# Patient Record
Sex: FEMALE | Race: WHITE | Employment: OTHER | URBAN - METROPOLITAN AREA
[De-identification: names, ages, dates, MRNs, and addresses within clinical notes are randomized per-mention and may not be internally consistent; named-entity substitution may affect disease eponyms.]

---

## 2017-01-03 ENCOUNTER — TELEPHONE (OUTPATIENT)
Dept: NEPHROLOGY | Facility: CLINIC | Age: 57
End: 2017-01-03

## 2017-01-03 DIAGNOSIS — M32.9 SLE (SYSTEMIC LUPUS ERYTHEMATOSUS) (H): Primary | ICD-10-CM

## 2017-01-03 NOTE — TELEPHONE ENCOUNTER
Left detailed message for patient with Dr. Clark recommendations.    Your labs are stable with the increase of the lisinopril. Will plan to follow closely for now to assure no flare of lupus nephritis. I know our initial plan was labs in 6 months but for reassurance, I would like to instead follow the labs every 3 months and can still plan follow-up in a year or sooner if needed based on the labs.  Team: bmp, ua, urine protein, urine microalb, c3, c4, ds-DNA every 3 months for now. Thank you,     Orders placed. Letter sent stating the following.  Labs due: March 2017, June 2017, September 2017  Follow up: December 2017 with labs same day or week    Tasha Granados RN, BSN  Nephrology Care Coordinator  [facility]

## 2017-01-03 NOTE — Clinical Note
January 3, 2017      Luz Marina Live  [address]  [place] [address]          Dear Luz Marina,    Your most recent lab results are attached.  Your labs are stable with the increase of the lisinopril. Will plan to follow closely for now to assure no flare of lupus nephritis. I know our initial plan was labs in 6 months but for reassurance, I would like to instead follow the labs every 3 months and can still plan follow-up in a year or sooner if needed based on the labs. Sanding lab orders have been placed. Please call with any questions or concerns    Labs due: March 2017, June 2017, September 2017  Follow up: December 2017 with labs same day or week    Sincerely,      Lavern Clark DO  Department of Renal Diseases and Hypertension  [facility]      Component      Latest Ref Rng 12/27/2016   Color Urine       Yellow   Appearance Urine       Clear   Glucose Urine      NEG mg/dL Negative   Bilirubin Urine      NEG Negative   Ketones Urine      NEG mg/dL Negative   Specific Gravity Urine      1.003 - 1.035 1.025   Blood Urine      NEG Trace (A)   pH Urine      5.0 - 7.0 pH 6.5   Protein Albumin Urine      NEG mg/dL 30 (A)   Urobilinogen Urine      0.2 - 1.0 EU/dL 0.2   Nitrite Urine      NEG Negative   Leukocyte Esterase Urine      NEG Negative   Source       Unspecified Urine   Sodium      133 - 144 mmol/L 140   Potassium      3.4 - 5.3 mmol/L 4.1   Chloride      94 - 109 mmol/L 107   Carbon Dioxide      20 - 32 mmol/L 27   Anion Gap      3 - 14 mmol/L 6   Glucose      70 - 99 mg/dL 105 (H)   Urea Nitrogen      7 - 30 mg/dL 21   Creatinine      0.52 - 1.04 mg/dL 1.03   GFR Estimate      >60 mL/min/1.7m2 55 (L)   GFR Estimate If Black      >60 mL/min/1.7m2 67   Calcium      8.5 - 10.1 mg/dL 8.7   WBC Urine      0 - 2 /HPF O - 2   RBC Urine      0 - 2 /HPF O - 2   Bacteria Urine      NEG /HPF Few (A)   Mucous Urine      NEG /LPF Present (A)   Protein Random Urine       0.44   Protein Total  Urine g/gr Creatinine      0 - 0.2 g/g Cr 0.28 (H)   Complement C3      76 - 169 mg/dL 66 (L)   Complement C4      15 - 50 mg/dL 16   Creatinine Urine       161

## 2017-01-10 ENCOUNTER — CARE COORDINATION (OUTPATIENT)
Dept: CARDIOLOGY | Facility: CLINIC | Age: 57
End: 2017-01-10

## 2017-01-10 NOTE — PROGRESS NOTES
Patient called requesting results of PFTs.  She states that she received all of her test results except the PFTs. She would like call back with results and recommendations.

## 2017-01-13 ENCOUNTER — CARE COORDINATION (OUTPATIENT)
Dept: CARDIOLOGY | Facility: CLINIC | Age: 57
End: 2017-01-13

## 2017-01-13 DIAGNOSIS — Q21.12 PFO (PATENT FORAMEN OVALE): ICD-10-CM

## 2017-01-13 DIAGNOSIS — I07.1 MODERATE TRICUSPID REGURGITATION: Primary | ICD-10-CM

## 2017-01-13 NOTE — PROGRESS NOTES
Dr. Myrick reviewed the results of patient's EVE.  Some abnormalities.  Dr. Myrick is recomending ta patient have a CArdiac MRI to rule out anomalous pulmonary venous drainage and to rule out Ebstein's anomaly.    Date: 1/13/2017    Time of Call: 10:54 AM     Diagnosis:  Abnormal EVE     [ VORB ] Ordering provider:   Order: Cardiac MRI     Order received by: Sonu SANTANA RN     Follow-up/additional notes: Orders entered in to [employer].  Patient called and voice message left with above information.  Contact number given for scheduling and for staff to answer any questions or concerns.  Awaiting call back or results of test.

## 2017-01-16 ENCOUNTER — CARE COORDINATION (OUTPATIENT)
Dept: CARDIOLOGY | Facility: CLINIC | Age: 57
End: 2017-01-16

## 2017-01-16 NOTE — PROGRESS NOTES
Left message for patient to call me to coordinate appt with Dr. Moreno.  She is being referred for shortness of breath and TR noted on previous echo.  Will wait to hear from patient.

## 2017-01-16 NOTE — PROGRESS NOTES
Spoke with patient regarding her PFT results are normal and don't explain her significant shortness of breath.  Based on her previous ECHO, recommend she see Dr. Moreno.   Discussed with Luz Marina Live that Dr. Moreno's RN would be calling her within the next week.  Pt expressed concern regarding her insurance and how she didn't have the insurance numbers.   SERENA Mac, CNP

## 2017-01-19 ENCOUNTER — ANTICOAGULATION THERAPY VISIT (OUTPATIENT)
Dept: ANTICOAGULATION | Facility: OTHER | Age: 57
End: 2017-01-19
Payer: COMMERCIAL

## 2017-01-19 DIAGNOSIS — I48.91 ATRIAL FIBRILLATION, UNSPECIFIED TYPE (H): ICD-10-CM

## 2017-01-19 DIAGNOSIS — Z79.01 LONG-TERM (CURRENT) USE OF ANTICOAGULANTS: Primary | ICD-10-CM

## 2017-01-19 DIAGNOSIS — Z79.01 LONG TERM CURRENT USE OF ANTICOAGULANT THERAPY: ICD-10-CM

## 2017-01-19 DIAGNOSIS — I48.91 ATRIAL FIBRILLATION (H): ICD-10-CM

## 2017-01-19 LAB — INR BLD: 2.7 (ref 0.86–1.14)

## 2017-01-19 PROCEDURE — 85610 PROTHROMBIN TIME: CPT | Mod: QW | Performed by: INTERNAL MEDICINE

## 2017-01-19 PROCEDURE — 99207 ZZC NO CHARGE NURSE ONLY: CPT | Performed by: INTERNAL MEDICINE

## 2017-01-19 PROCEDURE — 36416 COLLJ CAPILLARY BLOOD SPEC: CPT | Performed by: INTERNAL MEDICINE

## 2017-01-19 NOTE — PROGRESS NOTES
ANTICOAGULATION FOLLOW-UP CLINIC VISIT    Patient Name:  Luz Marina Live  Date:  1/19/2017  Contact Type:  Telephone    SUBJECTIVE:     Patient Findings     Positives No Problem Findings           OBJECTIVE    INR POINT OF CARE   Date Value Ref Range Status   01/19/2017 2.7* 0.86 - 1.14 Final       ASSESSMENT / PLAN  INR assessment THER    Recheck INR In: 5 WEEKS    INR Location Outside lab      Anticoagulation Summary as of 1/19/2017     INR goal 2.0-3.0   Selected INR 2.7 (1/19/2017)   Maintenance plan 12.5 mg (5 mg x 2.5) on Tue, Thu, Sat; 10 mg (5 mg x 2) all other days   Full instructions 12.5 mg on Tue, Thu, Sat; 10 mg all other days   Weekly total 77.5 mg   No change documented Deborah Alatorre RN   Plan last modified Michael Quispe RN (6/16/2016)   Next INR check 2/23/2017   Target end date     Indications   Long-term (current) use of anticoagulants [Z79.01] [Z79.01]  Atrial fibrillation (H) [I48.91]         Anticoagulation Episode Summary     INR check location     Preferred lab     Send INR reminders to [facility]    Comments       Anticoagulation Care Providers     Provider Role Specialty Phone number    Eliz Nguyen MD [facility] Internal Medicine [phone]            See the Encounter Report to view Anticoagulation Flowsheet and Dosing Calendar (Go to Encounters tab in chart review, and find the Anticoagulation Therapy Visit)    Dosage adjustment made based on physician directed care plan.    Deborah Alatorre, RN

## 2017-01-25 ENCOUNTER — CARE COORDINATION (OUTPATIENT)
Dept: CARDIOLOGY | Facility: CLINIC | Age: 57
End: 2017-01-25

## 2017-01-25 DIAGNOSIS — M32.9 SLE (SYSTEMIC LUPUS ERYTHEMATOSUS) (H): Primary | ICD-10-CM

## 2017-01-25 RX ORDER — HYDROXYCHLOROQUINE SULFATE 200 MG/1
200 TABLET, FILM COATED ORAL DAILY
Qty: 30 TABLET | Refills: 8 | Status: SHIPPED | OUTPATIENT
Start: 2017-01-25 | End: 2017-07-03

## 2017-01-25 NOTE — TELEPHONE ENCOUNTER
Prescription approved per Rheumatology Refill Protocol for 30 days supply and 8 refills.    Tanya Luke, ZEKEN, RN PHN  Rheumatology Care Coordinator  [facility]

## 2017-01-25 NOTE — PROGRESS NOTES
Contacted patient and spoke with her regarding referral to Dr. Moreno for shortness of breath, trcuspid insufficiency, and possible pulmonary hypertension.  Pt has a h/o CAD, CABG in her 40's.  She began having shortness of breath a while ago, a stress test was done and  RHC (2015) which she reported was normal. In August of 2015, she had an echo which showed moderate-severe tricuspid insufficiency, with a PASP of 60.     More recently, she underwent another echo, EVE.  The echo showed the tricuspid insufficiency had improved.  EVE was done which did not show ASD, but a small PFO.  She states she was informed of this.    Patient feels her shortness of breath could be from her weight, which is about 265.  However, she has not recently gained a lot of weight and previously she had not been short of breath.  We reviewed all her test results which she was aware of.  We discussed the reason for the referral.  As this does not seem emergent, she would like to come to see Dr. Moreno in March.  Appt given for March 15th.

## 2017-02-08 DIAGNOSIS — I48.91 ATRIAL FIBRILLATION, UNSPECIFIED TYPE (H): Primary | ICD-10-CM

## 2017-02-08 RX ORDER — METOPROLOL TARTRATE 50 MG
50 TABLET ORAL 2 TIMES DAILY
Qty: 180 TABLET | Refills: 3 | Status: SHIPPED | OUTPATIENT
Start: 2017-02-08 | End: 2017-02-13

## 2017-02-08 NOTE — TELEPHONE ENCOUNTER
[facility] pharmacy calling for a refill on metoprolol.  Patient sees Dr. Olivia at the [facility].  Will route to the correct refill pool.    Maine Ospina RN

## 2017-02-13 ENCOUNTER — CARE COORDINATION (OUTPATIENT)
Dept: CARDIOLOGY | Facility: CLINIC | Age: 57
End: 2017-02-13

## 2017-02-13 DIAGNOSIS — I48.91 ATRIAL FIBRILLATION, UNSPECIFIED TYPE (H): ICD-10-CM

## 2017-02-13 RX ORDER — METOPROLOL TARTRATE 50 MG
50 TABLET ORAL 2 TIMES DAILY
Qty: 180 TABLET | Refills: 3 | Status: SHIPPED | OUTPATIENT
Start: 2017-02-13 | End: 2018-01-28

## 2017-03-14 ENCOUNTER — PRE VISIT (OUTPATIENT)
Dept: CARDIOLOGY | Facility: CLINIC | Age: 57
End: 2017-03-14

## 2017-03-15 ENCOUNTER — OFFICE VISIT (OUTPATIENT)
Dept: CARDIOLOGY | Facility: CLINIC | Age: 57
End: 2017-03-15
Attending: INTERNAL MEDICINE
Payer: COMMERCIAL

## 2017-03-15 ENCOUNTER — ANTICOAGULATION THERAPY VISIT (OUTPATIENT)
Dept: ANTICOAGULATION | Facility: OTHER | Age: 57
End: 2017-03-15
Payer: COMMERCIAL

## 2017-03-15 VITALS
WEIGHT: 271.6 LBS | DIASTOLIC BLOOD PRESSURE: 94 MMHG | BODY MASS INDEX: 48.12 KG/M2 | SYSTOLIC BLOOD PRESSURE: 164 MMHG | HEART RATE: 63 BPM | HEIGHT: 63 IN | OXYGEN SATURATION: 97 %

## 2017-03-15 DIAGNOSIS — I25.10 CAD (CORONARY ARTERY DISEASE): ICD-10-CM

## 2017-03-15 DIAGNOSIS — I48.91 ATRIAL FIBRILLATION, UNSPECIFIED TYPE (H): ICD-10-CM

## 2017-03-15 DIAGNOSIS — R06.09 DYSPNEA ON EXERTION: ICD-10-CM

## 2017-03-15 DIAGNOSIS — Z79.01 LONG-TERM (CURRENT) USE OF ANTICOAGULANTS: ICD-10-CM

## 2017-03-15 DIAGNOSIS — I48.91 ATRIAL FIBRILLATION (H): ICD-10-CM

## 2017-03-15 DIAGNOSIS — R06.09 DYSPNEA ON EXERTION: Primary | ICD-10-CM

## 2017-03-15 LAB
ALBUMIN SERPL-MCNC: 3.6 G/DL (ref 3.4–5)
ALP SERPL-CCNC: 105 U/L (ref 40–150)
ALT SERPL W P-5'-P-CCNC: 60 U/L (ref 0–50)
ANION GAP SERPL CALCULATED.3IONS-SCNC: 6 MMOL/L (ref 3–14)
AST SERPL W P-5'-P-CCNC: 47 U/L (ref 0–45)
BILIRUB SERPL-MCNC: 0.8 MG/DL (ref 0.2–1.3)
BUN SERPL-MCNC: 18 MG/DL (ref 7–30)
CALCIUM SERPL-MCNC: 8.9 MG/DL (ref 8.5–10.1)
CHLORIDE SERPL-SCNC: 105 MMOL/L (ref 94–109)
CO2 SERPL-SCNC: 29 MMOL/L (ref 20–32)
CREAT SERPL-MCNC: 0.85 MG/DL (ref 0.52–1.04)
CRP SERPL-MCNC: 4 MG/L (ref 0–8)
ERYTHROCYTE [DISTWIDTH] IN BLOOD BY AUTOMATED COUNT: 13.2 % (ref 10–15)
GFR SERPL CREATININE-BSD FRML MDRD: 69 ML/MIN/1.7M2
GLUCOSE SERPL-MCNC: 93 MG/DL (ref 70–99)
HCT VFR BLD AUTO: 39.5 % (ref 35–47)
HGB BLD-MCNC: 13.3 G/DL (ref 11.7–15.7)
INR PPP: 3.25 (ref 0.86–1.14)
IRON SATN MFR SERPL: 18 % (ref 15–46)
IRON SERPL-MCNC: 46 UG/DL (ref 35–180)
MCH RBC QN AUTO: 30.2 PG (ref 26.5–33)
MCHC RBC AUTO-ENTMCNC: 33.7 G/DL (ref 31.5–36.5)
MCV RBC AUTO: 90 FL (ref 78–100)
PLATELET # BLD AUTO: 204 10E9/L (ref 150–450)
POTASSIUM SERPL-SCNC: 4.3 MMOL/L (ref 3.4–5.3)
PROT SERPL-MCNC: 8.3 G/DL (ref 6.8–8.8)
RBC # BLD AUTO: 4.4 10E12/L (ref 3.8–5.2)
SODIUM SERPL-SCNC: 139 MMOL/L (ref 133–144)
TIBC SERPL-MCNC: 263 UG/DL (ref 240–430)
WBC # BLD AUTO: 7.2 10E9/L (ref 4–11)

## 2017-03-15 PROCEDURE — 85610 PROTHROMBIN TIME: CPT | Performed by: INTERNAL MEDICINE

## 2017-03-15 PROCEDURE — 99212 OFFICE O/P EST SF 10 MIN: CPT

## 2017-03-15 PROCEDURE — 83540 ASSAY OF IRON: CPT | Performed by: INTERNAL MEDICINE

## 2017-03-15 PROCEDURE — 36415 COLL VENOUS BLD VENIPUNCTURE: CPT | Performed by: INTERNAL MEDICINE

## 2017-03-15 PROCEDURE — 80053 COMPREHEN METABOLIC PANEL: CPT | Performed by: INTERNAL MEDICINE

## 2017-03-15 PROCEDURE — 85027 COMPLETE CBC AUTOMATED: CPT | Performed by: INTERNAL MEDICINE

## 2017-03-15 PROCEDURE — 99214 OFFICE O/P EST MOD 30 MIN: CPT | Mod: ZP | Performed by: INTERNAL MEDICINE

## 2017-03-15 PROCEDURE — 99207 ZZC NO CHARGE NURSE ONLY: CPT | Performed by: FAMILY MEDICINE

## 2017-03-15 PROCEDURE — 86140 C-REACTIVE PROTEIN: CPT | Performed by: INTERNAL MEDICINE

## 2017-03-15 PROCEDURE — 83550 IRON BINDING TEST: CPT | Performed by: INTERNAL MEDICINE

## 2017-03-15 RX ORDER — LISINOPRIL 10 MG/1
10 TABLET ORAL DAILY
Qty: 90 TABLET | Refills: 3 | Status: SHIPPED | OUTPATIENT
Start: 2017-03-15 | End: 2017-11-09

## 2017-03-15 ASSESSMENT — PAIN SCALES - GENERAL: PAINLEVEL: NO PAIN (0)

## 2017-03-15 NOTE — MR AVS SNAPSHOT
After Visit Summary   3/15/2017    LuzM arina Live    MRN: [id number]           Patient Information     Date Of Birth          1960        Visit Information        Provider Department      3/15/2017 2:00 PM Twin Moreno MD [facility]        Today's Diagnoses     CAD (coronary artery disease)    -  1    Dyspnea on exertion          Care Instructions    You were seen today in the Cardiovascular Clinic at the [facility].   Cardiology Providers you saw during your visit:    Dr. Twin Moreno  Recommendations:   **Labs Today  Lisinopril 10 mg Daily  Follow-up:   Labs in 2 weeks   Sleep Study with Dr. Rangel  Follow up with Us in 3 months    For emergencies call 911.     If you have any questions regarding your visit please contact your care team:     Rima Sanchez RN  [facility]  Cardiology Care Coordinator-Heart Failure    Appointment scheduling or nurse questions: [phone]    On Call Cardiologist for after hours or on weekends: [phone]    option #4    If you need a medication refill please contact your pharmacy.  Please allow 3 business days for your refill to be completed.    As always, thank you for trusting us with your health care needs!        Follow-ups after your visit        Additional Services     Sleep Study Referral       Please be aware that coverage of these services is subject to the terms and limitations of your health insurance plan.  Call member services at your health plan with any benefit or coverage questions.      Please bring the following to your appointment:    >>   List of current medications   >>   This referral request   >>   Any documents/labs given to you for this referral    [facility]   [phone] (Age 15 and up)                  Follow-up notes from your care team     Return in about 3 months (around 6/15/2017) for with kristi Moreno, Labs, HEART FAILURE.      Your next 10  appointments already scheduled     Mar 15, 2017  4:00 PM CDT   Lab with [facility]   [facility] ([facility])    [address]  1st Floor  [facility] [address]   [phone]            Mar 23, 2017  1:30 PM CDT   Return Visit with Lucia Sanchez MD   [facility] ([facility]    [address] [address]   [phone]            Apr 03, 2017  1:00 PM CDT   New Sleep Patient with Rodrigo Nguyễn MD   [facility] [place] Sleep Study ([facility])    [address] [facility] [address]   [phone]            Jun 21, 2017 12:30 PM CDT   Lab with UC LAB   [facility] ([facility])    [address]  [address]st [facility] [address]   [phone]            Jun 21, 2017  1:00 PM CDT   (Arrive by 12:45 PM)   RETURN HEART FAILURE with Twin Moreno MD   [facility] ([facility])    [address] [place] [address]  3rd Floor  [facility] [address]   [phone]            Jul 03, 2017  3:00 PM CDT   Return Visit with Ever Adame MD   [facility] ([facility])    [address] [place] [address]   [phone]              Future tests that were ordered for you today     Open Future Orders        Priority Expected Expires Ordered    Sleep Study Referral Routine  3/15/2018 3/15/2017    Basic metabolic panel Routine 3/22/2017 5/5/2017 3/15/2017    Comprehensive metabolic panel Routine 3/15/2017 3/30/2017 3/15/2017    CBC with platelets Routine 3/15/2017 3/30/2017 3/15/2017    Iron and iron binding capacity Routine 3/15/2017 3/30/2017 3/15/2017    CRP inflammation Routine 3/15/2017 3/30/2017 3/15/2017            Who to contact     If you have questions or need follow up information about today's clinic visit or your schedule please contact [facility] directly at  "[phone].  Normal or non-critical lab and imaging results will be communicated to you by MyChart, letter or phone within 4 business days after the clinic has received the results. If you do not hear from us within 7 days, please contact the clinic through MyChart or phone. If you have a critical or abnormal lab result, we will notify you by phone as soon as possible.  Submit refill requests through [employer] or call your pharmacy and they will forward the refill request to us. Please allow 3 business days for your refill to be completed.          Additional Information About Your Visit        Care EveryWhere ID     This is your Care EveryWhere ID. This could be used by other organizations to access your [place] medical records  [id number]        Your Vitals Were     Pulse Height Pulse Oximetry BMI (Body Mass Index)          63 1.6 m (5' 3\") 97% 48.11 kg/m2         Blood Pressure from Last 3 Encounters:   03/15/17 (!) 164/94   12/28/16 110/63   12/22/16 133/84    Weight from Last 3 Encounters:   03/15/17 123.2 kg (271 lb 9.6 oz)   12/22/16 122.5 kg (270 lb)   12/20/16 124.7 kg (275 lb)                 Today's Medication Changes          These changes are accurate as of: 3/15/17  3:47 PM.  If you have any questions, ask your nurse or doctor.               These medicines have changed or have updated prescriptions.        Dose/Directions    * lisinopril 5 MG tablet   Commonly known as:  PRINIVIL/ZESTRIL   This may have changed:  Another medication with the same name was added. Make sure you understand how and when to take each.   Used for:  CKD (chronic kidney disease) stage 3, GFR 30-59 ml/min   Changed by:  Lavern Clark MD        Dose:  5 mg   Take 1 tablet (5 mg) by mouth daily   Quantity:  30 tablet   Refills:  11       * lisinopril 10 MG tablet   Commonly known as:  PRINIVIL/ZESTRIL   This may have changed:  You were already taking a medication with the same name, and this prescription was added. " Make sure you understand how and when to take each.   Used for:  CAD (coronary artery disease)   Changed by:  Twin Moreno MD        Dose:  10 mg   Take 1 tablet (10 mg) by mouth daily   Quantity:  90 tablet   Refills:  3       * Notice:  This list has 2 medication(s) that are the same as other medications prescribed for you. Read the directions carefully, and ask your doctor or other care provider to review them with you.         Where to get your medicines      These medications were sent to [facility]/pharmacy #[id number] - [place], [place] - [address]  [address], [place] [address]     Phone:  [phone]     lisinopril 10 MG tablet                Primary Care Provider Office Phone # Fax #    Eliz My Nguyen [phone]        [facility] [address] [place] [address] [address]  [facility] [address]        Thank you!     Thank you for choosing [facility]  for your care. Our goal is always to provide you with excellent care. Hearing back from our patients is one way we can continue to improve our services. Please take a few minutes to complete the written survey that you may receive in the mail after your visit with us. Thank you!             Your Updated Medication List - Protect others around you: Learn how to safely use, store and throw away your medicines at www.disposemymeds.org.          This list is accurate as of: 3/15/17  3:47 PM.  Always use your most recent med list.                   Brand Name Dispense Instructions for use    aspirin 81 MG tablet      Take by mouth daily       atorvastatin 40 MG tablet    LIPITOR    90 tablet    Take 1 tablet (40 mg) by mouth daily       CALCIUM 600-D PO      Take  by mouth 2 times daily.       hydroxychloroquine 200 MG tablet    PLAQUENIL    30 tablet    Take 1 tablet (200 mg) by mouth daily       * lisinopril 5 MG tablet    PRINIVIL/ZESTRIL    30 tablet    Take 1 tablet (5 mg) by mouth daily       * lisinopril 10 MG tablet     PRINIVIL/ZESTRIL    90 tablet    Take 1 tablet (10 mg) by mouth daily       metoprolol 50 MG tablet    LOPRESSOR    180 tablet    Take 1 tablet (50 mg) by mouth 2 times daily       MULTIPLE VITAMIN PO      Take  by mouth.       mupirocin 2 % nasal ointment    BACTROBAN NASAL    10 g    Place a thin layer inside right nose on septum BID x 2 weeks       omega-3 fatty acids 1200 MG capsule      Take 2 capsules by mouth 2 times daily       warfarin 5 MG tablet    COUMADIN    65 tablet    Take 12.5 mg TU, TH, SA and 10 mg the other 4 days or as directed by the coumadin clinic       * Notice:  This list has 2 medication(s) that are the same as other medications prescribed for you. Read the directions carefully, and ask your doctor or other care provider to review them with you.

## 2017-03-15 NOTE — LETTER
3/15/2017      RE: Luz Marina Live  [id number]  [place] [address]       Dear Colleague,    Thank you for the opportunity to participate in the care of your patient, Luz Marina Live, at the [facility] at [facility]. Please see a copy of my visit note below.      Impression  1. ASHD  2. History of CAB  3. Elevated PA pressure  4. Hypertension  5. Hyperlipidemia  6. Symptoms of sleep apnea without testing  7. PFO    1. Sleep referral to Dr. Nguyễn  2. Weight loss  3. Laboratories  4. Increase lisinopril    I had the opportunity to review Ms. Live chart as well as go over the results and findings.  She has exertional shortness of breath functional class 2 superimposed upon history of SLE, premature CAD resulting in by-pass, elevated body mass index and history suggestive of KEITH.  She has a history of atrial fibrillation and warfarin usage, facial cellulitis and has routine eye care in view of lupus medications.  She is not diabetic    Echocardiographic findings of TR, modest PA, normal systolic function.    We reviewed the treatments of her condition with optimal blood pressure and weight, exercise, titration of medications, control of sleep apnea (if present), good control of SLE.  We discussed KEITH testing and rationale.  We discussed follow-up 3 months post adoption of this medical plan, sooner if not doing well.      Thank you for allowing us to see her.  Twin      Current Outpatient Prescriptions   Medication     metoprolol (LOPRESSOR) 50 MG tablet     hydroxychloroquine (PLAQUENIL) 200 MG tablet     lisinopril (PRINIVIL/ZESTRIL) 5 MG tablet     aspirin 81 MG tablet     atorvastatin (LIPITOR) 40 MG tablet     warfarin (COUMADIN) 5 MG tablet     mupirocin (BACTROBAN NASAL) 2 % nasal ointment     Calcium Carbonate-Vitamin D (CALCIUM 600-D PO)     MULTIPLE VITAMIN PO     Omega-3 Fatty Acids (FISH OIL) 1200 MG capsule     No current facility-administered medications  for this visit.      Wt Readings from Last 24 Encounters:   03/15/17 123.2 kg (271 lb 9.6 oz)   16 122.5 kg (270 lb)   16 124.7 kg (275 lb)   16 123.5 kg (272 lb 4.8 oz)   16 121.4 kg (267 lb 11.2 oz)   16 122.5 kg (270 lb)   16 118.4 kg (261 lb)   16 117 kg (258 lb)   16 117 kg (258 lb)   16 116.6 kg (257 lb)   16 115.7 kg (255 lb)   12/18/15 115.9 kg (255 lb 9.6 oz)   09/23/15 111.4 kg (245 lb 8 oz)   09/21/15 111 kg (244 lb 11.2 oz)   09/14/15 110.8 kg (244 lb 4.8 oz)   09/02/15 112.3 kg (247 lb 9.6 oz)   08/28/15 121.3 kg (267 lb 8 oz)   06/01/15 111.2 kg (245 lb 3.2 oz)   04/29/15 111.4 kg (245 lb 9.6 oz)   04/06/15 111.6 kg (246 lb 1.6 oz)   03/10/15 115.4 kg (254 lb 8 oz)   01/26/15 115.8 kg (255 lb 6.4 oz)   14 115.6 kg (254 lb 12.8 oz)   14 116 kg (255 lb 12.8 oz)   : 1960  Study Date: 2016 01:31 PM  Age: 56 yrs  Gender: Female  [facility]  Reason For Study:     History: tricuspid regurgitation  Ordering Physician: JOSE ARELLANO  Referring Physician: JOSE ARELLANO  Performed By: Hugh Sharpe MD     BSA: 2.2 m2  Height: 63 in  Weight: 269 lb  ______________________________________________________________________________     Interpretation Summary  Left ventricular function, chamber size, wall motion, and wall thickness are  normal.The EF is 55-60%.  Global right ventricular function is normal. Mild right ventricular dilation  is present.  Mild to moderate TR with mild prolapse of the posterior leaflet of the  tricupsid valve.  A small patent foramen ovale is present. There is no ASD (secundum, sinus  venosus or unroofed coronary sinus). All 4 pulmonary veins drain into the  left atrium.  Mild pulmonary hypertension is present with RVSP 45mmHg above RAP.     ______________________________________________________________________________        Procedure  Transesophageal Echocardiogram with color and  spectral Doppler performed. The  procedure was performed in the Echo Lab. Informed consent for Transesophegeal  echo obtained. EVE Probe #12 was used during the procedure. Patient was  sedated using Fentanyl 100 mcg. Patient was sedated using Versed 4 mg. The  Transducer was inserted without difficulty . The patient tolerated the  procedure well. Complications None. ECG shows normal sinus rhythm. Good  quality two-dimensional was performed and interpreted.     Left Ventricle  Left ventricular function, chamber size, wall motion, and wall thickness are  normal.The EF is 55-60%.     Right Ventricle  Global right ventricular function is normal. Mild right ventricular dilation  is present.     Atria  Both atria appear normal. The left atrial appendage is normal. It is free of  spontaneous echo contrast and thrombus. A small patent foramen ovale is  present. The patent foramen ovale was demonstrated by color Doppler and  agitated saline bubble study . The patent foramen ovale was demonstrated with  Valsalva's maneuver. No evidence of ASD.     Mitral Valve  The mitral valve is normal. Trace mitral insufficiency is present.     Aortic Valve  Aortic valve is normal in structure and function. The aortic valve is  tricuspid.  Tricuspid Valve  Mild to moderate tricuspid insufficiency is present. Right ventricular  systolic pressure is 45mmHg above the right atrial pressure. Mild pulmonary  hypertension is present. Mild prolapse of the posterior leaflet of the  tricupsid valve is noted.     Pulmonic Valve  The pulmonic valve is normal.     Vessels  The pulmonary artery is normal. The aorta root is normal. The thoracic aorta  is normal. All four pulmonary veins visualized with dampened velocity  waveforms.     Pericardium  No pericardial effusion is present.     Compared to Previous Study  This study was compared with the study from 12/20/2016. A small PFO has been  identified.  .     ______________________________________________________________________________     Doppler Measurements & Calculations  TR max maria isabel: 336.7 cm/sec  TR max P.5 mmHg  ______________________________________________________________________________         Name: IDA PADRON  MRN: [id number]  : 1960  Study Date: 2016 01:31 PM  Age: 56 yrs  Gender: Female  [facility]  Reason For Study:     History: tricuspid regurgitation  Ordering Physician: JOSE ARELLANO  Referring Physician: JOSE ARELLANO  Performed By: Hugh Sharpe MD     BSA: 2.2 m2  Height: 63 in  Weight: 269 lb  ______________________________________________________________________________     Interpretation Summary  Left ventricular function, chamber size, wall motion, and wall thickness are  normal.The EF is 55-60%.  Global right ventricular function is normal. Mild right ventricular dilation  is present.  Mild to moderate TR with mild prolapse of the posterior leaflet of the  tricupsid valve.  A small patent foramen ovale is present. There is no ASD (secundum, sinus  venosus or unroofed coronary sinus). All 4 pulmonary veins drain into the  left atrium.  Mild pulmonary hypertension is present with RVSP 45mmHg above RAP.     ______________________________________________________________________________        Procedure  Transesophageal Echocardiogram with color and spectral Doppler performed. The  procedure was performed in the Echo Lab. Informed consent for Transesophegeal  echo obtained. EVE Probe #12 was used during the procedure. Patient was  sedated using Fentanyl 100 mcg. Patient was sedated using Versed 4 mg. The  Transducer was inserted without difficulty . The patient tolerated the  procedure well. Complications None. ECG shows normal sinus rhythm. Good  quality two-dimensional was performed and interpreted.     Left Ventricle  Left ventricular function, chamber size, wall motion, and wall thickness  are  normal.The EF is 55-60%.     Right Ventricle  Global right ventricular function is normal. Mild right ventricular dilation  is present.     Atria  Both atria appear normal. The left atrial appendage is normal. It is free of  spontaneous echo contrast and thrombus. A small patent foramen ovale is  present. The patent foramen ovale was demonstrated by color Doppler and  agitated saline bubble study . The patent foramen ovale was demonstrated with  Valsalva's maneuver. No evidence of ASD.     Mitral Valve  The mitral valve is normal. Trace mitral insufficiency is present.     Aortic Valve  Aortic valve is normal in structure and function. The aortic valve is  tricuspid.  Tricuspid Valve  Mild to moderate tricuspid insufficiency is present. Right ventricular  systolic pressure is 45mmHg above the right atrial pressure. Mild pulmonary  hypertension is present. Mild prolapse of the posterior leaflet of the  tricupsid valve is noted.     Pulmonic Valve  The pulmonic valve is normal.     Vessels  The pulmonary artery is normal. The aorta root is normal. The thoracic aorta  is normal. All four pulmonary veins visualized with dampened velocity  waveforms.     Pericardium  No pericardial effusion is present.     Compared to Previous Study  This study was compared with the study from 2016. A small PFO has been  identified. .     ______________________________________________________________________________     Doppler Measurements & Calculations  TR max maria isabel: 336.7 cm/sec  TR max P.5 mmHg  ______________________________________________________________________________                        Procedures:  ECHO: 09:   Interpretation Summary   The Ejection Fraction is estimated at 55-60%. No regional wall motion   abnormalities are seen. The right ventricle is normal size. Global right   ventricular function is normal. Right ventricular systolic pressure is 27mmHg   above the right atrial pressure. No  pericardial effusion is present. The   inferior vena cava is normal.   Stress test: 08-10-09:   1. Abnormal study with a high degree of certainty.   2. There is a predominantly fixed medium sized moderately decreased   intensity myocardial perfusion defect with minimal periinfarct   reversibility involving the apical anterior, mid anterior, and apical   region of the heart. There is corresponding hypokinesis. No other   adenosine induced fixed or reversible myocardial perfusion defect.   3. Left ventricular size is enlarged at rest. Transient ischemic   dilation of the left ventricle did not occur.   4. The left ventricular ejection fraction is 56 %.   5. Summed Stress Score is calculated at 18, which is associated   with increased risk of future coronary events.   CCL: 08-26-09: ARELLANO:   Mrs. Live is a 49 year old female with known coronary artery disease s/p MI in 1996 found to have minimal disease on catheterization. Her cardiovascular risk factors include HTN and hyperlipidemia. She presented with a 2 week history of dyspnea with exertion and lower extremity edema. She underwent Adenosine MPI which revealed a fixed medium-sized defect with minimal periinfarct reversability and anterior hypokinesis. Her LVEF was preserved at 56%. CT angio of the coronaries revealed moderate stenosis in the pLAD and dRCA and mild to moderate stenosis of the pLCx.   Access: 6F long RFA, 6F RFV   Coronary Anatomy:   LMCA: normal   LAD: There is a long, discrete, ectatic 70-80% lesion in the ostial and proximal LAD. There is one D1 branch without any significant disease.   LCx: no significant disease. There is a large OM1 branch that has a 50-60% stenosis prior to the branch bifurcation.   RCA: The proximal and mid RCA have luminal irregularities without significance. The distal RCA has a complex bifurcation lesion prior to the take-off of the rPDA. There is disease in the ostial PL1 as well.   Conclusions:   1. 3-vessel coronary  artery disease without left main lesion   Plan   Discussed the options in depth with the patient. Given the proximity of the LAD lesion to the LMCA and the complexity of the dRCA lesion, we recommended CABG for the patient. She will be admitted to the hospital.Discussed with Darleen Johnson MD.   Cardiac Surgery: 8/27/2009   Triple vessel coronary artery bypass grafting. Left internal mammary artery was placed to the left anterior descending coronary artery and separate reverse saphenous vein grafts were placed to the obtuse marginal and right posterior descending coronary arteries.     Please do not hesitate to contact me if you have any questions/concerns.     Sincerely,     Twin Moreno MD

## 2017-03-15 NOTE — NURSING NOTE
Med Reconcile: Reviewed and verified all current medications with the patient. The updated medication list was printed and given to the patient.  Return Appointment: Patient given instructions regarding scheduling next clinic visit. Patient demonstrated understanding of this information and agreed to call with further questions or concerns.  Medication Change: Patient was educated regarding prescribed medication change, including discussion of the indication, administration, side effects, and when to report to MD or RN. Patient demonstrated understanding of this information and agreed to call with further questions or concerns.  Patient stated she understood all health information given and agreed to call with further questions or concerns.     Recommendations:   **Labs Today  Lisinopril 10 mg Daily  Follow-up:   Labs in 2 weeks   Sleep Study  Follow up with Us in 3 months

## 2017-03-15 NOTE — PROGRESS NOTES
Impression  1. ASHD  2. History of CAB  3. Elevated PA pressure  4. Hypertension  5. Hyperlipidemia  6. Symptoms of sleep apnea without testing  7. PFO    1. Sleep referral to Dr. Nguyễn  2. Weight loss  3. Laboratories  4. Increase lisinopril    I had the opportunity to review Ms. Live chart as well as go over the results and findings.  She has exertional shortness of breath functional class 2 superimposed upon history of SLE, premature CAD resulting in by-pass, elevated body mass index and history suggestive of KEITH.  She has a history of atrial fibrillation and warfarin usage, facial cellulitis and has routine eye care in view of lupus medications.  She is not diabetic    Echocardiographic findings of TR, modest PA, normal systolic function.    We reviewed the treatments of her condition with optimal blood pressure and weight, exercise, titration of medications, control of sleep apnea (if present), good control of SLE.  We discussed KEITH testing and rationale.  We discussed follow-up 3 months post adoption of this medical plan, sooner if not doing well.      Thank you for allowing us to see herEmre Murcia      Current Outpatient Prescriptions   Medication     metoprolol (LOPRESSOR) 50 MG tablet     hydroxychloroquine (PLAQUENIL) 200 MG tablet     lisinopril (PRINIVIL/ZESTRIL) 5 MG tablet     aspirin 81 MG tablet     atorvastatin (LIPITOR) 40 MG tablet     warfarin (COUMADIN) 5 MG tablet     mupirocin (BACTROBAN NASAL) 2 % nasal ointment     Calcium Carbonate-Vitamin D (CALCIUM 600-D PO)     MULTIPLE VITAMIN PO     Omega-3 Fatty Acids (FISH OIL) 1200 MG capsule     No current facility-administered medications for this visit.      Wt Readings from Last 24 Encounters:   03/15/17 123.2 kg (271 lb 9.6 oz)   12/22/16 122.5 kg (270 lb)   12/20/16 124.7 kg (275 lb)   12/19/16 123.5 kg (272 lb 4.8 oz)   11/28/16 121.4 kg (267 lb 11.2 oz)   08/01/16 122.5 kg (270 lb)   05/20/16 118.4 kg (261 lb)   02/19/16 117 kg (258 lb)    16 117 kg (258 lb)   16 116.6 kg (257 lb)   16 115.7 kg (255 lb)   12/18/15 115.9 kg (255 lb 9.6 oz)   09/23/15 111.4 kg (245 lb 8 oz)   09/21/15 111 kg (244 lb 11.2 oz)   09/14/15 110.8 kg (244 lb 4.8 oz)   09/02/15 112.3 kg (247 lb 9.6 oz)   08/28/15 121.3 kg (267 lb 8 oz)   06/01/15 111.2 kg (245 lb 3.2 oz)   04/29/15 111.4 kg (245 lb 9.6 oz)   04/06/15 111.6 kg (246 lb 1.6 oz)   03/10/15 115.4 kg (254 lb 8 oz)   01/26/15 115.8 kg (255 lb 6.4 oz)   14 115.6 kg (254 lb 12.8 oz)   14 116 kg (255 lb 12.8 oz)   : 1960  Study Date: 2016 01:31 PM  Age: 56 yrs  Gender: Female  Patient Location: [facility]  Reason For Study:     History: tricuspid regurgitation  Ordering Physician: JOSE ARELLANO  Referring Physician: JOSE ARELLANO  Performed By: Hugh Sharpe MD     BSA: 2.2 m2  Height: 63 in  Weight: 269 lb  ______________________________________________________________________________     Interpretation Summary  Left ventricular function, chamber size, wall motion, and wall thickness are  normal.The EF is 55-60%.  Global right ventricular function is normal. Mild right ventricular dilation  is present.  Mild to moderate TR with mild prolapse of the posterior leaflet of the  tricupsid valve.  A small patent foramen ovale is present. There is no ASD (secundum, sinus  venosus or unroofed coronary sinus). All 4 pulmonary veins drain into the  left atrium.  Mild pulmonary hypertension is present with RVSP 45mmHg above RAP.     ______________________________________________________________________________        Procedure  Transesophageal Echocardiogram with color and spectral Doppler performed. The  procedure was performed in the Echo Lab. Informed consent for Transesophegeal  echo obtained. EVE Probe #12 was used during the procedure. Patient was  sedated using Fentanyl 100 mcg. Patient was sedated using Versed 4 mg. The  Transducer was inserted without difficulty . The  patient tolerated the  procedure well. Complications None. ECG shows normal sinus rhythm. Good  quality two-dimensional was performed and interpreted.     Left Ventricle  Left ventricular function, chamber size, wall motion, and wall thickness are  normal.The EF is 55-60%.     Right Ventricle  Global right ventricular function is normal. Mild right ventricular dilation  is present.     Atria  Both atria appear normal. The left atrial appendage is normal. It is free of  spontaneous echo contrast and thrombus. A small patent foramen ovale is  present. The patent foramen ovale was demonstrated by color Doppler and  agitated saline bubble study . The patent foramen ovale was demonstrated with  Valsalva's maneuver. No evidence of ASD.     Mitral Valve  The mitral valve is normal. Trace mitral insufficiency is present.     Aortic Valve  Aortic valve is normal in structure and function. The aortic valve is  tricuspid.  Tricuspid Valve  Mild to moderate tricuspid insufficiency is present. Right ventricular  systolic pressure is 45mmHg above the right atrial pressure. Mild pulmonary  hypertension is present. Mild prolapse of the posterior leaflet of the  tricupsid valve is noted.     Pulmonic Valve  The pulmonic valve is normal.     Vessels  The pulmonary artery is normal. The aorta root is normal. The thoracic aorta  is normal. All four pulmonary veins visualized with dampened velocity  waveforms.     Pericardium  No pericardial effusion is present.     Compared to Previous Study  This study was compared with the study from 2016. A small PFO has been  identified. .     ______________________________________________________________________________     Doppler Measurements & Calculations  TR max maria isabel: 336.7 cm/sec  TR max P.5 mmHg  ______________________________________________________________________________         Name: IDA PADRON  MRN: [id number]  : 1960  Study Date: 2016 01:31 PM  Age: 56  yrs  Gender: Female  Patient [facility]  Reason For Study:     History: tricuspid regurgitation  Ordering Physician: JOSE ARELLANO  Referring Physician: JOSE ARELLANO  Performed By: Hugh Sharpe MD     BSA: 2.2 m2  Height: 63 in  Weight: 269 lb  ______________________________________________________________________________     Interpretation Summary  Left ventricular function, chamber size, wall motion, and wall thickness are  normal.The EF is 55-60%.  Global right ventricular function is normal. Mild right ventricular dilation  is present.  Mild to moderate TR with mild prolapse of the posterior leaflet of the  tricupsid valve.  A small patent foramen ovale is present. There is no ASD (secundum, sinus  venosus or unroofed coronary sinus). All 4 pulmonary veins drain into the  left atrium.  Mild pulmonary hypertension is present with RVSP 45mmHg above RAP.     ______________________________________________________________________________        Procedure  Transesophageal Echocardiogram with color and spectral Doppler performed. The  procedure was performed in the Echo Lab. Informed consent for Transesophegeal  echo obtained. EVE Probe #12 was used during the procedure. Patient was  sedated using Fentanyl 100 mcg. Patient was sedated using Versed 4 mg. The  Transducer was inserted without difficulty . The patient tolerated the  procedure well. Complications None. ECG shows normal sinus rhythm. Good  quality two-dimensional was performed and interpreted.     Left Ventricle  Left ventricular function, chamber size, wall motion, and wall thickness are  normal.The EF is 55-60%.     Right Ventricle  Global right ventricular function is normal. Mild right ventricular dilation  is present.     Atria  Both atria appear normal. The left atrial appendage is normal. It is free of  spontaneous echo contrast and thrombus. A small patent foramen ovale is  present. The patent foramen ovale was demonstrated by color  Doppler and  agitated saline bubble study . The patent foramen ovale was demonstrated with  Valsalva's maneuver. No evidence of ASD.     Mitral Valve  The mitral valve is normal. Trace mitral insufficiency is present.     Aortic Valve  Aortic valve is normal in structure and function. The aortic valve is  tricuspid.  Tricuspid Valve  Mild to moderate tricuspid insufficiency is present. Right ventricular  systolic pressure is 45mmHg above the right atrial pressure. Mild pulmonary  hypertension is present. Mild prolapse of the posterior leaflet of the  tricupsid valve is noted.     Pulmonic Valve  The pulmonic valve is normal.     Vessels  The pulmonary artery is normal. The aorta root is normal. The thoracic aorta  is normal. All four pulmonary veins visualized with dampened velocity  waveforms.     Pericardium  No pericardial effusion is present.     Compared to Previous Study  This study was compared with the study from 2016. A small PFO has been  identified. .     ______________________________________________________________________________     Doppler Measurements & Calculations  TR max maria isabel: 336.7 cm/sec  TR max P.5 mmHg  ______________________________________________________________________________                        Procedures:  ECHO: 09:   Interpretation Summary   The Ejection Fraction is estimated at 55-60%. No regional wall motion   abnormalities are seen. The right ventricle is normal size. Global right   ventricular function is normal. Right ventricular systolic pressure is 27mmHg   above the right atrial pressure. No pericardial effusion is present. The   inferior vena cava is normal.   Stress test: 08-10-09:   1. Abnormal study with a high degree of certainty.   2. There is a predominantly fixed medium sized moderately decreased   intensity myocardial perfusion defect with minimal periinfarct   reversibility involving the apical anterior, mid anterior, and apical   region of the  heart. There is corresponding hypokinesis. No other   adenosine induced fixed or reversible myocardial perfusion defect.   3. Left ventricular size is enlarged at rest. Transient ischemic   dilation of the left ventricle did not occur.   4. The left ventricular ejection fraction is 56 %.   5. Summed Stress Score is calculated at 18, which is associated   with increased risk of future coronary events.   CCL: 08-26-09: ARELLANO:   Mrs. Live is a 49 year old female with known coronary artery disease s/p MI in 1996 found to have minimal disease on catheterization. Her cardiovascular risk factors include HTN and hyperlipidemia. She presented with a 2 week history of dyspnea with exertion and lower extremity edema. She underwent Adenosine MPI which revealed a fixed medium-sized defect with minimal periinfarct reversability and anterior hypokinesis. Her LVEF was preserved at 56%. CT angio of the coronaries revealed moderate stenosis in the pLAD and dRCA and mild to moderate stenosis of the pLCx.   Access: 6F long RFA, 6F RFV   Coronary Anatomy:   LMCA: normal   LAD: There is a long, discrete, ectatic 70-80% lesion in the ostial and proximal LAD. There is one D1 branch without any significant disease.   LCx: no significant disease. There is a large OM1 branch that has a 50-60% stenosis prior to the branch bifurcation.   RCA: The proximal and mid RCA have luminal irregularities without significance. The distal RCA has a complex bifurcation lesion prior to the take-off of the rPDA. There is disease in the ostial PL1 as well.   Conclusions:   1. 3-vessel coronary artery disease without left main lesion   Plan   Discussed the options in depth with the patient. Given the proximity of the LAD lesion to the LMCA and the complexity of the dRCA lesion, we recommended CABG for the patient. She will be admitted to the hospital.Discussed with Darleen Johnson MD.   Cardiac Surgery: 8/27/2009   Triple vessel coronary artery bypass grafting.  Left internal mammary artery was placed to the left anterior descending coronary artery and separate reverse saphenous vein grafts were placed to the obtuse marginal and right posterior descending coronary arteries.

## 2017-03-15 NOTE — MR AVS SNAPSHOT
Luz Marina Live   3/15/2017   Anticoagulation Therapy Visit    Description:  56 year old female   Provider:  Eliz Nguyen MD   Department:  [facility]           INR as of 3/15/2017     Today's INR 3.25!      Anticoagulation Summary as of 3/15/2017     INR goal 2.0-3.0   Today's INR 3.25!   Full instructions 12.5 mg on Tue, Thu, Sat; 10 mg all other days   Next INR check 4/26/2017    Indications   Long-term (current) use of anticoagulants [Z79.01] [Z79.01]  Atrial fibrillation (H) [I48.91]         Contact Numbers     Clinic Number:         March 2017 Details    Sun Mon Tue Wed Thu Fri Sat        1               2               3               4                 5               6               7               8               9               10               11                 12               13               14               15      10 mg   See details      16      12.5 mg         17      10 mg         18      12.5 mg           19      10 mg         20      10 mg         21      12.5 mg         22      10 mg         23      12.5 mg         24      10 mg         25      12.5 mg           26      10 mg         27      10 mg         28      12.5 mg         29      10 mg         30      12.5 mg         31      10 mg           Date Details   03/15 This INR check               How to take your warfarin dose     To take:  10 mg Take 2 of the 5 mg tablets.    To take:  12.5 mg Take 2.5 of the 5 mg tablets.           April 2017 Details    Sun Mon Tue Wed Thu Fri Sat           1      12.5 mg           2      10 mg         3      10 mg         4      12.5 mg         5      10 mg         6      12.5 mg         7      10 mg         8      12.5 mg           9      10 mg         10      10 mg         11      12.5 mg         12      10 mg         13      12.5 mg         14      10 mg         15      12.5 mg           16      10 mg         17      10 mg         18      12.5 mg         19      10 mg         20      12.5 mg          21      10 mg         22      12.5 mg           23      10 mg         24      10 mg         25      12.5 mg         26            27               28               29                 30                      Date Details   No additional details    Date of next INR:  4/26/2017         How to take your warfarin dose     To take:  10 mg Take 2 of the 5 mg tablets.    To take:  12.5 mg Take 2.5 of the 5 mg tablets.

## 2017-03-15 NOTE — NURSING NOTE
Chief Complaint   Patient presents with     New Patient     shortness of breath, trcuspid insufficiency, and possible pulmonary hypertension

## 2017-03-16 ENCOUNTER — TELEPHONE (OUTPATIENT)
Dept: ANTICOAGULATION | Facility: OTHER | Age: 57
End: 2017-03-16

## 2017-03-16 NOTE — TELEPHONE ENCOUNTER
I have attempted to contact this patient by phone with the following results: no answer. VM left asking her to call the [facility] back to discuss. Michael Quispe RN, BSN

## 2017-03-16 NOTE — PROGRESS NOTES
ANTICOAGULATION FOLLOW-UP CLINIC VISIT    Patient Name:  Luz Marina Live  Date:  3/16/2017  Contact Type:  Telephone    SUBJECTIVE:     Patient Findings     Positives No Problem Findings           OBJECTIVE    INR   Date Value Ref Range Status   03/15/2017 3.25 (H) 0.86 - 1.14 Final       ASSESSMENT / PLAN  INR assessment THER    Recheck INR In: 6 WEEKS    INR Location Outside lab      Anticoagulation Summary as of 3/15/2017     INR goal 2.0-3.0   Today's INR 3.25!   Maintenance plan 12.5 mg (5 mg x 2.5) on Tue, Thu, Sat; 10 mg (5 mg x 2) all other days   Full instructions 12.5 mg on Tue, Thu, Sat; 10 mg all other days   Weekly total 77.5 mg   No change documented Michael Quispe RN   Plan last modified Michael Quispe RN (6/16/2016)   Next INR check 4/26/2017   Target end date     Indications   Long-term (current) use of anticoagulants [Z79.01] [Z79.01]  Atrial fibrillation (H) [I48.91]         Anticoagulation Episode Summary     INR check location     Preferred lab     Send INR reminders to [facility] MAGDALENE Hoff 5 mg tabs, [facility] (needs staff message)      Anticoagulation Care Providers     Provider Role Specialty Phone number    Eliz Nguyen MD [facility] [phone]            See the Encounter Report to view Anticoagulation Flowsheet and Dosing Calendar (Go to Encounters tab in chart review, and find the Anticoagulation Therapy Visit)    Dosage adjustment made based on physician directed care plan.    Michael Quispe RN

## 2017-03-23 ENCOUNTER — OFFICE VISIT (OUTPATIENT)
Dept: DERMATOLOGY | Facility: CLINIC | Age: 57
End: 2017-03-23

## 2017-03-23 DIAGNOSIS — L71.9 ROSACEA: Primary | ICD-10-CM

## 2017-03-23 PROCEDURE — 96999 UNLISTED SPEC DERM SVC/PX: CPT | Performed by: DERMATOLOGY

## 2017-03-23 NOTE — LETTER
3/23/2017      RE: Luz Marina Live  [address] [place] [address]       See procedure note.     Lucia Sanchez MD

## 2017-03-23 NOTE — NURSING NOTE
Dermatology Rooming Note    Luz Marina Live's goals for this visit include:   Chief Complaint   Patient presents with     Derm Problem     rosacea PDL       Is a scribe okay for this visit:YES    Are records needed for this visit(If yes, obtain release of information): No     Vitals: There were no vitals taken for this visit.    Referring Provider:  No referring provider defined for this encounter.      Anali Vernon LPN

## 2017-03-23 NOTE — PROCEDURES
Laser- VBeam(Pulsed Dye Laser) Procedure Note: Cosmetic    Procedure Date: Mar 23, 2017    Attending Staff Surgeon: Lucia Sanchez MD    Resident Surgeon: none    Assistant: Anali Vernon LPN    Operating Room Data:     Surgery/Procedure Date:    SAME     Pre-operative Diagnosis:   Rosacea  Location: cheeks and nose    Operation/Procedure    Vbeam pulsed dye laser treatment#: 2     Post-operative Diagnosis:  SAME    Laser Settings:  Energy:7 J/cm2  Spot size:7mm  Pulse width:  6 mS (0.45 thru 40 mS)  Dynamic cooling spray settin mS  Dynamic cooling device delay:  20 mS    Anesthesia:  None    Description of Operation/Procedure:   The nature and purpose of the procedure, associated risks, possible consequences, complications and alternative methods of treatment were explained in detail, this includes but is not limited to hyperpigmentation, hypopigmentation, scarring, bruising, hair loss pain/discomfort, eye injury, and blister.   We reviewed that the outcome could be any of the following: no improvement, slight improvement or change in skin color & texture, the skin might be permanently lighter or darker, and though uncommon, superficial scarring may occur.  Multiple treatments may be recommended.   A  operative consent were obtained. Time-out was performed.  The patient was positioned to optimally expose the area treated.  Protective eyewear was worn by the patient and goggles on all personnel in the treatment room.  The laser energy output was verified by meter reading.      The clinically evident lesion(s) was/were treated with Tamela Vbeam pulsed dye laser (595 nm) beam as above.  A total of 149 pulses were used.  The patient tolerated the procedure well and no complications were noted. Post operative instructions were provided. The total laser operation and preparation time was 15 minutes.      The patient will follow-up in 2 months.    The patient will pay the cosmetic fee today.     Staff  Involved:  Scribe/Staff    Scribe Disclosure:   I, Freda Ventura, am serving as a scribe to document services personally performed by Dr. Lucia Sanchez, based on data collection and the provider's statements to me.     Provider Disclosure:   I agree with above History, Review of Systems, Physical exam and Plan. I have reviewed the content of the documentation and have edited it as needed. I have personally performed the services documented here and the documentation accurately represents those services and the decisions I have made.     Lucia Sanchez MD    Department of Dermatology  [facility]: Phone: [phone], Fax:[phone]  [facility]: Phone: [phone], Fax: [phone]

## 2017-03-23 NOTE — LETTER
3/23/2017      RE: Luz Marina Live  [address] [place] [address]       See procedure note.     Lucia Sanchez MD

## 2017-03-23 NOTE — MR AVS SNAPSHOT
After Visit Summary   3/23/2017    Luz Marina Live    MRN: [id number]           Patient Information     Date Of Birth          1960        Visit Information        Provider Department      3/23/2017 1:30 PM Lucia Sanchez MD [facility]        Today's Diagnoses     Rosacea    -  1       Follow-ups after your visit        Your next 10 appointments already scheduled     Apr 03, 2017  1:00 PM CDT   New Sleep Patient with Rodrigo Nguyễn MD   [facility], [place], Sleep Study ([facility])    [address] [place] [id number] [phone]            Jun 20, 2017  3:00 PM CDT   PROCEDURE with Lucia Sanchez MD, PROC RM 2 SURG   [facility] ([facility])    [id number] [place] [id number] [phone]            Jun 21, 2017 12:30 PM CDT   Lab with  LAB   [facility] ([facility])    [address] [place] [address]  1st [facility] [address] [phone]            Jun 21, 2017  1:00 PM CDT   (Arrive by 12:45 PM)   RETURN HEART FAILURE with Twin Moreno MD   [facility] ([facility])    [address] [place] [address]  3rd [facility] [address] [phone]            Jul 03, 2017  3:00 PM CDT   Return Visit with Ever Adame MD   [facility])    [id number] [place] [id number] [phone]              Who to contact     If you have questions or need follow up information about today's clinic visit or your schedule please contact [facility] directly at [phone].  Normal or non-critical lab and imaging results will be communicated to you by MyChart, letter or phone within 4 business days after the clinic has received the results. If you do not hear from us within 7 days, please contact the clinic through MyChart or phone. If you have a  critical or abnormal lab result, we will notify you by phone as soon as possible.  Submit refill requests through [email or web address] or call your pharmacy and they will forward the refill request to us. Please allow 3 business days for your refill to be completed.          Additional Information About Your Visit        [employer] Information     [email or web address] is an electronic gateway that provides easy, online access to your medical records. With [email or web address], you can request a clinic appointment, read your test results, renew a prescription or communicate with your care team.     To sign up for [email or web address] visit the website at [email or web address]   You will be asked to enter the access code listed below, as well as some personal information. Please follow the directions to create your username and password.     Your access code is: [email or web address]  Expires: 2017  1:46 PM     Your access code will  in 90 days. If you need help or a new code, please contact your [facility] or call [phone] for assistance.        Care EveryWhere ID     This is your Care EveryWhere ID. This could be used by other organizations to access your [place] medical records  [id number]         Blood Pressure from Last 3 Encounters:   03/15/17 (!) 164/94   16 110/63   16 133/84    Weight from Last 3 Encounters:   03/15/17 123.2 kg (271 lb 9.6 oz)   16 122.5 kg (270 lb)   16 124.7 kg (275 lb)              We Performed the Following     C PULSE DYE LASER, 3 AREAS        Primary Care Provider Office Phone # Fax #    Eliz My Nguyen [phone]        [facility] [address] [facility] [address]  [facility] [address]        Thank you!     Thank you for choosing [facility]  for your care. Our goal is always to provide you with excellent care. Hearing back from our patients is one way we can continue to improve our services. Please take a few minutes to complete the  written survey that you may receive in the mail after your visit with us. Thank you!             Your Updated Medication List - Protect others around you: Learn how to safely use, store and throw away your medicines at www.disposemymeds.org.          This list is accurate as of: 3/23/17  1:49 PM.  Always use your most recent med list.                   Brand Name Dispense Instructions for use    aspirin 81 MG tablet      Take by mouth daily       atorvastatin 40 MG tablet    LIPITOR    90 tablet    Take 1 tablet (40 mg) by mouth daily       CALCIUM 600-D PO      Take  by mouth 2 times daily.       hydroxychloroquine 200 MG tablet    PLAQUENIL    30 tablet    Take 1 tablet (200 mg) by mouth daily       * lisinopril 5 MG tablet    PRINIVIL/ZESTRIL    30 tablet    Take 1 tablet (5 mg) by mouth daily       * lisinopril 10 MG tablet    PRINIVIL/ZESTRIL    90 tablet    Take 1 tablet (10 mg) by mouth daily       metoprolol 50 MG tablet    LOPRESSOR    180 tablet    Take 1 tablet (50 mg) by mouth 2 times daily       MULTIPLE VITAMIN PO      Take  by mouth.       mupirocin 2 % nasal ointment    BACTROBAN NASAL    10 g    Place a thin layer inside right nose on septum BID x 2 weeks       omega-3 fatty acids 1200 MG capsule      Take 2 capsules by mouth 2 times daily       warfarin 5 MG tablet    COUMADIN    65 tablet    Take 12.5 mg TU, TH, SA and 10 mg the other 4 days or as directed by the coumadin clinic       * Notice:  This list has 2 medication(s) that are the same as other medications prescribed for you. Read the directions carefully, and ask your doctor or other care provider to review them with you.

## 2017-03-31 DIAGNOSIS — R06.09 DYSPNEA ON EXERTION: ICD-10-CM

## 2017-03-31 LAB
ANION GAP SERPL CALCULATED.3IONS-SCNC: 6 MMOL/L (ref 3–14)
BUN SERPL-MCNC: 29 MG/DL (ref 7–30)
CALCIUM SERPL-MCNC: 8.7 MG/DL (ref 8.5–10.1)
CHLORIDE SERPL-SCNC: 108 MMOL/L (ref 94–109)
CO2 SERPL-SCNC: 28 MMOL/L (ref 20–32)
CREAT SERPL-MCNC: 0.93 MG/DL (ref 0.52–1.04)
GFR SERPL CREATININE-BSD FRML MDRD: 62 ML/MIN/1.7M2
GLUCOSE SERPL-MCNC: 99 MG/DL (ref 70–99)
POTASSIUM SERPL-SCNC: 4.4 MMOL/L (ref 3.4–5.3)
SODIUM SERPL-SCNC: 142 MMOL/L (ref 133–144)

## 2017-03-31 PROCEDURE — 36415 COLL VENOUS BLD VENIPUNCTURE: CPT | Performed by: INTERNAL MEDICINE

## 2017-03-31 PROCEDURE — 80048 BASIC METABOLIC PNL TOTAL CA: CPT | Performed by: INTERNAL MEDICINE

## 2017-04-03 ENCOUNTER — OFFICE VISIT (OUTPATIENT)
Dept: SLEEP MEDICINE | Facility: CLINIC | Age: 57
End: 2017-04-03
Attending: INTERNAL MEDICINE
Payer: COMMERCIAL

## 2017-04-03 VITALS
BODY MASS INDEX: 48.04 KG/M2 | DIASTOLIC BLOOD PRESSURE: 90 MMHG | RESPIRATION RATE: 18 BRPM | SYSTOLIC BLOOD PRESSURE: 168 MMHG | HEIGHT: 63 IN | HEART RATE: 59 BPM | OXYGEN SATURATION: 97 % | WEIGHT: 271.1 LBS

## 2017-04-03 DIAGNOSIS — G47.33 OBSTRUCTIVE SLEEP APNEA SYNDROME: Primary | ICD-10-CM

## 2017-04-03 DIAGNOSIS — F51.04 PSYCHOPHYSIOLOGICAL INSOMNIA: ICD-10-CM

## 2017-04-03 DIAGNOSIS — R06.09 DYSPNEA ON EXERTION: ICD-10-CM

## 2017-04-03 PROCEDURE — 99211 OFF/OP EST MAY X REQ PHY/QHP: CPT | Mod: ZF

## 2017-04-03 PROCEDURE — 40000809 ZZH STATISTIC NO DOCUMENTATION TO SUPPORT CHARGE

## 2017-04-03 RX ORDER — ZOLPIDEM TARTRATE 5 MG/1
TABLET ORAL
Qty: 1 TABLET | Refills: 0 | Status: SHIPPED | OUTPATIENT
Start: 2017-04-03 | End: 2017-11-06

## 2017-04-03 NOTE — NURSING NOTE
"Chief Complaint   Patient presents with     Consult     Possible sleep apnea       Initial /90 (BP Location: Right arm, Patient Position: Chair, Cuff Size: Adult Large)  Pulse 59  Resp 18  Ht 1.6 m (5' 3\")  Wt 123 kg (271 lb 1.6 oz)  SpO2 97%  BMI 48.02 kg/m2 Estimated body mass index is 48.02 kg/(m^2) as calculated from the following:    Height as of this encounter: 1.6 m (5' 3\").    Weight as of this encounter: 123 kg (271 lb 1.6 oz).  Medication Reconciliation: complete   Neck 39cm      Moy DAVIS      "

## 2017-04-03 NOTE — MR AVS SNAPSHOT
"              After Visit Summary   4/3/2017    Luz Marina Live    MRN: [id number]           Patient Information     Date Of Birth          1960        Visit Information        Provider Department      4/3/2017 1:00 PM Rodrigo Nguyễn MD [facility], [place], Sleep Study        Today's Diagnoses     Obstructive sleep apnea syndrome    -  1    Dyspnea on exertion        Psychophysiological insomnia          Care Instructions    MY TREATMENT INFORMATION FOR SLEEP APNEA-  Luz Marina Live    DOCTOR : RODRIGO NGUYỄN  SLEEP CENTER :      MY CONTACT NUMBER:       If I haven't had a sleep study yet, what can I expect?  A personal story from [employer]  https://www.[employer].com/watch?v=AxPLmlRpnCs    Am I having a home sleep study?  Here is a video in case you get home and want to make sure you have done it correctly  https://[email or web address].com/watch?v=TUJ2G0yGgg6&feature=youtu.be    Frequently asked questions:  1. What is Obstructive Sleep Apnea (KEITH)? KEITH is the most common type of sleep apnea. Apnea literally means, \"without breath.\" It is characterized by repetitive pauses in breathing, despite continued effort to breathe, and is usually associated with a reduction in blood oxygen saturation. Apneas can last 10 to over 60 seconds. It is caused by narrowing or collapse of the upper airway as muscles relax during sleep. Severity of sleep apnea is determined by frequency of breathing events and their effect on your sleep and oxygen levels determined during sleep testing.   2. What are the consequences of KEITH? Symptoms include: daytime sleepiness- possibly increasing the risk of falling asleep while driving, unrefreshing/restless sleep, snoring, insomnia, waking frequently to urinate, waking with heartburn or reflux, reduced concentration and memory, and morning headaches. Other health consequences may include development of high blood pressure and other cardiovascular disease in persons who are susceptible. Untreated KEITH  can contribute to " heart disease, stroke and diabetes.   3. What are the treatment options? In most situations, sleep apnea is a lifelong disease that must be managed with daily therapy. Medications are not effective for sleep apnea and surgery is generally not performed until other therapies have been tried. Therapy is usually tailored to the individual patient based on many factors including your wishes as well as severity of sleep apnea and severity of obesity. Continuous Positive Airway (CPAP) is the most reliable treatment. An oral device to hold your jaw forward is usually the next most reliable option. Other options include postioning devices (to keep you off your back), weight loss, and surgery including a tongue pacing device. There is more detail about some of these options below.            1. CPAP-  WHAT DOES IT DO AND HOW CAN I LEARN TO WEAR IT?                               BEFORE I START, CAN I WATCH A MOVIE TO GET A PLAN ON HOW TO USE CPAP?  https://www.[employer]com/watch?f=n4H80ro250H      Continuous positive airway pressure, or CPAP, is the most effective treatment for obstructive sleep apnea. It works by blowing room air, through a mask, to hold your throat open. A decision to use CPAP is a major step forward in the pursuit of a healthier life. The successful use of CPAP will help you breathe easier, sleep better and live healthier. You can choose CPAP equipment from any durable medical equipment provider that meets your needs.  Using CPAP can be a positive experience if you keep these monte points in mind:  1. Commitment  CPAP is not a quick fix for your problem. It involves a long-term commitment to improve your sleep and your health.    2. Communication  Stay in close communication with both your sleep doctor and your CPAP supplier. Ask lots of questions and seek help when you need it.    3. Consistency  Use CPAP all night, every night and for every nap. You will receive the maximum health benefits from CPAP when  "you use it every time that you sleep. This will also make it easier for your body to adjust to the treatment.    4. Correction  The first machine and mask that you try may not be the best ones for you. Work with your sleep doctor and your CPAP supplier to make corrections to your equipment selection. Ask about trying a different type of machine or mask if you have ongoing problems. Make sure that your mask is a good fit and learn to use your equipment properly.    5. Challenge  Tell a family member or close friend to ask you each morning if you used your CPAP the previous night. Have someone to challenge you to give it your best effort.    6. Connection   Your adjustment to CPAP will be easier if you are able to connect with others who use the same treatment. Ask your sleep doctor if there is a support group in your area for people who have sleep apnea, or look for one on the Internet.  7. Comfort   Increase your level of comfort by using a saline spray, decongestant or heated humidifier if CPAP irritates your nose, mouth or throat. Use your unit's \"ramp\" setting to slowly get used to the air pressure level. There may be soft pads you can buy that will fit over your mask straps. Look on www.CPAP.com for accessories that can help make CPAP use more comfortable.  8. Cleaning   Clean your mask, tubing and headgear on a regular basis. Put this time in your schedule so that you don't forget to do it. Check and replace the filters for your CPAP unit and humidifier.    9. Completion   Although you are never finished with CPAP therapy, you should reward yourself by celebrating the completion of your first month of treatment. Expect this first month to be your hardest period of adjustment. It will involve some trial and error as you find the machine, mask and pressure settings that are right for you.    10. Continuation  After your first month of treatment, continue to make a daily commitment to use your CPAP all night, " every night and for every nap.    CPAP-Tips to starting with success:  Begin using your CPAP for short periods of time during the day while you watch TV or read.    Use CPAP every night and for every nap. Using it less often reduces the health benefits and makes it harder for your body to get used to it.    Make small adjustments to your mask, tubing, straps and headgear until you get the right fit. Tightening the mask may actually worsen the leak.  If it leaves significant marks on your face or irritates the bridge of your nose, it may not be the best mask for you.  Speak with the person who supplied the mask and consider trying other masks. Insurances will allow you to try different masks during the first month of starting CPAP.  Insurance also covers a new mask, hose and filter about every 6 months.    Use a saline nasal spray to ease mild nasal congestion. Neti-Pot or saline nasal rinses may also help. Nasal gel sprays can help reduce nasal dryness.  Biotene mouthwash can be helpful to protect your teeth if you experience frequent dry mouth.  Dry mouth may be a sign of air escaping out of your mouth or out of the mask in the case of a full face mask.  Speak with your provider if you expect that is the case.     Take a nasal decongestant to relieve more severe nasal or sinus congestion.  Do not use Afrin (oxymetazoline) nasal spray more than 3 days in a row.  Speak with your sleep doctor if your nasal congestion is chronic.    Use a heated humidifier that fits your CPAP model to enhance your breathing comfort. Adjust the heat setting up if you get a dry nose or throat, down if you get condensation in the hose or mask.  Position the CPAP lower than you so that any condensation in the hose drains back into the machine rather than towards the mask.    Try a system that uses nasal pillows if traditional masks give you problems.    Clean your mask, tubing and headgear once a week. Make sure the equipment dries  fully.    Regularly check and replace the filters for your CPAP unit and humidifier.    Work closely with your sleep provider and your CPAP supplier to make sure that you have the machine, mask and air pressure setting that works best for you. It is better to stop using it and call your provider to solve problems than to lay awake all night frustrated with the device.    BESIDES CPAP, WHAT OTHER THERAPIES ARE THERE?      Positioning Device  Positioning devices are generally used when sleep apnea is mild and only occurs on your back.This example shows a pillow that straps around the waist. It may be appropriate for those whose sleep study shows milder sleep apnea that occurs primarily when lying flat on one's back. Preliminary studies have shown benefit but effectiveness at home may need to be verified by a home sleep test. These devices are generally not covered by medical insurance.                      Oral Appliance  What is oral appliance therapy?  An oral appliance is a small acrylic device that fits over the upper and lower teeth or tongue (similar to an orthodontic retainer or a mouth guard). This device slightly advances the lower jaw or tongue, which moves the base of the tongue forward, opens the airway, improves breathing and can effectively treat snoring and obstructive sleep apnea sleep apnea. The appliance is fabricated and customized by a qualified dentist with experience in treating snoring and sleep apnea. Oral appliances are usually well tolerated and have relatively high compliance by patients1, 2, 3.  When is an oral appliance indicated?  Oral appliance therapy is recommended as a first-line treatment for patients with primary snoring, mild sleep apnea, and for patients with moderate sleep apnea who prefer appliance therapy to use of CPAP4, 5. Severity of sleep apnea is determined by sleep testing and is based on the number of respiratory events per hour of sleep.   How successful is oral  appliance therapy?  The success rate of oral appliance therapy in patients with mild sleep apnea is 75-80% while in patients with moderate sleep apnea it is 50-70%. The chance of success in patients with severe sleep apnea is 40-50%. The research also shows that oral appliances have a beneficial effect on the cardiovascular health of KEITH patients at the same magnitude as CPAP therapy7.  Oral appliances should be a second-line treatment in cases of severe sleep apnea, but if not completely successful then a combination therapy utilizing CPAP plus oral appliance therapy may be effective. Oral appliances tend to be effective in a broad range of patients although studies show that the patients who have the highest success are females, younger patients, those with milder disease, and less severe obesity. 3, 6.   The chances of success are lower in patients who have more severe KEITH, are older, and those who are morbidly obese.     Example of an oral appliance   Finding a dentist that practices dental sleep medicine  Specific training is available through the American Academy of Dental Sleep Medicine for dentists interested in working in the field of sleep. To find a dentist who is educated in the field of sleep and the use of oral appliances, near you, visit the Web site of the American Academy of Dental Sleep Medicine; also see   http://www.accpstorage.org/newOrganization/patients/oralAppliances.pdf  To search for a dentist certified in these practices:  Http://aadsm.org/FindADentist.aspx?1  1. Tyson, et al. Objectively measured vs self-reported compliance during oral appliance therapy for sleep-disordered breathing. Chest 2013; 144(5): 5481-9137.  2. Nick et al. Objective measurement of compliance during oral appliance therapy for sleep-disordered breathing. Thorax 2013; 68(1): 91-96.  3. Juventino et al. Mandibular advancement devices in 620 men and women with KEITH and snoring: tolerability and predictors  of treatment success. Chest 2004; 125: 7134-5717.  4. Ashleigh et al. Oral appliances for snoring and KEITH: a review. Sleep 2006; 29: 244-262.  5. Margarita et al. Oral appliance treatment for KEITH: an update. J Clin Sleep Med 2014; 10(2): 215-227.  6. Jose et al. Predictors of OSAH treatment outcome. J Dent Res 2007; 86: 5651-4984.      Weight Loss:    Weight management is a personal decision.  If you are interested in exploring weight loss strategies, the following discussion covers the impact on weight loss on sleep apnea and the approaches that may be successful.    Weight loss decreases severity of sleep apnea in most people with obesity. For those with mild obesity who have developed snoring with weight gain, even 15-30 pound weight loss can improve and occasionally eliminate sleep apnea.  Structured and life-long dietary and health habits are necessary to lose weight and keep healthier weight levels.     Though there may be significant health benefits from weight loss, long-term weight loss is very difficult to achieve- studies show success with dietary management in less than 10% of people. In addition, substantial weight loss may require years of dietary control and may be difficult if patients have severe obesity. In these cases, surgical management may be considered.  Finally, older individuals who have tolerated obesity without health complications may be less likely to benefit from weight loss strategies.    Your BMI is Body mass index is 48.02 kg/(m^2).  Body mass index (BMI) is one way to tell whether you are at a healthy weight, overweight, or obese. It measures your weight in relation to your height.  A BMI of 18.5 to 24.9 is in the healthy range. A person with a BMI of 25 to 29.9 is considered overweight, and someone with a BMI of 30 or greater is considered obese. More than two-thirds of American adults are considered overweight or obese.  Being overweight or obese increases the risk for  further weight gain. Excess weight may lead to heart disease and diabetes.  Creating and following plans for healthy eating and physical activity may help you improve your health.  Weight control is part of healthy lifestyle and includes exercise, emotional health, and healthy eating habits. Careful eating habits lifelong are the mainstay of weight control. Though there are significant health benefits from weight loss, long-term weight loss with diet alone may be very difficult to achieve- studies show long-term success with dietary management in less than 10% of people. Attaining a healthy weight may be especially difficult to achieve in those with severe obesity. In some cases, medications, devices and surgical management might be considered.  What can you do?  If you are overweight or obese and are interested in methods for weight loss, you should discuss this with your provider.     Consider reducing daily calorie intake by 500 calories.     Keep a food journal.     Avoiding skipping meals, consider cutting portions instead.    Diet combined with exercise helps maintain muscle while optimizing fat loss. Strength training is particularly important for building and maintaining muscle mass. Exercise helps reduce stress, increase energy, and improves fitness. Increasing exercise without diet control, however, may not burn enough calories to loose weight.       Start walking three days a week 10-20 minutes at a time    Work towards walking thirty minutes five days a week     Eventually, increase the speed of your walking for 1-2 minutes at time    In addition, we recommend that you review healthy lifestyles and methods for weight loss available through the National Institutes of Health patient information sites:  http://win.niddk.nih.gov/publications/index.htm    And look into health and wellness programs that may be available through your health insurance provider, employer, local community center, or [place]  club.    Weight management plan: Patient was referred to their PCP to discuss a diet and exercise plan.    Surgery:    Upper Airway Surgery for KEITH  Surgery for KEITH is a second-line treatment option in the management of sleep apnea.  Surgery should be considered for patients who are having a difficult time tolerating CPAP.    Surgery for KEITH is directed at areas that are responsible for narrowing or complete obstruction of the airway during sleep.  There are a wide range of procedures available to enlarge and/or stabilize the airway to prevent blockage of breathing in the three major areas where it can occur: the palate, tongue, and nasal regions.  Successful surgical treatment depends on the accurate identification of the factors responsible for obstructive sleep apnea in each person.  A personalized approach is required because there is no single treatment that works well for everyone.  Because of anatomic variation, consultation with an examination by a sleep surgeon is a critical first step in determining what surgical options are best for each patient.  In some cases, examination during sedation may be recommended in order to guide the selection of procedures.  Patients will be counseled about risks and benefits as well as the typical recovery course after surgery. Surgery is typically not a cure for a person s KEITH.  However, surgery will often significantly improve one s KEITH severity (termed  success rate ).  Even in the absence of a cure, surgery will decrease the cardiovascular risk associated with OSA7; improve overall quality of life8 (sleepiness, functionality, sleep quality, etc).          Palate Procedures:  Patients with KEITH often have narrowing of their airway in the region of their tonsils and uvula.  The goals of palate procedures are to widen the airway in this region as well as to help the tissues resist collapse.  Modern palate procedure techniques focus on tissue conservation and soft tissue  rearrangement, rather than tissue removal.  Often the uvula is preserved in this procedure. Residual sleep apnea is common in patient after pharyngoplasty with an average reduction in sleep apnea events of 33%2.      Tongue Procedures:  While patients are awake, the muscles that surround the throat are active and keep this region open for breathing. These muscles relax during sleep, allowing the tongue and other structures to collapse and block breathing.  There are several different tongue procedures available.  Selection of a tongue base procedure depends on characteristics seen on physical exam.  Generally, procedures are aimed at removing bulky tissues in this area or preventing the back of the tongue from falling back during sleep.  Success rates for tongue surgery range from 50-62%3.    Hypoglossal Nerve Stimulation:  Hypoglossal nerve stimulation has recently received approval from the [place] Food and Drug Administration for the treatment of obstructive sleep apnea.  This is based on research showing that the system was safe and effective in treating sleep apnea6.  Results showed that the median AHI score decreased 68%, from 29.3 to 9.0. This therapy uses an implant system that senses breathing patterns and delivers mild stimulation to airway muscles, which keeps the airway open during sleep.  The system consists of three fully implanted components: a small generator (similar in size to a pacemaker), a breathing sensor, and a stimulation lead.  Using a small handheld remote, a patient turns the therapy on before bed and off upon awakening.    Candidates for this device must be greater than 22 years of age, have moderate to severe KEITH (AHI between 20-65), BMI less than 32, have tried CPAP/oral appliance without success, and have appropriate upper airway anatomy (determined by a sleep endoscopy performed by Dr. Patel).    Hypoglossal Nerve Stimulation Pathway:    The sleep surgeon s office will work with  the patient through the insurance prior-authorization process (including communications and appeals).    Nasal Procedures:  Nasal obstruction can interfere with nasal breathing during the day and night.  Studies have shown that relief of nasal obstruction can improve the ability of some patients to tolerate positive airway pressure therapy for obstructive sleep apnea1.  Treatment options include medications such as nasal saline, topical corticosteroid and antihistamine sprays, and oral medications such as antihistamines or decongestants. Non-surgical treatments can include external nasal dilators for selected patients. If these are not successful by themselves, surgery can improve the nasal airway either alone or in combination with these other options.      Combination Procedures:  Combination of surgical procedures and other treatments may be recommended, particularly if patients have more than one area of narrowing or persistent positional disease.  The success rate of combination surgery ranges from 66-80%2,3.      1. Rich WILLS. The Role of the Nose in Snoring and Obstructive Sleep Apnoea: An Update.  Eur Arch Otorhinolaryngol. 2011; 268: 1365-73.  2.  Isabel SM; Mary JA; Parker JR; Pallanch JF; Magdy MB; Awais SG; Priscilla LEI. Surgical modifications of the upper airway for obstructive sleep apnea in adults: a systematic review and meta-analysis. SLEEP 2010;33(10):2903-0680. Bell GONSALEZ. Hypopharyngeal surgery in obstructive sleep apnea: an evidence-based medicine review.  Arch Otolaryngol Head Neck Surg. 2006 Feb;132(2):206-13.  3. Mariusz YH1, Porfirio Y, Ruben TAMAR. The efficacy of anatomically based multilevel surgery for obstructive sleep apnea. Otolaryngol Head Neck Surg. 2003 Oct;129(4):327-35.  4. Bell GONSALEZ, Goldberg A. Hypopharyngeal Surgery in Obstructive Sleep Apnea: An Evidence-Based Medicine Review. Arch Otolaryngol Head Neck Surg. 2006 Feb;132(2):206-13.  5. Racheal JULIEN et al. Upper-Airway Stimulation  for Obstructive Sleep Apnea.  N Engl J Med. 2014 Jan 9;370(2):139-49.  6. Ramy Y et al. Increased Incidence of Cardiovascular Disease in Middle-aged Men with Obstructive Sleep Apnea. Am J Respir Crit Care Med; 2002 166: 159-165  7. Vince SALGADO et al. Studying Life Effects and Effectiveness of Palatopharyngoplasty (SLEEP) study: Subjective Outcomes of Isolated Uvulopalatopharyngoplasty. Otolaryngol Head Neck Surg. 2011; 144: 623-631.                        Follow-ups after your visit        Your next 10 appointments already scheduled     Jun 20, 2017  3:00 PM CDT   PROCEDURE with Lucia Sanchez MD, PROC RM 2 SURG   [facility]    [id number] [place] [id number] [phone]            Jun 21, 2017 12:30 PM CDT   Lab with  LAB   [facility])    [address] [place] [address]  1st Floor  [facility] [id number] [phone]            Jun 21, 2017  1:00 PM CDT   (Arrive by 12:45 PM)   RETURN HEART FAILURE with Twin Moreno MD   [facility] ([facility])    [address] [place] [address]  3rd Floor  [facility] [id number] [phone]            Jul 03, 2017  3:00 PM CDT   Return Visit with Ever Adame MD   [facility])    [id number] [place] [id number] [phone]              Future tests that were ordered for you today     Open Future Orders        Priority Expected Expires Ordered    Comprehensive Sleep Study Routine  9/30/2017 4/3/2017    ABG-Blood Gas Arterial ([place] / [place] / [place] / [facility]) Routine  6/2/2017 4/3/2017            Who to contact     If you have questions or need follow up information about today's clinic visit or your schedule please [facility] directly at [phone].  Normal or non-critical lab and imaging results will be communicated to you by Jan  "letter or phone within 4 business days after the clinic has received the results. If you do not hear from us within 7 days, please contact the clinic through [employer] or phone. If you have a critical or abnormal lab result, we will notify you by phone as soon as possible.  Submit refill requests through [employer] or call your pharmacy and they will forward the refill request to us. Please allow 3 business days for your refill to be completed.          Additional Information About Your Visit        [employer] [facility] Information     [facility] lets you send messages to your doctor, view your test results, renew your prescriptions, schedule appointments and more. To sign up, go to [email or web address] . Click on \"Log in\" on the left side of the screen, which will take you to the Welcome page. Then click on \"Sign up Now\" on the right side of the page.     You will be asked to enter the access code listed below, as well as some personal information. Please follow the directions to create your username and password.     Your access code is: [email or web address]  Expires: 2017  1:46 PM     Your access code will  in 90 days. If you need help or a new code, please call your [place] clinic or [phone].        Care EveryWhere ID     This is your Care EveryWhere ID. This could be used by other organizations to access your [place] medical records  [id number]        Your Vitals Were     Pulse Respirations Height Pulse Oximetry BMI (Body Mass Index)       59 18 1.6 m (5' 3\") 97% 48.02 kg/m2        Blood Pressure from Last 3 Encounters:   17 168/90   03/15/17 (!) 164/94   16 110/63    Weight from Last 3 Encounters:   17 123 kg (271 lb 1.6 oz)   03/15/17 123.2 kg (271 lb 9.6 oz)   16 122.5 kg (270 lb)              We Performed the Following     Sleep Study Referral          Today's Medication Changes          These changes are accurate as of: 4/3/17  2:02 PM.  If you have any questions, ask your nurse or " doctor.               Start taking these medicines.        Dose/Directions    zolpidem 5 MG tablet   Commonly known as:  AMBIEN   Used for:  Psychophysiological insomnia   Started by:  Rodrigo Nguyễn MD        Take tablet by mouth 15 minutes prior to sleep, for insomnia   Quantity:  1 tablet   Refills:  0            Where to get your medicines      Some of these will need a paper prescription and others can be bought over the counter.  Ask your nurse if you have questions.     Bring a paper prescription for each of these medications     zolpidem 5 MG tablet                Primary Care Provider Office Phone # Fax #    Eliz My Nguyen [phone]        [facility] [address]  [facility] [address]        Thank you!     Thank you for choosing [facility], SLEEP STUDY  for your care. Our goal is always to provide you with excellent care. Hearing back from our patients is one way we can continue to improve our services. Please take a few minutes to complete the written survey that you may receive in the mail after your visit with us. Thank you!             Your Updated Medication List - Protect others around you: Learn how to safely use, store and throw away your medicines at www.disposemymeds.org.          This list is accurate as of: 4/3/17  2:02 PM.  Always use your most recent med list.                   Brand Name Dispense Instructions for use    aspirin 81 MG tablet      Take by mouth daily       atorvastatin 40 MG tablet    LIPITOR    90 tablet    Take 1 tablet (40 mg) by mouth daily       CALCIUM 600-D PO      Take  by mouth 2 times daily.       hydroxychloroquine 200 MG tablet    PLAQUENIL    30 tablet    Take 1 tablet (200 mg) by mouth daily       * lisinopril 5 MG tablet    PRINIVIL/ZESTRIL    30 tablet    Take 1 tablet (5 mg) by mouth daily       * lisinopril 10 MG tablet    PRINIVIL/ZESTRIL    90 tablet    Take 1 tablet (10 mg) by mouth daily       metoprolol 50 MG  tablet    LOPRESSOR    180 tablet    Take 1 tablet (50 mg) by mouth 2 times daily       MULTIPLE VITAMIN PO      Take  by mouth.       mupirocin 2 % nasal ointment    BACTROBAN NASAL    10 g    Place a thin layer inside right nose on septum BID x 2 weeks       omega-3 fatty acids 1200 MG capsule      Take 2 capsules by mouth 2 times daily       warfarin 5 MG tablet    COUMADIN    65 tablet    Take 12.5 mg TU, TH, SA and 10 mg the other 4 days or as directed by the coumadin clinic       zolpidem 5 MG tablet    AMBIEN    1 tablet    Take tablet by mouth 15 minutes prior to sleep, for insomnia       * Notice:  This list has 2 medication(s) that are the same as other medications prescribed for you. Read the directions carefully, and ask your doctor or other care provider to review them with you.

## 2017-04-04 NOTE — PROGRESS NOTES
Luz Marina Live is a 56-year-old female we are asked to see at the request of Eliz Nguyen for evaluation of possible obstructive sleep apnea.   1.  Obstructive sleep apnea.  This patient has a body mass index of 48, underlying hypertension, is greater than 50 years of age, has loud snoring and interrupted breathing on a nightly basis. She does not have daytime sleepiness and has a neck circumference of 16 inches.  In this setting, her risk of obstructive sleep apnea is approximately 80%.  She also presents with poorly controlled blood pressure at this day's measure of 168/90.   2.  Hypertension, currently on beta blocker and ACE inhibitor.   3.  Coronary vascular disease, status post 3-vessel bypass with preserved left ventricular function but findings of mild pulmonary hypertension (pulmonary artery pressure 45+ right atrial pressure) and right ventricular dilatation.   4.  History of lupus/RA and immunosuppression and steroid therapy for immunologic arthritis starting in 1993 contributing to substantial weight gain.     5.  History of carpal tunnel syndrome, currently not symptomatic.      SLEEP HABITS:  This patient does not have regular difficulty getting to sleep or staying asleep.  She falls asleep typically at 12:00 a.m. within 30 minutes of going to bed and awakens at 8:30 in the morning without difficulty.  On weekends she will sleep as late as 9:00 in the morning.  She gets up once during the night to go to the bathroom and is only occasionally bothered by discomfort in her lower extremities.  She does not have motor restlessness, does not have sleep talking, sleep walking or features of dream enactment.  She tends to sleep on her stomach and on her sides preferentially and seldom finds herself sleeping on her back.  She does use audio book recordings at night but does not use any other electronic devices.      PHYSICAL EXAMINATION:   GENERAL:  Demonstrates a healthy-appearing female with a body mass index of  48.   VITAL SIGNS:  Blood pressure 168/90, pulse 59, oxygen saturation 97%.  Neck circumference 16 inches.     MOUTH:  Edentulous with Mallampati 3.   LUNGS:  [place] clear bilaterally.   HEART:  Regular rhythm, no murmurs.  Although the patient has had occasional peripheral edema, there is no edema or stasis change today, and patient has central obesity.      ASSESSMENT:   1.  Obstructive sleep apnea with significant risk for hypoventilation syndrome in the setting of a body mass index of 48.   2.  Coronary artery disease, status post triple bypass surgery with normal left ventricular functions but findings of right ventricular dilatation and pulmonary hypertension.  These findings can reflect underlying nocturnal hypoxemia/hypercapnia.   3.  Morbid obesity.   4.  Hypertension, currently poorly controlled.      PLAN:   1.  We did refer the patient back to Dr. Eliz Nguyen for repeat blood pressure measures and consideration for alteration of dosing.  The patient did acknowledge being somewhat agitated at the time of her clinic visit because she was late for clinic.   2.  Sleep studies with transcutaneous CO2 monitoring and blood gases to confirm accuracy of measures with consideration for CPAP.  This patient is edentulous and is interested in oral device and would require multiple dental implants.  This process is somewhat daunting and can be expensive.  We will  the patient following review of this in the morning.  In the meantime we have suggested that she attempt to desensitize herself to the use of a mask at night prior to the initiation of the baseline portion of the sleep study.  She has had at least 1 episode of a feeling of claustrophobia with the use of a mask in the past.      If this patient has severe hypercapnia at night, we will opt for bilevel positive airway pressure support to minimize pCO2 if it is greater than 60.      Total time 45 minutes, greater than 50% counseling.         DEISY JENSEN,  MD             D: 2017 14:17   T: 2017 03:41   MT: denis      Name:     IDA PADRON   MRN:      [id number]        Account:      [id number]   :      1960           Visit Date:   2017      Document: [id number]       cc: Eliz Nguyen MD

## 2017-04-19 ENCOUNTER — ANTICOAGULATION THERAPY VISIT (OUTPATIENT)
Dept: ANTICOAGULATION | Facility: OTHER | Age: 57
End: 2017-04-19
Payer: COMMERCIAL

## 2017-04-19 DIAGNOSIS — I48.91 ATRIAL FIBRILLATION, UNSPECIFIED TYPE (H): ICD-10-CM

## 2017-04-19 DIAGNOSIS — Z79.01 LONG-TERM (CURRENT) USE OF ANTICOAGULANTS: ICD-10-CM

## 2017-04-19 DIAGNOSIS — I48.91 ATRIAL FIBRILLATION (H): ICD-10-CM

## 2017-04-19 DIAGNOSIS — Z79.01 LONG TERM CURRENT USE OF ANTICOAGULANT THERAPY: ICD-10-CM

## 2017-04-19 LAB — INR BLD: 1.5 (ref 0.86–1.14)

## 2017-04-19 PROCEDURE — 36416 COLLJ CAPILLARY BLOOD SPEC: CPT | Performed by: INTERNAL MEDICINE

## 2017-04-19 PROCEDURE — 99207 ZZC NO CHARGE NURSE ONLY: CPT | Performed by: INTERNAL MEDICINE

## 2017-04-19 PROCEDURE — 85610 PROTHROMBIN TIME: CPT | Mod: QW | Performed by: INTERNAL MEDICINE

## 2017-04-19 NOTE — PROGRESS NOTES
ANTICOAGULATION FOLLOW-UP CLINIC VISIT    Patient Name:  Luz Marina Live  Date:  4/19/2017  Contact Type:  Telephone    SUBJECTIVE:     Patient Findings     Positives Change in medications, Change in diet/appetite (Luz Marina did have a good sized serving of spinach yesteday and also this AM in her smoothie, which she usually doesnt have that much 2 days together. She'll resume her usual diet.)           OBJECTIVE    INR Point of Care   Date Value Ref Range Status   04/19/2017 1.5 (H) 0.86 - 1.14 Final     Comment:     This test is intended for monitoring Coumadin therapy.  Results are not   accurate   in patients with prolonged INR due to factor deficiency.         ASSESSMENT / PLAN  INR assessment SUB    Recheck INR In: 2 WEEKS    INR Location Outside lab      Anticoagulation Summary as of 4/19/2017     INR goal 2.0-3.0   Today's INR 1.5!   Maintenance plan 12.5 mg (5 mg x 2.5) on Tue, Thu, Sat; 10 mg (5 mg x 2) all other days   Full instructions 4/19: 12.5 mg; Otherwise 12.5 mg on Tue, Thu, Sat; 10 mg all other days   Weekly total 77.5 mg   Plan last modified Michael Quispe RN (6/16/2016)   Next INR check 5/3/2017   Target end date     Indications   Long-term (current) use of anticoagulants [Z79.01] [Z79.01]  Atrial fibrillation (H) [I48.91]         Anticoagulation Episode Summary     INR check location     Preferred lab     Send INR reminders to [facility] POOL    Comments 5 mg tabs, [facility] (needs staff message)      Anticoagulation Care Providers     Provider Role Specialty Phone number    Eliz Nguyen MD [facility] Internal Medicine [phone]            See the Encounter Report to view Anticoagulation Flowsheet and Dosing Calendar (Go to Encounters tab in chart review, and find the Anticoagulation Therapy Visit)    Dosage adjustment made based on physician directed care plan.    Deborah Alatorre RN

## 2017-04-19 NOTE — MR AVS SNAPSHOT
Luz Marina Live   4/19/2017   Anticoagulation Therapy Visit    Description:  56 year old female   Provider:  Eliz Nguyen MD   Department:  [facility] Anticoag           INR as of 4/19/2017     Today's INR 1.5!      Anticoagulation Summary as of 4/19/2017     INR goal 2.0-3.0   Today's INR 1.5!   Full instructions 4/19: 12.5 mg; Otherwise 12.5 mg on Tue, Thu, Sat; 10 mg all other days   Next INR check 5/3/2017    Indications   Long-term (current) use of anticoagulants [Z79.01] [Z79.01]  Atrial fibrillation (H) [I48.91]         Contact Numbers     Clinic Number:         April 2017 Details    Sun Mon Tue Wed Thu Fri Sat           1                 2               3               4               5               6               7               8                 9               10               11               12               13               14               15                 16               17               18               19      12.5 mg   See details      20      12.5 mg         21      10 mg         22      12.5 mg           23      10 mg         24      10 mg         25      12.5 mg         26      10 mg         27      12.5 mg         28      10 mg         29      12.5 mg           30      10 mg                Date Details   04/19 This INR check               How to take your warfarin dose     To take:  10 mg Take 2 of the 5 mg tablets.    To take:  12.5 mg Take 2.5 of the 5 mg tablets.           May 2017 Details    Sun Mon Tue Wed Thu Fri Sat      1      10 mg         2      12.5 mg         3            4               5               6                 7               8               9               10               11               12               13                 14               15               16               17               18               19               20                 21               22               23               24               25               26               27                  28               29               30               31                   Date Details   No additional details    Date of next INR:  5/3/2017         How to take your warfarin dose     To take:  10 mg Take 2 of the 5 mg tablets.    To take:  12.5 mg Take 2.5 of the 5 mg tablets.

## 2017-05-04 ENCOUNTER — TELEPHONE (OUTPATIENT)
Dept: OBGYN | Facility: CLINIC | Age: 57
End: 2017-05-04

## 2017-05-04 NOTE — TELEPHONE ENCOUNTER
Telephone call from patient with reports of a cold/sinus that she has had treated twice now with an antibiotic and she is feeling worse She denies fever, but states she is SOB on exertion. I noted that she had infiltrates on her chest x ray in early April and she did have Doxycycline for 10 days along with an Albuterol inhaler. She was encouraged to use the Albuterol, she has not been using this. She states she has been doing the Neti pot. She was encouraged to use Mucinex and be evaluated today in a Urgent care or [facility]. She verbalized understanding and stated she would do this.

## 2017-05-08 ENCOUNTER — ANTICOAGULATION THERAPY VISIT (OUTPATIENT)
Dept: ANTICOAGULATION | Facility: OTHER | Age: 57
End: 2017-05-08
Payer: COMMERCIAL

## 2017-05-08 DIAGNOSIS — Z79.01 LONG-TERM (CURRENT) USE OF ANTICOAGULANTS: ICD-10-CM

## 2017-05-08 DIAGNOSIS — I48.91 ATRIAL FIBRILLATION, UNSPECIFIED TYPE (H): ICD-10-CM

## 2017-05-08 DIAGNOSIS — I48.91 ATRIAL FIBRILLATION (H): ICD-10-CM

## 2017-05-08 DIAGNOSIS — Z79.01 LONG TERM CURRENT USE OF ANTICOAGULANT THERAPY: ICD-10-CM

## 2017-05-08 PROCEDURE — 36416 COLLJ CAPILLARY BLOOD SPEC: CPT | Performed by: INTERNAL MEDICINE

## 2017-05-08 PROCEDURE — 85610 PROTHROMBIN TIME: CPT | Mod: QW | Performed by: INTERNAL MEDICINE

## 2017-05-08 PROCEDURE — 99207 ZZC NO CHARGE NURSE ONLY: CPT | Performed by: INTERNAL MEDICINE

## 2017-05-08 NOTE — MR AVS SNAPSHOT
Luz Marina Live   5/8/2017   Anticoagulation Therapy Visit    Description:  56 year old female   Provider:  Eliz Nguyen MD   Department:  [facility] Anticoag           INR as of 5/8/2017     Today's INR No new INR was available at the time of this encounter.      Anticoagulation Summary as of 5/8/2017     INR goal 2.0-3.0   Today's INR No new INR was available at the time of this encounter.   Full instructions 5/11: 5 mg; Otherwise 12.5 mg on Tue, Thu, Sat; 10 mg all other days   Next INR check 5/25/2017    Indications   Long-term (current) use of anticoagulants [Z79.01] [Z79.01]  Atrial fibrillation (H) [I48.91]         Contact Numbers     Clinic Number:         May 2017 Details    Sun Mon Tue Wed Thu Fri Sat      1               2               3               4               5               6                 7               8      10 mg   See details      9      12.5 mg         10      10 mg         11      5 mg         12      10 mg         13      12.5 mg           14      10 mg         15      10 mg         16      12.5 mg         17      10 mg         18      12.5 mg         19      10 mg         20      12.5 mg           21      10 mg         22      10 mg         23      12.5 mg         24      10 mg         25            26               27                 28               29               30               31                   Date Details   05/08 This INR check       Date of next INR:  5/25/2017         How to take your warfarin dose     To take:  5 mg Take 1 of the 5 mg tablets.    To take:  10 mg Take 2 of the 5 mg tablets.    To take:  12.5 mg Take 2.5 of the 5 mg tablets.

## 2017-05-11 ENCOUNTER — TELEPHONE (OUTPATIENT)
Dept: FAMILY MEDICINE | Facility: OTHER | Age: 57
End: 2017-05-11

## 2017-05-11 LAB — INR BLD: 3.6 (ref 0.86–1.14)

## 2017-05-11 NOTE — TELEPHONE ENCOUNTER
Reason for call:  Pt states she had her INR drawn Monday and has not received a call telling her what dose of meds to take.  Please call to tony yeh

## 2017-05-11 NOTE — PROGRESS NOTES
ANTICOAGULATION FOLLOW-UP CLINIC VISIT    Patient Name:  Luz Marina Live  Date:  5/11/2017  Contact Type:  Telephone    SUBJECTIVE:     Patient Findings     Positives Antibiotic use or infection (Pt just finished up a Z-oralia. )    Comments Pt was in the lab on 5/8/17 and the INR order was cancelled for some reason. I spoke with Sindy Frey in the lab and she will work on reordering the lab but at this time tells me with her INR was 3.6 on 5/8/17.            OBJECTIVE    INR Point of Care   Date Value Ref Range Status   04/19/2017 1.5 (H) 0.86 - 1.14 Final     Comment:     This test is intended for monitoring Coumadin therapy.  Results are not   accurate   in patients with prolonged INR due to factor deficiency.         ASSESSMENT / PLAN  INR assessment SUPRA    Recheck INR In: 2 WEEKS    INR Location Outside lab      Anticoagulation Summary as of 5/8/2017     INR goal 2.0-3.0   Today's INR No new INR was available at the time of this encounter.   Maintenance plan 12.5 mg (5 mg x 2.5) on Tue, Thu, Sat; 10 mg (5 mg x 2) all other days   Full instructions 5/11: 5 mg; Otherwise 12.5 mg on Tue, Thu, Sat; 10 mg all other days   Weekly total 77.5 mg   Plan last modified Michael Quispe RN (6/16/2016)   Next INR check 5/25/2017   Target end date     Indications   Long-term (current) use of anticoagulants [Z79.01] [Z79.01]  Atrial fibrillation (H) [I48.91]         Anticoagulation Episode Summary     INR check location     Preferred lab     Send INR reminders to [facility] POOL    Comments 5 mg tabs, [facility] (needs staff message)      Anticoagulation Care Providers     Provider Role Specialty Phone number    Eliz Nguyen MD Responsible Internal Medicine [phone]            See the Encounter Report to view Anticoagulation Flowsheet and Dosing Calendar (Go to Encounters tab in chart review, and find the Anticoagulation Therapy Visit)    Dosage adjustment made based on physician directed care plan.    Michael Quispe,  RN

## 2017-05-25 ENCOUNTER — ANTICOAGULATION THERAPY VISIT (OUTPATIENT)
Dept: ANTICOAGULATION | Facility: OTHER | Age: 57
End: 2017-05-25
Payer: COMMERCIAL

## 2017-05-25 DIAGNOSIS — I48.91 ATRIAL FIBRILLATION, UNSPECIFIED TYPE (H): ICD-10-CM

## 2017-05-25 DIAGNOSIS — Z79.01 LONG-TERM (CURRENT) USE OF ANTICOAGULANTS: ICD-10-CM

## 2017-05-25 DIAGNOSIS — I48.91 ATRIAL FIBRILLATION (H): ICD-10-CM

## 2017-05-25 DIAGNOSIS — Z79.01 LONG TERM CURRENT USE OF ANTICOAGULANT THERAPY: ICD-10-CM

## 2017-05-25 LAB — INR BLD: 2.7 (ref 0.86–1.14)

## 2017-05-25 PROCEDURE — 36416 COLLJ CAPILLARY BLOOD SPEC: CPT | Performed by: INTERNAL MEDICINE

## 2017-05-25 PROCEDURE — 99207 ZZC NO CHARGE NURSE ONLY: CPT | Performed by: FAMILY MEDICINE

## 2017-05-25 PROCEDURE — 85610 PROTHROMBIN TIME: CPT | Mod: QW | Performed by: INTERNAL MEDICINE

## 2017-05-25 NOTE — MR AVS SNAPSHOT
Luz Marina Live   5/25/2017   Anticoagulation Therapy Visit    Description:  56 year old female   Provider:  Eliz Nguyen MD   Department:  [facility] Anticoag           INR as of 5/25/2017     Today's INR 2.7      Anticoagulation Summary as of 5/25/2017     INR goal 2.0-3.0   Today's INR 2.7   Full instructions 12.5 mg on Tue, Thu, Sat; 10 mg all other days   Next INR check 6/22/2017    Indications   Long-term (current) use of anticoagulants [Z79.01] [Z79.01]  Atrial fibrillation (H) [I48.91]         Contact Numbers     Clinic Number:         May 2017 Details    Sun Mon Tue Wed Thu Fri Sat      1               2               3               4               5               6                 7               8               9               10               11               12               13                 14               15               16               17               18               19               20                 21               22               23               24               25      12.5 mg   See details      26      10 mg         27      12.5 mg           28      10 mg         29      10 mg         30      12.5 mg         31      10 mg             Date Details   05/25 This INR check               How to take your warfarin dose     To take:  10 mg Take 2 of the 5 mg tablets.    To take:  12.5 mg Take 2.5 of the 5 mg tablets.           June 2017 Details    Sun Mon Tue Wed Thu Fri Sat         1      12.5 mg         2      10 mg         3      12.5 mg           4      10 mg         5      10 mg         6      12.5 mg         7      10 mg         8      12.5 mg         9      10 mg         10      12.5 mg           11      10 mg         12      10 mg         13      12.5 mg         14      10 mg         15      12.5 mg         16      10 mg         17      12.5 mg           18      10 mg         19      10 mg         20      12.5 mg         21      10 mg         22            23               24                  25               26               27               28               29               30                 Date Details   No additional details    Date of next INR:  6/22/2017         How to take your warfarin dose     To take:  10 mg Take 2 of the 5 mg tablets.    To take:  12.5 mg Take 2.5 of the 5 mg tablets.

## 2017-05-25 NOTE — PROGRESS NOTES
ANTICOAGULATION FOLLOW-UP CLINIC VISIT    Patient Name:  Luz Marina Live  Date:  5/25/2017  Contact Type:  Telephone    SUBJECTIVE:     Patient Findings     Positives No Problem Findings           OBJECTIVE    INR Point of Care   Date Value Ref Range Status   05/25/2017 2.7 (H) 0.86 - 1.14 Final       ASSESSMENT / PLAN  INR assessment THER    Recheck INR In: 4 WEEKS    INR Location Outside lab      Anticoagulation Summary as of 5/25/2017     INR goal 2.0-3.0   Today's INR 2.7   Maintenance plan 12.5 mg (5 mg x 2.5) on Tue, Thu, Sat; 10 mg (5 mg x 2) all other days   Full instructions 12.5 mg on Tue, Thu, Sat; 10 mg all other days   Weekly total 77.5 mg   No change documented Michael Quispe RN   Plan last modified Michael Quispe RN (6/16/2016)   Next INR check 6/22/2017   Target end date     Indications   Long-term (current) use of anticoagulants [Z79.01] [Z79.01]  Atrial fibrillation (H) [I48.91]         Anticoagulation Episode Summary     INR check location     Preferred lab     Send INR reminders to [facility] POOL    Comments 5 mg tabs, [facility] (needs staff message)      Anticoagulation Care Providers     Provider Role Specialty Phone number    Eliz Nguyen MD [facility] Internal Medicine [phone]            See the Encounter Report to view Anticoagulation Flowsheet and Dosing Calendar (Go to Encounters tab in chart review, and find the Anticoagulation Therapy Visit)    Dosage adjustment made based on physician directed care plan.    Michael Quispe RN

## 2017-06-20 ENCOUNTER — OFFICE VISIT (OUTPATIENT)
Dept: DERMATOLOGY | Facility: CLINIC | Age: 57
End: 2017-06-20

## 2017-06-20 DIAGNOSIS — L71.9 ROSACEA: Primary | ICD-10-CM

## 2017-06-20 PROCEDURE — 96999 UNLISTED SPEC DERM SVC/PX: CPT | Performed by: DERMATOLOGY

## 2017-06-20 NOTE — MR AVS SNAPSHOT
After Visit Summary   6/20/2017    Luz Marina Live    MRN: [id number]           Patient Information     Date Of Birth          1960        Visit Information        Provider Department      6/20/2017 3:00 PM Lucia Sanchez MD; [facility]        Today's Diagnoses     Rosacea    -  1      Care Instructions    It was a pleasure to see you at your recent visit in Dermatology.    We will schedule your next follow up appointment however, your appointment may be rescheduled due to any unforseen circumstances.    If you have any questions or concerns, please feel free to contact us at [facility] at [phone].        Pulse Dye Laser    I will experience redness, swelling, pain, and heat sensation. I may experience bruising, itching, or acne. Risks are blistering, oozing, permanent scarring, hair loss,  temporary or permanent skin lightening or darkening, infection, and eye injury. I understand my outcome could be no improvement, slight improvement. Multiple treatments may be required.    After treatment, Do Not:    Rub, scratch, or put weight on the site for 2 weeks    Wear tight fitting clothing or jewelry over the site    Rdz. Keep the site out of sunlight. Use sunscreen of 30 SPF or greater when in the sun. Use sunscreen 30 minutes before going out and reapply if sweating. Tanning decreases the success of the treatment     How do I care for the treated site?    Use ice packs for 10 minutes after the procedure for swelling     If the site is on your face, use ice again 1 hour after treatment    If a scab or crust forms, gently cleanse the site with hydrogen peroxide. Then put on Vaseline  ointment 3 times a day    Do not use makeup on any open wound    What should I expect?    Blue-gray color that may take 2 to 3 weeks to go away    Redness may also last a week or longer    Results may take up to 3 or 4 months after treatment    More  procedures may be needed    Who should I call with questions?    [facility]: [phone]     [facility]: [phone]    For urgent needs outside of business hours call the [facility] at [phone] and ask for the dermatology resident on call                Follow-ups after your visit        Your next 10 appointments already scheduled     Jul 03, 2017  3:00 PM CDT   [facility] with Ever [facility]ame MD   [facility]    [address] [place] [id number] [phone]            Oct 24, 2017 10:00 AM CDT   Lab with  LAB   [facility])    [address] [place] [id number] [phone]            Oct 24, 2017 10:30 AM CDT   (Arrive by 10:15 AM)   RETURN HEART FAILURE with Twin Moreno MD   [facility] ([facility])    [address] [place] [id number] [phone]              Who to contact     If you have questions or need follow up information about today's clinic visit or your schedule please contact [facility] directly at [phone].  Normal or non-critical lab and imaging results will be communicated to you by MyChart, letter or phone within 4 business days after the clinic has received the results. If you do not hear from us within 7 days, please contact the clinic through MyChart or phone. If you have a critical or abnormal lab result, we will notify you by phone as soon as possible.  Submit refill requests through Quickoffice or call your pharmacy and they will forward the refill request to us. Please allow 3 business days for your refill to be completed.          Additional Information About Your Visit        Quickoffice Information     Quickoffice is an electronic gateway that provides easy, online access to your medical records.  With [employer], you can request a clinic appointment, read your test results, renew a prescription or communicate with your care team.     To sign up for [employer] visit the website at [email or web address]   You will be asked to enter the access code listed below, as well as some personal information. Please follow the directions to create your username and password.     Your access code is: [email or web address]  Expires: 2017  1:46 PM     Your access code will  in 90 days. If you need help or a new code, please contact your [facility] or call [phone] for assistance.        Care EveryWhere ID     This is your Care EveryWhere ID. This could be used by other organizations to access your [facility] medical records  [id number]         Blood Pressure from Last 3 Encounters:   17 168/90   03/15/17 (!) 164/94   16 110/63    Weight from Last 3 Encounters:   17 123 kg (271 lb 1.6 oz)   03/15/17 123.2 kg (271 lb 9.6 oz)   16 122.5 kg (270 lb)              We Performed the Following     PDL Laser Therapy - C PDL 3 TX Areas        Primary Care Provider Office Phone # Fax #    Eliz My Nguyen [phone]       [facility] [address] [address]        Thank you!     Thank you for choosing [facility]  for your care. Our goal is always to provide you with excellent care. Hearing back from our patients is one way we can continue to improve our services. Please take a few minutes to complete the written survey that you may receive in the mail after your visit with us. Thank you!             Your Updated Medication List - Protect others around you: Learn how to safely use, store and throw away your medicines at www.disposemymeds.org.          This list is accurate as of: 17  3:34 PM.  Always use your most recent med list.                   Brand Name Dispense Instructions for use    aspirin 81 MG  tablet      Take by mouth daily       atorvastatin 40 MG tablet    LIPITOR    90 tablet    Take 1 tablet (40 mg) by mouth daily       CALCIUM 600-D PO      Take  by mouth 2 times daily.       hydroxychloroquine 200 MG tablet    PLAQUENIL    30 tablet    Take 1 tablet (200 mg) by mouth daily       * lisinopril 5 MG tablet    PRINIVIL/ZESTRIL    30 tablet    Take 1 tablet (5 mg) by mouth daily       * lisinopril 10 MG tablet    PRINIVIL/ZESTRIL    90 tablet    Take 1 tablet (10 mg) by mouth daily       metoprolol 50 MG tablet    LOPRESSOR    180 tablet    Take 1 tablet (50 mg) by mouth 2 times daily       MULTIPLE VITAMIN PO      Take  by mouth.       mupirocin 2 % nasal ointment    BACTROBAN NASAL    10 g    Place a thin layer inside right nose on septum BID x 2 weeks       omega-3 fatty acids 1200 MG capsule      Take 2 capsules by mouth 2 times daily       warfarin 5 MG tablet    COUMADIN    65 tablet    TAKE 12.5 MG TU, TH, SA AND 10 MG THE OTHER 4 DAYS OR AS DIRECTED BY THE COUMADIN CLINIC       zolpidem 5 MG tablet    AMBIEN    1 tablet    Take tablet by mouth 15 minutes prior to sleep, for insomnia       * Notice:  This list has 2 medication(s) that are the same as other medications prescribed for you. Read the directions carefully, and ask your doctor or other care provider to review them with you.

## 2017-06-20 NOTE — NURSING NOTE
Dermatology Rooming Note    Luz Marina Live's goals for this visit include:   Chief Complaint   Patient presents with     Procedure     PDL nose and cheeks rosacea       Is a scribe okay for this visit:YES    Are records needed for this visit(If yes, obtain release of information): Not applicable     Vitals: There were no vitals taken for this visit.    Referring Provider:  No referring provider defined for this encounter.

## 2017-06-20 NOTE — PROCEDURES
Laser- VBeam(Pulsed Dye Laser) Procedure Note: Cosmetic    Procedure Date: 2017    Attending Staff Surgeon: Lucia Sanchez MD    Resident Surgeon: none    Assistant: Darleen Mcduffie CMA    Operating Room Data:     Surgery/Procedure Date:    SAME     Pre-operative Diagnosis:   Rosacea  Location: cheeks and nose-improved    Operation/Procedure    Vbeam pulsed dye laser treatment#: 3     Post-operative Diagnosis:  SAME    Laser Settings:  Energy:7 J/cm2  Spot size:7mm 10mm on lower cheeks  Pulse width: 6 mS (0.45 thru 40 mS)  Dynamic cooling spray settin mS  Dynamic cooling device delay:  20 mS    Anesthesia:  None    Description of Operation/Procedure:   The nature and purpose of the procedure, associated risks, possible consequences, complications and alternative methods of treatment were explained in detail, this includes but is not limited to hyperpigmentation, hypopigmentation, scarring, bruising, hair loss pain/discomfort, eye injury, and blister.   We reviewed that the outcome could be any of the following: no improvement, slight improvement or change in skin color & texture, the skin might be permanently lighter or darker, and though uncommon, superficial scarring may occur.  Multiple treatments may be recommended.   A photo and operative consent were obtained. Time-out was performed.  The patient was positioned to optimally expose the area treated.  Protective eyewear was worn by the patient and goggles on all personnel in the treatment room.  The patient confirmed the site to be treated. The laser energy output was verified by meter reading.      The clinically evident lesion(s) was/were treated with Tamela Vbeam pulsed dye laser (595 nm) beam as above.  A total of 65 pulses were used.  The patient tolerated the procedure well and no complications were noted. Post operative instructions were provided. The total laser operation and preparation time was 15 minutes.      The patient will follow-up in 2  months.    The patient will pay the cosmetic fee today.     Staff Involved:  Scribe/Staff    Scribe Disclosure:   I, Freda Ventura, am serving as a scribe to document services personally performed by Dr. Lucia Sanchez, based on data collection and the provider's statements to me.     Provider Disclosure:   The documentation recorded by the scribe accurately reflects the services I personally performed and the decisions made by me.    Lucia Sanchez MD    Department of Dermatology  [facility]: Phone: [phone], Fax:[phone]  [facility]: Phone: [phone], Fax: [phone]

## 2017-06-20 NOTE — PATIENT INSTRUCTIONS
It was a pleasure to see you at your recent visit in Dermatology.    We will schedule your next follow up appointment however, your appointment may be rescheduled due to any unforseen circumstances.    If you have any questions or concerns, please feel free to contact us at [facility] at [phone].        Pulse Dye Laser    I will experience redness, swelling, pain, and heat sensation. I may experience bruising, itching, or acne. Risks are blistering, oozing, permanent scarring, hair loss,  temporary or permanent skin lightening or darkening, infection, and eye injury. I understand my outcome could be no improvement, slight improvement. Multiple treatments may be required.    After treatment, Do Not:    Rub, scratch, or put weight on the site for 2 weeks    Wear tight fitting clothing or jewelry over the site    Rdz. Keep the site out of sunlight. Use sunscreen of 30 SPF or greater when in the sun. Use sunscreen 30 minutes before going out and reapply if sweating. Tanning decreases the success of the treatment     How do I care for the treated site?    Use ice packs for 10 minutes after the procedure for swelling     If the site is on your face, use ice again 1 hour after treatment    If a scab or crust forms, gently cleanse the site with hydrogen peroxide. Then put on Vaseline  ointment 3 times a day    Do not use makeup on any open wound    What should I expect?    Blue-gray color that may take 2 to 3 weeks to go away    Redness may also last a week or longer    Results may take up to 3 or 4 months after treatment    More procedures may be needed    Who should I call with questions?    [facility]: [phone]     [facility]: [phone]    For urgent needs outside of business hours call the [facility] at [phone] and ask for the dermatology resident on call

## 2017-07-01 ENCOUNTER — HEALTH MAINTENANCE LETTER (OUTPATIENT)
Age: 57
End: 2017-07-01

## 2017-07-03 ENCOUNTER — OFFICE VISIT (OUTPATIENT)
Dept: RHEUMATOLOGY | Facility: CLINIC | Age: 57
End: 2017-07-03
Payer: COMMERCIAL

## 2017-07-03 VITALS
TEMPERATURE: 98.2 F | WEIGHT: 269 LBS | SYSTOLIC BLOOD PRESSURE: 131 MMHG | BODY MASS INDEX: 47.65 KG/M2 | DIASTOLIC BLOOD PRESSURE: 81 MMHG

## 2017-07-03 DIAGNOSIS — Z79.899 ENCOUNTER FOR LONG-TERM CURRENT USE OF MEDICATION: Primary | ICD-10-CM

## 2017-07-03 DIAGNOSIS — M32.14 SYSTEMIC LUPUS ERYTHEMATOSUS WITH GLOMERULAR DISEASE, UNSPECIFIED SLE TYPE (H): ICD-10-CM

## 2017-07-03 PROCEDURE — 99214 OFFICE O/P EST MOD 30 MIN: CPT | Performed by: INTERNAL MEDICINE

## 2017-07-03 RX ORDER — HYDROXYCHLOROQUINE SULFATE 200 MG/1
200 TABLET, FILM COATED ORAL DAILY
Qty: 30 TABLET | Refills: 11 | Status: SHIPPED | OUTPATIENT
Start: 2017-07-03 | End: 2018-03-21

## 2017-07-03 ASSESSMENT — PAIN SCALES - GENERAL: PAINLEVEL: NO PAIN (0)

## 2017-07-03 NOTE — PROGRESS NOTES
Rheumatology Visit     Luz Marina Live MRN# [id number]   YOB: 1960 Age: 56 year old     Date of Visit: July 3, 2017  Primary care provider: Eliz Nguyen          Assessment and Plan:   1. 57 yo female with longstanding SLE. She did have renal involvement in the 1990's requiring Cytoxan, Prednisone and Imuran. In the past several years her disease has been well controlled on Plaquenil alone. She has persistently abnormal but stable DSDNA and normal or low but stable C3 and C4. She has intermittent low grade protein in the urine, and borderline low wbc on occasion.  She had worsened renal function in 2015 with hospitalizations for serious illnesses that then reversed.  2. CAD, s/p CABG in August 2009, stable. Followed by Dr. Nguyen and Cardiology  3. Hypertension, stable  4. CKD per above, very stable followed by Dr. Clark.   5. Long term use of Plaquenil, between 15 - 20 years.  Normal OCT in November per Dr. Davenport  6. Symptomatic hammertoes  7. Probable OSAS  PLAN: long discussion with patient regarding long term Plaquenil use, current guidelines and attributed eye risk with duration/total dosage; inability to predict flares of SLE  1. See lab orders, medication orders and follow-up plans for this encounter. Her DSDNA was stable improved in December, with variable but no trend in complements.  2. Additional treatment plan consists of continuation of Plaquenil at lowered dose of 200 mg daily.  We will discuss yearly. Eye appt yearly  3. She will have BP rechecked regularly.  Discuss her sleep study concerns with Dr. Nguyen  4. RTC: in 1 year(s);   5. Letter: no  6. She has had the flu shot      Ever Adame MD FACP      Rheumatic and Autoimmune Diseases           History of Present Illness:   57 yo female with SLE seen for followup. In the past she was followed by Dr. Behrens and then Dr. Valdez. She saw Dr. Mendoza twice, the last time in the summer of 2009. I saw her  first in 2010 last 17 months ago. [facility] reviewed.  HISTORY CARRIED FORWARD:   To review, she presented with SLE in 1993 with arthritis, nasal ulcers, rash, positive DSDNA and APLA. She had an episode of respiratory failure in 1994 and renal involvement. She was on high dose Prednsone, IV Cytoxan and then Imuran. She had an MI in 1996 ? Related to APLA.   She has been in a clinical remission for several years on Plaquenil 400 mg daily and takes Naprosyn for arthralgias. She had an eye exam in the fall without toxicity. No side effects. She is on 325 mg of ASA daily. She had a 3 vessel bypass in 8/09 for CAD and is followed by Dr. Myrick who saw her in November 2013 with stable findings. Dr. Nguyen is her PCP and she saw her in December and increased Lisinopril for her BP. Lab was normal/stable in November.   Labs in 2014 both Febr and October showed stable C3 of 72 and low C4 at 11 and elevated but overall stable DSDNA at 572 but no persistent trend over past several tests. Last may she had DSDNA 491 and normal complements.   She has had a borderline low wbc but recently normal. Her UA has small amount of protein; this shows up intermittently with no cells or casts. See Dr. Clark's notes.   She has had two hospitalizations in 2015. She is followed closely by her PCP Dr. Nguyen as well as here by Dr. Clark, and Cardiology.  Last hospitalization in August 2015with strep infection and then septic shock and worsened CKD per notes.  Her creatinine had returned to baseline.  INTERVAL HISTORY:     At last visit she had had extensive medical evaluation and therapy related to hospitalizations, but there was no evidence of a contribution from her SLE.   There has been no findings to suggest SLE flare in the interim either causing her illnesses or exacerbated by them.  She remains on Plaquenil now at 200 mg daily. She had a stable eye exam last fall and is scheduled this fall.   She has had evaluation by Dr. Moreno for  dyspnea and elevated PA pressures, and Dr. Nguyễn for OSAS with potential pending sleep study. She is reluctant to do this and she thinks she would not use CPAP. We discussed this at length  Her CKD has been stable, followed by Dr. Clark.  Dr. Nguyen is her PCP.  She has been on Plaquenil for >17 years.  Discussed extensively as per Assessment and Plan above.  She has no photosensitivity, Raynauds, pleurisy, fever, alopecia, overt arthritis. She has a hammertoe deformity in her feet that we discussed again.       Review of Systems:   Review Of Systems  Skin: see Dr. Sanchez's notes  Eyes: negative  Ears/Nose/Throat: negative  Respiratory: see HPI and Dr. Nguyễn's note  Cardiovascular: see HPI  Gastrointestinal: negative  Genitourinary: see Dr. Clark's notes  Musculoskeletal: see HPI  Neurologic: negative  Psychiatric: negative  Hematologic/Lymphatic/Immunologic: negative  Endocrine: negative          Past Medical History:     Past Medical History:   Diagnosis Date     Arthritis      Hyperlipidemia      Hypertension      Lupus (systemic lupus erythematosus) (H)      Moderate tricuspid insufficiency 12/22/2016     Myocardial infarction (H)        Patient Active Problem List    Diagnosis Date Noted     Moderate tricuspid insufficiency 12/22/2016     Priority: Medium     Long-term (current) use of anticoagulants [Z79.01] 04/05/2016     Priority: Medium     Long term current use of anticoagulant therapy 10/06/2015     Priority: Medium     CKD (chronic kidney disease) stage 3, GFR 30-59 ml/min 09/14/2015     Priority: Medium     Atrial fibrillation (H) 08/24/2015     Priority: Medium     On 8/22 hospitalization       Sepsis (H) 08/22/2015     Priority: Medium     SVT (supraventricular tachycardia) (H) 04/02/2015     Priority: Medium     Hypovolemic shock (H) 02/28/2015     Priority: Medium     Encounter for long-term current use of medication 02/03/2014     Priority: Medium     Problem list name updated by automated  process. Provider to review       HTN, goal below 140/90 07/03/2013     Priority: Medium     Hyperlipidemia LDL goal <100 12/05/2011     Priority: Medium     CAD (coronary artery disease) 12/05/2011     Priority: Medium     SLE (systemic lupus erythematosus) (H) 11/01/2010     Priority: Medium             Past Surgical History:     Past Surgical History:   Procedure Laterality Date     CARDIAC SURGERY       CARPAL TUNNEL RELEASE RT/LT  1996    bilateral     VASCULAR SURGERY               Social History:     Social History   Substance Use Topics     Smoking status: Former Smoker     Smokeless tobacco: Former User      Comment: 30 plus years ago.     Alcohol use No             Family History:     Family History   Problem Relation Age of Onset     Arthritis Mother      ra     Connective Tissue Disorder Mother      lupus     Congenital Anomalies Mother      wholein heart     CEREBROVASCULAR DISEASE Mother      TIA's     CEREBROVASCULAR DISEASE Father      DIABETES Father      Hypertension Father      Cardiovascular Father      stents     Genitourinary Problems Father      kidney failure     KIDNEY DISEASE Father      kidney injury related to acute illness around time of death (RANDELL likely)     Cataracts Father      Arthritis Paternal Grandmother      DIABETES Brother             Allergies:     Allergies   Allergen Reactions     Sulfa Drugs Rash and GI Disturbance             Medications:     Current Outpatient Prescriptions   Medication Sig Dispense Refill     warfarin (COUMADIN) 5 MG tablet TAKE 12.5 MG TU, TH, SA AND 10 MG THE OTHER 4 DAYS OR AS DIRECTED BY THE COUMADIN CLINIC 65 tablet 3     zolpidem (AMBIEN) 5 MG tablet Take tablet by mouth 15 minutes prior to sleep, for insomnia 1 tablet 0     lisinopril (PRINIVIL/ZESTRIL) 10 MG tablet Take 1 tablet (10 mg) by mouth daily 90 tablet 3     metoprolol (LOPRESSOR) 50 MG tablet Take 1 tablet (50 mg) by mouth 2 times daily 180 tablet 3     hydroxychloroquine (PLAQUENIL)  200 MG tablet Take 1 tablet (200 mg) by mouth daily 30 tablet 8     lisinopril (PRINIVIL/ZESTRIL) 5 MG tablet Take 1 tablet (5 mg) by mouth daily 30 tablet 11     aspirin 81 MG tablet Take by mouth daily       atorvastatin (LIPITOR) 40 MG tablet Take 1 tablet (40 mg) by mouth daily 90 tablet 1     mupirocin (BACTROBAN NASAL) 2 % nasal ointment Place a thin layer inside right nose on septum BID x 2 weeks 10 g 1     Calcium Carbonate-Vitamin D (CALCIUM 600-D PO) Take  by mouth 2 times daily.       MULTIPLE VITAMIN PO Take  by mouth.       Omega-3 Fatty Acids (FISH OIL) 1200 MG capsule Take 2 capsules by mouth 2 times daily               Physical Exam:   /81  Temp 98.2  F (36.8  C)  Wt 122 kg (269 lb)  BMI 47.65 kg/m2  Constitutional: WD-WN-WG cooperative   Eyes: nl conjunctiva, sclera   ENT: nl external ears, nose, throat   No mucous membrane lesions, normal saliva pool   Neck: no mass or thyroid enlargement   Lymph: no cervical, supraclavicular, inguinal or epitrochlear nodes   MS: All TMJ, neck, shoulder, elbow, wrist, MCP/PIP/DIP, spine, hip, knee, ankle, and foot MTP/IP joints were examined and found without active synovitis. Minor pain with finger curl unchanged. Second digit hammertoes. No dactylitis, tenosynovitis, enthesopathy   Skin: no nail pitting, alopecia, rash, nodules or lesions. Malar erythema minimal  Neuro: nl cranial nerves, strength   Psych: nl affect.       Data:     Lab Results   Component Value Date    WBC 7.2 03/15/2017    WBC 7.1 09/02/2015    WBC 9.6 08/29/2015    HGB 13.3 03/15/2017    HGB 12.3 12/15/2016    HGB 13.0 08/01/2016    HCT 39.5 03/15/2017    HCT 29.8 (L) 09/02/2015    HCT 25.4 (L) 08/29/2015    MCV 90 03/15/2017    MCV 93 09/02/2015    MCV 89 08/29/2015     03/15/2017     09/02/2015     08/29/2015     Lab Results   Component Value Date    BUN 29 03/31/2017    BUN 18 03/15/2017    BUN 21 12/27/2016     No components found for: SEDRATE  Lab Results    Component Value Date    TSH 5.44 (H) 03/02/2015    TSH 2.59 02/28/2015    TSH 5.45 (H) 10/08/2014     Lab Results   Component Value Date    AST 47 (H) 03/15/2017    AST 26 08/22/2015    AST 36 03/06/2015    ALT 60 (H) 03/15/2017    ALT 22 08/22/2015    ALT 50 03/06/2015    ALKPHOS 105 03/15/2017    ALKPHOS 91 08/22/2015    ALKPHOS 124 03/06/2015     Reviewed Rheumatology lab flowsheet    Ever Adame

## 2017-07-03 NOTE — MR AVS SNAPSHOT
After Visit Summary   7/3/2017    Luz Marina Live    MRN: [id number]           Patient Information     Date Of Birth          1960        Visit Information        Provider Department      7/3/2017 3:00 PM Ever Adame MD [facility] [place]        Today's Diagnoses     Encounter for long-term current use of medication    -  1    Systemic lupus erythematosus with glomerular disease, unspecified SLE type (H)           Follow-ups after your visit        Follow-up notes from your care team     Return in about 1 year (around 7/3/2018).      Your next 10 appointments already scheduled     Oct 24, 2017 10:00 AM CDT   Lab with  LAB   [facility] ([place])    [address] [place] [id number] [phone]            Oct 24, 2017 10:30 AM CDT   (Arrive by 10:15 AM)   RETURN HEART FAILURE with Twin Moreno MD   [facility] ([place])    [address] [place] [id number] [phone]              Who to contact     If you have questions or need follow up information about today's clinic visit or your schedule please contact [facility] directly at [phone].  Normal or non-critical lab and imaging results will be communicated to you by MyChart, letter or phone within 4 business days after the clinic has received the results. If you do not hear from us within 7 days, please contact the clinic through Celnyxhart or phone. If you have a critical or abnormal lab result, we will notify you by phone as soon as possible.  Submit refill requests through LookTracker or call your pharmacy and they will forward the refill request to us. Please allow 3 business days for your refill to be completed.          Additional Information About Your Visit        MyChart Information     LookTracker is an electronic gateway that provides easy, online access to your medical records.  With MarketGid, you can request a clinic appointment, read your test results, renew a prescription or communicate with your care team.     To sign up for MarketGid visit the website at [email or web address]   You will be asked to enter the access code listed below, as well as some personal information. Please follow the directions to create your username and password.     Your access code is: [email or web address]  Expires: 10/1/2017  3:28 PM     Your access code will  in 90 days. If you need help or a new code, please contact your [facility] or call [phone] for assistance.        Care EveryWhere ID     This is your Care EveryWhere ID. This could be used by other organizations to access your [facility] medical records  [id number]        Your Vitals Were     Temperature BMI (Body Mass Index)                98.2  F (36.8  C) 47.65 kg/m2           Blood Pressure from Last 3 Encounters:   17 131/81   17 168/90   03/15/17 (!) 164/94    Weight from Last 3 Encounters:   17 122 kg (269 lb)   17 123 kg (271 lb 1.6 oz)   03/15/17 123.2 kg (271 lb 9.6 oz)              Today, you had the following     No orders found for display         Where to get your medicines      These medications were sent to [facility]/pharmacy #[id number] - [place], [place] - [address]  [address] , [place] [address]     Phone:  [phone]     hydroxychloroquine 200 MG tablet          Primary Care Provider Office Phone # Fax #    Eliz Nguyen [phone]       [facility] [address]  [facility] [address]        Equal Access to Services     RUBI MCGUIRE : Sandra Garrett, estuardo kebede, mayte moreno. So [facility] [phone].    ATENCIÓN: Si habla español, tiene a bennett disposición servicios gratuitos de asistencia lingüística. Llame al [phone].    We comply with applicable  federal civil rights laws and [place] laws. We do not discriminate on the basis of race, color, national origin, age, disability sex, sexual orientation or gender identity.            Thank you!     Thank you for choosing [facility]  for your care. Our goal is always to provide you with excellent care. Hearing back from our patients is one way we can continue to improve our services. Please take a few minutes to complete the written survey that you may receive in the mail after your visit with us. Thank you!             Your Updated Medication List - Protect others around you: Learn how to safely use, store and throw away your medicines at www.disposemymeds.org.          This list is accurate as of: 7/3/17  3:28 PM.  Always use your most recent med list.                   Brand Name Dispense Instructions for use Diagnosis    aspirin 81 MG tablet      Take by mouth daily        atorvastatin 40 MG tablet    LIPITOR    90 tablet    Take 1 tablet (40 mg) by mouth daily    Pure hypercholesterolemia       CALCIUM 600-D PO      Take  by mouth 2 times daily.    SLE (systemic lupus erythematosus) (H)       hydroxychloroquine 200 MG tablet    PLAQUENIL    30 tablet    Take 1 tablet (200 mg) by mouth daily    Systemic lupus erythematosus with glomerular disease, unspecified SLE type (H)       lisinopril 10 MG tablet    PRINIVIL/ZESTRIL    90 tablet    Take 1 tablet (10 mg) by mouth daily        metoprolol 50 MG tablet    LOPRESSOR    180 tablet    Take 1 tablet (50 mg) by mouth 2 times daily    Atrial fibrillation, unspecified type (H)       MULTIPLE VITAMIN PO      Take  by mouth.        mupirocin 2 % nasal ointment    BACTROBAN NASAL    10 g    Place a thin layer inside right nose on septum BID x 2 weeks    Nasal septum ulceration       omega-3 fatty acids 1200 MG capsule      Take 2 capsules by mouth 2 times daily        warfarin 5 MG tablet    COUMADIN    65 tablet    TAKE 12.5 MG TU, TH, SA AND 10 MG  THE OTHER 4 DAYS OR AS DIRECTED BY THE COUMADIN CLINIC    Atrial fibrillation, unspecified type (H)       zolpidem 5 MG tablet    AMBIEN    1 tablet    Take tablet by mouth 15 minutes prior to sleep, for insomnia    Psychophysiological insomnia

## 2017-07-03 NOTE — LETTER
7/3/2017      RE: Luz Marina Live  [address] [place] [address]       Rheumatology Visit     Luz Marina Live MRN# [id number]   YOB: 1960 Age: 56 year old     Date of Visit: July 3, 2017  Primary care provider: Eliz Nguyen          Assessment and Plan:   1. 57 yo female with longstanding SLE. She did have renal involvement in the 1990's requiring Cytoxan, Prednisone and Imuran. In the past several years her disease has been well controlled on Plaquenil alone. She has persistently abnormal but stable DSDNA and normal or low but stable C3 and C4. She has intermittent low grade protein in the urine, and borderline low wbc on occasion.  She had worsened renal function in 2015 with hospitalizations for serious illnesses that then reversed.  2. CAD, s/p CABG in August 2009, stable. Followed by Dr. Nguyen and Cardiology  3. Hypertension, stable  4. CKD per above, very stable followed by Dr. Clark.   5. Long term use of Plaquenil, between 15 - 20 years.  Normal OCT in November per Dr. Davenport  6. Symptomatic hammertoes  7. Probable OSAS  PLAN: long discussion with patient regarding long term Plaquenil use, current guidelines and attributed eye risk with duration/total dosage; inability to predict flares of SLE  1. See lab orders, medication orders and follow-up plans for this encounter. Her DSDNA was stable improved in December, with variable but no trend in complements.  2. Additional treatment plan consists of continuation of Plaquenil at lowered dose of 200 mg daily.  We will discuss yearly. Eye appt yearly  3. She will have BP rechecked regularly.  Discuss her sleep study concerns with Dr. Nguyen  4. RTC: in 1 year(s);   5. Letter: no  6. She has had the flu shot      Ever Adame MD FACP      Rheumatic and Autoimmune Diseases           History of Present Illness:   57 yo female with SLE seen for followup. In the past she was followed by Dr. Behrens and then   José Miguel. She saw Dr. Mendoza twice, the last time in the summer of 2009. I saw her first in 2010 last 17 months ago. EPIC reviewed.  HISTORY CARRIED FORWARD:   To review, she presented with SLE in 1993 with arthritis, nasal ulcers, rash, positive DSDNA and APLA. She had an episode of respiratory failure in 1994 and renal involvement. She was on high dose Prednsone, IV Cytoxan and then Imuran. She had an MI in 1996 ? Related to APLA.   She has been in a clinical remission for several years on Plaquenil 400 mg daily and takes Naprosyn for arthralgias. She had an eye exam in the fall without toxicity. No side effects. She is on 325 mg of ASA daily. She had a 3 vessel bypass in 8/09 for CAD and is followed by Dr. Myrick who saw her in November 2013 with stable findings. Dr. Nguyen is her PCP and she saw her in December and increased Lisinopril for her BP. Lab was normal/stable in November.   Labs in 2014 both Febr and October showed stable C3 of 72 and low C4 at 11 and elevated but overall stable DSDNA at 572 but no persistent trend over past several tests. Last may she had DSDNA 491 and normal complements.   She has had a borderline low wbc but recently normal. Her UA has small amount of protein; this shows up intermittently with no cells or casts. See Dr. Clark's notes.   She has had two hospitalizations in 2015. She is followed closely by her PCP Dr. Nguyen as well as here by Dr. Clark, and Cardiology.  Last hospitalization in August 2015with strep infection and then septic shock and worsened CKD per notes.  Her creatinine had returned to baseline.  INTERVAL HISTORY:     At last visit she had had extensive medical evaluation and therapy related to hospitalizations, but there was no evidence of a contribution from her SLE.   There has been no findings to suggest SLE flare in the interim either causing her illnesses or exacerbated by them.  She remains on Plaquenil now at 200 mg daily. She had a stable eye exam last  fall and is scheduled this fall.   She has had evaluation by Dr. Moreno for dyspnea and elevated PA pressures, and Dr. Nguyễn for OSAS with potential pending sleep study. She is reluctant to do this and she thinks she would not use CPAP. We discussed this at length  Her CKD has been stable, followed by Dr. Clark.  Dr. Nguyen is her PCP.  She has been on Plaquenil for >17 years.  Discussed extensively as per Assessment and Plan above.  She has no photosensitivity, Raynauds, pleurisy, fever, alopecia, overt arthritis. She has a hammertoe deformity in her feet that we discussed again.       Review of Systems:   Review Of Systems  Skin: see Dr. Sanchez's notes  Eyes: negative  Ears/Nose/Throat: negative  Respiratory: see HPI and Dr. Nguyễn's note  Cardiovascular: see HPI  Gastrointestinal: negative  Genitourinary: see Dr. Clark's notes  Musculoskeletal: see HPI  Neurologic: negative  Psychiatric: negative  Hematologic/Lymphatic/Immunologic: negative  Endocrine: negative          Past Medical History:     Past Medical History:   Diagnosis Date     Arthritis      Hyperlipidemia      Hypertension      Lupus (systemic lupus erythematosus) (H)      Moderate tricuspid insufficiency 12/22/2016     Myocardial infarction (H)        Patient Active Problem List    Diagnosis Date Noted     Moderate tricuspid insufficiency 12/22/2016     Priority: Medium     Long-term (current) use of anticoagulants [Z79.01] 04/05/2016     Priority: Medium     Long term current use of anticoagulant therapy 10/06/2015     Priority: Medium     CKD (chronic kidney disease) stage 3, GFR 30-59 ml/min 09/14/2015     Priority: Medium     Atrial fibrillation (H) 08/24/2015     Priority: Medium     On 8/22 hospitalization       Sepsis (H) 08/22/2015     Priority: Medium     SVT (supraventricular tachycardia) (H) 04/02/2015     Priority: Medium     Hypovolemic shock (H) 02/28/2015     Priority: Medium     Encounter for long-term current use of medication  02/03/2014     Priority: Medium     Problem list name updated by automated process. Provider to review       HTN, goal below 140/90 07/03/2013     Priority: Medium     Hyperlipidemia LDL goal <100 12/05/2011     Priority: Medium     CAD (coronary artery disease) 12/05/2011     Priority: Medium     SLE (systemic lupus erythematosus) (H) 11/01/2010     Priority: Medium             Past Surgical History:     Past Surgical History:   Procedure Laterality Date     CARDIAC SURGERY       CARPAL TUNNEL RELEASE RT/LT  1996    bilateral     VASCULAR SURGERY               Social History:     Social History   Substance Use Topics     Smoking status: Former Smoker     Smokeless tobacco: Former User      Comment: 30 plus years ago.     Alcohol use No             Family History:     Family History   Problem Relation Age of Onset     Arthritis Mother      ra     Connective Tissue Disorder Mother      lupus     Congenital Anomalies Mother      wholein heart     CEREBROVASCULAR DISEASE Mother      TIA's     CEREBROVASCULAR DISEASE Father      DIABETES Father      Hypertension Father      Cardiovascular Father      stents     Genitourinary Problems Father      kidney failure     KIDNEY DISEASE Father      kidney injury related to acute illness around time of death (RANDELL likely)     Cataracts Father      Arthritis Paternal Grandmother      DIABETES Brother             Allergies:     Allergies   Allergen Reactions     Sulfa Drugs Rash and GI Disturbance             Medications:     Current Outpatient Prescriptions   Medication Sig Dispense Refill     warfarin (COUMADIN) 5 MG tablet TAKE 12.5 MG TU, TH, SA AND 10 MG THE OTHER 4 DAYS OR AS DIRECTED BY THE COUMADIN CLINIC 65 tablet 3     zolpidem (AMBIEN) 5 MG tablet Take tablet by mouth 15 minutes prior to sleep, for insomnia 1 tablet 0     lisinopril (PRINIVIL/ZESTRIL) 10 MG tablet Take 1 tablet (10 mg) by mouth daily 90 tablet 3     metoprolol (LOPRESSOR) 50 MG tablet Take 1 tablet (50  mg) by mouth 2 times daily 180 tablet 3     hydroxychloroquine (PLAQUENIL) 200 MG tablet Take 1 tablet (200 mg) by mouth daily 30 tablet 8     lisinopril (PRINIVIL/ZESTRIL) 5 MG tablet Take 1 tablet (5 mg) by mouth daily 30 tablet 11     aspirin 81 MG tablet Take by mouth daily       atorvastatin (LIPITOR) 40 MG tablet Take 1 tablet (40 mg) by mouth daily 90 tablet 1     mupirocin (BACTROBAN NASAL) 2 % nasal ointment Place a thin layer inside right nose on septum BID x 2 weeks 10 g 1     Calcium Carbonate-Vitamin D (CALCIUM 600-D PO) Take  by mouth 2 times daily.       MULTIPLE VITAMIN PO Take  by mouth.       Omega-3 Fatty Acids (FISH OIL) 1200 MG capsule Take 2 capsules by mouth 2 times daily               Physical Exam:   /81  Temp 98.2  F (36.8  C)  Wt 122 kg (269 lb)  BMI 47.65 kg/m2  Constitutional: WD-WN-WG cooperative   Eyes: nl conjunctiva, sclera   ENT: nl external ears, nose, throat   No mucous membrane lesions, normal saliva pool   Neck: no mass or thyroid enlargement   Lymph: no cervical, supraclavicular, inguinal or epitrochlear nodes   MS: All TMJ, neck, shoulder, elbow, wrist, MCP/PIP/DIP, spine, hip, knee, ankle, and foot MTP/IP joints were examined and found without active synovitis. Minor pain with finger curl unchanged. Second digit hammertoes. No dactylitis, tenosynovitis, enthesopathy   Skin: no nail pitting, alopecia, rash, nodules or lesions. Malar erythema minimal  Neuro: nl cranial nerves, strength   Psych: nl affect.       Data:     Lab Results   Component Value Date    WBC 7.2 03/15/2017    WBC 7.1 09/02/2015    WBC 9.6 08/29/2015    HGB 13.3 03/15/2017    HGB 12.3 12/15/2016    HGB 13.0 08/01/2016    HCT 39.5 03/15/2017    HCT 29.8 (L) 09/02/2015    HCT 25.4 (L) 08/29/2015    MCV 90 03/15/2017    MCV 93 09/02/2015    MCV 89 08/29/2015     03/15/2017     09/02/2015     08/29/2015     Lab Results   Component Value Date    BUN 29 03/31/2017    BUN 18 03/15/2017     BUN 21 12/27/2016     No components found for: SEDRATE  Lab Results   Component Value Date    TSH 5.44 (H) 03/02/2015    TSH 2.59 02/28/2015    TSH 5.45 (H) 10/08/2014     Lab Results   Component Value Date    AST 47 (H) 03/15/2017    AST 26 08/22/2015    AST 36 03/06/2015    ALT 60 (H) 03/15/2017    ALT 22 08/22/2015    ALT 50 03/06/2015    ALKPHOS 105 03/15/2017    ALKPHOS 91 08/22/2015    ALKPHOS 124 03/06/2015     Reviewed Rheumatology lab flowsheet    Ever Adame MD

## 2017-07-03 NOTE — NURSING NOTE
"Luz Marina Live's goals for this visit include:   Chief Complaint   Patient presents with     RECHECK       She requests these members of her care team be copied on today's visit information: yes     PCP: Eliz Nguyen    Referring Provider:  No referring provider defined for this encounter.    Chief Complaint   Patient presents with     RECHECK       Initial /81  Temp 98.2  F (36.8  C)  Wt 122 kg (269 lb)  BMI 47.65 kg/m2 Estimated body mass index is 47.65 kg/(m^2) as calculated from the following:    Height as of 4/3/17: 1.6 m (5' 3\").    Weight as of this encounter: 122 kg (269 lb).  Medication Reconciliation: complete    Do you need any medication refills at today's visit?       "

## 2017-07-10 ENCOUNTER — ANTICOAGULATION THERAPY VISIT (OUTPATIENT)
Dept: ANTICOAGULATION | Facility: OTHER | Age: 57
End: 2017-07-10
Payer: COMMERCIAL

## 2017-07-10 DIAGNOSIS — Z79.01 LONG-TERM (CURRENT) USE OF ANTICOAGULANTS: ICD-10-CM

## 2017-07-10 DIAGNOSIS — I48.91 ATRIAL FIBRILLATION, UNSPECIFIED TYPE (H): ICD-10-CM

## 2017-07-10 DIAGNOSIS — Z79.01 LONG TERM CURRENT USE OF ANTICOAGULANT THERAPY: ICD-10-CM

## 2017-07-10 DIAGNOSIS — I48.91 ATRIAL FIBRILLATION (H): ICD-10-CM

## 2017-07-10 LAB — INR BLD: 2.6 (ref 0.86–1.14)

## 2017-07-10 PROCEDURE — 85610 PROTHROMBIN TIME: CPT | Mod: QW | Performed by: INTERNAL MEDICINE

## 2017-07-10 PROCEDURE — 36416 COLLJ CAPILLARY BLOOD SPEC: CPT | Performed by: INTERNAL MEDICINE

## 2017-07-10 PROCEDURE — 99207 ZZC NO CHARGE NURSE ONLY: CPT | Performed by: INTERNAL MEDICINE

## 2017-07-10 NOTE — PROGRESS NOTES
ANTICOAGULATION FOLLOW-UP CLINIC VISIT    Patient Name:  Luz Marina Live  Date:  7/10/2017  Contact Type:  Telephone    SUBJECTIVE:     Patient Findings     Positives No Problem Findings           OBJECTIVE    INR Point of Care   Date Value Ref Range Status   07/10/2017 2.6 (H) 0.86 - 1.14 Final     Comment:     This test is intended for monitoring Coumadin therapy.  Results are not   accurate   in patients with prolonged INR due to factor deficiency.         ASSESSMENT / PLAN  INR assessment THER    Recheck INR In: 6 WEEKS    INR Location Outside lab      Anticoagulation Summary as of 7/10/2017     INR goal 2.0-3.0   Today's INR 2.6   Maintenance plan 12.5 mg (5 mg x 2.5) on Tue, Thu, Sat; 10 mg (5 mg x 2) all other days   Full instructions 12.5 mg on Tue, Thu, Sat; 10 mg all other days   Weekly total 77.5 mg   No change documented Michael Quispe RN   Plan last modified Michael Quispe RN (6/16/2016)   Next INR check 8/21/2017   Target end date     Indications   Long-term (current) use of anticoagulants [Z79.01] [Z79.01]  Atrial fibrillation (H) [I48.91]         Anticoagulation Episode Summary     INR check location     Preferred lab     Send INR reminders to [facility] POOL    Comments 5 mg tabs, [facility] (needs staff message)      Anticoagulation Care Providers     Provider Role Specialty Phone number    Eliz Nguyen MD [facility] [phone]            See the Encounter Report to view Anticoagulation Flowsheet and Dosing Calendar (Go to Encounters tab in chart review, and find the Anticoagulation Therapy Visit)    Dosage adjustment made based on physician directed care plan.    Michael Quispe RN

## 2017-07-10 NOTE — MR AVS SNAPSHOT
Luz Marina Live   7/10/2017   Anticoagulation Therapy Visit    Description:  56 year old female   Provider:  Eliz Nguyen MD   Department:   Antico           INR as of 7/10/2017     Today's INR 2.6      Anticoagulation Summary as of 7/10/2017     INR goal 2.0-3.0   Today's INR 2.6   Full instructions 12.5 mg on Tue, Thu, Sat; 10 mg all other days   Next INR check 8/21/2017    Indications   Long-term (current) use of anticoagulants [Z79.01] [Z79.01]  Atrial fibrillation (H) [I48.91]         Contact Numbers     Clinic Number:         July 2017 Details    Sun Mon Tue Wed Thu Fri Sat           1                 2               3               4               5               6               7               8                 9               10      10 mg   See details      11      12.5 mg         12      10 mg         13      12.5 mg         14      10 mg         15      12.5 mg           16      10 mg         17      10 mg         18      12.5 mg         19      10 mg         20      12.5 mg         21      10 mg         22      12.5 mg           23      10 mg         24      10 mg         25      12.5 mg         26      10 mg         27      12.5 mg         28      10 mg         29      12.5 mg           30      10 mg         31      10 mg               Date Details   07/10 This INR check               How to take your warfarin dose     To take:  10 mg Take 2 of the 5 mg tablets.    To take:  12.5 mg Take 2.5 of the 5 mg tablets.           August 2017 Details    Sun Mon Tue Wed Thu Fri Sat       1      12.5 mg         2      10 mg         3      12.5 mg         4      10 mg         5      12.5 mg           6      10 mg         7      10 mg         8      12.5 mg         9      10 mg         10      12.5 mg         11      10 mg         12      12.5 mg           13      10 mg         14      10 mg         15      12.5 mg         16      10 mg         17      12.5 mg         18      10 mg         19       12.5 mg           20      10 mg         21            22               23               24               25               26                 27               28               29               30               31                  Date Details   No additional details    Date of next INR:  8/21/2017         How to take your warfarin dose     To take:  10 mg Take 2 of the 5 mg tablets.    To take:  12.5 mg Take 2.5 of the 5 mg tablets.

## 2017-07-25 DIAGNOSIS — E78.00 PURE HYPERCHOLESTEROLEMIA: ICD-10-CM

## 2017-07-25 DIAGNOSIS — I48.91 ATRIAL FIBRILLATION, UNSPECIFIED TYPE (H): ICD-10-CM

## 2017-07-25 RX ORDER — ATORVASTATIN CALCIUM 40 MG/1
40 TABLET, FILM COATED ORAL DAILY
Qty: 90 TABLET | Refills: 0 | Status: SHIPPED | OUTPATIENT
Start: 2017-07-25 | End: 2017-08-01

## 2017-07-25 RX ORDER — WARFARIN SODIUM 5 MG/1
TABLET ORAL
Qty: 215 TABLET | Refills: 0 | Status: SHIPPED | OUTPATIENT
Start: 2017-07-25 | End: 2017-08-01

## 2017-07-25 RX ORDER — WARFARIN SODIUM 5 MG/1
TABLET ORAL
Qty: 65 TABLET | Refills: 3 | Status: SHIPPED | OUTPATIENT
Start: 2017-07-25 | End: 2017-07-25

## 2017-07-25 NOTE — TELEPHONE ENCOUNTER
Received refill request for lipitor. Patient due for clinic appt. Nurse sent 3 month refill with note that must be seen for more refills. rx sent.

## 2017-07-25 NOTE — TELEPHONE ENCOUNTER
warfarin (COUMADIN) 5 MG tablet      Last Written Prescription Date:  4-12-17  Last Fill Quantity: 65,   # refills: 3  Last Office Visit : 11-28-16  Future Office visit:  none    Lab Results   Component Value Date    INR 2.6 07/10/2017    INR 2.7 05/25/2017    INR 3.6 05/08/2017    INR 1.5 04/19/2017    INR 3.25 03/15/2017    INR 2.7 01/19/2017    INR 2.50 03/04/2016    INR 1.94 09/14/2015    INR 2.80 08/31/2015    INR 1.55 08/29/2015           Kathleen M Doege RN

## 2017-07-31 ENCOUNTER — TELEPHONE (OUTPATIENT)
Dept: OBGYN | Facility: CLINIC | Age: 57
End: 2017-07-31

## 2017-07-31 NOTE — TELEPHONE ENCOUNTER
Spoke with Luz Marina who reports she has had ongoing ear infections for past 6 months - treated at [facility] clinic each time. She now has 'tunnel' hearing in her left ear and is wondering how long that will last. She also was diagnosed with bronchitis a while ago and is still coughing at night.    Advised evaluation in clinic by Dr Gamino is needed. She agreed with plan and was scheduled for tomorrow.

## 2017-08-01 ENCOUNTER — TELEPHONE (OUTPATIENT)
Dept: GASTROENTEROLOGY | Facility: CLINIC | Age: 57
End: 2017-08-01

## 2017-08-01 ENCOUNTER — OFFICE VISIT (OUTPATIENT)
Dept: INTERNAL MEDICINE | Facility: CLINIC | Age: 57
End: 2017-08-01
Attending: INTERNAL MEDICINE
Payer: COMMERCIAL

## 2017-08-01 VITALS
HEART RATE: 52 BPM | WEIGHT: 264 LBS | SYSTOLIC BLOOD PRESSURE: 137 MMHG | BODY MASS INDEX: 46.77 KG/M2 | DIASTOLIC BLOOD PRESSURE: 88 MMHG

## 2017-08-01 DIAGNOSIS — F51.04 PSYCHOPHYSIOLOGICAL INSOMNIA: ICD-10-CM

## 2017-08-01 DIAGNOSIS — J31.0 CHRONIC RHINITIS, UNSPECIFIED TYPE: Primary | ICD-10-CM

## 2017-08-01 DIAGNOSIS — Z12.11 COLON CANCER SCREENING: ICD-10-CM

## 2017-08-01 DIAGNOSIS — I48.91 ATRIAL FIBRILLATION, UNSPECIFIED TYPE (H): ICD-10-CM

## 2017-08-01 DIAGNOSIS — E78.00 PURE HYPERCHOLESTEROLEMIA: ICD-10-CM

## 2017-08-01 PROCEDURE — 99212 OFFICE O/P EST SF 10 MIN: CPT | Mod: ZF

## 2017-08-01 RX ORDER — ZOLPIDEM TARTRATE 5 MG/1
TABLET ORAL
Qty: 1 TABLET | Refills: 0 | Status: CANCELLED | OUTPATIENT
Start: 2017-08-01

## 2017-08-01 RX ORDER — WARFARIN SODIUM 5 MG/1
TABLET ORAL
Qty: 215 TABLET | Refills: 11 | Status: SHIPPED | OUTPATIENT
Start: 2017-08-01 | End: 2018-05-15

## 2017-08-01 RX ORDER — ATORVASTATIN CALCIUM 40 MG/1
40 TABLET, FILM COATED ORAL DAILY
Qty: 90 TABLET | Refills: 3 | Status: SHIPPED | OUTPATIENT
Start: 2017-08-01 | End: 2018-10-19

## 2017-08-01 ASSESSMENT — PAIN SCALES - GENERAL: PAINLEVEL: NO PAIN (0)

## 2017-08-01 NOTE — NURSING NOTE
Chief Complaint   Patient presents with     RECHECK     C/O continued ear pain   Rosario Coffey LPN

## 2017-08-01 NOTE — MR AVS SNAPSHOT
After Visit Summary   8/1/2017    Luz Marina Live    MRN: [id number]           Patient Information     Date Of Birth          1960        Visit Information        Provider Department      8/1/2017 9:40 AM Eliz Nguyen MD [facility]         Today's Diagnoses     Chronic rhinitis, unspecified type    -  1    Atrial fibrillation, unspecified type (H)        Pure hypercholesterolemia        Psychophysiological insomnia        Colon cancer screening           Follow-ups after your visit        Additional Services     GASTROENTEROLOGY ADULT REF PROCEDURE ONLY       Last Lab Result: Creatinine (mg/dL)       Date                     Value                 03/31/2017               0.93             ----------  Body mass index is 46.77 kg/(m^2).     Needed:  No  Language:  English    Patient will be contacted to schedule procedure.     Please be aware that coverage of these services is subject to the terms and limitations of your health insurance plan.  Call member services at your health plan with any benefit or coverage questions.  Any procedures must be performed at a [facility] facility OR coordinated by your clinic's referral office.    Please bring the following with you to your appointment:    (1) Any X-Rays, CTs or MRIs which have been performed.  Contact the facility where they were done to arrange for  prior to your scheduled appointment.    (2) List of current medications   (3) This referral request   (4) Any documents/labs given to you for this referral            OTOLARYNGOLOGY REFERRAL       Your provider has referred you to: [facility]: [facility] [phone]   [email or web address]    Please be aware that coverage of these services is subject to the terms and limitations of your health insurance plan.  Call member services at your health plan with any benefit or coverage  questions.      Please bring the following with you to your appointment:    (1) Any X-Rays, CTs or MRIs which have been performed.  Contact the facility where they were done to arrange for  prior to your scheduled appointment.   (2) List of current medications  (3) This referral request   (4) Any documents/labs given to you for this referral                  Your next 10 appointments already scheduled     Oct 10, 2017   Procedure with Mayda Wood MD   [facility], [place], Endoscopy ([facility])    [address] [id number] [phone]           The [facility] is located on the corner of [address] and [address] on the [facility]. It is easily accessible from virtually any point in the [facility] area, via [address] and [address].            Oct 24, 2017 10:00 AM CDT   Lab with  LAB   [facility] ([facility])    [address]  1st Floor  [facility] [address] [phone]            Oct 24, 2017 10:30 AM CDT   (Arrive by 10:15 AM)   RETURN HEART FAILURE with Twin Moreno MD   [facility] ([facility])    [address]  3rd Floor  [facility] [address] [phone]              Who to contact     Please call your clinic at [phone] to:    Ask questions about your health    Make or cancel appointments    Discuss your medicines    Learn about your test results    Speak to your doctor   If you have compliments or concerns about an experience at your clinic, or if you wish to file a complaint, please contact [facility] Patient Relations at [phone] or email us at [email or web address]         Additional Information About Your Visit        [employer] Information     EVIIVO is an electronic gateway that provides easy, online access to your medical records. With EVIIVO,  you can request a clinic appointment, read your test results, renew a prescription or communicate with your care team.     To sign up for [employer] visit the website at [email or web address]   You will be asked to enter the access code listed below, as well as some personal information. Please follow the directions to create your username and password.     Your access code is: [email or web address]  Expires: 10/1/2017  3:28 PM     Your access code will  in 90 days. If you need help or a new code, please contact your [facility] or call [phone] for assistance.        Care EveryWhere ID     This is your Care EveryWhere ID. This could be used by other organizations to access your [place] medical records  [id number]        Your Vitals Were     Pulse Breastfeeding? BMI (Body Mass Index)             52 No 46.77 kg/m2          Blood Pressure from Last 3 Encounters:   17 137/88   17 131/81   17 168/90    Weight from Last 3 Encounters:   17 119.7 kg (264 lb)   17 122 kg (269 lb)   17 123 kg (271 lb 1.6 oz)              We Performed the Following     GASTROENTEROLOGY ADULT REF PROCEDURE ONLY     OTOLARYNGOLOGY REFERRAL          Where to get your medicines      These medications were sent to [facility]/pharmacy #[id number] - [place], [place] - [address]  [address], [place] [address]     Phone:  [phone]     atorvastatin 40 MG tablet    warfarin 5 MG tablet          Primary Care Provider Office Phone # Fax #    Eliz Nguyen [phone]       [facility] [address]  [facility] [address]        Equal Access to Services     [facility]: Hadii bridgette manrique Sonorm, waaxda luqadaha, qaybta kaalmada mayte curran . So [facility] [phone].    ATENCIÓN: Si habla español, tiene a bennett disposición servicios gratuitos de asistencia lingüística. Llame al  [phone].    We comply with applicable federal civil rights laws and [place] laws. We do not discriminate on the basis of race, color, national origin, age, disability sex, sexual orientation or gender identity.            Thank you!     Thank you for choosing [facility]   for your care. Our goal is always to provide you with excellent care. Hearing back from our patients is one way we can continue to improve our services. Please take a few minutes to complete the written survey that you may receive in the mail after your visit with us. Thank you!             Your Updated Medication List - Protect others around you: Learn how to safely use, store and throw away your medicines at www.disposemymeds.org.          This list is accurate as of: 8/1/17 11:59 PM.  Always use your most recent med list.                   Brand Name Dispense Instructions for use Diagnosis    aspirin 81 MG tablet      Take by mouth daily        atorvastatin 40 MG tablet    LIPITOR    90 tablet    Take 1 tablet (40 mg) by mouth daily    Pure hypercholesterolemia       CALCIUM 600-D PO      Take  by mouth 2 times daily.    SLE (systemic lupus erythematosus) (H)       hydroxychloroquine 200 MG tablet    PLAQUENIL    30 tablet    Take 1 tablet (200 mg) by mouth daily    Systemic lupus erythematosus with glomerular disease, unspecified SLE type (H)       lisinopril 10 MG tablet    PRINIVIL/ZESTRIL    90 tablet    Take 1 tablet (10 mg) by mouth daily        metoprolol 50 MG tablet    LOPRESSOR    180 tablet    Take 1 tablet (50 mg) by mouth 2 times daily    Atrial fibrillation, unspecified type (H)       MULTIPLE VITAMIN PO      Take  by mouth.        mupirocin 2 % nasal ointment    BACTROBAN NASAL    10 g    Place a thin layer inside right nose on septum BID x 2 weeks    Nasal septum ulceration       omega-3 fatty acids 1200 MG capsule      Take 2 capsules by mouth 2 times daily        warfarin 5 MG tablet    COUMADIN     215 tablet    Take 12.5 mg (2 and 1/2 tab) on Tuesday, Thursday, and Saturday and 10 mg (2 tabs) all other days, or as directed by the coumadin clinic.    Atrial fibrillation, unspecified type (H)       zolpidem 5 MG tablet    AMBIEN    1 tablet    Take tablet by mouth 15 minutes prior to sleep, for insomnia    Psychophysiological insomnia

## 2017-08-01 NOTE — PROGRESS NOTES
HPI  Patient is here for follow up on treatment for ear infection. She reports that she was treated with several course of antibiotics since January. She was last seen a month ago. She states that the sensation of ear being plugged has not resolved completely since January. She states that she has no nasal congestion at this time. She has occasional postnasal drip. She has had nocturnal cough. She denies fever or chills. She denies shortness of breath.     Review of Systems     Constitutional:  Negative for fever, chills and fatigue.   HENT:  Positive for ear pain and sinus congestion. Negative for sore throat, dry mouth and sinus pain.    Eyes:  Negative for decreased vision.   Respiratory:   Negative for cough and dyspnea on exertion.    Cardiovascular:  Negative for chest pain, dyspnea on exertion and edema.   Musculoskeletal:  Negative for back pain.   Skin:  Negative for itching and rash.   Neurological:  Negative for dizziness, tingling, tremors and weakness.     Current Outpatient Prescriptions   Medication     warfarin (COUMADIN) 5 MG tablet     atorvastatin (LIPITOR) 40 MG tablet     hydroxychloroquine (PLAQUENIL) 200 MG tablet     zolpidem (AMBIEN) 5 MG tablet     lisinopril (PRINIVIL/ZESTRIL) 10 MG tablet     metoprolol (LOPRESSOR) 50 MG tablet     aspirin 81 MG tablet     mupirocin (BACTROBAN NASAL) 2 % nasal ointment     Calcium Carbonate-Vitamin D (CALCIUM 600-D PO)     MULTIPLE VITAMIN PO     Omega-3 Fatty Acids (FISH OIL) 1200 MG capsule     No current facility-administered medications for this visit.      Vitals:    08/01/17 0952 08/01/17 0955 08/01/17 0956   BP: 136/88 146/88 137/88   Pulse: (!) 49 52 52   Weight: 119.7 kg (264 lb)           Physical Exam   Constitutional: She is well-developed, well-nourished, and in no distress.   HENT:   Head: Normocephalic and atraumatic.   Right Ear: No drainage. A middle ear effusion is present.   Left Ear: No drainage. A middle ear effusion is present.    Nose: Mucosal edema and rhinorrhea present.   Eyes: Conjunctivae are normal. Pupils are equal, round, and reactive to light.   Cardiovascular: Normal rate and regular rhythm.    Pulmonary/Chest: Effort normal and breath sounds normal.   Musculoskeletal: She exhibits no edema.   Vitals reviewed.    Assessment and Plan:  Luz Marina was seen today for recheck.    Diagnoses and all orders for this visit:    Chronic rhinitis, unspecified type. Discussed possible causes of patient's symptoms. Recommend restarting Flonase. Patient was also referred to ENT for further evaluation. She is in agreement with the plan.   -     OTOLARYNGOLOGY REFERRAL    Atrial fibrillation, unspecified type (H). Refill for warfarin was given to the patient today.   -     warfarin (COUMADIN) 5 MG tablet; Take 12.5 mg (2 and 1/2 tab) on Tuesday, Thursday, and Saturday and 10 mg (2 tabs) all other days, or as directed by the coumadin clinic.    Pure hypercholesterolemia  -     atorvastatin (LIPITOR) 40 MG tablet; Take 1 tablet (40 mg) by mouth daily    Psychophysiological insomnia    Colon cancer screening  -     GASTROENTEROLOGY ADULT REF PROCEDURE ONLY    Total time spent 25 minutes.  More than 50% of the time spent with Ms. Live on counseling / coordinating her care    Eliz Nguyen MD

## 2017-08-01 NOTE — LETTER
8/1/2017       RE: Luz Marina Live  [address]  [place] [address]     Dear Colleague,    Thank you for referring your patient, Luz Marina Live, to the [facility]  at [facility]. Please see a copy of my visit note below.    HPI  Patient is here for follow up on treatment for ear infection. She reports that she was treated with several course of antibiotics since January. She was last seen a month ago. She states that the sensation of ear being plugged has not resolved completely since January. She states that she has no nasal congestion at this time. She has occasional postnasal drip. She has had nocturnal cough. She denies fever or chills. She denies shortness of breath.     Review of Systems   Constitutional:  Negative for fever, chills and fatigue.   HENT:  Positive for ear pain and sinus congestion. Negative for sore throat, dry mouth and sinus pain.    Eyes:  Negative for decreased vision.   Respiratory:   Negative for cough and dyspnea on exertion.    Cardiovascular:  Negative for chest pain, dyspnea on exertion and edema.   Musculoskeletal:  Negative for back pain.   Skin:  Negative for itching and rash.   Neurological:  Negative for dizziness, tingling, tremors and weakness.     Current Outpatient Prescriptions   Medication     warfarin (COUMADIN) 5 MG tablet     atorvastatin (LIPITOR) 40 MG tablet     hydroxychloroquine (PLAQUENIL) 200 MG tablet     zolpidem (AMBIEN) 5 MG tablet     lisinopril (PRINIVIL/ZESTRIL) 10 MG tablet     metoprolol (LOPRESSOR) 50 MG tablet     aspirin 81 MG tablet     mupirocin (BACTROBAN NASAL) 2 % nasal ointment     Calcium Carbonate-Vitamin D (CALCIUM 600-D PO)     MULTIPLE VITAMIN PO     Omega-3 Fatty Acids (FISH OIL) 1200 MG capsule     No current facility-administered medications for this visit.      Vitals:    08/01/17 0952 08/01/17 0955 08/01/17 0956   BP: 136/88 146/88 137/88   Pulse: (!) 49 52 52    Weight: 119.7 kg (264 lb)       Physical Exam   Constitutional: She is well-developed, well-nourished, and in no distress.   HENT:   Head: Normocephalic and atraumatic.   Right Ear: No drainage. A middle ear effusion is present.   Left Ear: No drainage. A middle ear effusion is present.   Nose: Mucosal edema and rhinorrhea present.   Eyes: Conjunctivae are normal. Pupils are equal, round, and reactive to light.   Cardiovascular: Normal rate and regular rhythm.    Pulmonary/Chest: Effort normal and breath sounds normal.   Musculoskeletal: She exhibits no edema.   Vitals reviewed.    Assessment and Plan:  Luz Marina was seen today for recheck.    Diagnoses and all orders for this visit:    Chronic rhinitis, unspecified type. Discussed possible causes of patient's symptoms. Recommend restarting Flonase. Patient was also referred to ENT for further evaluation. She is in agreement with the plan.   -     OTOLARYNGOLOGY REFERRAL    Atrial fibrillation, unspecified type (H). Refill for warfarin was given to the patient today.   -     warfarin (COUMADIN) 5 MG tablet; Take 12.5 mg (2 and 1/2 tab) on Tuesday, Thursday, and Saturday and 10 mg (2 tabs) all other days, or as directed by the coumadin clinic.    Pure hypercholesterolemia  -     atorvastatin (LIPITOR) 40 MG tablet; Take 1 tablet (40 mg) by mouth daily    Psychophysiological insomnia    Colon cancer screening  -     GASTROENTEROLOGY ADULT REF PROCEDURE ONLY    Total time spent 25 minutes.  More than 50% of the time spent with Ms. Live on counseling / coordinating her care  Eliz Nguyen MD

## 2017-08-01 NOTE — TELEPHONE ENCOUNTER
Spoke with patient regarding recommended colonoscopy. Patient would like in September but requests a call back to schedule once she returns home this afternoon.    Liane Epperson RN

## 2017-08-02 ENCOUNTER — HOSPITAL ENCOUNTER (OUTPATIENT)
Facility: CLINIC | Age: 57
End: 2017-08-02
Attending: INTERNAL MEDICINE | Admitting: INTERNAL MEDICINE

## 2017-08-04 ASSESSMENT — ENCOUNTER SYMPTOMS
WEAKNESS: 0
COUGH: 0
SINUS CONGESTION: 1
FATIGUE: 0
DIZZINESS: 0
DYSPNEA ON EXERTION: 0
SINUS PAIN: 0
TINGLING: 0
TREMORS: 0
SORE THROAT: 0
CHILLS: 0
FEVER: 0
BACK PAIN: 0

## 2017-08-07 ENCOUNTER — TELEPHONE (OUTPATIENT)
Dept: NEPHROLOGY | Facility: CLINIC | Age: 57
End: 2017-08-07

## 2017-08-07 NOTE — TELEPHONE ENCOUNTER
Patient is due for labs and a follow up appointment with Dr. Clark.. Patient was due for labs within 6 mos of last visit with Dr. Clark, around July and a year follow up with.   Labs in 6 months   Follow-up in one year    Nova L[place]ton Medical Assistant

## 2017-08-15 NOTE — TELEPHONE ENCOUNTER
Called and left a detailed message for this patient asking that she please call [facility] [phone] to schedule a follow up appointment with Dr. Clark.    Nova Cates Medical Assistant

## 2017-08-22 NOTE — TELEPHONE ENCOUNTER
Third attempt trying to contact the patient and schedule a follow up appointment. Left a detailed message and a letter has been sent.    Nova Altamirano

## 2017-09-15 ENCOUNTER — TELEPHONE (OUTPATIENT)
Dept: ANTICOAGULATION | Facility: OTHER | Age: 57
End: 2017-09-15

## 2017-09-20 ENCOUNTER — ANTICOAGULATION THERAPY VISIT (OUTPATIENT)
Dept: ANTICOAGULATION | Facility: OTHER | Age: 57
End: 2017-09-20
Payer: COMMERCIAL

## 2017-09-20 ENCOUNTER — TELEPHONE (OUTPATIENT)
Dept: ANTICOAGULATION | Facility: OTHER | Age: 57
End: 2017-09-20

## 2017-09-20 DIAGNOSIS — I48.91 ATRIAL FIBRILLATION, UNSPECIFIED TYPE (H): ICD-10-CM

## 2017-09-20 DIAGNOSIS — I48.91 ATRIAL FIBRILLATION (H): ICD-10-CM

## 2017-09-20 DIAGNOSIS — Z79.01 LONG-TERM (CURRENT) USE OF ANTICOAGULANTS: Primary | ICD-10-CM

## 2017-09-20 DIAGNOSIS — Z79.01 LONG TERM CURRENT USE OF ANTICOAGULANT THERAPY: ICD-10-CM

## 2017-09-20 DIAGNOSIS — Z79.01 LONG-TERM (CURRENT) USE OF ANTICOAGULANTS: ICD-10-CM

## 2017-09-20 LAB — INR BLD: 4.3 (ref 0.86–1.14)

## 2017-09-20 PROCEDURE — 99207 ZZC NO CHARGE NURSE ONLY: CPT | Performed by: INTERNAL MEDICINE

## 2017-09-20 NOTE — PROGRESS NOTES
ANTICOAGULATION FOLLOW-UP CLINIC VISIT    Patient Name:  Luz Marina Live  Date:  9/20/2017  Contact Type:  Telephone    SUBJECTIVE:     Patient Findings     Positives Unexplained INR or factor level change (possibly inflammatory (sinus)? )           OBJECTIVE    INR Point of Care   Date Value Ref Range Status   09/20/2017 4.3 (H) 0.86 - 1.14 Final     Comment:     This test is intended for monitoring Coumadin therapy.  Results are not   accurate in patients with prolonged INR due to factor deficiency.         ASSESSMENT / PLAN  INR assessment SUPRA    Recheck INR In: 2 WEEKS    INR Location Outside lab      Anticoagulation Summary as of 9/20/2017     INR goal 2.0-3.0   Today's INR 4.3!   Maintenance plan 12.5 mg (5 mg x 2.5) on Tue, Sat; 10 mg (5 mg x 2) all other days   Full instructions 9/20: 5 mg; Otherwise 12.5 mg on Tue, Sat; 10 mg all other days   Weekly total 75 mg   Plan last modified Deborah Alatorre RN (9/20/2017)   Next INR check 10/4/2017   Target end date     Indications   Long-term (current) use of anticoagulants [Z79.01] [Z79.01]  Atrial fibrillation (H) [I48.91]         Anticoagulation Episode Summary     INR check location     Preferred lab     Send INR reminders to [facility] POOL    Comments 5 mg tabs, [facility] (needs staff message)      Anticoagulation Care Providers     Provider Role Specialty Phone number    Eliz Nguyen MD [facility] Internal Medicine [phone]            See the Encounter Report to view Anticoagulation Flowsheet and Dosing Calendar (Go to Encounters tab in chart review, and find the Anticoagulation Therapy Visit)    Dosage adjustment made based on physician directed care plan.    Deborah Alatorre, KATE

## 2017-09-20 NOTE — TELEPHONE ENCOUNTER
Please review and renew this patients INR referral, orders pending. Thank you!           Deborah Alatorre, RN- Coumadin Clinic RN

## 2017-09-20 NOTE — MR AVS SNAPSHOT
Luz Marina Live   9/20/2017   Anticoagulation Therapy Visit    Description:  57 year old female   Provider:  Eliz Nguyen MD   Department:  [facility] Antico[facility]           INR as of 9/20/2017     Today's INR 4.3!      Anticoagulation Summary as of 9/20/2017     INR goal 2.0-3.0   Today's INR 4.3!   Full instructions 9/20: 5 mg; Otherwise 12.5 mg on Tue, Sat; 10 mg all other days   Next INR check 10/4/2017    Indications   Long-term (current) use of anticoagulants [Z79.01] [Z79.01]  Atrial fibrillation (H) [I48.91]         Contact Numbers     Clinic Number:         September 2017 Details    Sun Mon Tue Wed Thu Fri Sat          1               2                 3               4               5               6               7               8               9                 10               11               12               13               14               15               16                 17               18               19               20      5 mg   See details      21      10 mg         22      10 mg         23      12.5 mg           24      10 mg         25      10 mg         26      12.5 mg         27      10 mg         28      10 mg         29      10 mg         30      12.5 mg          Date Details   09/20 This INR check               How to take your warfarin dose     To take:  5 mg Take 1 of the 5 mg tablets.    To take:  10 mg Take 2 of the 5 mg tablets.    To take:  12.5 mg Take 2.5 of the 5 mg tablets.           October 2017 Details    Sun Mon Tue Wed Thu Fri Sat     1      10 mg         2      10 mg         3      12.5 mg         4            5               6               7                 8               9               10               11               12               13               14                 15               16               17               18               19               20               21                 22               23               24               25               26                27               28                 29               30               31                    Date Details   No additional details    Date of next INR:  10/4/2017         How to take your warfarin dose     To take:  10 mg Take 2 of the 5 mg tablets.    To take:  12.5 mg Take 2.5 of the 5 mg tablets.

## 2017-10-03 ENCOUNTER — TELEPHONE (OUTPATIENT)
Dept: GASTROENTEROLOGY | Facility: CLINIC | Age: 57
End: 2017-10-03

## 2017-10-03 ENCOUNTER — TELEPHONE (OUTPATIENT)
Dept: FAMILY MEDICINE | Facility: CLINIC | Age: 57
End: 2017-10-03

## 2017-10-03 DIAGNOSIS — Z79.01 WARFARIN ANTICOAGULATION: Primary | ICD-10-CM

## 2017-10-03 DIAGNOSIS — Z12.11 ENCOUNTER FOR SCREENING COLONOSCOPY: Primary | ICD-10-CM

## 2017-10-03 DIAGNOSIS — Z79.01 LONG-TERM (CURRENT) USE OF ANTICOAGULANTS: Primary | ICD-10-CM

## 2017-10-03 NOTE — TELEPHONE ENCOUNTER
Patient is going to have a colonoscopy next Tuesday and she has some questions about her INR for this. Please call her at [phone].    Thank you Carina

## 2017-10-03 NOTE — TELEPHONE ENCOUNTER
Dr. Wood, Patient taking coumadin. Please place order for INR to be drawn prior to procedure.   Thanks Kaley LOVE    Patient scheduled for Colonoscopy    Indication for procedure. Colon cancer screening     Referring Provider. Eliz Nguyen MD    ? Not Needed    Arrival time verified? Yes, 1000    Facility location verified? Yes, [address]    Instructions given regarding prep and procedure    Prep Type Golytely    Are you taking any anticoagulants or blood thinners? Coumadin and Aspirin    Instructions given? Patient consulting PCP for management with procedure    Electronic implanted devices? No    Pre procedure teaching completed? Yes    Transportation from procedure?     H&P / Pre op physical completed? N/A

## 2017-10-03 NOTE — TELEPHONE ENCOUNTER
Pt will be having colonoscopy on 10/10. Please review pt's history and advise if he/she will need to be bridged with Lovenox. If so, 1 mg/kg BID or 1.5 mg/kg daily? If not, OK to stop coumadin 5 days prior and resume usual dose 12-24 hours after procedure? Please advise.  Note: pt is taking coumadin for A. Fib.   Cait Tarango RN

## 2017-10-04 NOTE — TELEPHONE ENCOUNTER
Discussed with Dr. Nguyen who advises pt be bridged with Lovenox 1/mg/kg BID for colonoscopy procedure.

## 2017-10-04 NOTE — TELEPHONE ENCOUNTER
I spoke with patient who states that she has never done Lovenox before and is concerned about it. She is worried about giving herself shots. I did offer her an appt to see either a triage nurse or a Coumadin nurse to go over the lovenox, but pt is on her way to [place] right now, will be getting back late tomorrow and then is leaving for a wedding in [place]. I did suggest that she consider reschedule the screening for the colonoscopy.     Pt states that she has been on coumadin for over two years, has not done Lovenox in the past, and has never had a clot (being treated for A. Fib). Do you feel like Lovnenox is still needed? If so, patient asks that it be only once a day instead of two, as she is a 'baby' when it comes to shots    Cait Hawthorne RN

## 2017-10-04 NOTE — TELEPHONE ENCOUNTER
See note below from Dr. Nguyen. Lovenox was sent to the pharmacy.           Agree with the plan.   Eliz Nguyen MD (Routing comment)

## 2017-10-04 NOTE — TELEPHONE ENCOUNTER
Please review the Lovenox instructions below. If you agree, please send the Lovenox that is cued up to the pharmacy and send message back to the [facility] so pt can be called.       Thursday 10/5/17 5 days before surgery: Stop warfarin.  No Lovenox.   Friday 10/6/17  4 days before surgery: No warfarin.  No Lovenox.  Saturday 10/7/17 3 days before surgery: No warfarin, begin Lovenox shots 120 mg  SQ every 12 hours.  Ronan 10/8/17 2 days before surgery: No warfarin, Lovenox shots 120 mg SQ every 12 hours.  Monday 10/9/17 1 day before surgery: No warfarin, Lovenox shot in AM only. No PM dose.  Check INR.    Tuesday 10/10/17 Day of surgery: No Lovenox, warfarin 15 mg (bump dose) in the PM after surgery/procedure (first dose of coumadin should be taken 12-24 after procedure to decrease risk of bleeding)     Wednesday 10/11/17 POD 1: warfarin 12.5 mg, Lovenox 120 mg SQ every 12 hours.  Thursday 10/12/17 POD 2: warfarin 12.5 mg, Lovenox 120 mg SQ every 12 hours.  Friday 10/13/17 POD 3: warfarin 10 mg, Lovenox 120 mg SQ every 12 hours.  Saturday 10/14/17 POD 4: warfarin 12.5 mg, Lovenox 120 mg SQ every 12 hours.  Ronan 10/15/17 POD 5: warfarin 10 mg, Lovenox 120 mg SQ every 12 hours.  Monday 10/16/17 POD 6: Lovenox 120 mg SQ in the AM only - further instructions with INR check.     * Or as directed by the coumadin clinic.

## 2017-10-10 ENCOUNTER — ANTICOAGULATION THERAPY VISIT (OUTPATIENT)
Dept: ANTICOAGULATION | Facility: OTHER | Age: 57
End: 2017-10-10
Payer: COMMERCIAL

## 2017-10-10 ENCOUNTER — ANTICOAGULATION THERAPY VISIT (OUTPATIENT)
Dept: ANTICOAGULATION | Facility: OTHER | Age: 57
End: 2017-10-10

## 2017-10-10 DIAGNOSIS — I48.91 ATRIAL FIBRILLATION (H): ICD-10-CM

## 2017-10-10 DIAGNOSIS — I48.91 ATRIAL FIBRILLATION, UNSPECIFIED TYPE (H): ICD-10-CM

## 2017-10-10 DIAGNOSIS — Z79.01 LONG-TERM (CURRENT) USE OF ANTICOAGULANTS: ICD-10-CM

## 2017-10-10 DIAGNOSIS — Z79.01 LONG TERM CURRENT USE OF ANTICOAGULANT THERAPY: ICD-10-CM

## 2017-10-10 LAB — INR BLD: 2.9 (ref 0.86–1.14)

## 2017-10-10 PROCEDURE — 85610 PROTHROMBIN TIME: CPT | Mod: QW | Performed by: INTERNAL MEDICINE

## 2017-10-10 PROCEDURE — 36416 COLLJ CAPILLARY BLOOD SPEC: CPT | Performed by: INTERNAL MEDICINE

## 2017-10-10 PROCEDURE — 99207 ZZC NO CHARGE NURSE ONLY: CPT | Performed by: INTERNAL MEDICINE

## 2017-10-10 NOTE — PROGRESS NOTES
ANTICOAGULATION FOLLOW-UP CLINIC VISIT    Patient Name:  Luz Marina Live  Date:  10/10/2017  Contact Type:  Telephone    SUBJECTIVE:     Patient Findings     Positives No Problem Findings           OBJECTIVE    INR Point of Care   Date Value Ref Range Status   10/10/2017 2.9 (H) 0.86 - 1.14 Final     Comment:     This test is intended for monitoring Coumadin therapy.  Results are not   accurate in patients with prolonged INR due to factor deficiency.         ASSESSMENT / PLAN  INR assessment THER    Recheck INR In: 3 WEEKS    INR Location Outside lab      Anticoagulation Summary as of 10/10/2017     INR goal 2.0-3.0   Today's INR 2.9   Maintenance plan 12.5 mg (5 mg x 2.5) on Tue, Sat; 10 mg (5 mg x 2) all other days   Full instructions 12.5 mg on Tue, Sat; 10 mg all other days   Weekly total 75 mg   No change documented Cait Hawthorne RN   Plan last modified Deborah Alatorre RN (9/20/2017)   Next INR check 10/31/2017   Target end date     Indications   Long-term (current) use of anticoagulants [Z79.01] [Z79.01]  Atrial fibrillation (H) [I48.91]         Anticoagulation Episode Summary     INR check location     Preferred lab     Send INR reminders to [facility] POOL    Comments 5 mg tabs, [facility] (needs staff message) PM dose      Anticoagulation Care Providers     Provider Role Specialty Phone number    Eliz Nguyen MD [facility] [phone]            See the Encounter Report to view Anticoagulation Flowsheet and Dosing Calendar (Go to Encounters tab in chart review, and find the Anticoagulation Therapy Visit)    Dosage adjustment made based on physician directed care plan.      Cait Hawthorne RN

## 2017-10-10 NOTE — TELEPHONE ENCOUNTER
Reviewed patient's medical record. Agree with stopping warfarin without bridging. Patient is anticoagulated for atrial fibrillation, bridging is not indicated.   Eliz Nguyen MD

## 2017-10-10 NOTE — MR AVS SNAPSHOT
Luz Marina Live   10/10/2017   Anticoagulation Therapy Visit    Description:  57 year old female   Provider:  Eliz Nguyen MD   Department:  Er Anticoag           INR as of 10/10/2017     Today's INR 2.9      Anticoagulation Summary as of 10/10/2017     INR goal 2.0-3.0   Today's INR 2.9   Full instructions 12.5 mg on Tue, Sat; 10 mg all other days   Next INR check 10/31/2017    Indications   Long-term (current) use of anticoagulants [Z79.01] [Z79.01]  Atrial fibrillation (H) [I48.91]         Contact Numbers     Clinic Number:         October 2017 Details    Sun Mon Tue Wed Thu Fri Sat     1               2               3               4               5               6               7                 8               9               10      12.5 mg   See details      11      10 mg         12      10 mg         13      10 mg         14      12.5 mg           15      10 mg         16      10 mg         17      12.5 mg         18      10 mg         19      10 mg         20      10 mg         21      12.5 mg           22      10 mg         23      10 mg         24      12.5 mg         25      10 mg         26      10 mg         27      10 mg         28      12.5 mg           29      10 mg         30      10 mg         31                 Date Details   10/10 This INR check       Date of next INR:  10/31/2017         How to take your warfarin dose     To take:  10 mg Take 2 of the 5 mg tablets.    To take:  12.5 mg Take 2.5 of the 5 mg tablets.

## 2017-10-10 NOTE — TELEPHONE ENCOUNTER
Spoke with pt. Informed her that she will just need to hold her coumadin for 5 days prior to the colonoscopy and resume 12-24 hrs after. She has cancelled the appt for now, but will reschedule soon. She was infomed as well that she's overdue for her INR. She's going to try to come in today.    Deborah Alatorre, RN- Coumadin Clinic RN

## 2017-11-06 ENCOUNTER — ANTICOAGULATION THERAPY VISIT (OUTPATIENT)
Dept: ANTICOAGULATION | Facility: OTHER | Age: 57
End: 2017-11-06
Payer: COMMERCIAL

## 2017-11-06 ENCOUNTER — OFFICE VISIT (OUTPATIENT)
Dept: NEPHROLOGY | Facility: CLINIC | Age: 57
End: 2017-11-06
Payer: COMMERCIAL

## 2017-11-06 VITALS
HEART RATE: 53 BPM | OXYGEN SATURATION: 96 % | DIASTOLIC BLOOD PRESSURE: 76 MMHG | BODY MASS INDEX: 46.61 KG/M2 | WEIGHT: 263.1 LBS | SYSTOLIC BLOOD PRESSURE: 140 MMHG

## 2017-11-06 DIAGNOSIS — I48.91 ATRIAL FIBRILLATION, UNSPECIFIED TYPE (H): ICD-10-CM

## 2017-11-06 DIAGNOSIS — I10 HTN, GOAL BELOW 140/90: Primary | ICD-10-CM

## 2017-11-06 DIAGNOSIS — M32.9 SLE (SYSTEMIC LUPUS ERYTHEMATOSUS) (H): ICD-10-CM

## 2017-11-06 DIAGNOSIS — N18.30 CKD (CHRONIC KIDNEY DISEASE) STAGE 3, GFR 30-59 ML/MIN (H): ICD-10-CM

## 2017-11-06 DIAGNOSIS — M32.14 SYSTEMIC LUPUS ERYTHEMATOSUS WITH GLOMERULAR DISEASE, UNSPECIFIED SLE TYPE (H): ICD-10-CM

## 2017-11-06 DIAGNOSIS — I48.91 ATRIAL FIBRILLATION (H): ICD-10-CM

## 2017-11-06 DIAGNOSIS — Z79.01 LONG-TERM (CURRENT) USE OF ANTICOAGULANTS: ICD-10-CM

## 2017-11-06 DIAGNOSIS — Z79.01 LONG TERM CURRENT USE OF ANTICOAGULANT THERAPY: ICD-10-CM

## 2017-11-06 LAB
ALBUMIN UR-MCNC: 10 MG/DL
ANION GAP SERPL CALCULATED.3IONS-SCNC: 6 MMOL/L (ref 3–14)
APPEARANCE UR: CLEAR
BACTERIA #/AREA URNS HPF: ABNORMAL /HPF
BILIRUB UR QL STRIP: NEGATIVE
BUN SERPL-MCNC: 23 MG/DL (ref 7–30)
C3 SERPL-MCNC: 81 MG/DL (ref 76–169)
C4 SERPL-MCNC: 14 MG/DL (ref 15–50)
CALCIUM SERPL-MCNC: 8.9 MG/DL (ref 8.5–10.1)
CHLORIDE SERPL-SCNC: 108 MMOL/L (ref 94–109)
CO2 SERPL-SCNC: 26 MMOL/L (ref 20–32)
COLOR UR AUTO: YELLOW
CREAT SERPL-MCNC: 0.85 MG/DL (ref 0.52–1.04)
CREAT UR-MCNC: 83 MG/DL
GFR SERPL CREATININE-BSD FRML MDRD: 69 ML/MIN/1.7M2
GLUCOSE SERPL-MCNC: 98 MG/DL (ref 70–99)
GLUCOSE UR STRIP-MCNC: NEGATIVE MG/DL
HGB UR QL STRIP: ABNORMAL
INR PPP: 3.76 (ref 0.86–1.14)
KETONES UR STRIP-MCNC: NEGATIVE MG/DL
LEUKOCYTE ESTERASE UR QL STRIP: NEGATIVE
MICROALBUMIN UR-MCNC: 159 MG/L
MICROALBUMIN/CREAT UR: 190.88 MG/G CR (ref 0–25)
NITRATE UR QL: NEGATIVE
NON-SQ EPI CELLS #/AREA URNS LPF: ABNORMAL /LPF
PH UR STRIP: 6 PH (ref 5–7)
POTASSIUM SERPL-SCNC: 4.6 MMOL/L (ref 3.4–5.3)
PROT UR-MCNC: 0.39 G/L
PROT/CREAT 24H UR: 0.46 G/G CR (ref 0–0.2)
RBC #/AREA URNS AUTO: ABNORMAL /HPF
SODIUM SERPL-SCNC: 140 MMOL/L (ref 133–144)
SOURCE: ABNORMAL
SP GR UR STRIP: 1.01 (ref 1–1.03)
UROBILINOGEN UR STRIP-MCNC: NORMAL MG/DL (ref 0–2)
WBC #/AREA URNS AUTO: ABNORMAL /HPF

## 2017-11-06 PROCEDURE — 80048 BASIC METABOLIC PNL TOTAL CA: CPT | Performed by: INTERNAL MEDICINE

## 2017-11-06 PROCEDURE — 86225 DNA ANTIBODY NATIVE: CPT | Performed by: INTERNAL MEDICINE

## 2017-11-06 PROCEDURE — 81001 URINALYSIS AUTO W/SCOPE: CPT | Performed by: INTERNAL MEDICINE

## 2017-11-06 PROCEDURE — 86160 COMPLEMENT ANTIGEN: CPT | Performed by: INTERNAL MEDICINE

## 2017-11-06 PROCEDURE — 99207 ZZC NO CHARGE NURSE ONLY: CPT | Performed by: INTERNAL MEDICINE

## 2017-11-06 PROCEDURE — 85610 PROTHROMBIN TIME: CPT | Performed by: INTERNAL MEDICINE

## 2017-11-06 PROCEDURE — 99214 OFFICE O/P EST MOD 30 MIN: CPT | Performed by: INTERNAL MEDICINE

## 2017-11-06 PROCEDURE — 84156 ASSAY OF PROTEIN URINE: CPT | Performed by: INTERNAL MEDICINE

## 2017-11-06 PROCEDURE — 36415 COLL VENOUS BLD VENIPUNCTURE: CPT | Performed by: INTERNAL MEDICINE

## 2017-11-06 PROCEDURE — 82043 UR ALBUMIN QUANTITATIVE: CPT | Performed by: INTERNAL MEDICINE

## 2017-11-06 NOTE — PROGRESS NOTES
11/06/2017   CC: CKD    HPI: Luz Marina Live is a 57 year old female who presents for follow-up of CKD. Ms. Live' hx is significant for SLE for which she follows with Dr. Adame. Her SLE has included renal involvement in the past - 1990s and included treatment with cytoxan, prednisone and later imuran. She has had intermittent low grade proteinuria since. Additional hx includes CABG in August 2009, Afib, and hypertension (dx around 20 years ago). Additionally she has had a few RANDELL episodes and NSAID related injury.    Creatinine has been 0.85-1.04 in the past year with a correlating GFR of 53-74; stable creatinine at 0.85 today. She remains on lisinopril 10 mg daily for some renoprotective effects. She does not follow her blood pressure regularly outside of clinic but is open to following it at outside pharmacies. She feels that the reading is typically less than 140. She denies any NSAID use. No blood in the urine, swelling has been well controlled. She is currently on plaquenil 200 mg daily which was lowered from 400 mg last year.        Allergies   Allergen Reactions     Sulfa Drugs Rash and GI Disturbance         Current Outpatient Prescriptions on File Prior to Visit:  warfarin (COUMADIN) 5 MG tablet Take 12.5 mg (2 and 1/2 tab) on Tuesday, Thursday, and Saturday and 10 mg (2 tabs) all other days, or as directed by the coumadin clinic.   atorvastatin (LIPITOR) 40 MG tablet Take 1 tablet (40 mg) by mouth daily   hydroxychloroquine (PLAQUENIL) 200 MG tablet Take 1 tablet (200 mg) by mouth daily   lisinopril (PRINIVIL/ZESTRIL) 10 MG tablet Take 1 tablet (10 mg) by mouth daily   metoprolol (LOPRESSOR) 50 MG tablet Take 1 tablet (50 mg) by mouth 2 times daily   aspirin 81 MG tablet Take by mouth daily   mupirocin (BACTROBAN NASAL) 2 % nasal ointment Place a thin layer inside right nose on septum BID x 2 weeks   Calcium Carbonate-Vitamin D (CALCIUM 600-D PO) Take  by mouth 2 times daily.   MULTIPLE VITAMIN PO Take  by  mouth.   Omega-3 Fatty Acids (FISH OIL) 1200 MG capsule Take 2 capsules by mouth 2 times daily      No current facility-administered medications on file prior to visit.     Past Medical History:   Diagnosis Date     Arthritis      Hyperlipidemia      Hypertension      Lupus (systemic lupus erythematosus) (H)      Moderate tricuspid insufficiency 12/22/2016     Myocardial infarction        Past Surgical History:   Procedure Laterality Date     CARDIAC SURGERY       CARPAL TUNNEL RELEASE RT/LT  1996    bilateral     VASCULAR SURGERY         Social History   Substance Use Topics     Smoking status: Former Smoker     Smokeless tobacco: Former User      Comment: 30 plus years ago.     Alcohol use No       Family History   Problem Relation Age of Onset     Arthritis Mother      ra     Connective Tissue Disorder Mother      lupus     Congenital Anomalies Mother      wholein heart     CEREBROVASCULAR DISEASE Mother      TIA's     CEREBROVASCULAR DISEASE Father      DIABETES Father      Hypertension Father      Cardiovascular Father      stents     Genitourinary Problems Father      kidney failure     KIDNEY DISEASE Father      kidney injury related to acute illness around time of death (RANDELL likely)     Cataracts Father      Arthritis Paternal Grandmother      DIABETES Brother        ROS: A 4 system review of systems was negative other than noted here or above.     Exam:  /76 (BP Location: Left arm, Patient Position: Chair, Cuff Size: Adult Large)  Pulse 53  Wt 119.3 kg (263 lb 1.6 oz)  SpO2 96%  BMI 46.61 kg/m2    GENERAL APPEARANCE: alert and no distress  RESP: lungs clear to auscultation   CV: regular rhythm, normal rate, no rub  Extremities: no clubbing, cyanosis, or edema  SKIN: no rash  NEURO: mentation intact and speech normal  PSYCH: affect normal/bright    Result  Orders Only on 11/06/2017   Component Date Value Ref Range Status     Sodium 11/06/2017 140  133 - 144 mmol/L Final     Potassium 11/06/2017 4.6   3.4 - 5.3 mmol/L Final     Chloride 11/06/2017 108  94 - 109 mmol/L Final     Carbon Dioxide 11/06/2017 26  20 - 32 mmol/L Final     Anion Gap 11/06/2017 6  3 - 14 mmol/L Final     Glucose 11/06/2017 98  70 - 99 mg/dL Final    Non Fasting     Urea Nitrogen 11/06/2017 23  7 - 30 mg/dL Final     Creatinine 11/06/2017 0.85  0.52 - 1.04 mg/dL Final     GFR Estimate 11/06/2017 69  >60 mL/min/1.7m2 Final    Non  GFR Calc     GFR Estimate If Black 11/06/2017 83  >60 mL/min/1.7m2 Final    African American GFR Calc     Calcium 11/06/2017 8.9  8.5 - 10.1 mg/dL Final     Color Urine 11/06/2017 Yellow   Final     Appearance Urine 11/06/2017 Clear   Final     Glucose Urine 11/06/2017 Negative  NEG^Negative mg/dL Final     Bilirubin Urine 11/06/2017 Negative  NEG^Negative Final     Ketones Urine 11/06/2017 Negative  NEG^Negative mg/dL Final     Specific Gravity Urine 11/06/2017 1.013  1.003 - 1.035 Final     Blood Urine 11/06/2017 Small* NEG^Negative Final     pH Urine 11/06/2017 6.0  5.0 - 7.0 pH Final     Protein Albumin Urine 11/06/2017 10* NEG^Negative mg/dL Final     Urobilinogen mg/dL 11/06/2017 Normal  0.0 - 2.0 mg/dL Final     Nitrite Urine 11/06/2017 Negative  NEG^Negative Final     Leukocyte Esterase Urine 11/06/2017 Negative  NEG^Negative Final     Source 11/06/2017 Midstream Urine   Final     WBC Urine 11/06/2017 O - 2  OTO2^O - 2 /HPF Final     RBC Urine 11/06/2017 2-5* OTO2^O - 2 /HPF Final     Bacteria Urine 11/06/2017 Few* NEG^Negative /HPF Final     Squamous Epithelial /LPF Urine 11/06/2017 Few  FEW^Few /LPF Final     Creatinine Urine 11/06/2017 83  mg/dL Final     Albumin Urine mg/L 11/06/2017 PENDING  mg/L Incomplete     Albumin Urine mg/g Cr 11/06/2017 PENDING  0 - 25 mg/g Cr Incomplete     INR 11/06/2017 3.76* 0.86 - 1.14 Final        Assessment/Plan:   1. CKD stage 3: risk factors for CKD include previous lupus nephritis, hypertension, as well as acute kidney injuries. She has had some mild  proteinuria and masses on 10 mg lisinopril at this time. Her urine protein to creatinine ratio is pending currently. If her urine protein comes back abnormal at this time I would consider attempting to increase her lisinopril especially if her blood pressure truly is up around 140 typically. I will update her once I see the urine protein back from today. I am pleased with her stability of kidney function. I offered a one-year follow-up or she can also follow-up with her primary and rheumatology physicians and see me on an as-needed basis.    2. Hypertension: Blood pressure today is just at 140 systolic. Ideally she would be consistently less than 140 and ideally even less than 130. She is going to follow her blood pressure outside of the clinic for now and update if consistently greater than goal. If her urine protein to creatinine ratio comes back elevated again today I would have a low threshold to increase her lisinopril to 20 mg daily which may allow some additional blood pressure effects as well as urine protein lowering effects.    3. Lupus: following with Dr. Adame - on plaquenil.         Lavern Clark, DO

## 2017-11-06 NOTE — NURSING NOTE
"Luz Marina Live's goals for this visit include: CC  She requests these members of her care team be copied on today's visit information: No    PCP: Eliz Nguyen    Referring Provider:  ESTABLISHED PATIENT  No address on file    Chief Complaint   Patient presents with     RECHECK       Initial Wt 119.3 kg (263 lb 1.6 oz)  BMI 46.61 kg/m2 Estimated body mass index is 46.61 kg/(m^2) as calculated from the following:    Height as of 4/3/17: 1.6 m (5' 3\").    Weight as of this encounter: 119.3 kg (263 lb 1.6 oz).  Medication Reconciliation: complete  "

## 2017-11-06 NOTE — MR AVS SNAPSHOT
Luz Marina Live   11/6/2017   Anticoagulation Therapy Visit    Description:  57 year old female   Provider:  Eliz Nguyen MD   Department:  [facility] Anticoag           INR as of 11/6/2017     Today's INR 3.76!      Anticoagulation Summary as of 11/6/2017     INR goal 2.0-3.0   Today's INR 3.76!   Full instructions 11/8: 5 mg; Otherwise 12.5 mg on Tue; 10 mg all other days   Next INR check 11/22/2017    Indications   Long-term (current) use of anticoagulants [Z79.01] [Z79.01]  Atrial fibrillation (H) [I48.91]         Contact Numbers     Clinic Number:         November 2017 Details    Sun Mon Tue Wed Thu Fri Sat        1               2               3               4                 5               6      10 mg   See details      7      12.5 mg         8      5 mg         9      10 mg         10      10 mg         11      10 mg           12      10 mg         13      10 mg         14      12.5 mg         15      10 mg         16      10 mg         17      10 mg         18      10 mg           19      10 mg         20      10 mg         21      12.5 mg         22            23               24               25                 26               27               28               29               30                  Date Details   11/06 This INR check       Date of next INR:  11/22/2017         How to take your warfarin dose     To take:  5 mg Take 1 of the 5 mg tablets.    To take:  10 mg Take 2 of the 5 mg tablets.    To take:  12.5 mg Take 2.5 of the 5 mg tablets.

## 2017-11-06 NOTE — MR AVS SNAPSHOT
After Visit Summary   11/6/2017    Luz Marina Live    MRN: [id number]           Patient Information     Date Of Birth          1960        Visit Information        Provider Department      11/6/2017 10:30 AM Lavern Clark MD [facility]        Today's Diagnoses     HTN, goal below 140/90    -  1    CKD (chronic kidney disease) stage 3, GFR 30-59 ml/min        Systemic lupus erythematosus with glomerular disease, unspecified SLE type (H)           Follow-ups after your visit        Your next 10 appointments already scheduled     Nov 28, 2017   Procedure with Mayda Wood MD   [facility] [place], Endoscopy ([facility])    [address] [id number] [phone]           The [facility] is located on the corner of [address] on the [facility]. It is easily accessible from virtually any point in the [place] area, via [address] and [address].            Feb 27, 2018  8:30 AM CST   Lab with  LAB   [facility] ([facility])    [address] [place] [id number] [phone]            Feb 27, 2018  9:00 AM CST   (Arrive by 8:45 AM)   RETURN HEART FAILURE with Twin Moreno MD   [facility] ([facility])    [address] [place] [id number] [phone]            Aug 06, 2018 11:30 AM CDT   Return Visit with Ever Adame MD   [facility] ([facility])    [id number] [place] [address] [phone]              Who to contact     If you have questions or need follow up information about today's clinic visit or your schedule please contact [facility] directly at [phone].  Normal or non-critical lab and imaging results will be communicated to you by Gloriat, letter  or phone within 4 business days after the clinic has received the results. If you do not hear from us within 7 days, please contact the clinic through [email or web address] or phone. If you have a critical or abnormal lab result, we will notify you by phone as soon as possible.  Submit refill requests through [email or web address] or call your pharmacy and they will forward the refill request to us. Please allow 3 business days for your refill to be completed.          Additional Information About Your Visit        [email or web address] Information     [email or web address] is an electronic gateway that provides easy, online access to your medical records. With [email or web address], you can request a clinic appointment, read your test results, renew a prescription or communicate with your care team.     To sign up for [email or web address] visit the website at [email or web address]   You will be asked to enter the access code listed below, as well as some personal information. Please follow the directions to create your username and password.     Your access code is: [email or web address]  Expires: 2018 10:58 AM     Your access code will  in 90 days. If you need help or a new code, please contact your [facility] or call [phone] for assistance.        Care EveryWhere ID     This is your Care EveryWhere ID. This could be used by other organizations to access your [facility] medical records  [id number]        Your Vitals Were     Pulse Pulse Oximetry BMI (Body Mass Index)             53 96% 46.61 kg/m2          Blood Pressure from Last 3 Encounters:   17 140/76   17 137/88   17 131/81    Weight from Last 3 Encounters:   17 119.3 kg (263 lb 1.6 oz)   17 119.7 kg (264 lb)   17 122 kg (269 lb)              Today, you had the following     No orders found for display       Primary Care Provider Office Phone # Fax #    Eliz Nguyen [phone]       [facility] [address]  [place] [address]        Equal Access to Services     [facility] Hospital[facility]SE[facility]EL : Hadii bridgette betts burton Garrett, waaxda luqadaha, qaybta kaalmada magdy, mayte alvaradovaishnavijossy roberts. So [facility] [phone].    ATENCIÓN: Si habla español, tiene a bennett disposición servicios gratuitos de asistencia lingüística. Gabrielame al [phone].    We comply with applicable federal civil rights laws and [place] laws. We do not discriminate on the basis of race, color, national origin, age, disability, sex, sexual orientation, or gender identity.            Thank you!     Thank you for choosing [facility]  for your care. Our goal is always to provide you with excellent care. Hearing back from our patients is one way we can continue to improve our services. Please take a few minutes to complete the written survey that you may receive in the mail after your visit with us. Thank you!             Your Updated Medication List - Protect others around you: Learn how to safely use, store and throw away your medicines at www.disposemymeds.org.          This list is accurate as of: 11/6/17 10:58 AM.  Always use your most recent med list.                   Brand Name Dispense Instructions for use Diagnosis    aspirin 81 MG tablet      Take by mouth daily        atorvastatin 40 MG tablet    LIPITOR    90 tablet    Take 1 tablet (40 mg) by mouth daily    Pure hypercholesterolemia       CALCIUM 600-D PO      Take  by mouth 2 times daily.    SLE (systemic lupus erythematosus) (H)       hydroxychloroquine 200 MG tablet    PLAQUENIL    30 tablet    Take 1 tablet (200 mg) by mouth daily    Systemic lupus erythematosus with glomerular disease, unspecified SLE type (H)       lisinopril 10 MG tablet    PRINIVIL/ZESTRIL    90 tablet    Take 1 tablet (10 mg) by mouth daily        metoprolol 50 MG tablet    LOPRESSOR    180 tablet    Take 1 tablet (50 mg) by mouth 2 times daily    Atrial fibrillation, unspecified type (H)       MULTIPLE VITAMIN  PO      Take  by mouth.        mupirocin 2 % nasal ointment    BACTROBAN NASAL    10 g    Place a thin layer inside right nose on septum BID x 2 weeks    Nasal septum ulceration       omega-3 fatty acids 1200 MG capsule      Take 2 capsules by mouth 2 times daily        warfarin 5 MG tablet    COUMADIN    215 tablet    Take 12.5 mg (2 and 1/2 tab) on Tuesday, Thursday, and Saturday and 10 mg (2 tabs) all other days, or as directed by the coumadin clinic.    Atrial fibrillation, unspecified type (H)

## 2017-11-07 LAB — DSDNA AB SER-ACNC: 56 IU/ML

## 2017-11-08 ENCOUNTER — TELEPHONE (OUTPATIENT)
Dept: ANTICOAGULATION | Facility: OTHER | Age: 57
End: 2017-11-08

## 2017-11-08 NOTE — TELEPHONE ENCOUNTER
Reason for Call:  Request for results:    Name of test or procedure: INR    Date of test of procedure: 11-6-17    Location of the test or procedure: [facility]    OK to leave the result message on voice mail or with a family member? YES    Phone number Patient can be reached at:  Home number on file [phone] (home)    Additional comments: call with results.    Call taken on 11/8/2017 at 10:16 AM by Soila Dawn

## 2017-11-08 NOTE — PROGRESS NOTES
ANTICOAGULATION FOLLOW-UP CLINIC VISIT    Patient Name:  Luz Marina Live  Date:  11/8/2017  Contact Type:  Face to Face    SUBJECTIVE:     Patient Findings     Positives Unexplained INR or factor level change           OBJECTIVE    INR   Date Value Ref Range Status   11/06/2017 3.76 (H) 0.86 - 1.14 Final       ASSESSMENT / PLAN  INR assessment SUPRA    Recheck INR In: 2 WEEKS    INR Location Outside lab      Anticoagulation Summary as of 11/6/2017     INR goal 2.0-3.0   Today's INR 3.76!   Maintenance plan 12.5 mg (5 mg x 2.5) on Tue; 10 mg (5 mg x 2) all other days   Full instructions 11/8: 5 mg; Otherwise 12.5 mg on Tue; 10 mg all other days   Weekly total 72.5 mg   Plan last modified Cait Hawthorne, RN (11/8/2017)   Next INR check 11/22/2017   Target end date     Indications   Long-term (current) use of anticoagulants [Z79.01] [Z79.01]  Atrial fibrillation (H) [I48.91]         Anticoagulation Episode Summary     INR check location     Preferred lab     Send INR reminders to [facility] POOL    Comments 5 mg tabs, [facility] (needs staff message) PM dose      Anticoagulation Care Providers     Provider Role Specialty Phone number    Eliz Nguyen MD [facility] [phone]            See the Encounter Report to view Anticoagulation Flowsheet and Dosing Calendar (Go to Encounters tab in chart review, and find the Anticoagulation Therapy Visit)    Dosage adjustment made based on physician directed care plan.    5 mg on 11/8 (did not get this result until today beatris though it was done 11/6), then decrease to 12.5 mg Tues and 10 mg ROW.     Cait Hawthorne, RN

## 2017-11-09 ENCOUNTER — TELEPHONE (OUTPATIENT)
Dept: NEPHROLOGY | Facility: CLINIC | Age: 57
End: 2017-11-09

## 2017-11-09 DIAGNOSIS — M32.14 SYSTEMIC LUPUS ERYTHEMATOSUS WITH GLOMERULAR DISEASE, UNSPECIFIED SLE TYPE (H): Primary | ICD-10-CM

## 2017-11-09 RX ORDER — LISINOPRIL 20 MG/1
20 TABLET ORAL DAILY
Qty: 90 TABLET | Refills: 1 | Status: SHIPPED | OUTPATIENT
Start: 2017-11-09 | End: 2018-05-07

## 2017-11-09 NOTE — TELEPHONE ENCOUNTER
Contacted Luz Marina regarding lab result note and recommendations per Dr. Clark.     Your most recent lab results are attached.  You continue to have some protein present in the urine but it is similar to the level we saw in the past. The ds-DNA level remains elevated (as it has in the past) but is overall stable. This is an indicator of lupus activity. Kidney function is stable.     I do have one new recommendation at this time. Given your blood pressure is above goal at times, and you continue to have some protein present in the urine, I recommend increasing lisinopril to 20 mg daily at this time. With this change, I recommend repeat lab again in 1-2 weeks to assure the potassium is stable with making this change. Please let me know if you have questions or concerns with this plan.     She is scheduled for a colonoscopy on 11/28 and will plan to repeat lab at [facility] around 11/22. She declined assistance with scheduling.    Tasha Granados, RN, BSN  Nephrology Care Coordinator  [facility]

## 2017-11-20 ENCOUNTER — TELEPHONE (OUTPATIENT)
Dept: GASTROENTEROLOGY | Facility: CLINIC | Age: 57
End: 2017-11-20

## 2017-11-20 DIAGNOSIS — Z12.11 ENCOUNTER FOR SCREENING COLONOSCOPY: Primary | ICD-10-CM

## 2017-11-20 NOTE — TELEPHONE ENCOUNTER
Dr. Wood, Patient taking coumadin. Please place order for INR to be drawn prior to procedure.   Thanks Kaley LOVE    Patient scheduled for Colonoscopy    Indication for procedure. Colon cancer screening    Referring Provider. Eliz Nguyen MD    ? Not Needed    Arrival time verified? Yes, 0900    Facility location verified? Yes, [address]    Instructions given regarding prep and procedure    Prep Type Golytely    Are you taking any anticoagulants or blood thinners? Coumadin    Instructions given? Patient consulting PCP for management with procedure    Electronic implanted devices? No    Pre procedure teaching completed? Yes    Transportation from procedure?     H&P / Pre op physical completed? N/A

## 2017-11-24 DIAGNOSIS — M32.14 SYSTEMIC LUPUS ERYTHEMATOSUS WITH GLOMERULAR DISEASE, UNSPECIFIED SLE TYPE (H): ICD-10-CM

## 2017-11-24 LAB
ANION GAP SERPL CALCULATED.3IONS-SCNC: 2 MMOL/L (ref 3–14)
BUN SERPL-MCNC: 24 MG/DL (ref 7–30)
CALCIUM SERPL-MCNC: 9.2 MG/DL (ref 8.5–10.1)
CHLORIDE SERPL-SCNC: 105 MMOL/L (ref 94–109)
CO2 SERPL-SCNC: 32 MMOL/L (ref 20–32)
CREAT SERPL-MCNC: 1.05 MG/DL (ref 0.52–1.04)
GFR SERPL CREATININE-BSD FRML MDRD: 54 ML/MIN/1.7M2
GLUCOSE SERPL-MCNC: 92 MG/DL (ref 70–99)
POTASSIUM SERPL-SCNC: 4.4 MMOL/L (ref 3.4–5.3)
SODIUM SERPL-SCNC: 139 MMOL/L (ref 133–144)

## 2017-11-24 PROCEDURE — 36415 COLL VENOUS BLD VENIPUNCTURE: CPT | Performed by: INTERNAL MEDICINE

## 2017-11-24 PROCEDURE — 80048 BASIC METABOLIC PNL TOTAL CA: CPT | Performed by: INTERNAL MEDICINE

## 2017-11-28 ENCOUNTER — HOSPITAL ENCOUNTER (OUTPATIENT)
Facility: CLINIC | Age: 57
Discharge: HOME | End: 2017-11-28
Attending: INTERNAL MEDICINE | Admitting: INTERNAL MEDICINE
Payer: COMMERCIAL

## 2017-11-28 ENCOUNTER — SURGERY (OUTPATIENT)
Age: 57
End: 2017-11-28

## 2017-11-28 VITALS
DIASTOLIC BLOOD PRESSURE: 68 MMHG | RESPIRATION RATE: 22 BRPM | SYSTOLIC BLOOD PRESSURE: 112 MMHG | OXYGEN SATURATION: 96 %

## 2017-11-28 LAB — INR PPP: 1.21 (ref 0.86–1.14)

## 2017-11-28 PROCEDURE — 36415 COLL VENOUS BLD VENIPUNCTURE: CPT | Performed by: INTERNAL MEDICINE

## 2017-11-28 PROCEDURE — 45380 COLONOSCOPY AND BIOPSY: CPT | Mod: XU,PT

## 2017-11-28 PROCEDURE — 99152 MOD SED SAME PHYS/QHP 5/>YRS: CPT | Performed by: INTERNAL MEDICINE

## 2017-11-28 PROCEDURE — 45385 COLONOSCOPY W/LESION REMOVAL: CPT | Mod: PT | Performed by: INTERNAL MEDICINE

## 2017-11-28 PROCEDURE — 85610 PROTHROMBIN TIME: CPT | Performed by: INTERNAL MEDICINE

## 2017-11-28 PROCEDURE — 25000132 ZZH RX MED GY IP 250 OP 250 PS 637: Performed by: INTERNAL MEDICINE

## 2017-11-28 PROCEDURE — 45384 COLONOSCOPY W/LESION REMOVAL: CPT | Performed by: INTERNAL MEDICINE

## 2017-11-28 PROCEDURE — 99153 MOD SED SAME PHYS/QHP EA: CPT | Performed by: INTERNAL MEDICINE

## 2017-11-28 PROCEDURE — 88305 TISSUE EXAM BY PATHOLOGIST: CPT | Performed by: INTERNAL MEDICINE

## 2017-11-28 PROCEDURE — G0500 MOD SEDAT ENDO SERVICE >5YRS: HCPCS | Performed by: INTERNAL MEDICINE

## 2017-11-28 PROCEDURE — 25000128 H RX IP 250 OP 636: Performed by: INTERNAL MEDICINE

## 2017-11-28 RX ORDER — ONDANSETRON 2 MG/ML
4 INJECTION INTRAMUSCULAR; INTRAVENOUS
Status: DISCONTINUED | OUTPATIENT
Start: 2017-11-28 | End: 2017-11-28 | Stop reason: HOSPADM

## 2017-11-28 RX ORDER — SIMETHICONE
LIQUID (ML) MISCELLANEOUS PRN
Status: DISCONTINUED | OUTPATIENT
Start: 2017-11-28 | End: 2017-11-28 | Stop reason: HOSPADM

## 2017-11-28 RX ORDER — FENTANYL CITRATE 50 UG/ML
INJECTION, SOLUTION INTRAMUSCULAR; INTRAVENOUS PRN
Status: DISCONTINUED | OUTPATIENT
Start: 2017-11-28 | End: 2017-11-28 | Stop reason: HOSPADM

## 2017-11-28 RX ADMIN — FENTANYL CITRATE 50 MCG: 50 INJECTION, SOLUTION INTRAMUSCULAR; INTRAVENOUS at 10:25

## 2017-11-28 RX ADMIN — MIDAZOLAM HYDROCHLORIDE 1 MG: 1 INJECTION, SOLUTION INTRAMUSCULAR; INTRAVENOUS at 10:25

## 2017-11-28 RX ADMIN — MIDAZOLAM HYDROCHLORIDE 1 MG: 1 INJECTION, SOLUTION INTRAMUSCULAR; INTRAVENOUS at 10:27

## 2017-11-28 RX ADMIN — Medication 2 ML: at 10:31

## 2017-11-28 RX ADMIN — MIDAZOLAM HYDROCHLORIDE 1 MG: 1 INJECTION, SOLUTION INTRAMUSCULAR; INTRAVENOUS at 10:57

## 2017-11-28 RX ADMIN — FENTANYL CITRATE 50 MCG: 50 INJECTION, SOLUTION INTRAMUSCULAR; INTRAVENOUS at 10:27

## 2017-11-28 NOTE — LETTER
"    December 1, 2017       TO: Luz Marina ELMORE Calos  [address]  [place] [address]       Dear Ms. Live,    I am writing to let you know the results of the polyps that were removed at the time of your colonoscopy.  All four of the polyps returned as \"adenomatous polyps\".  An adenoma is considered a pre-cancerous polyp.  There was no cancer in your polyps.  Your polyps were  removed, however you are at risk to grow more adenomatous polyps in the future.      Because of the number of adenomatous polyps that you had, I recommend that you repeat a colonoscopy to screen for new polyps in three (3) years.  Of course should you develop any symptoms (such as unintended weight loss, blood in your stool or change in bowel pattern), you should let myself or your primary care doctor know as you may need an earlier procedure.      Finally, please make sure to let any first degree family members know that you had an adenomatous polyp as it may affect when they have a colonoscopy.  If you have any questions, please don't hesitate to contact the Gastroenterology nurse at [phone].    Sincerely,    Mayda Wood MD    [facility]  Division of Gastroenterology, Hepatology and Nutrition          "

## 2017-11-28 NOTE — OR NURSING
Tolerated colonoscopy well with sedation and oxygen supplementation via nasal cannula. Removed two polyps using hot snare with 2 effect and 25 aly. One was clipped in the the Ascending colon using a cook rotatable clip. Patient to resume coumadin per MD recommendations.

## 2017-11-29 ENCOUNTER — TELEPHONE (OUTPATIENT)
Dept: ANTICOAGULATION | Facility: OTHER | Age: 57
End: 2017-11-29

## 2017-11-29 LAB — COPATH REPORT: NORMAL

## 2017-11-29 NOTE — TELEPHONE ENCOUNTER
Pt advised to restart her Coumadin today and to recheck her INR on Thursday 12/7/17. Patient verbalized understanding and agrees with plan of care. Pt had no further questions or concerns at this time. Michael Quispe RN, BSN

## 2017-12-01 NOTE — ADDENDUM NOTE
Encounter addended by: Mayda Wood MD on: 12/1/2017  5:23 PM<BR>     Actions taken: Results reviewed in IB, Letter status changed

## 2017-12-08 ENCOUNTER — ANTICOAGULATION THERAPY VISIT (OUTPATIENT)
Dept: ANTICOAGULATION | Facility: OTHER | Age: 57
End: 2017-12-08

## 2017-12-08 DIAGNOSIS — I48.91 ATRIAL FIBRILLATION, UNSPECIFIED TYPE (H): ICD-10-CM

## 2017-12-08 DIAGNOSIS — I48.91 ATRIAL FIBRILLATION (H): ICD-10-CM

## 2017-12-08 DIAGNOSIS — Z79.01 LONG TERM CURRENT USE OF ANTICOAGULANT THERAPY: ICD-10-CM

## 2017-12-08 DIAGNOSIS — Z79.01 LONG-TERM (CURRENT) USE OF ANTICOAGULANTS: ICD-10-CM

## 2017-12-08 LAB — INR BLD: 2.2 (ref 0.86–1.14)

## 2017-12-08 PROCEDURE — 36416 COLLJ CAPILLARY BLOOD SPEC: CPT | Performed by: INTERNAL MEDICINE

## 2017-12-08 PROCEDURE — 85610 PROTHROMBIN TIME: CPT | Mod: QW | Performed by: INTERNAL MEDICINE

## 2017-12-08 NOTE — PROGRESS NOTES
ANTICOAGULATION FOLLOW-UP CLINIC VISIT    Patient Name:  Luz Marina Live  Date:  12/8/2017  Contact Type:  Telephone    SUBJECTIVE:     Patient Findings     Positives No Problem Findings           OBJECTIVE    INR Point of Care   Date Value Ref Range Status   12/08/2017 2.2 (H) 0.86 - 1.14 Final     Comment:     This test is intended for monitoring Coumadin therapy.  Results are not   accurate in patients with prolonged INR due to factor deficiency.         ASSESSMENT / PLAN  INR assessment THER    Recheck INR In: 4 WEEKS    INR Location Outside lab      Anticoagulation Summary as of 12/8/2017     INR goal 2.0-3.0   Today's INR 2.2   Maintenance plan 12.5 mg (5 mg x 2.5) on Tue; 10 mg (5 mg x 2) all other days   Full instructions 12.5 mg on Tue; 10 mg all other days   Weekly total 72.5 mg   No change documented Michael Quispe RN   Plan last modified Cait Hawthorne RN (11/8/2017)   Next INR check 1/5/2018   Target end date     Indications   Long-term (current) use of anticoagulants [Z79.01] [Z79.01]  Atrial fibrillation (H) [I48.91]         Anticoagulation Episode Summary     INR check location     Preferred lab     Send INR reminders to [facility] POOL    Comments 5 mg tabs, [facility] (needs staff message) PM dose      Anticoagulation Care Providers     Provider Role Specialty Phone number    Eliz Nguyen MD [facility] Internal Medicine [phone]            See the Encounter Report to view Anticoagulation Flowsheet and Dosing Calendar (Go to Encounters tab in chart review, and find the Anticoagulation Therapy Visit)    Dosage adjustment made based on physician directed care plan.    Michael Quispe RN

## 2017-12-08 NOTE — MR AVS SNAPSHOT
Luz Marina Live   12/8/2017   Anticoagulation Therapy Visit    Description:  57 year old female   Provider:  Eliz Nguyen MD   Department:  Er Anticoag           INR as of 12/8/2017     Today's INR 2.2      Anticoagulation Summary as of 12/8/2017     INR goal 2.0-3.0   Today's INR 2.2   Full instructions 12.5 mg on Tue; 10 mg all other days   Next INR check 1/5/2018    Indications   Long-term (current) use of anticoagulants [Z79.01] [Z79.01]  Atrial fibrillation (H) [I48.91]         Contact Numbers     Clinic Number:         December 2017 Details    Sun Mon Tue Wed Thu Fri Sat          1               2                 3               4               5               6               7               8      10 mg   See details      9      10 mg           10      10 mg         11      10 mg         12      12.5 mg         13      10 mg         14      10 mg         15      10 mg         16      10 mg           17      10 mg         18      10 mg         19      12.5 mg         20      10 mg         21      10 mg         22      10 mg         23      10 mg           24      10 mg         25      10 mg         26      12.5 mg         27      10 mg         28      10 mg         29      10 mg         30      10 mg           31      10 mg                Date Details   12/08 This INR check               How to take your warfarin dose     To take:  10 mg Take 2 of the 5 mg tablets.    To take:  12.5 mg Take 2.5 of the 5 mg tablets.           January 2018 Details    Sun Mon Tue Wed Thu Fri Sat      1      10 mg         2      12.5 mg         3      10 mg         4      10 mg         5            6                 7               8               9               10               11               12               13                 14               15               16               17               18               19               20                 21               22               23               24                25               26               27                 28               29               30               31                   Date Details   No additional details    Date of next INR:  1/5/2018         How to take your warfarin dose     To take:  10 mg Take 2 of the 5 mg tablets.    To take:  12.5 mg Take 2.5 of the 5 mg tablets.

## 2018-01-08 ENCOUNTER — ANTICOAGULATION THERAPY VISIT (OUTPATIENT)
Dept: ANTICOAGULATION | Facility: OTHER | Age: 58
End: 2018-01-08

## 2018-01-08 DIAGNOSIS — Z79.01 LONG-TERM (CURRENT) USE OF ANTICOAGULANTS: ICD-10-CM

## 2018-01-08 DIAGNOSIS — I48.91 ATRIAL FIBRILLATION (H): ICD-10-CM

## 2018-01-08 DIAGNOSIS — Z79.01 LONG TERM CURRENT USE OF ANTICOAGULANT THERAPY: ICD-10-CM

## 2018-01-08 DIAGNOSIS — I48.91 ATRIAL FIBRILLATION, UNSPECIFIED TYPE (H): ICD-10-CM

## 2018-01-08 LAB — INR BLD: 3.3 (ref 0.86–1.14)

## 2018-01-08 PROCEDURE — 85610 PROTHROMBIN TIME: CPT | Mod: QW | Performed by: INTERNAL MEDICINE

## 2018-01-08 PROCEDURE — 36416 COLLJ CAPILLARY BLOOD SPEC: CPT | Performed by: INTERNAL MEDICINE

## 2018-01-08 PROCEDURE — 99207 ZZC NO CHARGE NURSE ONLY: CPT | Performed by: INTERNAL MEDICINE

## 2018-01-08 RX ORDER — WARFARIN SODIUM 5 MG/1
TABLET ORAL
Qty: 65 TABLET | Refills: 0 | Status: SHIPPED | OUTPATIENT
Start: 2018-01-08 | End: 2018-02-08

## 2018-01-08 NOTE — PROGRESS NOTES
ANTICOAGULATION FOLLOW-UP CLINIC VISIT    Patient Name:  Luz Marina Live  Date:  1/8/2018  Contact Type:  Telephone/ LM with dosing instructions    SUBJECTIVE:     Patient Findings     Positives No Problem Findings           OBJECTIVE    INR Point of Care   Date Value Ref Range Status   01/08/2018 3.3 (H) 0.86 - 1.14 Final     Comment:     This test is intended for monitoring Coumadin therapy.  Results are not   accurate in patients with prolonged INR due to factor deficiency.         ASSESSMENT / PLAN  INR assessment THER    Recheck INR In: 4 WEEKS    INR Location Outside lab      Anticoagulation Summary as of 1/8/2018     INR goal 2.0-3.0   Today's INR 3.3!   Maintenance plan 12.5 mg (5 mg x 2.5) on Tue; 10 mg (5 mg x 2) all other days   Full instructions 12.5 mg on Tue; 10 mg all other days   Weekly total 72.5 mg   No change documented Michael Quispe RN   Plan last modified Cait Hawthorne RN (11/8/2017)   Next INR check 2/5/2018   Target end date     Indications   Long-term (current) use of anticoagulants [Z79.01] [Z79.01]  Atrial fibrillation (H) [I48.91]         Anticoagulation Episode Summary     INR check location     Preferred lab     Send INR reminders to [facility] POOL    Comments 5 mg tabs, [facility] (needs staff message) PM dose      Anticoagulation Care Providers     Provider Role Specialty Phone number    Eliz Nguyen MD [facility] Internal Medicine [phone]            See the Encounter Report to view Anticoagulation Flowsheet and Dosing Calendar (Go to Encounters tab in chart review, and find the Anticoagulation Therapy Visit)    Dosage adjustment made based on physician directed care plan.    I left a detailed voicemail with the orders below. I have also requested a call back if there have been any missed doses, concerns, illness, fever, or if there have been any changes in medications, activity level, or diet      Michael Quispe, RN

## 2018-01-08 NOTE — MR AVS SNAPSHOT
Luz Marina Live   1/8/2018   Anticoagulation Therapy Visit    Description:  57 year old female   Provider:  Eliz Nguyen MD   Department:  Er Anticoag           INR as of 1/8/2018     Today's INR 3.3!      Anticoagulation Summary as of 1/8/2018     INR goal 2.0-3.0   Today's INR 3.3!   Full instructions 12.5 mg on Tue; 10 mg all other days   Next INR check 2/5/2018    Indications   Long-term (current) use of anticoagulants [Z79.01] [Z79.01]  Atrial fibrillation (H) [I48.91]         Contact Numbers     Clinic Number:         January 2018 Details    Sun Mon Tue Wed Thu Fri Sat      1               2               3               4               5               6                 7               8      10 mg   See details      9      12.5 mg         10      10 mg         11      10 mg         12      10 mg         13      10 mg           14      10 mg         15      10 mg         16      12.5 mg         17      10 mg         18      10 mg         19      10 mg         20      10 mg           21      10 mg         22      10 mg         23      12.5 mg         24      10 mg         25      10 mg         26      10 mg         27      10 mg           28      10 mg         29      10 mg         30      12.5 mg         31      10 mg             Date Details   01/08 This INR check               How to take your warfarin dose     To take:  10 mg Take 2 of the 5 mg tablets.    To take:  12.5 mg Take 2.5 of the 5 mg tablets.           February 2018 Details    Sun Mon Tue Wed Thu Fri Sat         1      10 mg         2      10 mg         3      10 mg           4      10 mg         5            6               7               8               9               10                 11               12               13               14               15               16               17                 18               19               20               21               22               23               24                 25                26               27               28                   Date Details   No additional details    Date of next INR:  2/5/2018         How to take your warfarin dose     To take:  10 mg Take 2 of the 5 mg tablets.

## 2018-01-28 DIAGNOSIS — I48.91 ATRIAL FIBRILLATION, UNSPECIFIED TYPE (H): ICD-10-CM

## 2018-01-29 ENCOUNTER — OFFICE VISIT (OUTPATIENT)
Dept: OPHTHALMOLOGY | Facility: CLINIC | Age: 58
End: 2018-01-29
Payer: COMMERCIAL

## 2018-01-29 DIAGNOSIS — H52.03 HYPEROPIA WITH ASTIGMATISM AND PRESBYOPIA, BILATERAL: ICD-10-CM

## 2018-01-29 DIAGNOSIS — H52.4 HYPEROPIA WITH ASTIGMATISM AND PRESBYOPIA, BILATERAL: ICD-10-CM

## 2018-01-29 DIAGNOSIS — Z79.899 HIGH RISK MEDICATION USE: Primary | ICD-10-CM

## 2018-01-29 DIAGNOSIS — H52.203 HYPEROPIA WITH ASTIGMATISM AND PRESBYOPIA, BILATERAL: ICD-10-CM

## 2018-01-29 PROCEDURE — 92083 EXTENDED VISUAL FIELD XM: CPT | Performed by: OPHTHALMOLOGY

## 2018-01-29 PROCEDURE — 92134 CPTRZ OPH DX IMG PST SGM RTA: CPT | Performed by: OPHTHALMOLOGY

## 2018-01-29 PROCEDURE — 92015 DETERMINE REFRACTIVE STATE: CPT | Performed by: OPHTHALMOLOGY

## 2018-01-29 PROCEDURE — 92014 COMPRE OPH EXAM EST PT 1/>: CPT | Performed by: OPHTHALMOLOGY

## 2018-01-29 RX ORDER — METOPROLOL TARTRATE 50 MG
TABLET ORAL
Qty: 180 TABLET | Refills: 0 | Status: SHIPPED | OUTPATIENT
Start: 2018-01-29 | End: 2018-05-01

## 2018-01-29 ASSESSMENT — REFRACTION_WEARINGRX
OS_SPHERE: +1.00
OD_ADD: +2.50
OS_CYLINDER: +1.75
OD_CYLINDER: +1.25
OS_AXIS: 114
SPECS_TYPE: PAL
OD_AXIS: 052
OD_SPHERE: +2.25
OS_ADD: +2.50

## 2018-01-29 ASSESSMENT — VISUAL ACUITY
OS_CC: 20/20
CORRECTION_TYPE: GLASSES
OS_CC: 20/20
OD_CC: 20/30
OD_CC: 20/25+2
OD_CC+: +2
METHOD: SNELLEN - LINEAR

## 2018-01-29 ASSESSMENT — REFRACTION_MANIFEST
OS_CYLINDER: +1.75
OS_AXIS: 135
OD_SPHERE: +2.25
OS_SPHERE: +1.00
OD_ADD: +2.50
OD_AXIS: 070
OD_CYLINDER: +1.25
OS_ADD: +2.50

## 2018-01-29 ASSESSMENT — EXTERNAL EXAM - LEFT EYE: OS_EXAM: NORMAL

## 2018-01-29 ASSESSMENT — CONF VISUAL FIELD: COMMENTS: HVF DONE TODAY

## 2018-01-29 ASSESSMENT — CUP TO DISC RATIO
OS_RATIO: 0.3
OD_RATIO: 0.3

## 2018-01-29 ASSESSMENT — TONOMETRY
IOP_METHOD: TONOPEN
OS_IOP_MMHG: 16
OD_IOP_MMHG: 17

## 2018-01-29 ASSESSMENT — EXTERNAL EXAM - RIGHT EYE: OD_EXAM: NORMAL

## 2018-01-29 ASSESSMENT — SLIT LAMP EXAM - LIDS
COMMENTS: NORMAL
COMMENTS: NORMAL

## 2018-01-29 NOTE — LETTER
1/29/2018       RE: Luz Marina Live  [address]  [place] [address]     Dear Colleague,    Thank you for referring your patient, Luz Marina Live, to the [facility]. Please see a copy of my visit note below.    Assessment & Plan   Luz Marina Live is a 57 year old female who presents with:   Review of systems for the eyes was negative other than the pertinent positives and negatives noted in the HPI.    High risk medication use  - SD-OCT Macula Optovue OU (both eyes)  - HVF 10-2 OU  - REFRACTION    Hyperopia with astigmatism and presbyopia, bilateral  - REFRACTION      Return in 12 months for annual exam.    Documentation for today's encounter was performed by Kaye Lema COA. OSC. Acting as a scribe in my presence. I have reviewed and verified that it is an accurate recording of today's encounter.    Attending Physician Attestation:  Complete documentation of historical and exam elements from today's encounter can be found in the full encounter summary report (not reduplicated in this progress note).  I personally obtained the chief complaint(s) and history of present illness.  I confirmed and edited as necessary the review of systems, past medical/surgical history, family history, social history, and examination findings as documented by others; and I examined the patient myself.  I personally reviewed the relevant tests, images, and reports as documented above.  I formulated and edited as necessary the assessment and plan and discussed the findings and management plan with the patient and family. - Baljeet Davenport MD      Again, thank you for allowing me to participate in the care of your patient.      Sincerely,    Baljeet Davenport MD

## 2018-01-29 NOTE — NURSING NOTE
Patient presents with:  Plaquenil: Pt currently using 200mg plaquinel   COMPREHENSIVE EYE EXAM      Referring Provider:  No referring provider defined for this encounter.    HPI    Last Eye Exam:  11/29/16   Informant(s):  EMR   Affected eye(s):  Both   Symptoms:     Blurred vision   Decreased vision   Difficulty with reading         Do you have eye pain now?:  No      Comments:  Has trouble seeing when sewing.  When rinsing out sinuses and sneezing stuff will come out of left eye tear duct.

## 2018-01-29 NOTE — MR AVS SNAPSHOT
After Visit Summary   1/29/2018    Luz Marina Live    MRN: [id number]           Patient Information     Date Of Birth          1960        Visit Information        Provider Department      1/29/2018 12:30 PM Baljeet Davenport MD;  OPHTH NURSE ONLY [facility]        Today's Diagnoses     High risk medication use    -  1    Hyperopia with astigmatism and presbyopia, bilateral           Follow-ups after your visit        Follow-up notes from your care team     Return in about 1 year (around 1/29/2019) for Annual Eye Exam, Plaquenil toxicity screening, Visual field, OCT.      Your next 10 appointments already scheduled     Feb 27, 2018  8:30 AM CST   Lab with UC LAB   [facility] ([facility])    [address]  1st Floor  [facility] [address] [phone]            Feb 27, 2018  9:00 AM CST   (Arrive by 8:45 AM)   RETURN HEART FAILURE with Twin Moreno MD   [facility] ([facility])    [address] [place] [address]  [address] [place] [address] [phone]            Aug 06, 2018 11:30 AM CDT   [facility]ame [facility]   [facility]    [address] [place] [address] [phone]              Who to contact     If you have questions or need follow up information about today's clinic visit or your schedule please contact [facility] directly at [phone].  Normal or non-critical lab and imaging results will be communicated to you by MyChart, letter or phone within 4 business days after the clinic has received the results. If you do not hear from us within 7 days, please contact the clinic through MyChart or phone. If you have a critical or abnormal lab result, we will notify you by phone as soon as possible.  Submit refill requests through Online-OR or call your pharmacy and they will forward the  refill request to us. Please allow 3 business days for your refill to be completed.          Additional Information About Your Visit        Meilimeihart Information     Peecho is an electronic gateway that provides easy, online access to your medical records. With Peecho, you can request a clinic appointment, read your test results, renew a prescription or communicate with your care team.     To sign up for Peecho visit the website at [email or web address]   You will be asked to enter the access code listed below, as well as some personal information. Please follow the directions to create your username and password.     Your access code is: [email or web address]  Expires: 2018 10:58 AM     Your access code will  in 90 days. If you need help or a new code, please contact your [facility] or call [phone] for assistance.        Care EveryWhere ID     This is your Care EveryWhere ID. This could be used by other organizations to access your [facility] medical records  [id number]         Blood Pressure from Last 3 Encounters:   17 112/68   17 140/76   17 137/88    Weight from Last 3 Encounters:   17 119.3 kg (263 lb 1.6 oz)   17 119.7 kg (264 lb)   17 122 kg (269 lb)              We Performed the Following     HVF 10-2 OU     REFRACTION     SD-OCT Macula Optovue OU (both eyes)        Primary Care Provider Office Phone # Fax #    Eliz My Nguyen [phone]       [facility] [address]  [facility] [address]        Equal Access to Services     [facility] CenterJOSEMANUEL : Hadii aad ku hadasho Soomaali, waaxda luqadaha, qaybta kaalmada magdy, mayte roberts. So [facility] [phone].    ATENCIÓN: Si habla español, tiene a bennett disposición servicios gratuitos de asistencia lingüística. Llame al [phone].    We comply with applicable federal civil rights laws and [place] laws. We do not  discriminate on the basis of race, color, national origin, age, disability, sex, sexual orientation, or gender identity.            Thank you!     Thank you for choosing [facility]  for your care. Our goal is always to provide you with excellent care. Hearing back from our patients is one way we can continue to improve our services. Please take a few minutes to complete the written survey that you may receive in the mail after your visit with us. Thank you!             Your Updated Medication List - Protect others around you: Learn how to safely use, store and throw away your medicines at www.disposemymeds.org.          This list is accurate as of 1/29/18  2:35 PM.  Always use your most recent med list.                   Brand Name Dispense Instructions for use Diagnosis    aspirin 81 MG tablet      Take by mouth daily        atorvastatin 40 MG tablet    LIPITOR    90 tablet    Take 1 tablet (40 mg) by mouth daily    Pure hypercholesterolemia       CALCIUM 600-D PO      Take  by mouth 2 times daily.    SLE (systemic lupus erythematosus) (H)       hydroxychloroquine 200 MG tablet    PLAQUENIL    30 tablet    Take 1 tablet (200 mg) by mouth daily    Systemic lupus erythematosus with glomerular disease, unspecified SLE type (H)       lisinopril 20 MG tablet    PRINIVIL/ZESTRIL    90 tablet    Take 1 tablet (20 mg) by mouth daily    Systemic lupus erythematosus with glomerular disease, unspecified SLE type (H)       metoprolol tartrate 50 MG tablet    LOPRESSOR    180 tablet    TAKE 1 TABLET (50 MG) BY MOUTH 2 TIMES DAILY    Atrial fibrillation, unspecified type (H)       MULTIPLE VITAMIN PO      Take  by mouth.        mupirocin 2 % nasal ointment    BACTROBAN NASAL    10 g    Place a thin layer inside right nose on septum BID x 2 weeks    Nasal septum ulceration       omega-3 fatty acids 1200 MG capsule      Take 2 capsules by mouth 2 times daily        * warfarin 5 MG tablet    COUMADIN    215 tablet     Take 12.5 mg (2 and 1/2 tab) on Tuesday, Thursday, and Saturday and 10 mg (2 tabs) all other days, or as directed by the coumadin clinic.    Atrial fibrillation, unspecified type (H)       * warfarin 5 MG tablet    COUMADIN    65 tablet    TAKE 2 & 1/2 TABLET TUES AND 2TABS THE OTHER DAYS OR AS DIRECTED BY THE COUMADIN CLINIC    Atrial fibrillation, unspecified type (H)       * Notice:  This list has 2 medication(s) that are the same as other medications prescribed for you. Read the directions carefully, and ask your doctor or other care provider to review them with you.

## 2018-01-29 NOTE — PROGRESS NOTES
Assessment & Plan   Luz Marina Live is a 57 year old female who presents with:   Review of systems for the eyes was negative other than the pertinent positives and negatives noted in the HPI.    High risk medication use  - SD-OCT Macula Optovue OU (both eyes)  - HVF 10-2 OU  - REFRACTION    Hyperopia with astigmatism and presbyopia, bilateral  - REFRACTION      Return in 12 months for annual exam.    Documentation for today's encounter was performed by Kaye Lema COA. OSC. Acting as a scribe in my presence. I have reviewed and verified that it is an accurate recording of today's encounter.    Attending Physician Attestation:  Complete documentation of historical and exam elements from today's encounter can be found in the full encounter summary report (not reduplicated in this progress note).  I personally obtained the chief complaint(s) and history of present illness.  I confirmed and edited as necessary the review of systems, past medical/surgical history, family history, social history, and examination findings as documented by others; and I examined the patient myself.  I personally reviewed the relevant tests, images, and reports as documented above.  I formulated and edited as necessary the assessment and plan and discussed the findings and management plan with the patient and family. - Baljeet Davenport MD

## 2018-02-08 ENCOUNTER — ANTICOAGULATION THERAPY VISIT (OUTPATIENT)
Dept: ANTICOAGULATION | Facility: OTHER | Age: 58
End: 2018-02-08

## 2018-02-08 DIAGNOSIS — I48.91 ATRIAL FIBRILLATION (H): ICD-10-CM

## 2018-02-08 DIAGNOSIS — Z79.01 LONG TERM CURRENT USE OF ANTICOAGULANT THERAPY: ICD-10-CM

## 2018-02-08 DIAGNOSIS — Z79.01 LONG-TERM (CURRENT) USE OF ANTICOAGULANTS: ICD-10-CM

## 2018-02-08 DIAGNOSIS — I48.91 ATRIAL FIBRILLATION, UNSPECIFIED TYPE (H): ICD-10-CM

## 2018-02-08 LAB — INR BLD: 3.3 (ref 0.86–1.14)

## 2018-02-08 PROCEDURE — 85610 PROTHROMBIN TIME: CPT | Mod: QW | Performed by: INTERNAL MEDICINE

## 2018-02-08 PROCEDURE — 36416 COLLJ CAPILLARY BLOOD SPEC: CPT | Performed by: INTERNAL MEDICINE

## 2018-02-08 NOTE — MR AVS SNAPSHOT
Luz Marina Live   2/8/2018   Anticoagulation Therapy Visit    Description:  57 year old female   Provider:  Eliz Nguyen MD   Department:  [facility]           INR as of 2/8/2018     Today's INR 3.3!      Anticoagulation Summary as of 2/8/2018     INR goal 2.0-3.0   Today's INR 3.3!   Full instructions 12.5 mg on Tue; 10 mg all other days   Next INR check 3/8/2018    Indications   Long-term (current) use of anticoagulants [Z79.01] [Z79.01]  Atrial fibrillation (H) [I48.91]         Contact Numbers     Clinic Number:         February 2018 Details    Sun Mon Tue Wed Thu Fri Sat         1               2               3                 4               5               6               7               8      10 mg   See details      9      10 mg         10      10 mg           11      10 mg         12      10 mg         13      12.5 mg         14      10 mg         15      10 mg         16      10 mg         17      10 mg           18      10 mg         19      10 mg         20      12.5 mg         21      10 mg         22      10 mg         23      10 mg         24      10 mg           25      10 mg         26      10 mg         27      12.5 mg         28      10 mg             Date Details   02/08 This INR check               How to take your warfarin dose     To take:  10 mg Take 2 of the 5 mg tablets.    To take:  12.5 mg Take 2.5 of the 5 mg tablets.           March 2018 Details    Sun Mon Tue Wed Thu Fri Sat         1      10 mg         2      10 mg         3      10 mg           4      10 mg         5      10 mg         6      12.5 mg         7      10 mg         8            9               10                 11               12               13               14               15               16               17                 18               19               20               21               22               23               24                 25               26               27               28                29               30               31                Date Details   No additional details    Date of next INR:  3/8/2018         How to take your warfarin dose     To take:  10 mg Take 2 of the 5 mg tablets.    To take:  12.5 mg Take 2.5 of the 5 mg tablets.

## 2018-02-08 NOTE — PROGRESS NOTES
ANTICOAGULATION FOLLOW-UP CLINIC VISIT    Patient Name:  Luz Marina Live  Date:  2/8/2018  Contact Type:  Telephone    SUBJECTIVE:     Patient Findings     Positives No Problem Findings           OBJECTIVE    INR Point of Care   Date Value Ref Range Status   02/08/2018 3.3 (H) 0.86 - 1.14 Final     Comment:     This test is intended for monitoring Coumadin therapy.  Results are not   accurate in patients with prolonged INR due to factor deficiency.         ASSESSMENT / PLAN  INR assessment THER    Recheck INR In: 4 WEEKS    INR Location Outside lab      Anticoagulation Summary as of 2/8/2018     INR goal 2.0-3.0   Today's INR 3.3!   Maintenance plan 12.5 mg (5 mg x 2.5) on Tue; 10 mg (5 mg x 2) all other days   Full instructions 12.5 mg on Tue; 10 mg all other days   Weekly total 72.5 mg   No change documented Michael Quispe RN   Plan last modified Cait Hawthorne RN (11/8/2017)   Next INR check 3/8/2018   Target end date     Indications   Long-term (current) use of anticoagulants [Z79.01] [Z79.01]  Atrial fibrillation (H) [I48.91]         Anticoagulation Episode Summary     INR check location     Preferred lab     Send INR reminders to [facility] POOL    Comments 5 mg tabs, [facility] (needs staff message) PM dose      Anticoagulation Care Providers     Provider Role Specialty Phone number    Eliz Nguyen MD [facility] [phone]            See the Encounter Report to view Anticoagulation Flowsheet and Dosing Calendar (Go to Encounters tab in chart review, and find the Anticoagulation Therapy Visit)    Dosage adjustment made based on physician directed care plan.    Michael Quispe RN

## 2018-02-09 ENCOUNTER — TELEPHONE (OUTPATIENT)
Dept: OPHTHALMOLOGY | Facility: CLINIC | Age: 58
End: 2018-02-09

## 2018-02-09 NOTE — TELEPHONE ENCOUNTER
[facility] Call Center    Phone Message    May a detailed message be left on voicemail: yes    Reason for Call: Other: Patient is requesting a copy of her eye lense Rx. Patient will  at clinic  once request is done. Please advise.      Action Taken: Message routed to:  Adult Clinics: Eye p [phone]

## 2018-02-14 RX ORDER — WARFARIN SODIUM 5 MG/1
TABLET ORAL
Qty: 65 TABLET | Refills: 0 | Status: SHIPPED | OUTPATIENT
Start: 2018-02-14 | End: 2018-05-15

## 2018-02-16 DIAGNOSIS — I48.91 ATRIAL FIBRILLATION, UNSPECIFIED TYPE (H): ICD-10-CM

## 2018-02-16 RX ORDER — WARFARIN SODIUM 5 MG/1
TABLET ORAL
Qty: 65 TABLET | Refills: 0 | Status: SHIPPED | OUTPATIENT
Start: 2018-02-16 | End: 2018-04-22

## 2018-02-26 ENCOUNTER — PRE VISIT (OUTPATIENT)
Dept: CARDIOLOGY | Facility: CLINIC | Age: 58
End: 2018-02-26

## 2018-02-26 DIAGNOSIS — R06.09 DOE (DYSPNEA ON EXERTION): Primary | ICD-10-CM

## 2018-02-26 NOTE — PROGRESS NOTES
Impression  1. ASHD  2. History of CAB  3. Elevated PA pressure  4. Hypertension  5. Hyperlipidemia  6. Symptoms of sleep apnea without testing  7. PFO  8. Atrial fibrillation with warfarin anticoagulation  9. SLE    I had the opportunity to review Ms. Live chart as well as go over the results and findings.  She has exertional shortness of breath functional class 2 superimposed upon history of SLE, premature CAD resulting in by-pass, elevated body mass index and history suggestive of KEITH.  She has a history of atrial fibrillation and warfarin usage, facial cellulitis and has routine eye care in view of lupus medications.  She is not diabetic    REVIEW of SYMPTOMS    Constitutional: without fever, chills, night sweats.  Weight is down  HEENT: without dry eyes, dry mouth, sinusitis, corryza, visual changes  Endocrine: without polyuria, polydypsia, polyphagia, heat or cold intolerance, changing mental status  Cardiology: without chest pain, tightness, heaviness, pressure, paroxysmal dyspnea, orthopnea, palpitation, pre-syncope or syncope or device discharge (if present)  Pulmonary: without asthma, wheezing, cough, hemoptysis  GI: without nausea, emesis, jaundice, pain, hematemesis, melena  : without frequency, urgency, hematuria, stones, pain, abnormal bleeding, frequency, urgency  Neurologic: without TIA, CVA, trauma, seizure  Dermatologic: without lesions, abrasion rash,   Orthopedic/Rheum: without significant joint pain, impairment, limb, polyserositis, ulceration, Raynauds  Heme: without mass, bruising, frequent infection, anemia  Psychiatric: without substance abuse, hallucination, medication, depression    Constitutional: alert, oriented, normal gait and station, normal mentation.  Oral: moist mucous membrans  Lymph: without pathologic adenopathy  Chest: clear to ausculation and percussion  Cor: No evidence of left or right ventricular activity.  Rhythm is regular.  S1 normal, S2 split physiologically.  Murmurs are not present  Abdomen: without tenderness, rebound, guarding, masses, ascites  Extremities: Edema not present  Neuro: no focal defects, normal mentation  Skin: + butterfly  Psych: oriented, verbal, mental status in tact    Exercise tolerance: is class 2    Wt Readings from Last 24 Encounters:   18 116.6 kg (257 lb)   17 119.3 kg (263 lb 1.6 oz)   17 119.7 kg (264 lb)   17 122 kg (269 lb)   17 123 kg (271 lb 1.6 oz)   03/15/17 123.2 kg (271 lb 9.6 oz)   16 122.5 kg (270 lb)   16 124.7 kg (275 lb)   16 123.5 kg (272 lb 4.8 oz)   16 121.4 kg (267 lb 11.2 oz)   16 122.5 kg (270 lb)   16 118.4 kg (261 lb)   16 117 kg (258 lb)   16 117 kg (258 lb)   16 116.6 kg (257 lb)   16 115.7 kg (255 lb)   12/18/15 115.9 kg (255 lb 9.6 oz)   09/23/15 111.4 kg (245 lb 8 oz)   09/21/15 111 kg (244 lb 11.2 oz)   09/14/15 110.8 kg (244 lb 4.8 oz)   09/02/15 112.3 kg (247 lb 9.6 oz)   08/28/15 121.3 kg (267 lb 8 oz)   06/01/15 111.2 kg (245 lb 3.2 oz)   04/29/15 111.4 kg (245 lb 9.6 oz)       Echocardiographic findings of TR, modest PA, normal systolic function.      : 1960  Study Date: 2016 01:31 PM  Age: 56 yrs  Gender: Female  Patient Location: [facility]  Reason For Study:     History: tricuspid regurgitation  Ordering Physician: JOSE ARELLANO  Referring Physician: JOSE ARELLANO  Performed By: Hugh Sharpe MD     BSA: 2.2 m2  Height: 63 in  Weight: 269 lb  ______________________________________________________________________________     Interpretation Summary  Left ventricular function, chamber size, wall motion, and wall thickness are  normal.The EF is 55-60%.  Global right ventricular function is normal. Mild right ventricular dilation  is present.  Mild to moderate TR with mild prolapse of the posterior leaflet of the  tricupsid valve.  A small patent foramen ovale is present. There is no ASD (secundum,  sinus  venosus or unroofed coronary sinus). All 4 pulmonary veins drain into the  left atrium.  Mild pulmonary hypertension is present with RVSP 45mmHg above RAP.     ______________________________________________________________________________        Procedure  Transesophageal Echocardiogram with color and spectral Doppler performed. The  procedure was performed in the Echo Lab. Informed consent for Transesophegeal  echo obtained. EVE Probe #12 was used during the procedure. Patient was  sedated using Fentanyl 100 mcg. Patient was sedated using Versed 4 mg. The  Transducer was inserted without difficulty . The patient tolerated the  procedure well. Complications None. ECG shows normal sinus rhythm. Good  quality two-dimensional was performed and interpreted.     Left Ventricle  Left ventricular function, chamber size, wall motion, and wall thickness are  normal.The EF is 55-60%.     Right Ventricle  Global right ventricular function is normal. Mild right ventricular dilation  is present.     Atria  Both atria appear normal. The left atrial appendage is normal. It is free of  spontaneous echo contrast and thrombus. A small patent foramen ovale is  present. The patent foramen ovale was demonstrated by color Doppler and  agitated saline bubble study . The patent foramen ovale was demonstrated with  Valsalva's maneuver. No evidence of ASD.     Mitral Valve  The mitral valve is normal. Trace mitral insufficiency is present.     Aortic Valve  Aortic valve is normal in structure and function. The aortic valve is  tricuspid.  Tricuspid Valve  Mild to moderate tricuspid insufficiency is present. Right ventricular  systolic pressure is 45mmHg above the right atrial pressure. Mild pulmonary  hypertension is present. Mild prolapse of the posterior leaflet of the  tricupsid valve is noted.     Pulmonic Valve  The pulmonic valve is normal.     Vessels  The pulmonary artery is normal. The aorta root is normal. The thoracic  aorta  is normal. All four pulmonary veins visualized with dampened velocity  waveforms.     Pericardium  No pericardial effusion is present.     Compared to Previous Study  This study was compared with the study from 2016. A small PFO has been  identified. .     ______________________________________________________________________________     Doppler Measurements & Calculations  TR max maria isabel: 336.7 cm/sec  TR max P.5 mmHg  ______________________________________________________________________________         Name: IDA PADRON  MRN: [id number]  : 1960  Study Date: 2016 01:31 PM  Age: 56 yrs  Gender: Female  Patient Location: [facility]  Reason For Study:     History: tricuspid regurgitation  Ordering Physician: JOSE ARELLANO  Referring Physician: JOSE ARELLANO  Performed By: Hugh Sharpe MD     BSA: 2.2 m2  Height: 63 in  Weight: 269 lb  ______________________________________________________________________________     Interpretation Summary  Left ventricular function, chamber size, wall motion, and wall thickness are  normal.The EF is 55-60%.  Global right ventricular function is normal. Mild right ventricular dilation  is present.  Mild to moderate TR with mild prolapse of the posterior leaflet of the  tricupsid valve.  A small patent foramen ovale is present. There is no ASD (secundum, sinus  venosus or unroofed coronary sinus). All 4 pulmonary veins drain into the  left atrium.  Mild pulmonary hypertension is present with RVSP 45mmHg above RAP.     ______________________________________________________________________________        Procedure  Transesophageal Echocardiogram with color and spectral Doppler performed. The  procedure was performed in the Echo Lab. Informed consent for Transesophegeal  echo obtained. EVE Probe #12 was used during the procedure. Patient was  sedated using Fentanyl 100 mcg. Patient was sedated using Versed 4 mg. The  Transducer was inserted without  difficulty . The patient tolerated the  procedure well. Complications None. ECG shows normal sinus rhythm. Good  quality two-dimensional was performed and interpreted.     Left Ventricle  Left ventricular function, chamber size, wall motion, and wall thickness are  normal.The EF is 55-60%.     Right Ventricle  Global right ventricular function is normal. Mild right ventricular dilation  is present.     Atria  Both atria appear normal. The left atrial appendage is normal. It is free of  spontaneous echo contrast and thrombus. A small patent foramen ovale is  present. The patent foramen ovale was demonstrated by color Doppler and  agitated saline bubble study . The patent foramen ovale was demonstrated with  Valsalva's maneuver. No evidence of ASD.     Mitral Valve  The mitral valve is normal. Trace mitral insufficiency is present.     Aortic Valve  Aortic valve is normal in structure and function. The aortic valve is  tricuspid.  Tricuspid Valve  Mild to moderate tricuspid insufficiency is present. Right ventricular  systolic pressure is 45mmHg above the right atrial pressure. Mild pulmonary  hypertension is present. Mild prolapse of the posterior leaflet of the  tricupsid valve is noted.     Pulmonic Valve  The pulmonic valve is normal.     Vessels  The pulmonary artery is normal. The aorta root is normal. The thoracic aorta  is normal. All four pulmonary veins visualized with dampened velocity  waveforms.     Pericardium  No pericardial effusion is present.     Compared to Previous Study  This study was compared with the study from 2016. A small PFO has been  identified. .     ______________________________________________________________________________     Doppler Measurements & Calculations  TR max maria isabel: 336.7 cm/sec  TR max P.5 mmHg  ______________________________________________________________________________       Procedures:  ECHO: 09:   Interpretation Summary   The Ejection Fraction is  estimated at 55-60%. No regional wall motion   abnormalities are seen. The right ventricle is normal size. Global right   ventricular function is normal. Right ventricular systolic pressure is 27mmHg   above the right atrial pressure. No pericardial effusion is present. The   inferior vena cava is normal.   Stress test: 08-10-09:   1. Abnormal study with a high degree of certainty.   2. There is a predominantly fixed medium sized moderately decreased   intensity myocardial perfusion defect with minimal periinfarct   reversibility involving the apical anterior, mid anterior, and apical   region of the heart. There is corresponding hypokinesis. No other   adenosine induced fixed or reversible myocardial perfusion defect.   3. Left ventricular size is enlarged at rest. Transient ischemic   dilation of the left ventricle did not occur.   4. The left ventricular ejection fraction is 56 %.   5. Summed Stress Score is calculated at 18, which is associated   with increased risk of future coronary events.   CCL: 08-26-09: ARELLANO:   Mrs. Live is a 49 year old female with known coronary artery disease s/p MI in 1996 found to have minimal disease on catheterization. Her cardiovascular risk factors include HTN and hyperlipidemia. She presented with a 2 week history of dyspnea with exertion and lower extremity edema. She underwent Adenosine MPI which revealed a fixed medium-sized defect with minimal periinfarct reversability and anterior hypokinesis. Her LVEF was preserved at 56%. CT angio of the coronaries revealed moderate stenosis in the pLAD and dRCA and mild to moderate stenosis of the pLCx.   Access: 6F long RFA, 6F RFV   Coronary Anatomy:   LMCA: normal   LAD: There is a long, discrete, ectatic 70-80% lesion in the ostial and proximal LAD. There is one D1 branch without any significant disease.   LCx: no significant disease. There is a large OM1 branch that has a 50-60% stenosis prior to the branch bifurcation.   RCA: The  proximal and mid RCA have luminal irregularities without significance. The distal RCA has a complex bifurcation lesion prior to the take-off of the rPDA. There is disease in the ostial PL1 as well.   Conclusions:   1. 3-vessel coronary artery disease without left main lesion   Plan   Discussed the options in depth with the patient. Given the proximity of the LAD lesion to the LMCA and the complexity of the dRCA lesion, we recommended CABG for the patient. She will be admitted to the hospital.Discussed with Darleen Jonhson MD.   Cardiac Surgery: 8/27/2009   Triple vessel coronary artery bypass grafting. Left internal mammary artery was placed to the left anterior descending coronary artery and separate reverse saphenous vein grafts were placed to the obtuse marginal and right posterior descending coronary arteries.   Assessment:  The patient has previous evidence of elevated pulmonary artery pressure and multiple pre-disposing factors.  She has not had recurrent staging or visits in over 1 year and therefore should have: right heart catherization for determination of PA pressures and assessment of shunt, repeat echocardiogram with bubble and interim laboratory: CBC, BNP, CMP, anti-phospholipid antibody, IL-6, CRP-infl.  She should overnight recording oximetry. Her BNP this visit is elevated.  This is a new finding.  She should have overnight recording oximetry    We spent 30 minutes discussing the nature of PH in general, diagnostic strategies, risk and benefits and alternatives discussed.      CC:  Eliz Adame

## 2018-02-27 ENCOUNTER — OFFICE VISIT (OUTPATIENT)
Dept: CARDIOLOGY | Facility: CLINIC | Age: 58
End: 2018-02-27
Attending: INTERNAL MEDICINE
Payer: COMMERCIAL

## 2018-02-27 VITALS
DIASTOLIC BLOOD PRESSURE: 88 MMHG | HEART RATE: 97 BPM | WEIGHT: 257 LBS | BODY MASS INDEX: 45.54 KG/M2 | OXYGEN SATURATION: 56 % | SYSTOLIC BLOOD PRESSURE: 179 MMHG | HEIGHT: 63 IN

## 2018-02-27 DIAGNOSIS — I25.10 CORONARY ARTERY DISEASE DUE TO LIPID RICH PLAQUE: Primary | ICD-10-CM

## 2018-02-27 DIAGNOSIS — I25.83 CORONARY ARTERY DISEASE DUE TO LIPID RICH PLAQUE: ICD-10-CM

## 2018-02-27 DIAGNOSIS — I25.10 CORONARY ARTERY DISEASE DUE TO LIPID RICH PLAQUE: ICD-10-CM

## 2018-02-27 DIAGNOSIS — I25.83 CORONARY ARTERY DISEASE DUE TO LIPID RICH PLAQUE: Primary | ICD-10-CM

## 2018-02-27 DIAGNOSIS — R80.9 PROTEINURIA: ICD-10-CM

## 2018-02-27 DIAGNOSIS — N18.30 CKD (CHRONIC KIDNEY DISEASE) STAGE 3, GFR 30-59 ML/MIN (H): ICD-10-CM

## 2018-02-27 DIAGNOSIS — R06.09 DOE (DYSPNEA ON EXERTION): ICD-10-CM

## 2018-02-27 LAB
ALBUMIN SERPL-MCNC: 3.7 G/DL (ref 3.4–5)
ALBUMIN UR-MCNC: NEGATIVE MG/DL
ALP SERPL-CCNC: 106 U/L (ref 40–150)
ALT SERPL W P-5'-P-CCNC: 36 U/L (ref 0–50)
ANION GAP SERPL CALCULATED.3IONS-SCNC: 5 MMOL/L (ref 3–14)
APPEARANCE UR: CLEAR
AST SERPL W P-5'-P-CCNC: 32 U/L (ref 0–45)
BILIRUB DIRECT SERPL-MCNC: 0.2 MG/DL (ref 0–0.2)
BILIRUB SERPL-MCNC: 0.8 MG/DL (ref 0.2–1.3)
BILIRUB UR QL STRIP: NEGATIVE
BUN SERPL-MCNC: 22 MG/DL (ref 7–30)
CALCIUM SERPL-MCNC: 8.9 MG/DL (ref 8.5–10.1)
CHLORIDE SERPL-SCNC: 107 MMOL/L (ref 94–109)
CO2 SERPL-SCNC: 26 MMOL/L (ref 20–32)
COLOR UR AUTO: YELLOW
CREAT SERPL-MCNC: 0.88 MG/DL (ref 0.52–1.04)
CREAT UR-MCNC: 52 MG/DL
CRP SERPL-MCNC: 3.4 MG/L (ref 0–8)
GFR SERPL CREATININE-BSD FRML MDRD: 66 ML/MIN/1.7M2
GLUCOSE SERPL-MCNC: 93 MG/DL (ref 70–99)
GLUCOSE UR STRIP-MCNC: NEGATIVE MG/DL
HGB UR QL STRIP: ABNORMAL
KETONES UR STRIP-MCNC: NEGATIVE MG/DL
LEUKOCYTE ESTERASE UR QL STRIP: ABNORMAL
MICROALBUMIN UR-MCNC: 78 MG/L
MICROALBUMIN/CREAT UR: 151.25 MG/G CR (ref 0–25)
MUCOUS THREADS #/AREA URNS LPF: PRESENT /LPF
NITRATE UR QL: NEGATIVE
NT-PROBNP SERPL-MCNC: 613 PG/ML (ref 0–125)
PH UR STRIP: 7 PH (ref 5–7)
POTASSIUM SERPL-SCNC: 4.5 MMOL/L (ref 3.4–5.3)
PROT SERPL-MCNC: 8.2 G/DL (ref 6.8–8.8)
PROT UR-MCNC: 0.17 G/L
PROT/CREAT 24H UR: 0.33 G/G CR (ref 0–0.2)
RBC #/AREA URNS AUTO: 4 /HPF (ref 0–2)
SODIUM SERPL-SCNC: 138 MMOL/L (ref 133–144)
SOURCE: ABNORMAL
SP GR UR STRIP: 1.01 (ref 1–1.03)
SQUAMOUS #/AREA URNS AUTO: 1 /HPF (ref 0–1)
UROBILINOGEN UR STRIP-MCNC: 0 MG/DL (ref 0–2)
WBC #/AREA URNS AUTO: <1 /HPF (ref 0–2)

## 2018-02-27 PROCEDURE — 86160 COMPLEMENT ANTIGEN: CPT | Performed by: INTERNAL MEDICINE

## 2018-02-27 PROCEDURE — 00000401 ZZHCL STATISTIC THROMBIN TIME NC: Performed by: INTERNAL MEDICINE

## 2018-02-27 PROCEDURE — 85730 THROMBOPLASTIN TIME PARTIAL: CPT | Performed by: INTERNAL MEDICINE

## 2018-02-27 PROCEDURE — 86146 BETA-2 GLYCOPROTEIN ANTIBODY: CPT | Performed by: INTERNAL MEDICINE

## 2018-02-27 PROCEDURE — 86147 CARDIOLIPIN ANTIBODY EA IG: CPT | Performed by: INTERNAL MEDICINE

## 2018-02-27 PROCEDURE — 86140 C-REACTIVE PROTEIN: CPT | Performed by: INTERNAL MEDICINE

## 2018-02-27 PROCEDURE — 36415 COLL VENOUS BLD VENIPUNCTURE: CPT | Performed by: INTERNAL MEDICINE

## 2018-02-27 PROCEDURE — 99214 OFFICE O/P EST MOD 30 MIN: CPT | Mod: ZP | Performed by: INTERNAL MEDICINE

## 2018-02-27 PROCEDURE — 00000167 ZZHCL STATISTIC INR NC: Performed by: INTERNAL MEDICINE

## 2018-02-27 PROCEDURE — 85597 PHOSPHOLIPID PLTLT NEUTRALIZ: CPT | Performed by: INTERNAL MEDICINE

## 2018-02-27 PROCEDURE — 85732 THROMBOPLASTIN TIME PARTIAL: CPT | Performed by: INTERNAL MEDICINE

## 2018-02-27 PROCEDURE — 83880 ASSAY OF NATRIURETIC PEPTIDE: CPT | Performed by: INTERNAL MEDICINE

## 2018-02-27 PROCEDURE — 83520 IMMUNOASSAY QUANT NOS NONAB: CPT | Performed by: INTERNAL MEDICINE

## 2018-02-27 PROCEDURE — G0463 HOSPITAL OUTPT CLINIC VISIT: HCPCS | Mod: ZF

## 2018-02-27 PROCEDURE — 85613 RUSSELL VIPER VENOM DILUTED: CPT | Performed by: INTERNAL MEDICINE

## 2018-02-27 PROCEDURE — 81001 URINALYSIS AUTO W/SCOPE: CPT | Performed by: INTERNAL MEDICINE

## 2018-02-27 PROCEDURE — 80076 HEPATIC FUNCTION PANEL: CPT | Performed by: INTERNAL MEDICINE

## 2018-02-27 PROCEDURE — 84156 ASSAY OF PROTEIN URINE: CPT | Performed by: INTERNAL MEDICINE

## 2018-02-27 PROCEDURE — 82043 UR ALBUMIN QUANTITATIVE: CPT | Performed by: INTERNAL MEDICINE

## 2018-02-27 PROCEDURE — 80048 BASIC METABOLIC PNL TOTAL CA: CPT | Performed by: INTERNAL MEDICINE

## 2018-02-27 ASSESSMENT — PAIN SCALES - GENERAL: PAINLEVEL: NO PAIN (0)

## 2018-02-27 NOTE — MR AVS SNAPSHOT
After Visit Summary   2/27/2018    Luz Marina Live    MRN: [id number]           Patient Information     Date Of Birth          1960        Visit Information        Provider Department      2/27/2018 9:00 AM Twin Moreno MD M [facility]        Today's Diagnoses     Coronary artery disease due to lipid rich plaque    -  1      Care Instructions    You were seen at the [facility] clinic today.  You saw Dr. Moreno  Here are your Instructions:    1.  Echo with bubble study.  2.  Right heart cath at the [address]  Instructions: check in , nothing to eat 6h before but clear liquids ok, ok to take meds in the morning.  You need a  home in case you get sedation.  Call me if you decide to do, you will have to hold your warfarin 3 days prior.  3.  Labs today on way out.  4.  See us back in 3 months, sooner if needed.  5.  Overnight oximetry - [employer] the company will call you to arrange.      Lucie Szymanski RN  Nurse Care Coordinator  Office:  [phone] option #1, then #3 & ask for Lucie (nurse line)  Fax:  [phone]  After Hours:  [phone]  Appointments:  [phone] option #1, then option #1      Results for LUZ MARINA LIVE (MRN [id number]) as of 2/27/2018 10:32   Ref. Range 2/27/2018 09:04   Sodium Latest Ref Range: 133 - 144 mmol/L 138   Potassium Latest Ref Range: 3.4 - 5.3 mmol/L 4.5   Chloride Latest Ref Range: 94 - 109 mmol/L 107   Carbon Dioxide Latest Ref Range: 20 - 32 mmol/L 26   Urea Nitrogen Latest Ref Range: 7 - 30 mg/dL 22   Creatinine Latest Ref Range: 0.52 - 1.04 mg/dL 0.88   GFR Estimate Latest Ref Range: >60 mL/min/1.7m2 66   GFR Estimate If Black Latest Ref Range: >60 mL/min/1.7m2 80   Calcium Latest Ref Range: 8.5 - 10.1 mg/dL 8.9   Anion Gap Latest Ref Range: 3 - 14 mmol/L 5   N-Terminal Pro Bnp Latest Ref Range: 0 - 125 pg/mL 613 (H)   Glucose Latest Ref Range: 70 - 99 mg/dL 93                      Follow-ups after your visit        Additional Services     Follow-Up with [facility]       Tiffanie hf f/u                  Your next 10 appointments already scheduled     Mar 12, 2018 11:30 AM CDT   Ech Complete with MG GAYLE ECHO TECH   [facility]    [address] [place] [address] [phone]           1. Please bring or wear a comfortable two-piece outfit. 2. You may eat, drink and take your normal medicines. 3. For any questions that cannot be answered, please contact the ordering physician            May 31, 2018 10:30 AM CDT   (Arrive by 10:15 AM)   RETURN HEART FAILURE with Twin Moreno MD   [facility] ([facility])    [address] [place] [address] [phone]            Aug 06, 2018 11:30 AM CDT   Return Visit with Ever Adame MD   [facility]    [address] [place] [address] [phone]              Future tests that were ordered for you today     Open Future Orders        Priority Expected Expires Ordered    Overnight oximetry study Routine 3/14/2018 2/27/2019 2/27/2018    Follow-Up with [facility] Routine 5/27/2018 2/27/2019 2/27/2018    CRP inflammation Routine 2/27/2018 2/28/2018 2/27/2018    Hepatic panel Routine 2/27/2018 2/28/2018 2/27/2018    Lupus Anticoagulant Panel Routine 2/27/2018 2/28/2018 2/27/2018    Cardiolipin Christy IgG and IgM Routine 2/27/2018 2/28/2018 2/27/2018    Beta 2 Glycoprotein Antibodies IGG IGM Routine 2/27/2018 2/28/2018 2/27/2018    Interleukin 6 Blood Routine 2/27/2018 2/28/2018 2/27/2018    Echocardiogram Complete Routine  2/27/2019 2/27/2018            Who to contact     If you have questions or need follow up information about today's clinic visit or your schedule please contact [facility]  "directly at [phone].  Normal or non-critical lab and imaging results will be communicated to you by MyChart, letter or phone within 4 business days after the clinic has received the results. If you do not hear from us within 7 days, please contact the clinic through [facility] or phone. If you have a critical or abnormal lab result, we will notify you by phone as soon as possible.  Submit refill requests through [employer] or call your pharmacy and they will forward the refill request to us. Please allow 3 business days for your refill to be completed.          Additional Information About Your Visit        [facility] Information     [employer] lets you send messages to your doctor, view your test results, renew your prescriptions, schedule appointments and more. To sign up, go to [email or web address] . Click on \"Log in\" on the left side of the screen, which will take you to the Welcome page. Then click on \"Sign up Now\" on the right side of the page.     You will be asked to enter the access code listed below, as well as some personal information. Please follow the directions to create your username and password.     Your access code is: [email or web address]  Expires: 2018  6:30 AM     Your access code will  in 90 days. If you need help or a new code, please call your [facility] clinic or [phone].        Care EveryWhere ID     This is your Care EveryWhere ID. This could be used by other organizations to access your [facility] medical records  [id number]        Your Vitals Were     Pulse Height Pulse Oximetry BMI (Body Mass Index)          97 1.6 m (5' 3\") 56% 45.53 kg/m2         Blood Pressure from Last 3 Encounters:   18 179/88   17 112/68   17 140/76    Weight from Last 3 Encounters:   18 116.6 kg (257 lb)   17 119.3 kg (263 lb 1.6 oz)   17 119.7 kg (264 lb)               Primary Care Provider Office Phone # Fax #    Eliz Nguyen [phone]       " [facility] [address]  [facility] [address]        Equal Access to Services     DHIRAJRUBI MAYNOR : Hadii aad ku hadevivaishali Garrett, wastefanida delio, qaybta ellalokeshjesenia curran, mayte alvaradovaishnavijossy roberts. So [facility] [phone].    ATENCIÓN: Si habla español, tiene a bennett disposición servicios gratuitos de asistencia lingüística. Llame al [phone].    We comply with applicable federal civil rights laws and [place] laws. We do not discriminate on the basis of race, color, national origin, age, disability, sex, sexual orientation, or gender identity.            Thank you!     Thank you for choosing [facility]  for your care. Our goal is always to provide you with excellent care. Hearing back from our patients is one way we can continue to improve our services. Please take a few minutes to complete the written survey that you may receive in the mail after your visit with us. Thank you!             Your Updated Medication List - Protect others around you: Learn how to safely use, store and throw away your medicines at www.disposemymeds.org.          This list is accurate as of 2/27/18 10:38 AM.  Always use your most recent med list.                   Brand Name Dispense Instructions for use Diagnosis    aspirin 81 MG tablet      Take by mouth daily        atorvastatin 40 MG tablet    LIPITOR    90 tablet    Take 1 tablet (40 mg) by mouth daily    Pure hypercholesterolemia       CALCIUM 600-D PO      Take  by mouth 2 times daily.    SLE (systemic lupus erythematosus) (H)       hydroxychloroquine 200 MG tablet    PLAQUENIL    30 tablet    Take 1 tablet (200 mg) by mouth daily    Systemic lupus erythematosus with glomerular disease, unspecified SLE type (H)       lisinopril 20 MG tablet    PRINIVIL/ZESTRIL    90 tablet    Take 1 tablet (20 mg) by mouth daily    Systemic lupus erythematosus with glomerular disease, unspecified SLE type (H)       metoprolol tartrate 50 MG tablet     LOPRESSOR    180 tablet    TAKE 1 TABLET (50 MG) BY MOUTH 2 TIMES DAILY    Atrial fibrillation, unspecified type (H)       MULTIPLE VITAMIN PO      Take 1 tablet by mouth daily        mupirocin 2 % nasal ointment    BACTROBAN NASAL    10 g    Place a thin layer inside right nose on septum BID x 2 weeks    Nasal septum ulceration       omega-3 fatty acids 1200 MG capsule      Take 2 capsules by mouth 2 times daily        * warfarin 5 MG tablet    COUMADIN    215 tablet    Take 12.5 mg (2 and 1/2 tab) on Tuesday, Thursday, and Saturday and 10 mg (2 tabs) all other days, or as directed by the coumadin clinic.    Atrial fibrillation, unspecified type (H)       * warfarin 5 MG tablet    COUMADIN    65 tablet    TAKE 2 & 1/2 TABLET TUES AND 2TABS THE OTHER DAYS OR AS DIRECTED BY THE COUMADIN CLINIC    Atrial fibrillation, unspecified type (H)       * warfarin 5 MG tablet    COUMADIN    65 tablet    TAKE 2 & 1/2 TABLET TUES AND 2TABS THE OTHER DAYS OR AS DIRECTED BY THE COUMADIN CLINIC    Atrial fibrillation, unspecified type (H)       * Notice:  This list has 3 medication(s) that are the same as other medications prescribed for you. Read the directions carefully, and ask your doctor or other care provider to review them with you.

## 2018-02-27 NOTE — NURSING NOTE
Chief Complaint   Patient presents with     Follow Up For     hf f/u, hasn't been seen for 1 year     Vitals were taken and medications were reconciled.  Abdiftaah Isbell, RMA  9:30 AM

## 2018-02-27 NOTE — LETTER
2/27/2018      RE: Luz Marina Live  [address]  [place] [address]       Dear Colleague,    Thank you for the opportunity to participate in the care of your patient, Luz Marina Live, at the [facility]. Please see a copy of my visit note below.      Impression  1. ASHD  2. History of CAB  3. Elevated PA pressure  4. Hypertension  5. Hyperlipidemia  6. Symptoms of sleep apnea without testing  7. PFO  8. Atrial fibrillation with warfarin anticoagulation  9. SLE    I had the opportunity to review Ms. Live chart as well as go over the results and findings.  She has exertional shortness of breath functional class 2 superimposed upon history of SLE, premature CAD resulting in by-pass, elevated body mass index and history suggestive of KEITH.  She has a history of atrial fibrillation and warfarin usage, facial cellulitis and has routine eye care in view of lupus medications.  She is not diabetic    REVIEW of SYMPTOMS    Constitutional: without fever, chills, night sweats.  Weight is down  HEENT: without dry eyes, dry mouth, sinusitis, corryza, visual changes  Endocrine: without polyuria, polydypsia, polyphagia, heat or cold intolerance, changing mental status  Cardiology: without chest pain, tightness, heaviness, pressure, paroxysmal dyspnea, orthopnea, palpitation, pre-syncope or syncope or device discharge (if present)  Pulmonary: without asthma, wheezing, cough, hemoptysis  GI: without nausea, emesis, jaundice, pain, hematemesis, melena  : without frequency, urgency, hematuria, stones, pain, abnormal bleeding, frequency, urgency  Neurologic: without TIA, CVA, trauma, seizure  Dermatologic: without lesions, abrasion rash,   Orthopedic/Rheum: without significant joint pain, impairment, limb, polyserositis, ulceration, Raynauds  Heme: without mass, bruising, frequent infection, anemia  Psychiatric: without substance abuse, hallucination, medication,  depression    Constitutional: alert, oriented, normal gait and station, normal mentation.  Oral: moist mucous membrans  Lymph: without pathologic adenopathy  Chest: clear to ausculation and percussion  Cor: No evidence of left or right ventricular activity.  Rhythm is regular.  S1 normal, S2 split physiologically. Murmurs are not present  Abdomen: without tenderness, rebound, guarding, masses, ascites  Extremities: Edema not present  Neuro: no focal defects, normal mentation  Skin: + butterfly  Psych: oriented, verbal, mental status in tact    Exercise tolerance: is class 2    Wt Readings from Last 24 Encounters:   18 116.6 kg (257 lb)   17 119.3 kg (263 lb 1.6 oz)   17 119.7 kg (264 lb)   17 122 kg (269 lb)   17 123 kg (271 lb 1.6 oz)   03/15/17 123.2 kg (271 lb 9.6 oz)   16 122.5 kg (270 lb)   16 124.7 kg (275 lb)   16 123.5 kg (272 lb 4.8 oz)   16 121.4 kg (267 lb 11.2 oz)   16 122.5 kg (270 lb)   16 118.4 kg (261 lb)   16 117 kg (258 lb)   16 117 kg (258 lb)   16 116.6 kg (257 lb)   16 115.7 kg (255 lb)   12/18/15 115.9 kg (255 lb 9.6 oz)   09/23/15 111.4 kg (245 lb 8 oz)   09/21/15 111 kg (244 lb 11.2 oz)   09/14/15 110.8 kg (244 lb 4.8 oz)   09/02/15 112.3 kg (247 lb 9.6 oz)   08/28/15 121.3 kg (267 lb 8 oz)   06/01/15 111.2 kg (245 lb 3.2 oz)   04/29/15 111.4 kg (245 lb 9.6 oz)       Echocardiographic findings of TR, modest PA, normal systolic function.      : 1960  Study Date: 2016 01:31 PM  Age: 56 yrs  Gender: Female  Patient Location: [facility]  Reason For Study:     History: tricuspid regurgitation  Ordering Physician: JOSE ARELLANO  Referring Physician: JOSE ARELLANO  Performed By: Hugh Sharpe MD     BSA: 2.2 m2  Height: 63 in  Weight: 269 lb  ______________________________________________________________________________     Interpretation Summary  Left ventricular function, chamber size,  wall motion, and wall thickness are  normal.The EF is 55-60%.  Global right ventricular function is normal. Mild right ventricular dilation  is present.  Mild to moderate TR with mild prolapse of the posterior leaflet of the  tricupsid valve.  A small patent foramen ovale is present. There is no ASD (secundum, sinus  venosus or unroofed coronary sinus). All 4 pulmonary veins drain into the  left atrium.  Mild pulmonary hypertension is present with RVSP 45mmHg above RAP.     ______________________________________________________________________________        Procedure  Transesophageal Echocardiogram with color and spectral Doppler performed. The  procedure was performed in the Echo Lab. Informed consent for Transesophegeal  echo obtained. EVE Probe #12 was used during the procedure. Patient was  sedated using Fentanyl 100 mcg. Patient was sedated using Versed 4 mg. The  Transducer was inserted without difficulty . The patient tolerated the  procedure well. Complications None. ECG shows normal sinus rhythm. Good  quality two-dimensional was performed and interpreted.     Left Ventricle  Left ventricular function, chamber size, wall motion, and wall thickness are  normal.The EF is 55-60%.     Right Ventricle  Global right ventricular function is normal. Mild right ventricular dilation  is present.     Atria  Both atria appear normal. The left atrial appendage is normal. It is free of  spontaneous echo contrast and thrombus. A small patent foramen ovale is  present. The patent foramen ovale was demonstrated by color Doppler and  agitated saline bubble study . The patent foramen ovale was demonstrated with  Valsalva's maneuver. No evidence of ASD.     Mitral Valve  The mitral valve is normal. Trace mitral insufficiency is present.     Aortic Valve  Aortic valve is normal in structure and function. The aortic valve is  tricuspid.  Tricuspid Valve  Mild to moderate tricuspid insufficiency is present. Right  ventricular  systolic pressure is 45mmHg above the right atrial pressure. Mild pulmonary  hypertension is present. Mild prolapse of the posterior leaflet of the  tricupsid valve is noted.     Pulmonic Valve  The pulmonic valve is normal.     Vessels  The pulmonary artery is normal. The aorta root is normal. The thoracic aorta  is normal. All four pulmonary veins visualized with dampened velocity  waveforms.     Pericardium  No pericardial effusion is present.     Compared to Previous Study  This study was compared with the study from 2016. A small PFO has been  identified. .     ______________________________________________________________________________     Doppler Measurements & Calculations  TR max maria isabel: 336.7 cm/sec  TR max P.5 mmHg  ______________________________________________________________________________         Name: IDA PADRON  MRN: [id number]  : 1960  Study Date: 2016 01:31 PM  Age: 56 yrs  Gender: Female  Patient Location: [facility]  Reason For Study:     History: tricuspid regurgitation  Ordering Physician: JOSE ARELLANO  Referring Physician: JOSE ARELLANO  Performed By: Hugh Sharpe MD     BSA: 2.2 m2  Height: 63 in  Weight: 269 lb  ______________________________________________________________________________     Interpretation Summary  Left ventricular function, chamber size, wall motion, and wall thickness are  normal.The EF is 55-60%.  Global right ventricular function is normal. Mild right ventricular dilation  is present.  Mild to moderate TR with mild prolapse of the posterior leaflet of the  tricupsid valve.  A small patent foramen ovale is present. There is no ASD (secundum, sinus  venosus or unroofed coronary sinus). All 4 pulmonary veins drain into the  left atrium.  Mild pulmonary hypertension is present with RVSP 45mmHg above RAP.     ______________________________________________________________________________        Procedure  Transesophageal  Echocardiogram with color and spectral Doppler performed. The  procedure was performed in the Echo Lab. Informed consent for Transesophegeal  echo obtained. EVE Probe #12 was used during the procedure. Patient was  sedated using Fentanyl 100 mcg. Patient was sedated using Versed 4 mg. The  Transducer was inserted without difficulty . The patient tolerated the  procedure well. Complications None. ECG shows normal sinus rhythm. Good  quality two-dimensional was performed and interpreted.     Left Ventricle  Left ventricular function, chamber size, wall motion, and wall thickness are  normal.The EF is 55-60%.     Right Ventricle  Global right ventricular function is normal. Mild right ventricular dilation  is present.     Atria  Both atria appear normal. The left atrial appendage is normal. It is free of  spontaneous echo contrast and thrombus. A small patent foramen ovale is  present. The patent foramen ovale was demonstrated by color Doppler and  agitated saline bubble study . The patent foramen ovale was demonstrated with  Valsalva's maneuver. No evidence of ASD.     Mitral Valve  The mitral valve is normal. Trace mitral insufficiency is present.     Aortic Valve  Aortic valve is normal in structure and function. The aortic valve is  tricuspid.  Tricuspid Valve  Mild to moderate tricuspid insufficiency is present. Right ventricular  systolic pressure is 45mmHg above the right atrial pressure. Mild pulmonary  hypertension is present. Mild prolapse of the posterior leaflet of the  tricupsid valve is noted.     Pulmonic Valve  The pulmonic valve is normal.     Vessels  The pulmonary artery is normal. The aorta root is normal. The thoracic aorta  is normal. All four pulmonary veins visualized with dampened velocity  waveforms.     Pericardium  No pericardial effusion is present.     Compared to Previous Study  This study was compared with the study from 12/20/2016. A small PFO has been  identified.  .     ______________________________________________________________________________     Doppler Measurements & Calculations  TR max maria isabel: 336.7 cm/sec  TR max P.5 mmHg  ______________________________________________________________________________       Procedures:  ECHO: 09:   Interpretation Summary   The Ejection Fraction is estimated at 55-60%. No regional wall motion   abnormalities are seen. The right ventricle is normal size. Global right   ventricular function is normal. Right ventricular systolic pressure is 27mmHg   above the right atrial pressure. No pericardial effusion is present. The   inferior vena cava is normal.   Stress test: 08-10-09:   1. Abnormal study with a high degree of certainty.   2. There is a predominantly fixed medium sized moderately decreased   intensity myocardial perfusion defect with minimal periinfarct   reversibility involving the apical anterior, mid anterior, and apical   region of the heart. There is corresponding hypokinesis. No other   adenosine induced fixed or reversible myocardial perfusion defect.   3. Left ventricular size is enlarged at rest. Transient ischemic   dilation of the left ventricle did not occur.   4. The left ventricular ejection fraction is 56 %.   5. Summed Stress Score is calculated at 18, which is associated   with increased risk of future coronary events.   CCL: 09: ARELLANO:   Mrs. Live is a 49 year old female with known coronary artery disease s/p MI in  found to have minimal disease on catheterization. Her cardiovascular risk factors include HTN and hyperlipidemia. She presented with a 2 week history of dyspnea with exertion and lower extremity edema. She underwent Adenosine MPI which revealed a fixed medium-sized defect with minimal periinfarct reversability and anterior hypokinesis. Her LVEF was preserved at 56%. CT angio of the coronaries revealed moderate stenosis in the pLAD and dRCA and mild to moderate stenosis of the pLCx.    Access: 6F long RFA, 6F RFV   Coronary Anatomy:   LMCA: normal   LAD: There is a long, discrete, ectatic 70-80% lesion in the ostial and proximal LAD. There is one D1 branch without any significant disease.   LCx: no significant disease. There is a large OM1 branch that has a 50-60% stenosis prior to the branch bifurcation.   RCA: The proximal and mid RCA have luminal irregularities without significance. The distal RCA has a complex bifurcation lesion prior to the take-off of the rPDA. There is disease in the ostial PL1 as well.   Conclusions:   1. 3-vessel coronary artery disease without left main lesion   Plan   Discussed the options in depth with the patient. Given the proximity of the LAD lesion to the LMCA and the complexity of the dRCA lesion, we recommended CABG for the patient. She will be admitted to the hospital.Discussed with Darleen Johnson MD.   Cardiac Surgery: 8/27/2009   Triple vessel coronary artery bypass grafting. Left internal mammary artery was placed to the left anterior descending coronary artery and separate reverse saphenous vein grafts were placed to the obtuse marginal and right posterior descending coronary arteries.   Assessment:  The patient has previous evidence of elevated pulmonary artery pressure and multiple pre-disposing factors.  She has not had recurrent staging or visits in over 1 year and therefore should have: right heart catherization for determination of PA pressures and assessment of shunt, repeat echocardiogram with bubble and interim laboratory: CBC, BNP, CMP, anti-phospholipid antibody, IL-6, CRP-infl.  She should overnight recording oximetry. Her BNP this visit is elevated.  This is a new finding.  She should have overnight recording oximetry    We spent 30 minutes discussing the nature of PH in general, diagnostic strategies, risk and benefits and alternatives discussed.      Sincerely,     Twin Moreno MD        CC:  Eliz Adame

## 2018-02-27 NOTE — PATIENT INSTRUCTIONS
You were seen at the [facility] clinic today.  You saw Dr. Moreno  Here are your Instructions:    1.  Echo with bubble study.  2.  Right heart cath at the [facility] [address].  Instructions: check in , nothing to eat 6h before but clear liquids ok, ok to take meds in the morning.  You need a  home in case you get sedation.  Call me if you decide to do, you will have to hold your warfarin 3 days prior.  3.  Labs today on way out.  4.  See us back in 3 months, sooner if needed.  5.  Overnight oximetry - [employer] the company will call you to arrange.      Lucie Szymanski RN  Nurse Care Coordinator  Office:  [phone] option #1, then #3 & ask for Lucie (nurse line)  Fax:  [phone]  After Hours:  [phone]  Appointments:  [phone] option #1, then option #1      Results for IDA PADRON (MRN [id number]) as of 2/27/2018 10:32   Ref. Range 2/27/2018 09:04   Sodium Latest Ref Range: 133 - 144 mmol/L 138   Potassium Latest Ref Range: 3.4 - 5.3 mmol/L 4.5   Chloride Latest Ref Range: 94 - 109 mmol/L 107   Carbon Dioxide Latest Ref Range: 20 - 32 mmol/L 26   Urea Nitrogen Latest Ref Range: 7 - 30 mg/dL 22   Creatinine Latest Ref Range: 0.52 - 1.04 mg/dL 0.88   GFR Estimate Latest Ref Range: >60 mL/min/1.7m2 66   GFR Estimate If Black Latest Ref Range: >60 mL/min/1.7m2 80   Calcium Latest Ref Range: 8.5 - 10.1 mg/dL 8.9   Anion Gap Latest Ref Range: 3 - 14 mmol/L 5   N-Terminal Pro Bnp Latest Ref Range: 0 - 125 pg/mL 613 (H)   Glucose Latest Ref Range: 70 - 99 mg/dL 93

## 2018-02-27 NOTE — LETTER
March 2, 2018       TO: Luz Marina Live  [address]  [place] [address]         Dear Luz Marina,    There continues to be a small amount of protein present in the urine as we have seen in the past as well. This time and the last time, there were also a few red blood cells. With the blood in the urine, along with low complements, I question whether your lupus is more active at this time. The good news is that your kidney function is stable.     Before pursuing a repeat biopsy, I think we should plan to recheck this again in 1-2 weeks to determine whether this is trending to more toward active disease. We will call you to discuss and also get a sense of how you are feeling. We will also be in touch following the next set of labs.     Sincerely,   Lavern Clark, DO

## 2018-02-28 LAB
B2 GLYCOPROT1 IGG SERPL IA-ACNC: 1.4 U/ML
B2 GLYCOPROT1 IGM SERPL IA-ACNC: 2.5 U/ML
C3 SERPL-MCNC: 71 MG/DL (ref 76–169)
C4 SERPL-MCNC: 13 MG/DL (ref 15–50)
CARDIOLIPIN ANTIBODY IGG: <1.6 GPL-U/ML (ref 0–19.9)
CARDIOLIPIN ANTIBODY IGM: <0.2 MPL-U/ML (ref 0–19.9)
LA PPP-IMP: POSITIVE

## 2018-03-01 ENCOUNTER — TELEPHONE (OUTPATIENT)
Dept: NEPHROLOGY | Facility: CLINIC | Age: 58
End: 2018-03-01

## 2018-03-01 DIAGNOSIS — N18.30 CKD (CHRONIC KIDNEY DISEASE) STAGE 3, GFR 30-59 ML/MIN (H): Primary | ICD-10-CM

## 2018-03-01 NOTE — TELEPHONE ENCOUNTER
Reviewed lab results and Dr. Clark recommendations with Luz Marina.     There continues to be a small amount of protein present in the urine as we have seen in the past as well. This time and the last time, there were also a few red blood cells. With the blood in the urine, along with low complements, I question whether your lupus is more active at this time. The good news is that your kidney function is stable.     Before pursuing a repeat biopsy, I think we should plan to recheck this again in 1-2 weeks to determine whether this is trending to more toward active disease. We will call you to discuss and also get a sense of how you are feeling. We will also be in touch following the next set of labs.     Luz Marina reports feeling the same. She is without symptoms and is agreeable to repeating labs in 2 weeks.    Tasha Granados, RN, BSN  Nephrology Care Coordinator  [facility]

## 2018-03-02 LAB — IL6 SERPL-MCNC: 2.84 PG/ML

## 2018-03-07 ENCOUNTER — ALLIED HEALTH/NURSE VISIT (OUTPATIENT)
Dept: FAMILY MEDICINE | Facility: CLINIC | Age: 58
End: 2018-03-07
Payer: COMMERCIAL

## 2018-03-07 ENCOUNTER — ANTICOAGULATION THERAPY VISIT (OUTPATIENT)
Dept: ANTICOAGULATION | Facility: OTHER | Age: 58
End: 2018-03-07

## 2018-03-07 ENCOUNTER — TELEPHONE (OUTPATIENT)
Dept: LAB | Facility: CLINIC | Age: 58
End: 2018-03-07

## 2018-03-07 VITALS — SYSTOLIC BLOOD PRESSURE: 134 MMHG | DIASTOLIC BLOOD PRESSURE: 84 MMHG | HEART RATE: 62 BPM

## 2018-03-07 DIAGNOSIS — I10 BENIGN ESSENTIAL HYPERTENSION: Primary | ICD-10-CM

## 2018-03-07 DIAGNOSIS — Z79.01 LONG-TERM (CURRENT) USE OF ANTICOAGULANTS: ICD-10-CM

## 2018-03-07 DIAGNOSIS — I48.20 CHRONIC ATRIAL FIBRILLATION (H): Primary | ICD-10-CM

## 2018-03-07 DIAGNOSIS — I48.91 ATRIAL FIBRILLATION, UNSPECIFIED TYPE (H): ICD-10-CM

## 2018-03-07 DIAGNOSIS — Z79.01 LONG TERM CURRENT USE OF ANTICOAGULANT THERAPY: ICD-10-CM

## 2018-03-07 DIAGNOSIS — I48.20 CHRONIC ATRIAL FIBRILLATION (H): ICD-10-CM

## 2018-03-07 LAB
INR BLD: 4.5 (ref 0.86–1.14)
INR POINT OF CARE: NORMAL (ref 0.86–1.14)

## 2018-03-07 PROCEDURE — 99207 ZZC NO CHARGE NURSE ONLY: CPT

## 2018-03-07 PROCEDURE — 85610 PROTHROMBIN TIME: CPT | Mod: QW | Performed by: INTERNAL MEDICINE

## 2018-03-07 PROCEDURE — 36416 COLLJ CAPILLARY BLOOD SPEC: CPT | Performed by: INTERNAL MEDICINE

## 2018-03-07 PROCEDURE — 99207 ZZC NO CHARGE NURSE ONLY: CPT | Performed by: INTERNAL MEDICINE

## 2018-03-07 NOTE — MR AVS SNAPSHOT
Luz Marina Live   3/7/2018   Anticoagulation Therapy Visit    Description:  57 year old female   Provider:  Eliz Nguyen MD   Department:  [facility]           INR as of 3/7/2018     Today's INR 4.5- lab     The selected INR is not numeric and cannot be flagged as in or out of the INR goal range.      Anticoagulation Summary as of 3/7/2018     INR goal 2.0-3.0   Today's INR 4.5- lab    The selected INR is not numeric and cannot be flagged as in or out of the INR goal range.   Full instructions 3/7: Hold; Otherwise 12.5 mg on Tue; 10 mg all other days   Next INR check 3/12/2018    Indications   Long-term (current) use of anticoagulants [Z79.01] [Z79.01]  Atrial fibrillation (H) [I48.91]         Contact Numbers     Clinic Number:         March 2018 Details    Sun Mon Tue Wed Thu Fri Sat         1               2               3                 4               5               6               7      Hold   See details      8      10 mg         9      10 mg         10      10 mg           11      10 mg         12            13               14               15               16               17                 18               19               20               21               22               23               24                 25               26               27               28               29               30               31                Date Details   03/07 This INR check       Date of next INR:  3/12/2018         How to take your warfarin dose     To take:  10 mg Take 2 of the 5 mg tablets.    Hold Do not take your warfarin dose. See the Details table to the right for additional instructions.

## 2018-03-07 NOTE — PROGRESS NOTES
ANTICOAGULATION FOLLOW-UP CLINIC VISIT    Patient Name:  Luz Marina Live  Date:  3/7/2018  Contact Type:  Telephone    SUBJECTIVE:     Patient Findings     Positives Unexplained INR or factor level change           OBJECTIVE    INR Protime   Date Value Ref Range Status   03/07/2018 4.5- lab  0.86 - 1.14 Final       ASSESSMENT / PLAN  INR assessment SUPRA    Recheck INR In: 5 DAYS    INR Location Outside lab      Anticoagulation Summary as of 3/7/2018     INR goal 2.0-3.0   Today's INR 4.5- lab    The selected INR is not numeric and cannot be flagged as in or out of the INR goal range.   Maintenance plan 12.5 mg (5 mg x 2.5) on Tue; 10 mg (5 mg x 2) all other days   Full instructions 3/7: Hold; Otherwise 12.5 mg on Tue; 10 mg all other days   Weekly total 72.5 mg   Plan last modified Cait Hawthorne RN (11/8/2017)   Next INR check 3/12/2018   Target end date     Indications   Long-term (current) use of anticoagulants [Z79.01] [Z79.01]  Atrial fibrillation (H) [I48.91]         Anticoagulation Episode Summary     INR check location     Preferred lab     Send INR reminders to [facility] MAGDALENE Hoff 5 mg tabs, [facility] (needs staff message) PM dose      Anticoagulation Care Providers     Provider Role Specialty Phone number    Eliz Nguyen MD [facility] Internal Medicine [phone]            See the Encounter Report to view Anticoagulation Flowsheet and Dosing Calendar (Go to Encounters tab in chart review, and find the Anticoagulation Therapy Visit)    Dosage adjustment made based on physician directed care plan.        Cait Hawthorne, KATE

## 2018-03-07 NOTE — MR AVS SNAPSHOT
After Visit Summary   3/7/2018    Luz Marina Live    MRN: [id number]           Patient Information     Date Of Birth          1960        Visit Information        Provider Department      3/7/2018 4:00 PM RG FLOAT 1 [facility]        Today's Diagnoses     Benign essential hypertension    -  1       Follow-ups after your visit        Your next 10 appointments already scheduled     Mar 12, 2018 11:30 AM CDT   Ech Complete with MGECHR1, MG ECHO TECH   [facility])    [address] [place] [id number] [phone]           1. Please bring or wear a comfortable two-piece outfit. 2. You may eat, drink and take your normal medicines. 3. For any questions that cannot be answered, please contact the ordering physician            Mar 12, 2018 12:30 PM CDT   LAB with LAB FIRST FLOOR [facility])    [address] [place] [id number] [phone]           Please do not eat 10-12 hours before your appointment if you are coming in fasting for labs on lipids, cholesterol, or glucose (sugar). This does not apply to pregnant women. Water, hot tea and black coffee (with nothing added) are okay. Do not drink other fluids, diet soda or chew gum.            May 31, 2018 10:30 AM CDT   (Arrive by 10:15 AM)   RETURN HEART FAILURE with Twin Moreno MD   [facility])    [address] [place] [id number] [phone]            Aug 06, 2018 11:30 AM CDT   Return Visit with Ever Adame MD   [facility])    [address] [place] [id number] [phone]              Who to contact     If you have questions or need follow up information about today's clinic visit or your schedule please contact [facility] directly at  "[phone].  Normal or non-critical lab and imaging results will be communicated to you by MyChart, letter or phone within 4 business days after the clinic has received the results. If you do not hear from us within 7 days, please contact the clinic through [employer] or phone. If you have a critical or abnormal lab result, we will notify you by phone as soon as possible.  Submit refill requests through [employer] or call your pharmacy and they will forward the refill request to us. Please allow 3 business days for your refill to be completed.          Additional Information About Your Visit        [employer] Information     [employer] lets you send messages to your doctor, view your test results, renew your prescriptions, schedule appointments and more. To sign up, go to [email or web address] . Click on \"Log in\" on the left side of the screen, which will take you to the Welcome page. Then click on \"Sign up Now\" on the right side of the page.     You will be asked to enter the access code listed below, as well as some personal information. Please follow the directions to create your username and password.     Your access code is: [email or web address]  Expires: 2018  6:30 AM     Your access code will  in 90 days. If you need help or a new code, please call your [place] clinic or [phone].        Care EveryWhere ID     This is your Care EveryWhere ID. This could be used by other organizations to access your [place] medical records  [id number]        Your Vitals Were     Pulse                   62            Blood Pressure from Last 3 Encounters:   18 134/84   18 179/88   17 112/68    Weight from Last 3 Encounters:   18 257 lb (116.6 kg)   17 263 lb 1.6 oz (119.3 kg)   17 264 lb (119.7 kg)              Today, you had the following     No orders found for display       Primary Care Provider Office Phone # Fax #    Eliz Nguyen [phone]       [facility] [address]  [facility] [address]        Equal Access to Services     KANCHAN MCGUIRE : Hadii aad ku hadevivaishali Sonorm, wastefanida delio, qawilliamsta shyammajesenia curran, mayte alvaradovaishnavijossy roberts. So [facility] [phone].    ATENCIÓN: Si habla español, tiene a bennett disposición servicios gratuitos de asistencia lingüística. Llame al [phone].    We comply with applicable federal civil rights laws and [place] laws. We do not discriminate on the basis of race, color, national origin, age, disability, sex, sexual orientation, or gender identity.            Thank you!     Thank you for choosing [facility]  for your care. Our goal is always to provide you with excellent care. Hearing back from our patients is one way we can continue to improve our services. Please take a few minutes to complete the written survey that you may receive in the mail after your visit with us. Thank you!             Your Updated Medication List - Protect others around you: Learn how to safely use, store and throw away your medicines at www.disposemymeds.org.          This list is accurate as of 3/7/18  4:12 PM.  Always use your most recent med list.                   Brand Name Dispense Instructions for use Diagnosis    aspirin 81 MG tablet      Take by mouth daily        atorvastatin 40 MG tablet    LIPITOR    90 tablet    Take 1 tablet (40 mg) by mouth daily    Pure hypercholesterolemia       CALCIUM 600-D PO      Take  by mouth 2 times daily.    SLE (systemic lupus erythematosus) (H)       hydroxychloroquine 200 MG tablet    PLAQUENIL    30 tablet    Take 1 tablet (200 mg) by mouth daily    Systemic lupus erythematosus with glomerular disease, unspecified SLE type (H)       lisinopril 20 MG tablet    PRINIVIL/ZESTRIL    90 tablet    Take 1 tablet (20 mg) by mouth daily    Systemic lupus erythematosus with glomerular disease, unspecified SLE type (H)       metoprolol tartrate 50 MG tablet    LOPRESSOR     180 tablet    TAKE 1 TABLET (50 MG) BY MOUTH 2 TIMES DAILY    Atrial fibrillation, unspecified type (H)       MULTIPLE VITAMIN PO      Take 1 tablet by mouth daily        mupirocin 2 % nasal ointment    BACTROBAN NASAL    10 g    Place a thin layer inside right nose on septum BID x 2 weeks    Nasal septum ulceration       omega-3 fatty acids 1200 MG capsule      Take 2 capsules by mouth 2 times daily        * warfarin 5 MG tablet    COUMADIN    215 tablet    Take 12.5 mg (2 and 1/2 tab) on Tuesday, Thursday, and Saturday and 10 mg (2 tabs) all other days, or as directed by the coumadin clinic.    Atrial fibrillation, unspecified type (H)       * warfarin 5 MG tablet    COUMADIN    65 tablet    TAKE 2 & 1/2 TABLET TUES AND 2TABS THE OTHER DAYS OR AS DIRECTED BY THE COUMADIN CLINIC    Atrial fibrillation, unspecified type (H)       * warfarin 5 MG tablet    COUMADIN    65 tablet    TAKE 2 & 1/2 TABLET TUES AND 2TABS THE OTHER DAYS OR AS DIRECTED BY THE COUMADIN CLINIC    Atrial fibrillation, unspecified type (H)       * Notice:  This list has 3 medication(s) that are the same as other medications prescribed for you. Read the directions carefully, and ask your doctor or other care provider to review them with you.

## 2018-03-07 NOTE — TELEPHONE ENCOUNTER
See note below          Patient was here for her point of care INR and there are no orders. Please place orders. Her inr today was 4.5. Thanks, lab (Routing comment)       Cait Hawthorne RN

## 2018-03-07 NOTE — PROGRESS NOTES
Luz Marina Live is a 57 year old female who comes in today for a Blood Pressure check because of ongoing blood pressure monitoring.    *Document pulse and BP  *Use new set of vitals button for multiple readings.  *Use extended vitals for orthostatic    Vitals as recorded, a x-large cuff was used.    Patient is taking medication as prescribed  Patient is tolerating medications well.  Patient is not monitoring Blood Pressure at home.     Current complaints: none    Disposition: results routed to MD/KATINA

## 2018-03-12 ENCOUNTER — ANTICOAGULATION THERAPY VISIT (OUTPATIENT)
Dept: ANTICOAGULATION | Facility: OTHER | Age: 58
End: 2018-03-12
Payer: COMMERCIAL

## 2018-03-12 ENCOUNTER — RADIANT APPOINTMENT (OUTPATIENT)
Dept: CARDIOLOGY | Facility: CLINIC | Age: 58
End: 2018-03-12
Payer: COMMERCIAL

## 2018-03-12 DIAGNOSIS — I48.91 ATRIAL FIBRILLATION, UNSPECIFIED TYPE (H): ICD-10-CM

## 2018-03-12 DIAGNOSIS — Z79.01 LONG-TERM (CURRENT) USE OF ANTICOAGULANTS: ICD-10-CM

## 2018-03-12 DIAGNOSIS — R82.90 NONSPECIFIC FINDING ON EXAMINATION OF URINE: Primary | ICD-10-CM

## 2018-03-12 DIAGNOSIS — N18.30 CKD (CHRONIC KIDNEY DISEASE) STAGE 3, GFR 30-59 ML/MIN (H): ICD-10-CM

## 2018-03-12 DIAGNOSIS — I25.83 CORONARY ARTERY DISEASE DUE TO LIPID RICH PLAQUE: ICD-10-CM

## 2018-03-12 DIAGNOSIS — I25.10 CORONARY ARTERY DISEASE DUE TO LIPID RICH PLAQUE: ICD-10-CM

## 2018-03-12 DIAGNOSIS — Z79.01 LONG TERM CURRENT USE OF ANTICOAGULANT THERAPY: ICD-10-CM

## 2018-03-12 DIAGNOSIS — I48.20 CHRONIC ATRIAL FIBRILLATION (H): ICD-10-CM

## 2018-03-12 LAB
ALBUMIN UR-MCNC: NEGATIVE MG/DL
ANION GAP SERPL CALCULATED.3IONS-SCNC: 7 MMOL/L (ref 3–14)
APPEARANCE UR: CLEAR
BACTERIA #/AREA URNS HPF: ABNORMAL /HPF
BACTERIA SPEC CULT: NORMAL
BACTERIA SPEC CULT: NORMAL
BILIRUB UR QL STRIP: NEGATIVE
BUN SERPL-MCNC: 24 MG/DL (ref 7–30)
CALCIUM SERPL-MCNC: 8.9 MG/DL (ref 8.5–10.1)
CHLORIDE SERPL-SCNC: 108 MMOL/L (ref 94–109)
CO2 SERPL-SCNC: 25 MMOL/L (ref 20–32)
COLOR UR AUTO: ABNORMAL
CREAT SERPL-MCNC: 0.91 MG/DL (ref 0.52–1.04)
CREAT UR-MCNC: 39 MG/DL
GFR SERPL CREATININE-BSD FRML MDRD: 64 ML/MIN/1.7M2
GLUCOSE SERPL-MCNC: 99 MG/DL (ref 70–99)
GLUCOSE UR STRIP-MCNC: NEGATIVE MG/DL
HGB UR QL STRIP: ABNORMAL
INR BLD: 2.5 (ref 0.86–1.14)
KETONES UR STRIP-MCNC: NEGATIVE MG/DL
LEUKOCYTE ESTERASE UR QL STRIP: NEGATIVE
MICROALBUMIN UR-MCNC: 81 MG/L
MICROALBUMIN/CREAT UR: 208.72 MG/G CR (ref 0–25)
NITRATE UR QL: NEGATIVE
PH UR STRIP: 6.5 PH (ref 5–7)
POTASSIUM SERPL-SCNC: NORMAL MMOL/L (ref 3.4–5.3)
PROT UR-MCNC: 0.17 G/L
PROT/CREAT 24H UR: 0.5 G/G CR (ref 0–0.2)
RBC #/AREA URNS AUTO: ABNORMAL /HPF
SODIUM SERPL-SCNC: 140 MMOL/L (ref 133–144)
SOURCE: ABNORMAL
SP GR UR STRIP: 1.01 (ref 1–1.03)
SPECIMEN SOURCE: NORMAL
UROBILINOGEN UR STRIP-MCNC: NORMAL MG/DL (ref 0–2)
WBC #/AREA URNS AUTO: ABNORMAL /HPF

## 2018-03-12 PROCEDURE — 84156 ASSAY OF PROTEIN URINE: CPT | Performed by: INTERNAL MEDICINE

## 2018-03-12 PROCEDURE — 81001 URINALYSIS AUTO W/SCOPE: CPT | Performed by: INTERNAL MEDICINE

## 2018-03-12 PROCEDURE — 85610 PROTHROMBIN TIME: CPT | Mod: QW | Performed by: INTERNAL MEDICINE

## 2018-03-12 PROCEDURE — 82043 UR ALBUMIN QUANTITATIVE: CPT | Performed by: INTERNAL MEDICINE

## 2018-03-12 PROCEDURE — 36415 COLL VENOUS BLD VENIPUNCTURE: CPT | Performed by: INTERNAL MEDICINE

## 2018-03-12 PROCEDURE — 80048 BASIC METABOLIC PNL TOTAL CA: CPT | Performed by: INTERNAL MEDICINE

## 2018-03-12 PROCEDURE — 93306 TTE W/DOPPLER COMPLETE: CPT

## 2018-03-12 PROCEDURE — 99207 ZZC NO CHARGE NURSE ONLY: CPT | Performed by: INTERNAL MEDICINE

## 2018-03-12 RX ORDER — ACYCLOVIR 200 MG/1
30 CAPSULE ORAL ONCE
Status: DISCONTINUED | OUTPATIENT
Start: 2018-03-12 | End: 2018-04-10

## 2018-03-12 NOTE — PROGRESS NOTES
ANTICOAGULATION FOLLOW-UP CLINIC VISIT    Patient Name:  Luz Marina Live  Date:  3/12/2018  Contact Type:  Telephone    SUBJECTIVE:     Patient Findings     Positives No Problem Findings           OBJECTIVE    INR Point of Care   Date Value Ref Range Status   03/12/2018 2.5 (H) 0.86 - 1.14 Final     Comment:     This test is intended for monitoring Coumadin therapy.  Results are not   accurate in patients with prolonged INR due to factor deficiency.         ASSESSMENT / PLAN  INR assessment THER    Recheck INR In: 2 WEEKS    INR Location Outside lab      Anticoagulation Summary as of 3/12/2018     INR goal 2.0-3.0   Today's INR 2.5   Maintenance plan 10 mg (5 mg x 2) every day   Full instructions 10 mg every day   Weekly total 70 mg   Plan last modified Michael Quispe RN (3/12/2018)   Next INR check 3/26/2018   Target end date     Indications   Long-term (current) use of anticoagulants [Z79.01] [Z79.01]  Atrial fibrillation (H) [I48.91]         Anticoagulation Episode Summary     INR check location     Preferred lab     Send INR reminders to [facility] POOL    Comments 5 mg tabs, [facility] (needs staff message) PM dose      Anticoagulation Care Providers     Provider Role Specialty Phone number    Eliz Nguyen MD Responsible Internal Medicine [phone]            See the Encounter Report to view Anticoagulation Flowsheet and Dosing Calendar (Go to Encounters tab in chart review, and find the Anticoagulation Therapy Visit)    Dosage adjustment made based on physician directed care plan.    Michael Quispe, KATE

## 2018-03-12 NOTE — MR AVS SNAPSHOT
Luz Marina Live   3/12/2018   Anticoagulation Therapy Visit    Description:  57 year old female   Provider:  Eliz Nguyen MD   Department:  [facility]           INR as of 3/12/2018     Today's INR 2.5      Anticoagulation Summary as of 3/12/2018     INR goal 2.0-3.0   Today's INR 2.5   Full instructions 10 mg every day   Next INR check 3/26/2018    Indications   Long-term (current) use of anticoagulants [Z79.01] [Z79.01]  Atrial fibrillation (H) [I48.91]         Contact Numbers     Clinic Number:         March 2018 Details    Sun Mon Tue Wed Thu Fri Sat         1               2               3                 4               5               6               7               8               9               10                 11               12      10 mg   See details      13      10 mg         14      10 mg         15      10 mg         16      10 mg         17      10 mg           18      10 mg         19      10 mg         20      10 mg         21      10 mg         22      10 mg         23      10 mg         24      10 mg           25      10 mg         26            27               28               29               30               31                Date Details   03/12 This INR check       Date of next INR:  3/26/2018         How to take your warfarin dose     To take:  10 mg Take 2 of the 5 mg tablets.

## 2018-03-12 NOTE — LETTER
March 26, 2018       TO: Luz Marina Live  [address]  [place] [address]         Dear Luz Marina,    Your most recent lab results are attached.  You continue to have some protein as well as a few red blood cells present in the urine. IN the past, we have assumed that this protein present in the urine is consistent with scarring that occurred at the time of your previous lupus involvement in the kidney. It is very difficult to say whether there is ongoing lupus activity in the kidney. We have two options at this time:     - Continue to monitor as we have been. In this regard, I would want to make sure we have your blood pressure consistently less than 130/80 with hopes of minimizing the urine protein level. If we were to follow, the plan would be to biopsy in the setting of worsening kidney function, proteinuria, or blood in the urine.   - The other option would be to pursue a kidney biopsy at this time to confirm whether or not there is lupus activity occurring in the kidney. It is possible that there is some activity present and if that is the case, you are at risk for addt scarring in the future.     We will call you Monday to go over these results in more detail. Please call with any questions or concerns.     Sincerely,     Lavern Clark, DO     Component      Latest Ref Rng & Units 3/12/2018 3/12/2018           1:16 PM  1:16 PM   Color Urine       Light Yellow    Appearance Urine       Clear    Glucose Urine      NEG:Negative mg/dL Negative    Bilirubin Urine      NEG:Negative Negative    Ketones Urine      NEG:Negative mg/dL Negative    Specific Gravity Urine      1.003 - 1.035 1.006    Blood Urine      NEG:Negative Trace (A)    pH Urine      5.0 - 7.0 pH 6.5    Protein Albumin Urine      NEG:Negative mg/dL Negative    Urobilinogen mg/dL      0.0 - 2.0 mg/dL Normal    Nitrite Urine      NEG:Negative Negative    Leukocyte Esterase Urine      NEG:Negative Negative    Source       Midstream Urine     Creatinine Urine      mg/dL 39    Albumin Urine mg/L      mg/L 81    Albumin Urine mg/g Cr      0 - 25 mg/g Cr 208.72 (H)    Specimen Description        Midstream Urine   Culture Micro        Canceled, Test credited   WBC Urine      OTO5:0 - 5 /HPF 0 - 5    RBC Urine      OTO2:O - 2 /HPF 2-5 (A)    Bacteria Urine      NEG:Negative /HPF Few (A)    Protein Random Urine      g/L 0.17    Protein Total Urine g/gr Creatinine      0 - 0.2 g/g Cr 0.50 (H)      Component      Latest Ref Rng & Units 3/12/2018           1:16 PM   Color Urine          Appearance Urine          Glucose Urine      NEG:Negative mg/dL    Bilirubin Urine      NEG:Negative    Ketones Urine      NEG:Negative mg/dL    Specific Gravity Urine      1.003 - 1.035    Blood Urine      NEG:Negative    pH Urine      5.0 - 7.0 pH    Protein Albumin Urine      NEG:Negative mg/dL    Urobilinogen mg/dL      0.0 - 2.0 mg/dL    Nitrite Urine      NEG:Negative    Leukocyte Esterase Urine      NEG:Negative    Source          Creatinine Urine      mg/dL    Albumin Urine mg/L      mg/L    Albumin Urine mg/g Cr      0 - 25 mg/g Cr    Specimen Description          Culture Micro       NOT NEEDED   WBC Urine      OTO5:0 - 5 /HPF    RBC Urine      OTO2:O - 2 /HPF    Bacteria Urine      NEG:Negative /HPF    Protein Random Urine      g/L    Protein Total Urine g/gr Creatinine      0 - 0.2 g/g Cr

## 2018-03-20 ENCOUNTER — MEDICAL CORRESPONDENCE (OUTPATIENT)
Dept: HEALTH INFORMATION MANAGEMENT | Facility: CLINIC | Age: 58
End: 2018-03-20

## 2018-03-20 ENCOUNTER — CARE COORDINATION (OUTPATIENT)
Dept: CARDIOLOGY | Facility: CLINIC | Age: 58
End: 2018-03-20

## 2018-03-20 ENCOUNTER — TELEPHONE (OUTPATIENT)
Dept: RHEUMATOLOGY | Facility: CLINIC | Age: 58
End: 2018-03-20

## 2018-03-20 DIAGNOSIS — M32.14 SYSTEMIC LUPUS ERYTHEMATOSUS WITH GLOMERULAR DISEASE, UNSPECIFIED SLE TYPE (H): ICD-10-CM

## 2018-03-20 NOTE — TELEPHONE ENCOUNTER
Patient called back. She has been on Plaquenil 200mg daily for over a year now she states. Doesn't really have any pain, does have some increased fatigue, but has been under some extra stress lately, and feels is due to that. From last year visit last July, she states Dr Adame wanted her to update him with taking the reduced dose. She is now wondering if he wants her to increase back up to the 400mg daily? She knows she has an appt in August for a follow up. Advised would update Dr Adame with info and get back to her with his recommendations.

## 2018-03-20 NOTE — PROGRESS NOTES
Per Dr. Moreno - due to increased right sided pressures on echo (44 to 61 currently) suggest RHC as discussed at ov.  Pt is still leery & anxious about this test.  Discussed with her the rationale of doing it, & to find her treatment to help her feel better.    Pt will think about it more & call me either way.

## 2018-03-21 RX ORDER — HYDROXYCHLOROQUINE SULFATE 200 MG/1
200 TABLET, FILM COATED ORAL 2 TIMES DAILY
Qty: 60 TABLET | Refills: 6 | Status: SHIPPED | OUTPATIENT
Start: 2018-03-21 | End: 2018-09-04

## 2018-03-21 NOTE — TELEPHONE ENCOUNTER
Patient is called and advised to resume taking one 200 mg tablet twice daily. Refill sent to pharmacy with that order. Follow up in August at  with Dr Adame.

## 2018-03-30 ENCOUNTER — TELEPHONE (OUTPATIENT)
Dept: ANTICOAGULATION | Facility: OTHER | Age: 58
End: 2018-03-30

## 2018-04-02 ENCOUNTER — ANTICOAGULATION THERAPY VISIT (OUTPATIENT)
Dept: ANTICOAGULATION | Facility: OTHER | Age: 58
End: 2018-04-02

## 2018-04-02 DIAGNOSIS — Z79.01 LONG TERM CURRENT USE OF ANTICOAGULANT THERAPY: ICD-10-CM

## 2018-04-02 DIAGNOSIS — I48.91 ATRIAL FIBRILLATION, UNSPECIFIED TYPE (H): ICD-10-CM

## 2018-04-02 DIAGNOSIS — I48.20 CHRONIC ATRIAL FIBRILLATION (H): ICD-10-CM

## 2018-04-02 DIAGNOSIS — Z79.01 LONG-TERM (CURRENT) USE OF ANTICOAGULANTS: ICD-10-CM

## 2018-04-02 LAB — INR BLD: 3.1 (ref 0.86–1.14)

## 2018-04-02 PROCEDURE — 85610 PROTHROMBIN TIME: CPT | Mod: QW | Performed by: INTERNAL MEDICINE

## 2018-04-02 PROCEDURE — 36416 COLLJ CAPILLARY BLOOD SPEC: CPT | Performed by: INTERNAL MEDICINE

## 2018-04-02 PROCEDURE — 99207 ZZC NO CHARGE NURSE ONLY: CPT | Performed by: INTERNAL MEDICINE

## 2018-04-02 NOTE — MR AVS SNAPSHOT
Luz Marina Live   4/2/2018   Anticoagulation Therapy Visit    Description:  57 year old female   Provider:  Eliz Nguyen MD   Department:  [facility]           INR as of 4/2/2018     Today's INR 3.1!      Anticoagulation Summary as of 4/2/2018     INR goal 2.0-3.0   Today's INR 3.1!   Full instructions 10 mg every day   Next INR check 4/30/2018    Indications   Long-term (current) use of anticoagulants [Z79.01] [Z79.01]  Atrial fibrillation (H) [I48.91]         Contact Numbers     Clinic Number:         April 2018 Details    Sun Mon Tue Wed Thu Fri Sat     1               2      10 mg   See details      3      10 mg         4      10 mg         5      10 mg         6      10 mg         7      10 mg           8      10 mg         9      10 mg         10      10 mg         11      10 mg         12      10 mg         13      10 mg         14      10 mg           15      10 mg         16      10 mg         17      10 mg         18      10 mg         19      10 mg         20      10 mg         21      10 mg           22      10 mg         23      10 mg         24      10 mg         25      10 mg         26      10 mg         27      10 mg         28      10 mg           29      10 mg         30                  Date Details   04/02 This INR check       Date of next INR:  4/30/2018         How to take your warfarin dose     To take:  10 mg Take 2 of the 5 mg tablets.

## 2018-04-02 NOTE — PROGRESS NOTES
ANTICOAGULATION FOLLOW-UP CLINIC VISIT    Patient Name:  Luz Marina Live  Date:  4/2/2018  Contact Type:  Telephone    SUBJECTIVE:     Patient Findings     Positives No Problem Findings           OBJECTIVE    INR Point of Care   Date Value Ref Range Status   04/02/2018 3.1 (H) 0.86 - 1.14 Final     Comment:     This test is intended for monitoring Coumadin therapy.  Results are not   accurate in patients with prolonged INR due to factor deficiency.         ASSESSMENT / PLAN  INR assessment THER    Recheck INR In: 4 WEEKS    INR Location Outside lab      Anticoagulation Summary as of 4/2/2018     INR goal 2.0-3.0   Today's INR 3.1!   Maintenance plan 10 mg (5 mg x 2) every day   Full instructions 10 mg every day   Weekly total 70 mg   No change documented Michael Quispe, RN   Plan last modified Michael Quispe RN (3/12/2018)   Next INR check 4/30/2018   Target end date     Indications   Long-term (current) use of anticoagulants [Z79.01] [Z79.01]  Atrial fibrillation (H) [I48.91]         Anticoagulation Episode Summary     INR check location     Preferred lab     Send INR reminders to [facility] POOL    Comments 5 mg tabs, [facility] (needs staff message) PM dose      Anticoagulation Care Providers     Provider Role Specialty Phone number    Eliz Nguyen MD [facility] Internal Medicine [phone]            See the Encounter Report to view Anticoagulation Flowsheet and Dosing Calendar (Go to Encounters tab in chart review, and find the Anticoagulation Therapy Visit)    Dosage adjustment made based on physician directed care plan.    Michael Quispe, RN

## 2018-04-10 ENCOUNTER — OFFICE VISIT (OUTPATIENT)
Dept: PEDIATRICS | Facility: CLINIC | Age: 58
End: 2018-04-10
Payer: COMMERCIAL

## 2018-04-10 VITALS
OXYGEN SATURATION: 97 % | DIASTOLIC BLOOD PRESSURE: 68 MMHG | BODY MASS INDEX: 44.92 KG/M2 | SYSTOLIC BLOOD PRESSURE: 120 MMHG | WEIGHT: 253.6 LBS | TEMPERATURE: 97 F | HEART RATE: 61 BPM

## 2018-04-10 DIAGNOSIS — H66.90 ACUTE OTITIS MEDIA, UNSPECIFIED OTITIS MEDIA TYPE: Primary | ICD-10-CM

## 2018-04-10 DIAGNOSIS — J01.90 ACUTE RHINOSINUSITIS: ICD-10-CM

## 2018-04-10 PROCEDURE — 99213 OFFICE O/P EST LOW 20 MIN: CPT | Performed by: FAMILY MEDICINE

## 2018-04-10 RX ORDER — AMOXICILLIN 875 MG
875 TABLET ORAL 2 TIMES DAILY
Qty: 20 TABLET | Refills: 0 | Status: SHIPPED | OUTPATIENT
Start: 2018-04-10 | End: 2018-05-15

## 2018-04-10 ASSESSMENT — PAIN SCALES - GENERAL: PAINLEVEL: NO PAIN (0)

## 2018-04-10 NOTE — MR AVS SNAPSHOT
After Visit Summary   4/10/2018    Luz Marina Live    MRN: [id number]           Patient Information     Date Of Birth          1960        Visit Information        Provider Department      4/10/2018 1:10 PM Mark Laughlin MD [facility]        Today's Diagnoses     Acute otitis media, unspecified otitis media type    -  1    Acute rhinosinusitis          Care Instructions      Otitis Media (Middle-Ear Infection) in Adults  Otitis media is another name for a middle-ear infection. It means an infection behind your eardrum. This kind of ear infection can happen after any condition that keeps fluid from draining from the middle ear. These conditions include allergies, a cold, a sore throat, or a respiratory infection.  Middle-ear infections are common in children, but they can also happen in adults. An ear infection in an adult may mean a more serious problem than in a child. So you may need additional tests. If you have an ear infection, you should see your health care provider for treatment.  What are the types of middle-ear infections?  Infections can affect the middle ear in several ways. They are:    Acute otitis media. This middle-ear infection occurs suddenly. It causes swelling and redness. Fluid and mucus become trapped inside the ear. You can have a fever and ear pain.    Otitis media with effusion. Fluid (effusion) and mucus build up in the middle ear after the infection goes away. You may feel like your middle ear is full. This can continue for months and may affect your hearing.    Chronic otitis media with effusion. Fluid (effusion) remains in the middle ear for a long time. Or it builds up again and again, even though there is no infection. This type of middle-ear infection may be hard to treat. It may also affect your hearing.  Who is more likely to get a middle-ear infection?  You are more likely to get an ear infection if you:    Smoke or are around someone who  smokes    Have seasonal or year-round allergy symptoms    Have a cold or other upper respiratory infection  What causes a middle-ear infection?  The middle ear connects to the throat by a canal called the eustachian tube. This tube helps even out the pressure between the outer ear and the inner ear. A cold or allergy can irritate the tube or cause the area around it to swell. This can keep fluid from draining from the middle ear. The fluid builds up behind the eardrum. Bacteria and viruses can grow in this fluid. The bacteria and viruses cause the middle-ear infection.  What are the symptoms of a middle-ear infection?  Common symptoms of a middle-ear infection in adults are:    Pain in 1 or both ears    Drainage from the ear    Muffled hearing    Sore throat   You may also have a fever. Rarely, your balance can be affected.  These symptoms may be the same as for other conditions. It s important to talk with your health care provider if you think you have a middle-ear infection. If you have a high fever, severe pain behind your ear, or paralysis in your face, see your provider as soon as you can.  How is a middle-ear infection diagnosed?  Your health care provider will take a medical history and do a physical exam. He or she will look at the outer ear and eardrum with an otoscope. The otoscope is a lighted tool that lets your provider see inside the ear. A pneumatic otoscope blows a puff of air into the ear to check how well your eardrum moves. If you eardrum doesn t move well, it may mean you have fluid behind it.  Your provider may also do a test called tympanometry. This test tells how well the middle ear is working. It can find any changes in pressure in the middle ear. Your provider may test your hearing with a tuning fork.  How is a middle-ear infection treated?  A middle-ear infection may be treated with:    Antibiotics, taken by mouth or as ear drops    Medication for pain    Decongestants, antihistamines, or  nasal steroids  Your health care provider may also have you try autoinsufflation. This helps adjust the air pressure in your ear. For this, you pinch your nose and gently exhale. This forces air back through the eustachian tube.  The exact treatment for your ear infection will depend on the type of infection you have. In general, if your symptoms don t get better in 48 to 72 hours, contact your health care provider.  Middle-ear infections can cause long-term problems if not treated. They can lead to:    Infection in other parts of the head    Permanent hearing loss    Paralysis of a nerve in your face  If you have a middle-ear infection that doesn t get better, you may need to see an ear, nose, and throat specialist (otolaryngologist). You may need a CT scan or MRI to check for head and neck cancer.    Sinusitis (Antibiotic Treatment)    The sinuses are air-filled spaces within the bones of the face. They connect to the inside of the nose. Sinusitis is an inflammation of the tissue lining the sinus cavity. Sinus inflammation can occur during a cold. It can also be due to allergies to pollens and other particles in the air. Sinusitis can cause symptoms of sinus congestion and fullness. A sinus infection causes fever, headache and facial pain. There is often green or yellow drainage from the nose or into the back of the throat (post-nasal drip). You have been given antibiotics to treat this condition.  Home care:    Take the full course of antibiotics as instructed. Do not stop taking them, even if you feel better.    Drink plenty of water, hot tea, and other liquids. This may help thin mucus. It also may promote sinus drainage.    Heat may help soothe painful areas of the face. Use a towel soaked in hot water. Or,  the shower and direct the hot spray onto your face. Using a vaporizer along with a menthol rub at night may also help.     An expectorant containing guaifenesin may help thin the mucus and promote  drainage from the sinuses.    Over-the-counter decongestants may be used unless a similar medicine was prescribed. Nasal sprays work the fastest. Use one that contains phenylephrine or oxymetazoline. First blow the nose gently. Then use the spray. Do not use these medicines more often than directed on the label or symptoms may get worse. You may also use tablets containing pseudoephedrine. Avoid products that combine ingredients, because side effects may be increased. Read labels. You can also ask the pharmacist for help. (NOTE: Persons with high blood pressure should not use decongestants. They can raise blood pressure.)    Over-the-counter antihistamines may help if allergies contributed to your sinusitis.      Do not use nasal rinses or irrigation during an acute sinus infection, unless told to by your health care provider. Rinsing may spread the infection to other sinuses.    Use acetaminophen or ibuprofen to control pain, unless another pain medicine was prescribed. (If you have chronic liver or kidney disease or ever had a stomach ulcer, talk with your doctor before using these medicines. Aspirin should never be used in anyone under 18 years of age who is ill with a fever. It may cause severe liver damage.)    Don't smoke. This can worsen symptoms.  Follow-up care  Follow up with your healthcare provider or our staff if you are not improving within the next week.  When to seek medical advice  Call your healthcare provider if any of these occur:    Facial pain or headache becoming more severe    Stiff neck    Unusual drowsiness or confusion    Swelling of the forehead or eyelids    Vision problems, including blurred or double vision    Fever of 100.4 F (38 C) or higher, or as directed by your healthcare provider    Seizure    Breathing problems    Symptoms not resolving within 10 days  Date Last Reviewed: 4/13/2015 2000-2017 [employer]. [address] [place] [address], [place] [address]. All rights  reserved. This information is not intended as a substitute for professional medical care. Always follow your healthcare professional's instructions.          2901-3954 [employer]. [address] [place] [address], [place] [address]. All rights reserved. This information is not intended as a substitute for professional medical care. Always follow your healthcare professional's instructions.                Follow-ups after your visit        Your next 10 appointments already scheduled     May 31, 2018 10:30 AM CDT   (Arrive by 10:15 AM)   RETURN HEART FAILURE with Twin Moreno MD   [facility] ([facility])    [address]  [address]  [facility] [place] [address] [phone]            Aug 06, 2018 11:30 AM CDT   Return Visit with Ever Adame MD   [facility] ([facility])    [id number] [place] [address] [phone]              Who to contact     If you have questions or need follow up information about today's clinic visit or your schedule please contact [facility] directly at [phone].  Normal or non-critical lab and imaging results will be communicated to you by MyChart, letter or phone within 4 business days after the clinic has received the results. If you do not hear from us within 7 days, please contact the clinic through Nagih[employer] or phone. If you have a critical or abnormal lab result, we will notify you by phone as soon as possible.  Submit refill requests through [employer] or call your pharmacy and they will forward the refill request to us. Please allow 3 business days for your refill to be completed.          Additional Information About Your Visit        Netflix Information     Net[employer] is an electronic gateway that provides easy, online access to your medical records. With Netflix, you can request a clinic appointment, read your test results, renew a prescription or  communicate with your care team.     To sign up for Foneshow[employer] visit the website at [email or web address]   You will be asked to enter the access code listed below, as well as some personal information. Please follow the directions to create your username and password.     Your access code is: [email or web address]  Expires: 2018  7:30 AM     Your access code will  in 90 days. If you need help or a new code, please contact your [facility] or call [phone] for assistance.        Care EveryWhere ID     This is your Care EveryWhere ID. This could be used by other organizations to access your [facility] medical records  [id number]        Your Vitals Were     Pulse Temperature Pulse Oximetry BMI (Body Mass Index)          61 97  F (36.1  C) (Temporal) 97% 44.92 kg/m2         Blood Pressure from Last 3 Encounters:   04/10/18 120/68   18 134/84   18 179/88    Weight from Last 3 Encounters:   04/10/18 253 lb 9.6 oz (115 kg)   18 257 lb (116.6 kg)   17 263 lb 1.6 oz (119.3 kg)              Today, you had the following     No orders found for display         Today's Medication Changes          These changes are accurate as of 4/10/18  1:42 PM.  If you have any questions, ask your nurse or doctor.               Start taking these medicines.        Dose/Directions    amoxicillin 875 MG tablet   Commonly known as:  AMOXIL   Used for:  Acute otitis media, unspecified otitis media type   Started by:  Mark Laughlin MD        Dose:  875 mg   Take 1 tablet (875 mg) by mouth 2 times daily   Quantity:  20 tablet   Refills:  0            Where to get your medicines      These medications were sent to [facility]/pharmacy #[id number] - [place], [place] - [address]  [address] [place] [address]     Phone:  [phone]     amoxicillin 875 MG tablet                Primary Care Provider Office Phone # Fax #    Eliz Nguyen [phone]        [facility] [address]  [facility] [address]        Equal Access to Services     DHIRAJRUBI MAYNOR : Hadii bridgette Garrett, wastefanida delio, qawilliamsta shyammajesenia curran, mayte alvaradovaishnavijossy roberts. So [facility] [phone].    ATENCIÓN: Si habla español, tiene a bennett disposición servicios gratuitos de asistencia lingüística. Llame al [phone].    We comply with applicable federal civil rights laws and [place] laws. We do not discriminate on the basis of race, color, national origin, age, disability, sex, sexual orientation, or gender identity.            Thank you!     Thank you for choosing [facility]  for your care. Our goal is always to provide you with excellent care. Hearing back from our patients is one way we can continue to improve our services. Please take a few minutes to complete the written survey that you may receive in the mail after your visit with us. Thank you!             Your Updated Medication List - Protect others around you: Learn how to safely use, store and throw away your medicines at www.disposemymeds.org.          This list is accurate as of 4/10/18  1:42 PM.  Always use your most recent med list.                   Brand Name Dispense Instructions for use Diagnosis    amoxicillin 875 MG tablet    AMOXIL    20 tablet    Take 1 tablet (875 mg) by mouth 2 times daily    Acute otitis media, unspecified otitis media type       aspirin 81 MG tablet      Take by mouth daily        atorvastatin 40 MG tablet    LIPITOR    90 tablet    Take 1 tablet (40 mg) by mouth daily    Pure hypercholesterolemia       CALCIUM 600-D PO      Take  by mouth 2 times daily.    SLE (systemic lupus erythematosus) (H)       hydroxychloroquine 200 MG tablet    PLAQUENIL    60 tablet    Take 1 tablet (200 mg) by mouth 2 times daily    Systemic lupus erythematosus with glomerular disease, unspecified SLE type (H)       lisinopril 20 MG tablet    PRINIVIL/ZESTRIL    90  tablet    Take 1 tablet (20 mg) by mouth daily    Systemic lupus erythematosus with glomerular disease, unspecified SLE type (H)       metoprolol tartrate 50 MG tablet    LOPRESSOR    180 tablet    TAKE 1 TABLET (50 MG) BY MOUTH 2 TIMES DAILY    Atrial fibrillation, unspecified type (H)       MULTIPLE VITAMIN PO      Take 1 tablet by mouth daily        mupirocin 2 % nasal ointment    BACTROBAN NASAL    10 g    Place a thin layer inside right nose on septum BID x 2 weeks    Nasal septum ulceration       omega-3 fatty acids 1200 MG capsule      Take 2 capsules by mouth 2 times daily        * warfarin 5 MG tablet    COUMADIN    215 tablet    Take 12.5 mg (2 and 1/2 tab) on Tuesday, Thursday, and Saturday and 10 mg (2 tabs) all other days, or as directed by the coumadin clinic.    Atrial fibrillation, unspecified type (H)       * warfarin 5 MG tablet    COUMADIN    65 tablet    TAKE 2 & 1/2 TABLET TUES AND 2TABS THE OTHER DAYS OR AS DIRECTED BY THE COUMADIN CLINIC    Atrial fibrillation, unspecified type (H)       * warfarin 5 MG tablet    COUMADIN    65 tablet    TAKE 2 & 1/2 TABLET TUES AND 2TABS THE OTHER DAYS OR AS DIRECTED BY THE COUMADIN CLINIC    Atrial fibrillation, unspecified type (H)       * Notice:  This list has 3 medication(s) that are the same as other medications prescribed for you. Read the directions carefully, and ask your doctor or other care provider to review them with you.

## 2018-04-10 NOTE — PATIENT INSTRUCTIONS
Otitis Media (Middle-Ear Infection) in Adults  Otitis media is another name for a middle-ear infection. It means an infection behind your eardrum. This kind of ear infection can happen after any condition that keeps fluid from draining from the middle ear. These conditions include allergies, a cold, a sore throat, or a respiratory infection.  Middle-ear infections are common in children, but they can also happen in adults. An ear infection in an adult may mean a more serious problem than in a child. So you may need additional tests. If you have an ear infection, you should see your health care provider for treatment.  What are the types of middle-ear infections?  Infections can affect the middle ear in several ways. They are:    Acute otitis media. This middle-ear infection occurs suddenly. It causes swelling and redness. Fluid and mucus become trapped inside the ear. You can have a fever and ear pain.    Otitis media with effusion. Fluid (effusion) and mucus build up in the middle ear after the infection goes away. You may feel like your middle ear is full. This can continue for months and may affect your hearing.    Chronic otitis media with effusion. Fluid (effusion) remains in the middle ear for a long time. Or it builds up again and again, even though there is no infection. This type of middle-ear infection may be hard to treat. It may also affect your hearing.  Who is more likely to get a middle-ear infection?  You are more likely to get an ear infection if you:    Smoke or are around someone who smokes    Have seasonal or year-round allergy symptoms    Have a cold or other upper respiratory infection  What causes a middle-ear infection?  The middle ear connects to the throat by a canal called the eustachian tube. This tube helps even out the pressure between the outer ear and the inner ear. A cold or allergy can irritate the tube or cause the area around it to swell. This can keep fluid from draining from  the middle ear. The fluid builds up behind the eardrum. Bacteria and viruses can grow in this fluid. The bacteria and viruses cause the middle-ear infection.  What are the symptoms of a middle-ear infection?  Common symptoms of a middle-ear infection in adults are:    Pain in 1 or both ears    Drainage from the ear    Muffled hearing    Sore throat   You may also have a fever. Rarely, your balance can be affected.  These symptoms may be the same as for other conditions. It s important to talk with your health care provider if you think you have a middle-ear infection. If you have a high fever, severe pain behind your ear, or paralysis in your face, see your provider as soon as you can.  How is a middle-ear infection diagnosed?  Your health care provider will take a medical history and do a physical exam. He or she will look at the outer ear and eardrum with an otoscope. The otoscope is a lighted tool that lets your provider see inside the ear. A pneumatic otoscope blows a puff of air into the ear to check how well your eardrum moves. If you eardrum doesn t move well, it may mean you have fluid behind it.  Your provider may also do a test called tympanometry. This test tells how well the middle ear is working. It can find any changes in pressure in the middle ear. Your provider may test your hearing with a tuning fork.  How is a middle-ear infection treated?  A middle-ear infection may be treated with:    Antibiotics, taken by mouth or as ear drops    Medication for pain    Decongestants, antihistamines, or nasal steroids  Your health care provider may also have you try autoinsufflation. This helps adjust the air pressure in your ear. For this, you pinch your nose and gently exhale. This forces air back through the eustachian tube.  The exact treatment for your ear infection will depend on the type of infection you have. In general, if your symptoms don t get better in 48 to 72 hours, contact your health care  provider.  Middle-ear infections can cause long-term problems if not treated. They can lead to:    Infection in other parts of the head    Permanent hearing loss    Paralysis of a nerve in your face  If you have a middle-ear infection that doesn t get better, you may need to see an ear, nose, and throat specialist (otolaryngologist). You may need a CT scan or MRI to check for head and neck cancer.    Sinusitis (Antibiotic Treatment)    The sinuses are air-filled spaces within the bones of the face. They connect to the inside of the nose. Sinusitis is an inflammation of the tissue lining the sinus cavity. Sinus inflammation can occur during a cold. It can also be due to allergies to pollens and other particles in the air. Sinusitis can cause symptoms of sinus congestion and fullness. A sinus infection causes fever, headache and facial pain. There is often green or yellow drainage from the nose or into the back of the throat (post-nasal drip). You have been given antibiotics to treat this condition.  Home care:    Take the full course of antibiotics as instructed. Do not stop taking them, even if you feel better.    Drink plenty of water, hot tea, and other liquids. This may help thin mucus. It also may promote sinus drainage.    Heat may help soothe painful areas of the face. Use a towel soaked in hot water. Or,  the shower and direct the hot spray onto your face. Using a vaporizer along with a menthol rub at night may also help.     An expectorant containing guaifenesin may help thin the mucus and promote drainage from the sinuses.    Over-the-counter decongestants may be used unless a similar medicine was prescribed. Nasal sprays work the fastest. Use one that contains phenylephrine or oxymetazoline. First blow the nose gently. Then use the spray. Do not use these medicines more often than directed on the label or symptoms may get worse. You may also use tablets containing pseudoephedrine. Avoid products  that combine ingredients, because side effects may be increased. Read labels. You can also ask the pharmacist for help. (NOTE: Persons with high blood pressure should not use decongestants. They can raise blood pressure.)    Over-the-counter antihistamines may help if allergies contributed to your sinusitis.      Do not use nasal rinses or irrigation during an acute sinus infection, unless told to by your health care provider. Rinsing may spread the infection to other sinuses.    Use acetaminophen or ibuprofen to control pain, unless another pain medicine was prescribed. (If you have chronic liver or kidney disease or ever had a stomach ulcer, talk with your doctor before using these medicines. Aspirin should never be used in anyone under 18 years of age who is ill with a fever. It may cause severe liver damage.)    Don't smoke. This can worsen symptoms.  Follow-up care  Follow up with your healthcare provider or our staff if you are not improving within the next week.  When to seek medical advice  Call your healthcare provider if any of these occur:    Facial pain or headache becoming more severe    Stiff neck    Unusual drowsiness or confusion    Swelling of the forehead or eyelids    Vision problems, including blurred or double vision    Fever of 100.4 F (38 C) or higher, or as directed by your healthcare provider    Seizure    Breathing problems    Symptoms not resolving within 10 days  Date Last Reviewed: 4/13/2015 2000-2017 [employer] [address] [place] [address]. All rights reserved. This information is not intended as a substitute for professional medical care. Always follow your healthcare professional's instructions.          4304-5587 [employer]. [address] [place] [address]. All rights reserved. This information is not intended as a substitute for professional medical care. Always follow your healthcare professional's instructions.

## 2018-04-10 NOTE — PROGRESS NOTES
SUBJECTIVE:   Luz Marina Live is a 57 year old female who presents to clinic today for the following health issues:      Eye(s) Problem/ SYNUS CONCERN   Onset: 04/08/2018    Description: Pt is here to discuss some eye concerns of her eyes having some redness  Location: bilateral  Pain: no   Redness: YES    Accompanying Signs & Symptoms:  Discharge/mattering: YES  Swelling: YES  Visual changes: YES  Fever: no   Nasal Congestion: YES  Bothered by bright lights: YES    History:   Trauma: no   Foreign body exposure: no     Precipitating factors:   Wearing contacts: no     Alleviating factors:  Improved by: Adding some ointment prescribed by a previous provider, ERYTHROMYCIN FOR THE EYES, AND NEOMYCIN FOR THE EARS.     Therapies Tried and outcome:  Adding some ointment prescribed by a previous provider, ERYTHROMYCIN FOR THE EYES, AND NEOMYCIN FOR THE EARS.             Problem list and histories reviewed & adjusted, as indicated.  Additional history: as documented    Patient Active Problem List   Diagnosis     Hyperlipidemia LDL goal <100     CAD (coronary artery disease)     HTN, goal below 140/90     Encounter for long-term current use of medication     Hypovolemic shock (H)     SVT (supraventricular tachycardia) (H)     Sepsis (H)     Atrial fibrillation (H)     CKD (chronic kidney disease) stage 3, GFR 30-59 ml/min     Long term current use of anticoagulant therapy     Long-term (current) use of anticoagulants [Z79.01]     Moderate tricuspid insufficiency     Systemic lupus erythematosus with glomerular disease, unspecified SLE type (H)     Past Surgical History:   Procedure Laterality Date     CARDIAC SURGERY       CARPAL TUNNEL RELEASE RT/LT  1996    bilateral     VASCULAR SURGERY         Social History   Substance Use Topics     Smoking status: Former Smoker     Smokeless tobacco: Former User      Comment: 30 plus years ago.     Alcohol use No     Family History   Problem Relation Age of Onset     Arthritis Mother       ra     Connective Tissue Disorder Mother      lupus     Congenital Anomalies Mother      wholein heart     CEREBROVASCULAR DISEASE Mother      TIA's     CEREBROVASCULAR DISEASE Father      DIABETES Father      Hypertension Father      Cardiovascular Father      stents     Genitourinary Problems Father      kidney failure     KIDNEY DISEASE Father      kidney injury related to acute illness around time of death (RANDELL likely)     Cataracts Father      Arthritis Paternal Grandmother      DIABETES Brother          Current Outpatient Prescriptions   Medication Sig Dispense Refill     hydroxychloroquine (PLAQUENIL) 200 MG tablet Take 1 tablet (200 mg) by mouth 2 times daily 60 tablet 6     warfarin (COUMADIN) 5 MG tablet TAKE 2 & 1/2 TABLET TUES AND 2TABS THE OTHER DAYS OR AS DIRECTED BY THE COUMADIN CLINIC 65 tablet 0     warfarin (COUMADIN) 5 MG tablet TAKE 2 & 1/2 TABLET TUES AND 2TABS THE OTHER DAYS OR AS DIRECTED BY THE COUMADIN CLINIC 65 tablet 0     metoprolol tartrate (LOPRESSOR) 50 MG tablet TAKE 1 TABLET (50 MG) BY MOUTH 2 TIMES DAILY 180 tablet 0     lisinopril (PRINIVIL/ZESTRIL) 20 MG tablet Take 1 tablet (20 mg) by mouth daily 90 tablet 1     warfarin (COUMADIN) 5 MG tablet Take 12.5 mg (2 and 1/2 tab) on Tuesday, Thursday, and Saturday and 10 mg (2 tabs) all other days, or as directed by the coumadin clinic. 215 tablet 11     atorvastatin (LIPITOR) 40 MG tablet Take 1 tablet (40 mg) by mouth daily 90 tablet 3     aspirin 81 MG tablet Take by mouth daily       mupirocin (BACTROBAN NASAL) 2 % nasal ointment Place a thin layer inside right nose on septum BID x 2 weeks 10 g 1     Calcium Carbonate-Vitamin D (CALCIUM 600-D PO) Take  by mouth 2 times daily.       MULTIPLE VITAMIN PO Take 1 tablet by mouth daily        Omega-3 Fatty Acids (FISH OIL) 1200 MG capsule Take 2 capsules by mouth 2 times daily        Allergies   Allergen Reactions     Sulfa Drugs Rash and GI Disturbance     BP Readings from Last 3  Encounters:   04/10/18 120/68   03/07/18 134/84   02/27/18 179/88    Wt Readings from Last 3 Encounters:   04/10/18 253 lb 9.6 oz (115 kg)   02/27/18 257 lb (116.6 kg)   11/06/17 263 lb 1.6 oz (119.3 kg)                    Reviewed and updated as needed this visit by clinical staff       Reviewed and updated as needed this visit by Provider         ROS:  Constitutional, HEENT, cardiovascular, pulmonary, GI, , musculoskeletal, neuro, skin, endocrine and psych systems are negative, except as otherwise noted.    OBJECTIVE:     /68 (BP Location: Right arm, Patient Position: Sitting, Cuff Size: Adult Large)  Pulse 61  Temp 97  F (36.1  C) (Temporal)  Wt 253 lb 9.6 oz (115 kg)  SpO2 97%  BMI 44.92 kg/m2  Body mass index is 44.92 kg/(m^2).  GENERAL: alert, moderate distress and fatigued  Eyes: Lt scleral hyperemia with bi;lateral crusting and drainage  HENT: normal cephalic/atraumatic, both ears: erythematous, nose and mouth without ulcers or lesions, rhinorrhea yellow and thick, oropharynx clear and oral mucous membranes moist  NECK: no adenopathy, no asymmetry, masses, or scars and thyroid normal to palpation  RESP: lungs clear to auscultation - no rales, rhonchi or wheezes  CV: regular rate and rhythm, normal S1 S2, no S3 or S4, no murmur, click or rub, no peripheral edema and peripheral pulses strong  SKIN: erythema - over bilateral face just below her eyes and ovr the maxillary areas  NEURO: Normal strength and tone, mentation intact and speech normal  PSYCH: mentation appears normal, affect normal/bright    Diagnostic Test Results:  none     ASSESSMENT/PLAN:           ICD-10-CM    1. Acute otitis media, unspecified otitis media type H66.90 amoxicillin (AMOXIL) 875 MG tablet   2. Acute rhinosinusitis J01.90        See Patient Instructions    Mark Laughlin MD  [facility]

## 2018-04-10 NOTE — NURSING NOTE
"Chief Complaint   Patient presents with     Eye Problem       Initial /68 (BP Location: Right arm, Patient Position: Sitting, Cuff Size: Adult Large)  Pulse 61  Temp 97  F (36.1  C) (Temporal)  Wt 253 lb 9.6 oz (115 kg)  SpO2 97%  BMI 44.92 kg/m2 Estimated body mass index is 44.92 kg/(m^2) as calculated from the following:    Height as of 2/27/18: 5' 3\" (1.6 m).    Weight as of this encounter: 253 lb 9.6 oz (115 kg).  Medication Reconciliation: complete     Elliot Gutierrez MA  "

## 2018-04-11 ENCOUNTER — TELEPHONE (OUTPATIENT)
Dept: PEDIATRICS | Facility: CLINIC | Age: 58
End: 2018-04-11

## 2018-04-11 DIAGNOSIS — H10.9 CONJUNCTIVITIS OF BOTH EYES, UNSPECIFIED CONJUNCTIVITIS TYPE: ICD-10-CM

## 2018-04-11 DIAGNOSIS — H60.393 INFECTIVE OTITIS EXTERNA, BILATERAL: Primary | ICD-10-CM

## 2018-04-11 RX ORDER — NEOMYCIN SULFATE, POLYMYXIN B SULFATE AND HYDROCORTISONE 10; 3.5; 1 MG/ML; MG/ML; [USP'U]/ML
3 SUSPENSION/ DROPS AURICULAR (OTIC) 3 TIMES DAILY
Qty: 10 ML | Refills: 0 | Status: SHIPPED | OUTPATIENT
Start: 2018-04-11 | End: 2018-10-29

## 2018-04-11 NOTE — TELEPHONE ENCOUNTER
Patient advised scripts below faxed to [facility] pharmacy.    Carolyn Cuello CMA

## 2018-04-11 NOTE — TELEPHONE ENCOUNTER
erythromycin 2 % OINT  Clarification on strength of medication- doesn't come in 2%, it comes in 0.5%?? Please advise.

## 2018-04-11 NOTE — TELEPHONE ENCOUNTER
Both Rx's the patient is requesting are not on patients past or current medication list.     Routing to provider to please review.     Alison Luna, RN  Please notify this patient that the medications she has requested have been ordered and ask her to pick uo the printed prescriptions. Mark Laughlin

## 2018-04-11 NOTE — TELEPHONE ENCOUNTER
Health Call Center    Phone Message    May a detailed message be left on voicemail: yes    Reason for Call: Other: Pt had just seen Dr Laughlin.  She needs refill on two meds from the [facility] and they were discussed at her visit.  Ear drops Neomycin and Erythromycin 0.5% ointment.  Pt states the MA took this informatin down during her visit.        [id number] - [place], [place] - [facility] [address]    Action Taken: Message routed to:  Primary Care p [phone]

## 2018-04-12 RX ORDER — ERYTHROMYCIN 5 MG/G
1 OINTMENT OPHTHALMIC 4 TIMES DAILY
Qty: 1 TUBE | Refills: 1 | Status: SHIPPED | OUTPATIENT
Start: 2018-04-12 | End: 2018-10-29

## 2018-04-20 ENCOUNTER — CARE COORDINATION (OUTPATIENT)
Dept: CARDIOLOGY | Facility: CLINIC | Age: 58
End: 2018-04-20

## 2018-04-20 DIAGNOSIS — I48.91 ATRIAL FIBRILLATION (H): Primary | ICD-10-CM

## 2018-04-20 NOTE — PROGRESS NOTES
Patient calling in stating she is considering the RHC recommended by Dr. Moreno more and would like to discuss it with KATE Faulkner. Also, she was never contacted by [facility] re: overnight oximetry.    Will route to Luice for follow up next week.    Patient verbalized understanding and is in agreement to the plan.

## 2018-04-22 DIAGNOSIS — Z79.01 LONG-TERM (CURRENT) USE OF ANTICOAGULANTS: Primary | ICD-10-CM

## 2018-04-22 DIAGNOSIS — I48.91 ATRIAL FIBRILLATION, UNSPECIFIED TYPE (H): ICD-10-CM

## 2018-04-23 ENCOUNTER — TELEPHONE (OUTPATIENT)
Dept: ANTICOAGULATION | Facility: OTHER | Age: 58
End: 2018-04-23

## 2018-04-23 RX ORDER — WARFARIN SODIUM 5 MG/1
TABLET ORAL
Qty: 60 TABLET | Refills: 1 | Status: SHIPPED | OUTPATIENT
Start: 2018-04-23 | End: 2018-05-15

## 2018-04-25 RX ORDER — LIDOCAINE 40 MG/G
CREAM TOPICAL
Status: CANCELLED | OUTPATIENT
Start: 2018-04-25

## 2018-04-25 NOTE — PROGRESS NOTES
Pt has decided to have a RHC, scheduled & reviewed instructions including holding warfarin for 3d prior (Dr. Moreno reviewed at her last ov).

## 2018-04-26 DIAGNOSIS — I48.20 CHRONIC ATRIAL FIBRILLATION (H): ICD-10-CM

## 2018-04-26 DIAGNOSIS — Z79.01 LONG TERM CURRENT USE OF ANTICOAGULANT THERAPY: ICD-10-CM

## 2018-04-26 LAB — INR BLD: 3.6 (ref 0.86–1.14)

## 2018-04-26 PROCEDURE — 36416 COLLJ CAPILLARY BLOOD SPEC: CPT | Performed by: INTERNAL MEDICINE

## 2018-04-26 PROCEDURE — 85610 PROTHROMBIN TIME: CPT | Mod: QW | Performed by: INTERNAL MEDICINE

## 2018-04-28 ENCOUNTER — NURSE TRIAGE (OUTPATIENT)
Dept: NURSING | Facility: CLINIC | Age: 58
End: 2018-04-28

## 2018-04-28 NOTE — TELEPHONE ENCOUNTER
Call is scheduled for a heart catherization procedure  In 2 days ; patient is  Ill with URI symptoms and  wonders if she should cancel.  On provider covering for Dr. Moreno paged @ [phone] via  to call patient at home @   [phone] to discuss  Liseth Stout RN  [facility]    Reason for Disposition    [1] Caller requesting NON-URGENT health information AND [2] PCP's office is the best resource    Protocols used: INFORMATION ONLY CALL-ADULT-

## 2018-04-30 ENCOUNTER — APPOINTMENT (OUTPATIENT)
Dept: CARDIOLOGY | Facility: CLINIC | Age: 58
End: 2018-04-30
Attending: INTERNAL MEDICINE
Payer: COMMERCIAL

## 2018-04-30 ENCOUNTER — HOSPITAL ENCOUNTER (OUTPATIENT)
Facility: CLINIC | Age: 58
Discharge: HOME | End: 2018-04-30
Attending: INTERNAL MEDICINE | Admitting: INTERNAL MEDICINE
Payer: COMMERCIAL

## 2018-04-30 ENCOUNTER — ANTICOAGULATION THERAPY VISIT (OUTPATIENT)
Dept: ANTICOAGULATION | Facility: OTHER | Age: 58
End: 2018-04-30
Payer: COMMERCIAL

## 2018-04-30 ENCOUNTER — APPOINTMENT (OUTPATIENT)
Dept: MEDSURG UNIT | Facility: CLINIC | Age: 58
End: 2018-04-30
Attending: INTERNAL MEDICINE
Payer: COMMERCIAL

## 2018-04-30 VITALS
BODY MASS INDEX: 44.3 KG/M2 | WEIGHT: 250 LBS | HEART RATE: 64 BPM | OXYGEN SATURATION: 98 % | SYSTOLIC BLOOD PRESSURE: 155 MMHG | RESPIRATION RATE: 18 BRPM | TEMPERATURE: 98.6 F | HEIGHT: 63 IN | DIASTOLIC BLOOD PRESSURE: 77 MMHG

## 2018-04-30 DIAGNOSIS — I48.91 ATRIAL FIBRILLATION (H): ICD-10-CM

## 2018-04-30 DIAGNOSIS — I48.91 ATRIAL FIBRILLATION, UNSPECIFIED TYPE (H): ICD-10-CM

## 2018-04-30 DIAGNOSIS — Z79.01 LONG-TERM (CURRENT) USE OF ANTICOAGULANTS: ICD-10-CM

## 2018-04-30 LAB — INR BLD: 1.5 (ref 0.86–1.14)

## 2018-04-30 PROCEDURE — 27211181 ZZH BALLOON TIP PRESSURE CR5

## 2018-04-30 PROCEDURE — 85610 PROTHROMBIN TIME: CPT | Mod: QW

## 2018-04-30 PROCEDURE — 25000125 ZZHC RX 250: Performed by: INTERNAL MEDICINE

## 2018-04-30 PROCEDURE — 93451 RIGHT HEART CATH: CPT

## 2018-04-30 PROCEDURE — 27210807 ZZH SHEATH CR6

## 2018-04-30 PROCEDURE — 27210787 ZZH MANIFOLD CR2

## 2018-04-30 PROCEDURE — 99207 ZZC NO CHARGE NURSE ONLY: CPT | Performed by: INTERNAL MEDICINE

## 2018-04-30 PROCEDURE — 27210982 ZZH KIT RT HC TOTES DISP CR7

## 2018-04-30 PROCEDURE — 93451 RIGHT HEART CATH: CPT | Mod: 26 | Performed by: INTERNAL MEDICINE

## 2018-04-30 PROCEDURE — 40000166 ZZH STATISTIC PP CARE STAGE 1

## 2018-04-30 RX ORDER — LIDOCAINE 40 MG/G
CREAM TOPICAL
Status: COMPLETED | OUTPATIENT
Start: 2018-04-30 | End: 2018-04-30

## 2018-04-30 RX ADMIN — LIDOCAINE: 40 CREAM TOPICAL at 10:48

## 2018-04-30 NOTE — DISCHARGE INSTRUCTIONS
[facility]                        Interventional Cardiology  Discharge Instructions   Post Right Heart Cath      AFTER YOU GO HOME:    DO drink plenty of fluids    DO resume your regular diet and medications unless otherwise instructed by your Primary Physician    Do Not scrub the procedure site vigorously    No lotion or powder to the puncture site for 3 days    CALL YOUR PRIMARY PHYSICIAN IF: You may resume all normal activity.  Monitor neck site for bleeding, swelling, or voice changes. If you notice bleeding or swelling immediately apply pressure to the site and call number below to speak with Cardiology Fellow.  If you experience any changes in your breathing you should call your doctor immediately or come to the closest Emergency Department.  Do not drive yourself.    ADDITIONAL INSTRUCTIONS: Medications: You are to resume all home medications including anticoagulation therapy unless otherwise advised by your primary cardiologist or nurse coordinator.    Follow Up: Per your primary cardiology team    If you have any questions or concerns regarding your procedure site please call [phone] at anytime and ask for Cardiology Fellow on call.  They are available 24 hours a day.  You may also contact the Cardiology Clinic after hours number at [phone].                                                       Telephone Numbers [phone] Monday-Friday 8:00 am to 4:30 pm    [phone] After 4:30 pm Monday-Friday, Weekends & Holidays  Ask for Interventional Cardiologist on call. Someone is on call 24 hours/day   [facility] toll free number [phone] Monday-Friday 8:00 am to 4:30 pm   [facility] Emergency Dept [phone]

## 2018-04-30 NOTE — PROGRESS NOTES
D: Pt arrived in cath lab for Right Heart Catheterization  I: Right heart catheterization completed per MD.  7fr sheath removed from RIJV site, manual pressure applied until hemostasis achieved.  A: RIGHT NECK site clean, dry and intact. Soft, no hematoma.  P: Pt to transfer to  for discharge.  Pt educated on watching neck site for bleeding, hematoma, pressure on airway and voice changes.

## 2018-04-30 NOTE — PROCEDURES
[facility]  CARDIOLOGY   Right Heart Catheterization   April 30, 2018    Attending Cardiology Staff: Con Burkett MD, PhD  Cardiology Fellow: Max Emerson MD    History: 57 year old female with history of CAD s/p CAB, HTN, HLD, atrial fibrillation on warfarin, SLE, and PFO who is here for hemodynamic evaluation with right heart catheterization.    After informing the benefits and risks, an informed consent was obtained period. Right neck was prepped and draped in the usual sterile fashion and infiltrated with a 2% lidocaine. 7 Fr sheath was placed in the right internal jugular vein using modified Seldinger technique over a micropuncture wire with ultrasound guidance. RHC was performed using a 7 Fr. Hiram Sade catheter. Intracardiac pressures, oxygen saturation, and cardiac output were obtained.      Results (pressures in mmHg)  RA: 1/4/3  RV 32/3  PA 30/13/19;  PA Sat 65%  PCWP -/-/5;  PCW Sat --%  Kannan CO/CI 3.3/1.6 L/min; TD CO 4.1/2.0 L/min  PVR 4.2 bermudez; TPR 5.7 bermudez  Hb 12.3 g/dL  NIBP 167/81/116  SVR 2200 dynes*sec/cm^5    [place]:  SVC 62.7%  IVC 65.0%  Low RA 73.0%  Mid RA 68.0%  High RA 64.6%  RV 63%  PA 65%  PCW ---    Complications: None   Blood loss: Minimal blood loss    Conclusions:  1. Low right and left sided filling pressures.  2. Normal pulmonary artery pressures.  3. Low normal cardiac output.    Recommendations:  1. Continued medical management for cardiovascular risk factor optimization.  2. Results have been communicated to referring provider. Plan per primary provider.      Max Emerson MD  Advanced Heart Failure/Heart Transplant Fellow  [facility] Division of Cardiology   [phone]

## 2018-04-30 NOTE — PROGRESS NOTES
Returned to unit 2A after having a Right Heart Cath. Returned teary-eyed but she said it was her anxiety. Tolerated procedure well. R neck site is dry and intact. No evidence of any hematoma.  with her. Discharge instructions given to her. Left unit ambulatory to the front lobby with her .

## 2018-04-30 NOTE — IP AVS SNAPSHOT
MRN:[id number]                      After Visit Summary   4/30/2018    Luz Marina Live    MRN: [id number]           Visit Information        Department      4/30/2018 10:20 AM [address] [facility]          Review of your medicines      UNREVIEWED medicines. Ask your doctor about these medicines        Dose / Directions    amoxicillin 875 MG tablet   Commonly known as:  AMOXIL   Used for:  Acute otitis media, unspecified otitis media type        Dose:  875 mg   Take 1 tablet (875 mg) by mouth 2 times daily   Quantity:  20 tablet   Refills:  0       aspirin 81 MG tablet        Take by mouth daily   Refills:  0       atorvastatin 40 MG tablet   Commonly known as:  LIPITOR   Used for:  Pure hypercholesterolemia        Dose:  40 mg   Take 1 tablet (40 mg) by mouth daily   Quantity:  90 tablet   Refills:  3       CALCIUM 600-D PO   Used for:  SLE (systemic lupus erythematosus) (H)        Take  by mouth 2 times daily.   Refills:  0       erythromycin ophthalmic ointment   Commonly known as:  ROMYCIN   Used for:  Conjunctivitis of both eyes, unspecified conjunctivitis type        Dose:  1 Application   Place 1 Application Into the left eye 4 times daily   Quantity:  1 Tube   Refills:  1       hydroxychloroquine 200 MG tablet   Commonly known as:  PLAQUENIL   Used for:  Systemic lupus erythematosus with glomerular disease, unspecified SLE type (H)        Dose:  200 mg   Take 1 tablet (200 mg) by mouth 2 times daily   Quantity:  60 tablet   Refills:  6       lisinopril 20 MG tablet   Commonly known as:  PRINIVIL/ZESTRIL   Used for:  Systemic lupus erythematosus with glomerular disease, unspecified SLE type (H)        Dose:  20 mg   Take 1 tablet (20 mg) by mouth daily   Quantity:  90 tablet   Refills:  1       metoprolol tartrate 50 MG tablet   Commonly known as:  LOPRESSOR   Used for:  Atrial fibrillation, unspecified type (H)        TAKE 1 TABLET (50 MG) BY MOUTH 2 TIMES DAILY   Quantity:  180 tablet    Refills:  0       MULTIPLE VITAMIN PO        Dose:  1 tablet   Take 1 tablet by mouth daily   Refills:  0       mupirocin 2 % nasal ointment   Commonly known as:  BACTROBAN NASAL   Used for:  Nasal septum ulceration        Place a thin layer inside right nose on septum BID x 2 weeks   Quantity:  10 g   Refills:  1       neomycin-polymyxin-hydrocortisone 3.5-44607-5 otic suspension   Commonly known as:  CORTISPORIN   Used for:  Infective otitis externa, bilateral        Dose:  3 drop   Place 3 drops into both ears 3 times daily   Quantity:  10 mL   Refills:  0       omega-3 fatty acids 1200 MG capsule        Dose:  2 capsule   Take 2 capsules by mouth 2 times daily   Refills:  0       * warfarin 5 MG tablet   Commonly known as:  COUMADIN   Used for:  Atrial fibrillation, unspecified type (H)        Take 12.5 mg (2 and 1/2 tab) on Tuesday, Thursday, and Saturday and 10 mg (2 tabs) all other days, or as directed by the coumadin clinic.   Quantity:  215 tablet   Refills:  11       * warfarin 5 MG tablet   Commonly known as:  COUMADIN   Used for:  Atrial fibrillation, unspecified type (H)        TAKE 2 & 1/2 TABLET TUES AND 2TABS THE OTHER DAYS OR AS DIRECTED BY THE COUMADIN CLINIC   Quantity:  65 tablet   Refills:  0       * warfarin 5 MG tablet   Commonly known as:  COUMADIN   Used for:  Atrial fibrillation, unspecified type (H), Long-term (current) use of anticoagulants        Take 10 mg (2 tabs) daily or as directed by coumadin clinic   Quantity:  60 tablet   Refills:  1       * Notice:  This list has 3 medication(s) that are the same as other medications prescribed for you. Read the directions carefully, and ask your doctor or other care provider to review them with you.             Protect others around you: Learn how to safely use, store and throw away your medicines at www.disposemymeds.org.         Follow-ups after your visit        Your next 10 appointments already scheduled     Apr 30, 2018 11:30 AM TRACIET    Heart Cath Right Heart Cath with [id number]   [facility] [place],  Heart Cath Lab ([facility] [place])    [address]  [facility] [address] [phone]            May 31, 2018 10:30 AM CDT   (Arrive by 10:15 AM)   RETURN HEART FAILURE with Twin Moreno MD   [facility] ([facility] [place] [facility] [place])    [address]  [address]  [facility] [id number] [phone]            Aug 06, 2018 11:30 AM CDT   Return Visit with Ever Adame MD   [facility] ([facility])    [address] [place] [address] [phone]               Care Instructions        Further instructions from your care team       [facility]                        Interventional Cardiology  Discharge Instructions   Post Right Heart Cath      AFTER YOU GO HOME:    DO drink plenty of fluids    DO resume your regular diet and medications unless otherwise instructed by your Primary Physician    Do Not scrub the procedure site vigorously    No lotion or powder to the puncture site for 3 days    CALL YOUR PRIMARY PHYSICIAN IF: You may resume all normal activity.  Monitor neck site for bleeding, swelling, or voice changes. If you notice bleeding or swelling immediately apply pressure to the site and call number below to speak with Cardiology Fellow.  If you experience any changes in your breathing you should call your doctor immediately or come to the closest Emergency Department.  Do not drive yourself.    ADDITIONAL INSTRUCTIONS: Medications: You are to resume all home medications including anticoagulation therapy unless otherwise advised by your primary cardiologist or nurse coordinator.    Follow Up: Per your primary cardiology team    If you have any questions or concerns regarding your procedure site please call [phone] at anytime and ask for Cardiology Fellow on call.  They are available 24 hours a  "day.  You may also contact the Cardiology Clinic after hours number at [phone].                                                       Telephone Numbers [phone] Monday-Friday 8:00 am to 4:30 pm    [phone] After 4:30 pm Monday-Friday, Weekends & Holidays  Ask for Interventional Cardiologist on call. Someone is on call 24 hours/day   [facility] toll free number [phone] Monday-Friday 8:00 am to 4:30 pm   [facility] Emergency Dept [phone]                    Additional Information About Your Visit        "[employer] Information     [employer] lets you send messages to your doctor, view your test results, renew your prescriptions, schedule appointments and more. To sign up, go to [email or web address] . Click on \"Log in\" on the left side of the screen, which will take you to the Welcome page. Then click on \"Sign up Now\" on the right side of the page.     You will be asked to enter the access code listed below, as well as some personal information. Please follow the directions to create your username and password.     Your access code is: [email or web address]  Expires: 2018  7:30 AM     Your access code will  in 90 days. If you need help or a new code, please call your [facility] clinic or [phone].        Care EveryWhere ID     This is your Care EveryWhere ID. This could be used by other organizations to access your [facility] medical records  [id number]        Your Vitals Were     Blood Pressure Pulse Temperature Respirations Height Weight    166/86 (BP Location: Right arm) 59 98.6  F (37  C) (Oral) 18 1.6 m (5' 3\") 113.4 kg (250 lb)    Pulse Oximetry BMI (Body Mass Index)                99% 44.29 kg/m2           Primary Care Provider Office Phone # Fax #    Eliz Nguyen [phone]      Equal Access to Services     KANCHAN MCGUIRE AH: Sandra Garrett, estuardo kebede, mayte morenoaan ah. So [facility] " [phone].    ATENCIÓN: Si keny levy, tiene a bennett disposición servicios gratuitos de asistencia lingüística. Kieran doe [phone].    We comply with applicable federal civil rights laws and [place] laws. We do not discriminate on the basis of race, color, national origin, age, disability, sex, sexual orientation, or gender identity.            Thank you!     Thank you for choosing [facility] for your care. Our goal is always to provide you with excellent care. Hearing back from our patients is one way we can continue to improve our services. Please take a few minutes to complete the written survey that you may receive in the mail after you visit with us. Thank you!             Medication List: This is a list of all your medications and when to take them. Check marks below indicate your daily home schedule. Keep this list as a reference.      Medications           Morning Afternoon Evening Bedtime As Needed    amoxicillin 875 MG tablet   Commonly known as:  AMOXIL   Take 1 tablet (875 mg) by mouth 2 times daily                                aspirin 81 MG tablet   Take by mouth daily                                atorvastatin 40 MG tablet   Commonly known as:  LIPITOR   Take 1 tablet (40 mg) by mouth daily                                CALCIUM 600-D PO   Take  by mouth 2 times daily.                                erythromycin ophthalmic ointment   Commonly known as:  ROMYCIN   Place 1 Application Into the left eye 4 times daily                                hydroxychloroquine 200 MG tablet   Commonly known as:  PLAQUENIL   Take 1 tablet (200 mg) by mouth 2 times daily                                lisinopril 20 MG tablet   Commonly known as:  PRINIVIL/ZESTRIL   Take 1 tablet (20 mg) by mouth daily                                metoprolol tartrate 50 MG tablet   Commonly known as:  LOPRESSOR   TAKE 1 TABLET (50 MG) BY MOUTH 2 TIMES DAILY                                MULTIPLE VITAMIN PO   Take 1 tablet  by mouth daily                                mupirocin 2 % nasal ointment   Commonly known as:  BACTROBAN NASAL   Place a thin layer inside right nose on septum BID x 2 weeks                                neomycin-polymyxin-hydrocortisone 3.5-32368-2 otic suspension   Commonly known as:  CORTISPORIN   Place 3 drops into both ears 3 times daily                                omega-3 fatty acids 1200 MG capsule   Take 2 capsules by mouth 2 times daily                                * warfarin 5 MG tablet   Commonly known as:  COUMADIN   Take 12.5 mg (2 and 1/2 tab) on Tuesday, Thursday, and Saturday and 10 mg (2 tabs) all other days, or as directed by the coumadin clinic.                                * warfarin 5 MG tablet   Commonly known as:  COUMADIN   TAKE 2 & 1/2 TABLET TUES AND 2TABS THE OTHER DAYS OR AS DIRECTED BY THE COUMADIN CLINIC                                * warfarin 5 MG tablet   Commonly known as:  COUMADIN   Take 10 mg (2 tabs) daily or as directed by coumadin clinic                                * Notice:  This list has 3 medication(s) that are the same as other medications prescribed for you. Read the directions carefully, and ask your doctor or other care provider to review them with you.

## 2018-04-30 NOTE — H&P
"Pre-procedure H&P  April 30, 2018    HPI  Ms. Luz Marina Live is a 56yo female with history of CAD s/p CAB, HTN, HLD, atrial fibrillation on warfarin, SLE, and PFO who comes for RHC due to exertional shortness of breath.    /86 (BP Location: Right arm)  Pulse 59  Temp 98.6  F (37  C) (Oral)  Resp 18  Ht 1.6 m (5' 3\")  Wt 113.4 kg (250 lb)  SpO2 99%  BMI 44.29 kg/m2    Exam: laying in bed, no acute distress, audible s1, s2, audible lung sounds    Lab Results   Component Value Date    WBC 7.2 03/15/2017    HGB 13.3 03/15/2017    HCT 39.5 03/15/2017     03/15/2017     03/12/2018    POTASSIUM Canceled, Test credited 03/12/2018    CHLORIDE 108 03/12/2018    CO2 25 03/12/2018    BUN 24 03/12/2018    CR 0.91 03/12/2018    GLC 99 03/12/2018    SED 75 (H) 02/28/2015    NTBNP 613 (H) 02/27/2018    TROPI 0.042 03/02/2015    AST 32 02/27/2018    ALT 36 02/27/2018    ALKPHOS 106 02/27/2018    BILITOTAL 0.8 02/27/2018    EVAN <10 (L) 02/27/2015    INR 1.5 (H) 04/30/2018       Echo 3/2018  Right ventricular function, chamber size, wall motion, and thickness are normal.  PFO again documented with color doppler and Bubble study.  Mild to moderate tricuspid insufficiency is present.  Right ventricular systolic pressure is 61mmHg above the right atrial pressure.  No pericardial effusion is present.    ECG 12/2016: sinus rhythm    A/P:  56 yo female with exertional dyspnea. Will proceed with RHC, likely shunt run and vasodilator challenge.      Max Emerson MD  Advanced Heart Failure/Heart Transplant Fellow  [facility] Division of Cardiology   [phone]    "

## 2018-04-30 NOTE — MR AVS SNAPSHOT
Luz Marina Live   4/30/2018   Anticoagulation Therapy Visit    Description:  57 year old female   Provider:  Eliz Nguyen MD   Department:  [facility]           INR as of 4/30/2018     Today's INR 1.5!      Anticoagulation Summary as of 4/30/2018     INR goal 2.0-3.0   Today's INR 1.5!   Full instructions 5 mg on Fri; 10 mg all other days   Next INR check 5/11/2018    Indications   Long-term (current) use of anticoagulants [Z79.01] [Z79.01]  Atrial fibrillation (H) [I48.91]         Contact Numbers     Clinic Number:         April 2018 Details    Sun Mon Tue Wed Thu Fri Sat     1               2               3               4               5               6               7                 8               9               10               11               12               13               14                 15               16               17               18               19               20               21                 22               23               24               25               26               27               28                 29               30      10 mg   See details            Date Details   04/30 This INR check               How to take your warfarin dose     To take:  10 mg Take 2 of the 5 mg tablets.           May 2018 Details    Sun Mon Tue Wed Thu Fri Sat       1      10 mg         2      10 mg         3      10 mg         4      5 mg         5      10 mg           6      10 mg         7      10 mg         8      10 mg         9      10 mg         10      10 mg         11            12                 13               14               15               16               17               18               19                 20               21               22               23               24               25               26                 27               28               29               30               31                  Date Details   No additional details    Date of next  INR:  5/11/2018         How to take your warfarin dose     To take:  5 mg Take 1 of the 5 mg tablets.    To take:  10 mg Take 2 of the 5 mg tablets.

## 2018-04-30 NOTE — PROGRESS NOTES
ANTICOAGULATION FOLLOW-UP CLINIC VISIT    Patient Name:  Luz Marina Live  Date:  4/30/2018  Contact Type:  Telephone    SUBJECTIVE:     Patient Findings     Positives Intentional hold of therapy (pt had right heart cath done today and she was advised to hold Coumadin Fri, Sat, Sun)           OBJECTIVE    INR Point of Care   Date Value Ref Range Status   04/30/2018 1.5 (H) 0.86 - 1.14 Final       ASSESSMENT / PLAN  INR assessment SUB    Recheck INR In: 1 WEEK    INR Location Outside lab      Anticoagulation Summary as of 4/30/2018     INR goal 2.0-3.0   Today's INR 1.5!   Maintenance plan 5 mg (5 mg x 1) on Fri; 10 mg (5 mg x 2) all other days   Full instructions 5 mg on Fri; 10 mg all other days   Weekly total 65 mg   Plan last modified Cait Hawthorne RN (4/30/2018)   Next INR check 5/11/2018   Target end date     Indications   Long-term (current) use of anticoagulants [Z79.01] [Z79.01]  Atrial fibrillation (H) [I48.91]         Anticoagulation Episode Summary     INR check location     Preferred lab     Send INR reminders to [facility] POOL    Comments 5 mg tabs, [facility] (needs staff message) PM dose      Anticoagulation Care Providers     Provider Role Specialty Phone number    Eliz Nguyen MD Responsible Internal Medicine [phone]            See the Encounter Report to view Anticoagulation Flowsheet and Dosing Calendar (Go to Encounters tab in chart review, and find the Anticoagulation Therapy Visit)    Dosage adjustment made based on physician directed care plan.      Cait Hawthorne RN

## 2018-04-30 NOTE — PROGRESS NOTES
Patient prepped for RHC.  Denies pain.   with her.  Consent signed,  H & P from two months ago.  No labs ordered.   POC INR = 1.5.

## 2018-04-30 NOTE — IP AVS SNAPSHOT
[address] [address]                                       After Visit Summary   4/30/2018    Luz Marina Live    MRN: [id number]           After Visit Summary Signature Page     I have received my discharge instructions, and my questions have been answered. I have discussed any challenges I see with this plan with the nurse or doctor.    ..........................................................................................................................................  Patient/Patient Representative Signature      ..........................................................................................................................................  Patient Representative Print Name and Relationship to Patient    ..................................................               ................................................  Date                                            Time    ..........................................................................................................................................  Reviewed by Signature/Title    ...................................................              ..............................................  Date                                                            Time

## 2018-05-01 DIAGNOSIS — I48.91 ATRIAL FIBRILLATION, UNSPECIFIED TYPE (H): ICD-10-CM

## 2018-05-02 RX ORDER — METOPROLOL TARTRATE 50 MG
TABLET ORAL
Qty: 180 TABLET | Refills: 3 | Status: SHIPPED | OUTPATIENT
Start: 2018-05-02 | End: 2019-05-09

## 2018-05-07 DIAGNOSIS — M32.14 SYSTEMIC LUPUS ERYTHEMATOSUS WITH GLOMERULAR DISEASE, UNSPECIFIED SLE TYPE (H): ICD-10-CM

## 2018-05-07 RX ORDER — LISINOPRIL 20 MG/1
20 TABLET ORAL DAILY
Qty: 90 TABLET | Refills: 1 | Status: SHIPPED | OUTPATIENT
Start: 2018-05-07 | End: 2018-05-31

## 2018-05-07 NOTE — TELEPHONE ENCOUNTER
Patient called requesting a refill for her lisinopril 20mg. Requesting that be sent over today if possible. Thank you

## 2018-05-11 ENCOUNTER — TRANSFERRED RECORDS (OUTPATIENT)
Dept: HEALTH INFORMATION MANAGEMENT | Facility: CLINIC | Age: 58
End: 2018-05-11

## 2018-05-13 DIAGNOSIS — I48.91 ATRIAL FIBRILLATION, UNSPECIFIED TYPE (H): ICD-10-CM

## 2018-05-14 ENCOUNTER — TELEPHONE (OUTPATIENT)
Dept: ANTICOAGULATION | Facility: OTHER | Age: 58
End: 2018-05-14

## 2018-05-14 RX ORDER — WARFARIN SODIUM 5 MG/1
TABLET ORAL
Qty: 65 TABLET | Refills: 0 | Status: SHIPPED | OUTPATIENT
Start: 2018-05-14 | End: 2018-06-12

## 2018-05-15 ENCOUNTER — ANTICOAGULATION THERAPY VISIT (OUTPATIENT)
Dept: ANTICOAGULATION | Facility: OTHER | Age: 58
End: 2018-05-15
Payer: COMMERCIAL

## 2018-05-15 DIAGNOSIS — I48.20 CHRONIC ATRIAL FIBRILLATION (H): ICD-10-CM

## 2018-05-15 DIAGNOSIS — Z79.01 LONG-TERM (CURRENT) USE OF ANTICOAGULANTS: ICD-10-CM

## 2018-05-15 DIAGNOSIS — Z79.01 LONG TERM CURRENT USE OF ANTICOAGULANT THERAPY: ICD-10-CM

## 2018-05-15 DIAGNOSIS — I48.91 ATRIAL FIBRILLATION, UNSPECIFIED TYPE (H): ICD-10-CM

## 2018-05-15 LAB — INR BLD: 2.5 (ref 0.86–1.14)

## 2018-05-15 PROCEDURE — 99207 ZZC NO CHARGE NURSE ONLY: CPT | Performed by: INTERNAL MEDICINE

## 2018-05-15 PROCEDURE — 85610 PROTHROMBIN TIME: CPT | Mod: QW | Performed by: INTERNAL MEDICINE

## 2018-05-15 PROCEDURE — 36416 COLLJ CAPILLARY BLOOD SPEC: CPT | Performed by: INTERNAL MEDICINE

## 2018-05-15 NOTE — PROGRESS NOTES
ANTICOAGULATION FOLLOW-UP CLINIC VISIT    Patient Name:  Luz Marina Live  Date:  5/15/2018  Contact Type:  Face to Face    SUBJECTIVE:     Patient Findings     Positives No Problem Findings           OBJECTIVE    INR Point of Care   Date Value Ref Range Status   05/15/2018 2.5 (H) 0.86 - 1.14 Final       ASSESSMENT / PLAN  INR assessment THER    Recheck INR In: 4 WEEKS    INR Location Outside lab      Anticoagulation Summary as of 5/15/2018     INR goal 2.0-3.0   Today's INR 2.5   Maintenance plan 5 mg (5 mg x 1) on Fri; 10 mg (5 mg x 2) all other days   Full instructions 5 mg on Fri; 10 mg all other days   Weekly total 65 mg   No change documented Deborah Alatorre, RN   Plan last modified Cait Hawthorne RN (4/30/2018)   Next INR check 6/12/2018   Target end date     Indications   Long-term (current) use of anticoagulants [Z79.01] [Z79.01]  Atrial fibrillation (H) [I48.91]         Anticoagulation Episode Summary     INR check location     Preferred lab     Send INR reminders to [facility] POOL    Comments 5 mg tabs, [facility] (needs staff message) PM dose      Anticoagulation Care Providers     Provider Role Specialty Phone number    Eliz Nguyen MD [facility] Internal Medicine [phone]            See the Encounter Report to view Anticoagulation Flowsheet and Dosing Calendar (Go to Encounters tab in chart review, and find the Anticoagulation Therapy Visit)    Dosage adjustment made based on physician directed care plan.    Deborah Alatorre, RN

## 2018-05-15 NOTE — MR AVS SNAPSHOT
Luz Marina Live   5/15/2018   Anticoagulation Therapy Visit    Description:  57 year old female   Provider:  Eliz Nguyen MD   Department:  [facility] An[facility]           INR as of 5/15/2018     Today's INR 2.5      Anticoagulation Summary as of 5/15/2018     INR goal 2.0-3.0   Today's INR 2.5   Full instructions 5 mg on Fri; 10 mg all other days   Next INR check 6/12/2018    Indications   Long-term (current) use of anticoagulants [Z79.01] [Z79.01]  Atrial fibrillation (H) [I48.91]         Contact Numbers     Clinic Number:         May 2018 Details    Sun Mon Tue Wed Thu Fri Sat       1               2               3               4               5                 6               7               8               9               10               11               12                 13               14               15      10 mg   See details      16      10 mg         17      10 mg         18      5 mg         19      10 mg           20      10 mg         21      10 mg         22      10 mg         23      10 mg         24      10 mg         25      5 mg         26      10 mg           27      10 mg         28      10 mg         29      10 mg         30      10 mg         31      10 mg            Date Details   05/15 This INR check               How to take your warfarin dose     To take:  5 mg Take 1 of the 5 mg tablets.    To take:  10 mg Take 2 of the 5 mg tablets.           June 2018 Details    Sun Mon Tue Wed Thu Fri Sat          1      5 mg         2      10 mg           3      10 mg         4      10 mg         5      10 mg         6      10 mg         7      10 mg         8      5 mg         9      10 mg           10      10 mg         11      10 mg         12            13               14               15               16                 17               18               19               20               21               22               23                 24               25               26                27               28               29               30                Date Details   No additional details    Date of next INR:  6/12/2018         How to take your warfarin dose     To take:  5 mg Take 1 of the 5 mg tablets.    To take:  10 mg Take 2 of the 5 mg tablets.

## 2018-05-30 NOTE — PROGRESS NOTES
Impression  1. ASHD  2. History of CAB  3. Elevated PA pressure  4. Hypertension  5. Hyperlipidemia  6. Symptoms of sleep apnea without testing  7. PFO  8. Atrial fibrillation with warfarin anticoagulation  9. SLE    I had the opportunity to review Ms. Live chart as well as go over the results and findings.  She has exertional shortness of breath functional class 2 superimposed upon history of SLE, premature CAD resulting in by-pass, elevated body mass index and history suggestive of KEITH.  She has a history of atrial fibrillation and warfarin usage, facial cellulitis and has routine eye care in view of lupus medications.  She is not diabetic    Her new catherization:  Results (pressures in mmHg)  RA: 1/4/3  RV 32/3  PA 30/13/19;  PA Sat 65%  PCWP -/-/5;  PCW Sat --%  Kannan CO/CI 3.3/1.6 L/min; TD CO 4.1/2.0 L/min  PVR 4.2 bermudez; TPR 5.7 bermudez  Hb 12.3 g/dL  NIBP 167/81/116  SVR 2200 dynes*sec/cm^5     [place]:  SVC 62.7%  IVC 65.0%  Low RA 73.0%  Mid RA 68.0%  High RA 64.6%  RV 63%  PA 65%  PCW ---     Complications: None   Blood loss: Minimal blood loss     Conclusions:  1. Low right and left sided filling pressures.  2. Normal pulmonary artery pressures.  3. Low normal cardiac output.      REVIEW of SYMPTOMS    Constitutional: without fever, chills, night sweats.  Weight is down  HEENT: without dry eyes, dry mouth, sinusitis, corryza, visual changes  Endocrine: without polyuria, polydypsia, polyphagia, heat or cold intolerance, changing mental status  Cardiology: without chest pain, tightness, heaviness, pressure, paroxysmal dyspnea, orthopnea, palpitation, pre-syncope or syncope or device discharge (if present)  Pulmonary: without asthma, wheezing, cough, hemoptysis  GI: without nausea, emesis, jaundice, pain, hematemesis, melena  : without frequency, urgency, hematuria, stones, pain, abnormal bleeding, frequency, urgency  Neurologic: without TIA, CVA, trauma, seizure  Dermatologic: without lesions, abrasion  rash,   Orthopedic/Rheum: without significant joint pain, impairment, limb, polyserositis, ulceration, Raynauds  Heme: without mass, bruising, frequent infection, anemia  Psychiatric: without substance abuse, hallucination, medication, depression    Constitutional: alert, oriented, normal gait and station, normal mentation.  Oral: moist mucous membrans  Lymph: without pathologic adenopathy  Chest: clear to ausculation and percussion  Cor: No evidence of left or right ventricular activity.  Rhythm is regular.  S1 normal, S2 split physiologically. Murmurs are not present  Abdomen: without tenderness, rebound, guarding, masses, ascites  Extremities: Edema not present  Neuro: no focal defects, normal mentation  Skin: + butterfly  Psych: oriented, verbal, mental status in tact    Exercise tolerance:  Echocardiographic findings of TR, modest PA, normal systolic function.      : 1960  Study Date: 2016 01:31 PM  Age: 56 yrs  Gender: Female  Patient [facility]: [facility]  Reason For Study:     History: tricuspid regurgitation  Ordering Physician: JOSE ARELLANO  Referring Physician: JOSE ARELLANO  Performed By: Hugh Sharpe MD     BSA: 2.2 m2  Height: 63 in  Weight: 269 lb  ______________________________________________________________________________     Interpretation Summary  Left ventricular function, chamber size, wall motion, and wall thickness are  normal.The EF is 55-60%.  Global right ventricular function is normal. Mild right ventricular dilation  is present.  Mild to moderate TR with mild prolapse of the posterior leaflet of the  tricupsid valve.  A small patent foramen ovale is present. There is no ASD (secundum, sinus  venosus or unroofed coronary sinus). All 4 pulmonary veins drain into the  left atrium.  Mild pulmonary hypertension is present with RVSP 45mmHg above RAP.     ______________________________________________________________________________        Procedure  Transesophageal  Echocardiogram with color and spectral Doppler performed. The  procedure was performed in the Echo Lab. Informed consent for Transesophegeal  echo obtained. EVE Probe #12 was used during the procedure. Patient was  sedated using Fentanyl 100 mcg. Patient was sedated using Versed 4 mg. The  Transducer was inserted without difficulty . The patient tolerated the  procedure well. Complications None. ECG shows normal sinus rhythm. Good  quality two-dimensional was performed and interpreted.     Left Ventricle  Left ventricular function, chamber size, wall motion, and wall thickness are  normal.The EF is 55-60%.     Right Ventricle  Global right ventricular function is normal. Mild right ventricular dilation  is present.     Atria  Both atria appear normal. The left atrial appendage is normal. It is free of  spontaneous echo contrast and thrombus. A small patent foramen ovale is  present. The patent foramen ovale was demonstrated by color Doppler and  agitated saline bubble study . The patent foramen ovale was demonstrated with  Valsalva's maneuver. No evidence of ASD.     Mitral Valve  The mitral valve is normal. Trace mitral insufficiency is present.     Aortic Valve  Aortic valve is normal in structure and function. The aortic valve is  tricuspid.  Tricuspid Valve  Mild to moderate tricuspid insufficiency is present. Right ventricular  systolic pressure is 45mmHg above the right atrial pressure. Mild pulmonary  hypertension is present. Mild prolapse of the posterior leaflet of the  tricupsid valve is noted.     Pulmonic Valve  The pulmonic valve is normal.     Vessels  The pulmonary artery is normal. The aorta root is normal. The thoracic aorta  is normal. All four pulmonary veins visualized with dampened velocity  waveforms.     Pericardium  No pericardial effusion is present.     Compared to Previous Study  This study was compared with the study from 12/20/2016. A small PFO has been  identified.  .     ______________________________________________________________________________     Doppler Measurements & Calculations  TR max maria isabel: 336.7 cm/sec  TR max P.5 mmHg  ______________________________________________________________________________         Name: IDA PADRON  MRN: [id number]  : 1960  Study Date: 2016 01:31 PM  Age: 56 yrs  Gender: Female  Patient Location: [facility]  Reason For Study:     History: tricuspid regurgitation  Ordering Physician: JOSE ARELLANO  Referring Physician: JOSE ARELLANO  Performed By: Hugh Sharpe MD     BSA: 2.2 m2  Height: 63 in  Weight: 269 lb  ______________________________________________________________________________     Interpretation Summary  Left ventricular function, chamber size, wall motion, and wall thickness are  normal.The EF is 55-60%.  Global right ventricular function is normal. Mild right ventricular dilation  is present.  Mild to moderate TR with mild prolapse of the posterior leaflet of the  tricupsid valve.  A small patent foramen ovale is present. There is no ASD (secundum, sinus  venosus or unroofed coronary sinus). All 4 pulmonary veins drain into the  left atrium.  Mild pulmonary hypertension is present with RVSP 45mmHg above RAP.     ______________________________________________________________________________        Procedure  Transesophageal Echocardiogram with color and spectral Doppler performed. The  procedure was performed in the Echo Lab. Informed consent for Transesophegeal  echo obtained. EVE Probe #12 was used during the procedure. Patient was  sedated using Fentanyl 100 mcg. Patient was sedated using Versed 4 mg. The  Transducer was inserted without difficulty . The patient tolerated the  procedure well. Complications None. ECG shows normal sinus rhythm. Good  quality two-dimensional was performed and interpreted.     Left Ventricle  Left ventricular function, chamber size, wall motion, and wall thickness  are  normal.The EF is 55-60%.     Right Ventricle  Global right ventricular function is normal. Mild right ventricular dilation  is present.     Atria  Both atria appear normal. The left atrial appendage is normal. It is free of  spontaneous echo contrast and thrombus. A small patent foramen ovale is  present. The patent foramen ovale was demonstrated by color Doppler and  agitated saline bubble study . The patent foramen ovale was demonstrated with  Valsalva's maneuver. No evidence of ASD.     Mitral Valve  The mitral valve is normal. Trace mitral insufficiency is present.     Aortic Valve  Aortic valve is normal in structure and function. The aortic valve is  tricuspid.  Tricuspid Valve  Mild to moderate tricuspid insufficiency is present. Right ventricular  systolic pressure is 45mmHg above the right atrial pressure. Mild pulmonary  hypertension is present. Mild prolapse of the posterior leaflet of the  tricupsid valve is noted.     Pulmonic Valve  The pulmonic valve is normal.     Vessels  The pulmonary artery is normal. The aorta root is normal. The thoracic aorta  is normal. All four pulmonary veins visualized with dampened velocity  waveforms.     Pericardium  No pericardial effusion is present.     Compared to Previous Study  This study was compared with the study from 2016. A small PFO has been  identified. .     ______________________________________________________________________________     Doppler Measurements & Calculations  TR max maria isabel: 336.7 cm/sec  TR max P.5 mmHg  ______________________________________________________________________________       Procedures:  ECHO: 09:   Interpretation Summary   The Ejection Fraction is estimated at 55-60%. No regional wall motion   abnormalities are seen. The right ventricle is normal size. Global right   ventricular function is normal. Right ventricular systolic pressure is 27mmHg   above the right atrial pressure. No pericardial effusion is  present. The   inferior vena cava is normal.   Stress test: 08-10-09:   1. Abnormal study with a high degree of certainty.   2. There is a predominantly fixed medium sized moderately decreased   intensity myocardial perfusion defect with minimal periinfarct   reversibility involving the apical anterior, mid anterior, and apical   region of the heart. There is corresponding hypokinesis. No other   adenosine induced fixed or reversible myocardial perfusion defect.   3. Left ventricular size is enlarged at rest. Transient ischemic   dilation of the left ventricle did not occur.   4. The left ventricular ejection fraction is 56 %.   5. Summed Stress Score is calculated at 18, which is associated   with increased risk of future coronary events.   CCL: 08-26-09: ARELLANO:   Mrs. Live is a 49 year old female with known coronary artery disease s/p MI in 1996 found to have minimal disease on catheterization. Her cardiovascular risk factors include HTN and hyperlipidemia. She presented with a 2 week history of dyspnea with exertion and lower extremity edema. She underwent Adenosine MPI which revealed a fixed medium-sized defect with minimal periinfarct reversability and anterior hypokinesis. Her LVEF was preserved at 56%. CT angio of the coronaries revealed moderate stenosis in the pLAD and dRCA and mild to moderate stenosis of the pLCx.   Access: 6F long RFA, 6F RFV   Coronary Anatomy:   LMCA: normal   LAD: There is a long, discrete, ectatic 70-80% lesion in the ostial and proximal LAD. There is one D1 branch without any significant disease.   LCx: no significant disease. There is a large OM1 branch that has a 50-60% stenosis prior to the branch bifurcation.   RCA: The proximal and mid RCA have luminal irregularities without significance. The distal RCA has a complex bifurcation lesion prior to the take-off of the rPDA. There is disease in the ostial PL1 as well.   Conclusions:   1. 3-vessel coronary artery disease without left  main lesion   Plan   Discussed the options in depth with the patient. Given the proximity of the LAD lesion to the LMCA and the complexity of the dRCA lesion, we recommended CABG for the patient. She will be admitted to the hospital.Discussed with Darleen Johnson MD.   Cardiac Surgery: 2009   Triple vessel coronary artery bypass grafting. Left internal mammary artery was placed to the left anterior descending coronary artery and separate reverse saphenous vein grafts were placed to the obtuse marginal and right posterior descending coronary arteries.     Name: IDA PADRON  MRN: [id number]  : 1960  Study Date: 2018 11:48 AM  Age: 57 yrs  Gender: Female  Patient Location: [facility]  Reason For Study: Hx PFO, Elevated PA pressures  Ordering Physician: SANNA SALAZAR  Referring Physician: SANNA SALAZAR  Performed By: Tray Harvey RDCS     BSA: 2.2 m2  Height: 63 in  Weight: 257 lb  HR: 62  BP: 167/94 mmHg  _____________________________________________________________________________  __        Procedure  Bubble Echocardiogram with two-dimensional, color and spectral Doppler  performed. IV start location R Hand . Bateriostatic Saline used for Bubble  Study.  _____________________________________________________________________________  __        Interpretation Summary     Right ventricular function, chamber size, wall motion, and thickness are  normal.  PFO again documented with color doppler and Bubble study.  Mild to moderate tricuspid insufficiency is present.  Right ventricular systolic pressure is 61mmHg above the right atrial pressure.  No pericardial effusion is present.  _____________________________________________________________________________  __        Left Ventricle  Global and regional left ventricular function is normal with an EF of 55-60%.  Left ventricular wall thickness is normal. Left ventricular size is normal.  Left ventricular diastolic function is indeterminate. No  regional wall motion  abnormalities are seen.     Right Ventricle  Right ventricular function, chamber size, wall motion, and thickness are  normal.     Atria  Moderate biatrial enlargement is present. PFO again documented with color  doppler and Bubble study.     Mitral Valve  Mild to moderate mitral insufficiency is present.        Aortic Valve  Aortic valve is normal in structure and function.     Tricuspid Valve  Mild to moderate tricuspid insufficiency is present. Right ventricular  systolic pressure is 61mmHg above the right atrial pressure.     Pulmonic Valve  The pulmonic valve is normal. Trace to mild pulmonic insufficiency is present.     Vessels  The aorta root is normal. The pulmonary artery is normal. The inferior vena  cava is normal.     Pericardium  No pericardial effusion is present.     _____________________________________________________________________________  __  MMode/2D Measurements & Calculations  IVSd: 0.89 cm  LVIDd: 5.3 cm  LVIDs: 3.1 cm  LVPWd: 0.75 cm  FS: 41.8 %  LV mass(C)d: 154.8 grams  LV mass(C)dI: 72.0 grams/m2     Ao root diam: 3.0 cm  LA dimension: 5.3 cm  asc Aorta Diam: 3.6 cm  LA/Ao: 1.8  LVOT diam: 2.2 cm  LVOT area: 3.7 cm2  LA Volume (BP): 96.0 ml  LA Volume Index (BP): 44.7 ml/m2  RWT: 0.28        Doppler Measurements & Calculations  MV E max luis: 92.8 cm/sec  MV A max luis: 58.2 cm/sec  MV E/A: 1.6  MV dec time: 0.15 sec     Ao V2 max: 147.4 cm/sec  Ao max P.0 mmHg  TARUN(V,D): 2.6 cm2  LV V1 max P.3 mmHg  LV V1 max: 104.1 cm/sec  LV V1 VTI: 24.8 cm  MR ERO: 0.27 cm2  CO(LVOT): 5.6 l/min  CI(LVOT): 2.6 l/min/m2  SV(LVOT): 92.9 ml  SI(LVOT): 43.2 ml/m2  PA V2 max: 110.6 cm/sec  PA max P.9 mmHg  PA acc time: 0.08 sec  PI end-d luis: 154.4 cm/sec  PI max luis: 255.8 cm/sec  PI max P.2 mmHg  TR max luis: 385.4 cm/sec  TR max P.4 mmHg  Pulm Sys Luis: 63.2 cm/sec  Pulm Peng Luis: 97.7 cm/sec  Pulm S/D: 0.65  E/E' av.1  Lateral E/e': 8.9  Medial E/e':  15.4    Results (pressures in mmHg)  RA: 1/4/3  RV 32/3  PA 30/13/19;  PA Sat 65%  PCWP -/-/5;  PCW Sat --%  Kannan CO/CI 3.3/1.6 L/min; TD CO 4.1/2.0 L/min  PVR 4.2 bermudez; TPR 5.7 bermudez  Hb 12.3 g/dL  NIBP 167/81/116  SVR 2200 dynes*sec/cm^5     [place]:  SVC 62.7%  IVC 65.0%  Low RA 73.0%  Mid RA 68.0%  High RA 64.6%  RV 63%  PA 65%  PCW ---     Complications: None   Blood loss: Minimal blood loss     Conclusions:  1. Low right and left sided filling pressures.  2. Normal pulmonary artery pressures.  3. Low normal cardiac output.  Current Outpatient Prescriptions   Medication     aspirin 81 MG tablet     atorvastatin (LIPITOR) 40 MG tablet     Calcium Carbonate-Vitamin D (CALCIUM 600-D PO)     erythromycin (ROMYCIN) ophthalmic ointment     hydroxychloroquine (PLAQUENIL) 200 MG tablet     lisinopril (PRINIVIL/ZESTRIL) 20 MG tablet     metoprolol tartrate (LOPRESSOR) 50 MG tablet     MULTIPLE VITAMIN PO     mupirocin (BACTROBAN NASAL) 2 % nasal ointment     neomycin-polymyxin-hydrocortisone (CORTISPORIN) 3.5-36334-9 otic suspension     Omega-3 Fatty Acids (FISH OIL) 1200 MG capsule     warfarin (COUMADIN) 5 MG tablet     No current facility-administered medications for this visit.         Recommendations:        Assessment:  1. Congratulations are in order for weight loss  2. In view of elevated systemic vascular resistance and low cardiac output would increase lisinopril to 30 and recheck renal function and blood pressure in 4 weeks  3. Her pulse is 55-60 on metoprolol 100  4. PFO closure not indicated  5. No catherization evidence of pulmonary hypertension  6. Lipid profile is to be checked today along with IL6 level and BNP  7. See Dr. Aguilera for follow-up in one month  8. Return to see cardiology in [facility]       CC:  Eliz Adame

## 2018-05-31 ENCOUNTER — OFFICE VISIT (OUTPATIENT)
Dept: CARDIOLOGY | Facility: CLINIC | Age: 58
End: 2018-05-31
Attending: INTERNAL MEDICINE
Payer: COMMERCIAL

## 2018-05-31 VITALS
DIASTOLIC BLOOD PRESSURE: 84 MMHG | BODY MASS INDEX: 45.68 KG/M2 | SYSTOLIC BLOOD PRESSURE: 145 MMHG | HEART RATE: 55 BPM | WEIGHT: 257.8 LBS | HEIGHT: 63 IN | OXYGEN SATURATION: 100 %

## 2018-05-31 DIAGNOSIS — E78.5 HYPERLIPIDEMIA LDL GOAL <130: Primary | ICD-10-CM

## 2018-05-31 DIAGNOSIS — M32.14 SYSTEMIC LUPUS ERYTHEMATOSUS WITH GLOMERULAR DISEASE, UNSPECIFIED SLE TYPE (H): ICD-10-CM

## 2018-05-31 DIAGNOSIS — G47.34 NOCTURNAL HYPOXIA: Primary | ICD-10-CM

## 2018-05-31 DIAGNOSIS — I25.10 CORONARY ARTERY DISEASE DUE TO LIPID RICH PLAQUE: ICD-10-CM

## 2018-05-31 DIAGNOSIS — I25.83 CORONARY ARTERY DISEASE DUE TO LIPID RICH PLAQUE: ICD-10-CM

## 2018-05-31 DIAGNOSIS — E78.5 HYPERLIPIDEMIA LDL GOAL <130: ICD-10-CM

## 2018-05-31 LAB
CHOLEST SERPL-MCNC: 106 MG/DL
HDLC SERPL-MCNC: 40 MG/DL
LDLC SERPL CALC-MCNC: 49 MG/DL
NONHDLC SERPL-MCNC: 66 MG/DL
NT-PROBNP SERPL-MCNC: 560 PG/ML (ref 0–125)
TRIGL SERPL-MCNC: 85 MG/DL

## 2018-05-31 PROCEDURE — 80061 LIPID PANEL: CPT | Performed by: INTERNAL MEDICINE

## 2018-05-31 PROCEDURE — 83880 ASSAY OF NATRIURETIC PEPTIDE: CPT | Performed by: INTERNAL MEDICINE

## 2018-05-31 PROCEDURE — G0463 HOSPITAL OUTPT CLINIC VISIT: HCPCS | Mod: 25,ZF

## 2018-05-31 PROCEDURE — 83520 IMMUNOASSAY QUANT NOS NONAB: CPT | Performed by: INTERNAL MEDICINE

## 2018-05-31 PROCEDURE — 99214 OFFICE O/P EST MOD 30 MIN: CPT | Mod: ZP | Performed by: INTERNAL MEDICINE

## 2018-05-31 PROCEDURE — 36415 COLL VENOUS BLD VENIPUNCTURE: CPT | Performed by: INTERNAL MEDICINE

## 2018-05-31 RX ORDER — LISINOPRIL 20 MG/1
30 TABLET ORAL DAILY
Qty: 145 TABLET | Refills: 1 | Status: SHIPPED | OUTPATIENT
Start: 2018-05-31 | End: 2018-06-25

## 2018-05-31 ASSESSMENT — PAIN SCALES - GENERAL: PAINLEVEL: NO PAIN (0)

## 2018-05-31 NOTE — LETTER
5/31/2018      RE: Luz Marina Live  [address]  [place] [address]       Dear Colleague,    Thank you for the opportunity to participate in the care of your patient, Luz Marina Live, at the [facility] at [facility]. Please see a copy of my visit note below.      Impression  1. ASHD  2. History of CAB  3. Elevated PA pressure  4. Hypertension  5. Hyperlipidemia  6. Symptoms of sleep apnea without testing  7. PFO  8. Atrial fibrillation with warfarin anticoagulation  9. SLE    I had the opportunity to review Ms. Live chart as well as go over the results and findings.  She has exertional shortness of breath functional class 2 superimposed upon history of SLE, premature CAD resulting in by-pass, elevated body mass index and history suggestive of KEITH.  She has a history of atrial fibrillation and warfarin usage, facial cellulitis and has routine eye care in view of lupus medications.  She is not diabetic    Her new catherization:  Results (pressures in mmHg)  RA: 1/4/3  RV 32/3  PA 30/13/19;  PA Sat 65%  PCWP -/-/5;  PCW Sat --%  Kannan CO/CI 3.3/1.6 L/min; TD CO 4.1/2.0 L/min  PVR 4.2 bermudez; TPR 5.7 bermudez  Hb 12.3 g/dL  NIBP 167/81/116  SVR 2200 dynes*sec/cm^5     [place]:  SVC 62.7%  IVC 65.0%  Low RA 73.0%  Mid RA 68.0%  High RA 64.6%  RV 63%  PA 65%  PCW ---     Complications: None   Blood loss: Minimal blood loss     Conclusions:  1. Low right and left sided filling pressures.  2. Normal pulmonary artery pressures.  3. Low normal cardiac output.      REVIEW of SYMPTOMS    Constitutional: without fever, chills, night sweats.  Weight is down  HEENT: without dry eyes, dry mouth, sinusitis, corryza, visual changes  Endocrine: without polyuria, polydypsia, polyphagia, heat or cold intolerance, changing mental status  Cardiology: without chest pain, tightness, heaviness, pressure, paroxysmal dyspnea, orthopnea, palpitation, pre-syncope or syncope or device discharge (if  present)  Pulmonary: without asthma, wheezing, cough, hemoptysis  GI: without nausea, emesis, jaundice, pain, hematemesis, melena  : without frequency, urgency, hematuria, stones, pain, abnormal bleeding, frequency, urgency  Neurologic: without TIA, CVA, trauma, seizure  Dermatologic: without lesions, abrasion rash,   Orthopedic/Rheum: without significant joint pain, impairment, limb, polyserositis, ulceration, Raynauds  Heme: without mass, bruising, frequent infection, anemia  Psychiatric: without substance abuse, hallucination, medication, depression    Constitutional: alert, oriented, normal gait and station, normal mentation.  Oral: moist mucous membrans  Lymph: without pathologic adenopathy  Chest: clear to ausculation and percussion  Cor: No evidence of left or right ventricular activity.  Rhythm is regular.  S1 normal, S2 split physiologically. Murmurs are not present  Abdomen: without tenderness, rebound, guarding, masses, ascites  Extremities: Edema not present  Neuro: no focal defects, normal mentation  Skin: + butterfly  Psych: oriented, verbal, mental status in tact    Exercise tolerance:  Echocardiographic findings of TR, modest PA, normal systolic function.      : 1960  Study Date: 2016 01:31 PM  Age: 56 yrs  Gender: Female  Patient Location: [facility]  Reason For Study:     History: tricuspid regurgitation  Ordering Physician: JOSE ARELLANO  Referring Physician: JOSE ARELLANO  Performed By: Hugh Sharpe MD     BSA: 2.2 m2  Height: 63 in  Weight: 269 lb  ______________________________________________________________________________     Interpretation Summary  Left ventricular function, chamber size, wall motion, and wall thickness are  normal.The EF is 55-60%.  Global right ventricular function is normal. Mild right ventricular dilation  is present.  Mild to moderate TR with mild prolapse of the posterior leaflet of the  tricupsid valve.  A small patent foramen ovale is  present. There is no ASD (secundum, sinus  venosus or unroofed coronary sinus). All 4 pulmonary veins drain into the  left atrium.  Mild pulmonary hypertension is present with RVSP 45mmHg above RAP.     ______________________________________________________________________________        Procedure  Transesophageal Echocardiogram with color and spectral Doppler performed. The  procedure was performed in the Echo Lab. Informed consent for Transesophegeal  echo obtained. EVE Probe #12 was used during the procedure. Patient was  sedated using Fentanyl 100 mcg. Patient was sedated using Versed 4 mg. The  Transducer was inserted without difficulty . The patient tolerated the  procedure well. Complications None. ECG shows normal sinus rhythm. Good  quality two-dimensional was performed and interpreted.     Left Ventricle  Left ventricular function, chamber size, wall motion, and wall thickness are  normal.The EF is 55-60%.     Right Ventricle  Global right ventricular function is normal. Mild right ventricular dilation  is present.     Atria  Both atria appear normal. The left atrial appendage is normal. It is free of  spontaneous echo contrast and thrombus. A small patent foramen ovale is  present. The patent foramen ovale was demonstrated by color Doppler and  agitated saline bubble study . The patent foramen ovale was demonstrated with  Valsalva's maneuver. No evidence of ASD.     Mitral Valve  The mitral valve is normal. Trace mitral insufficiency is present.     Aortic Valve  Aortic valve is normal in structure and function. The aortic valve is  tricuspid.  Tricuspid Valve  Mild to moderate tricuspid insufficiency is present. Right ventricular  systolic pressure is 45mmHg above the right atrial pressure. Mild pulmonary  hypertension is present. Mild prolapse of the posterior leaflet of the  tricupsid valve is noted.     Pulmonic Valve  The pulmonic valve is normal.     Vessels  The pulmonary artery is normal. The  aorta root is normal. The thoracic aorta  is normal. All four pulmonary veins visualized with dampened velocity  waveforms.     Pericardium  No pericardial effusion is present.     Compared to Previous Study  This study was compared with the study from 2016. A small PFO has been  identified. .     ______________________________________________________________________________     Doppler Measurements & Calculations  TR max maria isabel: 336.7 cm/sec  TR max P.5 mmHg  ______________________________________________________________________________         Name: IDA PADRON  MRN: [id number]  : 1960  Study Date: 2016 01:31 PM  Age: 56 yrs  Gender: Female  Patient Location: [facility]  Reason For Study:     History: tricuspid regurgitation  Ordering Physician: JOSE ARELLANO  Referring Physician: JOSE ARELLANO  Performed By: Hugh Sharpe MD     BSA: 2.2 m2  Height: 63 in  Weight: 269 lb  ______________________________________________________________________________     Interpretation Summary  Left ventricular function, chamber size, wall motion, and wall thickness are  normal.The EF is 55-60%.  Global right ventricular function is normal. Mild right ventricular dilation  is present.  Mild to moderate TR with mild prolapse of the posterior leaflet of the  tricupsid valve.  A small patent foramen ovale is present. There is no ASD (secundum, sinus  venosus or unroofed coronary sinus). All 4 pulmonary veins drain into the  left atrium.  Mild pulmonary hypertension is present with RVSP 45mmHg above RAP.     ______________________________________________________________________________        Procedure  Transesophageal Echocardiogram with color and spectral Doppler performed. The  procedure was performed in the Echo Lab. Informed consent for Transesophegeal  echo obtained. EVE Probe #12 was used during the procedure. Patient was  sedated using Fentanyl 100 mcg. Patient was sedated using Versed 4 mg.  The  Transducer was inserted without difficulty . The patient tolerated the  procedure well. Complications None. ECG shows normal sinus rhythm. Good  quality two-dimensional was performed and interpreted.     Left Ventricle  Left ventricular function, chamber size, wall motion, and wall thickness are  normal.The EF is 55-60%.     Right Ventricle  Global right ventricular function is normal. Mild right ventricular dilation  is present.     Atria  Both atria appear normal. The left atrial appendage is normal. It is free of  spontaneous echo contrast and thrombus. A small patent foramen ovale is  present. The patent foramen ovale was demonstrated by color Doppler and  agitated saline bubble study . The patent foramen ovale was demonstrated with  Valsalva's maneuver. No evidence of ASD.     Mitral Valve  The mitral valve is normal. Trace mitral insufficiency is present.     Aortic Valve  Aortic valve is normal in structure and function. The aortic valve is  tricuspid.  Tricuspid Valve  Mild to moderate tricuspid insufficiency is present. Right ventricular  systolic pressure is 45mmHg above the right atrial pressure. Mild pulmonary  hypertension is present. Mild prolapse of the posterior leaflet of the  tricupsid valve is noted.     Pulmonic Valve  The pulmonic valve is normal.     Vessels  The pulmonary artery is normal. The aorta root is normal. The thoracic aorta  is normal. All four pulmonary veins visualized with dampened velocity  waveforms.     Pericardium  No pericardial effusion is present.     Compared to Previous Study  This study was compared with the study from 2016. A small PFO has been  identified. .     ______________________________________________________________________________     Doppler Measurements & Calculations  TR max maria isabel: 336.7 cm/sec  TR max P.5 mmHg  ______________________________________________________________________________       Procedures:  ECHO: 09:   Interpretation  Summary   The Ejection Fraction is estimated at 55-60%. No regional wall motion   abnormalities are seen. The right ventricle is normal size. Global right   ventricular function is normal. Right ventricular systolic pressure is 27mmHg   above the right atrial pressure. No pericardial effusion is present. The   inferior vena cava is normal.   Stress test: 08-10-09:   1. Abnormal study with a high degree of certainty.   2. There is a predominantly fixed medium sized moderately decreased   intensity myocardial perfusion defect with minimal periinfarct   reversibility involving the apical anterior, mid anterior, and apical   region of the heart. There is corresponding hypokinesis. No other   adenosine induced fixed or reversible myocardial perfusion defect.   3. Left ventricular size is enlarged at rest. Transient ischemic   dilation of the left ventricle did not occur.   4. The left ventricular ejection fraction is 56 %.   5. Summed Stress Score is calculated at 18, which is associated   with increased risk of future coronary events.   CCL: 08-26-09: ARELLANO:   Mrs. Live is a 49 year old female with known coronary artery disease s/p MI in 1996 found to have minimal disease on catheterization. Her cardiovascular risk factors include HTN and hyperlipidemia. She presented with a 2 week history of dyspnea with exertion and lower extremity edema. She underwent Adenosine MPI which revealed a fixed medium-sized defect with minimal periinfarct reversability and anterior hypokinesis. Her LVEF was preserved at 56%. CT angio of the coronaries revealed moderate stenosis in the pLAD and dRCA and mild to moderate stenosis of the pLCx.   Access: 6F long RFA, 6F RFV   Coronary Anatomy:   LMCA: normal   LAD: There is a long, discrete, ectatic 70-80% lesion in the ostial and proximal LAD. There is one D1 branch without any significant disease.   LCx: no significant disease. There is a large OM1 branch that has a 50-60% stenosis prior to  the branch bifurcation.   RCA: The proximal and mid RCA have luminal irregularities without significance. The distal RCA has a complex bifurcation lesion prior to the take-off of the rPDA. There is disease in the ostial PL1 as well.   Conclusions:   1. 3-vessel coronary artery disease without left main lesion   Plan   Discussed the options in depth with the patient. Given the proximity of the LAD lesion to the LMCA and the complexity of the dRCA lesion, we recommended CABG for the patient. She will be admitted to the hospital.Discussed with Darleen Johnson MD.   Cardiac Surgery: 2009   Triple vessel coronary artery bypass grafting. Left internal mammary artery was placed to the left anterior descending coronary artery and separate reverse saphenous vein grafts were placed to the obtuse marginal and right posterior descending coronary arteries.     Name: IDA PADRON  MRN: [id number]  : 1960  Study Date: 2018 11:48 AM  Age: 57 yrs  Gender: Female  Patient Location: [facility]  Reason For Study: Hx PFO, Elevated PA pressures  Ordering Physician: SANNA SALAZAR  Referring Physician: SANNA SALAZAR  Performed By: Tray Harvey RDCS     BSA: 2.2 m2  Height: 63 in  Weight: 257 lb  HR: 62  BP: 167/94 mmHg  _____________________________________________________________________________  __        Procedure  Bubble Echocardiogram with two-dimensional, color and spectral Doppler  performed. IV start location R Hand . Bateriostatic Saline used for Bubble  Study.  _____________________________________________________________________________  __        Interpretation Summary     Right ventricular function, chamber size, wall motion, and thickness are  normal.  PFO again documented with color doppler and Bubble study.  Mild to moderate tricuspid insufficiency is present.  Right ventricular systolic pressure is 61mmHg above the right atrial pressure.  No pericardial effusion is  present.  _____________________________________________________________________________  __        Left Ventricle  Global and regional left ventricular function is normal with an EF of 55-60%.  Left ventricular wall thickness is normal. Left ventricular size is normal.  Left ventricular diastolic function is indeterminate. No regional wall motion  abnormalities are seen.     Right Ventricle  Right ventricular function, chamber size, wall motion, and thickness are  normal.     Atria  Moderate biatrial enlargement is present. PFO again documented with color  doppler and Bubble study.     Mitral Valve  Mild to moderate mitral insufficiency is present.        Aortic Valve  Aortic valve is normal in structure and function.     Tricuspid Valve  Mild to moderate tricuspid insufficiency is present. Right ventricular  systolic pressure is 61mmHg above the right atrial pressure.     Pulmonic Valve  The pulmonic valve is normal. Trace to mild pulmonic insufficiency is present.     Vessels  The aorta root is normal. The pulmonary artery is normal. The inferior vena  cava is normal.     Pericardium  No pericardial effusion is present.     _____________________________________________________________________________  __  MMode/2D Measurements & Calculations  IVSd: 0.89 cm  LVIDd: 5.3 cm  LVIDs: 3.1 cm  LVPWd: 0.75 cm  FS: 41.8 %  LV mass(C)d: 154.8 grams  LV mass(C)dI: 72.0 grams/m2     Ao root diam: 3.0 cm  LA dimension: 5.3 cm  asc Aorta Diam: 3.6 cm  LA/Ao: 1.8  LVOT diam: 2.2 cm  LVOT area: 3.7 cm2  LA Volume (BP): 96.0 ml  LA Volume Index (BP): 44.7 ml/m2  RWT: 0.28        Doppler Measurements & Calculations  MV E max maria isabel: 92.8 cm/sec  MV A max maria isabel: 58.2 cm/sec  MV E/A: 1.6  MV dec time: 0.15 sec     Ao V2 max: 147.4 cm/sec  Ao max P.0 mmHg  TARUN(V,D): 2.6 cm2  LV V1 max P.3 mmHg  LV V1 max: 104.1 cm/sec  LV V1 VTI: 24.8 cm  MR ERO: 0.27 cm2  CO(LVOT): 5.6 l/min  CI(LVOT): 2.6 l/min/m2  SV(LVOT): 92.9 ml  SI(LVOT):  43.2 ml/m2  PA V2 max: 110.6 cm/sec  PA max P.9 mmHg  PA acc time: 0.08 sec  PI end-d luis: 154.4 cm/sec  PI max luis: 255.8 cm/sec  PI max P.2 mmHg  TR max luis: 385.4 cm/sec  TR max P.4 mmHg  Pulm Sys Luis: 63.2 cm/sec  Pulm Peng Luis: 97.7 cm/sec  Pulm S/D: 0.65  E/E' av.1  Lateral E/e': 8.9  Medial E/e': 15.4    Results (pressures in mmHg)  RA: 1/4/3  RV 32/3  PA /;  PA Sat 65%  PCWP -/-/5;  PCW Sat --%  Kannan CO/CI 3.3/1.6 L/min; TD CO 4.1/2.0 L/min  PVR 4.2 bermudez; TPR 5.7 bermudez  Hb 12.3 g/dL  NIBP 167/81/116  SVR 2200 dynes*sec/cm^5     [place]:  SVC 62.7%  IVC 65.0%  Low RA 73.0%  Mid RA 68.0%  High RA 64.6%  RV 63%  PA 65%  PCW ---     Complications: None   Blood loss: Minimal blood loss     Conclusions:  1. Low right and left sided filling pressures.  2. Normal pulmonary artery pressures.  3. Low normal cardiac output.  Current Outpatient Prescriptions   Medication     aspirin 81 MG tablet     atorvastatin (LIPITOR) 40 MG tablet     Calcium Carbonate-Vitamin D (CALCIUM 600-D PO)     erythromycin (ROMYCIN) ophthalmic ointment     hydroxychloroquine (PLAQUENIL) 200 MG tablet     lisinopril (PRINIVIL/ZESTRIL) 20 MG tablet     metoprolol tartrate (LOPRESSOR) 50 MG tablet     MULTIPLE VITAMIN PO     mupirocin (BACTROBAN NASAL) 2 % nasal ointment     neomycin-polymyxin-hydrocortisone (CORTISPORIN) 3.5-90722-6 otic suspension     Omega-3 Fatty Acids (FISH OIL) 1200 MG capsule     warfarin (COUMADIN) 5 MG tablet     No current facility-administered medications for this visit.         Recommendations:        Assessment:  1. Congratulations are in order for weight loss  2. In view of elevated systemic vascular resistance and low cardiac output would increase lisinopril to 30 and recheck renal function and blood pressure in 4 weeks  3. Her pulse is 55-60 on metoprolol 100  4. PFO closure not indicated  5. No catherization evidence of pulmonary hypertension  6. Lipid profile is to be checked today along  with IL6 level and BNP  7. See Dr. Aguilera for follow-up in one month  8. Return to see cardiology in [place]         Please do not hesitate to contact me if you have any questions/concerns.     Sincerely,     Twin Moreno MD      CC:  Eliz Adame

## 2018-05-31 NOTE — MR AVS SNAPSHOT
After Visit Summary   5/31/2018    Luz Marina Live    MRN: [id number]           Patient Information     Date Of Birth          1960        Visit Information        Provider Department      5/31/2018 10:30 AM Twin Moreno MD [facility]        Today's Diagnoses     Hyperlipidemia LDL goal <130    -  1    Coronary artery disease due to lipid rich plaque        Systemic lupus erythematosus with glomerular disease, unspecified SLE type (H)          Care Instructions    Please increase Lisinopril to 30 MG once daily.    Please have a lab draw a day before you see Dr. Nguyen in about a month.    Please call insurance company about the ordered  Sleep study and let [employer] know what they say.    Thank you for your visit today.  Please call me with any questions or concerns.   Sonu Mac RN  Cardiology Care Coordinator  [phone], press option 1 then option 3          Follow-ups after your visit        Additional Services     Follow-Up with Cardiologist                 Your next 10 appointments already scheduled     May 31, 2018  1:45 PM CDT   Lab with  LAB   [facility] ([facility])    [address] [address] [phone]            Aug 06, 2018 11:30 AM CDT   [facility] Visit with Ever Adame MD   [facility]    [address] [place] [address] [phone]              Future tests that were ordered for you today     Open Future Orders        Priority Expected Expires Ordered    Follow-Up with Cardiologist Routine 2/25/2019 5/26/2019 5/31/2018    Lipid Profile Routine 5/31/2018 5/31/2019 5/31/2018    Interleukin 6 Blood Routine 5/31/2018 5/31/2019 5/31/2018    N terminal pro BNP outpatient Routine 5/31/2018 5/31/2019 5/31/2018    SLEEP EVALUATION & MANAGEMENT REFERRAL - ADULT -[facility] Neurology   [facility] [phone] Routine   "2019            Who to contact     If you have questions or need follow up information about today's clinic visit or your schedule please contact [facility] directly at [phone].  Normal or non-critical lab and imaging results will be communicated to you by MyChart, letter or phone within 4 business days after the clinic has received the results. If you do not hear from us within 7 days, please contact the clinic through MyChart or phone. If you have a critical or abnormal lab result, we will notify you by phone as soon as possible.  Submit refill requests through [employer] or call your pharmacy and they will forward the refill request to us. Please allow 3 business days for your refill to be completed.          Additional Information About Your Visit        [employer] Information     [employer] lets you send messages to your doctor, view your test results, renew your prescriptions, schedule appointments and more. To sign up, go to [email or web address] . Click on \"Log in\" on the left side of the screen, which will take you to the Welcome page. Then click on \"Sign up Now\" on the right side of the page.     You will be asked to enter the access code listed below, as well as some personal information. Please follow the directions to create your username and password.     Your access code is: [email or web address]  Expires: 8/15/2018  6:31 AM     Your access code will  in 90 days. If you need help or a new code, please call your [facility] clinic or [phone].        Care EveryWhere ID     This is your Care EveryWhere ID. This could be used by other organizations to access your [facility] medical records  [id number]        Your Vitals Were     Pulse Height Pulse Oximetry BMI (Body Mass Index)          55 1.6 m (5' 3\") 100% 45.67 kg/m2         Blood Pressure from Last 3 Encounters:   18 145/84   18 155/77   04/10/18 120/68    Weight from Last 3 Encounters:   18 116.9 kg (257 lb 12.8 " oz)   04/30/18 113.4 kg (250 lb)   04/10/18 115 kg (253 lb 9.6 oz)              We Performed the Following     Follow-Up with [facility]          Today's Medication Changes          These changes are accurate as of 5/31/18  1:27 PM.  If you have any questions, ask your nurse or doctor.               These medicines have changed or have updated prescriptions.        Dose/Directions    lisinopril 20 MG tablet   Commonly known as:  PRINIVIL/ZESTRIL   This may have changed:  how much to take   Used for:  Systemic lupus erythematosus with glomerular disease, unspecified SLE type (H)   Changed by:  Twin Moreno MD        Dose:  30 mg   Take 1.5 tablets (30 mg) by mouth daily   Quantity:  145 tablet   Refills:  1            Where to get your medicines      These medications were sent to [facility]/pharmacy #[id number] - [place], [place] - [address]  [address], [place] [address]     Phone:  [phone]     lisinopril 20 MG tablet                Primary Care Provider Office Phone # Fax #    Eliz My Nguyen [phone]       [facility] [address]  [facility] [address]        Equal Access to Services     [facility] Center[facility]SE[facility] : Hadii bridgette betts hadasho Somaribellali, waaxda luqadaha, qaybta kaalmada ademenayada, mayte roberts. So [facility] [phone].    ATENCIÓN: Si habla español, tiene a bennett disposición servicios gratuitos de asistencia lingüística. Kieran al [phone].    We comply with applicable federal civil rights laws and [place] laws. We do not discriminate on the basis of race, color, national origin, age, disability, sex, sexual orientation, or gender identity.            Thank you!     Thank you for choosing [facility]  for your care. Our goal is always to provide you with excellent care. Hearing back from our patients is one way we can continue to improve our services. Please take a few minutes to complete the written  survey that you may receive in the mail after your visit with us. Thank you!             Your Updated Medication List - Protect others around you: Learn how to safely use, store and throw away your medicines at www.disposemymeds.org.          This list is accurate as of 5/31/18  1:27 PM.  Always use your most recent med list.                   Brand Name Dispense Instructions for use Diagnosis    aspirin 81 MG tablet      Take by mouth daily        atorvastatin 40 MG tablet    LIPITOR    90 tablet    Take 1 tablet (40 mg) by mouth daily    Pure hypercholesterolemia       CALCIUM 600-D PO      Take  by mouth 2 times daily.    SLE (systemic lupus erythematosus) (H)       erythromycin ophthalmic ointment    ROMYCIN    1 Tube    Place 1 Application Into the left eye 4 times daily    Conjunctivitis of both eyes, unspecified conjunctivitis type       hydroxychloroquine 200 MG tablet    PLAQUENIL    60 tablet    Take 1 tablet (200 mg) by mouth 2 times daily    Systemic lupus erythematosus with glomerular disease, unspecified SLE type (H)       lisinopril 20 MG tablet    PRINIVIL/ZESTRIL    145 tablet    Take 1.5 tablets (30 mg) by mouth daily    Systemic lupus erythematosus with glomerular disease, unspecified SLE type (H)       metoprolol tartrate 50 MG tablet    LOPRESSOR    180 tablet    TAKE 1 TABLET (50 MG) BY MOUTH 2 TIMES DAILY    Atrial fibrillation, unspecified type (H)       MULTIPLE VITAMIN PO      Take 1 tablet by mouth daily        mupirocin 2 % nasal ointment    BACTROBAN NASAL    10 g    Place a thin layer inside right nose on septum BID x 2 weeks    Nasal septum ulceration       neomycin-polymyxin-hydrocortisone 3.5-51825-6 otic suspension    CORTISPORIN    10 mL    Place 3 drops into both ears 3 times daily    Infective otitis externa, bilateral       omega-3 fatty acids 1200 MG capsule      Take 2 capsules by mouth 2 times daily        warfarin 5 MG tablet    COUMADIN    65 tablet    Take 5 mg Fri and 10  mg all other days or as directed by coumadin clinic    Atrial fibrillation, unspecified type (H)

## 2018-05-31 NOTE — NURSING NOTE
Chief Complaint   Patient presents with     Follow Up For     3 month return HF     Vitals were taken and medications were reconciled.  Abdifatah Isbell, GROVER  10:31 AM

## 2018-05-31 NOTE — NURSING NOTE
Cardiac Testing: Patient given instructions regarding sleep study.  Discussed purpose, preparation, procedure and when to expect results reported back to the patient. Patient demonstrated understanding of this information and agreed to call with further questions or concerns.  Labs: IL6, lipids, and NT pro bp today. BMP in 4 weeks. Patient was instructed to return for the next laboratory testing in 4 weeks . Patient demonstrated understanding of this information and agreed to call with further questions or concerns.   Med Reconcile: Reviewed and verified all current medications with the patient. The updated medication list was printed and given to the patient.  Return Appointment: Follow up in 8-9 months. Patient given instructions regarding scheduling next clinic visit. Patient demonstrated understanding of this information and agreed to call with further questions or concerns.  Medication Change: Increase Lisinopril to 30 MG once daily. Patient was educated regarding prescribed medication change, including discussion of the indication, administration, side effects, and when to report to MD or RN. Patient demonstrated understanding of this information and agreed to call with further questions or concerns.  Patient stated she understood all health information given and agreed to call with further questions or concerns.

## 2018-05-31 NOTE — PATIENT INSTRUCTIONS
Please increase Lisinopril to 30 MG once daily.    Please have a lab draw a day before you see Dr. Nguyen in about a month.    Please call insurance company about the ordered  Sleep study and let Sonu LOVE know what they say.    Thank you for your visit today.  Please call me with any questions or concerns.   Sonu Mac RN  Cardiology Care Coordinator  [phone], press option 1 then option 3

## 2018-06-01 LAB — IL6 SERPL-MCNC: 4.52 PG/ML

## 2018-06-11 PROBLEM — M32.14 SYSTEMIC LUPUS ERYTHEMATOSUS WITH GLOMERULAR DISEASE, UNSPECIFIED SLE TYPE (H): Chronic | Status: ACTIVE | Noted: 2017-07-03

## 2018-06-11 PROBLEM — F51.04 PSYCHOPHYSIOLOGICAL INSOMNIA: Status: ACTIVE | Noted: 2018-06-11

## 2018-06-11 PROBLEM — E66.01 MORBID OBESITY (H): Chronic | Status: ACTIVE | Noted: 2018-06-11

## 2018-06-11 PROBLEM — M32.14 SYSTEMIC LUPUS ERYTHEMATOSUS WITH GLOMERULAR DISEASE, UNSPECIFIED SLE TYPE (H): Status: ACTIVE | Noted: 2017-07-03

## 2018-06-11 RX ORDER — ZOLPIDEM TARTRATE 5 MG/1
TABLET ORAL
Qty: 1 TABLET | Refills: 0 | Status: SHIPPED | OUTPATIENT
Start: 2018-06-11 | End: 2018-06-25

## 2018-06-12 ENCOUNTER — ANTICOAGULATION THERAPY VISIT (OUTPATIENT)
Dept: ANTICOAGULATION | Facility: OTHER | Age: 58
End: 2018-06-12
Payer: COMMERCIAL

## 2018-06-12 DIAGNOSIS — I48.0 PAF (PAROXYSMAL ATRIAL FIBRILLATION) (H): ICD-10-CM

## 2018-06-12 DIAGNOSIS — R06.09 DYSPNEA ON EXERTION: ICD-10-CM

## 2018-06-12 DIAGNOSIS — F51.04 INSOMNIA, PSYCHOPHYSIOLOGICAL: ICD-10-CM

## 2018-06-12 DIAGNOSIS — G47.33 OBSTRUCTIVE SLEEP APNEA SYNDROME: Primary | ICD-10-CM

## 2018-06-12 DIAGNOSIS — I48.91 ATRIAL FIBRILLATION, UNSPECIFIED TYPE (H): ICD-10-CM

## 2018-06-12 DIAGNOSIS — Z79.01 LONG TERM CURRENT USE OF ANTICOAGULANT THERAPY: ICD-10-CM

## 2018-06-12 DIAGNOSIS — Z79.01 LONG-TERM (CURRENT) USE OF ANTICOAGULANTS: ICD-10-CM

## 2018-06-12 DIAGNOSIS — I48.20 CHRONIC ATRIAL FIBRILLATION (H): ICD-10-CM

## 2018-06-12 LAB
BASE EXCESS BLDV CALC-SCNC: 1.7 MMOL/L
HCO3 BLDV-SCNC: 27 MMOL/L (ref 21–28)
INR PPP: 2.01 (ref 0.86–1.14)
O2/TOTAL GAS SETTING VFR VENT: 21 %
PCO2 BLDV: 45 MM HG (ref 40–50)
PH BLDV: 7.39 PH (ref 7.32–7.43)
PO2 BLDV: 38 MM HG (ref 25–47)

## 2018-06-12 PROCEDURE — 85610 PROTHROMBIN TIME: CPT | Performed by: INTERNAL MEDICINE

## 2018-06-12 PROCEDURE — 99207 ZZC NO CHARGE NURSE ONLY: CPT | Performed by: INTERNAL MEDICINE

## 2018-06-12 PROCEDURE — 82803 BLOOD GASES ANY COMBINATION: CPT | Performed by: INTERNAL MEDICINE

## 2018-06-12 PROCEDURE — 36415 COLL VENOUS BLD VENIPUNCTURE: CPT | Performed by: INTERNAL MEDICINE

## 2018-06-12 RX ORDER — WARFARIN SODIUM 5 MG/1
TABLET ORAL
Qty: 160 TABLET | Refills: 0 | Status: SHIPPED | OUTPATIENT
Start: 2018-06-12 | End: 2018-09-06

## 2018-06-12 NOTE — TELEPHONE ENCOUNTER
Prescription approved per [facility] Refill Protocol.  Cait Hawthorne RN

## 2018-06-12 NOTE — MR AVS SNAPSHOT
Luz Marina Live   6/12/2018   Anticoagulation Therapy Visit    Description:  57 year old female   Provider:  Eliz Nguyen MD   Department:  [facility]           INR as of 6/12/2018     Today's INR 2.01      Anticoagulation Summary as of 6/12/2018     INR goal 2.0-3.0   Today's INR 2.01   Full warfarin instructions 5 mg on Fri; 10 mg all other days   Next INR check 7/17/2018    Indications   Long-term (current) use of anticoagulants [Z79.01] [Z79.01]  PAF (paroxysmal atrial fibrillation) (H) [I48.0]         Contact Numbers     Clinic Number:         June 2018 Details    Sun Mon Tue Wed Thu Fri Sat          1               2                 3               4               5               6               7               8               9                 10               11               12      10 mg   See details      13      10 mg         14      10 mg         15      5 mg         16      10 mg           17      10 mg         18      10 mg         19      10 mg         20      10 mg         21      10 mg         22      5 mg         23      10 mg           24      10 mg         25      10 mg         26      10 mg         27      10 mg         28      10 mg         29      5 mg         30      10 mg          Date Details   06/12 This INR check               How to take your warfarin dose     To take:  5 mg Take 1 of the 5 mg tablets.    To take:  10 mg Take 2 of the 5 mg tablets.           July 2018 Details    Sun Mon Tue Wed Thu Fri Sat     1      10 mg         2      10 mg         3      10 mg         4      10 mg         5      10 mg         6      5 mg         7      10 mg           8      10 mg         9      10 mg         10      10 mg         11      10 mg         12      10 mg         13      5 mg         14      10 mg           15      10 mg         16      10 mg         17            18               19               20               21                 22               23               24                25               26               27               28                 29               30               31                    Date Details   No additional details    Date of next INR:  7/17/2018         How to take your warfarin dose     To take:  5 mg Take 1 of the 5 mg tablets.    To take:  10 mg Take 2 of the 5 mg tablets.

## 2018-06-12 NOTE — PROGRESS NOTES
ANTICOAGULATION FOLLOW-UP CLINIC VISIT    Patient Name:  Luz Marina Live  Date:  6/12/2018  Contact Type:  Telephone    SUBJECTIVE:     Patient Findings     Positives No Problem Findings           OBJECTIVE    INR   Date Value Ref Range Status   06/12/2018 2.01 (H) 0.86 - 1.14 Final       ASSESSMENT / PLAN  INR assessment THER    Recheck INR In: 5 WEEKS    INR Location Outside lab      Anticoagulation Summary as of 6/12/2018     INR goal 2.0-3.0   Today's INR 2.01   Warfarin maintenance plan 5 mg (5 mg x 1) on Fri; 10 mg (5 mg x 2) all other days   Full warfarin instructions 5 mg on Fri; 10 mg all other days   Weekly warfarin total 65 mg   No change documented Cait Hawthorne, RN   Plan last modified Cait Hawthorne RN (4/30/2018)   Next INR check 7/17/2018   Target end date     Indications   Long-term (current) use of anticoagulants [Z79.01] [Z79.01]  PAF (paroxysmal atrial fibrillation) (H) [I48.0]         Anticoagulation Episode Summary     INR check location     Preferred lab     Send INR reminders to [facility] MAGDALENE Hoff 5 mg tabs, [facility] (needs staff message) PM dose      Anticoagulation Care Providers     Provider Role Specialty Phone number    Eliz Nguyen MD [facility] Internal Medicine [phone]            See the Encounter Report to view Anticoagulation Flowsheet and Dosing Calendar (Go to Encounters tab in chart review, and find the Anticoagulation Therapy Visit)    Dosage adjustment made based on physician directed care plan.      Cait Hawthorne, RN

## 2018-06-13 ENCOUNTER — THERAPY VISIT (OUTPATIENT)
Dept: SLEEP MEDICINE | Facility: CLINIC | Age: 58
End: 2018-06-13
Payer: COMMERCIAL

## 2018-06-13 DIAGNOSIS — F51.04 PSYCHOPHYSIOLOGICAL INSOMNIA: ICD-10-CM

## 2018-06-13 DIAGNOSIS — G47.33 OBSTRUCTIVE SLEEP APNEA SYNDROME: ICD-10-CM

## 2018-06-13 DIAGNOSIS — R06.09 DYSPNEA ON EXERTION: ICD-10-CM

## 2018-06-13 DIAGNOSIS — G47.33 OSA (OBSTRUCTIVE SLEEP APNEA): Chronic | ICD-10-CM

## 2018-06-13 PROCEDURE — 95810 POLYSOM 6/> YRS 4/> PARAM: CPT | Performed by: INTERNAL MEDICINE

## 2018-06-14 NOTE — PROGRESS NOTES
Pt arrived [facility]      Diagnostic PSG completed per provider order.  Patient met criteria for PAP therapy to late into the study to start CPAP titration.

## 2018-06-18 PROBLEM — G47.33 OSA (OBSTRUCTIVE SLEEP APNEA): Chronic | Status: ACTIVE | Noted: 2018-06-18

## 2018-06-18 NOTE — PROCEDURES
" SLEEP STUDY INTERPRETATION  DIAGNOSTIC POLYSOMNOGRAPHY REPORT      Patient: IDA PADRON  YOB: 1960  Study Date: 6/13/2018  MRN: [id number]  Referring Provider: Dr. Moreno  Ordering Provider: Dr. Adrian    Indications for Polysomnography: The patient is a 57 y old Female who is 5' 3\" and weighs 257.0 lbs. Her BMI is 45.5, Merrillan sleepiness scale 3.0 and neck circumference is 40.0 cm. Relevant medical history includes coronary artery disease, paroxysmal atrial fibrillation, hypertension, SLE.  A diagnostic polysomnogram was performed to evaluate for possible sleep apnea, hypoventilation.    Polysomnogram Data: A full night polysomnogram recorded the standard physiologic parameters including EEG, EOG, EMG, ECG, nasal and oral airflow. Respiratory parameters of chest and abdominal movements were recorded with respiratory inductance plethysmography. Oxygen saturation was recorded by pulse oximetry. Hypopnea scoring rule used: 1B 4%.    Sleep Architecture:   The total recording time of the polysomnogram was 486.5 minutes. The total sleep time was 378.5 minutes. Sleep latency was at 50.5 minutes from lights off and 27.0 minutes from stopping use of smartphone with the use of a sleep aid (zolpidem). REM latency was 64.5 minutes. Arousal index was 17.9 arousals per hour. Sleep efficiency was decreased at 77.8%. Wake after sleep onset was 56.5 minutes. The patient spent 6.6% of total sleep time in Stage N1, 56.1% in Stage N2, 15.9% in Stage N3, and 21.4% in REM. Time in REM supine was 41.5 minutes.      Respiration:     Events ? The polysomnogram revealed a presence of 97 obstructive, 3 central, and 4 mixed apneas resulting in an apnea index of 16.5 events per hour. There were 91 obstructive hypopneas and 0 central hypopneas resulting in an obstructive hypopnea index of 14.4 and central hypopnea index of 0 events per hour. The combined apnea/hypopnea index was 30.9 events per hour (central apnea/hypopnea " index was 0.5 events per hour). The REM AHI was 76.3 events per hour. The supine AHI was 48.7 events per hour. The RERA index was 1.6 events per hour.  The RDI was 32.5 events per hour.    Snoring - was reported as moderate to loud.    Respiratory rate and pattern - was notable for normal respiratory rate and pattern.    Sustained Sleep Associated Hypoventilation - Transcutaneous carbon dioxide monitoring was used, however severe hypoventilation was not suggested with a maximum change from baseline of 35-38 mmHg to a peak of 46 mmHg and 0 minutes at or greater than 55 mmHg.    Sleep Associated Hypoxemia - (Greater than 5 minutes O2 sat at or below 88%) was present. Baseline oxygen saturation was 94.3%. Lowest oxygen saturation was 71.1%. Time spent less than or equal to 88% was 20.8 minutes. Time spent less than or equal to 89% was 25.7 minutes.    Movement Activity:     Periodic Limb Activity - There were 143 PLMs during the entire study. The PLM index was 22.7 movements per hour. The PLM Arousal Index was 0.3 per hour.    REM EMG Activity - Excessive transient/sustained muscle activity was not present.    Nocturnal Behavior - Abnormal sleep related behaviors were not noted    Bruxism - None apparent.      Cardiac Summary:   The average pulse rate was 57.9 bpm. The minimum pulse rate was 48.9 bpm while the maximum pulse rate was 71.1 bpm.  Arrhythmias were not noted.      Assessment:     Moderate-severe obstructive sleep-apnea with REM related worsening    Sleep related hypoxemia    Periodic limb movements of sleep    Recommendations:    Recommend repeat polysomnography with full night titration of positive airway pressure therapy for the control of sleep disordered breathing.    Alternatively, treatment could be empirically initiated with Auto?titrating PAP therapy with a range of 5 to 18 cmH2O. Recommend clinical follow up with sleep management team including oximetry on PAP.    Patient may be a candidate for  dental appliance through referral to Sleep Dentistry for the treatment of obstructive sleep apnea and/or socially disruptive snoring.    Advice regarding the risks of drowsy driving.    Suggest optimizing sleep schedule and avoiding sleep deprivation.    Weight management (if BMI > 30).    Pharmacologic therapy should be used for management of restless legs syndrome only if present and clinically indicated and not based on the presence of periodic limb movements alone.    Diagnostic Codes:   Obstructive Sleep Apnea G47.33  Sleep Hypoxemia/Hypoventilation G47.36       _____________________________________   Electronically Signed By: Alexy Adrian MD 6/18/18           Range(%) Time in range (min)   0.0 - 89.0 25.7   0.0 - 88.0 20.8         Stage Min(mm Hg) Max(mm Hg)   Wake 20.1 44.7   NREM(1+2+3) 38.7 43.9   REM 38.5 46.1       Range(mmHg) Time in range (min)   55.0 - 100.0 -   Excluded data <20.0 & >90.0 10.7

## 2018-06-21 ENCOUNTER — TELEPHONE (OUTPATIENT)
Dept: INTERNAL MEDICINE | Facility: CLINIC | Age: 58
End: 2018-06-21

## 2018-06-21 NOTE — TELEPHONE ENCOUNTER
[facility] Call Center    Phone Message    May a detailed message be left on voicemail: no    Reason for Call: Medication Refill Request    Has the patient contacted the pharmacy for the refill? Yes   Name of medication being requested: Flonase  Provider who prescribed the medication: Patrick  Pharmacy: [address] [place] [address]  Date medication is needed: 06/21/2018         Action Taken: Message routed to:  [facility] ([facility]): Flonase prescription needed

## 2018-06-25 ENCOUNTER — APPOINTMENT (OUTPATIENT)
Dept: LAB | Facility: CLINIC | Age: 58
End: 2018-06-25
Payer: COMMERCIAL

## 2018-06-25 ENCOUNTER — OFFICE VISIT (OUTPATIENT)
Dept: INTERNAL MEDICINE | Facility: CLINIC | Age: 58
End: 2018-06-25
Attending: INTERNAL MEDICINE
Payer: COMMERCIAL

## 2018-06-25 ENCOUNTER — OFFICE VISIT (OUTPATIENT)
Dept: SLEEP MEDICINE | Facility: CLINIC | Age: 58
End: 2018-06-25
Payer: COMMERCIAL

## 2018-06-25 VITALS
HEART RATE: 59 BPM | RESPIRATION RATE: 16 BRPM | DIASTOLIC BLOOD PRESSURE: 61 MMHG | BODY MASS INDEX: 46.25 KG/M2 | OXYGEN SATURATION: 98 % | WEIGHT: 261 LBS | HEIGHT: 63 IN | SYSTOLIC BLOOD PRESSURE: 102 MMHG

## 2018-06-25 VITALS
HEART RATE: 55 BPM | SYSTOLIC BLOOD PRESSURE: 116 MMHG | WEIGHT: 259 LBS | DIASTOLIC BLOOD PRESSURE: 79 MMHG | BODY MASS INDEX: 45.88 KG/M2

## 2018-06-25 DIAGNOSIS — E66.01 MORBID OBESITY (H): ICD-10-CM

## 2018-06-25 DIAGNOSIS — M32.14 SYSTEMIC LUPUS ERYTHEMATOSUS WITH GLOMERULAR DISEASE, UNSPECIFIED SLE TYPE (H): ICD-10-CM

## 2018-06-25 DIAGNOSIS — I10 BENIGN ESSENTIAL HYPERTENSION: Primary | ICD-10-CM

## 2018-06-25 DIAGNOSIS — G47.33 OSA (OBSTRUCTIVE SLEEP APNEA): ICD-10-CM

## 2018-06-25 DIAGNOSIS — J31.0 CHRONIC RHINITIS, UNSPECIFIED TYPE: ICD-10-CM

## 2018-06-25 LAB
ANION GAP SERPL CALCULATED.3IONS-SCNC: 6 MMOL/L (ref 3–14)
BUN SERPL-MCNC: 31 MG/DL (ref 7–30)
CALCIUM SERPL-MCNC: 9.4 MG/DL (ref 8.5–10.1)
CHLORIDE SERPL-SCNC: 105 MMOL/L (ref 94–109)
CO2 SERPL-SCNC: 29 MMOL/L (ref 20–32)
CREAT SERPL-MCNC: 1.06 MG/DL (ref 0.52–1.04)
GFR SERPL CREATININE-BSD FRML MDRD: 53 ML/MIN/1.7M2
GLUCOSE SERPL-MCNC: 95 MG/DL (ref 70–99)
POTASSIUM SERPL-SCNC: 5.1 MMOL/L (ref 3.4–5.3)
SODIUM SERPL-SCNC: 140 MMOL/L (ref 133–144)

## 2018-06-25 PROCEDURE — 36415 COLL VENOUS BLD VENIPUNCTURE: CPT | Performed by: INTERNAL MEDICINE

## 2018-06-25 PROCEDURE — 80048 BASIC METABOLIC PNL TOTAL CA: CPT | Performed by: INTERNAL MEDICINE

## 2018-06-25 PROCEDURE — 99214 OFFICE O/P EST MOD 30 MIN: CPT | Performed by: INTERNAL MEDICINE

## 2018-06-25 PROCEDURE — G0463 HOSPITAL OUTPT CLINIC VISIT: HCPCS | Mod: ZF

## 2018-06-25 RX ORDER — FLUTICASONE PROPIONATE 50 MCG
2 SPRAY, SUSPENSION (ML) NASAL DAILY
Qty: 1 BOTTLE | Refills: 11 | Status: SHIPPED | OUTPATIENT
Start: 2018-06-25 | End: 2018-10-29

## 2018-06-25 RX ORDER — LISINOPRIL 20 MG/1
30 TABLET ORAL DAILY
Qty: 145 TABLET | Refills: 1 | Status: SHIPPED | OUTPATIENT
Start: 2018-06-25 | End: 2018-10-29

## 2018-06-25 ASSESSMENT — ENCOUNTER SYMPTOMS
DECREASED CONCENTRATION: 0
SINUS CONGESTION: 1
DYSPNEA ON EXERTION: 0
NERVOUS/ANXIOUS: 0
POLYPHAGIA: 0
EYE WATERING: 1
BRUISES/BLEEDS EASILY: 0
CONSTIPATION: 0
ABDOMINAL PAIN: 0
SORE THROAT: 0
INSOMNIA: 0
SINUS PAIN: 0
SWOLLEN GLANDS: 0
POLYDIPSIA: 0
COUGH: 1
FEVER: 0
VOMITING: 0
ALTERED TEMPERATURE REGULATION: 0
FATIGUE: 0
DEPRESSION: 0
NAUSEA: 0
DIARRHEA: 0
PANIC: 0
CHILLS: 0

## 2018-06-25 ASSESSMENT — PAIN SCALES - GENERAL: PAINLEVEL: NO PAIN (0)

## 2018-06-25 NOTE — LETTER
6/26/2018         Luz Marina Live   [address]  [place] [address]        Dear Ms. Live:      Your labs were reviewed by Eliz Nguyen MD and are within acceptable ranges.  No changes are recommended         Results for orders placed or performed in visit on 06/25/18   Basic Metabolic Panel   Result Value Ref Range    Sodium 140 133 - 144 mmol/L    Potassium 5.1 3.4 - 5.3 mmol/L    Chloride 105 94 - 109 mmol/L    Carbon Dioxide 29 20 - 32 mmol/L    Anion Gap 6 3 - 14 mmol/L    Glucose 95 70 - 99 mg/dL    Urea Nitrogen 31 (H) 7 - 30 mg/dL    Creatinine 1.06 (H) 0.52 - 1.04 mg/dL    GFR Estimate 53 (L) >60 mL/min/1.7m2    GFR Estimate If Black 64 >60 mL/min/1.7m2    Calcium 9.4 8.5 - 10.1 mg/dL         Please note that test explanations are brief and do not reflect all diagnostic uses.  If you have any questions or concerns, please call the clinic at [phone].      Sincerely,      Rosario Coffey sent on behalf of  Eliz Nguyen MD

## 2018-06-25 NOTE — PROGRESS NOTES
HPI  Patient is here for follow-up on hypertension.  She reports that about a month ago she was seen by Dr. Moreno.  Her lisinopril was increased for 20 mg to 30 mg.  She reports that she has been feeling well since the dose changes.  She was advised to have blood pressure and kidney function checked in a month with her primary care provider.    Patient also states that she has significant nasal congestion and postnasal drip.  She does have a dry cough.  She also has intermittent itching and watery eyes.  She states that she started over-the-counter Claritin which has improved her symptoms.  However she is concerned about taking the medication long-term and wondering if she could try a different medication.    Review of Systems     Constitutional:  Negative for fever, chills and fatigue.   HENT:  Positive for sinus congestion. Negative for ear pain, nosebleeds, sore throat, ear discharge, tooth pain and sinus pain.    Eyes:  Positive for eye watering.   Respiratory:   Positive for cough. Negative for dyspnea on exertion.    Cardiovascular:  Negative for dyspnea on exertion.   Gastrointestinal:  Negative for nausea, vomiting, abdominal pain, diarrhea and constipation.   Skin:  Negative for itching and rash.   Endo/Heme:  Negative for anemia, swollen glands and bruises/bleeds easily.   Psychiatric/Behavioral:  Negative for depression, decreased concentration, mood swings and panic attacks.    Endocrine:  Negative for altered temperature regulation, polyphagia, polydipsia, unwanted hair growth and change in facial hair.    Current Outpatient Prescriptions   Medication     aspirin 81 MG tablet     atorvastatin (LIPITOR) 40 MG tablet     Calcium Carbonate-Vitamin D (CALCIUM 600-D PO)     erythromycin (ROMYCIN) ophthalmic ointment     hydroxychloroquine (PLAQUENIL) 200 MG tablet     lisinopril (PRINIVIL/ZESTRIL) 20 MG tablet     metoprolol tartrate (LOPRESSOR) 50 MG tablet     MULTIPLE VITAMIN PO     mupirocin  (BACTROBAN NASAL) 2 % nasal ointment     neomycin-polymyxin-hydrocortisone (CORTISPORIN) 3.5-44792-7 otic suspension     Omega-3 Fatty Acids (FISH OIL) 1200 MG capsule     warfarin (COUMADIN) 5 MG tablet     zolpidem (AMBIEN) 5 MG tablet     No current facility-administered medications for this visit.      Vitals:    06/25/18 1126 06/25/18 1127 06/25/18 1128   BP: 128/76 115/76 116/79   Pulse: 55 53 55   Weight: 117.5 kg (259 lb)           Physical Exam   Constitutional: She is well-developed, well-nourished, and in no distress.   HENT:   Head: Normocephalic and atraumatic.   Right Ear: Tympanic membrane is injected. No middle ear effusion.   Left Ear: Tympanic membrane is injected.  No middle ear effusion.   Nose: Mucosal edema and rhinorrhea present.   Eyes: Conjunctivae are normal. Pupils are equal, round, and reactive to light.   Neck: Normal range of motion. Neck supple.   Cardiovascular: Normal rate and regular rhythm.    Pulmonary/Chest: Effort normal and breath sounds normal.   Skin: Skin is warm and dry.   Psychiatric: Memory, affect and judgment normal.   Vitals reviewed.      Assessment and plan:    Luz Marina was seen today for recheck.    Diagnoses and all orders for this visit:    Benign essential hypertension.  Blood pressure is currently within acceptable range.  Patient was advised to check her renal function as well as electrolytes today.  She will be contacted with test results and further recommendations on medication dose adjustment.  -     lisinopril (PRINIVIL/ZESTRIL) 20 MG tablet; Take 1.5 tablets (30 mg) by mouth daily  -     Basic Metabolic Panel    Systemic lupus erythematosus with glomerular disease, unspecified SLE type (H).  Patient will continue regular follow-up with her rheumatologist as planned.    Chronic rhinitis, unspecified type.  Discussed symptoms as well as physical exam findings.  Recommend restarting Flonase 2 sprays in each nostril daily.  Patient was also advised to take  Claritin as needed.  Recommend evaluation by oncology if symptoms do not improve in 1 month.  Patient is in agreement with the plan.  -     OTOLARYNGOLOGY REFERRAL  -     fluticasone (FLONASE) 50 MCG/ACT spray; Spray 2 sprays into both nostrils daily    Total time spent 25 minutes.  More than 50% of the time spent with Ms. Live on counseling / coordinating her care    Eliz Nguyen MD

## 2018-06-25 NOTE — MR AVS SNAPSHOT
"              After Visit Summary   6/25/2018    Luz Marina Live    MRN: [id number]           Patient Information     Date Of Birth          1960        Visit Information        Provider Department      6/25/2018 1:30 PM Rodrigo Nguyễn MD [facility], [place], Sleep Study        Today's Diagnoses     KEITH (obstructive sleep apnea)        Morbid obesity (H)          Care Instructions    MY TREATMENT INFORMATION FOR SLEEP APNEA-  Luz Marina Live    DOCTOR : RODRIGO NGUYỄN  SLEEP CENTER :      MY CONTACT NUMBER:     Am I having a sleep study at a sleep center?  Make sure you have an appointment for the study before you leave!    Gradually increase the time you use the mask by holding it without headgear on your face during TV or reading 30 minutes or more a day before trying to wear it at night.      Frequently asked questions:  1. What is Obstructive Sleep Apnea (KEITH)? KEITH is the most common type of sleep apnea. Apnea means, \"without breath.\"  Apnea is most often caused by narrowing or collapse of the upper airway as muscles relax during sleep.   Almost everyone has occasional apneas. Most people with sleep apnea have had brief interruptions at night frequently for many years.  The severity of sleep apnea is related to how frequent and severe the events are.   2. What are the consequences of KEITH? Symptoms include: feeling sleepy during the day, snoring loudly, gasping or stopping of breathing, trouble sleeping, and occasionally morning headaches or heartburn at night.  Sleepiness can be serious and even increase the risk of falling asleep while driving. Other health consequences may include development of high blood pressure and other cardiovascular disease in persons who are susceptible. Untreated KEITH  can contribute to heart disease, stroke and diabetes.   3. What are the treatment options? In most situations, sleep apnea is a lifelong disease that must be managed with daily therapy. Medications are not effective for sleep " apnea and surgery is generally not considered until other therapies have been tried. Your treatment is your choice . Continuous Positive Airway (CPAP) works right away and is the therapy that is effective in nearly everyone. An oral device to hold your jaw forward is usually the next most reliable option. Other options include postioning devices (to keep you off your back), weight loss, and surgery including a tongue pacing device. There is more detail about some of these options below.    Important tips for using CPAP and similar devices   Know your equipment:  CPAP is continuous positive airway pressure that prevents obstructive sleep apnea by keeping the throat from collapsing while you are sleeping. In most cases, the device is  smart  and can slowly self-adjusts if your throat collapses and keeps a record every day of how well you are treated-this information is available to you and your care team.  BPAP is bilevel positive airway pressure that keeps your throat open and also assists each breath with a pressure boost to maintain adequate breathing.  Special kinds of BPAP are used in patients who have inadequate breathing from lung or heart disease. In most cases, the device is  smart  and can slowly self-adjusts to assist breathing. Like CPAP, the device keeps a record of how well you are treated.  Your mask is your connection to the device. You get to choose what feels most comfortable and the staff will help to make sure if fits. Here: are some examples of the different masks that are available:       Key points to remember on your journey with sleep apnea:  1. Sleep study.  PAP devices often need to be adjusted during a sleep study to show that they are effective and adjusted right.  2. Good tips to remember: Try wearing just the mask during a quiet time during the day so your body adapts to wearing it. A humidifier is recommended for comfort in most cases to prevent drying of your nose and throat. Allergy  medication from your provider may help you if you are having nasal congestion.  3. Getting settled-in. It takes more than one night for most of us to get used to wearing a mask. Try wearing just the mask during a quiet time during the day so your body adapts to wearing it. A humidifier is recommended for comfort in most cases. Our team will work with you carefully on the first day and will be in contact within 4 days and again at 2 and 4 weeks for advice and remote device adjustments. Your therapy is evaluated by the device each day.   4. Use it every night. The more you are able to sleep naturally for 7-8 hours, the more likely you will have good sleep and to prevent health risks or symptoms from sleep apnea. Even if you use it 4 hours it helps. Occasionally all of us are unable to use a medical therapy, in sleep apnea, it is not dangerous to miss one night.   5. Communicate. Call our skilled team on the number provided on the first day if your visit for problems that make it difficult to wear the device. Over 2 out of 3 patients can learn to wear the device long-term with help from our team. Remember to call our team or your sleep providers if you are unable to wear the device as we may have other solutions for those who cannot adapt to mask CPAP therapy. It is recommended that you sleep your sleep provider within the first 3 months and yearly after that if you are not having problems.   Take care of your equipment. Make sure you clean your mask and tubing using directions every day and that your filter and mask are replaced as recommended or if they are not working.     BESIDES CPAP, WHAT OTHER THERAPIES ARE THERE?    Positioning Device  Positioning devices are generally used when sleep apnea is mild and only occurs on your back.This example shows a pillow that straps around the waist. It may be appropriate for those whose sleep study shows milder sleep apnea that occurs primarily when lying flat on one's back.  Preliminary studies have shown benefit but effectiveness at home may need to be verified by a home sleep test. These devices are generally not covered by medical insurance.  Examples of devices that maintain sleeping on the back to prevent snoring and mild sleep apnea.    Belt type body positioner  Http://[employer].[email or web address]/    Electronic reminder  Http://nightshifttherapy.com/  Http://www.[employer].Crunchfish.au/    Oral Appliance  What is oral appliance therapy?  An oral appliance device fits on your teeth at night like a retainer used after having braces. The device is made by a specialized dentist and requires several visits over 1-2 months before a manufactured device is made to fit your teeth and is adjusted to prevent your sleep apnea. Once an oral device is working properly, snoring should be improved. A home sleep test may be recommended at that time if to determine whether the sleep apnea is adequately treated.       Some things to remember:  -Oral devices are often, but not always, covered by your medical insurance. Be sure to check with your insurance provider.   -If you are referred for oral therapy, you will be given a list of specialized dentists to consider or you may choose to visit the Web site of the American Academy of Dental Sleep Medicine  -Oral devices are less likely to work if you have severe sleep apnea or are extremely overweight.     More detailed information  An oral appliance is a small acrylic device that fits over the upper and lower teeth  (similar to a retainer or a mouth guard). This device slightly moves jaw forward, which moves the base of the tongue forward, opens the airway, improves breathing for effective treat snoring and obstructive sleep apnea in perhaps 7 out of 10 people .  The best working devices are custom-made by a dental device  after a mold is made of the teeth 1, 2, 3.  When is an oral appliance indicated?  Oral appliance therapy is recommended as a first-line  treatment for patients with primary snoring, mild sleep apnea, and for patients with moderate sleep apnea who prefer appliance therapy to use of CPAP4, 5. Severity of sleep apnea is determined by sleep testing and is based on the number of respiratory events per hour of sleep.   How successful is oral appliance therapy?  The success rate of oral appliance therapy in patients with mild sleep apnea is 75-80% while in patients with moderate sleep apnea it is 50-70%. The chance of success in patients with severe sleep apnea is 40-50%. The research also shows that oral appliances have a beneficial effect on the cardiovascular health of KEITH patients at the same magnitude as CPAP therapy7.  Oral appliances should be a second-line treatment in cases of severe sleep apnea, but if not completely successful then a combination therapy utilizing CPAP plus oral appliance therapy may be effective. Oral appliances tend to be effective in a broad range of patients although studies show that the patients who have the highest success are females, younger patients, those with milder disease, and less severe obesity. 3, 6.   Finding a dentist that practices dental sleep medicine  Specific training is available through the American Academy of Dental Sleep Medicine for dentists interested in working in the field of sleep. To find a dentist who is educated in the field of sleep and the use of oral appliances, near you, visit the Web site of the American Academy of Dental Sleep Medicine.    References  1. Tyson et al. Objectively measured vs self-reported compliance during oral appliance therapy for sleep-disordered breathing. Chest 2013; 144(5): 0972-6924.  2. Nick et al. Objective measurement of compliance during oral appliance therapy for sleep-disordered breathing. Thorax 2013; 68(1): 91-96.  3. Juventino et al. Mandibular advancement devices in 620 men and women with KEITH and snoring: tolerability and predictors of  treatment success. Chest 2004; 125: 3486-9693.  4. Ashleigh et al. Oral appliances for snoring and KEITH: a review. Sleep 2006; 29: 244-262.  5. Margarita et al. Oral appliance treatment for KEITH: an update. J Clin Sleep Med 2014; 10(2): 215-227.  6. Jose et al. Predictors of OSAH treatment outcome. J Dent Res 2007; 86: 9281-8115.      Weight Loss:    Weight loss is a long-term strategy that may improve sleep apnea in some patients.    Weight management is a personal decision and the decision should be based on your interest and the potential benefits.  If you are interested in exploring weight loss strategies, the following discussion covers the impact on weight loss on sleep apnea and the approaches that may be successful.    Being overweight does not necessarily mean you will have health consequences.  Those who have BMI over 35 or over 27 with existing medical conditions carries greater risk.   Weight loss decreases severity of sleep apnea in most people with obesity. For those with mild obesity who have developed snoring with weight gain, even 15-30 pound weight loss can improve and occasionally eliminate sleep apnea.  Structured and life-long dietary and health habits are necessary to lose weight and keep healthier weight levels.     Though there may be significant health benefits from weight loss, long-term weight loss is very difficult to achieve- studies show success with dietary management in less than 10% of people. In addition, substantial weight loss may require years of dietary control and may be difficult if patients have severe obesity. In these cases, surgical management may be considered.  Finally, older individuals who have tolerated obesity without health complications may be less likely to benefit from weight loss strategies.        Your BMI is Body mass index is 46.25 kg/(m^2).  Weight management is a personal decision.  If you are interested in exploring weight loss strategies, the  following discussion covers the approaches that may be successful. Body mass index (BMI) is one way to tell whether you are at a healthy weight, overweight, or obese. It measures your weight in relation to your height.  A BMI of 18.5 to 24.9 is in the healthy range. A person with a BMI of 25 to 29.9 is considered overweight, and someone with a BMI of 30 or greater is considered obese. More than two-thirds of American adults are considered overweight or obese.  Being overweight or obese increases the risk for further weight gain. Excess weight may lead to heart disease and diabetes.  Creating and following plans for healthy eating and physical activity may help you improve your health.  Weight control is part of healthy lifestyle and includes exercise, emotional health, and healthy eating habits. Careful eating habits lifelong are the mainstay of weight control. Though there are significant health benefits from weight loss, long-term weight loss with diet alone may be very difficult to achieve- studies show long-term success with dietary management in less than 10% of people. Attaining a healthy weight may be especially difficult to achieve in those with severe obesity. In some cases, medications, devices and surgical management might be considered.  What can you do?  If you are overweight or obese and are interested in methods for weight loss, you should discuss this with your provider.     Consider reducing daily calorie intake by 500 calories.     Keep a food journal.     Avoiding skipping meals, consider cutting portions instead.    Diet combined with exercise helps maintain muscle while optimizing fat loss. Strength training is particularly important for building and maintaining muscle mass. Exercise helps reduce stress, increase energy, and improves fitness. Increasing exercise without diet control, however, may not burn enough calories to loose weight.       Start walking three days a week 10-20 minutes at a  time    Work towards walking thirty minutes five days a week     Eventually, increase the speed of your walking for 1-2 minutes at time    In addition, we recommend that you review healthy lifestyles and methods for weight loss available through the National Institutes of Health patient information sites:  http://win.niddk.nih.gov/publications/index.htm    And look into health and wellness programs that may be available through your health insurance provider, employer, local community center, or william club.    Weight management plan: Patient was referred to their PCP to discuss a diet and exercise plan.      Surgery:    Surgery for obstructive sleep apnea is considered generally only when other therapies fail to work. Surgery may be discussed with you if you are having a difficult time tolerating CPAP and or when there is an abnormal structure that requires surgical correction.  Nose and throat surgeries often enlarge the airway to prevent collapse.  Most of these surgeries create pain for 1-2 weeks and up to half of the most common surgeries are not effective throughout life.  You should carefully discuss the benefits and drawbacks to surgery with your sleep provider and surgeon to determine if it is the best solution for you.   More information  Surgery for KEITH is directed at areas that are responsible for narrowing or complete obstruction of the airway during sleep.  There are a wide range of procedures available to enlarge and/or stabilize the airway to prevent blockage of breathing in the three major areas where it can occur: the palate, tongue, and nasal regions.  Successful surgical treatment depends on the accurate identification of the factors responsible for obstructive sleep apnea in each person.  A personalized approach is required because there is no single treatment that works well for everyone.  Because of anatomic variation, consultation with an examination by a sleep surgeon is a critical first step  in determining what surgical options are best for each patient.  In some cases, examination during sedation may be recommended in order to guide the selection of procedures.  Patients will be counseled about risks and benefits as well as the typical recovery course after surgery. Surgery is typically not a cure for a person s KEITH.  However, surgery will often significantly improve one s KEITH severity (termed  success rate ).  Even in the absence of a cure, surgery will decrease the cardiovascular risk associated with OSA7; improve overall quality of life8 (sleepiness, functionality, sleep quality, etc).      Palate Procedures:  Patients with KEITH often have narrowing of their airway in the region of their tonsils and uvula.  The goals of palate procedures are to widen the airway in this region as well as to help the tissues resist collapse.  Modern palate procedure techniques focus on tissue conservation and soft tissue rearrangement, rather than tissue removal.  Often the uvula is preserved in this procedure. Residual sleep apnea is common in patient after pharyngoplasty with an average reduction in sleep apnea events of 33%2.      Tongue Procedures:  ExamWhile patients are awake, the muscles that surround the throat are active and keep this region open for breathing. These muscles relax during sleep, allowing the tongue and other structures to collapse and block breathing.  There are several different tongue procedures available.  Selection of a tongue base procedure depends on characteristics seen on physical exam.  Generally, procedures are aimed at removing bulky tissues in this area or preventing the back of the tongue from falling back during sleep.  Success rates for tongue surgery range from 50-62%3.    Hypoglossal Nerve Stimulation:  Hypoglossal nerve stimulation has recently received approval from the [place] Food and Drug Administration for the treatment of obstructive sleep apnea.  This is based on  research showing that the system was safe and effective in treating sleep apnea6.  Results showed that the median AHI score decreased 68%, from 29.3 to 9.0. This therapy uses an implant system that senses breathing patterns and delivers mild stimulation to airway muscles, which keeps the airway open during sleep.  The system consists of three fully implanted components: a small generator (similar in size to a pacemaker), a breathing sensor, and a stimulation lead.  Using a small handheld remote, a patient turns the therapy on before bed and off upon awakening.    Candidates for this device must be greater than 22 years of age, have moderate to severe KEITH (AHI between 20-65), BMI less than 32, have tried CPAP/oral appliance without success, and have appropriate upper airway anatomy (determined by a sleep endoscopy performed by Dr. Patel).    Hypoglossal Nerve Stimulation Pathway:    The sleep surgeon s office will work with the patient through the insurance prior-authorization process (including communications and appeals).    Nasal Procedures:  Nasal obstruction can interfere with nasal breathing during the day and night.  Studies have shown that relief of nasal obstruction can improve the ability of some patients to tolerate positive airway pressure therapy for obstructive sleep apnea1.  Treatment options include medications such as nasal saline, topical corticosteroid and antihistamine sprays, and oral medications such as antihistamines or decongestants. Non-surgical treatments can include external nasal dilators for selected patients. If these are not successful by themselves, surgery can improve the nasal airway either alone or in combination with these other options.      Combination Procedures:  Combination of surgical procedures and other treatments may be recommended, particularly if patients have more than one area of narrowing or persistent positional disease.  The success rate of combination surgery ranges  from 66-80%2,3.    References  1. Rich WILLS. The Role of the Nose in Snoring and Obstructive Sleep Apnoea: An Update.  Eur Arch Otorhinolaryngol. 2011; 268: 1365-73.  2.  Isabel SM; Mary JA; Parker JR; Pallanch JF; Magdy MB; Awais SG; Priscilla LEI. Surgical modifications of the upper airway for obstructive sleep apnea in adults: a systematic review and meta-analysis. SLEEP 2010;33(10):7876-6125. Bell GONSALEZ. Hypopharyngeal surgery in obstructive sleep apnea: an evidence-based medicine review.  Arch Otolaryngol Head Neck Surg. 2006 Feb;132(2):206-13.  3. Mariusz YH1, Porfirio Y, Ruben TAMAR. The efficacy of anatomically based multilevel surgery for obstructive sleep apnea. Otolaryngol Head Neck Surg. 2003 Oct;129(4):327-35.  4. Bell GONSALEZ, Goldberg A. Hypopharyngeal Surgery in Obstructive Sleep Apnea: An Evidence-Based Medicine Review. Arch Otolaryngol Head Neck Surg. 2006 Feb;132(2):206-13.  5. Almao PJ et al. Upper-Airway Stimulation for Obstructive Sleep Apnea.  N Engl J Med. 2014 Jan 9;370(2):139-49.  6. Ramy Y et al. Increased Incidence of Cardiovascular Disease in Middle-aged Men with Obstructive Sleep Apnea. Am J Respir Crit Care Med; 2002 166: 159-165  7. Clarke EM et al. Studying Life Effects and Effectiveness of Palatopharyngoplasty (SLEEP) study: Subjective Outcomes of Isolated Uvulopalatopharyngoplasty. Otolaryngol Head Neck Surg. 2011; 144: 623-631.            Your BMI is Body mass index is 46.25 kg/(m^2).  Weight management is a personal decision.  If you are interested in exploring weight loss strategies, the following discussion covers the approaches that may be successful. Body mass index (BMI) is one way to tell whether you are at a healthy weight, overweight, or obese. It measures your weight in relation to your height.  A BMI of 18.5 to 24.9 is in the healthy range. A person with a BMI of 25 to 29.9 is considered overweight, and someone with a BMI of 30 or greater is considered obese. More than  two-thirds of American adults are considered overweight or obese.  Being overweight or obese increases the risk for further weight gain. Excess weight may lead to heart disease and diabetes.  Creating and following plans for healthy eating and physical activity may help you improve your health.  Weight control is part of healthy lifestyle and includes exercise, emotional health, and healthy eating habits. Careful eating habits lifelong are the mainstay of weight control. Though there are significant health benefits from weight loss, long-term weight loss with diet alone may be very difficult to achieve- studies show long-term success with dietary management in less than 10% of people. Attaining a healthy weight may be especially difficult to achieve in those with severe obesity. In some cases, medications, devices and surgical management might be considered.  What can you do?  If you are overweight or obese and are interested in methods for weight loss, you should discuss this with your provider.     Consider reducing daily calorie intake by 500 calories.     Keep a food journal.     Avoiding skipping meals, consider cutting portions instead.    Diet combined with exercise helps maintain muscle while optimizing fat loss. Strength training is particularly important for building and maintaining muscle mass. Exercise helps reduce stress, increase energy, and improves fitness. Increasing exercise without diet control, however, may not burn enough calories to loose weight.       Start walking three days a week 10-20 minutes at a time    Work towards walking thirty minutes five days a week     Eventually, increase the speed of your walking for 1-2 minutes at time    In addition, we recommend that you review healthy lifestyles and methods for weight loss available through the National Institutes of Health patient information sites:  http://win.niddk.nih.gov/publications/index.htm    And look into health and wellness  "programs that may be available through your health insurance provider, employer, local community center, or william club.    Weight management plan: Patient was referred to their PCP to discuss a diet and exercise plan.              Follow-ups after your visit        Follow-up notes from your care team     Return in about 3 months (around 9/25/2018).      Your next 10 appointments already scheduled     Aug 06, 2018 11:30 AM CDT   [facility]ame [facility]   [facility]    [address] [place] [address] [phone]              Who to contact     If you have questions or need follow up information about today's clinic visit or your schedule please contact [facility] directly at [phone].  Normal or non-critical lab and imaging results will be communicated to you by Trulihart, letter or phone within 4 business days after the clinic has received the results. If you do not hear from us within 7 days, please contact the clinic through Trulihart or phone. If you have a critical or abnormal lab result, we will notify you by phone as soon as possible.  Submit refill requests through FindIt or call your pharmacy and they will forward the refill request to us. Please allow 3 business days for your refill to be completed.          Additional Information About Your Visit        Trulihart Information     FindIt lets you send messages to your doctor, view your test results, renew your prescriptions, schedule appointments and more. To sign up, go to [email or web address] . Click on \"Log in\" on the left side of the screen, which will take you to the Welcome page. Then click on \"Sign up Now\" on the right side of the page.     You will be asked to enter the access code listed below, as well as some personal information. Please follow the directions to create your username and password.     Your access code is: [email or web address]  Expires: " "8/15/2018  6:31 AM     Your access code will  in 90 days. If you need help or a new code, please call your [place] clinic or [phone].        Care EveryWhere ID     This is your Care EveryWhere ID. This could be used by other organizations to access your [place] medical records  [id number]        Your Vitals Were     Pulse Respirations Height Pulse Oximetry BMI (Body Mass Index)       59 16 1.6 m (5' 2.99\") 98% 46.25 kg/m2        Blood Pressure from Last 3 Encounters:   18 102/61   18 116/79   18 145/84    Weight from Last 3 Encounters:   18 118.4 kg (261 lb)   18 117.5 kg (259 lb)   18 116.9 kg (257 lb 12.8 oz)              Today, you had the following     No orders found for display         Today's Medication Changes          These changes are accurate as of 18  2:07 PM.  If you have any questions, ask your nurse or doctor.               Start taking these medicines.        Dose/Directions    fluticasone 50 MCG/ACT spray   Commonly known as:  FLONASE   Used for:  Chronic rhinitis, unspecified type   Started by:  Eliz Nguyen MD        Dose:  2 spray   Spray 2 sprays into both nostrils daily   Quantity:  1 Bottle   Refills:  11            Where to get your medicines      These medications were sent to [facility]/pharmacy #[id number] - [place], [place] - [address]  [address], [place] [address]     Phone:  [phone]     fluticasone 50 MCG/ACT spray    lisinopril 20 MG tablet                Primary Care Provider Office Phone # Fax #    Eliz Nguyen [phone]       [facility] [address]  [facility] [address]        Equal Access to Services     [facility] AH: Sandra Garrett, estuardo kebede, qaybta kaalmamayte marie. So [facility] [phone].    ATENCIÓN: Si habla español, tiene a bennett disposición servicios gratuitos de asistencia lingüística. " Kieran doe [phone].    We comply with applicable federal civil rights laws and [place] laws. We do not discriminate on the basis of race, color, national origin, age, disability, sex, sexual orientation, or gender identity.            Thank you!     Thank you for choosing [facility]  for your care. Our goal is always to provide you with excellent care. Hearing back from our patients is one way we can continue to improve our services. Please take a few minutes to complete the written survey that you may receive in the mail after your visit with us. Thank you!             Your Updated Medication List - Protect others around you: Learn how to safely use, store and throw away your medicines at www.disposemymeds.org.          This list is accurate as of 6/25/18  2:07 PM.  Always use your most recent med list.                   Brand Name Dispense Instructions for use Diagnosis    aspirin 81 MG tablet      Take by mouth daily        atorvastatin 40 MG tablet    LIPITOR    90 tablet    Take 1 tablet (40 mg) by mouth daily    Pure hypercholesterolemia       CALCIUM 600-D PO      Take  by mouth 2 times daily.    SLE (systemic lupus erythematosus) (H)       erythromycin ophthalmic ointment    ROMYCIN    1 Tube    Place 1 Application Into the left eye 4 times daily    Conjunctivitis of both eyes, unspecified conjunctivitis type       fluticasone 50 MCG/ACT spray    FLONASE    1 Bottle    Spray 2 sprays into both nostrils daily    Chronic rhinitis, unspecified type       hydroxychloroquine 200 MG tablet    PLAQUENIL    60 tablet    Take 1 tablet (200 mg) by mouth 2 times daily    Systemic lupus erythematosus with glomerular disease, unspecified SLE type (H)       lisinopril 20 MG tablet    PRINIVIL/ZESTRIL    145 tablet    Take 1.5 tablets (30 mg) by mouth daily    Benign essential hypertension       metoprolol tartrate 50 MG tablet    LOPRESSOR    180 tablet    TAKE 1 TABLET (50 MG) BY MOUTH 2 TIMES DAILY     Atrial fibrillation, unspecified type (H)       MULTIPLE VITAMIN PO      Take 1 tablet by mouth daily        mupirocin 2 % nasal ointment    BACTROBAN NASAL    10 g    Place a thin layer inside right nose on septum BID x 2 weeks    Nasal septum ulceration       neomycin-polymyxin-hydrocortisone 3.5-07027-2 otic suspension    CORTISPORIN    10 mL    Place 3 drops into both ears 3 times daily    Infective otitis externa, bilateral       omega-3 fatty acids 1200 MG capsule      Take 2 capsules by mouth 2 times daily        warfarin 5 MG tablet    COUMADIN    160 tablet    Take 5 mg on Friday and 10 mg all other days, or as directed by the Coumadin Clinic.    Atrial fibrillation, unspecified type (H)

## 2018-06-25 NOTE — MR AVS SNAPSHOT
After Visit Summary   6/25/2018    Luz Marina Live    MRN: [id number]           Patient Information     Date Of Birth          1960        Visit Information        Provider Department      6/25/2018 11:20 AM Eliz Nguyen MD [facility]         Today's Diagnoses     Benign essential hypertension    -  1    Systemic lupus erythematosus with glomerular disease, unspecified SLE type (H)        Chronic rhinitis, unspecified type           Follow-ups after your visit        Additional Services     OTOLARYNGOLOGY REFERRAL       Your provider has referred you to: [facility]: Adult [facility] N[facility] Clinic (Otolaryngology) [facility] [place] [facility] [phone]  [email or web address]    Please be aware that coverage of these services is subject to the terms and limitations of your health insurance plan.  Call member services at your health plan with any benefit or coverage questions.      Please bring the following with you to your appointment:    (1) Any X-Rays, CTs or MRIs which have been performed.  Contact the facility where they were done to arrange for  prior to your scheduled appointment.   (2) List of current medications  (3) This referral request   (4) Any documents/labs given to you for this referral                  Your next 10 appointments already scheduled     Jun 25, 2018  1:30 PM CDT   Return Sleep Patient with Rodrigo Nguyễn MD   [facility] [place], Sleep Study ([facility])    [address] [facility] [address] [phone]            Aug 06, 2018 11:30 AM CDT   [facility]    [address] [facility] [address] [phone]              Who to contact     Please call your clinic at [phone] to:    Ask questions about your health    Make or cancel appointments    Discuss your  medicines    Learn about your test results    Speak to your doctor            Additional Information About Your Visit        MyChart Information     [employer] is an electronic gateway that provides easy, online access to your medical records. With Sokoos, you can request a clinic appointment, read your test results, renew a prescription or communicate with your care team.     To sign up for Sokoos visit the website at [email or web address]   You will be asked to enter the access code listed below, as well as some personal information. Please follow the directions to create your username and password.     Your access code is: [email or web address]  Expires: 8/15/2018  6:31 AM     Your access code will  in 90 days. If you need help or a new code, please contact your [facility] or call [phone] for assistance.        Care EveryWhere ID     This is your Care EveryWhere ID. This could be used by other organizations to access your [place] medical records  [id number]        Your Vitals Were     Pulse Breastfeeding? BMI (Body Mass Index)             55 No 45.88 kg/m2          Blood Pressure from Last 3 Encounters:   18 116/79   18 145/84   18 155/77    Weight from Last 3 Encounters:   18 117.5 kg (259 lb)   18 116.9 kg (257 lb 12.8 oz)   18 113.4 kg (250 lb)              We Performed the Following     Basic Metabolic Panel     OTOLARYNGOLOGY REFERRAL          Today's Medication Changes          These changes are accurate as of 18 11:39 AM.  If you have any questions, ask your nurse or doctor.               Start taking these medicines.        Dose/Directions    fluticasone 50 MCG/ACT spray   Commonly known as:  FLONASE   Used for:  Chronic rhinitis, unspecified type   Started by:  Eliz Nguyen MD        Dose:  2 spray   Spray 2 sprays into both nostrils daily   Quantity:  1 Bottle   Refills:  11            Where to get your medicines       These medications were sent to [facility]/pharmacy #[id number] - [place], [place] - [address]  [address], [place] [address]     Phone:  [phone]     fluticasone 50 MCG/ACT spray    lisinopril 20 MG tablet                Primary Care Provider Office Phone # Fax #    Eliz My Nguyen [phone]       [facility] [address]  [facility] [address]        Equal Access to Services     RUBI MCGUIRE : Hadii aad ku hadasho Soomaali, waaxda luqadaha, qaybta kaalmada adeegyada, waxay idiin hayaan adeeg kharash laalice . So [facility] [phone].    ATENCIÓN: Si habla español, tiene a bennett disposición servicios gratuitos de asistencia lingüística. [facility] [phone].    We comply with applicable federal civil rights laws and [place] laws. We do not discriminate on the basis of race, color, national origin, age, disability, sex, sexual orientation, or gender identity.            Thank you!     Thank you for choosing [facility]   for your care. Our goal is always to provide you with excellent care. Hearing back from our patients is one way we can continue to improve our services. Please take a few minutes to complete the written survey that you may receive in the mail after your visit with us. Thank you!             Your Updated Medication List - Protect others around you: Learn how to safely use, store and throw away your medicines at www.disposemymeds.org.          This list is accurate as of 6/25/18 11:39 AM.  Always use your most recent med list.                   Brand Name Dispense Instructions for use Diagnosis    aspirin 81 MG tablet      Take by mouth daily        atorvastatin 40 MG tablet    LIPITOR    90 tablet    Take 1 tablet (40 mg) by mouth daily    Pure hypercholesterolemia       CALCIUM 600-D PO      Take  by mouth 2 times daily.    SLE (systemic lupus erythematosus) (H)       erythromycin ophthalmic ointment    ROMYCIN    1 Tube     Place 1 Application Into the left eye 4 times daily    Conjunctivitis of both eyes, unspecified conjunctivitis type       fluticasone 50 MCG/ACT spray    FLONASE    1 Bottle    Spray 2 sprays into both nostrils daily    Chronic rhinitis, unspecified type       hydroxychloroquine 200 MG tablet    PLAQUENIL    60 tablet    Take 1 tablet (200 mg) by mouth 2 times daily    Systemic lupus erythematosus with glomerular disease, unspecified SLE type (H)       lisinopril 20 MG tablet    PRINIVIL/ZESTRIL    145 tablet    Take 1.5 tablets (30 mg) by mouth daily    Benign essential hypertension       metoprolol tartrate 50 MG tablet    LOPRESSOR    180 tablet    TAKE 1 TABLET (50 MG) BY MOUTH 2 TIMES DAILY    Atrial fibrillation, unspecified type (H)       MULTIPLE VITAMIN PO      Take 1 tablet by mouth daily        mupirocin 2 % nasal ointment    BACTROBAN NASAL    10 g    Place a thin layer inside right nose on septum BID x 2 weeks    Nasal septum ulceration       neomycin-polymyxin-hydrocortisone 3.5-39302-3 otic suspension    CORTISPORIN    10 mL    Place 3 drops into both ears 3 times daily    Infective otitis externa, bilateral       omega-3 fatty acids 1200 MG capsule      Take 2 capsules by mouth 2 times daily        warfarin 5 MG tablet    COUMADIN    160 tablet    Take 5 mg on Friday and 10 mg all other days, or as directed by the Coumadin Clinic.    Atrial fibrillation, unspecified type (H)       zolpidem 5 MG tablet    AMBIEN    1 tablet    Take tablet by mouth 15 minutes prior to sleep, for Sleep Study    Dyspnea on exertion, Obstructive sleep apnea syndrome, Psychophysiological insomnia

## 2018-06-25 NOTE — PATIENT INSTRUCTIONS
"MY TREATMENT INFORMATION FOR SLEEP APNEA-  [place]    DOCTOR : DEISY JENSEN  SLEEP CENTER :      MY CONTACT NUMBER:     Am I having a sleep study at a sleep center?  Make sure you have an appointment for the study before you leave!    Gradually increase the time you use the mask by holding it without headgear on your face during TV or reading 30 minutes or more a day before trying to wear it at night.      Frequently asked questions:  1. What is Obstructive Sleep Apnea (KEITH)? KEITH is the most common type of sleep apnea. Apnea means, \"without breath.\"  Apnea is most often caused by narrowing or collapse of the upper airway as muscles relax during sleep.   Almost everyone has occasional apneas. Most people with sleep apnea have had brief interruptions at night frequently for many years.  The severity of sleep apnea is related to how frequent and severe the events are.   2. What are the consequences of KEITH? Symptoms include: feeling sleepy during the day, snoring loudly, gasping or stopping of breathing, trouble sleeping, and occasionally morning headaches or heartburn at night.  Sleepiness can be serious and even increase the risk of falling asleep while driving. Other health consequences may include development of high blood pressure and other cardiovascular disease in persons who are susceptible. Untreated KEITH  can contribute to heart disease, stroke and diabetes.   3. What are the treatment options? In most situations, sleep apnea is a lifelong disease that must be managed with daily therapy. Medications are not effective for sleep apnea and surgery is generally not considered until other therapies have been tried. Your treatment is your choice . Continuous Positive Airway (CPAP) works right away and is the therapy that is effective in nearly everyone. An oral device to hold your jaw forward is usually the next most reliable option. Other options include postioning devices (to keep you off your back), weight " loss, and surgery including a tongue pacing device. There is more detail about some of these options below.    Important tips for using CPAP and similar devices   Know your equipment:  CPAP is continuous positive airway pressure that prevents obstructive sleep apnea by keeping the throat from collapsing while you are sleeping. In most cases, the device is  smart  and can slowly self-adjusts if your throat collapses and keeps a record every day of how well you are treated-this information is available to you and your care team.  BPAP is bilevel positive airway pressure that keeps your throat open and also assists each breath with a pressure boost to maintain adequate breathing.  Special kinds of BPAP are used in patients who have inadequate breathing from lung or heart disease. In most cases, the device is  smart  and can slowly self-adjusts to assist breathing. Like CPAP, the device keeps a record of how well you are treated.  Your mask is your connection to the device. You get to choose what feels most comfortable and the staff will help to make sure if fits. Here: are some examples of the different masks that are available:       Key points to remember on your journey with sleep apnea:  1. Sleep study.  PAP devices often need to be adjusted during a sleep study to show that they are effective and adjusted right.  2. Good tips to remember: Try wearing just the mask during a quiet time during the day so your body adapts to wearing it. A humidifier is recommended for comfort in most cases to prevent drying of your nose and throat. Allergy medication from your provider may help you if you are having nasal congestion.  3. Getting settled-in. It takes more than one night for most of us to get used to wearing a mask. Try wearing just the mask during a quiet time during the day so your body adapts to wearing it. A humidifier is recommended for comfort in most cases. Our team will work with you carefully on the first day  and will be in contact within 4 days and again at 2 and 4 weeks for advice and remote device adjustments. Your therapy is evaluated by the device each day.   4. Use it every night. The more you are able to sleep naturally for 7-8 hours, the more likely you will have good sleep and to prevent health risks or symptoms from sleep apnea. Even if you use it 4 hours it helps. Occasionally all of us are unable to use a medical therapy, in sleep apnea, it is not dangerous to miss one night.   5. Communicate. Call our skilled team on the number provided on the first day if your visit for problems that make it difficult to wear the device. Over 2 out of 3 patients can learn to wear the device long-term with help from our team. Remember to call our team or your sleep providers if you are unable to wear the device as we may have other solutions for those who cannot adapt to mask CPAP therapy. It is recommended that you sleep your sleep provider within the first 3 months and yearly after that if you are not having problems.   Take care of your equipment. Make sure you clean your mask and tubing using directions every day and that your filter and mask are replaced as recommended or if they are not working.     BESIDES CPAP, WHAT OTHER THERAPIES ARE THERE?    Positioning Device  Positioning devices are generally used when sleep apnea is mild and only occurs on your back.This example shows a pillow that straps around the waist. It may be appropriate for those whose sleep study shows milder sleep apnea that occurs primarily when lying flat on one's back. Preliminary studies have shown benefit but effectiveness at home may need to be verified by a home sleep test. These devices are generally not covered by medical insurance.  Examples of devices that maintain sleeping on the back to prevent snoring and mild sleep apnea.    Belt type body positioner  Http://[employer]/    Electronic  reminder  Http://nightshifttherapy.com/  Http://www.[employer].au/    Oral Appliance  What is oral appliance therapy?  An oral appliance device fits on your teeth at night like a retainer used after having braces. The device is made by a specialized dentist and requires several visits over 1-2 months before a manufactured device is made to fit your teeth and is adjusted to prevent your sleep apnea. Once an oral device is working properly, snoring should be improved. A home sleep test may be recommended at that time if to determine whether the sleep apnea is adequately treated.       Some things to remember:  -Oral devices are often, but not always, covered by your medical insurance. Be sure to check with your insurance provider.   -If you are referred for oral therapy, you will be given a list of specialized dentists to consider or you may choose to visit the Web site of the American Academy of Dental Sleep Medicine  -Oral devices are less likely to work if you have severe sleep apnea or are extremely overweight.     More detailed information  An oral appliance is a small acrylic device that fits over the upper and lower teeth  (similar to a retainer or a mouth guard). This device slightly moves jaw forward, which moves the base of the tongue forward, opens the airway, improves breathing for effective treat snoring and obstructive sleep apnea in perhaps 7 out of 10 people .  The best working devices are custom-made by a dental device  after a mold is made of the teeth 1, 2, 3.  When is an oral appliance indicated?  Oral appliance therapy is recommended as a first-line treatment for patients with primary snoring, mild sleep apnea, and for patients with moderate sleep apnea who prefer appliance therapy to use of CPAP4, 5. Severity of sleep apnea is determined by sleep testing and is based on the number of respiratory events per hour of sleep.   How successful is oral appliance therapy?  The success rate  of oral appliance therapy in patients with mild sleep apnea is 75-80% while in patients with moderate sleep apnea it is 50-70%. The chance of success in patients with severe sleep apnea is 40-50%. The research also shows that oral appliances have a beneficial effect on the cardiovascular health of KEITH patients at the same magnitude as CPAP therapy7.  Oral appliances should be a second-line treatment in cases of severe sleep apnea, but if not completely successful then a combination therapy utilizing CPAP plus oral appliance therapy may be effective. Oral appliances tend to be effective in a broad range of patients although studies show that the patients who have the highest success are females, younger patients, those with milder disease, and less severe obesity. 3, 6.   Finding a dentist that practices dental sleep medicine  Specific training is available through the American Academy of Dental Sleep Medicine for dentists interested in working in the field of sleep. To find a dentist who is educated in the field of sleep and the use of oral appliances, near you, visit the Web site of the American Academy of Dental Sleep Medicine.    References  1. Tyson et al. Objectively measured vs self-reported compliance during oral appliance therapy for sleep-disordered breathing. Chest 2013; 144(5): 8348-8048.  2. Nick et al. Objective measurement of compliance during oral appliance therapy for sleep-disordered breathing. Thorax 2013; 68(1): 91-96.  3. Juventino et al. Mandibular advancement devices in 620 men and women with KEITH and snoring: tolerability and predictors of treatment success. Chest 2004; 125: 4878-8033.  4. Ashleigh, et al. Oral appliances for snoring and KEITH: a review. Sleep 2006; 29: 244-262.  5. Margarita et al. Oral appliance treatment for KEITH: an update. J Clin Sleep Med 2014; 10(2): 215-227.  6. Jose et al. Predictors of OSAH treatment outcome. J Dent Res 2007; 86:  0478-1608.      Weight Loss:    Weight loss is a long-term strategy that may improve sleep apnea in some patients.    Weight management is a personal decision and the decision should be based on your interest and the potential benefits.  If you are interested in exploring weight loss strategies, the following discussion covers the impact on weight loss on sleep apnea and the approaches that may be successful.    Being overweight does not necessarily mean you will have health consequences.  Those who have BMI over 35 or over 27 with existing medical conditions carries greater risk.   Weight loss decreases severity of sleep apnea in most people with obesity. For those with mild obesity who have developed snoring with weight gain, even 15-30 pound weight loss can improve and occasionally eliminate sleep apnea.  Structured and life-long dietary and health habits are necessary to lose weight and keep healthier weight levels.     Though there may be significant health benefits from weight loss, long-term weight loss is very difficult to achieve- studies show success with dietary management in less than 10% of people. In addition, substantial weight loss may require years of dietary control and may be difficult if patients have severe obesity. In these cases, surgical management may be considered.  Finally, older individuals who have tolerated obesity without health complications may be less likely to benefit from weight loss strategies.        Your BMI is Body mass index is 46.25 kg/(m^2).  Weight management is a personal decision.  If you are interested in exploring weight loss strategies, the following discussion covers the approaches that may be successful. Body mass index (BMI) is one way to tell whether you are at a healthy weight, overweight, or obese. It measures your weight in relation to your height.  A BMI of 18.5 to 24.9 is in the healthy range. A person with a BMI of 25 to 29.9 is considered overweight, and  someone with a BMI of 30 or greater is considered obese. More than two-thirds of American adults are considered overweight or obese.  Being overweight or obese increases the risk for further weight gain. Excess weight may lead to heart disease and diabetes.  Creating and following plans for healthy eating and physical activity may help you improve your health.  Weight control is part of healthy lifestyle and includes exercise, emotional health, and healthy eating habits. Careful eating habits lifelong are the mainstay of weight control. Though there are significant health benefits from weight loss, long-term weight loss with diet alone may be very difficult to achieve- studies show long-term success with dietary management in less than 10% of people. Attaining a healthy weight may be especially difficult to achieve in those with severe obesity. In some cases, medications, devices and surgical management might be considered.  What can you do?  If you are overweight or obese and are interested in methods for weight loss, you should discuss this with your provider.     Consider reducing daily calorie intake by 500 calories.     Keep a food journal.     Avoiding skipping meals, consider cutting portions instead.    Diet combined with exercise helps maintain muscle while optimizing fat loss. Strength training is particularly important for building and maintaining muscle mass. Exercise helps reduce stress, increase energy, and improves fitness. Increasing exercise without diet control, however, may not burn enough calories to loose weight.       Start walking three days a week 10-20 minutes at a time    Work towards walking thirty minutes five days a week     Eventually, increase the speed of your walking for 1-2 minutes at time    In addition, we recommend that you review healthy lifestyles and methods for weight loss available through the National Institutes of Health patient information  sites:  http://win.niddk.nih.gov/publications/index.htm    And look into health and wellness programs that may be available through your health insurance provider, employer, local community center, or william club.    Weight management plan: Patient was referred to their PCP to discuss a diet and exercise plan.      Surgery:    Surgery for obstructive sleep apnea is considered generally only when other therapies fail to work. Surgery may be discussed with you if you are having a difficult time tolerating CPAP and or when there is an abnormal structure that requires surgical correction.  Nose and throat surgeries often enlarge the airway to prevent collapse.  Most of these surgeries create pain for 1-2 weeks and up to half of the most common surgeries are not effective throughout life.  You should carefully discuss the benefits and drawbacks to surgery with your sleep provider and surgeon to determine if it is the best solution for you.   More information  Surgery for KEITH is directed at areas that are responsible for narrowing or complete obstruction of the airway during sleep.  There are a wide range of procedures available to enlarge and/or stabilize the airway to prevent blockage of breathing in the three major areas where it can occur: the palate, tongue, and nasal regions.  Successful surgical treatment depends on the accurate identification of the factors responsible for obstructive sleep apnea in each person.  A personalized approach is required because there is no single treatment that works well for everyone.  Because of anatomic variation, consultation with an examination by a sleep surgeon is a critical first step in determining what surgical options are best for each patient.  In some cases, examination during sedation may be recommended in order to guide the selection of procedures.  Patients will be counseled about risks and benefits as well as the typical recovery course after surgery. Surgery is  typically not a cure for a person s KEITH.  However, surgery will often significantly improve one s KEITH severity (termed  success rate ).  Even in the absence of a cure, surgery will decrease the cardiovascular risk associated with OSA7; improve overall quality of life8 (sleepiness, functionality, sleep quality, etc).      Palate Procedures:  Patients with KEITH often have narrowing of their airway in the region of their tonsils and uvula.  The goals of palate procedures are to widen the airway in this region as well as to help the tissues resist collapse.  Modern palate procedure techniques focus on tissue conservation and soft tissue rearrangement, rather than tissue removal.  Often the uvula is preserved in this procedure. Residual sleep apnea is common in patient after pharyngoplasty with an average reduction in sleep apnea events of 33%2.      Tongue Procedures:  ExamWhile patients are awake, the muscles that surround the throat are active and keep this region open for breathing. These muscles relax during sleep, allowing the tongue and other structures to collapse and block breathing.  There are several different tongue procedures available.  Selection of a tongue base procedure depends on characteristics seen on physical exam.  Generally, procedures are aimed at removing bulky tissues in this area or preventing the back of the tongue from falling back during sleep.  Success rates for tongue surgery range from 50-62%3.    Hypoglossal Nerve Stimulation:  Hypoglossal nerve stimulation has recently received approval from the [place] Food and Drug Administration for the treatment of obstructive sleep apnea.  This is based on research showing that the system was safe and effective in treating sleep apnea6.  Results showed that the median AHI score decreased 68%, from 29.3 to 9.0. This therapy uses an implant system that senses breathing patterns and delivers mild stimulation to airway muscles, which keeps the  airway open during sleep.  The system consists of three fully implanted components: a small generator (similar in size to a pacemaker), a breathing sensor, and a stimulation lead.  Using a small handheld remote, a patient turns the therapy on before bed and off upon awakening.    Candidates for this device must be greater than 22 years of age, have moderate to severe KEITH (AHI between 20-65), BMI less than 32, have tried CPAP/oral appliance without success, and have appropriate upper airway anatomy (determined by a sleep endoscopy performed by Dr. Patel).    Hypoglossal Nerve Stimulation Pathway:    The sleep surgeon s office will work with the patient through the insurance prior-authorization process (including communications and appeals).    Nasal Procedures:  Nasal obstruction can interfere with nasal breathing during the day and night.  Studies have shown that relief of nasal obstruction can improve the ability of some patients to tolerate positive airway pressure therapy for obstructive sleep apnea1.  Treatment options include medications such as nasal saline, topical corticosteroid and antihistamine sprays, and oral medications such as antihistamines or decongestants. Non-surgical treatments can include external nasal dilators for selected patients. If these are not successful by themselves, surgery can improve the nasal airway either alone or in combination with these other options.      Combination Procedures:  Combination of surgical procedures and other treatments may be recommended, particularly if patients have more than one area of narrowing or persistent positional disease.  The success rate of combination surgery ranges from 66-80%2,3.    References  1. Rich WILLS. The Role of the Nose in Snoring and Obstructive Sleep Apnoea: An Update.  Eur Arch Otorhinolaryngol. 2011; 268: 1365-73.  2.  Isabel SM; Mary GAGE; Parker JONES; Pallanch JF; Magdy FISHER; Awais VASQUEZ; Priscilla LEI. Surgical modifications of the upper  airway for obstructive sleep apnea in adults: a systematic review and meta-analysis. SLEEP 2010;33(10):1442-3515. Bell GONSALEZ. Hypopharyngeal surgery in obstructive sleep apnea: an evidence-based medicine review.  Arch Otolaryngol Head Neck Surg. 2006 Feb;132(2):206-13.  3. Mariusz YH1, Porfirio Y, Ruben TAMAR. The efficacy of anatomically based multilevel surgery for obstructive sleep apnea. Otolaryngol Head Neck Surg. 2003 Oct;129(4):327-35.  4. Kezirian E, Goldberg A. Hypopharyngeal Surgery in Obstructive Sleep Apnea: An Evidence-Based Medicine Review. Arch Otolaryngol Head Neck Surg. 2006 Feb;132(2):206-13.  5. Racheal JULIEN et al. Upper-Airway Stimulation for Obstructive Sleep Apnea.  N Engl J Med. 2014 Jan 9;370(2):139-49.  6. Ramy Y et al. Increased Incidence of Cardiovascular Disease in Middle-aged Men with Obstructive Sleep Apnea. Am J Respir Crit Care Med; 2002 166: 159-165  7. Clarke EM et al. Studying Life Effects and Effectiveness of Palatopharyngoplasty (SLEEP) study: Subjective Outcomes of Isolated Uvulopalatopharyngoplasty. Otolaryngol Head Neck Surg. 2011; 144: 623-631.            Your BMI is Body mass index is 46.25 kg/(m^2).  Weight management is a personal decision.  If you are interested in exploring weight loss strategies, the following discussion covers the approaches that may be successful. Body mass index (BMI) is one way to tell whether you are at a healthy weight, overweight, or obese. It measures your weight in relation to your height.  A BMI of 18.5 to 24.9 is in the healthy range. A person with a BMI of 25 to 29.9 is considered overweight, and someone with a BMI of 30 or greater is considered obese. More than two-thirds of American adults are considered overweight or obese.  Being overweight or obese increases the risk for further weight gain. Excess weight may lead to heart disease and diabetes.  Creating and following plans for healthy eating and physical activity may help you improve your  health.  Weight control is part of healthy lifestyle and includes exercise, emotional health, and healthy eating habits. Careful eating habits lifelong are the mainstay of weight control. Though there are significant health benefits from weight loss, long-term weight loss with diet alone may be very difficult to achieve- studies show long-term success with dietary management in less than 10% of people. Attaining a healthy weight may be especially difficult to achieve in those with severe obesity. In some cases, medications, devices and surgical management might be considered.  What can you do?  If you are overweight or obese and are interested in methods for weight loss, you should discuss this with your provider.     Consider reducing daily calorie intake by 500 calories.     Keep a food journal.     Avoiding skipping meals, consider cutting portions instead.    Diet combined with exercise helps maintain muscle while optimizing fat loss. Strength training is particularly important for building and maintaining muscle mass. Exercise helps reduce stress, increase energy, and improves fitness. Increasing exercise without diet control, however, may not burn enough calories to loose weight.       Start walking three days a week 10-20 minutes at a time    Work towards walking thirty minutes five days a week     Eventually, increase the speed of your walking for 1-2 minutes at time    In addition, we recommend that you review healthy lifestyles and methods for weight loss available through the National Institutes of Health patient information sites:  http://win.niddk.nih.gov/publications/index.htm    And look into health and wellness programs that may be available through your health insurance provider, employer, local community center, or [employer]iam club.    Weight management plan: Patient was referred to their PCP to discuss a diet and exercise plan.

## 2018-06-25 NOTE — PROGRESS NOTES
[facility] -   Outpatient Sleep Medicine Consultation  June 25, 2018      Name: Luz Marina Live MRN# [id number]   Age: 57 year old YOB: 1960     Date of Consultation: June 25, 2018  Consultation is requested by: No referring provider defined for this encounter.  Primary care provider: Eliz Nguyen             Assessment and Plan:       Severe obstructive sleep apnea with hypoxemia     Hypertension, currently on beta blocker and ACE inhibitor.     Claustrophobia with prior history of being locked in school locker as a child    Coronary vascular disease, status post 3-vessel bypass with preserved left ventricular function but findings of mild pulmonary hypertension (pulmonary artery pressure 45+ right atrial pressure) and right ventricular dilatation.     History of lupus/RA and immunosuppression and steroid therapy     Summary Recommendations:      aCPAP 5-15 with followup in sleep therapy management    Desensitization of mask - see instructions    Verbal counseling - see instructions               History of Present Illness:     Luz Marina Live is a 57 year old female with severe obstructive sleep apnea with hypoxemia without daytime sleepiness or insomnia.    PREVIOUS SLEEP STUDIES:  Date: 6/13/18  AHI: 69 with 5.8 minutes SpO2<= 88%           Medications:     Current Outpatient Prescriptions   Medication Sig     aspirin 81 MG tablet Take by mouth daily     atorvastatin (LIPITOR) 40 MG tablet Take 1 tablet (40 mg) by mouth daily     Calcium Carbonate-Vitamin D (CALCIUM 600-D PO) Take  by mouth 2 times daily.     erythromycin (ROMYCIN) ophthalmic ointment Place 1 Application Into the left eye 4 times daily     fluticasone (FLONASE) 50 MCG/ACT spray Spray 2 sprays into both nostrils daily     hydroxychloroquine (PLAQUENIL) 200 MG tablet Take 1 tablet (200 mg) by mouth 2 times daily     lisinopril (PRINIVIL/ZESTRIL) 20 MG tablet Take 1.5 tablets (30 mg) by mouth daily     metoprolol  "tartrate (LOPRESSOR) 50 MG tablet TAKE 1 TABLET (50 MG) BY MOUTH 2 TIMES DAILY     MULTIPLE VITAMIN PO Take 1 tablet by mouth daily      mupirocin (BACTROBAN NASAL) 2 % nasal ointment Place a thin layer inside right nose on septum BID x 2 weeks     neomycin-polymyxin-hydrocortisone (CORTISPORIN) 3.5-67761-4 otic suspension Place 3 drops into both ears 3 times daily     Omega-3 Fatty Acids (FISH OIL) 1200 MG capsule Take 2 capsules by mouth 2 times daily      warfarin (COUMADIN) 5 MG tablet Take 5 mg on Friday and 10 mg all other days, or as directed by the Coumadin Clinic.     [DISCONTINUED] lisinopril (PRINIVIL/ZESTRIL) 20 MG tablet Take 1.5 tablets (30 mg) by mouth daily     No current facility-administered medications for this visit.         Allergies   Allergen Reactions     Sulfa Drugs Rash and GI Disturbance            Past Medical History:     Does not need 02 supplement at night   Past Medical History:   Diagnosis Date     Hypovolemic shock (H) 2/28/2015     Sepsis (H) 8/22/2015             Past Surgical History:    No h/o  upper airway surgery  Past Surgical History:   Procedure Laterality Date     CARDIAC SURGERY  08/27/2009    triple vessel coronary artery bypass grafting on 8/27/09     CARPAL TUNNEL RELEASE RT/LT  1996    bilateral                      Physical Examination:   /61  Pulse 59  Resp 16  Ht 1.6 m (5' 2.99\")  Wt 118.4 kg (261 lb)  SpO2 98%  BMI 46.25 kg/m2             Copy to: Eliz Nguyen MD 6/25/2018     [facility]   Floor 1, [address]   ?[address] [place] [address]   Appointments: [phone]    [facility]  3rd Floor  [address], [place] [address]     Total time spent with patient: 25 min >50% counseling    "

## 2018-06-25 NOTE — LETTER
6/25/2018       RE: Luz Marina Live  [address]  [place] [address]     Dear Colleague,    Thank you for referring your patient, Luz Marina Live, to the [facility]  at [facility]. Please see a copy of my visit note below.    HPI  Patient is here for follow-up on hypertension.  She reports that about a month ago she was seen by Dr. Moreno.  Her lisinopril was increased for 20 mg to 30 mg.  She reports that she has been feeling well since the dose changes.  She was advised to have blood pressure and kidney function checked in a month with her primary care provider.    Patient also states that she has significant nasal congestion and postnasal drip.  She does have a dry cough.  She also has intermittent itching and watery eyes.  She states that she started over-the-counter Claritin which has improved her symptoms.  However she is concerned about taking the medication long-term and wondering if she could try a different medication.    Review of Systems     Constitutional:  Negative for fever, chills and fatigue.   HENT:  Positive for sinus congestion. Negative for ear pain, nosebleeds, sore throat, ear discharge, tooth pain and sinus pain.    Eyes:  Positive for eye watering.   Respiratory:   Positive for cough. Negative for dyspnea on exertion.    Cardiovascular:  Negative for dyspnea on exertion.   Gastrointestinal:  Negative for nausea, vomiting, abdominal pain, diarrhea and constipation.   Skin:  Negative for itching and rash.   Endo/Heme:  Negative for anemia, swollen glands and bruises/bleeds easily.   Psychiatric/Behavioral:  Negative for depression, decreased concentration, mood swings and panic attacks.    Endocrine:  Negative for altered temperature regulation, polyphagia, polydipsia, unwanted hair growth and change in facial hair.    Current Outpatient Prescriptions   Medication     aspirin 81 MG tablet     atorvastatin (LIPITOR) 40 MG  tablet     Calcium Carbonate-Vitamin D (CALCIUM 600-D PO)     erythromycin (ROMYCIN) ophthalmic ointment     hydroxychloroquine (PLAQUENIL) 200 MG tablet     lisinopril (PRINIVIL/ZESTRIL) 20 MG tablet     metoprolol tartrate (LOPRESSOR) 50 MG tablet     MULTIPLE VITAMIN PO     mupirocin (BACTROBAN NASAL) 2 % nasal ointment     neomycin-polymyxin-hydrocortisone (CORTISPORIN) 3.5-79156-9 otic suspension     Omega-3 Fatty Acids (FISH OIL) 1200 MG capsule     warfarin (COUMADIN) 5 MG tablet     zolpidem (AMBIEN) 5 MG tablet     No current facility-administered medications for this visit.      Vitals:    06/25/18 1126 06/25/18 1127 06/25/18 1128   BP: 128/76 115/76 116/79   Pulse: 55 53 55   Weight: 117.5 kg (259 lb)           Physical Exam   Constitutional: She is well-developed, well-nourished, and in no distress.   HENT:   Head: Normocephalic and atraumatic.   Right Ear: Tympanic membrane is injected. No middle ear effusion.   Left Ear: Tympanic membrane is injected.  No middle ear effusion.   Nose: Mucosal edema and rhinorrhea present.   Eyes: Conjunctivae are normal. Pupils are equal, round, and reactive to light.   Neck: Normal range of motion. Neck supple.   Cardiovascular: Normal rate and regular rhythm.    Pulmonary/Chest: Effort normal and breath sounds normal.   Skin: Skin is warm and dry.   Psychiatric: Memory, affect and judgment normal.   Vitals reviewed.      Assessment and plan:    Luz Marina was seen today for recheck.    Diagnoses and all orders for this visit:    Benign essential hypertension.  Blood pressure is currently within acceptable range.  Patient was advised to check her renal function as well as electrolytes today.  She will be contacted with test results and further recommendations on medication dose adjustment.  -     lisinopril (PRINIVIL/ZESTRIL) 20 MG tablet; Take 1.5 tablets (30 mg) by mouth daily  -     Basic Metabolic Panel    Systemic lupus erythematosus with glomerular disease,  unspecified SLE type (H).  Patient will continue regular follow-up with her rheumatologist as planned.    Chronic rhinitis, unspecified type.  Discussed symptoms as well as physical exam findings.  Recommend restarting Flonase 2 sprays in each nostril daily.  Patient was also advised to take Claritin as needed.  Recommend evaluation by oncology if symptoms do not improve in 1 month.  Patient is in agreement with the plan.  -     OTOLARYNGOLOGY REFERRAL  -     fluticasone (FLONASE) 50 MCG/ACT spray; Spray 2 sprays into both nostrils daily    Total time spent 25 minutes.  More than 50% of the time spent with Ms. Live on counseling / coordinating her care    Eliz Nguyen MD

## 2018-06-25 NOTE — NURSING NOTE
"    Chief Complaint   Patient presents with     Study Results     PSG       Initial /61  Pulse 59  Resp 16  Ht 1.6 m (5' 2.99\")  Wt 118.4 kg (261 lb)  SpO2 98%  BMI 46.25 kg/m2 Estimated body mass index is 46.25 kg/(m^2) as calculated from the following:    Height as of this encounter: 1.6 m (5' 2.99\").    Weight as of this encounter: 118.4 kg (261 lb).    Medication Reconciliation: complete    Neck circumference:  inches /  centimeters.    DME:     [facility] Salazar [facility]       "

## 2018-07-12 ENCOUNTER — TELEPHONE (OUTPATIENT)
Dept: SLEEP MEDICINE | Facility: CLINIC | Age: 58
End: 2018-07-12

## 2018-07-12 DIAGNOSIS — G47.33 OSA (OBSTRUCTIVE SLEEP APNEA): ICD-10-CM

## 2018-07-12 DIAGNOSIS — E66.01 MORBID OBESITY (H): ICD-10-CM

## 2018-07-13 ENCOUNTER — ANTICOAGULATION THERAPY VISIT (OUTPATIENT)
Dept: ANTICOAGULATION | Facility: OTHER | Age: 58
End: 2018-07-13

## 2018-07-13 DIAGNOSIS — Z79.01 LONG-TERM (CURRENT) USE OF ANTICOAGULANTS: ICD-10-CM

## 2018-07-13 DIAGNOSIS — I48.20 CHRONIC ATRIAL FIBRILLATION (H): ICD-10-CM

## 2018-07-13 DIAGNOSIS — Z79.01 LONG TERM CURRENT USE OF ANTICOAGULANT THERAPY: ICD-10-CM

## 2018-07-13 DIAGNOSIS — I48.0 PAF (PAROXYSMAL ATRIAL FIBRILLATION) (H): ICD-10-CM

## 2018-07-13 LAB — INR BLD: 3.5 (ref 0.86–1.14)

## 2018-07-13 PROCEDURE — 99207 ZZC NO CHARGE NURSE ONLY: CPT | Performed by: INTERNAL MEDICINE

## 2018-07-13 PROCEDURE — 36416 COLLJ CAPILLARY BLOOD SPEC: CPT | Performed by: INTERNAL MEDICINE

## 2018-07-13 PROCEDURE — 85610 PROTHROMBIN TIME: CPT | Mod: QW | Performed by: INTERNAL MEDICINE

## 2018-07-13 NOTE — MR AVS SNAPSHOT
Luz Marina Live   7/13/2018   Anticoagulation Therapy Visit    Description:  58 year old female   Provider:  Eliz Nguyen MD   Department:  [facility]           INR as of 7/13/2018     Today's INR 3.5!      Anticoagulation Summary as of 7/13/2018     INR goal 2.0-3.0   Today's INR 3.5!   Full warfarin instructions 5 mg on Fri; 10 mg all other days   Next INR check 7/25/2018    Indications   Long-term (current) use of anticoagulants [Z79.01] [Z79.01]  PAF (paroxysmal atrial fibrillation) (H) [I48.0]         Contact Numbers     Clinic Number:         July 2018 Details    Sun Mon Tue Wed Thu Fri Sat     1               2               3               4               5               6               7                 8               9               10               11               12               13      5 mg   See details      14      10 mg           15      10 mg         16      10 mg         17      10 mg         18      10 mg         19      10 mg         20      5 mg         21      10 mg           22      10 mg         23      10 mg         24      10 mg         25            26               27               28                 29               30               31                    Date Details   07/13 This INR check       Date of next INR:  7/25/2018         How to take your warfarin dose     To take:  5 mg Take 1 of the 5 mg tablets.    To take:  10 mg Take 2 of the 5 mg tablets.

## 2018-07-13 NOTE — PROGRESS NOTES
ANTICOAGULATION FOLLOW-UP CLINIC VISIT    Patient Name:  Luz Marina Live  Date:  7/13/2018  Contact Type:  Telephone    SUBJECTIVE:     Patient Findings     Comments Left a detailed message on VM with this information, pt is advised to call back if pt has any questions, missed doses or concerns such as symptoms of bleeding, clotting, infection, change in diet or other.  Asked pt to increase her greens slightly or call back if she has any info about why it might be up.              OBJECTIVE    INR Point of Care   Date Value Ref Range Status   07/13/2018 3.5 (H) 0.86 - 1.14 Final     Comment:     This test is intended for monitoring Coumadin therapy.  Results are not   accurate in patients with prolonged INR due to factor deficiency.         ASSESSMENT / PLAN  INR assessment SUPRA    Recheck INR In: 10 DAYS    INR Location Outside lab      Anticoagulation Summary as of 7/13/2018     INR goal 2.0-3.0   Today's INR 3.5!   Warfarin maintenance plan 5 mg (5 mg x 1) on Fri; 10 mg (5 mg x 2) all other days   Full warfarin instructions 5 mg on Fri; 10 mg all other days   Weekly warfarin total 65 mg   No change documented Deborah Alatorre, RN   Plan last modified Cait Hawthorne, RN (4/30/2018)   Next INR check 7/25/2018   Target end date     Indications   Long-term (current) use of anticoagulants [Z79.01] [Z79.01]  PAF (paroxysmal atrial fibrillation) (H) [I48.0]         Anticoagulation Episode Summary     INR check location     Preferred lab     Send INR reminders to [facility] POOL    Comments 5 mg tabs, [facility] (needs staff message) PM dose      Anticoagulation Care Providers     Provider Role Specialty Phone number    Eliz Nguyen MD [facility] Internal Medicine [phone]            See the Encounter Report to view Anticoagulation Flowsheet and Dosing Calendar (Go to Encounters tab in chart review, and find the Anticoagulation Therapy Visit)    Dosage adjustment made based on physician directed care  plan.    Deborah Alatorre RN

## 2018-07-19 ENCOUNTER — TELEPHONE (OUTPATIENT)
Dept: SLEEP MEDICINE | Facility: CLINIC | Age: 58
End: 2018-07-19

## 2018-07-19 DIAGNOSIS — E66.01 MORBID OBESITY (H): ICD-10-CM

## 2018-07-19 DIAGNOSIS — G47.33 OSA (OBSTRUCTIVE SLEEP APNEA): ICD-10-CM

## 2018-07-19 NOTE — TELEPHONE ENCOUNTER
I called patient today 07/19/18 @ 10:21 am to schedule pap machine setup appointment. No answer left message for patient to call me back here in [place].

## 2018-07-26 ENCOUNTER — DOCUMENTATION ONLY (OUTPATIENT)
Dept: SLEEP MEDICINE | Facility: CLINIC | Age: 58
End: 2018-07-26
Payer: COMMERCIAL

## 2018-07-26 DIAGNOSIS — G47.33 OSA (OBSTRUCTIVE SLEEP APNEA): ICD-10-CM

## 2018-07-26 DIAGNOSIS — E66.01 MORBID OBESITY (H): ICD-10-CM

## 2018-07-26 NOTE — PROGRESS NOTES
Patient was offered choice of vendor and chose [facility].  Luz Marina ELMORE Calos was set up at [facility] on July 26, 2018 Patient received a Resmed AirSense 10 Auto. Pressures were set at 5-15 cm H2O.   Patient s ramp is 5 cm H2O for Auto and FLEX/EPR is EPR, 2.  Patient received a [employer] Mask name: Resmed Airfit P10 For Her Size Small, heated tubing and heated humidifier.  Patient is enrolled in the STM Program and does not need to meet compliance. Patient has a follow up on TBD, patient to call and schedule with Dr. Nguyễn or switch to [facility] provider.    Haley Flanagan

## 2018-07-30 ENCOUNTER — DOCUMENTATION ONLY (OUTPATIENT)
Dept: SLEEP MEDICINE | Facility: CLINIC | Age: 58
End: 2018-07-30

## 2018-07-30 DIAGNOSIS — G47.33 OSA (OBSTRUCTIVE SLEEP APNEA): ICD-10-CM

## 2018-07-30 DIAGNOSIS — E66.01 MORBID OBESITY (H): ICD-10-CM

## 2018-07-30 NOTE — PROGRESS NOTES
3 DAY STM VISIT    Diagnostic AHI: 30.9     Patient contacted for 3 day STM visit  Message left for patient to return call     Device type: Auto-CPAP  PAP settings from order::  CPAP min 5 cm  H20       CPAP max 15 cm  H20  Mask type:    Nasal Pillows     Device settings from machine      Min CPAP 5.0            Max CPAP 15.0      Assessment: Nightly usage over four hours.  Action plan: Pt to have f/u 14 day STM visit.  Patient has a follow up visit scheduled:   no.

## 2018-07-30 NOTE — PROGRESS NOTES
Patient returned call.     Subjective measures: Pt states that things are going ok. She is noticing the noise of her breathing and that is going to take some getting used to.   Patient meeting subjective benchmarks.     Action plan:pt to have 14 day STM visit.

## 2018-08-10 ENCOUNTER — DOCUMENTATION ONLY (OUTPATIENT)
Dept: SLEEP MEDICINE | Facility: CLINIC | Age: 58
End: 2018-08-10

## 2018-08-10 DIAGNOSIS — E66.01 MORBID OBESITY (H): ICD-10-CM

## 2018-08-10 DIAGNOSIS — G47.33 OSA (OBSTRUCTIVE SLEEP APNEA): ICD-10-CM

## 2018-08-10 NOTE — PROGRESS NOTES
14 DAY STM VISIT    Diagnostic AHI: 30.9   PSG    Message left for patient to return call     Assessment: Pt meeting objective benchmarks.     Action plan: waiting for patient to return call.  and pt to have 30 day STM visit.    Device type: Auto-CPAP  PAP settings: CPAP min 5.0 cm  H20     CPAP max 15.0 cm  H20    95th% pressure 13 cm   Mask type:  Nasal Pillows  Objective measures: 14 day rolling measures      Compliance  92 %      Leak  19.8 lpm  last  upload      AHI 1.33   last  upload      Average number of minutes 474     Average hours of usage 7.9          Objective measure goal  Compliance   Goal >70%  Leak   Goal < 24 lpm  AHI  Goal < 5  Usage  Goal >240

## 2018-08-14 ENCOUNTER — ANTICOAGULATION THERAPY VISIT (OUTPATIENT)
Dept: ANTICOAGULATION | Facility: OTHER | Age: 58
End: 2018-08-14

## 2018-08-14 DIAGNOSIS — Z79.01 LONG TERM CURRENT USE OF ANTICOAGULANT THERAPY: ICD-10-CM

## 2018-08-14 DIAGNOSIS — I48.0 PAF (PAROXYSMAL ATRIAL FIBRILLATION) (H): ICD-10-CM

## 2018-08-14 DIAGNOSIS — Z79.01 LONG-TERM (CURRENT) USE OF ANTICOAGULANTS: ICD-10-CM

## 2018-08-14 DIAGNOSIS — I48.20 CHRONIC ATRIAL FIBRILLATION (H): ICD-10-CM

## 2018-08-14 LAB — INR BLD: 2.4 (ref 0.86–1.14)

## 2018-08-14 PROCEDURE — 85610 PROTHROMBIN TIME: CPT | Mod: QW | Performed by: INTERNAL MEDICINE

## 2018-08-14 PROCEDURE — 36416 COLLJ CAPILLARY BLOOD SPEC: CPT | Performed by: INTERNAL MEDICINE

## 2018-08-14 NOTE — PROGRESS NOTES
ANTICOAGULATION FOLLOW-UP CLINIC VISIT    Patient Name:  Luz Marina Live  Date:  8/14/2018  Contact Type:  Telephone    SUBJECTIVE:     Patient Findings     Positives No Problem Findings           OBJECTIVE    INR Point of Care   Date Value Ref Range Status   08/14/2018 2.4 (H) 0.86 - 1.14 Final     Comment:     This test is intended for monitoring Coumadin therapy.  Results are not   accurate in patients with prolonged INR due to factor deficiency.         ASSESSMENT / PLAN  INR assessment THER    Recheck INR In: 4 WEEKS    INR Location Outside lab      Anticoagulation Summary as of 8/14/2018     INR goal 2.0-3.0   Today's INR 2.4   Warfarin maintenance plan 5 mg (5 mg x 1) on Fri; 10 mg (5 mg x 2) all other days   Full warfarin instructions 5 mg on Fri; 10 mg all other days   Weekly warfarin total 65 mg   No change documented Deborah Alatorre, KATE   Plan last modified Cait Hawthorne RN (4/30/2018)   Next INR check 9/11/2018   Target end date     Indications   Long-term (current) use of anticoagulants [Z79.01] [Z79.01]  PAF (paroxysmal atrial fibrillation) (H) [I48.0]         Anticoagulation Episode Summary     INR check location     Preferred lab     Send INR reminders to [facility] POOL    Comments 5 mg tabs, [facility] (needs staff message) PM dose      Anticoagulation Care Providers     Provider Role Specialty Phone number    Eliz Nguyen MD [facility] Internal Medicine [phone]            See the Encounter Report to view Anticoagulation Flowsheet and Dosing Calendar (Go to Encounters tab in chart review, and find the Anticoagulation Therapy Visit)    Dosage adjustment made based on physician directed care plan.    Deborah Alatorre, RN

## 2018-08-14 NOTE — MR AVS SNAPSHOT
Luz Marina Live   8/14/2018   Anticoagulation Therapy Visit    Description:  58 year old female   Provider:  Eliz Nguyen MD   Department:  [facility]           INR as of 8/14/2018     Today's INR 2.4      Anticoagulation Summary as of 8/14/2018     INR goal 2.0-3.0   Today's INR 2.4   Full warfarin instructions 5 mg on Fri; 10 mg all other days   Next INR check 9/11/2018    Indications   Long-term (current) use of anticoagulants [Z79.01] [Z79.01]  PAF (paroxysmal atrial fibrillation) (H) [I48.0]         Contact Numbers     Clinic Number:         August 2018 Details    Sun Mon Tue Wed Thu Fri Sat        1               2               3               4                 5               6               7               8               9               10               11                 12               13               14      10 mg   See details      15      10 mg         16      10 mg         17      5 mg         18      10 mg           19      10 mg         20      10 mg         21      10 mg         22      10 mg         23      10 mg         24      5 mg         25      10 mg           26      10 mg         27      10 mg         28      10 mg         29      10 mg         30      10 mg         31      5 mg           Date Details   08/14 This INR check               How to take your warfarin dose     To take:  5 mg Take 1 of the 5 mg tablets.    To take:  10 mg Take 2 of the 5 mg tablets.           September 2018 Details    Sun Mon Tue Wed Thu Fri Sat           1      10 mg           2      10 mg         3      10 mg         4      10 mg         5      10 mg         6      10 mg         7      5 mg         8      10 mg           9      10 mg         10      10 mg         11            12               13               14               15                 16               17               18               19               20               21               22                 23               24                25               26               27               28               29                 30                      Date Details   No additional details    Date of next INR:  9/11/2018         How to take your warfarin dose     To take:  5 mg Take 1 of the 5 mg tablets.    To take:  10 mg Take 2 of the 5 mg tablets.

## 2018-08-23 ENCOUNTER — DOCUMENTATION ONLY (OUTPATIENT)
Dept: SLEEP MEDICINE | Facility: CLINIC | Age: 58
End: 2018-08-23
Payer: COMMERCIAL

## 2018-08-23 DIAGNOSIS — G47.33 OSA (OBSTRUCTIVE SLEEP APNEA): ICD-10-CM

## 2018-08-23 DIAGNOSIS — E66.01 MORBID OBESITY (H): ICD-10-CM

## 2018-08-23 NOTE — PROGRESS NOTES
Patient came to [facility] for mask fitting appointment on August 23, 2018. Patient requested to switch masks because soreness/skin irritation.  Patient selected the following mask: Rich Parry, type Brevida Pillow mask with X-Small/Small pillows.

## 2018-08-27 ENCOUNTER — DOCUMENTATION ONLY (OUTPATIENT)
Dept: SLEEP MEDICINE | Facility: CLINIC | Age: 58
End: 2018-08-27

## 2018-08-27 DIAGNOSIS — E66.01 MORBID OBESITY (H): ICD-10-CM

## 2018-08-27 DIAGNOSIS — G47.33 OSA (OBSTRUCTIVE SLEEP APNEA): ICD-10-CM

## 2018-09-04 ENCOUNTER — OFFICE VISIT (OUTPATIENT)
Dept: RHEUMATOLOGY | Facility: CLINIC | Age: 58
End: 2018-09-04
Payer: COMMERCIAL

## 2018-09-04 VITALS
WEIGHT: 260.9 LBS | TEMPERATURE: 98.1 F | DIASTOLIC BLOOD PRESSURE: 76 MMHG | OXYGEN SATURATION: 99 % | BODY MASS INDEX: 46.23 KG/M2 | SYSTOLIC BLOOD PRESSURE: 117 MMHG | HEART RATE: 54 BPM

## 2018-09-04 DIAGNOSIS — N18.30 CKD (CHRONIC KIDNEY DISEASE) STAGE 3, GFR 30-59 ML/MIN (H): Chronic | ICD-10-CM

## 2018-09-04 DIAGNOSIS — Z79.899 LONG-TERM USE OF PLAQUENIL: ICD-10-CM

## 2018-09-04 DIAGNOSIS — M32.14 SYSTEMIC LUPUS ERYTHEMATOSUS WITH GLOMERULAR DISEASE, UNSPECIFIED SLE TYPE (H): Primary | Chronic | ICD-10-CM

## 2018-09-04 PROCEDURE — 99214 OFFICE O/P EST MOD 30 MIN: CPT | Performed by: INTERNAL MEDICINE

## 2018-09-04 RX ORDER — HYDROXYCHLOROQUINE SULFATE 200 MG/1
200 TABLET, FILM COATED ORAL 2 TIMES DAILY
Qty: 60 TABLET | Refills: 11 | Status: SHIPPED | OUTPATIENT
Start: 2018-09-04 | End: 2019-08-05

## 2018-09-04 ASSESSMENT — PAIN SCALES - GENERAL: PAINLEVEL: NO PAIN (0)

## 2018-09-04 NOTE — PROGRESS NOTES
Rheumatology Visit     Luz Marina Live MRN# [id number]   YOB: 1960 Age: 58 year old     Date of Visit: September 4, 2018  Primary care provider: Eliz Nguyen          Assessment and Plan:   1. 57 yo female with longstanding SLE. She did have renal involvement in the 1990's requiring Cytoxan, Prednisone and Imuran. In the past several years her disease has been well controlled on Plaquenil alone. She has persistently abnormal but stable DSDNA and normal or low but stable C3 and C4. She has intermittent low grade protein in the urine, and borderline low wbc on occasion.  She had worsened renal function in 2015 with hospitalizations for serious illnesses that then reversed.  2. CAD, s/p CABG in August 2009, stable with recent reassessment. Followed by Dr. Nguyen and Cardiology  3. Hypertension, stable  4. CKD per above, very stable followed by Dr. Clark.   5. Long term use of Plaquenil, between 15 - 20 years.  Normal OCT in per Dr. Davenport  6. Symptomatic hammertoes  7.  OSAS, pending CPAP therapy  PLAN: long discussion with patient regarding long term Plaquenil use, current guidelines and attributed eye risk with duration/total dosage; inability to predict flares of SLE.  1. See lab orders, medication orders and follow-up plans for this encounter. Her DSDNA was stable improved in December, with variable but no trend in complements.  2. Additional treatment plan consists of continuation of Plaquenil but decrease to 400 mg alternating with 200 mg every other day.  We will discuss yearly. Eye appt yearly  3. She will have BP rechecked regularly.  4. RTC: in 1 year(s);   5. Letter: no  6. flu shot this fall  7. Future orders for complements and DNA      Ever Adame MD FACP      Rheumatic and Autoimmune Diseases           History of Present Illness:   57 yo female with SLE seen for followup. In the past she was followed by Dr. Behrens and then Dr. Valdez. She saw Dr. Mendoza  twice, the last time in the summer of 2009. I saw her first in 2010 and last 14 months ago. EPIC reviewed.  HISTORY CARRIED FORWARD:   To review, she presented with SLE in 1993 with arthritis, nasal ulcers, rash, positive DSDNA and APLA. She had an episode of respiratory failure in 1994 and renal involvement. She was on high dose Prednsone, IV Cytoxan and then Imuran. She had an MI in 1996 ? Related to APLA.   She has been in a clinical remission for several years on Plaquenil 400 mg daily and takes Naprosyn for arthralgias. She had an eye exam in the fall without toxicity. No side effects. She is on 325 mg of ASA daily. She had a 3 vessel bypass in 8/09 for CAD and is followed by Dr. Myrick who saw her in November 2013 with stable findings. Dr. Nguyen is her PCP and she saw her in December and increased Lisinopril for her BP. Lab was normal/stable in November.   Labs in 2014 both Febr and October showed stable C3 of 72 and low C4 at 11 and elevated but overall stable DSDNA at 572 but no persistent trend over past several tests. Last may she had DSDNA 491 and normal complements.   She has had a borderline low wbc but recently normal. Her UA has small amount of protein; this shows up intermittently with no cells or casts. See Dr. Clark's notes.   She has had two hospitalizations in 2015. She is followed closely by her PCP Dr. Nguyen as well as here by Dr. Clark, and Cardiology.  Last hospitalization in August 2015with strep infection and then septic shock and worsened CKD per notes.  Her creatinine had returned to baseline.  INTERVAL HISTORY:   There have been no definite symptoms to suggest SLE flare in the interim since I saw her.  She called in March with some increased fatigue and wondered about going back up to BID Plaquenil which she did.  However she noted no difference.  She remains on Plaquenil now at 200 mg daily. She had a stable eye exam last fall and is scheduled this fall.  Her lab in the interim is  reviewed. She has stable low complements last done in February; DNA was 56 last November compared with 103 in 2016.  She has had evaluation by Dr. Moreno for dyspnea and elevated PA pressures with repeat catheterization in April, and Dr. Nguyễn for OSAS with  sleep study. She saw Dr. Nguyen her PCC in June per notes.  Her CKD has been stable, followed by Dr. Clark.    She has been on Plaquenil for >18 years.  Discussed extensively as per Assessment and Plan above.  She has no photosensitivity, Raynauds, pleurisy, fever, alopecia, overt arthritis. She has a hammertoe deformity in her feet that we discussed again.       Review of Systems:   Review Of Systems  Skin: rosacea unchanged  Eyes: negative  Ears/Nose/Throat: negative  Respiratory: No shortness of breath, dyspnea on exertion, cough, or hemoptysis  Cardiovascular: see CV notes  Gastrointestinal: negative  Genitourinary: negative  Musculoskeletal:see HPI  Neurologic: negative  Psychiatric: negative  Hematologic/Lymphatic/Immunologic: negative  Endocrine: negative          Past Medical History:     Past Medical History:   Diagnosis Date     Hypovolemic shock (H) 2/28/2015     Sepsis (H) 8/22/2015       Patient Active Problem List    Diagnosis Date Noted     KEITH (obstructive sleep apnea)- severe (AHI 30) 06/18/2018     Priority: Medium     6/13/2018 [place] Sleep Study (257.0 lbs) - AHI 30.9, RDI 32.5, Supine AHI 48.7, REM AHI 76.3, Low O2 71.1%, Time Spent ?88% 20.8 minutes / Time Spent ?89% 25.7 minutes. TCCo2 within upper normal limits.        Psychophysiological insomnia 06/11/2018     Priority: Medium     Morbid obesity (H) 06/11/2018     Priority: Medium     Systemic lupus erythematosus with glomerular disease, unspecified SLE type (H) 07/03/2017     Priority: Medium     longstanding SLE. She did have renal involvement in the 1990's requiring Cytoxan, Prednisone and Imuran       Moderate tricuspid insufficiency 12/22/2016     Priority: Medium      Long-term (current) use of anticoagulants [Z79.01] 04/05/2016     Priority: Medium     CKD (chronic kidney disease) stage 3, GFR 30-59 ml/min 09/14/2015     Priority: Medium     PAF (paroxysmal atrial fibrillation) (H) 08/24/2015     Priority: Medium     08/2015, the patient had a severe infection of her face and during that hospitalization had an episode of atrial fibrillation, she was discharged on amiodarone which since then has been discontinued.         HTN, goal below 140/90 07/03/2013     Priority: Medium     Hyperlipidemia LDL goal <100 12/05/2011     Priority: Medium     CAD (coronary artery disease) 12/05/2011     Priority: Medium     S/p CABGx3 2009               Past Surgical History:     Past Surgical History:   Procedure Laterality Date     CARDIAC SURGERY  08/27/2009    triple vessel coronary artery bypass grafting on 8/27/09     CARPAL TUNNEL RELEASE RT/LT  1996    bilateral             Social History:     Social History   Substance Use Topics     Smoking status: Former Smoker     Smokeless tobacco: Never Used      Comment: 30 plus years ago.     Alcohol use No             Family History:     Family History   Problem Relation Age of Onset     Arthritis Mother      ra     Connective Tissue Disorder Mother      lupus     Congenital Anomalies Mother      wholein heart     Cerebrovascular Disease Mother      TIA's     Cerebrovascular Disease Father      Diabetes Father      Hypertension Father      Cardiovascular Father      stents     Genitourinary Problems Father      kidney failure     KIDNEY DISEASE Father      kidney injury related to acute illness around time of death (RANDELL likely)     Cataracts Father      Arthritis Paternal Grandmother      Diabetes Brother             Allergies:     Allergies   Allergen Reactions     Sulfa Drugs Rash and GI Disturbance             Medications:     Current Outpatient Prescriptions   Medication Sig Dispense Refill     aspirin 81 MG tablet Take by mouth daily        atorvastatin (LIPITOR) 40 MG tablet Take 1 tablet (40 mg) by mouth daily 90 tablet 3     Calcium Carbonate-Vitamin D (CALCIUM 600-D PO) Take  by mouth 2 times daily.       erythromycin (ROMYCIN) ophthalmic ointment Place 1 Application Into the left eye 4 times daily 1 Tube 1     fluticasone (FLONASE) 50 MCG/ACT spray Spray 2 sprays into both nostrils daily 1 Bottle 11     hydroxychloroquine (PLAQUENIL) 200 MG tablet Take 1 tablet (200 mg) by mouth 2 times daily 60 tablet 6     lisinopril (PRINIVIL/ZESTRIL) 20 MG tablet Take 1.5 tablets (30 mg) by mouth daily 145 tablet 1     metoprolol tartrate (LOPRESSOR) 50 MG tablet TAKE 1 TABLET (50 MG) BY MOUTH 2 TIMES DAILY 180 tablet 3     MULTIPLE VITAMIN PO Take 1 tablet by mouth daily        mupirocin (BACTROBAN NASAL) 2 % nasal ointment Place a thin layer inside right nose on septum BID x 2 weeks 10 g 1     neomycin-polymyxin-hydrocortisone (CORTISPORIN) 3.5-39082-3 otic suspension Place 3 drops into both ears 3 times daily 10 mL 0     Omega-3 Fatty Acids (FISH OIL) 1200 MG capsule Take 2 capsules by mouth 2 times daily        warfarin (COUMADIN) 5 MG tablet Take 5 mg on Friday and 10 mg all other days, or as directed by the Coumadin Clinic. 160 tablet 0            Physical Exam:   /76 (BP Location: Left arm, Patient Position: Chair, Cuff Size: Adult Large)  Pulse 54  Temp 98.1  F (36.7  C) (Oral)  Wt 118.3 kg (260 lb 14.4 oz)  SpO2 99%  BMI 46.23 kg/m2    Constitutional: WD-WN-WG cooperative   Eyes: nl conjunctiva, sclera   ENT: nl external ears, nose, throat   No mucous membrane lesions, normal saliva pool   Neck: no mass or thyroid enlargement   Lymph: no cervical, supraclavicular, inguinal or epitrochlear nodes   MS: All TMJ, neck, shoulder, elbow, wrist, MCP/PIP/DIP, spine, hip, knee, ankle, and foot MTP/IP joints were examined and found without active synovitis. Minor pain with finger curl unchanged. Second digit hammertoes. No dactylitis,  tenosynovitis, enthesopathy   Skin: no nail pitting, alopecia, rash, nodules or lesions. Malar erythema minimal due to rosacea  Neuro: nl cranial nerves, strength   Psych: nl affect.       Data:     Lab Results   Component Value Date    WBC 7.2 03/15/2017    WBC 7.1 09/02/2015    WBC 9.6 08/29/2015    HGB 13.3 03/15/2017    HGB 12.3 12/15/2016    HGB 13.0 08/01/2016    HCT 39.5 03/15/2017    HCT 29.8 (L) 09/02/2015    HCT 25.4 (L) 08/29/2015    MCV 90 03/15/2017    MCV 93 09/02/2015    MCV 89 08/29/2015     03/15/2017     09/02/2015     08/29/2015     Lab Results   Component Value Date    BUN 31 (H) 06/25/2018    BUN 24 03/12/2018    BUN 22 02/27/2018     No components found for: SEDRATE  Lab Results   Component Value Date    TSH 5.44 (H) 03/02/2015    TSH 2.59 02/28/2015    TSH 5.45 (H) 10/08/2014     Lab Results   Component Value Date    AST 32 02/27/2018    AST 47 (H) 03/15/2017    AST 26 08/22/2015    ALT 36 02/27/2018    ALT 60 (H) 03/15/2017    ALT 22 08/22/2015    ALKPHOS 106 02/27/2018    ALKPHOS 105 03/15/2017    ALKPHOS 91 08/22/2015     Reviewed Rheumatology lab flowsheet    Ever Adame

## 2018-09-04 NOTE — MR AVS SNAPSHOT
After Visit Summary   9/4/2018    Luz Marina Live    MRN: [id number]           Patient Information     Date Of Birth          1960        Visit Information        Provider Department      9/4/2018 8:30 AM Ever Adame MD [facility]        Today's Diagnoses     Systemic lupus erythematosus with glomerular disease, unspecified SLE type (H)    -  1    CKD (chronic kidney disease) stage 3, GFR 30-59 ml/min        Long-term use of Plaquenil           Follow-ups after your visit        Follow-up notes from your care team     Return in about 1 year (around 9/4/2019).      Your next 10 appointments already scheduled     Sep 10, 2018 10:30 AM CDT   Return Visit with Tanya Patel MD   [facility] ([facility])    [address] [place] [id number] [phone]            Aug 05, 2019 10:30 AM CDT   Return Visit with Ever Adame MD   [facility] ([facility])    [address] [place] [id number] [phone]              Future tests that were ordered for you today     Open Future Orders        Priority Expected Expires Ordered    Complement C3 Routine 9/10/2018 9/4/2019 9/4/2018    Complement C4 Routine 9/10/2018 9/4/2019 9/4/2018    DNA double stranded antibodies Routine 9/10/2018 9/4/2019 9/4/2018            Who to contact     If you have questions or need follow up information about today's clinic visit or your schedule please contact [facility] directly at [phone].  Normal or non-critical lab and imaging results will be communicated to you by MyChart, letter or phone within 4 business days after the clinic has received the results. If you do not hear from us within 7 days, please contact the clinic through MyChart or phone. If you have a critical or abnormal lab result, we will notify you by phone as soon as possible.  Submit refill requests  through [employer] or call your pharmacy and they will forward the refill request to us. Please allow 3 business days for your refill to be completed.          Additional Information About Your Visit        [employer] Information     [employer] is an electronic gateway that provides easy, online access to your medical records. With [employer], you can request a clinic appointment, read your test results, renew a prescription or communicate with your care team.     To sign up for [employer] visit the website at [email or web address]   You will be asked to enter the access code listed below, as well as some personal information. Please follow the directions to create your username and password.     Your access code is: [email or web address]  Expires: 12/3/2018  9:13 AM     Your access code will  in 90 days. If you need help or a new code, please contact your [facility] or call [phone] for assistance.        Care EveryWhere ID     This is your Care EveryWhere ID. This could be used by other organizations to access your [place] medical records  [id number]        Your Vitals Were     Pulse Temperature Pulse Oximetry BMI (Body Mass Index)          54 98.1  F (36.7  C) (Oral) 99% 46.23 kg/m2         Blood Pressure from Last 3 Encounters:   18 117/76   18 102/61   18 116/79    Weight from Last 3 Encounters:   18 118.3 kg (260 lb 14.4 oz)   18 118.4 kg (261 lb)   18 117.5 kg (259 lb)                 Where to get your medicines      These medications were sent to [facility]/pharmacy #[id number] - [place], [place] - [address]  [address], [place] [address]     Phone:  [phone]     hydroxychloroquine 200 MG tablet          Primary Care Provider Office Phone # Fax #    Eliz Nguyen [phone]       [facility] [address] [facility] [address]        Equal Access to Services     KANCHAN MCGUIRE AH: Hadii aad ku  burton Garrett, wastefanida lumicheladaha, qawilliamsta karober curran, mayte saadin hayaan blumena jennyfrank lalindsayshaun armando. So [facility] [phone].    ATENCIÓN: Si eveliala cam, tiene a bennett disposición servicios gratuitos de asistencia lingüística. Kieran al [phone].    We comply with applicable federal civil rights laws and [place] laws. We do not discriminate on the basis of race, color, national origin, age, disability, sex, sexual orientation, or gender identity.            Thank you!     Thank you for choosing [facility]  for your care. Our goal is always to provide you with excellent care. Hearing back from our patients is one way we can continue to improve our services. Please take a few minutes to complete the written survey that you may receive in the mail after your visit with us. Thank you!             Your Updated Medication List - Protect others around you: Learn how to safely use, store and throw away your medicines at www.disposemymeds.org.          This list is accurate as of 9/4/18  9:14 AM.  Always use your most recent med list.                   Brand Name Dispense Instructions for use Diagnosis    aspirin 81 MG tablet      Take by mouth daily        atorvastatin 40 MG tablet    LIPITOR    90 tablet    Take 1 tablet (40 mg) by mouth daily    Pure hypercholesterolemia       CALCIUM 600-D PO      Take  by mouth 2 times daily.    SLE (systemic lupus erythematosus) (H)       erythromycin ophthalmic ointment    ROMYCIN    1 Tube    Place 1 Application Into the left eye 4 times daily    Conjunctivitis of both eyes, unspecified conjunctivitis type       fluticasone 50 MCG/ACT spray    FLONASE    1 Bottle    Spray 2 sprays into both nostrils daily    Chronic rhinitis, unspecified type       hydroxychloroquine 200 MG tablet    PLAQUENIL    60 tablet    Take 1 tablet (200 mg) by mouth 2 times daily    Systemic lupus erythematosus with glomerular disease, unspecified SLE type (H), CKD (chronic kidney  disease) stage 3, GFR 30-59 ml/min, Long-term use of Plaquenil       lisinopril 20 MG tablet    PRINIVIL/ZESTRIL    145 tablet    Take 1.5 tablets (30 mg) by mouth daily    Benign essential hypertension       metoprolol tartrate 50 MG tablet    LOPRESSOR    180 tablet    TAKE 1 TABLET (50 MG) BY MOUTH 2 TIMES DAILY    Atrial fibrillation, unspecified type (H)       MULTIPLE VITAMIN PO      Take 1 tablet by mouth daily        mupirocin 2 % nasal ointment    BACTROBAN NASAL    10 g    Place a thin layer inside right nose on septum BID x 2 weeks    Nasal septum ulceration       neomycin-polymyxin-hydrocortisone 3.5-17910-7 otic suspension    CORTISPORIN    10 mL    Place 3 drops into both ears 3 times daily    Infective otitis externa, bilateral       omega-3 fatty acids 1200 MG capsule      Take 2 capsules by mouth 2 times daily        warfarin 5 MG tablet    COUMADIN    160 tablet    Take 5 mg on Friday and 10 mg all other days, or as directed by the Coumadin Clinic.    Atrial fibrillation, unspecified type (H)

## 2018-09-04 NOTE — NURSING NOTE
Luz Marina Live's goals for this visit include:   Chief Complaint   Patient presents with     RECHECK     Lupus       She requests these members of her care team be copied on today's visit information: PCP    PCP: Eliz Nguyen    Referring Provider:  No referring provider defined for this encounter.    /76 (BP Location: Left arm, Patient Position: Chair, Cuff Size: Adult Large)  Pulse 54  Temp 98.1  F (36.7  C) (Oral)  Wt 118.3 kg (260 lb 14.4 oz)  SpO2 99%  BMI 46.23 kg/m2    Do you need any medication refills at today's visit? None      Shannan Ceron, SUSAN

## 2018-09-06 DIAGNOSIS — I48.91 ATRIAL FIBRILLATION, UNSPECIFIED TYPE (H): ICD-10-CM

## 2018-09-10 RX ORDER — WARFARIN SODIUM 5 MG/1
TABLET ORAL
Qty: 160 TABLET | Refills: 0 | Status: SHIPPED | OUTPATIENT
Start: 2018-09-10 | End: 2018-12-08

## 2018-09-17 ENCOUNTER — OFFICE VISIT (OUTPATIENT)
Dept: OTOLARYNGOLOGY | Facility: CLINIC | Age: 58
End: 2018-09-17
Payer: COMMERCIAL

## 2018-09-17 VITALS
HEART RATE: 57 BPM | SYSTOLIC BLOOD PRESSURE: 164 MMHG | HEIGHT: 64 IN | DIASTOLIC BLOOD PRESSURE: 88 MMHG | WEIGHT: 263 LBS | BODY MASS INDEX: 44.9 KG/M2

## 2018-09-17 DIAGNOSIS — R09.81 NASAL CONGESTION: Primary | ICD-10-CM

## 2018-09-17 PROCEDURE — 99213 OFFICE O/P EST LOW 20 MIN: CPT | Performed by: OTOLARYNGOLOGY

## 2018-09-17 NOTE — NURSING NOTE
"Luz Marina Live's goals for this visit include:   Chief Complaint   Patient presents with     RECHECK     Sinus pain, runny nose, echo in left ear       She requests these members of her care team be copied on today's visit information: yes      PCP: Eliz Nguyen    Referring Provider:  No referring provider defined for this encounter.    /88  Pulse 57  Ht 1.626 m (5' 4\")  Wt 119.3 kg (263 lb)  BMI 45.14 kg/m2    Yolande Hebert CMA (AAMA)    "

## 2018-09-17 NOTE — PATIENT INSTRUCTIONS
Preventive Care:    Breast Cancer Screening: During our visit today, we discussed that it is recommended you receive breast cancer screening. Please call or make an appointment with your primary care provider to discuss this with them. You may also call the [facility] scheduling line ([phone]) to set up a mammography appointment at the Breast Center within the [facility].

## 2018-09-17 NOTE — LETTER
9/17/2018         RE: Luz Marina Live  [address]  [place] [address]        Dear Colleague,    Thank you for referring your patient, Luz Marina Live, to the [facility]. Please see a copy of my visit note below.    CC: nasal congestion and rhinitis    HPI: Previously saw the patient back in 2016.  At that time she was doing rhinitis in the septal ulceration.  We did manage to clear up the septal ulceration with Bactroban ointment.  Her rhinitis did not significantly improve.  She does have a history of lupus.  She feels like she has had several months of increasing nasal congestion with more drainage from her nose.  This seemed to respond to Claritin.  She was uncertain if she could take Claritin long-term.  She does find that every fall and spring she seems to have more congestion and drainage.    PE:  GEN: nad  NOSE: Septum midline.  Turbinates are thin.  No excessive nasal mucus seen.  EARS: Tympanic membranes intact bilaterally.  Good middle ear aeration    A/P:  Patient presents with rhinitis.  Allergies is a likely trigger.  As I would recommend that she continue with the Claritin as needed.  I do think would be safe to take.  Otherwise she can try some nasal saline spray.  She may follow-up with me as needed.    I spent a total of 15 minutes face-to-face with Luz Marina Live during today's office visit.  Over 50% of this time was spent counseling the patient on and/or coordinating care as documented in my assessment and plan.      Again, thank you for allowing me to participate in the care of your patient.        Sincerely,        Tanya Patel MD

## 2018-09-17 NOTE — MR AVS SNAPSHOT
After Visit Summary   9/17/2018    Luz Marina Live    MRN: [id number]           Patient Information     Date Of Birth          1960        Visit Information        Provider Department      9/17/2018 1:15 PM Tanya Patel MD [facility]        Today's Diagnoses     Nasal congestion    -  1      Care Instructions    Preventive Care:    Breast Cancer Screening: During our visit today, we discussed that it is recommended you receive breast cancer screening. Please call or make an appointment with your primary care provider to discuss this with them. You may also call the [facility] scheduling line ([phone]) to set up a mammography appointment at the Breast Center within the [facility].              Follow-ups after your visit        Your next 10 appointments already scheduled     Aug 05, 2019 10:30 AM CDT   Return Visit with Ever Adame MD   [facility] ([facility])    [id number] [place] [id number] [phone]              Who to contact     If you have questions or need follow up information about today's clinic visit or your schedule please contact [facility] directly at [phone].  Normal or non-critical lab and imaging results will be communicated to you by MyChart, letter or phone within 4 business days after the clinic has received the results. If you do not hear from us within 7 days, please contact the clinic through MyChart or phone. If you have a critical or abnormal lab result, we will notify you by phone as soon as possible.  Submit refill requests through [email or web address] or call your pharmacy and they will forward the refill request to us. Please allow 3 business days for your refill to be completed.          Additional Information About Your Visit        MyChart Information     [email or web address] gives you secure access to your electronic health record. If you see a  "primary care provider, you can also send messages to your care team and make appointments. If you have questions, please call your primary care clinic.  If you do not have a primary care provider, please call [phone] and they will assist you.      [employer] is an electronic gateway that provides easy, online access to your medical records. With [employer], you can request a clinic appointment, read your test results, renew a prescription or communicate with your care team.     To access your existing account, please contact your [facility] or call [phone] for assistance.        Care EveryWhere ID     This is your Care EveryWhere ID. This could be used by other organizations to access your [place] medical records  [id number]        Your Vitals Were     Pulse Height BMI (Body Mass Index)             57 1.626 m (5' 4\") 45.14 kg/m2          Blood Pressure from Last 3 Encounters:   09/17/18 164/88   09/04/18 117/76   06/25/18 102/61    Weight from Last 3 Encounters:   09/17/18 119.3 kg (263 lb)   09/04/18 118.3 kg (260 lb 14.4 oz)   06/25/18 118.4 kg (261 lb)              Today, you had the following     No orders found for display       Primary Care Provider Office Phone # Fax #    Eliz My Nguyen [phone]       [facility] [address]  [facility] [address]        Equal Access to Services     [facility] HospitalJOSEMANUEL : Hadii aad ku hadasho Soomaali, waaxda luqadaha, qaybta kaalmada adeegyada, mayte figueroa . So [facility] [phone].    ATENCIÓN: Si habla español, tiene a bennett disposición servicios gratuitos de asistencia lingüística. Llame al [phone].    We comply with applicable federal civil rights laws and [place] laws. We do not discriminate on the basis of race, color, national origin, age, disability, sex, sexual orientation, or gender identity.            Thank you!     Thank you for choosing [facility]  for your care. Our goal is always to provide you with excellent care. Hearing back from our patients is one way we can continue to improve our services. Please take a few minutes to complete the written survey that you may receive in the mail after your visit with us. Thank you!             Your Updated Medication List - Protect others around you: Learn how to safely use, store and throw away your medicines at www.disposemymeds.org.          This list is accurate as of 9/17/18 11:59 PM.  Always use your most recent med list.                   Brand Name Dispense Instructions for use Diagnosis    aspirin 81 MG tablet      Take by mouth daily        atorvastatin 40 MG tablet    LIPITOR    90 tablet    Take 1 tablet (40 mg) by mouth daily    Pure hypercholesterolemia       CALCIUM 600-D PO      Take  by mouth 2 times daily.    SLE (systemic lupus erythematosus) (H)       erythromycin ophthalmic ointment    ROMYCIN    1 Tube    Place 1 Application Into the left eye 4 times daily    Conjunctivitis of both eyes, unspecified conjunctivitis type       fluticasone 50 MCG/ACT spray    FLONASE    1 Bottle    Spray 2 sprays into both nostrils daily    Chronic rhinitis, unspecified type       hydroxychloroquine 200 MG tablet    PLAQUENIL    60 tablet    Take 1 tablet (200 mg) by mouth 2 times daily    Systemic lupus erythematosus with glomerular disease, unspecified SLE type (H), CKD (chronic kidney disease) stage 3, GFR 30-59 ml/min, Long-term use of Plaquenil       lisinopril 20 MG tablet    PRINIVIL/ZESTRIL    145 tablet    Take 1.5 tablets (30 mg) by mouth daily    Benign essential hypertension       metoprolol tartrate 50 MG tablet    LOPRESSOR    180 tablet    TAKE 1 TABLET (50 MG) BY MOUTH 2 TIMES DAILY    Atrial fibrillation, unspecified type (H)       MULTIPLE VITAMIN PO      Take 1 tablet by mouth daily        mupirocin 2 % nasal ointment    BACTROBAN NASAL    10 g    Place a thin layer inside right nose on  septum BID x 2 weeks    Nasal septum ulceration       neomycin-polymyxin-hydrocortisone 3.5-99252-5 otic suspension    CORTISPORIN    10 mL    Place 3 drops into both ears 3 times daily    Infective otitis externa, bilateral       omega-3 fatty acids 1200 MG capsule      Take 2 capsules by mouth 2 times daily        warfarin 5 MG tablet    COUMADIN    160 tablet    TAKE 1 TAB (5 MG) ON FRIDAY & 2 TABS (10 MG) ALL OTHER DAYS, OR AS DIRECTED BY THE COUMADIN CLINIC.    Atrial fibrillation, unspecified type (H)

## 2018-09-17 NOTE — PROGRESS NOTES
CC: nasal congestion and rhinitis    HPI: Previously saw the patient back in 2016.  At that time she was doing rhinitis in the septal ulceration.  We did manage to clear up the septal ulceration with Bactroban ointment.  Her rhinitis did not significantly improve.  She does have a history of lupus.  She feels like she has had several months of increasing nasal congestion with more drainage from her nose.  This seemed to respond to Claritin.  She was uncertain if she could take Claritin long-term.  She does find that every fall and spring she seems to have more congestion and drainage.    PE:  GEN: nad  NOSE: Septum midline.  Turbinates are thin.  No excessive nasal mucus seen.  EARS: Tympanic membranes intact bilaterally.  Good middle ear aeration    A/P:  Patient presents with rhinitis.  Allergies is a likely trigger.  As I would recommend that she continue with the Claritin as needed.  I do think would be safe to take.  Otherwise she can try some nasal saline spray.  She may follow-up with me as needed.    I spent a total of 15 minutes face-to-face with Luz Marina Live during today's office visit.  Over 50% of this time was spent counseling the patient on and/or coordinating care as documented in my assessment and plan.

## 2018-09-18 ENCOUNTER — ANTICOAGULATION THERAPY VISIT (OUTPATIENT)
Dept: ANTICOAGULATION | Facility: OTHER | Age: 58
End: 2018-09-18

## 2018-09-18 DIAGNOSIS — Z79.01 LONG-TERM (CURRENT) USE OF ANTICOAGULANTS: ICD-10-CM

## 2018-09-18 DIAGNOSIS — I48.20 CHRONIC ATRIAL FIBRILLATION (H): ICD-10-CM

## 2018-09-18 DIAGNOSIS — Z79.01 LONG TERM CURRENT USE OF ANTICOAGULANT THERAPY: ICD-10-CM

## 2018-09-18 DIAGNOSIS — I48.0 PAF (PAROXYSMAL ATRIAL FIBRILLATION) (H): ICD-10-CM

## 2018-09-18 LAB — INR BLD: 2.9 (ref 0.86–1.14)

## 2018-09-18 PROCEDURE — 85610 PROTHROMBIN TIME: CPT | Mod: QW | Performed by: INTERNAL MEDICINE

## 2018-09-18 PROCEDURE — 36416 COLLJ CAPILLARY BLOOD SPEC: CPT | Performed by: INTERNAL MEDICINE

## 2018-09-18 NOTE — PROGRESS NOTES
ANTICOAGULATION FOLLOW-UP CLINIC VISIT    Patient Name:  Luz Marina Live  Date:  9/18/2018  Contact Type:  Telephone    SUBJECTIVE:     Patient Findings     Positives No Problem Findings           OBJECTIVE    INR Point of Care   Date Value Ref Range Status   09/18/2018 2.9 (H) 0.86 - 1.14 Final     Comment:     This test is intended for monitoring Coumadin therapy.  Results are not   accurate in patients with prolonged INR due to factor deficiency.         ASSESSMENT / PLAN  INR assessment THER    Recheck INR In: 4 WEEKS    INR Location Outside lab      Anticoagulation Summary as of 9/18/2018     INR goal 2.0-3.0   Today's INR 2.9   Warfarin maintenance plan 5 mg (5 mg x 1) on Fri; 10 mg (5 mg x 2) all other days   Full warfarin instructions 5 mg on Fri; 10 mg all other days   Weekly warfarin total 65 mg   No change documented Michael Quispe RN   Plan last modified Cait Hawthorne RN (4/30/2018)   Next INR check 10/16/2018   Target end date     Indications   Long-term (current) use of anticoagulants [Z79.01] [Z79.01]  PAF (paroxysmal atrial fibrillation) (H) [I48.0]         Anticoagulation Episode Summary     INR check location     Preferred lab     Send INR reminders to [facility] POOL    Comments 5 mg tabs, [facility] (needs staff message) PM dose      Anticoagulation Care Providers     Provider Role Specialty Phone number    Eliz Nguyen MD [facility] [phone]            See the Encounter Report to view Anticoagulation Flowsheet and Dosing Calendar (Go to Encounters tab in chart review, and find the Anticoagulation Therapy Visit)    Dosage adjustment made based on physician directed care plan.    Michael Quispe RN

## 2018-09-18 NOTE — MR AVS SNAPSHOT
Luz Marina Live   9/18/2018   Anticoagulation Therapy Visit    Description:  58 year old female   Provider:  Eliz Nguyen MD   Department:  [facility]           INR as of 9/18/2018     Today's INR 2.9      Anticoagulation Summary as of 9/18/2018     INR goal 2.0-3.0   Today's INR 2.9   Full warfarin instructions 5 mg on Fri; 10 mg all other days   Next INR check 10/16/2018    Indications   Long-term (current) use of anticoagulants [Z79.01] [Z79.01]  PAF (paroxysmal atrial fibrillation) (H) [I48.0]         Contact Numbers     Clinic Number:         September 2018 Details    Sun Mon Tue Wed Thu Fri Sat           1                 2               3               4               5               6               7               8                 9               10               11               12               13               14               15                 16               17               18      10 mg   See details      19      10 mg         20      10 mg         21      5 mg         22      10 mg           23      10 mg         24      10 mg         25      10 mg         26      10 mg         27      10 mg         28      5 mg         29      10 mg           30      10 mg                Date Details   09/18 This INR check               How to take your warfarin dose     To take:  5 mg Take 1 of the 5 mg tablets.    To take:  10 mg Take 2 of the 5 mg tablets.           October 2018 Details    Sun Mon Tue Wed Thu Fri Sat      1      10 mg         2      10 mg         3      10 mg         4      10 mg         5      5 mg         6      10 mg           7      10 mg         8      10 mg         9      10 mg         10      10 mg         11      10 mg         12      5 mg         13      10 mg           14      10 mg         15      10 mg         16            17               18               19               20                 21               22               23               24               25                26               27                 28               29               30               31                   Date Details   No additional details    Date of next INR:  10/16/2018         How to take your warfarin dose     To take:  5 mg Take 1 of the 5 mg tablets.    To take:  10 mg Take 2 of the 5 mg tablets.

## 2018-10-19 DIAGNOSIS — E78.00 PURE HYPERCHOLESTEROLEMIA: ICD-10-CM

## 2018-10-19 RX ORDER — ATORVASTATIN CALCIUM 40 MG/1
TABLET, FILM COATED ORAL
Qty: 90 TABLET | Refills: 3 | Status: SHIPPED | OUTPATIENT
Start: 2018-10-19 | End: 2019-10-24

## 2018-10-29 ENCOUNTER — RADIANT APPOINTMENT (OUTPATIENT)
Dept: MAMMOGRAPHY | Facility: CLINIC | Age: 58
End: 2018-10-29
Attending: INTERNAL MEDICINE
Payer: COMMERCIAL

## 2018-10-29 ENCOUNTER — OFFICE VISIT (OUTPATIENT)
Dept: INTERNAL MEDICINE | Facility: CLINIC | Age: 58
End: 2018-10-29
Attending: INTERNAL MEDICINE
Payer: COMMERCIAL

## 2018-10-29 VITALS
BODY MASS INDEX: 45.93 KG/M2 | SYSTOLIC BLOOD PRESSURE: 124 MMHG | WEIGHT: 269 LBS | HEART RATE: 52 BPM | DIASTOLIC BLOOD PRESSURE: 78 MMHG | HEIGHT: 64 IN

## 2018-10-29 DIAGNOSIS — Z00.00 PREVENTATIVE HEALTH CARE: Primary | ICD-10-CM

## 2018-10-29 DIAGNOSIS — Z23 NEED FOR VACCINATION: ICD-10-CM

## 2018-10-29 DIAGNOSIS — Z12.31 VISIT FOR SCREENING MAMMOGRAM: ICD-10-CM

## 2018-10-29 DIAGNOSIS — I10 BENIGN ESSENTIAL HYPERTENSION: ICD-10-CM

## 2018-10-29 PROCEDURE — G0009 ADMIN PNEUMOCOCCAL VACCINE: HCPCS | Mod: ZF

## 2018-10-29 PROCEDURE — 90715 TDAP VACCINE 7 YRS/> IM: CPT | Mod: ZF

## 2018-10-29 PROCEDURE — 87624 HPV HI-RISK TYP POOLED RSLT: CPT | Performed by: INTERNAL MEDICINE

## 2018-10-29 PROCEDURE — G0145 SCR C/V CYTO,THINLAYER,RESCR: HCPCS | Performed by: INTERNAL MEDICINE

## 2018-10-29 PROCEDURE — G0008 ADMIN INFLUENZA VIRUS VAC: HCPCS | Mod: ZF

## 2018-10-29 PROCEDURE — 90686 IIV4 VACC NO PRSV 0.5 ML IM: CPT | Mod: ZF

## 2018-10-29 PROCEDURE — 25000128 H RX IP 250 OP 636: Mod: ZF

## 2018-10-29 PROCEDURE — G0463 HOSPITAL OUTPT CLINIC VISIT: HCPCS | Mod: ZF

## 2018-10-29 PROCEDURE — 90472 IMMUNIZATION ADMIN EACH ADD: CPT | Mod: ZF

## 2018-10-29 PROCEDURE — 90732 PPSV23 VACC 2 YRS+ SUBQ/IM: CPT | Mod: ZF

## 2018-10-29 PROCEDURE — 77067 SCR MAMMO BI INCL CAD: CPT

## 2018-10-29 RX ORDER — LISINOPRIL 20 MG/1
30 TABLET ORAL DAILY
Qty: 145 TABLET | Refills: 1 | Status: SHIPPED | OUTPATIENT
Start: 2018-10-29 | End: 2019-05-09

## 2018-10-29 ASSESSMENT — PAIN SCALES - GENERAL: PAINLEVEL: NO PAIN (0)

## 2018-10-29 ASSESSMENT — ANXIETY QUESTIONNAIRES
6. BECOMING EASILY ANNOYED OR IRRITABLE: NOT AT ALL
GAD7 TOTAL SCORE: 0
3. WORRYING TOO MUCH ABOUT DIFFERENT THINGS: NOT AT ALL
7. FEELING AFRAID AS IF SOMETHING AWFUL MIGHT HAPPEN: NOT AT ALL
IF YOU CHECKED OFF ANY PROBLEMS ON THIS QUESTIONNAIRE, HOW DIFFICULT HAVE THESE PROBLEMS MADE IT FOR YOU TO DO YOUR WORK, TAKE CARE OF THINGS AT HOME, OR GET ALONG WITH OTHER PEOPLE: NOT DIFFICULT AT ALL
1. FEELING NERVOUS, ANXIOUS, OR ON EDGE: NOT AT ALL
2. NOT BEING ABLE TO STOP OR CONTROL WORRYING: NOT AT ALL
5. BEING SO RESTLESS THAT IT IS HARD TO SIT STILL: NOT AT ALL

## 2018-10-29 ASSESSMENT — PATIENT HEALTH QUESTIONNAIRE - PHQ9
5. POOR APPETITE OR OVEREATING: NOT AT ALL
SUM OF ALL RESPONSES TO PHQ QUESTIONS 1-9: 0

## 2018-10-29 NOTE — LETTER
10/29/2018       RE: Luz Marina Live  [address]  [place] [address]     Dear Colleague,    Thank you for referring your patient, Luz Marina Live, to [facility]  at [facility]. Please see a copy of my visit note below.       SUBJECTIVE:   CC: Luz Marina Live is an 58 year old woman who presents for preventive health visit.     Healthy Habits:    Do you get at least three servings of calcium containing foods daily (dairy, green leafy vegetables, etc.)? yes    Amount of exercise or daily activities, outside of work: walking - occasionally    Problems taking medications regularly No    Medication side effects: No    Have you had an eye exam in the past two years? yes    Do you see a dentist twice per year? yes    Do you have sleep apnea, excessive snoring or daytime drowsiness?no      -------------------------------------    Today's PHQ-2 Score:   PHQ-2 ( 1999 [employer]) 10/22/2018 9/17/2018   Q1: Little interest or pleasure in doing things 0 0   Q2: Feeling down, depressed or hopeless 0 0   PHQ-2 Score 0 0   Q1: Little interest or pleasure in doing things Not at all -   Q2: Feeling down, depressed or hopeless Not at all -   PHQ-2 Score 0 -       Abuse: Current or Past(Physical, Sexual or Emotional)- No  Do you feel safe in your environment - Yes    Social History   Substance Use Topics     Smoking status: Former Smoker     Smokeless tobacco: Never Used      Comment: 30 plus years ago.     Alcohol use No     If you drink alcohol do you typically have >3 drinks per day or >7 drinks per week? No                     Reviewed orders with patient.  Reviewed health maintenance and updated orders accordingly - Yes  Labs reviewed in [facility]    Patient over age 50, mutual decision to screen reflected in health maintenance.    Pertinent mammograms are reviewed under the imaging tab.  History of abnormal Pap smear: NO - age 30-65 PAP every 5 years with negative  "HPV co-testing recommended  PAP / HPV 6/22/2012 6/20/2012   PAP - NIL   HPVSUR RESULT Negative  Reference range: Negative  (Note)  INTERPRETIVE INFORMATION: HPV DNA Probe, High Risk, [facility]    The high-risk HPV test detects HPV genotypes 16, 18, 31,  33, 35, 39, 45, 51, 52, 56, 58, 59, and 68, which are  associated with cervical cancer and its precursor lesions.  However, cross-reactions with other genotypes may occur.  Results should be correlated with cytologic and histologic  findings. Sensitivity may be affected by cellularity of  specimen.    The performance characteristics of this test were  determined by [facility].  Performed by [facility],  [address] [place] [id number] [phone]  [email or web address], Karma Brooks MD, Lab. Director -     Reviewed and updated as needed this visit by clinical staff  Tobacco  Allergies  Meds         Reviewed and updated as needed this visit by Provider        Past Medical History:   Diagnosis Date     Hypovolemic shock (H) 2/28/2015     Sepsis (H) 8/22/2015      Past Surgical History:   Procedure Laterality Date     CARDIAC SURGERY  08/27/2009    triple vessel coronary artery bypass grafting on 8/27/09     CARPAL TUNNEL RELEASE RT/LT  1996    bilateral     OBJECTIVE:   /78  Pulse 52  Ht 1.626 m (5' 4\")  Wt 122 kg (269 lb)  Breastfeeding? No  BMI 46.17 kg/m2  EXAM:  GENERAL APPEARANCE: healthy, alert and no distress  EYES: Eyes grossly normal to inspection, PERRL and conjunctivae and sclerae normal  HENT: nose and mouth without ulcers or lesions, oropharynx clear and oral mucous membranes moist  NECK: no adenopathy, no asymmetry, masses, or scars and thyroid normal to palpation  RESP: lungs clear to auscultation - no rales, rhonchi or wheezes  CV: regular rate and rhythm, normal S1 S2, no S3 or S4, no murmur, click or rub, no peripheral edema and peripheral pulses strong  ABDOMEN: soft, nontender, no hepatosplenomegaly, no masses and bowel " "sounds normal  : normal external genitalia, normal cervix.  MS: no musculoskeletal defects are noted and gait is age appropriate without ataxia  SKIN: no suspicious lesions or rashes  NEURO: Normal strength and tone, sensory exam grossly normal, mentation intact and speech normal  PSYCH: mentation appears normal and affect normal/bright    Diagnostic Test Results:  none     ASSESSMENT/PLAN:   1. Preventative health care  Patient was advised on colon, breast, and cervical cancer screening. Discussed vaccinations today.   - Obtaining, preparing and conveyance of cervical or vaginal smear to laboratory.  - Pap imaged thin layer screen with HPV - recommended age 30 - 65 years (select HPV order below)    2. Benign essential hypertension  Blood pressure is under good control. Will continue with current medical therapy without changes.   - lisinopril (PRINIVIL/ZESTRIL) 20 MG tablet; Take 1.5 tablets (30 mg) by mouth daily  Dispense: 145 tablet; Refill: 1  - Lipid Profile; Future  - Comprehensive metabolic panel; Future    3. Need for vaccination  - HC FLU VAC PRESRV FREE QUAD SPLIT VIR 3+YRS IM  - TDAP VACCINE (BOOSTRIX)  - Pneumococcal vaccine 23 valent PPSV23  (Pneumovax) [13571]  - ADMIN MEDICARE: Pneumococcal Vaccine ()    COUNSELING:   Reviewed preventive health counseling, as reflected in patient instructions       Regular exercise       Healthy diet/nutrition       Vision screening    BP Readings from Last 1 Encounters:   10/29/18 124/78     Estimated body mass index is 46.17 kg/(m^2) as calculated from the following:    Height as of this encounter: 1.626 m (5' 4\").    Weight as of this encounter: 122 kg (269 lb).           reports that she has quit smoking. She has never used smokeless tobacco.      Counseling Resources:  ATP IV Guidelines  Pooled Cohorts Equation Calculator  Breast Cancer Risk Calculator  FRAX Risk Assessment  ICSI Preventive Guidelines  Dietary Guidelines for Americans, 2010  USDA's " MyPlate  ASA Prophylaxis  Lung CA Screening    Eliz Nguyen MD  [facility]

## 2018-10-29 NOTE — PROGRESS NOTES
SUBJECTIVE:   CC: Luz Marina Live is an 58 year old woman who presents for preventive health visit.     Healthy Habits:    Do you get at least three servings of calcium containing foods daily (dairy, green leafy vegetables, etc.)? yes    Amount of exercise or daily activities, outside of work: walking - occasionally    Problems taking medications regularly No    Medication side effects: No    Have you had an eye exam in the past two years? yes    Do you see a dentist twice per year? yes    Do you have sleep apnea, excessive snoring or daytime drowsiness?no      -------------------------------------    Today's PHQ-2 Score:   PHQ-2 ( 1999 [employer]) 10/22/2018 9/17/2018   Q1: Little interest or pleasure in doing things 0 0   Q2: Feeling down, depressed or hopeless 0 0   PHQ-2 Score 0 0   Q1: Little interest or pleasure in doing things Not at all -   Q2: Feeling down, depressed or hopeless Not at all -   PHQ-2 Score 0 -       Abuse: Current or Past(Physical, Sexual or Emotional)- No  Do you feel safe in your environment - Yes    Social History   Substance Use Topics     Smoking status: Former Smoker     Smokeless tobacco: Never Used      Comment: 30 plus years ago.     Alcohol use No     If you drink alcohol do you typically have >3 drinks per day or >7 drinks per week? No                     Reviewed orders with patient.  Reviewed health maintenance and updated orders accordingly - Yes  Labs reviewed in [facility]    Patient over age 50, mutual decision to screen reflected in health maintenance.    Pertinent mammograms are reviewed under the imaging tab.  History of abnormal Pap smear: NO - age 30-65 PAP every 5 years with negative HPV co-testing recommended  PAP / HPV 6/22/2012 6/20/2012   PAP - NIL   HPVSUR RESULT Negative  Reference range: Negative  (Note)  INTERPRETIVE INFORMATION: HPV DNA Probe, High Risk, SurePath    The high-risk HPV test detects HPV genotypes 16, 18, 31,  33, 35, 39, 45, 51, 52, 56, 58, 59, and 68,  "which are  associated with cervical cancer and its precursor lesions.  However, cross-reactions with other genotypes may occur.  Results should be correlated with cytologic and histologic  findings. Sensitivity may be affected by cellularity of  specimen.    The performance characteristics of this test were  determined by [facility].  Performed by [facility],  [facility] [address] [phone]  [email or web address], Karma Brooks MD, Lab. Director -     Reviewed and updated as needed this visit by clinical staff  Tobacco  Allergies  Meds         Reviewed and updated as needed this visit by Provider        Past Medical History:   Diagnosis Date     Hypovolemic shock (H) 2/28/2015     Sepsis (H) 8/22/2015      Past Surgical History:   Procedure Laterality Date     CARDIAC SURGERY  08/27/2009    triple vessel coronary artery bypass grafting on 8/27/09     CARPAL TUNNEL RELEASE RT/LT  1996    bilateral       ROS:  CONSTITUTIONAL: NEGATIVE for fever, chills, change in weight  INTEGUMENTARY/SKIN: NEGATIVE for worrisome rashes, moles or lesions  EYES: NEGATIVE for vision changes or irritation  ENT: NEGATIVE for ear, mouth and throat problems  RESP: NEGATIVE for significant cough or SOB  BREAST: NEGATIVE for masses, tenderness or discharge  CV: NEGATIVE for chest pain, palpitations or peripheral edema  GI: NEGATIVE for nausea, abdominal pain, heartburn, or change in bowel habits  : NEGATIVE for unusual urinary or vaginal symptoms. No vaginal bleeding.  MUSCULOSKELETAL: NEGATIVE for significant arthralgias or myalgia  NEURO: NEGATIVE for weakness, dizziness or paresthesias  PSYCHIATRIC: NEGATIVE for changes in mood or affect     OBJECTIVE:   /78  Pulse 52  Ht 1.626 m (5' 4\")  Wt 122 kg (269 lb)  Breastfeeding? No  BMI 46.17 kg/m2  EXAM:  GENERAL APPEARANCE: healthy, alert and no distress  EYES: Eyes grossly normal to inspection, PERRL and conjunctivae and sclerae normal  HENT: nose and " mouth without ulcers or lesions, oropharynx clear and oral mucous membranes moist  NECK: no adenopathy, no asymmetry, masses, or scars and thyroid normal to palpation  RESP: lungs clear to auscultation - no rales, rhonchi or wheezes  CV: regular rate and rhythm, normal S1 S2, no S3 or S4, no murmur, click or rub, no peripheral edema and peripheral pulses strong  ABDOMEN: soft, nontender, no hepatosplenomegaly, no masses and bowel sounds normal  : normal external genitalia, normal cervix.  MS: no musculoskeletal defects are noted and gait is age appropriate without ataxia  SKIN: no suspicious lesions or rashes  NEURO: Normal strength and tone, sensory exam grossly normal, mentation intact and speech normal  PSYCH: mentation appears normal and affect normal/bright    Diagnostic Test Results:  none     ASSESSMENT/PLAN:   1. Preventative health care  Patient was advised on colon, breast, and cervical cancer screening. Discussed vaccinations today.   - Obtaining, preparing and conveyance of cervical or vaginal smear to laboratory.  - Pap imaged thin layer screen with HPV - recommended age 30 - 65 years (select HPV order below)    2. Benign essential hypertension  Blood pressure is under good control. Will continue with current medical therapy without changes.   - lisinopril (PRINIVIL/ZESTRIL) 20 MG tablet; Take 1.5 tablets (30 mg) by mouth daily  Dispense: 145 tablet; Refill: 1  - Lipid Profile; Future  - Comprehensive metabolic panel; Future    3. Need for vaccination  - HC FLU VAC PRESRV FREE QUAD SPLIT VIR 3+YRS IM  - TDAP VACCINE (BOOSTRIX)  - Pneumococcal vaccine 23 valent PPSV23  (Pneumovax) [40628]  - ADMIN MEDICARE: Pneumococcal Vaccine ()    COUNSELING:   Reviewed preventive health counseling, as reflected in patient instructions       Regular exercise       Healthy diet/nutrition       Vision screening    BP Readings from Last 1 Encounters:   10/29/18 124/78     Estimated body mass index is 46.17  "kg/(m^2) as calculated from the following:    Height as of this encounter: 1.626 m (5' 4\").    Weight as of this encounter: 122 kg (269 lb).           reports that she has quit smoking. She has never used smokeless tobacco.      Counseling Resources:  ATP IV Guidelines  Pooled Cohorts Equation Calculator  Breast Cancer Risk Calculator  FRAX Risk Assessment  ICSI Preventive Guidelines  Dietary Guidelines for Americans, 2010  [employer] MyPlate  ASA Prophylaxis  Lung CA Screening    Eliz Nguyen MD  [facility]   Answers for HPI/ROS submitted by the patient on 10/22/2018   PHQ-2 Score: 0    "

## 2018-10-29 NOTE — LETTER
11/7/2018         Luz Marina Padron   [address]  [place] [address]        Dear Ms. Padron:    Your labs were reviewed by Eliz Kenney MD and are within acceptable ranges.  No changes are recommended.     Results for orders placed or performed in visit on 10/29/18   Pap imaged thin layer screen with HPV - recommended age 30 - 65 years (select HPV order below)   Result Value Ref Range    PAP NIL     Copath Report         Patient Name: LUZ MARINA PADRON  MR#: [id number]  Specimen #: [id number]  Collected: 10/29/2018  Received: 10/30/2018  Reported: 10/31/2018 09:15  Ordering Phy(s): ELIZ KENNEY    For improved result formatting, select 'View Enhanced Report Format' under   Linked Documents section.    SPECIMEN/STAIN PROCESS:  Pap imaged thin layer prep screening (Surepath, FocalPoint with guided   screening)       Pap-Cyto x 1, HPV ordered x 1    SOURCE: Cervical, endocervical  ----------------------------------------------------------------   Pap imaged thin layer prep screening (Surepath, FocalPoint with guided   screening)  SPECIMEN ADEQUACY:  Satisfactory for evaluation.  -Transformation zone component present.    CYTOLOGIC INTERPRETATION:    Negative for intraepithelial lesion or malignancy    Electronically signed out by:  ISHMAEL Higuera (ASCP)    Processed and screened at [facility]    CLINICAL HISTORY:    Post Menopausal, A pre vious normal pap  Date of Last Pap: 6/20/2012,    Papanicolaou Test Limitations:  Cervical cytology is a screening test with   limited sensitivity; regular  screening is critical for cancer prevention; Pap tests are primarily   effective for the diagnosis/prevention of  squamous cell carcinoma, not adenocarcinomas or other cancers.    TESTING LAB LOCATION:  [facility], [address]  [address] [phone]    COLLECTION  SITE:  Client:  [facility] [place]  Location: [facility] (B)       HPV High Risk Types DNA Cervical   Result Value Ref Range    HPV Source SurePath     HPV 16 DNA Negative NEG^Negative    HPV 18 DNA Negative NEG^Negative    Other HR HPV Negative NEG^Negative    Final Diagnosis This patient's sample is negative for HPV DNA.     Specimen Description Cervical Cells          Please note that test explanations are brief and do not reflect all diagnostic uses.  If you have any questions or concerns, please call the clinic at [phone].      Sincerely,      Rosario Coffey sent on behalf of  Eliz Nguyen MD

## 2018-10-29 NOTE — MR AVS SNAPSHOT
After Visit Summary   10/29/2018    Luz Marina Live    MRN: [id number]           Patient Information     Date Of Birth          1960        Visit Information        Provider Department      10/29/2018 10:40 AM Eliz Nguyen MD [facility]         Today's Diagnoses     Preventative health care    -  1    Benign essential hypertension        Need for vaccination          Care Instructions      Preventive Health Recommendations  Female Ages 50 - 64    Yearly exam: See your health care provider every year in order to  o Review health changes.   o Discuss preventive care.    o Review your medicines if your doctor has prescribed any.      Get a Pap test every three years (unless you have an abnormal result and your provider advises testing more often).    If you get Pap tests with HPV test, you only need to test every 5 years, unless you have an abnormal result.     You do not need a Pap test if your uterus was removed (hysterectomy) and you have not had cancer.    You should be tested each year for STDs (sexually transmitted diseases) if you're at risk.     Have a mammogram every 1 to 2 years.    Have a colonoscopy at age 50, or have a yearly FIT test (stool test). These exams screen for colon cancer.      Have a cholesterol test every 5 years, or more often if advised.    Have a diabetes test (fasting glucose) every three years. If you are at risk for diabetes, you should have this test more often.     If you are at risk for osteoporosis (brittle bone disease), think about having a bone density scan (DEXA).    Shots: Get a flu shot each year. Get a tetanus shot every 10 years.    Nutrition:     Eat at least 5 servings of fruits and vegetables each day.    Eat whole-grain bread, whole-wheat pasta and brown rice instead of white grains and rice.    Get adequate Calcium and Vitamin D.     Lifestyle    Exercise at least 150 minutes a week (30 minutes a day, 5 days a week). This  will help you control your weight and prevent disease.    Limit alcohol to one drink per day.    No smoking.     Wear sunscreen to prevent skin cancer.     See your dentist every six months for an exam and cleaning.    See your eye doctor every 1 to 2 years.            Follow-ups after your visit        Your next 10 appointments already scheduled     Aug 05, 2019 10:30 AM CDT   Return Visit with Ever Adame MD   [facility]    [address] [place] [id number] [phone]              Future tests that were ordered for you today     Open Future Orders        Priority Expected Expires Ordered    Comprehensive metabolic panel Routine  10/29/2019 10/29/2018    Lipid Profile Routine  10/29/2019 10/29/2018            Who to contact     Please call your clinic at [phone] to:    Ask questions about your health    Make or cancel appointments    Discuss your medicines    Learn about your test results    Speak to your doctor            Additional Information About Your Visit        [employer] Information     [employer] gives you secure access to your electronic health record. If you see a primary care provider, you can also send messages to your care team and make appointments. If you have questions, please call your primary care clinic.  If you do not have a primary care provider, please call [phone] and they will assist you.      [employer] is an electronic gateway that provides easy, online access to your medical records. With [employer], you can request a clinic appointment, read your test results, renew a prescription or communicate with your care team.     To access your existing account, please contact your [facility] or call [phone] for assistance.        Care EveryWhere ID     This is your Care EveryWhere ID. This could be used by other organizations to access your [place] medical records  [id number]        Your  "Vitals Were     Pulse Height Breastfeeding? BMI (Body Mass Index)          52 1.626 m (5' 4\") No 46.17 kg/m2         Blood Pressure from Last 3 Encounters:   10/29/18 124/78   09/17/18 164/88   09/04/18 117/76    Weight from Last 3 Encounters:   10/29/18 122 kg (269 lb)   09/17/18 119.3 kg (263 lb)   09/04/18 118.3 kg (260 lb 14.4 oz)              We Performed the Following     ADMIN MEDICARE: Pneumococcal Vaccine ()     HC FLU VAC PRESRV FREE QUAD SPLIT VIR 3+YRS IM     Obtaining, preparing and conveyance of cervical or vaginal smear to laboratory.     Pap imaged thin layer screen with HPV - recommended age 30 - 65 years (select HPV order below)     Pneumococcal vaccine 23 valent PPSV23  (Pneumovax) [18563]     TDAP VACCINE (BOOSTRIX)          Today's Medication Changes          These changes are accurate as of 10/29/18  8:58 PM.  If you have any questions, ask your nurse or doctor.               Stop taking these medicines if you haven't already. Please contact your care team if you have questions.     erythromycin ophthalmic ointment   Commonly known as:  ROMYCIN   Stopped by:  Eliz Nguyen MD           fluticasone 50 MCG/ACT spray   Commonly known as:  FLONASE   Stopped by:  Eliz Nguyen MD           mupirocin 2 % nasal ointment   Commonly known as:  BACTROBAN NASAL   Stopped by:  Eliz Nguyen MD           neomycin-polymyxin-hydrocortisone 3.5-31714-9 otic suspension   Commonly known as:  CORTISPORIN   Stopped by:  Eliz Nguyen MD                Where to get your medicines      These medications were sent to [facility]/pharmacy #[id number] - [place], [place] - [address]  [address], [place] [address]     Phone:  [phone]     lisinopril 20 MG tablet                Primary Care Provider Office Phone # Fax #    Eliz Nguyen [phone]       [facility] [address]  [facility] [address]        Equal Access to Services     " KANCHAN [facility]: Hadii aad ku burton Garrett, waaxda luqadaha, qaybta kaalmada adesaúl, mayte meet garyshaun sherman karthikjossy figueroa . So [facility] [phone].    ATENCIÓN: Si habla cam, tiene a bennett disposición servicios gratuitos de asistencia lingüística. Llame al [phone].    We comply with applicable federal civil rights laws and [place] laws. We do not discriminate on the basis of race, color, national origin, age, disability, sex, sexual orientation, or gender identity.            Thank you!     Thank you for choosing [facility]   for your care. Our goal is always to provide you with excellent care. Hearing back from our patients is one way we can continue to improve our services. Please take a few minutes to complete the written survey that you may receive in the mail after your visit with us. Thank you!             Your Updated Medication List - Protect others around you: Learn how to safely use, store and throw away your medicines at www.disposemymeds.org.          This list is accurate as of 10/29/18  8:58 PM.  Always use your most recent med list.                   Brand Name Dispense Instructions for use Diagnosis    aspirin 81 MG tablet      Take by mouth daily        atorvastatin 40 MG tablet    LIPITOR    90 tablet    TAKE 1 TABLET (40 MG) BY MOUTH DAILY    Pure hypercholesterolemia       CALCIUM 600-D PO      Take  by mouth 2 times daily.    SLE (systemic lupus erythematosus) (H)       hydroxychloroquine 200 MG tablet    PLAQUENIL    60 tablet    Take 1 tablet (200 mg) by mouth 2 times daily    Systemic lupus erythematosus with glomerular disease, unspecified SLE type (H), CKD (chronic kidney disease) stage 3, GFR 30-59 ml/min (H), Long-term use of Plaquenil       lisinopril 20 MG tablet    PRINIVIL/ZESTRIL    145 tablet    Take 1.5 tablets (30 mg) by mouth daily    Benign essential hypertension       metoprolol tartrate 50 MG tablet    LOPRESSOR    180 tablet    TAKE 1  TABLET (50 MG) BY MOUTH 2 TIMES DAILY    Atrial fibrillation, unspecified type (H)       MULTIPLE VITAMIN PO      Take 1 tablet by mouth daily        omega-3 fatty acids 1200 MG capsule      Take 2 capsules by mouth 2 times daily        warfarin 5 MG tablet    COUMADIN    160 tablet    TAKE 1 TAB (5 MG) ON FRIDAY & 2 TABS (10 MG) ALL OTHER DAYS, OR AS DIRECTED BY THE COUMADIN CLINIC.    Atrial fibrillation, unspecified type (H)

## 2018-10-30 ENCOUNTER — ANTICOAGULATION THERAPY VISIT (OUTPATIENT)
Dept: ANTICOAGULATION | Facility: CLINIC | Age: 58
End: 2018-10-30

## 2018-10-30 DIAGNOSIS — I10 BENIGN ESSENTIAL HYPERTENSION: ICD-10-CM

## 2018-10-30 DIAGNOSIS — I48.0 PAF (PAROXYSMAL ATRIAL FIBRILLATION) (H): ICD-10-CM

## 2018-10-30 DIAGNOSIS — I48.20 CHRONIC ATRIAL FIBRILLATION (H): ICD-10-CM

## 2018-10-30 DIAGNOSIS — Z79.01 LONG TERM CURRENT USE OF ANTICOAGULANT THERAPY: ICD-10-CM

## 2018-10-30 LAB
ALBUMIN SERPL-MCNC: 3.5 G/DL (ref 3.4–5)
ALP SERPL-CCNC: 113 U/L (ref 40–150)
ALT SERPL W P-5'-P-CCNC: 30 U/L (ref 0–50)
ANION GAP SERPL CALCULATED.3IONS-SCNC: 6 MMOL/L (ref 3–14)
AST SERPL W P-5'-P-CCNC: 24 U/L (ref 0–45)
BILIRUB SERPL-MCNC: 0.8 MG/DL (ref 0.2–1.3)
BUN SERPL-MCNC: 31 MG/DL (ref 7–30)
CALCIUM SERPL-MCNC: 8.8 MG/DL (ref 8.5–10.1)
CHLORIDE SERPL-SCNC: 111 MMOL/L (ref 94–109)
CHOLEST SERPL-MCNC: 111 MG/DL
CO2 SERPL-SCNC: 25 MMOL/L (ref 20–32)
CREAT SERPL-MCNC: 0.93 MG/DL (ref 0.52–1.04)
GFR SERPL CREATININE-BSD FRML MDRD: 62 ML/MIN/1.7M2
GLUCOSE SERPL-MCNC: 81 MG/DL (ref 70–99)
HDLC SERPL-MCNC: 38 MG/DL
INR BLD: 2.5 (ref 0.86–1.14)
LDLC SERPL CALC-MCNC: 57 MG/DL
NONHDLC SERPL-MCNC: 73 MG/DL
POTASSIUM SERPL-SCNC: 4.6 MMOL/L (ref 3.4–5.3)
PROT SERPL-MCNC: 8.1 G/DL (ref 6.8–8.8)
SODIUM SERPL-SCNC: 142 MMOL/L (ref 133–144)
TRIGL SERPL-MCNC: 80 MG/DL

## 2018-10-30 PROCEDURE — 80061 LIPID PANEL: CPT | Performed by: INTERNAL MEDICINE

## 2018-10-30 PROCEDURE — 85610 PROTHROMBIN TIME: CPT | Mod: QW | Performed by: INTERNAL MEDICINE

## 2018-10-30 PROCEDURE — 80053 COMPREHEN METABOLIC PANEL: CPT | Performed by: INTERNAL MEDICINE

## 2018-10-30 PROCEDURE — 36415 COLL VENOUS BLD VENIPUNCTURE: CPT | Performed by: INTERNAL MEDICINE

## 2018-10-30 ASSESSMENT — ANXIETY QUESTIONNAIRES: GAD7 TOTAL SCORE: 0

## 2018-10-30 NOTE — PROGRESS NOTES
ANTICOAGULATION FOLLOW-UP CLINIC VISIT    Patient Name:  Luz Marina Live  Date:  10/30/2018  Contact Type:  Telephone    SUBJECTIVE:     Patient Findings     Positives No Problem Findings           OBJECTIVE    INR Point of Care   Date Value Ref Range Status   10/30/2018 2.5 (H) 0.86 - 1.14 Final     Comment:     This test is intended for monitoring Coumadin therapy.  Results are not   accurate in patients with prolonged INR due to factor deficiency.         ASSESSMENT / PLAN  INR assessment THER    Recheck INR In: 6 WEEKS    INR Location Outside lab      Anticoagulation Summary as of 10/30/2018     INR goal 2.0-3.0   Today's INR 2.5   Warfarin maintenance plan 5 mg (5 mg x 1) on Fri; 10 mg (5 mg x 2) all other days   Full warfarin instructions 5 mg on Fri; 10 mg all other days   Weekly warfarin total 65 mg   No change documented Cait Hawthorne RN   Plan last modified Cait Hawthorne RN (4/30/2018)   Next INR check 12/11/2018   Target end date     Indications   Long-term (current) use of anticoagulants [Z79.01] [Z79.01]  PAF (paroxysmal atrial fibrillation) (H) [I48.0]         Anticoagulation Episode Summary     INR check location     Preferred lab     Send INR reminders to [facility] POOL    Comments 5 mg tabs, [facility] (needs staff message) PM dose      Anticoagulation Care Providers     Provider Role Specialty Phone number    Eliz Nguyen MD [facility] [phone]            See the Encounter Report to view Anticoagulation Flowsheet and Dosing Calendar (Go to Encounters tab in chart review, and find the Anticoagulation Therapy Visit)    Dosage adjustment made based on physician directed care plan.      Cait Hawthorne RN

## 2018-10-30 NOTE — MR AVS SNAPSHOT
Luz Marina Live   10/30/2018   Anticoagulation Therapy Visit    Description:  58 year old female   Provider:  Eliz Nguyen MD   Department:  Ph Anticoag           INR as of 10/30/2018     Today's INR 2.5      Anticoagulation Summary as of 10/30/2018     INR goal 2.0-3.0   Today's INR 2.5   Full warfarin instructions 5 mg on Fri; 10 mg all other days   Next INR check 12/11/2018    Indications   Long-term (current) use of anticoagulants [Z79.01] [Z79.01]  PAF (paroxysmal atrial fibrillation) (H) [I48.0]         Contact Numbers     Clinic Number:         October 2018 Details    Sun Mon Tue Wed Thu Fri Sat      1               2               3               4               5               6                 7               8               9               10               11               12               13                 14               15               16               17               18               19               20                 21               22               23               24               25               26               27                 28               29               30      10 mg   See details      31      10 mg             Date Details   10/30 This INR check               How to take your warfarin dose     To take:  10 mg Take 2 of the 5 mg tablets.           November 2018 Details    Sun Mon Tue Wed Thu Fri Sat         1      10 mg         2      5 mg         3      10 mg           4      10 mg         5      10 mg         6      10 mg         7      10 mg         8      10 mg         9      5 mg         10      10 mg           11      10 mg         12      10 mg         13      10 mg         14      10 mg         15      10 mg         16      5 mg         17      10 mg           18      10 mg         19      10 mg         20      10 mg         21      10 mg         22      10 mg         23      5 mg         24      10 mg           25      10 mg         26      10 mg         27       10 mg         28      10 mg         29      10 mg         30      5 mg           Date Details   No additional details            How to take your warfarin dose     To take:  5 mg Take 1 of the 5 mg tablets.    To take:  10 mg Take 2 of the 5 mg tablets.           December 2018 Details    Sun Mon Tue Wed Thu Fri Sat           1      10 mg           2      10 mg         3      10 mg         4      10 mg         5      10 mg         6      10 mg         7      5 mg         8      10 mg           9      10 mg         10      10 mg         11            12               13               14               15                 16               17               18               19               20               21               22                 23               24               25               26               27               28               29                 30               31                     Date Details   No additional details    Date of next INR:  12/11/2018         How to take your warfarin dose     To take:  5 mg Take 1 of the 5 mg tablets.    To take:  10 mg Take 2 of the 5 mg tablets.

## 2018-10-31 LAB
COPATH REPORT: NORMAL
PAP: NORMAL

## 2018-11-02 LAB
FINAL DIAGNOSIS: NORMAL
HPV HR 12 DNA CVX QL NAA+PROBE: NEGATIVE
HPV16 DNA SPEC QL NAA+PROBE: NEGATIVE
HPV18 DNA SPEC QL NAA+PROBE: NEGATIVE
SPECIMEN DESCRIPTION: NORMAL
SPECIMEN SOURCE CVX/VAG CYTO: NORMAL

## 2018-11-20 ENCOUNTER — OFFICE VISIT (OUTPATIENT)
Dept: SLEEP MEDICINE | Facility: CLINIC | Age: 58
End: 2018-11-20
Payer: COMMERCIAL

## 2018-11-20 VITALS
OXYGEN SATURATION: 98 % | SYSTOLIC BLOOD PRESSURE: 140 MMHG | WEIGHT: 271 LBS | HEART RATE: 58 BPM | HEIGHT: 64 IN | DIASTOLIC BLOOD PRESSURE: 84 MMHG | BODY MASS INDEX: 46.26 KG/M2

## 2018-11-20 DIAGNOSIS — G47.33 OSA (OBSTRUCTIVE SLEEP APNEA): Primary | ICD-10-CM

## 2018-11-20 DIAGNOSIS — E66.01 MORBID OBESITY (H): ICD-10-CM

## 2018-11-20 PROCEDURE — 99214 OFFICE O/P EST MOD 30 MIN: CPT | Performed by: INTERNAL MEDICINE

## 2018-11-20 NOTE — MR AVS SNAPSHOT
After Visit Summary   11/20/2018    Luz Marina Live    MRN: [id number]           Patient Information     Date Of Birth          1960        Visit Information        Provider Department      11/20/2018 1:40 PM Alexy Adrian MD [facility]        Today's Diagnoses     KEITH (obstructive sleep apnea)    -  1    Morbid obesity (H)          Care Instructions      Your BMI is Body mass index is 47.25 kg/(m^2).  Weight management is a personal decision.  If you are interested in exploring weight loss strategies, the following discussion covers the approaches that may be successful. Body mass index (BMI) is one way to tell whether you are at a healthy weight, overweight, or obese. It measures your weight in relation to your height.  A BMI of 18.5 to 24.9 is in the healthy range. A person with a BMI of 25 to 29.9 is considered overweight, and someone with a BMI of 30 or greater is considered obese. More than two-thirds of American adults are considered overweight or obese.  Being overweight or obese increases the risk for further weight gain. Excess weight may lead to heart disease and diabetes.  Creating and following plans for healthy eating and physical activity may help you improve your health.  Weight control is part of healthy lifestyle and includes exercise, emotional health, and healthy eating habits. Careful eating habits lifelong are the mainstay of weight control. Though there are significant health benefits from weight loss, long-term weight loss with diet alone may be very difficult to achieve- studies show long-term success with dietary management in less than 10% of people. Attaining a healthy weight may be especially difficult to achieve in those with severe obesity. In some cases, medications, devices and surgical management might be considered.  What can you do?  If you are overweight or obese and are interested in methods for weight loss, you should discuss this with your  provider.     Consider reducing daily calorie intake by 500 calories.     Keep a food journal.     Avoiding skipping meals, consider cutting portions instead.    Diet combined with exercise helps maintain muscle while optimizing fat loss. Strength training is particularly important for building and maintaining muscle mass. Exercise helps reduce stress, increase energy, and improves fitness. Increasing exercise without diet control, however, may not burn enough calories to loose weight.       Start walking three days a week 10-20 minutes at a time    Work towards walking thirty minutes five days a week     Eventually, increase the speed of your walking for 1-2 minutes at time    In addition, we recommend that you review healthy lifestyles and methods for weight loss available through the National Institutes of Health patient information sites:  http://win.niddk.nih.gov/publications/index.htm    And look into health and wellness programs that may be available through your health insurance provider, employer, local community center, or william club.    Weight management plan: Patient was referred to their PCP to discuss a diet and exercise plan.              Follow-ups after your visit        Follow-up notes from your care team     Return in about 2 years (around 11/20/2020), or if symptoms worsen or fail to improve, for Routine Visit.      Your next 10 appointments already scheduled     Aug 05, 2019 10:30 AM CDT   [facility] Visit with Ever Adame MD   [facility]    [address] [place] [id number] [phone]              Who to contact     If you have questions or need follow up information about today's clinic visit or your schedule please contact [facility] directly at [phone].  Normal or non-critical lab and imaging results will be communicated to you by MyChart, letter or phone within 4 business days after the clinic has  "received the results. If you do not hear from us within 7 days, please contact the clinic through [employer] or phone. If you have a critical or abnormal lab result, we will notify you by phone as soon as possible.  Submit refill requests through [employer] or call your pharmacy and they will forward the refill request to us. Please allow 3 business days for your refill to be completed.          Additional Information About Your Visit        [employer] Information     [employer] gives you secure access to your electronic health record. If you see a primary care provider, you can also send messages to your care team and make appointments. If you have questions, please call your primary care clinic.  If you do not have a primary care provider, please call [phone] and they will assist you.        Care EveryWhere ID     This is your Care EveryWhere ID. This could be used by other organizations to access your [place] medical records  [id number]        Your Vitals Were     Pulse Height Pulse Oximetry BMI (Body Mass Index)          58 1.613 m (5' 3.5\") 98% 47.25 kg/m2         Blood Pressure from Last 3 Encounters:   11/20/18 140/84   10/29/18 124/78   09/17/18 164/88    Weight from Last 3 Encounters:   11/20/18 122.9 kg (271 lb)   10/29/18 122 kg (269 lb)   09/17/18 119.3 kg (263 lb)              We Performed the Following     Comprehensive DME          Today's Medication Changes          These changes are accurate as of 11/20/18  2:00 PM.  If you have any questions, ask your nurse or doctor.               Start taking these medicines.        Dose/Directions    order for DME   Used for:  KEITH (obstructive sleep apnea)   Started by:  Alexy Adrian MD        Autopap 10-16 cmH20   Quantity:  1 Device   Refills:  0            Where to get your medicines      Information about where to get these medications is not yet available     ! Ask your nurse or doctor about these medications     order for DME                Primary Care " Provider Office Phone # Fax #    Eliz My Nguyen [phone]       [facility] [address]  [facility] [address]        Equal Access to Services     KANCHAN MCGUIRE : Hadii aad ku hadevio Sonorm, wastefanida luqadaha, qawilliamsta kaalmada magdy, mayte vegashaun hurtmena angelesjossy roberts. So [facility] [phone].    ATENCIÓN: Si habla español, tiene a bennett disposición servicios gratuitos de asistencia lingüística. Gabrielame al [phone].    We comply with applicable federal civil rights laws and [place] laws. We do not discriminate on the basis of race, color, national origin, age, disability, sex, sexual orientation, or gender identity.            Thank you!     Thank you for choosing [facility]  for your care. Our goal is always to provide you with excellent care. Hearing back from our patients is one way we can continue to improve our services. Please take a few minutes to complete the written survey that you may receive in the mail after your visit with us. Thank you!             Your Updated Medication List - Protect others around you: Learn how to safely use, store and throw away your medicines at www.disposemymeds.org.          This list is accurate as of 11/20/18  2:00 PM.  Always use your most recent med list.                   Brand Name Dispense Instructions for use Diagnosis    aspirin 81 MG tablet      Take by mouth daily        atorvastatin 40 MG tablet    LIPITOR    90 tablet    TAKE 1 TABLET (40 MG) BY MOUTH DAILY    Pure hypercholesterolemia       CALCIUM 600-D PO      Take  by mouth 2 times daily.    SLE (systemic lupus erythematosus) (H)       hydroxychloroquine 200 MG tablet    PLAQUENIL    60 tablet    Take 1 tablet (200 mg) by mouth 2 times daily    Systemic lupus erythematosus with glomerular disease, unspecified SLE type (H), CKD (chronic kidney disease) stage 3, GFR 30-59 ml/min (H), Long-term use of Plaquenil       lisinopril 20 MG tablet     PRINIVIL/ZESTRIL    145 tablet    Take 1.5 tablets (30 mg) by mouth daily    Benign essential hypertension       metoprolol tartrate 50 MG tablet    LOPRESSOR    180 tablet    TAKE 1 TABLET (50 MG) BY MOUTH 2 TIMES DAILY    Atrial fibrillation, unspecified type (H)       MULTIPLE VITAMIN PO      Take 1 tablet by mouth daily        omega-3 fatty acids 1200 MG capsule      Take 2 capsules by mouth 2 times daily        order for DME     1 Device    Autopap 10-16 cmH20    KEITH (obstructive sleep apnea)       warfarin 5 MG tablet    COUMADIN    160 tablet    TAKE 1 TAB (5 MG) ON FRIDAY & 2 TABS (10 MG) ALL OTHER DAYS, OR AS DIRECTED BY THE COUMADIN CLINIC.    Atrial fibrillation, unspecified type (H)

## 2018-11-20 NOTE — PATIENT INSTRUCTIONS

## 2018-11-20 NOTE — NURSING NOTE
"Chief Complaint   Patient presents with     CPAP Follow Up       Initial /88  Pulse 58  Ht 1.613 m (5' 3.5\")  Wt 122.9 kg (271 lb)  SpO2 98%  BMI 47.25 kg/m2 Estimated body mass index is 47.25 kg/(m^2) as calculated from the following:    Height as of this encounter: 1.613 m (5' 3.5\").    Weight as of this encounter: 122.9 kg (271 lb).    Medication Reconciliation: complete      "

## 2018-11-20 NOTE — PROGRESS NOTES
Obstructive Sleep Apnea - PAP Follow-Up Visit:    Chief Complaint   Patient presents with     CPAP Follow Up       Luz Marina Live comes in today for follow-up of their severe sleep apnea, managed with CPAP.     She was initially seen by Dr. Nguyễn 4/2017 with loud snoring and interrupted breathing on a nightly basis, morbid obesity. Relevant medical history included coronary artery disease, paroxysmal atrial fibrillation, hypertension, SLE.     She finally had a sleep study.   Study Date: 6/13/2018 (257.0 lbs)   - The combined apnea/hypopnea index was 30.9 events per hour (central apnea/hypopnea index was 0.5 events per hour). The REM AHI was 76.3 events per hour. The supine AHI was 48.7 events per hour. RDI was 32.5 events per hour.  -Severe hypoventilation was not suggested with a maximum change from baseline of 35-38 mmHg to a peak of 46 mmHg and 0 minutes at or greater than 55 mmHg.  -Lowest oxygen saturation was 71.1%. Time spent less than or equal to 88% was 20.8 minutes. Time spent less than or equal to 89% was 25.7 minutes.  -The PLM index was 22.7 movements per hour.    She was started on autopap 5-15 cmH20    Overall, she rates the experience with PAP as 9 (0 poor, 10 great). The mask is comfortable.  The mask is not leaking .  She is not snoring with the mask on. She is not having gasp arousals.  She is having significant oral/nasal dryness. The pressure is comfortable.     Her PAP interface is Nasal Mask.    Bedtime is typically 0000. Usually it takes about 15 min minutes to fall asleep with the mask on. Wake time is typically 0830.  Patient is using PAP therapy 7 hours per night. The patient is usually getting 7 hours of sleep per night.    She does feel rested in the morning.     Total score - [place]: 4 (11/20/2018  1:00 PM)      REENA Total Score: 2    ResMed   Auto-PAP 5.0 - 15.0 cmH2O 30 day usage data:    83% of days with > 4 hours of use. 1/30 days with no use.   Average use 388 minutes per day.  "  95%ile Leak 22.01 L/min.   CPAP 95% pressure 13.8 cm.   AHI 1.18 events per hour.         Past medical/surgical history, family history, social history, medications and allergies were reviewed.      Problem List:  Patient Active Problem List    Diagnosis Date Noted     Systemic lupus erythematosus with glomerular disease, unspecified SLE type (H) 07/03/2017     Priority: High     longstanding SLE. She did have renal involvement in the 1990's requiring Cytoxan, Prednisone and Imuran       CAD (coronary artery disease) 12/05/2011     Priority: High     S/p CABGx3 2009       Long-term use of Plaquenil 09/04/2018     Priority: Medium     KEITH (obstructive sleep apnea)- severe (AHI 30) 06/18/2018     Priority: Medium     6/13/2018 [place] [facility] Study (257.0 lbs) - AHI 30.9, RDI 32.5, Supine AHI 48.7, REM AHI 76.3, Low O2 71.1%, Time Spent ?88% 20.8 minutes / Time Spent ?89% 25.7 minutes. TCCo2 within upper normal limits.        Morbid obesity (H) 06/11/2018     Priority: Medium     Long-term (current) use of anticoagulants [Z79.01] 04/05/2016     Priority: Medium     CKD (chronic kidney disease) stage 3, GFR 30-59 ml/min (H) 09/14/2015     Priority: Medium     PAF (paroxysmal atrial fibrillation) (H) 08/24/2015     Priority: Medium     08/2015, the patient had a severe infection of her face and during that hospitalization had an episode of atrial fibrillation, she was discharged on amiodarone which since then has been discontinued.         HTN, goal below 140/90 07/03/2013     Priority: Medium     Hyperlipidemia LDL goal <100 12/05/2011     Priority: Medium     Psychophysiological insomnia 06/11/2018     Priority: Low     Moderate tricuspid insufficiency 12/22/2016     Priority: Low        /88  Pulse 58  Ht 1.613 m (5' 3.5\")  Wt 122.9 kg (271 lb)  SpO2 98%  BMI 47.25 kg/m2    Impression/Plan:     Severe Sleep apnea. Tolerating PAP well.   -Narrow pressures to 10-16 cmH20    Luz Marina Live will " follow up in about 2 year(s).     30 minutes spent with patient, all of which were spent face-to-face counseling, consulting, coordinating plan of care.

## 2018-12-08 DIAGNOSIS — I48.91 ATRIAL FIBRILLATION, UNSPECIFIED TYPE (H): ICD-10-CM

## 2018-12-11 RX ORDER — WARFARIN SODIUM 5 MG/1
TABLET ORAL
Qty: 160 TABLET | Refills: 0 | Status: SHIPPED | OUTPATIENT
Start: 2018-12-11 | End: 2019-03-17

## 2018-12-12 ENCOUNTER — ANTICOAGULATION THERAPY VISIT (OUTPATIENT)
Dept: ANTICOAGULATION | Facility: OTHER | Age: 58
End: 2018-12-12

## 2018-12-12 ENCOUNTER — TELEPHONE (OUTPATIENT)
Dept: ANTICOAGULATION | Facility: OTHER | Age: 58
End: 2018-12-12

## 2018-12-12 DIAGNOSIS — I48.0 PAF (PAROXYSMAL ATRIAL FIBRILLATION) (H): ICD-10-CM

## 2018-12-12 DIAGNOSIS — I48.0 PAF (PAROXYSMAL ATRIAL FIBRILLATION) (H): Primary | Chronic | ICD-10-CM

## 2018-12-12 DIAGNOSIS — Z79.01 LONG TERM CURRENT USE OF ANTICOAGULANT THERAPY: ICD-10-CM

## 2018-12-12 DIAGNOSIS — I48.20 CHRONIC ATRIAL FIBRILLATION (H): ICD-10-CM

## 2018-12-12 LAB — INR BLD: 2.8 (ref 0.86–1.14)

## 2018-12-12 PROCEDURE — 36416 COLLJ CAPILLARY BLOOD SPEC: CPT | Performed by: INTERNAL MEDICINE

## 2018-12-12 PROCEDURE — 85610 PROTHROMBIN TIME: CPT | Mod: QW | Performed by: INTERNAL MEDICINE

## 2018-12-12 NOTE — PROGRESS NOTES
ANTICOAGULATION FOLLOW-UP CLINIC VISIT    Patient Name:  Luz Marina Live  Date:  2018  Contact Type:  Face to Face    SUBJECTIVE:     Patient Findings     Positives:   No Problem Findings           OBJECTIVE    INR Point of Care   Date Value Ref Range Status   2018 2.8 (H) 0.86 - 1.14 Final     Comment:     This test is intended for monitoring Coumadin therapy.  Results are not   accurate in patients with prolonged INR due to factor deficiency.         ASSESSMENT / PLAN  INR assessment THER    Recheck INR In: 6 WEEKS    INR Location Outside lab      Anticoagulation Summary  As of 2018    INR goal:   2.0-3.0   TTR:   55.4 % (2.7 y)   INR used for dosin.8 (2018)   Warfarin maintenance plan:   5 mg (5 mg x 1) every Fri; 10 mg (5 mg x 2) all other days   Full warfarin instructions:   5 mg every Fri; 10 mg all other days   Weekly warfarin total:   65 mg   No change documented:   Deborah Alatorre RN   Plan last modified:   Cait Hawthorne RN (2018)   Next INR check:   2019   Target end date:       Indications    Long-term (current) use of anticoagulants [Z79.01] [Z79.01]  PAF (paroxysmal atrial fibrillation) (H) [I48.0]             Anticoagulation Episode Summary     INR check location:       Preferred lab:       Send INR reminders to:   DANG DEL CASTILLO    Comments:   5 mg tabs, [facility] (needs staff message) PM dose      Anticoagulation Care Providers     Provider Role Specialty Phone number    Eliz Nguyen MD [facility] [phone]            See the Encounter Report to view Anticoagulation Flowsheet and Dosing Calendar (Go to Encounters tab in chart review, and find the Anticoagulation Therapy Visit)    Dosage adjustment made based on physician directed care plan.    Deborah Alatorre, KATE

## 2019-01-15 ENCOUNTER — ANTICOAGULATION THERAPY VISIT (OUTPATIENT)
Dept: ANTICOAGULATION | Facility: OTHER | Age: 59
End: 2019-01-15
Payer: COMMERCIAL

## 2019-01-15 DIAGNOSIS — Z79.01 LONG TERM CURRENT USE OF ANTICOAGULANT THERAPY: ICD-10-CM

## 2019-01-15 DIAGNOSIS — I48.0 PAF (PAROXYSMAL ATRIAL FIBRILLATION) (H): ICD-10-CM

## 2019-01-15 DIAGNOSIS — I48.20 CHRONIC ATRIAL FIBRILLATION (H): ICD-10-CM

## 2019-01-15 LAB — INR BLD: 2.9 (ref 0.86–1.14)

## 2019-01-15 PROCEDURE — 36416 COLLJ CAPILLARY BLOOD SPEC: CPT | Performed by: INTERNAL MEDICINE

## 2019-01-15 PROCEDURE — 85610 PROTHROMBIN TIME: CPT | Mod: QW | Performed by: INTERNAL MEDICINE

## 2019-01-15 PROCEDURE — 99207 ZZC NO CHARGE NURSE ONLY: CPT | Performed by: INTERNAL MEDICINE

## 2019-01-15 NOTE — PROGRESS NOTES
ANTICOAGULATION FOLLOW-UP CLINIC VISIT    Patient Name:  Luz Marina Live  Date:  1/15/2019  Contact Type:  Telephone    SUBJECTIVE:     Patient Findings     Comments:   Left a detailed message on VM with this information, pt is advised to call back if pt has any questions, missed doses or concerns such as symptoms of bleeding, clotting, infection, change in diet or other.             OBJECTIVE    INR Point of Care   Date Value Ref Range Status   01/15/2019 2.9 (H) 0.86 - 1.14 Final     Comment:     This test is intended for monitoring Coumadin therapy.  Results are not   accurate in patients with prolonged INR due to factor deficiency.         ASSESSMENT / PLAN  INR assessment THER    Recheck INR In: 6 WEEKS    INR Location Outside lab      Anticoagulation Summary  As of 1/15/2019    INR goal:   2.0-3.0   TTR:   56.9 % (2.8 y)   INR used for dosin.9 (1/15/2019)   Warfarin maintenance plan:   5 mg (5 mg x 1) every Fri; 10 mg (5 mg x 2) all other days   Full warfarin instructions:   5 mg every Fri; 10 mg all other days   Weekly warfarin total:   65 mg   No change documented:   Deborah Alatorre, RN   Plan last modified:   Cait Hawthorne RN (2018)   Next INR check:   2019   Target end date:       Indications    Long-term (current) use of anticoagulants [Z79.01] [Z79.01]  PAF (paroxysmal atrial fibrillation) (H) [I48.0]             Anticoagulation Episode Summary     INR check location:       Preferred lab:       Send INR reminders to:   DANG DEL CASTILLO    Comments:   5 mg tabs, [facility] (needs staff message) PM dose      Anticoagulation Care Providers     Provider Role Specialty Phone number    Eliz Nguyen MD [facility] Internal Medicine [phone]            See the Encounter Report to view Anticoagulation Flowsheet and Dosing Calendar (Go to Encounters tab in chart review, and find the Anticoagulation Therapy Visit)    Dosage adjustment made based on physician directed care plan.    Deborah  KATE Alatorre

## 2019-01-25 ENCOUNTER — DOCUMENTATION ONLY (OUTPATIENT)
Dept: SLEEP MEDICINE | Facility: CLINIC | Age: 59
End: 2019-01-25

## 2019-01-25 DIAGNOSIS — E66.01 MORBID OBESITY (H): ICD-10-CM

## 2019-01-25 DIAGNOSIS — G47.33 OSA (OBSTRUCTIVE SLEEP APNEA): ICD-10-CM

## 2019-01-25 NOTE — PROGRESS NOTES
6 month [facility]    STM Recheck Visit     Diagnostic AHI: 30.9  PSG    Data only recheck     Assessment: Pt meeting objective benchmarks.     Action plan:  pt to follow up per provider request       Device type: Auto-CPAP  PAP settings: CPAP min 10.0 cm  H20     CPAP max 16.0 cm  H20    95th% pressure 14.7 cm  H20   Objective measures: 14 day rolling measures      Compliance  85 %      Leak  15.2 lpm  last  upload      AHI 1.93   last  upload      Average number of minutes 381      Objective measure goal  Compliance   Goal >70%  Leak   Goal < 24 lpm  AHI  Goal < 5  Usage  Goal >240

## 2019-02-18 ENCOUNTER — ANTICOAGULATION THERAPY VISIT (OUTPATIENT)
Dept: ANTICOAGULATION | Facility: OTHER | Age: 59
End: 2019-02-18
Payer: COMMERCIAL

## 2019-02-18 DIAGNOSIS — I48.0 PAF (PAROXYSMAL ATRIAL FIBRILLATION) (H): ICD-10-CM

## 2019-02-18 DIAGNOSIS — I48.20 CHRONIC ATRIAL FIBRILLATION (H): ICD-10-CM

## 2019-02-18 DIAGNOSIS — Z79.01 LONG TERM CURRENT USE OF ANTICOAGULANT THERAPY: ICD-10-CM

## 2019-02-18 DIAGNOSIS — Z79.01 LONG TERM CURRENT USE OF ANTICOAGULANTS WITH INR GOAL OF 2.0-3.0: Primary | ICD-10-CM

## 2019-02-18 LAB — INR BLD: 2.4 (ref 0.86–1.14)

## 2019-02-18 PROCEDURE — 36416 COLLJ CAPILLARY BLOOD SPEC: CPT | Performed by: INTERNAL MEDICINE

## 2019-02-18 PROCEDURE — 85610 PROTHROMBIN TIME: CPT | Mod: QW | Performed by: INTERNAL MEDICINE

## 2019-02-18 PROCEDURE — 99207 ZZC NO CHARGE NURSE ONLY: CPT | Performed by: INTERNAL MEDICINE

## 2019-02-18 NOTE — PROGRESS NOTES
ANTICOAGULATION FOLLOW-UP CLINIC VISIT    Patient Name:  Luz Marina Live  Date:  2019  Contact Type:  Face to Face    SUBJECTIVE:     Patient Findings     Positives:   No Problem Findings           OBJECTIVE    INR Point of Care   Date Value Ref Range Status   2019 2.4 (H) 0.86 - 1.14 Final     Comment:     This test is intended for monitoring Coumadin therapy.  Results are not   accurate in patients with prolonged INR due to factor deficiency.         ASSESSMENT / PLAN  INR assessment THER    Recheck INR In: 6 WEEKS    INR Location Outside lab      Anticoagulation Summary  As of 2019    INR goal:   2.0-3.0   TTR:   58.4 % (2.8 y)   INR used for dosin.4 (2019)   Warfarin maintenance plan:   5 mg (5 mg x 1) every Fri; 10 mg (5 mg x 2) all other days   Full warfarin instructions:   5 mg every Fri; 10 mg all other days   Weekly warfarin total:   65 mg   No change documented:   Cait Hawthorne RN   Plan last modified:   Cait Hawthorne RN (2018)   Next INR check:   2019   Target end date:       Indications    Long-term (current) use of anticoagulants [Z79.01] [Z79.01]  PAF (paroxysmal atrial fibrillation) (H) [I48.0]             Anticoagulation Episode Summary     INR check location:       Preferred lab:       Send INR reminders to:   DANG DEL CASTILLO    Comments:   5 mg tabs, [facility] (needs staff message) PM dose      Anticoagulation Care Providers     Provider Role Specialty Phone number    Eliz Nguyen MD [facility] Internal Medicine [phone]            See the Encounter Report to view Anticoagulation Flowsheet and Dosing Calendar (Go to Encounters tab in chart review, and find the Anticoagulation Therapy Visit)    Dosage adjustment made based on physician directed care plan.        Cait Hawthorne RN

## 2019-03-17 DIAGNOSIS — I48.91 ATRIAL FIBRILLATION, UNSPECIFIED TYPE (H): ICD-10-CM

## 2019-03-21 NOTE — TELEPHONE ENCOUNTER
Received refill request for Coumadin.  Last in clinic October 2018.     Routing request to Dr. Nguyen for approval.

## 2019-03-22 RX ORDER — WARFARIN SODIUM 5 MG/1
TABLET ORAL
Qty: 160 TABLET | Refills: 0 | Status: SHIPPED | OUTPATIENT
Start: 2019-03-22 | End: 2019-08-14

## 2019-04-17 ENCOUNTER — ANTICOAGULATION THERAPY VISIT (OUTPATIENT)
Dept: ANTICOAGULATION | Facility: OTHER | Age: 59
End: 2019-04-17

## 2019-04-17 DIAGNOSIS — Z79.01 LONG TERM CURRENT USE OF ANTICOAGULANTS WITH INR GOAL OF 2.0-3.0: ICD-10-CM

## 2019-04-17 DIAGNOSIS — I48.0 PAF (PAROXYSMAL ATRIAL FIBRILLATION) (H): ICD-10-CM

## 2019-04-17 LAB — INR BLD: 2.6 (ref 0.86–1.14)

## 2019-04-17 PROCEDURE — 99207 ZZC NO CHARGE NURSE ONLY: CPT | Performed by: INTERNAL MEDICINE

## 2019-04-17 PROCEDURE — 36416 COLLJ CAPILLARY BLOOD SPEC: CPT | Performed by: INTERNAL MEDICINE

## 2019-04-17 PROCEDURE — 85610 PROTHROMBIN TIME: CPT | Mod: QW | Performed by: INTERNAL MEDICINE

## 2019-04-17 NOTE — PROGRESS NOTES
ANTICOAGULATION FOLLOW-UP CLINIC VISIT    Patient Name:  Luz Marina Live  Date:  2019  Contact Type:  Telephone    SUBJECTIVE:     Patient Findings     Comments:   The patient was assessed for diet, medication, and activity level changes, missed or extra doses, bruising or bleeding, with no problem findings.  Cait Hawthorne RN             OBJECTIVE    INR Point of Care   Date Value Ref Range Status   2019 2.6 (H) 0.86 - 1.14 Final     Comment:     This test is intended for monitoring Coumadin therapy.  Results are not   accurate in patients with prolonged INR due to factor deficiency.         ASSESSMENT / PLAN  INR assessment THER    Recheck INR In: 6 WEEKS    INR Location Outside lab      Anticoagulation Summary  As of 2019    INR goal:   2.0-3.0   TTR:   60.6 % (3 y)   INR used for dosin.6 (2019)   Warfarin maintenance plan:   5 mg (5 mg x 1) every Fri; 10 mg (5 mg x 2) all other days   Full warfarin instructions:   5 mg every Fri; 10 mg all other days   Weekly warfarin total:   65 mg   No change documented:   Cait Hawthorne RN   Plan last modified:   Cait Hawthorne RN (2018)   Next INR check:   2019   Target end date:       Indications    Long-term (current) use of anticoagulants [Z79.01] [Z79.01]  PAF (paroxysmal atrial fibrillation) (H) [I48.0]             Anticoagulation Episode Summary     INR check location:       Preferred lab:       Send INR reminders to:   DANG DEL CASTILLO    Comments:   5 mg tabs, [facility] (needs staff message) PM dose      Anticoagulation Care Providers     Provider Role Specialty Phone number    Eliz Nguyen MD [facility] Internal Medicine [phone]            See the Encounter Report to view Anticoagulation Flowsheet and Dosing Calendar (Go to Encounters tab in chart review, and find the Anticoagulation Therapy Visit)    Dosage adjustment made based on physician directed care plan.        Cait Hawthorne RN

## 2019-04-29 DIAGNOSIS — I48.91 ATRIAL FIBRILLATION, UNSPECIFIED TYPE (H): ICD-10-CM

## 2019-05-06 ENCOUNTER — OFFICE VISIT (OUTPATIENT)
Dept: OPHTHALMOLOGY | Facility: CLINIC | Age: 59
End: 2019-05-06
Payer: COMMERCIAL

## 2019-05-06 DIAGNOSIS — Z79.899 LONG-TERM USE OF PLAQUENIL: Primary | ICD-10-CM

## 2019-05-06 PROCEDURE — 92083 EXTENDED VISUAL FIELD XM: CPT | Performed by: OPHTHALMOLOGY

## 2019-05-06 PROCEDURE — 92134 CPTRZ OPH DX IMG PST SGM RTA: CPT | Performed by: OPHTHALMOLOGY

## 2019-05-06 PROCEDURE — 92014 COMPRE OPH EXAM EST PT 1/>: CPT | Performed by: OPHTHALMOLOGY

## 2019-05-06 ASSESSMENT — CONF VISUAL FIELD
METHOD: COUNTING FINGERS
OS_NORMAL: 1
OD_NORMAL: 1

## 2019-05-06 ASSESSMENT — REFRACTION_MANIFEST
OD_CYLINDER: +1.75
OS_CYLINDER: +1.25
OS_AXIS: 130
OD_AXIS: 075
OS_SPHERE: +1.75
OS_ADD: +2.50
OD_SPHERE: +1.50
OD_ADD: +2.50

## 2019-05-06 ASSESSMENT — REFRACTION_WEARINGRX
SPECS_TYPE: PAL
OD_ADD: +2.50
OS_CYLINDER: +1.75
OS_AXIS: 135
OS_SPHERE: +1.00
OD_SPHERE: +2.25
OD_CYLINDER: +1.25
OD_AXIS: 070
OS_ADD: +2.50

## 2019-05-06 ASSESSMENT — TONOMETRY
IOP_METHOD: ICARE
OD_IOP_MMHG: 13
OS_IOP_MMHG: 9

## 2019-05-06 ASSESSMENT — VISUAL ACUITY
OS_CC: 20/25
OS_SC+: -1
CORRECTION_TYPE: GLASSES
OD_SC: 20/80
OS_SC: 20/60
OS_CC: 20/20
OD_CC: 20/30
METHOD: SNELLEN - LINEAR
OD_CC: 20/40

## 2019-05-06 ASSESSMENT — EXTERNAL EXAM - LEFT EYE: OS_EXAM: NORMAL

## 2019-05-06 ASSESSMENT — SLIT LAMP EXAM - LIDS
COMMENTS: NORMAL
COMMENTS: NORMAL

## 2019-05-06 ASSESSMENT — CUP TO DISC RATIO
OD_RATIO: 0.3
OS_RATIO: 0.3

## 2019-05-06 ASSESSMENT — EXTERNAL EXAM - RIGHT EYE: OD_EXAM: NORMAL

## 2019-05-07 NOTE — PROGRESS NOTES
Assessment & Plan  Assessment & Plan     Luz Marina Live is a 57 year old female who presents with:   Review of systems for the eyes was negative other than the pertinent positives and negatives noted in the HPI.     High risk medication use  - SD-OCT Macula Optovue OU (both eyes)  - HVF 10-2 OU  - REFRACTION     Hyperopia with astigmatism and presbyopia, bilateral  - REFRACTION        Return in 12 months for annual exam.

## 2019-05-09 ENCOUNTER — TELEPHONE (OUTPATIENT)
Dept: OBGYN | Facility: CLINIC | Age: 59
End: 2019-05-09

## 2019-05-09 DIAGNOSIS — I48.91 ATRIAL FIBRILLATION, UNSPECIFIED TYPE (H): ICD-10-CM

## 2019-05-09 DIAGNOSIS — I10 BENIGN ESSENTIAL HYPERTENSION: ICD-10-CM

## 2019-05-09 RX ORDER — LISINOPRIL 20 MG/1
30 TABLET ORAL DAILY
Qty: 145 TABLET | Refills: 0 | Status: SHIPPED | OUTPATIENT
Start: 2019-05-09 | End: 2019-08-16

## 2019-05-09 RX ORDER — METOPROLOL TARTRATE 50 MG
50 TABLET ORAL 2 TIMES DAILY
Qty: 180 TABLET | Refills: 0 | Status: SHIPPED | OUTPATIENT
Start: 2019-05-09 | End: 2019-08-16

## 2019-05-09 NOTE — TELEPHONE ENCOUNTER
[facility] Call Center    Phone Message    May a detailed message be left on voicemail: yes    Reason for Call: Medication Refill Request    Has the patient contacted the pharmacy for the refill? Yes   Name of medication being requested: metoprolol tartrate (LOPRESSOR) 50 MG tablet, lisinopril (PRINIVIL/ZESTRIL) 20 MG tablet  Provider who prescribed the medication:   Pharmacy:[id number] - [place], [place] - [address]  Date medication is needed: as soon as possible one is completely out and one is soon to be out          Action Taken: Message routed to:  [facility] ([facility] cardiology

## 2019-06-07 ENCOUNTER — ANTICOAGULATION THERAPY VISIT (OUTPATIENT)
Dept: ANTICOAGULATION | Facility: OTHER | Age: 59
End: 2019-06-07

## 2019-06-07 DIAGNOSIS — Z79.01 LONG TERM CURRENT USE OF ANTICOAGULANTS WITH INR GOAL OF 2.0-3.0: ICD-10-CM

## 2019-06-07 DIAGNOSIS — I48.0 PAF (PAROXYSMAL ATRIAL FIBRILLATION) (H): ICD-10-CM

## 2019-06-07 LAB — INR BLD: 3.7 (ref 0.86–1.14)

## 2019-06-07 PROCEDURE — 99207 ZZC NO CHARGE NURSE ONLY: CPT | Performed by: INTERNAL MEDICINE

## 2019-06-07 PROCEDURE — 36416 COLLJ CAPILLARY BLOOD SPEC: CPT | Performed by: INTERNAL MEDICINE

## 2019-06-07 PROCEDURE — 85610 PROTHROMBIN TIME: CPT | Mod: QW | Performed by: INTERNAL MEDICINE

## 2019-06-10 ENCOUNTER — TELEPHONE (OUTPATIENT)
Dept: ANTICOAGULATION | Facility: OTHER | Age: 59
End: 2019-06-10

## 2019-06-10 NOTE — TELEPHONE ENCOUNTER
I have attempted to contact this patient by phone, left message to return call at [phone] or [phone].  Will try again later.    Deborah Alatorre RN- Coumadin Clinic RN

## 2019-06-11 NOTE — PROGRESS NOTES
ANTICOAGULATION FOLLOW-UP CLINIC VISIT    Patient Name:  Luz Marina Live  Date:  6/11/2019  Contact Type:  Telephone    SUBJECTIVE:  Patient Findings     Comments:   Unable to identify a reason for supratherapeutic INR.          Clinical Outcomes     Comments:   Unable to identify a reason for supratherapeutic INR.             OBJECTIVE    INR Point of Care   Date Value Ref Range Status   06/07/2019 3.7 (H) 0.86 - 1.14 Final     Comment:     This test is intended for monitoring Coumadin therapy.  Results are not   accurate in patients with prolonged INR due to factor deficiency.         ASSESSMENT / PLAN  INR assessment SUPRA    Recheck INR In: 3 WEEKS    INR Location Outside lab      Anticoagulation Summary  As of 6/7/2019    INR goal:   2.0-3.0   TTR:   --   INR used for dosing:   3.7! (6/7/2019)   Warfarin maintenance plan:   5 mg (5 mg x 1) every Fri; 10 mg (5 mg x 2) all other days   Full warfarin instructions:   6/7: Hold; Otherwise 5 mg every Fri; 10 mg all other days   Weekly warfarin total:   65 mg   Plan last modified:   Cait Hawthorne RN (4/30/2018)   Next INR check:   6/28/2019   Target end date:       Indications    Long-term (current) use of anticoagulants [Z79.01] [Z79.01]  PAF (paroxysmal atrial fibrillation) (H) [I48.0]             Anticoagulation Episode Summary     INR check location:       Preferred lab:       Send INR reminders to:   [facility]    Comments:   5 mg tabs, [facility] (needs staff message) PM dose      Anticoagulation Care Providers     Provider Role Specialty Phone number    Eliz Nguyen MD [facility] Internal Medicine [phone]            See the Encounter Report to view Anticoagulation Flowsheet and Dosing Calendar (Go to Encounters tab in chart review, and find the Anticoagulation Therapy Visit)    Dosage adjustment made based on physician directed care plan.    Deborah Alatorre RN

## 2019-06-17 ENCOUNTER — DOCUMENTATION ONLY (OUTPATIENT)
Dept: CARE COORDINATION | Facility: CLINIC | Age: 59
End: 2019-06-17

## 2019-06-26 ENCOUNTER — ANTICOAGULATION THERAPY VISIT (OUTPATIENT)
Dept: ANTICOAGULATION | Facility: OTHER | Age: 59
End: 2019-06-26

## 2019-06-26 ENCOUNTER — ANTICOAGULATION THERAPY VISIT (OUTPATIENT)
Dept: ANTICOAGULATION | Facility: OTHER | Age: 59
End: 2019-06-26
Payer: COMMERCIAL

## 2019-06-26 DIAGNOSIS — Z79.01 LONG TERM CURRENT USE OF ANTICOAGULANT THERAPY: ICD-10-CM

## 2019-06-26 DIAGNOSIS — I48.0 PAF (PAROXYSMAL ATRIAL FIBRILLATION) (H): ICD-10-CM

## 2019-06-26 DIAGNOSIS — Z79.01 LONG TERM CURRENT USE OF ANTICOAGULANTS WITH INR GOAL OF 2.0-3.0: ICD-10-CM

## 2019-06-26 LAB — INR BLD: 3.8 (ref 0.86–1.14)

## 2019-06-26 PROCEDURE — 36416 COLLJ CAPILLARY BLOOD SPEC: CPT | Performed by: INTERNAL MEDICINE

## 2019-06-26 PROCEDURE — 99207 ZZC NO CHARGE NURSE ONLY: CPT | Performed by: INTERNAL MEDICINE

## 2019-06-26 PROCEDURE — 85610 PROTHROMBIN TIME: CPT | Mod: QW | Performed by: INTERNAL MEDICINE

## 2019-06-26 NOTE — PROGRESS NOTES
ANTICOAGULATION FOLLOW-UP CLINIC VISIT    Patient Name:  Luz Marina Live  Date:  6/26/2019  Contact Type:  Telephone    SUBJECTIVE:         OBJECTIVE    INR Point of Care   Date Value Ref Range Status   06/26/2019 3.8 (H) 0.86 - 1.14 Final     Comment:     This test is intended for monitoring Coumadin therapy.  Results are not   accurate in patients with prolonged INR due to factor deficiency.         ASSESSMENT / PLAN  INR assessment SUPRA    Recheck INR In: 2 WEEKS    INR Location Outside lab      Anticoagulation Summary  As of 6/26/2019    INR goal:   2.0-3.0   TTR:   58.5 % (3.2 y)   INR used for dosing:   3.8! (6/26/2019)   Warfarin maintenance plan:   10 mg (5 mg x 2) every Mon, Fri; 7.5 mg (5 mg x 1.5) all other days   Full warfarin instructions:   6/26: 2.5 mg; Otherwise 10 mg every Mon, Fri; 7.5 mg all other days   Weekly warfarin total:   57.5 mg   Plan last modified:   Deborah Alatorre RN (6/26/2019)   Next INR check:   7/10/2019   Target end date:       Indications    Long-term (current) use of anticoagulants [Z79.01] [Z79.01]  PAF (paroxysmal atrial fibrillation) (H) [I48.0]             Anticoagulation Episode Summary     INR check location:       Preferred lab:       Send INR reminders to:   [facility]    Comments:   5 mg tabs, [facility] (needs staff message) PM dose      Anticoagulation Care Providers     Provider Role Specialty Phone number    Eliz Nguyen MD [facility] Internal Medicine [phone]            See the Encounter Report to view Anticoagulation Flowsheet and Dosing Calendar (Go to Encounters tab in chart review, and find the Anticoagulation Therapy Visit)    Dosage adjustment made based on physician directed care plan.    Deborah Alatorre RN

## 2019-07-15 ENCOUNTER — ANTICOAGULATION THERAPY VISIT (OUTPATIENT)
Dept: ANTICOAGULATION | Facility: CLINIC | Age: 59
End: 2019-07-15

## 2019-07-15 DIAGNOSIS — Z79.01 LONG TERM CURRENT USE OF ANTICOAGULANTS WITH INR GOAL OF 2.0-3.0: ICD-10-CM

## 2019-07-15 DIAGNOSIS — Z79.01 LONG TERM CURRENT USE OF ANTICOAGULANT THERAPY: ICD-10-CM

## 2019-07-15 DIAGNOSIS — I48.0 PAF (PAROXYSMAL ATRIAL FIBRILLATION) (H): ICD-10-CM

## 2019-07-15 LAB — INR BLD: 2.9 (ref 0.86–1.14)

## 2019-07-15 PROCEDURE — 85610 PROTHROMBIN TIME: CPT | Mod: QW | Performed by: INTERNAL MEDICINE

## 2019-07-15 PROCEDURE — 99207 ZZC NO CHARGE NURSE ONLY: CPT | Performed by: INTERNAL MEDICINE

## 2019-07-15 PROCEDURE — 36416 COLLJ CAPILLARY BLOOD SPEC: CPT | Performed by: INTERNAL MEDICINE

## 2019-07-15 NOTE — PROGRESS NOTES
ANTICOAGULATION FOLLOW-UP CLINIC VISIT    Patient Name:  Luz Marina Live  Date:  7/15/2019  Contact Type:  Telephone    SUBJECTIVE:  Patient Findings     Comments:   The patient was assessed for diet, medication, and activity level changes, missed or extra doses, bruising or bleeding, with no problem findings.  Cait Hawthorne RN          Clinical Outcomes     Negatives:   Major bleeding event, Thromboembolic event, Anticoagulation-related hospital admission, Anticoagulation-related ED visit, Anticoagulation-related fatality    Comments:   The patient was assessed for diet, medication, and activity level changes, missed or extra doses, bruising or bleeding, with no problem findings.  Cait Hawthorne RN             OBJECTIVE    INR Point of Care   Date Value Ref Range Status   07/15/2019 2.9 (H) 0.86 - 1.14 Final     Comment:     This test is intended for monitoring Coumadin therapy.  Results are not   accurate in patients with prolonged INR due to factor deficiency.         ASSESSMENT / PLAN  INR assessment THER    Recheck INR In: 3 WEEKS    INR Location Outside lab      Anticoagulation Summary  As of 7/15/2019    INR goal:   2.0-3.0   TTR:   57.8 % (3.2 y)   INR used for dosin.9 (7/15/2019)   Warfarin maintenance plan:   10 mg (5 mg x 2) every Mon, Fri; 7.5 mg (5 mg x 1.5) all other days   Full warfarin instructions:   10 mg every Mon, Fri; 7.5 mg all other days   Weekly warfarin total:   57.5 mg   No change documented:   Cait Hawthorne RN   Plan last modified:   Deborah Alatorre RN (2019)   Next INR check:   2019   Target end date:       Indications    Long-term (current) use of anticoagulants [Z79.01] [Z79.01]  PAF (paroxysmal atrial fibrillation) (H) [I48.0]             Anticoagulation Episode Summary     INR check location:       Preferred lab:       Send INR reminders to:   [facility]    Comments:   5 mg tabs, [facility] (needs staff message) PM dose      Anticoagulation Care  Providers     Provider Role Specialty Phone number    Eliz Nguyen MD [facility] Internal Medicine [phone]            See the Encounter Report to view Anticoagulation Flowsheet and Dosing Calendar (Go to Encounters tab in chart review, and find the Anticoagulation Therapy Visit)    Dosage adjustment made based on physician directed care plan.    Cait Hawthorne RN

## 2019-07-22 RX ORDER — METOPROLOL TARTRATE 50 MG
TABLET ORAL
Qty: 180 TABLET | Refills: 3 | Status: ON HOLD | OUTPATIENT
Start: 2019-07-22 | End: 2019-10-17

## 2019-08-05 ENCOUNTER — OFFICE VISIT (OUTPATIENT)
Dept: RHEUMATOLOGY | Facility: CLINIC | Age: 59
End: 2019-08-05
Payer: COMMERCIAL

## 2019-08-05 VITALS
SYSTOLIC BLOOD PRESSURE: 133 MMHG | OXYGEN SATURATION: 97 % | BODY MASS INDEX: 47.41 KG/M2 | HEIGHT: 63 IN | HEART RATE: 59 BPM | WEIGHT: 267.6 LBS | DIASTOLIC BLOOD PRESSURE: 74 MMHG

## 2019-08-05 DIAGNOSIS — N18.30 CKD (CHRONIC KIDNEY DISEASE) STAGE 3, GFR 30-59 ML/MIN (H): Chronic | ICD-10-CM

## 2019-08-05 DIAGNOSIS — Z79.899 LONG-TERM USE OF PLAQUENIL: ICD-10-CM

## 2019-08-05 DIAGNOSIS — M32.14 SYSTEMIC LUPUS ERYTHEMATOSUS WITH GLOMERULAR DISEASE, UNSPECIFIED SLE TYPE (H): Chronic | ICD-10-CM

## 2019-08-05 LAB
ALBUMIN UR-MCNC: 10 MG/DL
ALT SERPL W P-5'-P-CCNC: 34 U/L (ref 0–50)
ANION GAP SERPL CALCULATED.3IONS-SCNC: 6 MMOL/L (ref 3–14)
APPEARANCE UR: CLEAR
AST SERPL W P-5'-P-CCNC: 29 U/L (ref 0–45)
BILIRUB UR QL STRIP: NEGATIVE
BUN SERPL-MCNC: 31 MG/DL (ref 7–30)
CALCIUM SERPL-MCNC: 9.2 MG/DL (ref 8.5–10.1)
CHLORIDE SERPL-SCNC: 111 MMOL/L (ref 94–109)
CO2 SERPL-SCNC: 24 MMOL/L (ref 20–32)
COLOR UR AUTO: ABNORMAL
CREAT SERPL-MCNC: 0.95 MG/DL (ref 0.52–1.04)
CREAT UR-MCNC: 57 MG/DL
CREAT UR-MCNC: 61 MG/DL
CRP SERPL-MCNC: 8.8 MG/L (ref 0–8)
ERYTHROCYTE [DISTWIDTH] IN BLOOD BY AUTOMATED COUNT: 13.5 % (ref 10–15)
GFR SERPL CREATININE-BSD FRML MDRD: 66 ML/MIN/{1.73_M2}
GLUCOSE SERPL-MCNC: 96 MG/DL (ref 70–99)
GLUCOSE UR STRIP-MCNC: NEGATIVE MG/DL
HCT VFR BLD AUTO: 34.7 % (ref 35–47)
HGB BLD-MCNC: 11.4 G/DL (ref 11.7–15.7)
HGB UR QL STRIP: ABNORMAL
KETONES UR STRIP-MCNC: NEGATIVE MG/DL
LEUKOCYTE ESTERASE UR QL STRIP: NEGATIVE
MCH RBC QN AUTO: 29.9 PG (ref 26.5–33)
MCHC RBC AUTO-ENTMCNC: 32.9 G/DL (ref 31.5–36.5)
MCV RBC AUTO: 91 FL (ref 78–100)
NITRATE UR QL: NEGATIVE
NON-SQ EPI CELLS #/AREA URNS LPF: ABNORMAL /LPF
PH UR STRIP: 6 PH (ref 5–7)
PLATELET # BLD AUTO: 161 10E9/L (ref 150–450)
POTASSIUM SERPL-SCNC: 4.5 MMOL/L (ref 3.4–5.3)
PROT UR-MCNC: 0.32 G/L
PROT/CREAT 24H UR: 0.56 G/G CR (ref 0–0.2)
RBC # BLD AUTO: 3.81 10E12/L (ref 3.8–5.2)
RBC #/AREA URNS AUTO: ABNORMAL /HPF
SODIUM SERPL-SCNC: 141 MMOL/L (ref 133–144)
SOURCE: ABNORMAL
SP GR UR STRIP: 1.01 (ref 1–1.03)
UROBILINOGEN UR STRIP-MCNC: NORMAL MG/DL (ref 0–2)
WBC # BLD AUTO: 4.4 10E9/L (ref 4–11)
WBC #/AREA URNS AUTO: ABNORMAL /HPF

## 2019-08-05 PROCEDURE — 80048 BASIC METABOLIC PNL TOTAL CA: CPT | Performed by: INTERNAL MEDICINE

## 2019-08-05 PROCEDURE — 84450 TRANSFERASE (AST) (SGOT): CPT | Performed by: INTERNAL MEDICINE

## 2019-08-05 PROCEDURE — 99214 OFFICE O/P EST MOD 30 MIN: CPT | Performed by: INTERNAL MEDICINE

## 2019-08-05 PROCEDURE — 84460 ALANINE AMINO (ALT) (SGPT): CPT | Performed by: INTERNAL MEDICINE

## 2019-08-05 PROCEDURE — 85027 COMPLETE CBC AUTOMATED: CPT | Performed by: INTERNAL MEDICINE

## 2019-08-05 PROCEDURE — 36415 COLL VENOUS BLD VENIPUNCTURE: CPT | Performed by: INTERNAL MEDICINE

## 2019-08-05 PROCEDURE — 81001 URINALYSIS AUTO W/SCOPE: CPT | Performed by: INTERNAL MEDICINE

## 2019-08-05 PROCEDURE — 86160 COMPLEMENT ANTIGEN: CPT | Performed by: INTERNAL MEDICINE

## 2019-08-05 PROCEDURE — 84156 ASSAY OF PROTEIN URINE: CPT | Performed by: INTERNAL MEDICINE

## 2019-08-05 PROCEDURE — 86225 DNA ANTIBODY NATIVE: CPT | Performed by: INTERNAL MEDICINE

## 2019-08-05 PROCEDURE — 86140 C-REACTIVE PROTEIN: CPT | Performed by: INTERNAL MEDICINE

## 2019-08-05 RX ORDER — HYDROXYCHLOROQUINE SULFATE 200 MG/1
200 TABLET, FILM COATED ORAL DAILY
Qty: 90 TABLET | Refills: 3 | Status: SHIPPED | OUTPATIENT
Start: 2019-08-05 | End: 2019-10-11

## 2019-08-05 ASSESSMENT — ROUTINE ASSESSMENT OF PATIENT INDEX DATA (RAPID3)
RAPID3 INTERPRETATION: MODERATE 6.1-12.0
TOTAL RAPID3 SCORE: 7.3

## 2019-08-05 ASSESSMENT — PAIN SCALES - GENERAL: PAINLEVEL: NO PAIN (0)

## 2019-08-05 ASSESSMENT — MIFFLIN-ST. JEOR: SCORE: 1757.96

## 2019-08-05 NOTE — NURSING NOTE
"Luz Marina Live's goals for this visit include:   She requests these members of her care team be copied on today's visit information:     PCP: Eliz Nguyen    Referring Provider:  No referring provider defined for this encounter.    /74   Pulse 59   Ht 1.6 m (5' 3\")   Wt 121.4 kg (267 lb 9.6 oz)   SpO2 97%   BMI 47.40 kg/m      "

## 2019-08-05 NOTE — LETTER
August 7, 2019      Luz Marina Live  [address]  [place] [address]        Dear ,    We are writing to inform you of your test results.    Your laboratory results are reviewed and are largely normal or unchanged.  There is just a small amount of protein in the urine unchanged.  However the Lupus testing we follow shows that the complements are lower and the DNA higher than in the recent past.  You are also mildly anemic.     I would not change things now just based on the lab, but will have orders set up to repeat the CBC, C3, C4, and DNA in six to eight weeks to assure this is not a trend.  One of my colleagues who will assume your care will review these.  If you have any new symptoms before, please contact the clinic.     Please contact the clinic if you have any questions or concerns.     It was a pleasure to see you at your recent visit.       Sincerely,     Ever Adame MD MACR FACP   Professor   Rheumatic and Autoimmune Diseases     Resulted Orders   Creatinine random urine   Result Value Ref Range    Creatinine Urine Random 61 mg/dL   Protein  random urine with Creat Ratio   Result Value Ref Range    Protein Random Urine 0.32 g/L    Protein Total Urine g/gr Creatinine 0.56 (H) 0 - 0.2 g/g Cr   Routine UA with microscopic - No culture ([facility])   Result Value Ref Range    Color Urine Light Yellow     Appearance Urine Clear     Glucose Urine Negative NEG^Negative mg/dL    Bilirubin Urine Negative NEG^Negative    Ketones Urine Negative NEG^Negative mg/dL    Specific Gravity Urine 1.010 1.003 - 1.035    Blood Urine Small (A) NEG^Negative    pH Urine 6.0 5.0 - 7.0 pH    Protein Albumin Urine 10 (A) NEG^Negative mg/dL    Urobilinogen mg/dL Normal 0.0 - 2.0 mg/dL    Nitrite Urine Negative NEG^Negative    Leukocyte Esterase Urine Negative NEG^Negative    Source Midstream Urine     WBC Urine 0 - 5 OTO5^0 - 5 /HPF    RBC Urine 2-5 (A) OTO2^O - 2 /HPF    Squamous Epithelial /LPF Urine Few FEW^Few /LPF    DNA double stranded antibodies   Result Value Ref Range    DNA-ds 110 (H) <10 IU/mL      Comment:      Positive   CRP inflammation   Result Value Ref Range    CRP Inflammation 8.8 (H) 0.0 - 8.0 mg/L   Complement C3   Result Value Ref Range    Complement C3 53 (L) 76 - 169 mg/dL   Complement C4   Result Value Ref Range    Complement C4 4 (L) 15 - 50 mg/dL   CBC with platelets   Result Value Ref Range    WBC 4.4 4.0 - 11.0 10e9/L    RBC Count 3.81 3.8 - 5.2 10e12/L    Hemoglobin 11.4 (L) 11.7 - 15.7 g/dL    Hematocrit 34.7 (L) 35.0 - 47.0 %    MCV 91 78 - 100 fl    MCH 29.9 26.5 - 33.0 pg    MCHC 32.9 31.5 - 36.5 g/dL    RDW 13.5 10.0 - 15.0 %    Platelet Count 161 150 - 450 10e9/L   Basic metabolic panel   Result Value Ref Range    Sodium 141 133 - 144 mmol/L    Potassium 4.5 3.4 - 5.3 mmol/L    Chloride 111 (H) 94 - 109 mmol/L    Carbon Dioxide 24 20 - 32 mmol/L    Anion Gap 6 3 - 14 mmol/L    Glucose 96 70 - 99 mg/dL      Comment:      Non Fasting    Urea Nitrogen 31 (H) 7 - 30 mg/dL    Creatinine 0.95 0.52 - 1.04 mg/dL    GFR Estimate 66 >60 mL/min/[1.73_m2]      Comment:      Non  GFR Calc  Starting 12/18/2018, serum creatinine based estimated GFR (eGFR) will be   calculated using the Chronic Kidney Disease Epidemiology Collaboration   (CKD-EPI) equation.      GFR Estimate If Black 76 >60 mL/min/[1.73_m2]      Comment:       GFR Calc  Starting 12/18/2018, serum creatinine based estimated GFR (eGFR) will be   calculated using the Chronic Kidney Disease Epidemiology Collaboration   (CKD-EPI) equation.      Calcium 9.2 8.5 - 10.1 mg/dL   AST   Result Value Ref Range    AST 29 0 - 45 U/L   ALT   Result Value Ref Range    ALT 34 0 - 50 U/L   Creatinine urine calculation only   Result Value Ref Range    Creatinine Urine 57 mg/dL

## 2019-08-05 NOTE — PROGRESS NOTES
Rheumatology Visit     Luz Marina Live MRN# [id number]   YOB: 1960 Age: 59 year old     Date of Visit: August 5, 2019  Primary care provider: Eliz Nguyen          Assessment and Plan:   1. 58 yo female with longstanding SLE. She did have renal involvement in the 1990's requiring Cytoxan, Prednisone and Imuran. In the past several years her disease has been well controlled on Plaquenil alone. She has persistently abnormal but stable DSDNA and normal or low but stable C3 and C4. She has intermittent low grade protein in the urine, and borderline low wbc on occasion.  She had worsened renal function in 2015 with hospitalizations for serious illnesses that then reversed.    2. CAD, s/p CABG in August 2009, stable with recent reassessment. Followed by Dr. Nguyen and Dr. Moreno in Cardiology; she sees him next week  3. Hypertension, stable  4. CKD per above, very stable followed by Dr. Clark.   5. Long term use of Plaquenil, between 15 - 20 years.  Normal OCT in per Dr. Davenport  6. Symptomatic hammertoes  7.  OSAS, on CPAP therapy    PLAN: long discussion with patient regarding long term Plaquenil use, current guidelines and attributed eye risk with duration/total dosage; inability to predict flares of SLE.    1. See lab orders, medication orders and follow-up plans for this encounter. Will repeat complements, DNA, UA and protein, CBC, chemistries and write  2. Additional treatment plan consists of continuation of Plaquenil at 200 mg every day.  We will discuss yearly. Eye appt yearly  3. She will have BP rechecked regularly.  4. RTC: in 1 year(s) to see new Rheumatologist  5. Letter: yes  6. flu shot this fall      Ever Adame MD FACP   Professor   Rheumatic and Autoimmune Diseases           History of Present Illness:   58 yo female with SLE seen for followup. In the past she was followed by Dr. Behrens and then Dr. Valdez. She saw Dr. Mendoza twice, the last time in the summer of 2009. I  saw her first in 2010 and last 11 months ago in 9/18. EPIC reviewed.    HISTORY CARRIED FORWARD:     To review, she presented with SLE in 1993 with arthritis, nasal ulcers, rash, positive DSDNA and APLA. She had an episode of respiratory failure in 1994 and renal involvement. She was on high dose Prednsone, IV Cytoxan and then Imuran. She had an MI in 1996 ? Related to APLA.   She has been in a clinical remission for several years on Plaquenil 400 mg daily and takes Naprosyn for arthralgias. She had an eye exam in the fall without toxicity. No side effects. She is on 325 mg of ASA daily. She had a 3 vessel bypass in 8/09 for CAD and is followed by Dr. Myrick who saw her in November 2013 with stable findings. Dr. Nguyen is her PCP and she saw her in December and increased Lisinopril for her BP. Lab was normal/stable in November.   Labs in 2014 both Febr and October showed stable C3 of 72 and low C4 at 11 and elevated but overall stable DSDNA at 572 but no persistent trend over past several tests. Last may she had DSDNA 491 and normal complements.   She has had a borderline low wbc but recently normal. Her UA has small amount of protein; this shows up intermittently with no cells or casts. See Dr. Clark's notes.   She has had two hospitalizations in 2015. She is followed closely by her PCP Dr. Nguyen as well as here by Dr. Clark, and Cardiology.  Last hospitalization in August 2015with strep infection and then septic shock and worsened CKD per notes.  Her creatinine had returned to baseline.    INTERVAL HISTORY:     There have been no definite symptoms to suggest SLE flare in the interim since I saw her.  She denies joint pain or new rash; she has Rosacea. No known renal issues.    She remains on Plaquenil now at 200 mg daily. She had a stable eye exam in May by Dr. Davenport.    Her lab is reviewed. She has stable low complements last done in February 2018; DNA was 56  November 2017 compared with 103 in 2016.    She  has had prior evaluation by Dr. Moreno for dyspnea and elevated PA pressures with repeat catheterization in April 2018, and Dr. Nguyễn for OSAS with  sleep study. She is now on CPAP, and sees Dr. Moreno next week. She saw Dr. Nguyen her PCC in October.    Her CKD has been stable, followed by Dr. Clark.      She has been on Plaquenil for >19 years.  Discussed extensively as per Assessment and Plan above.    She has no photosensitivity, Raynauds, pleurisy, fever, alopecia, overt arthritis. She has a hammertoe deformity in her feet that we discussed again.       Review of Systems:   Review Of Systems  Skin: rosacea unchanged  Eyes: see Eye notes and HPI  Ears/Nose/Throat: negative  Respiratory: No shortness of breath, dyspnea on exertion, cough, or hemoptysis  Cardiovascular: see CV notes  Gastrointestinal: negative  Genitourinary: negative  Musculoskeletal:see HPI  Neurologic: negative  Psychiatric: negative  Hematologic/Lymphatic/Immunologic: negative  Endocrine: negative          Past Medical History:     Past Medical History:   Diagnosis Date     Hypovolemic shock (H) 2/28/2015     Sepsis (H) 8/22/2015       Patient Active Problem List    Diagnosis Date Noted     Long-term use of Plaquenil 09/04/2018     Priority: Medium     KEITH (obstructive sleep apnea)- severe (AHI 30) 06/18/2018     Priority: Medium     6/13/2018 [facility] Study (257.0 lbs) - AHI 30.9, RDI 32.5, Supine AHI 48.7, REM AHI 76.3, Low O2 71.1%, Time Spent ?88% 20.8 minutes / Time Spent ?89% 25.7 minutes. TCCo2 within upper normal limits.        Psychophysiological insomnia 06/11/2018     Priority: Medium     Morbid obesity (H) 06/11/2018     Priority: Medium     Systemic lupus erythematosus with glomerular disease, unspecified SLE type (H) 07/03/2017     Priority: Medium     longstanding SLE. She did have renal involvement in the 1990's requiring Cytoxan, Prednisone and Imuran       Moderate tricuspid insufficiency 12/22/2016      Priority: Medium     Long-term (current) use of anticoagulants [Z79.01] 04/05/2016     Priority: Medium     CKD (chronic kidney disease) stage 3, GFR 30-59 ml/min (H) 09/14/2015     Priority: Medium     PAF (paroxysmal atrial fibrillation) (H) 08/24/2015     Priority: Medium     08/2015, the patient had a severe infection of her face and during that hospitalization had an episode of atrial fibrillation, she was discharged on amiodarone which since then has been discontinued.         HTN, goal below 140/90 07/03/2013     Priority: Medium     Hyperlipidemia LDL goal <100 12/05/2011     Priority: Medium     CAD (coronary artery disease) 12/05/2011     Priority: Medium     S/p CABGx3 2009               Past Surgical History:     Past Surgical History:   Procedure Laterality Date     CARDIAC SURGERY  08/27/2009    triple vessel coronary artery bypass grafting on 8/27/09     CARPAL TUNNEL RELEASE RT/LT  1996    bilateral             Social History:     Social History     Tobacco Use     Smoking status: Former Smoker     Smokeless tobacco: Never Used     Tobacco comment: 30 plus years ago.   Substance Use Topics     Alcohol use: No             Family History:     Family History   Problem Relation Age of Onset     Arthritis Mother         ra     Connective Tissue Disorder Mother         lupus     Congenital Anomalies Mother         wholein heart     Cerebrovascular Disease Mother         TIA's     Cerebrovascular Disease Father      Diabetes Father      Hypertension Father      Cardiovascular Father         stents     Genitourinary Problems Father         kidney failure     Kidney Disease Father         kidney injury related to acute illness around time of death (RANDELL likely)     Cataracts Father      Arthritis Paternal Grandmother      Diabetes Brother      Glaucoma No family hx of      Macular Degeneration No family hx of             Allergies:     Allergies   Allergen Reactions     Sulfa Drugs Rash and GI Disturbance       "       Medications:     Current Outpatient Medications   Medication Sig Dispense Refill     aspirin 81 MG tablet Take by mouth daily       atorvastatin (LIPITOR) 40 MG tablet TAKE 1 TABLET (40 MG) BY MOUTH DAILY 90 tablet 3     Calcium Carbonate-Vitamin D (CALCIUM 600-D PO) Take  by mouth 2 times daily.       hydroxychloroquine (PLAQUENIL) 200 MG tablet Take 1 tablet (200 mg) by mouth 2 times daily 60 tablet 11     lisinopril (PRINIVIL/ZESTRIL) 20 MG tablet Take 1.5 tablets (30 mg) by mouth daily 145 tablet 0     metoprolol tartrate (LOPRESSOR) 50 MG tablet TAKE 1 TABLET BY MOUTH TWICE A  tablet 3     metoprolol tartrate (LOPRESSOR) 50 MG tablet Take 1 tablet (50 mg) by mouth 2 times daily 180 tablet 0     MULTIPLE VITAMIN PO Take 1 tablet by mouth daily        Omega-3 Fatty Acids (FISH OIL) 1200 MG capsule Take 2 capsules by mouth 2 times daily        order for DME Autopap 10-16 cmH20 1 Device 0     warfarin (COUMADIN) 5 MG tablet TAKE 1 TAB (5 MG) BY MOUTH ON FRI & 2 TABS (10 MG) ALL OTHER DAYS, OR AS DIRECTED 160 tablet 0            Physical Exam:   /74   Pulse 59   Ht 1.6 m (5' 3\")   Wt 121.4 kg (267 lb 9.6 oz)   SpO2 97%   BMI 47.40 kg/m      Constitutional: WD-WN-WG cooperative   Eyes: nl conjunctiva, sclera   ENT: nl external ears, nose, throat   No mucous membrane lesions, normal saliva pool   Neck: no mass or thyroid enlargement   Lymph: no cervical, supraclavicular, inguinal or epitrochlear nodes   MS: All TMJ, neck, shoulder, elbow, wrist, MCP/PIP/DIP, spine, hip, knee, ankle, and foot MTP/IP joints were examined and found without active synovitis. Minor pain with finger curl unchanged. Second digit hammertoes. No dactylitis, tenosynovitis, enthesopathy   Skin: no nail pitting, alopecia, rash, nodules or lesions. Malar erythema minimal due to rosacea  Neuro: nl cranial nerves, strength   Psych: nl affect.       Data:     Lab Results   Component Value Date    WBC 7.2 03/15/2017    WBC 7.1 " 09/02/2015    WBC 9.6 08/29/2015    HGB 13.3 03/15/2017    HGB 12.3 12/15/2016    HGB 13.0 08/01/2016    HCT 39.5 03/15/2017    HCT 29.8 (L) 09/02/2015    HCT 25.4 (L) 08/29/2015    MCV 90 03/15/2017    MCV 93 09/02/2015    MCV 89 08/29/2015     03/15/2017     09/02/2015     08/29/2015     Lab Results   Component Value Date    BUN 31 (H) 10/30/2018    BUN 31 (H) 06/25/2018    BUN 24 03/12/2018     No components found for: SEDRATE  Lab Results   Component Value Date    TSH 5.44 (H) 03/02/2015    TSH 2.59 02/28/2015    TSH 5.45 (H) 10/08/2014     Lab Results   Component Value Date    AST 24 10/30/2018    AST 32 02/27/2018    AST 47 (H) 03/15/2017    ALT 30 10/30/2018    ALT 36 02/27/2018    ALT 60 (H) 03/15/2017    ALKPHOS 113 10/30/2018    ALKPHOS 106 02/27/2018    ALKPHOS 105 03/15/2017     Reviewed Rheumatology lab flowsheet    Ever Adame

## 2019-08-06 LAB
C3 SERPL-MCNC: 53 MG/DL (ref 76–169)
C4 SERPL-MCNC: 4 MG/DL (ref 15–50)
DSDNA AB SER-ACNC: 110 IU/ML

## 2019-08-13 ENCOUNTER — OFFICE VISIT (OUTPATIENT)
Dept: CARDIOLOGY | Facility: CLINIC | Age: 59
End: 2019-08-13
Attending: INTERNAL MEDICINE
Payer: COMMERCIAL

## 2019-08-13 ENCOUNTER — ANCILLARY PROCEDURE (OUTPATIENT)
Dept: GENERAL RADIOLOGY | Facility: CLINIC | Age: 59
End: 2019-08-13
Attending: INTERNAL MEDICINE
Payer: COMMERCIAL

## 2019-08-13 VITALS
HEART RATE: 53 BPM | OXYGEN SATURATION: 100 % | SYSTOLIC BLOOD PRESSURE: 150 MMHG | DIASTOLIC BLOOD PRESSURE: 87 MMHG | HEIGHT: 63 IN | WEIGHT: 270 LBS | BODY MASS INDEX: 47.84 KG/M2

## 2019-08-13 DIAGNOSIS — E78.5 HYPERLIPIDEMIA LDL GOAL <130: ICD-10-CM

## 2019-08-13 DIAGNOSIS — I25.10 CORONARY ARTERY DISEASE DUE TO LIPID RICH PLAQUE: ICD-10-CM

## 2019-08-13 DIAGNOSIS — M32.14 SYSTEMIC LUPUS ERYTHEMATOSUS WITH GLOMERULAR DISEASE, UNSPECIFIED SLE TYPE (H): Chronic | ICD-10-CM

## 2019-08-13 DIAGNOSIS — I25.83 CORONARY ARTERY DISEASE DUE TO LIPID RICH PLAQUE: Primary | ICD-10-CM

## 2019-08-13 DIAGNOSIS — I25.10 CORONARY ARTERY DISEASE DUE TO LIPID RICH PLAQUE: Primary | ICD-10-CM

## 2019-08-13 DIAGNOSIS — I25.83 CORONARY ARTERY DISEASE DUE TO LIPID RICH PLAQUE: ICD-10-CM

## 2019-08-13 LAB
CHOLEST SERPL-MCNC: 99 MG/DL
HDLC SERPL-MCNC: 40 MG/DL
IRON SERPL-MCNC: 45 UG/DL (ref 35–180)
LDLC SERPL CALC-MCNC: 41 MG/DL
NONHDLC SERPL-MCNC: 59 MG/DL
NT-PROBNP SERPL-MCNC: 507 PG/ML (ref 0–125)
TRIGL SERPL-MCNC: 89 MG/DL
TROPONIN I SERPL-MCNC: <0.015 UG/L (ref 0–0.04)
TSH SERPL DL<=0.005 MIU/L-ACNC: 3.85 MU/L (ref 0.4–4)

## 2019-08-13 PROCEDURE — 83880 ASSAY OF NATRIURETIC PEPTIDE: CPT | Performed by: INTERNAL MEDICINE

## 2019-08-13 PROCEDURE — 99214 OFFICE O/P EST MOD 30 MIN: CPT | Mod: ZP | Performed by: INTERNAL MEDICINE

## 2019-08-13 PROCEDURE — 83540 ASSAY OF IRON: CPT | Performed by: INTERNAL MEDICINE

## 2019-08-13 PROCEDURE — 80061 LIPID PANEL: CPT | Performed by: INTERNAL MEDICINE

## 2019-08-13 PROCEDURE — 36415 COLL VENOUS BLD VENIPUNCTURE: CPT | Performed by: INTERNAL MEDICINE

## 2019-08-13 PROCEDURE — 84443 ASSAY THYROID STIM HORMONE: CPT | Performed by: INTERNAL MEDICINE

## 2019-08-13 PROCEDURE — G0463 HOSPITAL OUTPT CLINIC VISIT: HCPCS

## 2019-08-13 PROCEDURE — 84484 ASSAY OF TROPONIN QUANT: CPT | Performed by: INTERNAL MEDICINE

## 2019-08-13 ASSESSMENT — MIFFLIN-ST. JEOR: SCORE: 1768.84

## 2019-08-13 ASSESSMENT — PAIN SCALES - GENERAL: PAINLEVEL: NO PAIN (0)

## 2019-08-13 NOTE — NURSING NOTE
Chief Complaint   Patient presents with     Follow Up     History of CAD s/p CAB, HTN, HLD, atrial fibrillation on warfarin, SLE, and PFO.     Vitals were taken and medications were reconciled.     Ambika Gonzalez RMA  10:31 AM

## 2019-08-13 NOTE — PROGRESS NOTES
Impression  1. ASHD  2. History of CAB  3. Elevated PA pressure  4. Hypertension  5. Hyperlipidemia  6. KEITH  7. PFO  8. Atrial fibrillation with warfarin anticoagulation  9. SLE  10. History of lupus nephritis    I had the opportunity to review Ms. Live chart as well as go over the results and findings.  She has exertional shortness of breath functional class 2 superimposed upon history of SLE, premature CAD resulting in by-pass, elevated body mass index and history suggestive of KEITH.  She has a history of atrial fibrillation and warfarin usage, facial cellulitis and has routine eye care in view of lupus medications.  She is not diabetic    Wt Readings from Last 24 Encounters:   08/05/19 121.4 kg (267 lb 9.6 oz)   11/20/18 122.9 kg (271 lb)   10/29/18 122 kg (269 lb)   09/17/18 119.3 kg (263 lb)   09/04/18 118.3 kg (260 lb 14.4 oz)   06/25/18 118.4 kg (261 lb)   06/25/18 117.5 kg (259 lb)   05/31/18 116.9 kg (257 lb 12.8 oz)   04/30/18 113.4 kg (250 lb)   04/10/18 115 kg (253 lb 9.6 oz)   02/27/18 116.6 kg (257 lb)   11/06/17 119.3 kg (263 lb 1.6 oz)   08/01/17 119.7 kg (264 lb)   07/03/17 122 kg (269 lb)   04/03/17 123 kg (271 lb 1.6 oz)   03/15/17 123.2 kg (271 lb 9.6 oz)   12/22/16 122.5 kg (270 lb)   12/20/16 124.7 kg (275 lb)   12/19/16 123.5 kg (272 lb 4.8 oz)   11/28/16 121.4 kg (267 lb 11.2 oz)   08/01/16 122.5 kg (270 lb)   05/20/16 118.4 kg (261 lb)   02/19/16 117 kg (258 lb)   02/01/16 117 kg (258 lb)     Results for IDA LIVE (MRN [id number]) as of 8/12/2019 21:27   Ref. Range 7/15/2019 16:08 8/5/2019 11:03   Sodium Latest Ref Range: 133 - 144 mmol/L  141   Potassium Latest Ref Range: 3.4 - 5.3 mmol/L  4.5   Chloride Latest Ref Range: 94 - 109 mmol/L  111 (H)   Carbon Dioxide Latest Ref Range: 20 - 32 mmol/L  24   Urea Nitrogen Latest Ref Range: 7 - 30 mg/dL  31 (H)   Creatinine Latest Ref Range: 0.52 - 1.04 mg/dL  0.95   GFR Estimate Latest Ref Range: >60 mL/min/1.73_m2  66   GFR Estimate If  Black Latest Ref Range: >60 mL/min/1.73_m2  76   Calcium Latest Ref Range: 8.5 - 10.1 mg/dL  9.2   Anion Gap Latest Ref Range: 3 - 14 mmol/L  6   ALT Latest Ref Range: 0 - 50 U/L  34   AST Latest Ref Range: 0 - 45 U/L  29   CRP Inflammation Latest Ref Range: 0.0 - 8.0 mg/L  8.8 (H)   Glucose Latest Ref Range: 70 - 99 mg/dL  96   WBC Latest Ref Range: 4.0 - 11.0 10e9/L  4.4   Hemoglobin Latest Ref Range: 11.7 - 15.7 g/dL  11.4 (L)   Hematocrit Latest Ref Range: 35.0 - 47.0 %  34.7 (L)   Platelet Count Latest Ref Range: 150 - 450 10e9/L  161   RBC Count Latest Ref Range: 3.8 - 5.2 10e12/L  3.81   MCV Latest Ref Range: 78 - 100 fl  91   MCH Latest Ref Range: 26.5 - 33.0 pg  29.9   MCHC Latest Ref Range: 31.5 - 36.5 g/dL  32.9   RDW Latest Ref Range: 10.0 - 15.0 %  13.5   INR Point of Care Latest Ref Range: 0.86 - 1.14  2.9 (H)    Complement C3 Latest Ref Range: 76 - 169 mg/dL  53 (L)   Complement C4 Latest Ref Range: 15 - 50 mg/dL  4 (L)   DNA-ds Latest Ref Range: <10 IU/mL  110 (H)       Current Outpatient Medications   Medication     aspirin 81 MG tablet     atorvastatin (LIPITOR) 40 MG tablet     Calcium Carbonate-Vitamin D (CALCIUM 600-D PO)     hydroxychloroquine (PLAQUENIL) 200 MG tablet     lisinopril (PRINIVIL/ZESTRIL) 20 MG tablet     metoprolol tartrate (LOPRESSOR) 50 MG tablet     metoprolol tartrate (LOPRESSOR) 50 MG tablet     MULTIPLE VITAMIN PO     Omega-3 Fatty Acids (FISH OIL) 1200 MG capsule     order for DME     warfarin (COUMADIN) 5 MG tablet     No current facility-administered medications for this visit.        Her new catherization:  Results (pressures in mmHg)  RA: 1/4/3  RV 32/3  PA 30/13/19;  PA Sat 65%  PCWP -/-/5;  PCW Sat --%  Kannan CO/CI 3.3/1.6 L/min; TD CO 4.1/2.0 L/min  PVR 4.2 bermudez; TPR 5.7 bermudez  Hb 12.3 g/dL  NIBP 167/81/116  SVR 2200 dynes*sec/cm^5     [place]:  SVC 62.7%  IVC 65.0%  Low RA 73.0%  Mid RA 68.0%  High RA 64.6%  RV 63%  PA 65%  PCW ---     Complications: None   Blood loss:  Minimal blood loss     Conclusions:  1. Low right and left sided filling pressures.  2. Normal pulmonary artery pressures.  3. Low normal cardiac output.      REVIEW of SYMPTOMS    Constitutional: without fever, chills, night sweats.  Weight is down  HEENT: without dry eyes, dry mouth, sinusitis, corryza, visual changes  Endocrine: without polyuria, polydypsia, polyphagia, heat or cold intolerance, changing mental status  Cardiology: without chest pain, tightness, heaviness, pressure, paroxysmal dyspnea, orthopnea, palpitation, pre-syncope or syncope or device discharge (if present)  Pulmonary: without asthma, wheezing, cough, hemoptysis  GI: without nausea, emesis, jaundice, pain, hematemesis, melena  : without frequency, urgency, hematuria, stones, pain, abnormal bleeding, frequency, urgency  Neurologic: without TIA, CVA, trauma, seizure  Dermatologic: without lesions, abrasion rash,   Orthopedic/Rheum: without significant joint pain, impairment, limb, polyserositis, ulceration, Raynauds  Heme: without mass, bruising, frequent infection, anemia  Psychiatric: without substance abuse, hallucination, medication, depression    Constitutional: alert, oriented, normal gait and station, normal mentation.  Oral: moist mucous membrans  Lymph: without pathologic adenopathy  Chest: clear to ausculation and percussion  Cor: No evidence of left or right ventricular activity.  Rhythm is regular.  S1 normal, S2 split physiologically. Murmurs are not present  Abdomen: without tenderness, rebound, guarding, masses, ascites  Extremities: Edema not present  Neuro: no focal defects, normal mentation  Skin: + butterfly  Psych: oriented, verbal, mental status in tact    Exercise tolerance:  Echocardiographic findings of TR, modest PA, normal systolic function.    Name: IDA PADRON  MRN: [id number]  : 1960  Study Date: 2018 11:48 AM  Age: 57 yrs  Gender: Female  Patient Location: [facility]  Reason For Study: Hx PFO,  Elevated PA pressures  Ordering Physician: SANNA SALAZAR  Referring Physician: SANNA SALAZAR  Performed By: Tray Harvey RDCS     BSA: 2.2 m2  Height: 63 in  Weight: 257 lb  HR: 62  BP: 167/94 mmHg  _____________________________________________________________________________  __        Procedure  Bubble Echocardiogram with two-dimensional, color and spectral Doppler  performed. IV start location R Hand . Bateriostatic Saline used for Bubble  Study.  _____________________________________________________________________________  __        Interpretation Summary     Right ventricular function, chamber size, wall motion, and thickness are  normal.  PFO again documented with color doppler and Bubble study.  Mild to moderate tricuspid insufficiency is present.  Right ventricular systolic pressure is 61mmHg above the right atrial pressure.  No pericardial effusion is present.  _____________________________________________________________________________  __        Left Ventricle  Global and regional left ventricular function is normal with an EF of 55-60%.  Left ventricular wall thickness is normal. Left ventricular size is normal.  Left ventricular diastolic function is indeterminate. No regional wall motion  abnormalities are seen.     Right Ventricle  Right ventricular function, chamber size, wall motion, and thickness are  normal.     Atria  Moderate biatrial enlargement is present. PFO again documented with color  doppler and Bubble study.     Mitral Valve  Mild to moderate mitral insufficiency is present.        Aortic Valve  Aortic valve is normal in structure and function.     Tricuspid Valve  Mild to moderate tricuspid insufficiency is present. Right ventricular  systolic pressure is 61mmHg above the right atrial pressure.     Pulmonic Valve  The pulmonic valve is normal. Trace to mild pulmonic insufficiency is present.     Vessels  The aorta root is normal. The pulmonary artery is normal. The inferior  vena  cava is normal.     Pericardium  No pericardial effusion is present.     _____________________________________________________________________________  __  MMode/2D Measurements & Calculations  IVSd: 0.89 cm  LVIDd: 5.3 cm  LVIDs: 3.1 cm  LVPWd: 0.75 cm  FS: 41.8 %  LV mass(C)d: 154.8 grams  LV mass(C)dI: 72.0 grams/m2     Ao root diam: 3.0 cm  LA dimension: 5.3 cm  asc Aorta Diam: 3.6 cm  LA/Ao: 1.8  LVOT diam: 2.2 cm  LVOT area: 3.7 cm2  LA Volume (BP): 96.0 ml  LA Volume Index (BP): 44.7 ml/m2  RWT: 0.28        Doppler Measurements & Calculations  MV E max luis: 92.8 cm/sec  MV A max luis: 58.2 cm/sec  MV E/A: 1.6  MV dec time: 0.15 sec     Ao V2 max: 147.4 cm/sec  Ao max P.0 mmHg  TARUN(V,D): 2.6 cm2  LV V1 max P.3 mmHg  LV V1 max: 104.1 cm/sec  LV V1 VTI: 24.8 cm  MR ERO: 0.27 cm2  CO(LVOT): 5.6 l/min  CI(LVOT): 2.6 l/min/m2  SV(LVOT): 92.9 ml  SI(LVOT): 43.2 ml/m2  PA V2 max: 110.6 cm/sec  PA max P.9 mmHg  PA acc time: 0.08 sec  PI end-d luis: 154.4 cm/sec  PI max luis: 255.8 cm/sec  PI max P.2 mmHg  TR max luis: 385.4 cm/sec  TR max P.4 mmHg  Pulm Sys Luis: 63.2 cm/sec  Pulm Peng Luis: 97.7 cm/sec  Pulm S/D: 0.65  E/E' av.1  Lateral E/e': 8.9  Medial E/e': 15.4        : 1960  Study Date: 2016 01:31 PM  Age: 56 yrs  Gender: Female  Patient Location: [facility]  Reason For Study:     History: tricuspid regurgitation  Ordering Physician: JOSE ARELLANO  Referring Physician: JOSE ARELLANO  Performed By: Hugh Sharpe MD     BSA: 2.2 m2  Height: 63 in  Weight: 269 lb  ______________________________________________________________________________     Interpretation Summary  Left ventricular function, chamber size, wall motion, and wall thickness are  normal.The EF is 55-60%.  Global right ventricular function is normal. Mild right ventricular dilation  is present.  Mild to moderate TR with mild prolapse of the posterior leaflet of the  tricupsid valve.  A small patent  foramen ovale is present. There is no ASD (secundum, sinus  venosus or unroofed coronary sinus). All 4 pulmonary veins drain into the  left atrium.  Mild pulmonary hypertension is present with RVSP 45mmHg above RAP.     ______________________________________________________________________________        Procedure  Transesophageal Echocardiogram with color and spectral Doppler performed. The  procedure was performed in the Echo Lab. Informed consent for Transesophegeal  echo obtained. EVE Probe #12 was used during the procedure. Patient was  sedated using Fentanyl 100 mcg. Patient was sedated using Versed 4 mg. The  Transducer was inserted without difficulty . The patient tolerated the  procedure well. Complications None. ECG shows normal sinus rhythm. Good  quality two-dimensional was performed and interpreted.     Left Ventricle  Left ventricular function, chamber size, wall motion, and wall thickness are  normal.The EF is 55-60%.     Right Ventricle  Global right ventricular function is normal. Mild right ventricular dilation  is present.     Atria  Both atria appear normal. The left atrial appendage is normal. It is free of  spontaneous echo contrast and thrombus. A small patent foramen ovale is  present. The patent foramen ovale was demonstrated by color Doppler and  agitated saline bubble study . The patent foramen ovale was demonstrated with  Valsalva's maneuver. No evidence of ASD.     Mitral Valve  The mitral valve is normal. Trace mitral insufficiency is present.     Aortic Valve  Aortic valve is normal in structure and function. The aortic valve is  tricuspid.  Tricuspid Valve  Mild to moderate tricuspid insufficiency is present. Right ventricular  systolic pressure is 45mmHg above the right atrial pressure. Mild pulmonary  hypertension is present. Mild prolapse of the posterior leaflet of the  tricupsid valve is noted.     Pulmonic Valve  The pulmonic valve is normal.     Vessels  The pulmonary artery is  normal. The aorta root is normal. The thoracic aorta  is normal. All four pulmonary veins visualized with dampened velocity  waveforms.     Pericardium  No pericardial effusion is present.     Compared to Previous Study  This study was compared with the study from 2016. A small PFO has been  identified. .     ______________________________________________________________________________     Doppler Measurements & Calculations  TR max maria isabel: 336.7 cm/sec  TR max P.5 mmHg  ______________________________________________________________________________         Name: IDA PADRON  MRN: [id number]  : 1960  Study Date: 2016 01:31 PM  Age: 56 yrs  Gender: Female  Patient Location: [facility]  Reason For Study:     History: tricuspid regurgitation  Ordering Physician: JOSE ARELLANO  Referring Physician: JOSE ARELLANO  Performed By: Hugh Sharpe MD     BSA: 2.2 m2  Height: 63 in  Weight: 269 lb  ______________________________________________________________________________     Interpretation Summary  Left ventricular function, chamber size, wall motion, and wall thickness are  normal.The EF is 55-60%.  Global right ventricular function is normal. Mild right ventricular dilation  is present.  Mild to moderate TR with mild prolapse of the posterior leaflet of the  tricupsid valve.  A small patent foramen ovale is present. There is no ASD (secundum, sinus  venosus or unroofed coronary sinus). All 4 pulmonary veins drain into the  left atrium.  Mild pulmonary hypertension is present with RVSP 45mmHg above RAP.     ______________________________________________________________________________        Procedure  Transesophageal Echocardiogram with color and spectral Doppler performed. The  procedure was performed in the Echo Lab. Informed consent for Transesophegeal  echo obtained. EVE Probe #12 was used during the procedure. Patient was  sedated using Fentanyl 100 mcg. Patient was sedated using  Versed 4 mg. The  Transducer was inserted without difficulty . The patient tolerated the  procedure well. Complications None. ECG shows normal sinus rhythm. Good  quality two-dimensional was performed and interpreted.     Left Ventricle  Left ventricular function, chamber size, wall motion, and wall thickness are  normal.The EF is 55-60%.     Right Ventricle  Global right ventricular function is normal. Mild right ventricular dilation  is present.     Atria  Both atria appear normal. The left atrial appendage is normal. It is free of  spontaneous echo contrast and thrombus. A small patent foramen ovale is  present. The patent foramen ovale was demonstrated by color Doppler and  agitated saline bubble study . The patent foramen ovale was demonstrated with  Valsalva's maneuver. No evidence of ASD.     Mitral Valve  The mitral valve is normal. Trace mitral insufficiency is present.     Aortic Valve  Aortic valve is normal in structure and function. The aortic valve is  tricuspid.  Tricuspid Valve  Mild to moderate tricuspid insufficiency is present. Right ventricular  systolic pressure is 45mmHg above the right atrial pressure. Mild pulmonary  hypertension is present. Mild prolapse of the posterior leaflet of the  tricupsid valve is noted.     Pulmonic Valve  The pulmonic valve is normal.     Vessels  The pulmonary artery is normal. The aorta root is normal. The thoracic aorta  is normal. All four pulmonary veins visualized with dampened velocity  waveforms.     Pericardium  No pericardial effusion is present.     Compared to Previous Study  This study was compared with the study from 2016. A small PFO has been  identified. .     ______________________________________________________________________________     Doppler Measurements & Calculations  TR max maria isabel: 336.7 cm/sec  TR max P.5 mmHg  ______________________________________________________________________________       Procedures:  ECHO: 09:    Interpretation Summary   The Ejection Fraction is estimated at 55-60%. No regional wall motion   abnormalities are seen. The right ventricle is normal size. Global right   ventricular function is normal. Right ventricular systolic pressure is 27mmHg   above the right atrial pressure. No pericardial effusion is present. The   inferior vena cava is normal.   Stress test: 08-10-09:   1. Abnormal study with a high degree of certainty.   2. There is a predominantly fixed medium sized moderately decreased   intensity myocardial perfusion defect with minimal periinfarct   reversibility involving the apical anterior, mid anterior, and apical   region of the heart. There is corresponding hypokinesis. No other   adenosine induced fixed or reversible myocardial perfusion defect.   3. Left ventricular size is enlarged at rest. Transient ischemic   dilation of the left ventricle did not occur.   4. The left ventricular ejection fraction is 56 %.   5. Summed Stress Score is calculated at 18, which is associated   with increased risk of future coronary events.   CCL: 08-26-09: ARELLANO:   Mrs. Live is a 49 year old female with known coronary artery disease s/p MI in 1996 found to have minimal disease on catheterization. Her cardiovascular risk factors include HTN and hyperlipidemia. She presented with a 2 week history of dyspnea with exertion and lower extremity edema. She underwent Adenosine MPI which revealed a fixed medium-sized defect with minimal periinfarct reversability and anterior hypokinesis. Her LVEF was preserved at 56%. CT angio of the coronaries revealed moderate stenosis in the pLAD and dRCA and mild to moderate stenosis of the pLCx.   Access: 6F long RFA, 6F RFV   Coronary Anatomy:   LMCA: normal   LAD: There is a long, discrete, ectatic 70-80% lesion in the ostial and proximal LAD. There is one D1 branch without any significant disease.   LCx: no significant disease. There is a large OM1 branch that has a 50-60%  stenosis prior to the branch bifurcation.   RCA: The proximal and mid RCA have luminal irregularities without significance. The distal RCA has a complex bifurcation lesion prior to the take-off of the rPDA. There is disease in the ostial PL1 as well.   Conclusions:   1. 3-vessel coronary artery disease without left main lesion   Plan   Discussed the options in depth with the patient. Given the proximity of the LAD lesion to the LMCA and the complexity of the dRCA lesion, we recommended CABG for the patient. She will be admitted to the hospital.Discussed with Darleen Johnson MD.   Cardiac Surgery: 2009   Triple vessel coronary artery bypass grafting. Left internal mammary artery was placed to the left anterior descending coronary artery and separate reverse saphenous vein grafts were placed to the obtuse marginal and right posterior descending coronary arteries.     Name: IDA PADRON  MRN: [id number]  : 1960  Study Date: 2018 11:48 AM  Age: 57 yrs  Gender: Female  Patient Location: [facility]  Reason For Study: Hx PFO, Elevated PA pressures  Ordering Physician: SANNA SALAZAR  Referring Physician: SANNA SALAZAR  Performed By: Tray Harvey RDCS     BSA: 2.2 m2  Height: 63 in  Weight: 257 lb  HR: 62  BP: 167/94 mmHg  _____________________________________________________________________________  __        Procedure  Bubble Echocardiogram with two-dimensional, color and spectral Doppler  performed. IV start location R Hand . Bateriostatic Saline used for Bubble  Study.  _____________________________________________________________________________  __        Interpretation Summary     Right ventricular function, chamber size, wall motion, and thickness are  normal.  PFO again documented with color doppler and Bubble study.  Mild to moderate tricuspid insufficiency is present.  Right ventricular systolic pressure is 61mmHg above the right atrial pressure.  No pericardial effusion is  present.  _____________________________________________________________________________  __        Left Ventricle  Global and regional left ventricular function is normal with an EF of 55-60%.  Left ventricular wall thickness is normal. Left ventricular size is normal.  Left ventricular diastolic function is indeterminate. No regional wall motion  abnormalities are seen.     Right Ventricle  Right ventricular function, chamber size, wall motion, and thickness are  normal.     Atria  Moderate biatrial enlargement is present. PFO again documented with color  doppler and Bubble study.     Mitral Valve  Mild to moderate mitral insufficiency is present.        Aortic Valve  Aortic valve is normal in structure and function.     Tricuspid Valve  Mild to moderate tricuspid insufficiency is present. Right ventricular  systolic pressure is 61mmHg above the right atrial pressure.     Pulmonic Valve  The pulmonic valve is normal. Trace to mild pulmonic insufficiency is present.     Vessels  The aorta root is normal. The pulmonary artery is normal. The inferior vena  cava is normal.     Pericardium  No pericardial effusion is present.     _____________________________________________________________________________  __  MMode/2D Measurements & Calculations  IVSd: 0.89 cm  LVIDd: 5.3 cm  LVIDs: 3.1 cm  LVPWd: 0.75 cm  FS: 41.8 %  LV mass(C)d: 154.8 grams  LV mass(C)dI: 72.0 grams/m2     Ao root diam: 3.0 cm  LA dimension: 5.3 cm  asc Aorta Diam: 3.6 cm  LA/Ao: 1.8  LVOT diam: 2.2 cm  LVOT area: 3.7 cm2  LA Volume (BP): 96.0 ml  LA Volume Index (BP): 44.7 ml/m2  RWT: 0.28        Doppler Measurements & Calculations  MV E max maria isabel: 92.8 cm/sec  MV A max maria isabel: 58.2 cm/sec  MV E/A: 1.6  MV dec time: 0.15 sec     Ao V2 max: 147.4 cm/sec  Ao max P.0 mmHg  TARUN(V,D): 2.6 cm2  LV V1 max P.3 mmHg  LV V1 max: 104.1 cm/sec  LV V1 VTI: 24.8 cm  MR ERO: 0.27 cm2  CO(LVOT): 5.6 l/min  CI(LVOT): 2.6 l/min/m2  SV(LVOT): 92.9 ml  SI(LVOT):  43.2 ml/m2  PA V2 max: 110.6 cm/sec  PA max P.9 mmHg  PA acc time: 0.08 sec  PI end-d luis: 154.4 cm/sec  PI max luis: 255.8 cm/sec  PI max P.2 mmHg  TR max luis: 385.4 cm/sec  TR max P.4 mmHg  Pulm Sys Luis: 63.2 cm/sec  Pulm Peng Luis: 97.7 cm/sec  Pulm S/D: 0.65  E/E' av.1  Lateral E/e': 8.9  Medial E/e': 15.4    Results (pressures in mmHg)  RA: /3  RV 32/3  PA /;  PA Sat 65%  PCWP -/-/5;  PCW Sat --%  Kannan CO/CI 3.3/1.6 L/min; TD CO 4.1/2.0 L/min  PVR 4.2 bermudez; TPR 5.7 bermudez  Hb 12.3 g/dL  NIBP 167/81/116  SVR 2200 dynes*sec/cm^5     [place]:  SVC 62.7%  IVC 65.0%  Low RA 73.0%  Mid RA 68.0%  High RA 64.6%  RV 63%  PA 65%  PCW ---     Complications: None   Blood loss: Minimal blood loss     Conclusions:  1. Low right and left sided filling pressures.  2. Normal pulmonary artery pressures.  3. Low normal cardiac output.  Current Outpatient Medications   Medication     aspirin 81 MG tablet     atorvastatin (LIPITOR) 40 MG tablet     Calcium Carbonate-Vitamin D (CALCIUM 600-D PO)     hydroxychloroquine (PLAQUENIL) 200 MG tablet     lisinopril (PRINIVIL/ZESTRIL) 20 MG tablet     metoprolol tartrate (LOPRESSOR) 50 MG tablet     metoprolol tartrate (LOPRESSOR) 50 MG tablet     MULTIPLE VITAMIN PO     Omega-3 Fatty Acids (FISH OIL) 1200 MG capsule     order for DME     warfarin (COUMADIN) 5 MG tablet     No current facility-administered medications for this visit.           Assessment:  1. Echocardiogram, BNP troponin, lipids (add statin), iron, TSH  2. CXR today  3. Dr. Aguilera for yearly evaluation and depression  4. Return to clinic me in 6-8 weeks  5. I believe she could be switch to NWOAC, apixaban   6. Dobutamine echo or nuclear study for ASHD  7. Change ASA Tues and Thursday only  8. See Dr Chau (rheumatology) in September at [facility]    CC:  Eliz Adame

## 2019-08-13 NOTE — PATIENT INSTRUCTIONS
Take Aspirin 81 MG on Tuesday and Thursday only.     Thank you for your visit today.  Please call me with any questions or concerns.   Sonu Mac RN  Cardiology Care Coordinator  [phone], press option 1 then option 4

## 2019-08-13 NOTE — LETTER
8/13/2019      RE: Luz Marina Live  [address]  [place] [address]       Dear Colleague,    Thank you for the opportunity to participate in the care of your patient, Luz Marina Live, at the [facility]. Please see a copy of my visit note below.      Impression  1. ASHD  2. History of CAB  3. Elevated PA pressure  4. Hypertension  5. Hyperlipidemia  6. KEITH  7. PFO  8. Atrial fibrillation with warfarin anticoagulation  9. SLE  10. History of lupus nephritis    I had the opportunity to review Ms. Live chart as well as go over the results and findings.  She has exertional shortness of breath functional class 2 superimposed upon history of SLE, premature CAD resulting in by-pass, elevated body mass index and history suggestive of KEITH.  She has a history of atrial fibrillation and warfarin usage, facial cellulitis and has routine eye care in view of lupus medications.  She is not diabetic    Wt Readings from Last 24 Encounters:   08/05/19 121.4 kg (267 lb 9.6 oz)   11/20/18 122.9 kg (271 lb)   10/29/18 122 kg (269 lb)   09/17/18 119.3 kg (263 lb)   09/04/18 118.3 kg (260 lb 14.4 oz)   06/25/18 118.4 kg (261 lb)   06/25/18 117.5 kg (259 lb)   05/31/18 116.9 kg (257 lb 12.8 oz)   04/30/18 113.4 kg (250 lb)   04/10/18 115 kg (253 lb 9.6 oz)   02/27/18 116.6 kg (257 lb)   11/06/17 119.3 kg (263 lb 1.6 oz)   08/01/17 119.7 kg (264 lb)   07/03/17 122 kg (269 lb)   04/03/17 123 kg (271 lb 1.6 oz)   03/15/17 123.2 kg (271 lb 9.6 oz)   12/22/16 122.5 kg (270 lb)   12/20/16 124.7 kg (275 lb)   12/19/16 123.5 kg (272 lb 4.8 oz)   11/28/16 121.4 kg (267 lb 11.2 oz)   08/01/16 122.5 kg (270 lb)   05/20/16 118.4 kg (261 lb)   02/19/16 117 kg (258 lb)   02/01/16 117 kg (258 lb)     Results for LUZ MARINA LIVE (MRN [id number]) as of 8/12/2019 21:27   Ref. Range 7/15/2019 16:08 8/5/2019 11:03   Sodium Latest Ref Range: 133 - 144 mmol/L  141   Potassium Latest Ref Range:  3.4 - 5.3 mmol/L  4.5   Chloride Latest Ref Range: 94 - 109 mmol/L  111 (H)   Carbon Dioxide Latest Ref Range: 20 - 32 mmol/L  24   Urea Nitrogen Latest Ref Range: 7 - 30 mg/dL  31 (H)   Creatinine Latest Ref Range: 0.52 - 1.04 mg/dL  0.95   GFR Estimate Latest Ref Range: >60 mL/min/1.73_m2  66   GFR Estimate If Black Latest Ref Range: >60 mL/min/1.73_m2  76   Calcium Latest Ref Range: 8.5 - 10.1 mg/dL  9.2   Anion Gap Latest Ref Range: 3 - 14 mmol/L  6   ALT Latest Ref Range: 0 - 50 U/L  34   AST Latest Ref Range: 0 - 45 U/L  29   CRP Inflammation Latest Ref Range: 0.0 - 8.0 mg/L  8.8 (H)   Glucose Latest Ref Range: 70 - 99 mg/dL  96   WBC Latest Ref Range: 4.0 - 11.0 10e9/L  4.4   Hemoglobin Latest Ref Range: 11.7 - 15.7 g/dL  11.4 (L)   Hematocrit Latest Ref Range: 35.0 - 47.0 %  34.7 (L)   Platelet Count Latest Ref Range: 150 - 450 10e9/L  161   RBC Count Latest Ref Range: 3.8 - 5.2 10e12/L  3.81   MCV Latest Ref Range: 78 - 100 fl  91   MCH Latest Ref Range: 26.5 - 33.0 pg  29.9   MCHC Latest Ref Range: 31.5 - 36.5 g/dL  32.9   RDW Latest Ref Range: 10.0 - 15.0 %  13.5   INR Point of Care Latest Ref Range: 0.86 - 1.14  2.9 (H)    Complement C3 Latest Ref Range: 76 - 169 mg/dL  53 (L)   Complement C4 Latest Ref Range: 15 - 50 mg/dL  4 (L)   DNA-ds Latest Ref Range: <10 IU/mL  110 (H)       Current Outpatient Medications   Medication     aspirin 81 MG tablet     atorvastatin (LIPITOR) 40 MG tablet     Calcium Carbonate-Vitamin D (CALCIUM 600-D PO)     hydroxychloroquine (PLAQUENIL) 200 MG tablet     lisinopril (PRINIVIL/ZESTRIL) 20 MG tablet     metoprolol tartrate (LOPRESSOR) 50 MG tablet     metoprolol tartrate (LOPRESSOR) 50 MG tablet     MULTIPLE VITAMIN PO     Omega-3 Fatty Acids (FISH OIL) 1200 MG capsule     order for DME     warfarin (COUMADIN) 5 MG tablet     No current facility-administered medications for this visit.        Her new catherization:  Results (pressures in mmHg)  RA: 1/4/3  RV 32/3  PA  30/13/19;  PA Sat 65%  PCWP -/-/5;  PCW Sat --%  Kannan CO/CI 3.3/1.6 L/min; TD CO 4.1/2.0 L/min  PVR 4.2 bermudez; TPR 5.7 bermudez  Hb 12.3 g/dL  NIBP 167/81/116  SVR 2200 dynes*sec/cm^5     [place]:  SVC 62.7%  IVC 65.0%  Low RA 73.0%  Mid RA 68.0%  High RA 64.6%  RV 63%  PA 65%  PCW ---     Complications: None   Blood loss: Minimal blood loss     Conclusions:  1. Low right and left sided filling pressures.  2. Normal pulmonary artery pressures.  3. Low normal cardiac output.      REVIEW of SYMPTOMS    Constitutional: without fever, chills, night sweats.  Weight is down  HEENT: without dry eyes, dry mouth, sinusitis, corryza, visual changes  Endocrine: without polyuria, polydypsia, polyphagia, heat or cold intolerance, changing mental status  Cardiology: without chest pain, tightness, heaviness, pressure, paroxysmal dyspnea, orthopnea, palpitation, pre-syncope or syncope or device discharge (if present)  Pulmonary: without asthma, wheezing, cough, hemoptysis  GI: without nausea, emesis, jaundice, pain, hematemesis, melena  : without frequency, urgency, hematuria, stones, pain, abnormal bleeding, frequency, urgency  Neurologic: without TIA, CVA, trauma, seizure  Dermatologic: without lesions, abrasion rash,   Orthopedic/Rheum: without significant joint pain, impairment, limb, polyserositis, ulceration, Raynauds  Heme: without mass, bruising, frequent infection, anemia  Psychiatric: without substance abuse, hallucination, medication, depression    Constitutional: alert, oriented, normal gait and station, normal mentation.  Oral: moist mucous membrans  Lymph: without pathologic adenopathy  Chest: clear to ausculation and percussion  Cor: No evidence of left or right ventricular activity.  Rhythm is regular.  S1 normal, S2 split physiologically. Murmurs are not present  Abdomen: without tenderness, rebound, guarding, masses, ascites  Extremities: Edema not present  Neuro: no focal defects, normal mentation  Skin: +  butterfly  Psych: oriented, verbal, mental status in tact    Exercise tolerance:  Echocardiographic findings of TR, modest PA, normal systolic function.    Name: IDA PADRON  MRN: [id number]  : 1960  Study Date: 2018 11:48 AM  Age: 57 yrs  Gender: Female  Patient [facility]  Reason For Study: Hx PFO, Elevated PA pressures  Ordering Physician: SANNA SALAZAR  Referring Physician: SANNA SALAZAR  Performed By: Tray Harvey RDCS     BSA: 2.2 m2  Height: 63 in  Weight: 257 lb  HR: 62  BP: 167/94 mmHg  _____________________________________________________________________________  __        Procedure  Bubble Echocardiogram with two-dimensional, color and spectral Doppler  performed. IV start location R Hand . Bateriostatic Saline used for Bubble  Study.  _____________________________________________________________________________  __        Interpretation Summary     Right ventricular function, chamber size, wall motion, and thickness are  normal.  PFO again documented with color doppler and Bubble study.  Mild to moderate tricuspid insufficiency is present.  Right ventricular systolic pressure is 61mmHg above the right atrial pressure.  No pericardial effusion is present.  _____________________________________________________________________________  __        Left Ventricle  Global and regional left ventricular function is normal with an EF of 55-60%.  Left ventricular wall thickness is normal. Left ventricular size is normal.  Left ventricular diastolic function is indeterminate. No regional wall motion  abnormalities are seen.     Right Ventricle  Right ventricular function, chamber size, wall motion, and thickness are  normal.     Atria  Moderate biatrial enlargement is present. PFO again documented with color  doppler and Bubble study.     Mitral Valve  Mild to moderate mitral insufficiency is present.        Aortic Valve  Aortic valve is normal in structure and  function.     Tricuspid Valve  Mild to moderate tricuspid insufficiency is present. Right ventricular  systolic pressure is 61mmHg above the right atrial pressure.     Pulmonic Valve  The pulmonic valve is normal. Trace to mild pulmonic insufficiency is present.     Vessels  The aorta root is normal. The pulmonary artery is normal. The inferior vena  cava is normal.     Pericardium  No pericardial effusion is present.     _____________________________________________________________________________  __  MMode/2D Measurements & Calculations  IVSd: 0.89 cm  LVIDd: 5.3 cm  LVIDs: 3.1 cm  LVPWd: 0.75 cm  FS: 41.8 %  LV mass(C)d: 154.8 grams  LV mass(C)dI: 72.0 grams/m2     Ao root diam: 3.0 cm  LA dimension: 5.3 cm  asc Aorta Diam: 3.6 cm  LA/Ao: 1.8  LVOT diam: 2.2 cm  LVOT area: 3.7 cm2  LA Volume (BP): 96.0 ml  LA Volume Index (BP): 44.7 ml/m2  RWT: 0.28        Doppler Measurements & Calculations  MV E max luis: 92.8 cm/sec  MV A max luis: 58.2 cm/sec  MV E/A: 1.6  MV dec time: 0.15 sec     Ao V2 max: 147.4 cm/sec  Ao max P.0 mmHg  TARUN(V,D): 2.6 cm2  LV V1 max P.3 mmHg  LV V1 max: 104.1 cm/sec  LV V1 VTI: 24.8 cm  MR ERO: 0.27 cm2  CO(LVOT): 5.6 l/min  CI(LVOT): 2.6 l/min/m2  SV(LVOT): 92.9 ml  SI(LVOT): 43.2 ml/m2  PA V2 max: 110.6 cm/sec  PA max P.9 mmHg  PA acc time: 0.08 sec  PI end-d luis: 154.4 cm/sec  PI max luis: 255.8 cm/sec  PI max P.2 mmHg  TR max luis: 385.4 cm/sec  TR max P.4 mmHg  Pulm Sys Luis: 63.2 cm/sec  Pulm Peng Luis: 97.7 cm/sec  Pulm S/D: 0.65  E/E' av.1  Lateral E/e': 8.9  Medial E/e': 15.4        : 1960  Study Date: 2016 01:31 PM  Age: 56 yrs  Gender: Female  Patient Location: [facility]  Reason For Study:     History: tricuspid regurgitation  Ordering Physician: JOSE ARELLANO  Referring Physician: JOSE ARELLANO  Performed By: Hugh Sharpe MD     BSA: 2.2 m2  Height: 63 in  Weight: 269  lb  ______________________________________________________________________________     Interpretation Summary  Left ventricular function, chamber size, wall motion, and wall thickness are  normal.The EF is 55-60%.  Global right ventricular function is normal. Mild right ventricular dilation  is present.  Mild to moderate TR with mild prolapse of the posterior leaflet of the  tricupsid valve.  A small patent foramen ovale is present. There is no ASD (secundum, sinus  venosus or unroofed coronary sinus). All 4 pulmonary veins drain into the  left atrium.  Mild pulmonary hypertension is present with RVSP 45mmHg above RAP.     ______________________________________________________________________________        Procedure  Transesophageal Echocardiogram with color and spectral Doppler performed. The  procedure was performed in the Echo Lab. Informed consent for Transesophegeal  echo obtained. EVE Probe #12 was used during the procedure. Patient was  sedated using Fentanyl 100 mcg. Patient was sedated using Versed 4 mg. The  Transducer was inserted without difficulty . The patient tolerated the  procedure well. Complications None. ECG shows normal sinus rhythm. Good  quality two-dimensional was performed and interpreted.     Left Ventricle  Left ventricular function, chamber size, wall motion, and wall thickness are  normal.The EF is 55-60%.     Right Ventricle  Global right ventricular function is normal. Mild right ventricular dilation  is present.     Atria  Both atria appear normal. The left atrial appendage is normal. It is free of  spontaneous echo contrast and thrombus. A small patent foramen ovale is  present. The patent foramen ovale was demonstrated by color Doppler and  agitated saline bubble study . The patent foramen ovale was demonstrated with  Valsalva's maneuver. No evidence of ASD.     Mitral Valve  The mitral valve is normal. Trace mitral insufficiency is present.     Aortic Valve  Aortic valve is normal  in structure and function. The aortic valve is  tricuspid.  Tricuspid Valve  Mild to moderate tricuspid insufficiency is present. Right ventricular  systolic pressure is 45mmHg above the right atrial pressure. Mild pulmonary  hypertension is present. Mild prolapse of the posterior leaflet of the  tricupsid valve is noted.     Pulmonic Valve  The pulmonic valve is normal.     Vessels  The pulmonary artery is normal. The aorta root is normal. The thoracic aorta  is normal. All four pulmonary veins visualized with dampened velocity  waveforms.     Pericardium  No pericardial effusion is present.     Compared to Previous Study  This study was compared with the study from 2016. A small PFO has been  identified. .     ______________________________________________________________________________     Doppler Measurements & Calculations  TR max maria isabel: 336.7 cm/sec  TR max P.5 mmHg  ______________________________________________________________________________         Name: IDA PADRON  MRN: [id number]  : 1960  Study Date: 2016 01:31 PM  Age: 56 yrs  Gender: Female  [facility]  Reason For Study:     History: tricuspid regurgitation  Ordering Physician: JOSE ARELLANO  Referring Physician: JOSE ARELLANO  Performed By: Hugh Sharpe MD     BSA: 2.2 m2  Height: 63 in  Weight: 269 lb  ______________________________________________________________________________     Interpretation Summary  Left ventricular function, chamber size, wall motion, and wall thickness are  normal.The EF is 55-60%.  Global right ventricular function is normal. Mild right ventricular dilation  is present.  Mild to moderate TR with mild prolapse of the posterior leaflet of the  tricupsid valve.  A small patent foramen ovale is present. There is no ASD (secundum, sinus  venosus or unroofed coronary sinus). All 4 pulmonary veins drain into the  left atrium.  Mild pulmonary hypertension is present with RVSP  45mmHg above RAP.     ______________________________________________________________________________        Procedure  Transesophageal Echocardiogram with color and spectral Doppler performed. The  procedure was performed in the Echo Lab. Informed consent for Transesophegeal  echo obtained. EVE Probe #12 was used during the procedure. Patient was  sedated using Fentanyl 100 mcg. Patient was sedated using Versed 4 mg. The  Transducer was inserted without difficulty . The patient tolerated the  procedure well. Complications None. ECG shows normal sinus rhythm. Good  quality two-dimensional was performed and interpreted.     Left Ventricle  Left ventricular function, chamber size, wall motion, and wall thickness are  normal.The EF is 55-60%.     Right Ventricle  Global right ventricular function is normal. Mild right ventricular dilation  is present.     Atria  Both atria appear normal. The left atrial appendage is normal. It is free of  spontaneous echo contrast and thrombus. A small patent foramen ovale is  present. The patent foramen ovale was demonstrated by color Doppler and  agitated saline bubble study . The patent foramen ovale was demonstrated with  Valsalva's maneuver. No evidence of ASD.     Mitral Valve  The mitral valve is normal. Trace mitral insufficiency is present.     Aortic Valve  Aortic valve is normal in structure and function. The aortic valve is  tricuspid.  Tricuspid Valve  Mild to moderate tricuspid insufficiency is present. Right ventricular  systolic pressure is 45mmHg above the right atrial pressure. Mild pulmonary  hypertension is present. Mild prolapse of the posterior leaflet of the  tricupsid valve is noted.     Pulmonic Valve  The pulmonic valve is normal.     Vessels  The pulmonary artery is normal. The aorta root is normal. The thoracic aorta  is normal. All four pulmonary veins visualized with dampened velocity  waveforms.     Pericardium  No pericardial effusion is  present.     Compared to Previous Study  This study was compared with the study from 2016. A small PFO has been  identified. .     ______________________________________________________________________________     Doppler Measurements & Calculations  TR max maria isabel: 336.7 cm/sec  TR max P.5 mmHg  ______________________________________________________________________________       Procedures:  ECHO: 09:   Interpretation Summary   The Ejection Fraction is estimated at 55-60%. No regional wall motion   abnormalities are seen. The right ventricle is normal size. Global right   ventricular function is normal. Right ventricular systolic pressure is 27mmHg   above the right atrial pressure. No pericardial effusion is present. The   inferior vena cava is normal.   Stress test: 08-10-09:   1. Abnormal study with a high degree of certainty.   2. There is a predominantly fixed medium sized moderately decreased   intensity myocardial perfusion defect with minimal periinfarct   reversibility involving the apical anterior, mid anterior, and apical   region of the heart. There is corresponding hypokinesis. No other   adenosine induced fixed or reversible myocardial perfusion defect.   3. Left ventricular size is enlarged at rest. Transient ischemic   dilation of the left ventricle did not occur.   4. The left ventricular ejection fraction is 56 %.   5. Summed Stress Score is calculated at 18, which is associated   with increased risk of future coronary events.   CCL: 09: ARELLANO:   Mrs. Live is a 49 year old female with known coronary artery disease s/p MI in  found to have minimal disease on catheterization. Her cardiovascular risk factors include HTN and hyperlipidemia. She presented with a 2 week history of dyspnea with exertion and lower extremity edema. She underwent Adenosine MPI which revealed a fixed medium-sized defect with minimal periinfarct reversability and anterior hypokinesis. Her LVEF was  preserved at 56%. CT angio of the coronaries revealed moderate stenosis in the pLAD and dRCA and mild to moderate stenosis of the pLCx.   Access: 6F long RFA, 6F RFV   Coronary Anatomy:   LMCA: normal   LAD: There is a long, discrete, ectatic 70-80% lesion in the ostial and proximal LAD. There is one D1 branch without any significant disease.   LCx: no significant disease. There is a large OM1 branch that has a 50-60% stenosis prior to the branch bifurcation.   RCA: The proximal and mid RCA have luminal irregularities without significance. The distal RCA has a complex bifurcation lesion prior to the take-off of the rPDA. There is disease in the ostial PL1 as well.   Conclusions:   1. 3-vessel coronary artery disease without left main lesion   Plan   Discussed the options in depth with the patient. Given the proximity of the LAD lesion to the LMCA and the complexity of the dRCA lesion, we recommended CABG for the patient. She will be admitted to the hospital.Discussed with Darleen Johnson MD.   Cardiac Surgery: 2009   Triple vessel coronary artery bypass grafting. Left internal mammary artery was placed to the left anterior descending coronary artery and separate reverse saphenous vein grafts were placed to the obtuse marginal and right posterior descending coronary arteries.     Name: IDA PADRON  MRN: [id number]  : 1960  Study Date: 2018 11:48 AM  Age: 57 yrs  Gender: Female  Patient Location: [facility]  Reason For Study: Hx PFO, Elevated PA pressures  Ordering Physician: SANNA SALAZAR  Referring Physician: SANNA SALAZAR  Performed By: Tray Harvey RDCS     BSA: 2.2 m2  Height: 63 in  Weight: 257 lb  HR: 62  BP: 167/94 mmHg  _____________________________________________________________________________  __        Procedure  Bubble Echocardiogram with two-dimensional, color and spectral Doppler  performed. IV start location R Hand . Bateriostatic Saline used for  Bubble  Study.  _____________________________________________________________________________  __        Interpretation Summary     Right ventricular function, chamber size, wall motion, and thickness are  normal.  PFO again documented with color doppler and Bubble study.  Mild to moderate tricuspid insufficiency is present.  Right ventricular systolic pressure is 61mmHg above the right atrial pressure.  No pericardial effusion is present.  _____________________________________________________________________________  __        Left Ventricle  Global and regional left ventricular function is normal with an EF of 55-60%.  Left ventricular wall thickness is normal. Left ventricular size is normal.  Left ventricular diastolic function is indeterminate. No regional wall motion  abnormalities are seen.     Right Ventricle  Right ventricular function, chamber size, wall motion, and thickness are  normal.     Atria  Moderate biatrial enlargement is present. PFO again documented with color  doppler and Bubble study.     Mitral Valve  Mild to moderate mitral insufficiency is present.        Aortic Valve  Aortic valve is normal in structure and function.     Tricuspid Valve  Mild to moderate tricuspid insufficiency is present. Right ventricular  systolic pressure is 61mmHg above the right atrial pressure.     Pulmonic Valve  The pulmonic valve is normal. Trace to mild pulmonic insufficiency is present.     Vessels  The aorta root is normal. The pulmonary artery is normal. The inferior vena  cava is normal.     Pericardium  No pericardial effusion is present.     _____________________________________________________________________________  __  MMode/2D Measurements & Calculations  IVSd: 0.89 cm  LVIDd: 5.3 cm  LVIDs: 3.1 cm  LVPWd: 0.75 cm  FS: 41.8 %  LV mass(C)d: 154.8 grams  LV mass(C)dI: 72.0 grams/m2     Ao root diam: 3.0 cm  LA dimension: 5.3 cm  asc Aorta Diam: 3.6 cm  LA/Ao: 1.8  LVOT diam: 2.2 cm  LVOT area: 3.7  cm2  LA Volume (BP): 96.0 ml  LA Volume Index (BP): 44.7 ml/m2  RWT: 0.28        Doppler Measurements & Calculations  MV E max luis: 92.8 cm/sec  MV A max luis: 58.2 cm/sec  MV E/A: 1.6  MV dec time: 0.15 sec     Ao V2 max: 147.4 cm/sec  Ao max P.0 mmHg  TARUN(V,D): 2.6 cm2  LV V1 max P.3 mmHg  LV V1 max: 104.1 cm/sec  LV V1 VTI: 24.8 cm  MR ERO: 0.27 cm2  CO(LVOT): 5.6 l/min  CI(LVOT): 2.6 l/min/m2  SV(LVOT): 92.9 ml  SI(LVOT): 43.2 ml/m2  PA V2 max: 110.6 cm/sec  PA max P.9 mmHg  PA acc time: 0.08 sec  PI end-d luis: 154.4 cm/sec  PI max luis: 255.8 cm/sec  PI max P.2 mmHg  TR max luis: 385.4 cm/sec  TR max P.4 mmHg  Pulm Sys Luis: 63.2 cm/sec  Pulm Peng Luis: 97.7 cm/sec  Pulm S/D: 0.65  E/E' av.1  Lateral E/e': 8.9  Medial E/e': 15.4    Results (pressures in mmHg)  RA: 1/4/3  RV 32/3  PA 30/13/19;  PA Sat 65%  PCWP -/-/5;  PCW Sat --%  Kannan CO/CI 3.3/1.6 L/min; TD CO 4.1/2.0 L/min  PVR 4.2 bermudez; TPR 5.7 bermudez  Hb 12.3 g/dL  NIBP 167/81/116  SVR 2200 dynes*sec/cm^5     [place]:  SVC 62.7%  IVC 65.0%  Low RA 73.0%  Mid RA 68.0%  High RA 64.6%  RV 63%  PA 65%  PCW ---     Complications: None   Blood loss: Minimal blood loss     Conclusions:  1. Low right and left sided filling pressures.  2. Normal pulmonary artery pressures.  3. Low normal cardiac output.  Current Outpatient Medications   Medication     aspirin 81 MG tablet     atorvastatin (LIPITOR) 40 MG tablet     Calcium Carbonate-Vitamin D (CALCIUM 600-D PO)     hydroxychloroquine (PLAQUENIL) 200 MG tablet     lisinopril (PRINIVIL/ZESTRIL) 20 MG tablet     metoprolol tartrate (LOPRESSOR) 50 MG tablet     metoprolol tartrate (LOPRESSOR) 50 MG tablet     MULTIPLE VITAMIN PO     Omega-3 Fatty Acids (FISH OIL) 1200 MG capsule     order for DME     warfarin (COUMADIN) 5 MG tablet     No current facility-administered medications for this visit.           Assessment:  1. Echocardiogram, BNP troponin, lipids (add statin), iron, TSH  2. CXR today  3.  Dr. Aguilera for yearly evaluation and depression  4. Return to clinic me in 6-8 weeks  5. I believe she could be switch to NWOAC, apixaban   6. Dobutamine echo or nuclear study for ASHD  7. Change ASA Tues and Thursday only  8. See Dr Chau (rheumatology) in September at [facility]    CC:  Eliz Adame      Please do not hesitate to contact me if you have any questions/concerns.     Sincerely,     Twin Moreno MD

## 2019-08-14 DIAGNOSIS — I48.91 ATRIAL FIBRILLATION, UNSPECIFIED TYPE (H): ICD-10-CM

## 2019-08-16 ENCOUNTER — OFFICE VISIT (OUTPATIENT)
Dept: INTERNAL MEDICINE | Facility: CLINIC | Age: 59
End: 2019-08-16
Attending: INTERNAL MEDICINE
Payer: COMMERCIAL

## 2019-08-16 VITALS
SYSTOLIC BLOOD PRESSURE: 148 MMHG | BODY MASS INDEX: 47.65 KG/M2 | DIASTOLIC BLOOD PRESSURE: 86 MMHG | HEART RATE: 58 BPM | WEIGHT: 269 LBS

## 2019-08-16 DIAGNOSIS — I10 BENIGN ESSENTIAL HYPERTENSION: ICD-10-CM

## 2019-08-16 DIAGNOSIS — I48.0 PAF (PAROXYSMAL ATRIAL FIBRILLATION) (H): Primary | Chronic | ICD-10-CM

## 2019-08-16 RX ORDER — LISINOPRIL 40 MG/1
40 TABLET ORAL DAILY
Qty: 90 TABLET | Refills: 3 | Status: SHIPPED | OUTPATIENT
Start: 2019-08-16 | End: 2020-08-13

## 2019-08-16 RX ORDER — LISINOPRIL 20 MG/1
30 TABLET ORAL DAILY
Qty: 145 TABLET | Refills: 0 | Status: SHIPPED | OUTPATIENT
Start: 2019-08-16 | End: 2019-08-16

## 2019-08-16 ASSESSMENT — ANXIETY QUESTIONNAIRES
5. BEING SO RESTLESS THAT IT IS HARD TO SIT STILL: NOT AT ALL
2. NOT BEING ABLE TO STOP OR CONTROL WORRYING: SEVERAL DAYS
1. FEELING NERVOUS, ANXIOUS, OR ON EDGE: NOT AT ALL
3. WORRYING TOO MUCH ABOUT DIFFERENT THINGS: NOT AT ALL
6. BECOMING EASILY ANNOYED OR IRRITABLE: NOT AT ALL
GAD7 TOTAL SCORE: 1
7. FEELING AFRAID AS IF SOMETHING AWFUL MIGHT HAPPEN: NOT AT ALL

## 2019-08-16 ASSESSMENT — PATIENT HEALTH QUESTIONNAIRE - PHQ9
5. POOR APPETITE OR OVEREATING: NOT AT ALL
SUM OF ALL RESPONSES TO PHQ QUESTIONS 1-9: 3

## 2019-08-16 NOTE — PROGRESS NOTES
HPI  Patient is here for follow up on several issues. She reports that she was advised to discuss her medications with the primary care provider. She reports significant issues with bruising on warfarin despite INR being within therapeutic range.     Review of Systems     Constitutional:  Negative for chills and fatigue.   HENT:  Negative for dry mouth and sinus congestion.    Eyes:  Negative for decreased vision.   Respiratory:   Negative for cough, shortness of breath and respiratory pain.    Cardiovascular:  Negative for chest pain and edema.   Gastrointestinal:  Negative for nausea, vomiting, abdominal pain, diarrhea, constipation and melena.   Skin:  Negative for itching and rash.   Endo/Heme:  Positive for bruises/bleeds easily.   Psychiatric/Behavioral:  Negative for depression, decreased concentration, mood swings and panic attacks.    Endocrine:  Negative for altered temperature regulation, polyphagia, polydipsia, unwanted hair growth and change in facial hair.    Current Outpatient Medications   Medication     aspirin 81 MG tablet     atorvastatin (LIPITOR) 40 MG tablet     Calcium Carbonate-Vitamin D (CALCIUM 600-D PO)     hydroxychloroquine (PLAQUENIL) 200 MG tablet     lisinopril (PRINIVIL/ZESTRIL) 20 MG tablet     metoprolol tartrate (LOPRESSOR) 50 MG tablet     MULTIPLE VITAMIN PO     Omega-3 Fatty Acids (FISH OIL) 1200 MG capsule     order for DME     warfarin (COUMADIN) 5 MG tablet     No current facility-administered medications for this visit.      Vitals:    08/16/19 1107 08/16/19 1112 08/16/19 1113 08/16/19 1114   BP: (!) 150/87 (!) 151/87 (!) 142/88 (!) 148/86   Pulse: 58 58 58 58   Weight: 122 kg (269 lb)          Physical Exam   Constitutional: She is oriented to person, place, and time. She appears well-developed and well-nourished.   HENT:   Head: Normocephalic and atraumatic.   Eyes: Pupils are equal, round, and reactive to light. EOM are normal.   Neck: Normal range of motion. Neck  supple.   Cardiovascular: Normal rate and regular rhythm.   Pulmonary/Chest: Effort normal and breath sounds normal.   Abdominal: Soft.   Musculoskeletal: She exhibits no edema.   Neurological: She is alert and oriented to person, place, and time.   Skin: Skin is warm and dry.   Psychiatric: She has a normal mood and affect. Her behavior is normal. Judgment and thought content normal.   Nursing note and vitals reviewed.    Assessment and Plan:  Luz Marina was seen today for recheck.    Diagnoses and all orders for this visit:    PAF (paroxysmal atrial fibrillation) (H). Discussed anticoagulation options with patient. Recommend Xarelto given normal renal function and significant bruising with warfarin. Patient is in agreement with the plan.   -     rivaroxaban ANTICOAGULANT (XARELTO) 20 MG TABS tablet; Take 1 tablet (20 mg) by mouth daily (with dinner)    Benign essential hypertension. Blood pressure is elevated today. Recommend increasing the dose of lisinopril to 40 mg. May consider adding another agent if blood pressure remains high.   -     Discontinue: lisinopril (PRINIVIL/ZESTRIL) 20 MG tablet; Take 1.5 tablets (30 mg) by mouth daily  -     lisinopril (PRINIVIL/ZESTRIL) 40 MG tablet; Take 1 tablet (40 mg) by mouth daily      Total time spent 25 minutes.  More than 50% of the time spent with Ms. Live on counseling / coordinating her care    Eliz Nguyen MD

## 2019-08-16 NOTE — LETTER
8/16/2019       RE: Luz Marina Live  [address]  [place] [address]     Dear Colleague,    Thank you for referring your patient, Luz Marina Live, to the [facility]  at [facility]. Please see a copy of my visit note below.    HPI  Patient is here for follow up on several issues. She reports that she was advised to discuss her medications with the primary care provider. She reports significant issues with bruising on warfarin despite INR being within therapeutic range.     Review of Systems     Constitutional:  Negative for chills and fatigue.   HENT:  Negative for dry mouth and sinus congestion.    Eyes:  Negative for decreased vision.   Respiratory:   Negative for cough, shortness of breath and respiratory pain.    Cardiovascular:  Negative for chest pain and edema.   Gastrointestinal:  Negative for nausea, vomiting, abdominal pain, diarrhea, constipation and melena.   Skin:  Negative for itching and rash.   Endo/Heme:  Positive for bruises/bleeds easily.   Psychiatric/Behavioral:  Negative for depression, decreased concentration, mood swings and panic attacks.    Endocrine:  Negative for altered temperature regulation, polyphagia, polydipsia, unwanted hair growth and change in facial hair.    Current Outpatient Medications   Medication     aspirin 81 MG tablet     atorvastatin (LIPITOR) 40 MG tablet     Calcium Carbonate-Vitamin D (CALCIUM 600-D PO)     hydroxychloroquine (PLAQUENIL) 200 MG tablet     lisinopril (PRINIVIL/ZESTRIL) 20 MG tablet     metoprolol tartrate (LOPRESSOR) 50 MG tablet     MULTIPLE VITAMIN PO     Omega-3 Fatty Acids (FISH OIL) 1200 MG capsule     order for DME     warfarin (COUMADIN) 5 MG tablet     No current facility-administered medications for this visit.      Vitals:    08/16/19 1107 08/16/19 1112 08/16/19 1113 08/16/19 1114   BP: (!) 150/87 (!) 151/87 (!) 142/88 (!) 148/86   Pulse: 58 58 58 58   Weight: 122 kg (269  lb)        Physical Exam   Constitutional: She is oriented to person, place, and time. She appears well-developed and well-nourished.   HENT:   Head: Normocephalic and atraumatic.   Eyes: Pupils are equal, round, and reactive to light. EOM are normal.   Neck: Normal range of motion. Neck supple.   Cardiovascular: Normal rate and regular rhythm.   Pulmonary/Chest: Effort normal and breath sounds normal.   Abdominal: Soft.   Musculoskeletal: She exhibits no edema.   Neurological: She is alert and oriented to person, place, and time.   Skin: Skin is warm and dry.   Psychiatric: She has a normal mood and affect. Her behavior is normal. Judgment and thought content normal.   Nursing note and vitals reviewed.    Assessment and Plan:  Luz Marina was seen today for recheck.    Diagnoses and all orders for this visit:    PAF (paroxysmal atrial fibrillation) (H). Discussed anticoagulation options with patient. Recommend Xarelto given normal renal function and significant bruising with warfarin. Patient is in agreement with the plan.   -     rivaroxaban ANTICOAGULANT (XARELTO) 20 MG TABS tablet; Take 1 tablet (20 mg) by mouth daily (with dinner)    Benign essential hypertension. Blood pressure is elevated today. Recommend increasing the dose of lisinopril to 40 mg. May consider adding another agent if blood pressure remains high.   -     Discontinue: lisinopril (PRINIVIL/ZESTRIL) 20 MG tablet; Take 1.5 tablets (30 mg) by mouth daily  -     lisinopril (PRINIVIL/ZESTRIL) 40 MG tablet; Take 1 tablet (40 mg) by mouth daily      Total time spent 25 minutes.  More than 50% of the time spent with Ms. Live on counseling / coordinating her care    Eliz Nguyen MD

## 2019-08-17 ASSESSMENT — ANXIETY QUESTIONNAIRES: GAD7 TOTAL SCORE: 1

## 2019-08-18 RX ORDER — WARFARIN SODIUM 5 MG/1
TABLET ORAL
Qty: 160 TABLET | Refills: 0 | Status: SHIPPED | OUTPATIENT
Start: 2019-08-18 | End: 2019-11-11

## 2019-08-18 ASSESSMENT — ENCOUNTER SYMPTOMS
BRUISES/BLEEDS EASILY: 1
DEPRESSION: 0
PANIC: 0
FATIGUE: 0
VOMITING: 0
SINUS CONGESTION: 0
ABDOMINAL PAIN: 0
POLYPHAGIA: 0
RESPIRATORY PAIN: 0
NERVOUS/ANXIOUS: 0
POLYDIPSIA: 0
ALTERED TEMPERATURE REGULATION: 0
DIARRHEA: 0
COUGH: 0
DECREASED CONCENTRATION: 0
CHILLS: 0
INSOMNIA: 0
SHORTNESS OF BREATH: 0
NAUSEA: 0
CONSTIPATION: 0

## 2019-08-19 NOTE — TELEPHONE ENCOUNTER
Received refill request for coumadin. Patient recently started on xeralto. Sent to Dr. Nguyen for approval. Dr. Nguyen approves coumadin, states that Xeralto may not be approved right away so she needs to stay on coumadin until the Xeralto PA is approved. Rx sent.

## 2019-08-23 ENCOUNTER — ANTICOAGULATION THERAPY VISIT (OUTPATIENT)
Dept: ANTICOAGULATION | Facility: OTHER | Age: 59
End: 2019-08-23
Payer: COMMERCIAL

## 2019-08-23 DIAGNOSIS — I48.0 PAF (PAROXYSMAL ATRIAL FIBRILLATION) (H): ICD-10-CM

## 2019-08-23 DIAGNOSIS — Z79.01 LONG TERM CURRENT USE OF ANTICOAGULANTS WITH INR GOAL OF 2.0-3.0: ICD-10-CM

## 2019-08-23 DIAGNOSIS — Z79.01 LONG TERM CURRENT USE OF ANTICOAGULANT THERAPY: ICD-10-CM

## 2019-08-23 LAB — INR BLD: 3.1 (ref 0.86–1.14)

## 2019-08-23 PROCEDURE — 36416 COLLJ CAPILLARY BLOOD SPEC: CPT | Performed by: INTERNAL MEDICINE

## 2019-08-23 PROCEDURE — 85610 PROTHROMBIN TIME: CPT | Mod: QW | Performed by: INTERNAL MEDICINE

## 2019-08-23 PROCEDURE — 99207 ZZC NO CHARGE NURSE ONLY: CPT | Performed by: INTERNAL MEDICINE

## 2019-08-23 NOTE — PROGRESS NOTES
ANTICOAGULATION FOLLOW-UP CLINIC VISIT    Patient Name:  Luz Marina Live  Date:  8/23/2019  Contact Type:  Telephone    SUBJECTIVE:  Patient Findings         Clinical Outcomes     Negatives:   Major bleeding event, Thromboembolic event, Anticoagulation-related hospital admission, Anticoagulation-related ED visit, Anticoagulation-related fatality           OBJECTIVE    INR Point of Care   Date Value Ref Range Status   08/23/2019 3.1 (H) 0.86 - 1.14 Final     Comment:     This test is intended for monitoring Coumadin therapy.  Results are not   accurate in patients with prolonged INR due to factor deficiency.         ASSESSMENT / PLAN  INR assessment SUPRA    Recheck INR In: 3 WEEKS    INR Location Outside lab      Anticoagulation Summary  As of 8/23/2019    INR goal:   2.0-3.0   TTR:   57.5 % (3.4 y)   INR used for dosing:   3.1! (8/23/2019)   Warfarin maintenance plan:   10 mg (5 mg x 2) every Mon, Fri; 7.5 mg (5 mg x 1.5) all other days   Full warfarin instructions:   8/23: 7.5 mg; Otherwise 10 mg every Mon, Fri; 7.5 mg all other days   Weekly warfarin total:   57.5 mg   Plan last modified:   Deborah Alatorre RN (6/26/2019)   Next INR check:   9/13/2019   Target end date:       Indications    Long-term (current) use of anticoagulants [Z79.01] [Z79.01]  PAF (paroxysmal atrial fibrillation) (H) [I48.0]             Anticoagulation Episode Summary     INR check location:       Preferred lab:       Send INR reminders to:   [facility]    Comments:   5 mg tabs, [facility] (needs staff message) PM dose      Anticoagulation Care Providers     Provider Role Specialty Phone number    Eliz Nguyen MD [facility] Internal Medicine [phone]            See the Encounter Report to view Anticoagulation Flowsheet and Dosing Calendar (Go to Encounters tab in chart review, and find the Anticoagulation Therapy Visit)    Dosage adjustment made based on physician directed care plan.    Michael Quispe, RN

## 2019-08-30 ENCOUNTER — CARE COORDINATION (OUTPATIENT)
Dept: CARDIOLOGY | Facility: CLINIC | Age: 59
End: 2019-08-30

## 2019-08-30 DIAGNOSIS — I25.83 CORONARY ARTERY DISEASE DUE TO LIPID RICH PLAQUE: Primary | ICD-10-CM

## 2019-08-30 DIAGNOSIS — I25.10 CORONARY ARTERY DISEASE DUE TO LIPID RICH PLAQUE: Primary | ICD-10-CM

## 2019-09-03 ENCOUNTER — ANCILLARY PROCEDURE (OUTPATIENT)
Dept: NUCLEAR MEDICINE | Facility: CLINIC | Age: 59
End: 2019-09-03
Attending: INTERNAL MEDICINE
Payer: COMMERCIAL

## 2019-09-03 DIAGNOSIS — I25.10 CORONARY ARTERY DISEASE DUE TO LIPID RICH PLAQUE: ICD-10-CM

## 2019-09-03 DIAGNOSIS — I25.83 CORONARY ARTERY DISEASE DUE TO LIPID RICH PLAQUE: ICD-10-CM

## 2019-09-03 PROCEDURE — A9502 TC99M TETROFOSMIN: HCPCS | Performed by: INTERNAL MEDICINE

## 2019-09-03 PROCEDURE — 78452 HT MUSCLE IMAGE SPECT MULT: CPT | Performed by: INTERNAL MEDICINE

## 2019-09-03 PROCEDURE — 93017 CV STRESS TEST TRACING ONLY: CPT | Performed by: INTERNAL MEDICINE

## 2019-09-03 PROCEDURE — 93018 CV STRESS TEST I&R ONLY: CPT | Performed by: INTERNAL MEDICINE

## 2019-09-03 PROCEDURE — 93016 CV STRESS TEST SUPVJ ONLY: CPT | Performed by: INTERNAL MEDICINE

## 2019-09-03 RX ORDER — REGADENOSON 0.08 MG/ML
0.4 INJECTION, SOLUTION INTRAVENOUS ONCE
Status: COMPLETED | OUTPATIENT
Start: 2019-09-03 | End: 2019-09-03

## 2019-09-03 RX ADMIN — REGADENOSON 0.4 MG: 0.08 INJECTION, SOLUTION INTRAVENOUS at 11:11

## 2019-09-04 ENCOUNTER — OFFICE VISIT (OUTPATIENT)
Dept: INTERNAL MEDICINE | Facility: CLINIC | Age: 59
End: 2019-09-04
Attending: INTERNAL MEDICINE
Payer: COMMERCIAL

## 2019-09-04 ENCOUNTER — TELEPHONE (OUTPATIENT)
Dept: INTERNAL MEDICINE | Facility: CLINIC | Age: 59
End: 2019-09-04

## 2019-09-04 VITALS
DIASTOLIC BLOOD PRESSURE: 75 MMHG | WEIGHT: 269 LBS | SYSTOLIC BLOOD PRESSURE: 120 MMHG | HEART RATE: 64 BPM | BODY MASS INDEX: 47.65 KG/M2

## 2019-09-04 DIAGNOSIS — I10 HTN, GOAL BELOW 140/90: Primary | Chronic | ICD-10-CM

## 2019-09-04 LAB
ANION GAP SERPL CALCULATED.3IONS-SCNC: 7 MMOL/L (ref 3–14)
BUN SERPL-MCNC: 40 MG/DL (ref 7–30)
CALCIUM SERPL-MCNC: 8.5 MG/DL (ref 8.5–10.1)
CHLORIDE SERPL-SCNC: 107 MMOL/L (ref 94–109)
CO2 SERPL-SCNC: 26 MMOL/L (ref 20–32)
CREAT SERPL-MCNC: 1.31 MG/DL (ref 0.52–1.04)
GFR SERPL CREATININE-BSD FRML MDRD: 44 ML/MIN/{1.73_M2}
GLUCOSE SERPL-MCNC: 92 MG/DL (ref 70–99)
POTASSIUM SERPL-SCNC: 4.7 MMOL/L (ref 3.4–5.3)
SODIUM SERPL-SCNC: 140 MMOL/L (ref 133–144)

## 2019-09-04 PROCEDURE — G0463 HOSPITAL OUTPT CLINIC VISIT: HCPCS | Mod: ZF

## 2019-09-04 PROCEDURE — 36415 COLL VENOUS BLD VENIPUNCTURE: CPT | Performed by: INTERNAL MEDICINE

## 2019-09-04 PROCEDURE — 80048 BASIC METABOLIC PNL TOTAL CA: CPT | Performed by: INTERNAL MEDICINE

## 2019-09-04 ASSESSMENT — ANXIETY QUESTIONNAIRES
3. WORRYING TOO MUCH ABOUT DIFFERENT THINGS: SEVERAL DAYS
6. BECOMING EASILY ANNOYED OR IRRITABLE: SEVERAL DAYS
7. FEELING AFRAID AS IF SOMETHING AWFUL MIGHT HAPPEN: NOT AT ALL
GAD7 TOTAL SCORE: 2
5. BEING SO RESTLESS THAT IT IS HARD TO SIT STILL: NOT AT ALL
2. NOT BEING ABLE TO STOP OR CONTROL WORRYING: NOT AT ALL
1. FEELING NERVOUS, ANXIOUS, OR ON EDGE: NOT AT ALL

## 2019-09-04 ASSESSMENT — PATIENT HEALTH QUESTIONNAIRE - PHQ9
SUM OF ALL RESPONSES TO PHQ QUESTIONS 1-9: 3
5. POOR APPETITE OR OVEREATING: NOT AT ALL

## 2019-09-04 NOTE — LETTER
9/4/2019       RE: Luz Marina Live  [address]  [place] [address]     Dear Colleague,    Thank you for referring your patient, Luz Marina Live, to the [facility]  at [facility]. Please see a copy of my visit note below.    HPI  Patient is here for follow up on hypertension. She reports that she has been feeling well. She states that she is not having any major issues. She has regular follow up with Cardiology and Rheumatology. She denies new concerning symptoms.     Review of Systems     Constitutional:  Positive for fatigue. Negative for fever and chills.   HENT:  Negative for ear pain and dry mouth.    Eyes:  Negative for decreased vision.   Respiratory:   Negative for cough and dyspnea on exertion.    Cardiovascular:  Negative for chest pain, dyspnea on exertion and edema.   Gastrointestinal:  Negative for heartburn, nausea, vomiting, abdominal pain, diarrhea, constipation and melena.   Skin:  Negative for itching, rash and acne.   Endo/Heme:  Negative for anemia, swollen glands and bruises/bleeds easily.   Psychiatric/Behavioral:  Negative for depression, decreased concentration, mood swings and panic attacks.    Endocrine:  Negative for altered temperature regulation, polyphagia, polydipsia, unwanted hair growth and change in facial hair.    Current Outpatient Medications   Medication     aspirin 81 MG tablet     atorvastatin (LIPITOR) 40 MG tablet     Calcium Carbonate-Vitamin D (CALCIUM 600-D PO)     hydroxychloroquine (PLAQUENIL) 200 MG tablet     lisinopril (PRINIVIL/ZESTRIL) 40 MG tablet     metoprolol tartrate (LOPRESSOR) 50 MG tablet     MULTIPLE VITAMIN PO     Omega-3 Fatty Acids (FISH OIL) 1200 MG capsule     order for DME     warfarin (COUMADIN) 5 MG tablet     No current facility-administered medications for this visit.      Vitals:    09/04/19 1447 09/04/19 1454 09/04/19 1455 09/04/19 1456   BP: 125/77 121/75 113/74 120/75    Pulse: 64 64 64 64   Weight: 122 kg (269 lb)        Physical Exam   Constitutional: She is oriented to person, place, and time. She appears well-developed and well-nourished.   HENT:   Head: Atraumatic.   Eyes: Pupils are equal, round, and reactive to light. EOM are normal.   Cardiovascular: Normal rate and regular rhythm.   Pulmonary/Chest: Effort normal and breath sounds normal.   Musculoskeletal: She exhibits no edema.   Neurological: She is alert and oriented to person, place, and time.   Skin: Skin is warm and dry.   Psychiatric: She has a normal mood and affect. Her behavior is normal. Judgment and thought content normal.   Nursing note and vitals reviewed.    Assessment and Plan:  Luz Marina was seen today for recheck.    Diagnoses and all orders for this visit:    HTN, goal below 140/90. Blood pressure is within acceptable range today. Discussed blood pressure goals, recommend checking renal function and electrolytes today. Patient will be advised on test results accordingly.   -     Basic Metabolic Panel      Total time spent 15 minutes.  More than 50% of the time spent with Ms. Live on counseling / coordinating her care    Eliz Nguyen MD

## 2019-09-04 NOTE — TELEPHONE ENCOUNTER
Creatinine has increased significantly, recommend recheck in 1-2 days.   Order for recheck was placed today.   Eliz Nguyen MD

## 2019-09-04 NOTE — PROGRESS NOTES
HPI  Patient is here for follow up on hypertension. She reports that she has been feeling well. She states that she is not having any major issues. She has regular follow up with Cardiology and Rheumatology. She denies new concerning symptoms.     Review of Systems     Constitutional:  Positive for fatigue. Negative for fever and chills.   HENT:  Negative for ear pain and dry mouth.    Eyes:  Negative for decreased vision.   Respiratory:   Negative for cough and dyspnea on exertion.    Cardiovascular:  Negative for chest pain, dyspnea on exertion and edema.   Gastrointestinal:  Negative for heartburn, nausea, vomiting, abdominal pain, diarrhea, constipation and melena.   Skin:  Negative for itching, rash and acne.   Endo/Heme:  Negative for anemia, swollen glands and bruises/bleeds easily.   Psychiatric/Behavioral:  Negative for depression, decreased concentration, mood swings and panic attacks.    Endocrine:  Negative for altered temperature regulation, polyphagia, polydipsia, unwanted hair growth and change in facial hair.    Current Outpatient Medications   Medication     aspirin 81 MG tablet     atorvastatin (LIPITOR) 40 MG tablet     Calcium Carbonate-Vitamin D (CALCIUM 600-D PO)     hydroxychloroquine (PLAQUENIL) 200 MG tablet     lisinopril (PRINIVIL/ZESTRIL) 40 MG tablet     metoprolol tartrate (LOPRESSOR) 50 MG tablet     MULTIPLE VITAMIN PO     Omega-3 Fatty Acids (FISH OIL) 1200 MG capsule     order for DME     warfarin (COUMADIN) 5 MG tablet     No current facility-administered medications for this visit.      Vitals:    09/04/19 1447 09/04/19 1454 09/04/19 1455 09/04/19 1456   BP: 125/77 121/75 113/74 120/75   Pulse: 64 64 64 64   Weight: 122 kg (269 lb)          Physical Exam   Constitutional: She is oriented to person, place, and time. She appears well-developed and well-nourished.   HENT:   Head: Atraumatic.   Eyes: Pupils are equal, round, and reactive to light. EOM are normal.   Cardiovascular:  Normal rate and regular rhythm.   Pulmonary/Chest: Effort normal and breath sounds normal.   Musculoskeletal: She exhibits no edema.   Neurological: She is alert and oriented to person, place, and time.   Skin: Skin is warm and dry.   Psychiatric: She has a normal mood and affect. Her behavior is normal. Judgment and thought content normal.   Nursing note and vitals reviewed.    Assessment and Plan:  Luz Marina was seen today for recheck.    Diagnoses and all orders for this visit:    HTN, goal below 140/90. Blood pressure is within acceptable range today. Discussed blood pressure goals, recommend checking renal function and electrolytes today. Patient will be advised on test results accordingly.   -     Basic Metabolic Panel      Total time spent 15 minutes.  More than 50% of the time spent with Ms. Live on counseling / coordinating her care    Eliz Nguyen MD

## 2019-09-05 ENCOUNTER — TELEPHONE (OUTPATIENT)
Dept: OBGYN | Facility: CLINIC | Age: 59
End: 2019-09-05

## 2019-09-05 ASSESSMENT — ANXIETY QUESTIONNAIRES: GAD7 TOTAL SCORE: 2

## 2019-09-05 NOTE — TELEPHONE ENCOUNTER
Left vm for her to call triage to make sure she is aware Dr Nguyen's  plan to repeat creat today or tomorrow

## 2019-09-06 ENCOUNTER — TELEPHONE (OUTPATIENT)
Dept: ANTICOAGULATION | Facility: OTHER | Age: 59
End: 2019-09-06

## 2019-09-06 ENCOUNTER — ANTICOAGULATION THERAPY VISIT (OUTPATIENT)
Dept: ANTICOAGULATION | Facility: OTHER | Age: 59
End: 2019-09-06

## 2019-09-06 DIAGNOSIS — I48.0 PAF (PAROXYSMAL ATRIAL FIBRILLATION) (H): ICD-10-CM

## 2019-09-06 DIAGNOSIS — Z79.01 LONG TERM CURRENT USE OF ANTICOAGULANT THERAPY: ICD-10-CM

## 2019-09-06 DIAGNOSIS — Z79.01 LONG TERM CURRENT USE OF ANTICOAGULANTS WITH INR GOAL OF 2.0-3.0: ICD-10-CM

## 2019-09-06 DIAGNOSIS — I10 HTN, GOAL BELOW 140/90: Chronic | ICD-10-CM

## 2019-09-06 LAB
ANION GAP SERPL CALCULATED.3IONS-SCNC: 6 MMOL/L (ref 3–14)
BUN SERPL-MCNC: 39 MG/DL (ref 7–30)
CALCIUM SERPL-MCNC: 9.4 MG/DL (ref 8.5–10.1)
CHLORIDE SERPL-SCNC: 108 MMOL/L (ref 94–109)
CO2 SERPL-SCNC: 25 MMOL/L (ref 20–32)
CREAT SERPL-MCNC: 1.13 MG/DL (ref 0.52–1.04)
GFR SERPL CREATININE-BSD FRML MDRD: 53 ML/MIN/{1.73_M2}
GLUCOSE SERPL-MCNC: 95 MG/DL (ref 70–99)
INR BLD: 4 (ref 0.86–1.14)
POTASSIUM SERPL-SCNC: 4.7 MMOL/L (ref 3.4–5.3)
SODIUM SERPL-SCNC: 139 MMOL/L (ref 133–144)

## 2019-09-06 PROCEDURE — 99207 ZZC NO CHARGE NURSE ONLY: CPT | Performed by: INTERNAL MEDICINE

## 2019-09-06 PROCEDURE — 85610 PROTHROMBIN TIME: CPT | Mod: QW | Performed by: INTERNAL MEDICINE

## 2019-09-06 PROCEDURE — 36416 COLLJ CAPILLARY BLOOD SPEC: CPT | Performed by: INTERNAL MEDICINE

## 2019-09-06 PROCEDURE — 80048 BASIC METABOLIC PNL TOTAL CA: CPT | Performed by: INTERNAL MEDICINE

## 2019-09-06 NOTE — PROGRESS NOTES
ANTICOAGULATION FOLLOW-UP CLINIC VISIT    Patient Name:  Luz Marina Live  Date:  2019  Contact Type:  Telephone    SUBJECTIVE:  Patient Findings     Comments:   I was unable to identify a reason for supratherapeutic INR.          Clinical Outcomes     Comments:   I was unable to identify a reason for supratherapeutic INR.             OBJECTIVE    INR Point of Care   Date Value Ref Range Status   2019 4.0 (H) 0.86 - 1.14 Final     Comment:     This test is intended for monitoring Coumadin therapy.  Results are not   accurate in patients with prolonged INR due to factor deficiency.         ASSESSMENT / PLAN  INR assessment SUPRA    Recheck INR In: 2 WEEKS    INR Location Outside lab      Anticoagulation Summary  As of 2019    INR goal:   2.0-3.0   TTR:   56.9 % (3.4 y)   INR used for dosin.0! (2019)   Warfarin maintenance plan:   10 mg (5 mg x 2) every Mon; 7.5 mg (5 mg x 1.5) all other days   Full warfarin instructions:   : Hold; Otherwise 10 mg every Mon; 7.5 mg all other days   Weekly warfarin total:   55 mg   Plan last modified:   Michael Quispe RN (2019)   Next INR check:   2019   Target end date:       Indications    Long-term (current) use of anticoagulants [Z79.01] [Z79.01]  PAF (paroxysmal atrial fibrillation) (H) [I48.0]             Anticoagulation Episode Summary     INR check location:       Preferred lab:       Send INR reminders to:   [facility]    Comments:   5 mg tabs, [facility] (needs staff message) PM dose      Anticoagulation Care Providers     Provider Role Specialty Phone number    Eliz Nguyen MD [facility] Internal Medicine [phone]            See the Encounter Report to view Anticoagulation Flowsheet and Dosing Calendar (Go to Encounters tab in chart review, and find the Anticoagulation Therapy Visit)    Dosage adjustment made based on physician directed care plan.    Michael Quispe RN

## 2019-09-06 NOTE — LETTER
9/23/2019         Luz Marina Live   [address]  [place] [address]        Dear Ms. Live:    Your kidney function has improved. Recommend to continue with current medications. Recheck in 1 month should be done.     Results for orders placed or performed in visit on 09/06/19   INR point of care   Result Value Ref Range    INR Point of Care 4.0 (H) 0.86 - 1.14   Basic Metabolic Panel   Result Value Ref Range    Sodium 139 133 - 144 mmol/L    Potassium 4.7 3.4 - 5.3 mmol/L    Chloride 108 94 - 109 mmol/L    Carbon Dioxide 25 20 - 32 mmol/L    Anion Gap 6 3 - 14 mmol/L    Glucose 95 70 - 99 mg/dL    Urea Nitrogen 39 (H) 7 - 30 mg/dL    Creatinine 1.13 (H) 0.52 - 1.04 mg/dL    GFR Estimate 53 (L) >60 mL/min/[1.73_m2]    GFR Estimate If Black 62 >60 mL/min/[1.73_m2]    Calcium 9.4 8.5 - 10.1 mg/dL         Please note that test explanations are brief and do not reflect all diagnostic uses.  If you have any questions or concerns, please call the clinic at [phone].      Sincerely,      Rosario Coffey LPN sent on behalf of  Eliz Nguyen MD

## 2019-09-08 ASSESSMENT — ENCOUNTER SYMPTOMS
ABDOMINAL PAIN: 0
CONSTIPATION: 0
VOMITING: 0
DEPRESSION: 0
ALTERED TEMPERATURE REGULATION: 0
DIARRHEA: 0
COUGH: 0
NAUSEA: 0
POLYPHAGIA: 0
NERVOUS/ANXIOUS: 0
CHILLS: 0
SWOLLEN GLANDS: 0
FATIGUE: 1
DYSPNEA ON EXERTION: 0
BRUISES/BLEEDS EASILY: 0
HEARTBURN: 0
DECREASED CONCENTRATION: 0
INSOMNIA: 0
FEVER: 0
POLYDIPSIA: 0
PANIC: 0

## 2019-09-11 NOTE — TELEPHONE ENCOUNTER
Spoke with Dr. Nguyen regarding recheck of creatnine - the result is better and pt is to come to clinic and see her in 1 month for BP check and follow-up lab.    Tried to reach Luz Marina but received personal voicemail.  Left message with Dr. Nguyen recommendation. Please call [phone] to schedule with Dr. Nguyen in one month.

## 2019-09-16 NOTE — TELEPHONE ENCOUNTER
Left another message for Luz Marina indicating Dr. Nguyen's recommendation for followup visit and labs in 1 month. Closing encounter.

## 2019-09-20 ENCOUNTER — TELEPHONE (OUTPATIENT)
Dept: ANTICOAGULATION | Facility: OTHER | Age: 59
End: 2019-09-20

## 2019-09-20 ENCOUNTER — ANTICOAGULATION THERAPY VISIT (OUTPATIENT)
Dept: ANTICOAGULATION | Facility: OTHER | Age: 59
End: 2019-09-20

## 2019-09-20 DIAGNOSIS — I48.0 PAF (PAROXYSMAL ATRIAL FIBRILLATION) (H): ICD-10-CM

## 2019-09-20 DIAGNOSIS — Z79.01 LONG TERM CURRENT USE OF ANTICOAGULANTS WITH INR GOAL OF 2.0-3.0: ICD-10-CM

## 2019-09-20 DIAGNOSIS — Z79.01 LONG TERM CURRENT USE OF ANTICOAGULANT THERAPY: ICD-10-CM

## 2019-09-20 LAB
CAPILLARY BLOOD COLLECTION: NORMAL
INR BLD: 3.9 (ref 0.86–1.14)

## 2019-09-20 PROCEDURE — 36416 COLLJ CAPILLARY BLOOD SPEC: CPT | Performed by: INTERNAL MEDICINE

## 2019-09-20 PROCEDURE — 85610 PROTHROMBIN TIME: CPT | Mod: QW | Performed by: INTERNAL MEDICINE

## 2019-09-20 PROCEDURE — 99207 ZZC NO CHARGE NURSE ONLY: CPT | Performed by: INTERNAL MEDICINE

## 2019-09-20 NOTE — PROGRESS NOTES
ANTICOAGULATION FOLLOW-UP CLINIC VISIT    Patient Name:  Luz Marina Live  Date:  9/20/2019  Contact Type:  Telephone    SUBJECTIVE:  Patient Findings     Comments:   I was unable to identify a reason for supratherapeutic INR. Pt is going out of town and will recheck when she gets back. Michael Quispe RN, BSN          Clinical Outcomes     Comments:   I was unable to identify a reason for supratherapeutic INR. Pt is going out of town and will recheck when she gets back. Michael Quispe RN, BSN             OBJECTIVE    INR Point of Care   Date Value Ref Range Status   09/20/2019 3.9 (H) 0.86 - 1.14 Final     Comment:     This test is intended for monitoring Coumadin therapy.  Results are not   accurate in patients with prolonged INR due to factor deficiency.         ASSESSMENT / PLAN  INR assessment SUPRA    Recheck INR In: 3 WEEKS    INR Location Outside lab      Anticoagulation Summary  As of 9/20/2019    INR goal:   2.0-3.0   TTR:   56.2 % (3.4 y)   INR used for dosing:   3.9! (9/20/2019)   Warfarin maintenance plan:   7.5 mg (5 mg x 1.5) every day   Full warfarin instructions:   9/20: Hold; Otherwise 7.5 mg every day   Weekly warfarin total:   52.5 mg   Plan last modified:   Michael Quispe RN (9/20/2019)   Next INR check:   10/11/2019   Target end date:       Indications    Long-term (current) use of anticoagulants [Z79.01] [Z79.01]  PAF (paroxysmal atrial fibrillation) (H) [I48.0]             Anticoagulation Episode Summary     INR check location:       Preferred lab:       Send INR reminders to:   [facility]    Comments:   5 mg tabs, [facility] (needs staff message) PM dose      Anticoagulation Care Providers     Provider Role Specialty Phone number    Eliz Nguyen MD [facility] Internal Medicine [phone]            See the Encounter Report to view Anticoagulation Flowsheet and Dosing Calendar (Go to Encounters tab in chart review, and find the Anticoagulation Therapy Visit)    Dosage  adjustment made based on physician directed care plan.    Michael Quispe RN

## 2019-10-04 ENCOUNTER — HEALTH MAINTENANCE LETTER (OUTPATIENT)
Age: 59
End: 2019-10-04

## 2019-10-11 ENCOUNTER — OFFICE VISIT (OUTPATIENT)
Dept: RHEUMATOLOGY | Facility: CLINIC | Age: 59
End: 2019-10-11
Payer: COMMERCIAL

## 2019-10-11 VITALS
WEIGHT: 266.1 LBS | BODY MASS INDEX: 47.15 KG/M2 | HEART RATE: 61 BPM | HEIGHT: 63 IN | DIASTOLIC BLOOD PRESSURE: 82 MMHG | OXYGEN SATURATION: 98 % | SYSTOLIC BLOOD PRESSURE: 144 MMHG

## 2019-10-11 DIAGNOSIS — Z79.899 LONG-TERM USE OF PLAQUENIL: ICD-10-CM

## 2019-10-11 DIAGNOSIS — M32.14 SYSTEMIC LUPUS ERYTHEMATOSUS WITH GLOMERULAR DISEASE, UNSPECIFIED SLE TYPE (H): Chronic | ICD-10-CM

## 2019-10-11 DIAGNOSIS — Z79.60 LONG-TERM USE OF IMMUNOSUPPRESSANT MEDICATION: Chronic | ICD-10-CM

## 2019-10-11 DIAGNOSIS — N18.30 CKD (CHRONIC KIDNEY DISEASE) STAGE 3, GFR 30-59 ML/MIN (H): Chronic | ICD-10-CM

## 2019-10-11 PROCEDURE — 99214 OFFICE O/P EST MOD 30 MIN: CPT | Performed by: INTERNAL MEDICINE

## 2019-10-11 RX ORDER — HYDROXYCHLOROQUINE SULFATE 200 MG/1
200 TABLET, FILM COATED ORAL DAILY
Qty: 90 TABLET | Refills: 3 | Status: SHIPPED | OUTPATIENT
Start: 2019-10-11 | End: 2019-11-08

## 2019-10-11 ASSESSMENT — ROUTINE ASSESSMENT OF PATIENT INDEX DATA (RAPID3)
RAPID3 INTERPRETATION: MODERATE 6.1-12.0
TOTAL RAPID3 SCORE: 7.2

## 2019-10-11 ASSESSMENT — MIFFLIN-ST. JEOR: SCORE: 1751.15

## 2019-10-11 ASSESSMENT — PAIN SCALES - GENERAL: PAINLEVEL: NO PAIN (0)

## 2019-10-11 NOTE — PROGRESS NOTES
Ms. 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  +ROHAN, dsDNA, SSA, lupus inhibitor, and depressed complements. Modest proteinuria. Previous treatment with cytoxan and imuran. Current management with hydroxychloroquine 200 mg daily.     This patient was previously managed by [facility], but now presents to transfer care for complicated SLE.    Her joints are good today and no pain to report. No swelling, redness or warmth. No joint dysfunction. Minor DIP deformities. No ulcers, but she can have some night time dryness with the CPAP. No dysphagia. Minor hair thinness. Only rare chest pains, and these are exertional (recent normal stress test). No raynaud's. No important edema.    She tolerates the hydroxychloroquine 200 mg daily well and has a recent normal eye check.     PMI:  Medical-  Active Ambulatory Problems     Diagnosis Date Noted     Hyperlipidemia LDL goal <100 12/05/2011     CAD (coronary artery disease) 12/05/2011     HTN, goal below 140/90 07/03/2013     PAF (paroxysmal atrial fibrillation) (H) 08/24/2015     CKD (chronic kidney disease) stage 3, GFR 30-59 ml/min (H) 09/14/2015     Long-term (current) use of anticoagulants [Z79.01] 04/05/2016     Moderate tricuspid insufficiency 12/22/2016     Systemic lupus erythematosus with glomerular disease, unspecified SLE type (H) 07/03/2017     Psychophysiological insomnia 06/11/2018     Morbid obesity (H) 06/11/2018     KEITH (obstructive sleep apnea)- severe (AHI 30) 06/18/2018     Long-term use of Plaquenil 09/04/2018     Resolved Ambulatory Problems     Diagnosis Date Noted     SLE (systemic lupus erythematosus) (H) 11/01/2010     Hypovolemic shock (H) 02/28/2015     Sepsis (H) 08/22/2015     Secondary renal hyperparathyroidism (H) 01/27/2016     No Additional Past Medical History     Surgical-  Past Surgical History:   Procedure Laterality Date     CARDIAC SURGERY  08/27/2009    triple vessel coronary artery bypass  grafting on 8/27/09     CARPAL TUNNEL RELEASE RT/LT  1996    bilateral     Injuries-none    SH:  Social History     Socioeconomic History     Marital status:      Spouse name: None     Number of children: 1 daughter     Years of education: None     Highest education level: None   Occupational History     None   Social Needs     Financial resource strain: None     Food insecurity:     Worry: None     Inability: None     Transportation needs:     Medical: None     Non-medical: None   Tobacco Use     Smoking status: Former Smoker     Smokeless tobacco: Never Used     Tobacco comment: 30 plus years ago.   Substance and Sexual Activity     Alcohol use: No     Drug use: No     Sexual activity: Yes     Partners: Male     Birth control/protection: Surgical     Comment: vasectomy   Lifestyle     Physical activity:     Days per week: None     Minutes per session: None     Stress: None   Relationships     Social connections:     Talks on phone: None     Gets together: None     Attends Church service: None     Active member of club or organization: None     Attends meetings of clubs or organizations: None     Relationship status: None     Intimate partner violence:     Fear of current or ex partner: None     Emotionally abused: None     Physically abused: None     Forced sexual activity: None   Other Topics Concern     Parent/sibling w/ CABG, MI or angioplasty before 65F 55M? Not Asked   Social History Narrative     None     FH:  Family History   Problem Relation Age of Onset     Arthritis Mother         ra     Connective Tissue Disorder Mother         lupus     Congenital Anomalies Mother         wholein heart     Cerebrovascular Disease Mother         TIA's     Cerebrovascular Disease Father      Diabetes Father      Hypertension Father      Cardiovascular Father         stents     Genitourinary Problems Father         kidney failure     Kidney Disease Father         kidney injury related to acute illness around  time of death (RANDELL likely)     Cataracts Father      Arthritis Paternal Grandmother      Diabetes Brother      Glaucoma No family hx of      Macular Degeneration No family hx of      ROS:  +rosacea redness on the face  +easy bruising  +ankle edema  +intermittent hand paresthesia  Remainder of the 14 point ROS obtained and found negative.    Physical Exam:  Constitutional: WD-WN-WG cooperative; +obese  Eyes: nl EOM, PERRLA, vision, conjunctiva, sclera  ENT: nl external ears, nose, hearing, lips, teeth, gums, throat  Neck: no mass or thyroid enlargement  Resp: lungs clear to auscultation, nl to palpation, nl effort  CV: RRR, no murmurs, rubs or gallops, no edema  GI: no ABD mass or tenderness, no HSM  : not tested  Lymph: no cervical or epitrochlear nodes  MS: +bilateral trace 1st MCP swelling;  All other TMJ, neck, shoulder, elbow, wrist, hand, spine, hip, knee, ankle, and foot joints were examined and otherwise found normal. Normal  strength. No deformity. Full ROM.  Skin: no nail pitting, alopecia, rash  Neuro: nl cranial nerves, strength, sensation, DTRs.   Psych: nl judgement, orientation, memory, affect.    Laboratory:    Component      Latest Ref Rng & Units 8/5/2019   Color Urine       Light Yellow   Appearance Urine       Clear   Glucose Urine      NEG:Negative mg/dL Negative   Bilirubin Urine      NEG:Negative Negative   Ketones Urine      NEG:Negative mg/dL Negative   Specific Gravity Urine      1.003 - 1.035 1.010   Blood Urine      NEG:Negative Small (A)   pH Urine      5.0 - 7.0 pH 6.0   Protein Albumin Urine      NEG:Negative mg/dL 10 (A)   Urobilinogen mg/dL      0.0 - 2.0 mg/dL Normal   Nitrite Urine      NEG:Negative Negative   Leukocyte Esterase Urine      NEG:Negative Negative   Source       Midstream Urine   WBC Urine      OTO5:0 - 5 /HPF 0 - 5   RBC Urine      OTO2:O - 2 /HPF 2-5 (A)   Squamous Epithelial /LPF Urine      FEW:Few /LPF Few   Sodium      133 - 144 mmol/L 141   Potassium       3.4 - 5.3 mmol/L 4.5   Chloride      94 - 109 mmol/L 111 (H)   Carbon Dioxide      20 - 32 mmol/L 24   Anion Gap      3 - 14 mmol/L 6   Glucose      70 - 99 mg/dL 96   Urea Nitrogen      7 - 30 mg/dL 31 (H)   Creatinine      0.52 - 1.04 mg/dL 0.95   GFR Estimate      >60 mL/min/1.73:m2 66   GFR Estimate If Black      >60 mL/min/1.73:m2 76   Calcium      8.5 - 10.1 mg/dL 9.2   WBC      4.0 - 11.0 10e9/L 4.4   RBC Count      3.8 - 5.2 10e12/L 3.81   Hemoglobin      11.7 - 15.7 g/dL 11.4 (L)   Hematocrit      35.0 - 47.0 % 34.7 (L)   MCV      78 - 100 fl 91   MCH      26.5 - 33.0 pg 29.9   MCHC      31.5 - 36.5 g/dL 32.9   RDW      10.0 - 15.0 % 13.5   Platelet Count      150 - 450 10e9/L 161   Protein Random Urine      g/L 0.32   Protein Total Urine g/gr Creatinine      0 - 0.2 g/g Cr 0.56 (H)   Creatinine Urine Random      mg/dL 61   DNA-ds      <10 IU/mL 110 (H)   CRP Inflammation      0.0 - 8.0 mg/L 8.8 (H)   Complement C3      76 - 169 mg/dL 53 (L)   Complement C4      15 - 50 mg/dL 4 (L)   AST      0 - 45 U/L 29   ALT      0 - 50 U/L 34   Creatinine Urine      mg/dL 57       Component      Latest Ref Rng & Units 9/6/2019   Sodium      133 - 144 mmol/L 139   Potassium      3.4 - 5.3 mmol/L 4.7   Chloride      94 - 109 mmol/L 108   Carbon Dioxide      20 - 32 mmol/L 25   Anion Gap      3 - 14 mmol/L 6   Glucose      70 - 99 mg/dL 95   Urea Nitrogen      7 - 30 mg/dL 39 (H)   Creatinine      0.52 - 1.04 mg/dL 1.13 (H)   GFR Estimate      >60 mL/min/1.73:m2 53 (L)   GFR Estimate If Black      >60 mL/min/1.73:m2 62   Calcium      8.5 - 10.1 mg/dL 9.4       Component      Latest Ref Rng & Units 10/10/2006 12/3/2008   Cardiolipin Antibody IgG      0.0 - 19.9 GPL-U/mL     Cardiolipin Antibody IgM      0.0 - 19.9 MPL-U/mL     DNA-ds      <10 IU/mL 145 483 (H)   Rheumatoid Factor      0 - 14 IU/mL <20 <7   Lupus Result      NEG:Negative (Note) (Note)   ROHAN Screen by EIA        7.4   Cyclic Citrullinated Peptide IgG      <5  U/mL  3   SSB (LA) Antibody IgG       4    SSA (RO) Antibody IgG       85 (H)        Component      Latest Ref Rng & Units 2/27/2018   Cardiolipin Antibody IgG      0.0 - 19.9 GPL-U/mL <1.6   Cardiolipin Antibody IgM      0.0 - 19.9 MPL-U/mL <0.2   DNA-ds      <10 IU/mL    Rheumatoid Factor      0 - 14 IU/mL    Lupus Result      NEG:Negative Positive (A)   ROHAN Screen by EIA          Cyclic Citrullinated Peptide IgG      <5 U/mL    SSB (LA) Antibody IgG          SSA (RO) Antibody IgG            Impression:    Systemic lupus erythematosis-with history of severe lupus nephritis. Today, her illness is quiescent, with no signs of active inflammation or disease progression. Recent testing demonstrated stable kidney function, only modest proteinuria and stable dsDNA level. There are no signs of disease activity by exam. Based on all of this it is my impression that her disease activity remains low on low dose immunosuppression. She continues to tolerate the hydroxychloroquine well and I will continue with this therapy.    Bone health-we agree that she will discuss getting a repeat DEXA scan when she sees Dr. Nguyen later this month to further evaluate her bone health. Her history of high dose prednisone therapy puts her at increased risk for osteoporosis.    Long term management of immunosuppression-plan to repeat labs in  6 months and on RTC in 1 year.

## 2019-10-11 NOTE — NURSING NOTE
"Luz Marina Live's goals for this visit include: return  She requests these members of her care team be copied on today's visit information:     PCP: Eliz Nguyen    Referring Provider:  No referring provider defined for this encounter.    BP (!) 144/82   Pulse 61   Ht 1.6 m (5' 3\")   Wt 120.7 kg (266 lb 1.6 oz)   SpO2 98%   BMI 47.14 kg/m      Do you need any medication refills at today's visit? ?  "

## 2019-10-16 ENCOUNTER — TELEPHONE (OUTPATIENT)
Dept: FAMILY MEDICINE | Facility: CLINIC | Age: 59
End: 2019-10-16

## 2019-10-16 ENCOUNTER — APPOINTMENT (OUTPATIENT)
Dept: GENERAL RADIOLOGY | Facility: CLINIC | Age: 59
DRG: 247 | End: 2019-10-16
Attending: INTERNAL MEDICINE
Payer: COMMERCIAL

## 2019-10-16 ENCOUNTER — HOSPITAL ENCOUNTER (INPATIENT)
Facility: CLINIC | Age: 59
LOS: 1 days | Discharge: HOME | DRG: 247 | End: 2019-10-17
Attending: INTERNAL MEDICINE | Admitting: INTERNAL MEDICINE
Payer: COMMERCIAL

## 2019-10-16 ENCOUNTER — TRANSFERRED RECORDS (OUTPATIENT)
Dept: HEALTH INFORMATION MANAGEMENT | Facility: CLINIC | Age: 59
End: 2019-10-16

## 2019-10-16 DIAGNOSIS — G47.33 OSA (OBSTRUCTIVE SLEEP APNEA): ICD-10-CM

## 2019-10-16 DIAGNOSIS — I48.91 ATRIAL FIBRILLATION, UNSPECIFIED TYPE (H): ICD-10-CM

## 2019-10-16 DIAGNOSIS — I25.810 CORONARY ARTERY DISEASE INVOLVING AUTOLOGOUS ARTERY CORONARY BYPASS GRAFT WITHOUT ANGINA PECTORIS: ICD-10-CM

## 2019-10-16 DIAGNOSIS — G47.33 OBSTRUCTIVE SLEEP APNEA (ADULT) (PEDIATRIC): Primary | ICD-10-CM

## 2019-10-16 DIAGNOSIS — E66.01 MORBID OBESITY (H): ICD-10-CM

## 2019-10-16 DIAGNOSIS — I20.89 STABLE ANGINA (H): Primary | ICD-10-CM

## 2019-10-16 LAB
ALBUMIN SERPL-MCNC: 2.8 G/DL (ref 3.4–5)
ALP SERPL-CCNC: 86 U/L (ref 40–150)
ALT SERPL W P-5'-P-CCNC: 23 U/L (ref 0–50)
ANION GAP SERPL CALCULATED.3IONS-SCNC: 6 MMOL/L (ref 3–14)
AST SERPL W P-5'-P-CCNC: 20 U/L (ref 0–45)
BASOPHILS # BLD AUTO: 0 10E9/L (ref 0–0.2)
BASOPHILS NFR BLD AUTO: 0.6 %
BILIRUB SERPL-MCNC: 0.8 MG/DL (ref 0.2–1.3)
BUN SERPL-MCNC: 29 MG/DL (ref 7–30)
CALCIUM SERPL-MCNC: 9 MG/DL (ref 8.5–10.1)
CHLORIDE SERPL-SCNC: 107 MMOL/L (ref 94–109)
CO2 SERPL-SCNC: 23 MMOL/L (ref 20–32)
CREAT SERPL-MCNC: 1.08 MG/DL (ref 0.52–1.04)
DIFFERENTIAL METHOD BLD: ABNORMAL
EOSINOPHIL # BLD AUTO: 0.1 10E9/L (ref 0–0.7)
EOSINOPHIL NFR BLD AUTO: 1.4 %
ERYTHROCYTE [DISTWIDTH] IN BLOOD BY AUTOMATED COUNT: 13.4 % (ref 10–15)
GFR SERPL CREATININE-BSD FRML MDRD: 56 ML/MIN/{1.73_M2}
GLUCOSE SERPL-MCNC: 115 MG/DL (ref 70–99)
HCT VFR BLD AUTO: 31.2 % (ref 35–47)
HGB BLD-MCNC: 9.7 G/DL (ref 11.7–15.7)
IMM GRANULOCYTES # BLD: 0 10E9/L (ref 0–0.4)
IMM GRANULOCYTES NFR BLD: 0.5 %
LYMPHOCYTES # BLD AUTO: 1.1 10E9/L (ref 0.8–5.3)
LYMPHOCYTES NFR BLD AUTO: 16.7 %
MAGNESIUM SERPL-MCNC: 1.7 MG/DL (ref 1.6–2.3)
MCH RBC QN AUTO: 29.4 PG (ref 26.5–33)
MCHC RBC AUTO-ENTMCNC: 31.1 G/DL (ref 31.5–36.5)
MCV RBC AUTO: 95 FL (ref 78–100)
MONOCYTES # BLD AUTO: 0.6 10E9/L (ref 0–1.3)
MONOCYTES NFR BLD AUTO: 9.2 %
NEUTROPHILS # BLD AUTO: 4.6 10E9/L (ref 1.6–8.3)
NEUTROPHILS NFR BLD AUTO: 71.6 %
NRBC # BLD AUTO: 0 10*3/UL
NRBC BLD AUTO-RTO: 0 /100
PLATELET # BLD AUTO: 158 10E9/L (ref 150–450)
POTASSIUM SERPL-SCNC: 3.8 MMOL/L (ref 3.4–5.3)
PROT SERPL-MCNC: 8.2 G/DL (ref 6.8–8.8)
RBC # BLD AUTO: 3.3 10E12/L (ref 3.8–5.2)
SODIUM SERPL-SCNC: 137 MMOL/L (ref 133–144)
TROPONIN I SERPL-MCNC: 0.02 UG/L (ref 0–0.04)
URATE SERPL-MCNC: 6.7 MG/DL (ref 2.6–6)
WBC # BLD AUTO: 6.4 10E9/L (ref 4–11)

## 2019-10-16 PROCEDURE — 36415 COLL VENOUS BLD VENIPUNCTURE: CPT | Performed by: STUDENT IN AN ORGANIZED HEALTH CARE EDUCATION/TRAINING PROGRAM

## 2019-10-16 PROCEDURE — 93005 ELECTROCARDIOGRAM TRACING: CPT

## 2019-10-16 PROCEDURE — 84550 ASSAY OF BLOOD/URIC ACID: CPT | Performed by: STUDENT IN AN ORGANIZED HEALTH CARE EDUCATION/TRAINING PROGRAM

## 2019-10-16 PROCEDURE — 83735 ASSAY OF MAGNESIUM: CPT | Performed by: STUDENT IN AN ORGANIZED HEALTH CARE EDUCATION/TRAINING PROGRAM

## 2019-10-16 PROCEDURE — 80053 COMPREHEN METABOLIC PANEL: CPT | Performed by: STUDENT IN AN ORGANIZED HEALTH CARE EDUCATION/TRAINING PROGRAM

## 2019-10-16 PROCEDURE — 84484 ASSAY OF TROPONIN QUANT: CPT | Performed by: STUDENT IN AN ORGANIZED HEALTH CARE EDUCATION/TRAINING PROGRAM

## 2019-10-16 PROCEDURE — 93010 ELECTROCARDIOGRAM REPORT: CPT | Performed by: INTERNAL MEDICINE

## 2019-10-16 PROCEDURE — 71045 X-RAY EXAM CHEST 1 VIEW: CPT

## 2019-10-16 PROCEDURE — 85025 COMPLETE CBC W/AUTO DIFF WBC: CPT | Performed by: STUDENT IN AN ORGANIZED HEALTH CARE EDUCATION/TRAINING PROGRAM

## 2019-10-16 PROCEDURE — 21400000 ZZH R&B CCU UMMC

## 2019-10-16 RX ORDER — ASPIRIN 81 MG/1
81 TABLET, CHEWABLE ORAL DAILY
Status: DISCONTINUED | OUTPATIENT
Start: 2019-10-17 | End: 2019-10-17

## 2019-10-16 RX ORDER — LISINOPRIL 20 MG/1
40 TABLET ORAL DAILY
Status: DISCONTINUED | OUTPATIENT
Start: 2019-10-17 | End: 2019-10-16

## 2019-10-16 RX ORDER — HYDROXYCHLOROQUINE SULFATE 200 MG/1
200 TABLET, FILM COATED ORAL DAILY
Status: DISCONTINUED | OUTPATIENT
Start: 2019-10-17 | End: 2019-10-18 | Stop reason: HOSPADM

## 2019-10-16 RX ORDER — FUROSEMIDE 10 MG/ML
40 INJECTION INTRAMUSCULAR; INTRAVENOUS ONCE
Status: COMPLETED | OUTPATIENT
Start: 2019-10-16 | End: 2019-10-17

## 2019-10-16 RX ORDER — ATORVASTATIN CALCIUM 40 MG/1
40 TABLET, FILM COATED ORAL DAILY
Status: DISCONTINUED | OUTPATIENT
Start: 2019-10-17 | End: 2019-10-18 | Stop reason: HOSPADM

## 2019-10-16 RX ORDER — METOPROLOL TARTRATE 50 MG
50 TABLET ORAL 2 TIMES DAILY
Status: DISCONTINUED | OUTPATIENT
Start: 2019-10-16 | End: 2019-10-17

## 2019-10-16 ASSESSMENT — MIFFLIN-ST. JEOR: SCORE: 1738.45

## 2019-10-16 NOTE — TELEPHONE ENCOUNTER
Patient calls in to report chest pain and SOB. Pain radiating into neck and arm on left side. She has been nauseated today and has fatigue. She has a history of heart attack and lupus. The chest pain started yesterday. She really notices increase in pain when she takes a deep breath in. She denies recent URI. She sounded mildly out of breath on the phone. She was making sense and able to communicate. This RN recommend per protocol that she call an ambulance. She stated she did not want to do that at this point. Told her that there are many conditions that cause chest pain and some can be potentially life threatening again advised that she call an ambulance.Recommended that she take an adult aspirin now and she agreed. She stated that she does not have allergies to aspirin. Writer noted Aspirin is on her medi list she stated that she had not taken aspirin today. She stated that she will call her  and will have him drive her to ED. Told her that going to clinic or UC would not have treatment or testing options like the ED so she should go to ED and not UC or clinic so that her care is not delayed. She verbalized understanding. Even though she refused ambulance told her if symptoms worsen she really should call ambulance.       Noris Veras RN        Guideline used:  Telephone Triage Protocols for Nurses, Fifth Edition, Evelin Wade. Pg. 118

## 2019-10-16 NOTE — Clinical Note
Stent deployed in the marginal circumflex. Max pressure = 12 keaton. Total duration = 12 seconds. Balloon reinflated a second time: Max pressure = 14 keaton. Total duration = 10 seconds.

## 2019-10-16 NOTE — Clinical Note
The first balloon was inserted into the circumflex and marginal circumflex.Max pressure = 12 keaton. Total duration = 10 seconds.     Max pressure = 12 keaton. Total duration = 8 seconds.    Balloon reinflated a second time: Max pressure = 12 keaton. Total duration = 8 seconds.

## 2019-10-16 NOTE — Clinical Note
The first balloon was inserted into the circumflex and marginal circumflex.Max pressure = 14 keaton. Total duration = 14 seconds.

## 2019-10-16 NOTE — TELEPHONE ENCOUNTER
Reason for call:  Patient reporting a symptom    Symptom or request: Symptoms    Duration (how long have symptoms been present): on going    Have you been treated for this before? No    Additional comments: Pt calling to report symptoms of chest pain and difficulty breathing and was transferred to a Triage Nurse.    Phone Number patient can be reached at:  Home number on file [phone] (home)    Best Time:  anytime    Can we leave a detailed message on this number:  YES    Call taken on 10/16/2019 at 3:54 PM by Jong Becker

## 2019-10-17 ENCOUNTER — APPOINTMENT (OUTPATIENT)
Dept: CARDIOLOGY | Facility: CLINIC | Age: 59
DRG: 247 | End: 2019-10-17
Attending: INTERNAL MEDICINE
Payer: COMMERCIAL

## 2019-10-17 ENCOUNTER — ANTICOAGULATION THERAPY VISIT (OUTPATIENT)
Dept: ANTICOAGULATION | Facility: CLINIC | Age: 59
End: 2019-10-17

## 2019-10-17 ENCOUNTER — APPOINTMENT (OUTPATIENT)
Dept: GENERAL RADIOLOGY | Facility: CLINIC | Age: 59
DRG: 247 | End: 2019-10-17
Attending: INTERNAL MEDICINE
Payer: COMMERCIAL

## 2019-10-17 VITALS
BODY MASS INDEX: 46.65 KG/M2 | HEART RATE: 70 BPM | SYSTOLIC BLOOD PRESSURE: 169 MMHG | RESPIRATION RATE: 20 BRPM | DIASTOLIC BLOOD PRESSURE: 83 MMHG | HEIGHT: 63 IN | WEIGHT: 263.3 LBS | OXYGEN SATURATION: 98 % | TEMPERATURE: 98.6 F

## 2019-10-17 DIAGNOSIS — I48.0 PAF (PAROXYSMAL ATRIAL FIBRILLATION) (H): ICD-10-CM

## 2019-10-17 DIAGNOSIS — Z79.01 LONG TERM CURRENT USE OF ANTICOAGULANT THERAPY: ICD-10-CM

## 2019-10-17 LAB
ABO + RH BLD: NORMAL
ABO + RH BLD: NORMAL
ANION GAP SERPL CALCULATED.3IONS-SCNC: 8 MMOL/L (ref 3–14)
BLD GP AB SCN SERPL QL: NORMAL
BLD PROD TYP BPU: NORMAL
BLD UNIT ID BPU: 0
BLD UNIT ID BPU: 0
BLOOD BANK CMNT PATIENT-IMP: NORMAL
BLOOD PRODUCT CODE: NORMAL
BLOOD PRODUCT CODE: NORMAL
BPU ID: NORMAL
BPU ID: NORMAL
BUN SERPL-MCNC: 28 MG/DL (ref 7–30)
CALCIUM SERPL-MCNC: 9 MG/DL (ref 8.5–10.1)
CHLORIDE SERPL-SCNC: 107 MMOL/L (ref 94–109)
CHOLEST SERPL-MCNC: 67 MG/DL
CO2 SERPL-SCNC: 22 MMOL/L (ref 20–32)
CREAT SERPL-MCNC: 1.2 MG/DL (ref 0.52–1.04)
GFR SERPL CREATININE-BSD FRML MDRD: 49 ML/MIN/{1.73_M2}
GLUCOSE SERPL-MCNC: 101 MG/DL (ref 70–99)
HDLC SERPL-MCNC: 32 MG/DL
INR PPP: 2.98 (ref 0.86–1.14)
INTERPRETATION ECG - MUSE: NORMAL
KCT BLD-ACNC: 288 SEC (ref 75–150)
LDLC SERPL CALC-MCNC: 27 MG/DL
MAGNESIUM SERPL-MCNC: 1.6 MG/DL (ref 1.6–2.3)
NONHDLC SERPL-MCNC: 36 MG/DL
NUM BPU REQUESTED: 2
POTASSIUM SERPL-SCNC: 3.5 MMOL/L (ref 3.4–5.3)
SODIUM SERPL-SCNC: 137 MMOL/L (ref 133–144)
SPECIMEN EXP DATE BLD: NORMAL
TRANSFUSION STATUS PATIENT QL: NORMAL
TRIGL SERPL-MCNC: 45 MG/DL
TROPONIN I SERPL-MCNC: 0.02 UG/L (ref 0–0.04)
TROPONIN I SERPL-MCNC: <0.015 UG/L (ref 0–0.04)

## 2019-10-17 PROCEDURE — 85610 PROTHROMBIN TIME: CPT | Performed by: STUDENT IN AN ORGANIZED HEALTH CARE EDUCATION/TRAINING PROGRAM

## 2019-10-17 PROCEDURE — C1769 GUIDE WIRE: HCPCS | Performed by: INTERNAL MEDICINE

## 2019-10-17 PROCEDURE — C1725 CATH, TRANSLUMIN NON-LASER: HCPCS | Performed by: INTERNAL MEDICINE

## 2019-10-17 PROCEDURE — 83735 ASSAY OF MAGNESIUM: CPT | Performed by: STUDENT IN AN ORGANIZED HEALTH CARE EDUCATION/TRAINING PROGRAM

## 2019-10-17 PROCEDURE — 25000132 ZZH RX MED GY IP 250 OP 250 PS 637: Performed by: INTERNAL MEDICINE

## 2019-10-17 PROCEDURE — 40000264 ECHOCARDIOGRAM COMPLETE

## 2019-10-17 PROCEDURE — 93455 CORONARY ART/GRFT ANGIO S&I: CPT | Performed by: INTERNAL MEDICINE

## 2019-10-17 PROCEDURE — 93005 ELECTROCARDIOGRAM TRACING: CPT

## 2019-10-17 PROCEDURE — B2111ZZ FLUOROSCOPY OF MULTIPLE CORONARY ARTERIES USING LOW OSMOLAR CONTRAST: ICD-10-PCS | Performed by: INTERNAL MEDICINE

## 2019-10-17 PROCEDURE — B2131ZZ FLUOROSCOPY OF MULTIPLE CORONARY ARTERY BYPASS GRAFTS USING LOW OSMOLAR CONTRAST: ICD-10-PCS | Performed by: INTERNAL MEDICINE

## 2019-10-17 PROCEDURE — 36415 COLL VENOUS BLD VENIPUNCTURE: CPT | Performed by: NURSE PRACTITIONER

## 2019-10-17 PROCEDURE — 86900 BLOOD TYPING SEROLOGIC ABO: CPT | Performed by: NURSE PRACTITIONER

## 2019-10-17 PROCEDURE — P9059 PLASMA, FRZ BETWEEN 8-24HOUR: HCPCS | Performed by: NURSE PRACTITIONER

## 2019-10-17 PROCEDURE — 25000132 ZZH RX MED GY IP 250 OP 250 PS 637: Performed by: NURSE PRACTITIONER

## 2019-10-17 PROCEDURE — 99152 MOD SED SAME PHYS/QHP 5/>YRS: CPT | Performed by: INTERNAL MEDICINE

## 2019-10-17 PROCEDURE — C1887 CATHETER, GUIDING: HCPCS | Performed by: INTERNAL MEDICINE

## 2019-10-17 PROCEDURE — 86901 BLOOD TYPING SEROLOGIC RH(D): CPT | Performed by: NURSE PRACTITIONER

## 2019-10-17 PROCEDURE — B2101ZZ FLUOROSCOPY OF SINGLE CORONARY ARTERY USING LOW OSMOLAR CONTRAST: ICD-10-PCS | Performed by: INTERNAL MEDICINE

## 2019-10-17 PROCEDURE — C1894 INTRO/SHEATH, NON-LASER: HCPCS | Performed by: INTERNAL MEDICINE

## 2019-10-17 PROCEDURE — 25000128 H RX IP 250 OP 636: Performed by: NURSE PRACTITIONER

## 2019-10-17 PROCEDURE — 25500064 ZZH RX 255 OP 636: Performed by: INTERNAL MEDICINE

## 2019-10-17 PROCEDURE — 25000132 ZZH RX MED GY IP 250 OP 250 PS 637: Performed by: STUDENT IN AN ORGANIZED HEALTH CARE EDUCATION/TRAINING PROGRAM

## 2019-10-17 PROCEDURE — 86850 RBC ANTIBODY SCREEN: CPT | Performed by: NURSE PRACTITIONER

## 2019-10-17 PROCEDURE — 85347 COAGULATION TIME ACTIVATED: CPT

## 2019-10-17 PROCEDURE — C1874 STENT, COATED/COV W/DEL SYS: HCPCS | Performed by: INTERNAL MEDICINE

## 2019-10-17 PROCEDURE — 92929 ZZHC PRQ TRLUML CORONARY BM STENT W/ANGIO ADDL ART/BRNCH: CPT | Mod: 59 | Performed by: INTERNAL MEDICINE

## 2019-10-17 PROCEDURE — 25000125 ZZHC RX 250: Performed by: INTERNAL MEDICINE

## 2019-10-17 PROCEDURE — 25000128 H RX IP 250 OP 636: Performed by: INTERNAL MEDICINE

## 2019-10-17 PROCEDURE — 92928 PRQ TCAT PLMT NTRAC ST 1 LES: CPT | Mod: LC | Performed by: INTERNAL MEDICINE

## 2019-10-17 PROCEDURE — 99153 MOD SED SAME PHYS/QHP EA: CPT | Performed by: INTERNAL MEDICINE

## 2019-10-17 PROCEDURE — 25000128 H RX IP 250 OP 636: Performed by: STUDENT IN AN ORGANIZED HEALTH CARE EDUCATION/TRAINING PROGRAM

## 2019-10-17 PROCEDURE — 84484 ASSAY OF TROPONIN QUANT: CPT | Performed by: STUDENT IN AN ORGANIZED HEALTH CARE EDUCATION/TRAINING PROGRAM

## 2019-10-17 PROCEDURE — C9600 PERC DRUG-EL COR STENT SING: HCPCS | Mod: LC | Performed by: INTERNAL MEDICINE

## 2019-10-17 PROCEDURE — 40000344 ZZHCL STATISTIC THAWING COMPONENT: Performed by: NURSE PRACTITIONER

## 2019-10-17 PROCEDURE — 21400000 ZZH R&B CCU UMMC

## 2019-10-17 PROCEDURE — C9601 PERC DRUG-EL COR STENT BRAN: HCPCS | Mod: LC | Performed by: INTERNAL MEDICINE

## 2019-10-17 PROCEDURE — 93455 CORONARY ART/GRFT ANGIO S&I: CPT | Mod: 26 | Performed by: INTERNAL MEDICINE

## 2019-10-17 PROCEDURE — 93306 TTE W/DOPPLER COMPLETE: CPT | Mod: 26 | Performed by: INTERNAL MEDICINE

## 2019-10-17 PROCEDURE — 27210794 ZZH OR GENERAL SUPPLY STERILE: Performed by: INTERNAL MEDICINE

## 2019-10-17 PROCEDURE — 80048 BASIC METABOLIC PNL TOTAL CA: CPT | Performed by: STUDENT IN AN ORGANIZED HEALTH CARE EDUCATION/TRAINING PROGRAM

## 2019-10-17 PROCEDURE — 93010 ELECTROCARDIOGRAM REPORT: CPT | Performed by: INTERNAL MEDICINE

## 2019-10-17 PROCEDURE — 71045 X-RAY EXAM CHEST 1 VIEW: CPT

## 2019-10-17 PROCEDURE — 27210762 ZZH DEVICE SUTURELESS SECUREMENT EA CR2: Performed by: INTERNAL MEDICINE

## 2019-10-17 PROCEDURE — 36415 COLL VENOUS BLD VENIPUNCTURE: CPT | Performed by: STUDENT IN AN ORGANIZED HEALTH CARE EDUCATION/TRAINING PROGRAM

## 2019-10-17 PROCEDURE — 80061 LIPID PANEL: CPT | Performed by: STUDENT IN AN ORGANIZED HEALTH CARE EDUCATION/TRAINING PROGRAM

## 2019-10-17 PROCEDURE — 99207 ZZC NO CHARGE NURSE ONLY: CPT | Performed by: INTERNAL MEDICINE

## 2019-10-17 PROCEDURE — 027135Z DILATION OF CORONARY ARTERY, TWO ARTERIES WITH TWO DRUG-ELUTING INTRALUMINAL DEVICES, PERCUTANEOUS APPROACH: ICD-10-PCS | Performed by: INTERNAL MEDICINE

## 2019-10-17 DEVICE — STENT SYNERGY DRUG ELUTING 3.00X20MM  H7493926020300: Type: IMPLANTABLE DEVICE | Status: FUNCTIONAL

## 2019-10-17 DEVICE — STENT SYNERGY DRUG ELUTING 3.50X16MM  H7493926016350: Type: IMPLANTABLE DEVICE | Status: FUNCTIONAL

## 2019-10-17 RX ORDER — METOPROLOL TARTRATE 50 MG
100 TABLET ORAL 2 TIMES DAILY
Qty: 180 TABLET | Refills: 3 | Status: SHIPPED | OUTPATIENT
Start: 2019-10-17 | End: 2019-12-18

## 2019-10-17 RX ORDER — HEPARIN SODIUM 1000 [USP'U]/ML
INJECTION, SOLUTION INTRAVENOUS; SUBCUTANEOUS
Status: DISCONTINUED | OUTPATIENT
Start: 2019-10-17 | End: 2019-10-17 | Stop reason: HOSPADM

## 2019-10-17 RX ORDER — ARGATROBAN 1 MG/ML
150 INJECTION, SOLUTION INTRAVENOUS
Status: DISCONTINUED | OUTPATIENT
Start: 2019-10-17 | End: 2019-10-17 | Stop reason: HOSPADM

## 2019-10-17 RX ORDER — NALOXONE HYDROCHLORIDE 0.4 MG/ML
.1-.4 INJECTION, SOLUTION INTRAMUSCULAR; INTRAVENOUS; SUBCUTANEOUS
Status: DISCONTINUED | OUTPATIENT
Start: 2019-10-17 | End: 2019-10-18 | Stop reason: HOSPADM

## 2019-10-17 RX ORDER — NICARDIPINE HYDROCHLORIDE 2.5 MG/ML
INJECTION INTRAVENOUS
Status: DISCONTINUED | OUTPATIENT
Start: 2019-10-17 | End: 2019-10-17 | Stop reason: HOSPADM

## 2019-10-17 RX ORDER — IOPAMIDOL 755 MG/ML
INJECTION, SOLUTION INTRAVASCULAR
Status: DISCONTINUED | OUTPATIENT
Start: 2019-10-17 | End: 2019-10-17 | Stop reason: HOSPADM

## 2019-10-17 RX ORDER — NITROGLYCERIN 20 MG/100ML
.07-1.68 INJECTION INTRAVENOUS CONTINUOUS PRN
Status: DISCONTINUED | OUTPATIENT
Start: 2019-10-17 | End: 2019-10-17 | Stop reason: HOSPADM

## 2019-10-17 RX ORDER — EPTIFIBATIDE 2 MG/ML
2 INJECTION, SOLUTION INTRAVENOUS CONTINUOUS PRN
Status: DISCONTINUED | OUTPATIENT
Start: 2019-10-17 | End: 2019-10-17 | Stop reason: HOSPADM

## 2019-10-17 RX ORDER — HYDROXYZINE HYDROCHLORIDE 25 MG/1
50 TABLET, FILM COATED ORAL ONCE
Status: COMPLETED | OUTPATIENT
Start: 2019-10-17 | End: 2019-10-17

## 2019-10-17 RX ORDER — DOBUTAMINE HYDROCHLORIDE 200 MG/100ML
2-20 INJECTION INTRAVENOUS CONTINUOUS PRN
Status: DISCONTINUED | OUTPATIENT
Start: 2019-10-17 | End: 2019-10-17 | Stop reason: HOSPADM

## 2019-10-17 RX ORDER — HYDRALAZINE HYDROCHLORIDE 20 MG/ML
10-20 INJECTION INTRAMUSCULAR; INTRAVENOUS EVERY 6 HOURS PRN
Status: DISCONTINUED | OUTPATIENT
Start: 2019-10-17 | End: 2019-10-18 | Stop reason: HOSPADM

## 2019-10-17 RX ORDER — NITROGLYCERIN 5 MG/ML
VIAL (ML) INTRAVENOUS
Status: DISCONTINUED | OUTPATIENT
Start: 2019-10-17 | End: 2019-10-17 | Stop reason: HOSPADM

## 2019-10-17 RX ORDER — HEPARIN SODIUM 10000 [USP'U]/100ML
100-1000 INJECTION, SOLUTION INTRAVENOUS CONTINUOUS PRN
Status: DISCONTINUED | OUTPATIENT
Start: 2019-10-17 | End: 2019-10-17 | Stop reason: HOSPADM

## 2019-10-17 RX ORDER — METOPROLOL TARTRATE 100 MG
100 TABLET ORAL 2 TIMES DAILY
Status: DISCONTINUED | OUTPATIENT
Start: 2019-10-17 | End: 2019-10-18 | Stop reason: HOSPADM

## 2019-10-17 RX ORDER — ARGATROBAN 1 MG/ML
350 INJECTION, SOLUTION INTRAVENOUS
Status: DISCONTINUED | OUTPATIENT
Start: 2019-10-17 | End: 2019-10-17 | Stop reason: HOSPADM

## 2019-10-17 RX ORDER — NALOXONE HYDROCHLORIDE 0.4 MG/ML
.2-.4 INJECTION, SOLUTION INTRAMUSCULAR; INTRAVENOUS; SUBCUTANEOUS
Status: DISCONTINUED | OUTPATIENT
Start: 2019-10-17 | End: 2019-10-18 | Stop reason: HOSPADM

## 2019-10-17 RX ORDER — ATROPINE SULFATE 0.1 MG/ML
0.5 INJECTION INTRAVENOUS EVERY 5 MIN PRN
Status: DISCONTINUED | OUTPATIENT
Start: 2019-10-17 | End: 2019-10-18 | Stop reason: HOSPADM

## 2019-10-17 RX ORDER — NOREPINEPHRINE BITARTRATE 0.06 MG/ML
.03-.4 INJECTION, SOLUTION INTRAVENOUS CONTINUOUS PRN
Status: DISCONTINUED | OUTPATIENT
Start: 2019-10-17 | End: 2019-10-17 | Stop reason: HOSPADM

## 2019-10-17 RX ORDER — SODIUM CHLORIDE 9 MG/ML
INJECTION, SOLUTION INTRAVENOUS CONTINUOUS
Status: DISCONTINUED | OUTPATIENT
Start: 2019-10-17 | End: 2019-10-17 | Stop reason: HOSPADM

## 2019-10-17 RX ORDER — FENTANYL CITRATE 50 UG/ML
INJECTION, SOLUTION INTRAMUSCULAR; INTRAVENOUS
Status: DISCONTINUED | OUTPATIENT
Start: 2019-10-17 | End: 2019-10-17 | Stop reason: HOSPADM

## 2019-10-17 RX ORDER — FLUMAZENIL 0.1 MG/ML
0.2 INJECTION, SOLUTION INTRAVENOUS
Status: DISCONTINUED | OUTPATIENT
Start: 2019-10-17 | End: 2019-10-18 | Stop reason: HOSPADM

## 2019-10-17 RX ORDER — FENTANYL CITRATE 50 UG/ML
25-50 INJECTION, SOLUTION INTRAMUSCULAR; INTRAVENOUS
Status: DISCONTINUED | OUTPATIENT
Start: 2019-10-17 | End: 2019-10-18 | Stop reason: HOSPADM

## 2019-10-17 RX ORDER — FUROSEMIDE 10 MG/ML
20 INJECTION INTRAMUSCULAR; INTRAVENOUS ONCE
Status: DISCONTINUED | OUTPATIENT
Start: 2019-10-17 | End: 2019-10-18 | Stop reason: HOSPADM

## 2019-10-17 RX ORDER — DOPAMINE HYDROCHLORIDE 160 MG/100ML
2-20 INJECTION, SOLUTION INTRAVENOUS CONTINUOUS PRN
Status: DISCONTINUED | OUTPATIENT
Start: 2019-10-17 | End: 2019-10-17 | Stop reason: HOSPADM

## 2019-10-17 RX ORDER — EPTIFIBATIDE 2 MG/ML
180 INJECTION, SOLUTION INTRAVENOUS EVERY 10 MIN PRN
Status: DISCONTINUED | OUTPATIENT
Start: 2019-10-17 | End: 2019-10-17 | Stop reason: HOSPADM

## 2019-10-17 RX ORDER — NITROGLYCERIN 0.4 MG/1
0.4 TABLET SUBLINGUAL EVERY 5 MIN PRN
Status: DISCONTINUED | OUTPATIENT
Start: 2019-10-17 | End: 2019-10-18 | Stop reason: HOSPADM

## 2019-10-17 RX ORDER — ASPIRIN 81 MG/1
81 TABLET ORAL DAILY
Status: DISCONTINUED | OUTPATIENT
Start: 2019-10-17 | End: 2019-10-18 | Stop reason: HOSPADM

## 2019-10-17 RX ADMIN — HUMAN ALBUMIN MICROSPHERES AND PERFLUTREN 6 ML: 10; .22 INJECTION, SOLUTION INTRAVENOUS at 10:48

## 2019-10-17 RX ADMIN — HYDROXYCHLOROQUINE SULFATE 200 MG: 200 TABLET, FILM COATED ORAL at 08:05

## 2019-10-17 RX ADMIN — METOPROLOL TARTRATE 50 MG: 50 TABLET, FILM COATED ORAL at 08:05

## 2019-10-17 RX ADMIN — ASPIRIN 81 MG CHEWABLE TABLET 81 MG: 81 TABLET CHEWABLE at 08:05

## 2019-10-17 RX ADMIN — METOPROLOL TARTRATE 50 MG: 50 TABLET, FILM COATED ORAL at 00:12

## 2019-10-17 RX ADMIN — FUROSEMIDE 40 MG: 10 INJECTION, SOLUTION INTRAVENOUS at 00:12

## 2019-10-17 RX ADMIN — METOPROLOL TARTRATE 100 MG: 100 TABLET, FILM COATED ORAL at 20:05

## 2019-10-17 RX ADMIN — HYDROXYZINE HYDROCHLORIDE 50 MG: 25 TABLET, FILM COATED ORAL at 19:43

## 2019-10-17 RX ADMIN — ATORVASTATIN CALCIUM 40 MG: 40 TABLET, FILM COATED ORAL at 08:05

## 2019-10-17 RX ADMIN — HYDRALAZINE HYDROCHLORIDE 20 MG: 20 INJECTION INTRAMUSCULAR; INTRAVENOUS at 17:52

## 2019-10-17 ASSESSMENT — ACTIVITIES OF DAILY LIVING (ADL)
ADLS_ACUITY_SCORE: 11

## 2019-10-17 NOTE — PROGRESS NOTES
"S: \"Luz Marina\" (she/her/hers pronouns) was admitted 10/16 with stable angina.    B: medical history of CAD s/p CABG (2009), HFpEF, Afib, lupus, normocytic anemia    A: Monitored vitals and assessed pt status.   Changed: 2 units of plasma given; echo completed; LHC with stents x2 to circumflex; hydralazine 20mg IV; chest x-ray  Running: none  PRN: none  Tele: sinus rhythm/sinus teresa  O2: room air  Mobility: independent     Neuro: A&O x 4, anxious and pleasant  Cardiac: murmur detected; L anterior chest pain with deep breathing, radiating the L shoulder and neck, improving   Respiratory: tachypnea with shallow breathing this AM, improving; lung sounds diminished at bases; increased SOB this evening  GI/: urinating adequately; last BM 10/15 per pt report  Diet/appetite: regular diet  Activity: ambulating to bathroom independently  Pain: chest pain with deep breathing  Skin: no areas of concern, declined full skin assessment  LDAs: R PIV, saline locked    R: Continue to monitor Pt status and report changes to treatment team - CSI.    "

## 2019-10-17 NOTE — PLAN OF CARE
Shift: [id number]    Status: NPO pending heart cath  Neuro: Alert and oriented x4, calls appropriately  Cardiac: VSS  Respiratory: RA, diminished in bases  GI: denies N/V  Diet/Appetite: NPO  : Voids adequately, 40mg IV lasix given  Activity: Up independently  Pain: Denies  Skin: No new deficits   LDA(s): PIV SL        Plan: Continue plan of care

## 2019-10-17 NOTE — DISCHARGE INSTRUCTIONS
Anticoagulation Instructions:  Your INR today was 2.98, however we gave you 2 units of FFP.  Please have your INR checked Monday, 10/21. Continue to take your home Warfarin and Asprin as well as new Brilinta.  However, if INR is greater than 2 on Monday, stop aspirin      Going home after a Coronary Angiogram with Stent Placement    PROCEDURE SITE:  It is normal to have soreness and small bruising at the puncture site as well as mild tingling in your hand for up to 3 days. You may shower but please do not use a hot tub, bath tub or pool for 2 days. Do not apply any lotion or powder near the site for 3 days. For 3 days do not use your affected hand/arm to support your weight (like rising up out of a chair) or lifting >5 pounds.    ACTIVITY:  Keep in mind everyone will recover at a different pace.  Eventually the goal per the American [employer] Association is 30 minutes of moderate exercise 5 times a week. In the first 2 weeks it is best for all patient to avoid strenuous/vigorous exercise including sex.    MEDICATIONS:  1. You have begun Brilinta (ticagrelor). This medication is essential for your stent(s). Do not stop it without speaking first to your heart doctor or their nurse- this includes if you have concerns about side effects or cost. Also, if another health provider tells you to stop it, let them know they need to discuss it with your heart doctor.   2. If you are on Metformin (Glucophage) or any medications that contain this, you should not take it for at least 48 hours (2 days) from the time of your procedure. If you have baseline kidney problems, you may be instructed to also have a lab test drawn in 2-3 days before getting the okay to restart it.  3. If you have not been continued on other medications you were previously taking at home it is probably for reason though please discuss your concerns with any of your doctors.     DIET:  We recommend a diet low in saturated fat, trans fat and cholesterol. In  addition it will be helpful to be cautious of sodium intake and carbohydrates. Try to increase the amount of lean meats you eat like fish and chicken, but avoid frying; and reduce the amount of red meat you eat. Eat more fresh fruits and vegetables and try to avoid canned and processed food. Please reference the handouts you received for more specific information.    CARDIAC REHAB:  After the initial healing process of the procedure site, we recommend cardiac rehabilitation for all heart attack and stent patients. Cardiac rehabilitation will help you:  - Rebuild stamina, strength and balance.  - Learn how to participate in activities safely, as well as help you regain confidence to do so.  - Return to activities of daily living and leisure.  You should receive a call from them within the next week, but if you do not or you would like to call you can contact their central scheduling at [phone]    OTHER INFORMATION:  1. If you are a smoker, quitting smoking will be one of the most important things you can do for yourself. There are nicotine replacement options or medications they might be able to be prescribed. Please discuss this with your doctors. Consider calling the [employer]uit[employer] at [phone] ([phone]) as they can offer ongoing support after discharge.    CALL YOUR DOCTOR IF:  -You have a large or growing lump/bump around the procedure site  -The site is red, swollen, hot, tender or has drainage  -You have hives, a rash or unusual itching  -You have increasing or worsening shortness of breath or chest pain    FOLLOW UP:  We prefer you to follow up with your primary care provider within one week. You will be arranged to see the cardiologist (heart doctor) in clinic in about 2-4 weeks.     Should you need to contact us:  Cardiology clinic for scheduling or triage nurse questions/concerns:  [phone]

## 2019-10-17 NOTE — H&P
[facility] Cardiology History and Physical    I did not see patient.  Patient was admitted at ER request.  The patient was not staffed with me.         Luz Marina Live MRN# [id number]             Assessment and Plan:   Assessment:  59yoF w/ hx of CAD s/p CABG, Afib on warfarin presents for sxs consistent w/ stable angina, admitted for [facility]    Plan:  # Angina, stable  # Hx of CAD s/p CABG  Hx of triple vessel disease s/p CABG in 2009 (LIMA to LAD, SVG to OM and R post descending), sxs consistent w/ stable angina, EKG neg for ST elevation and trop neg x2 at OSH. Had Lexiscan that demonstrated no filling defect in 2018, given hx of multi-vessel dz, will need diagnostic angiogram  - hold warfarin  - pta ASA  - NPO at [facility] in AM   - echo in AM   - trend trop     # HFpEF  BNP at OSH >400, some KAYE in the past 24 hrs, does not take diuretics at home denies orthopnea/bendopnea, does not weigh self regularly to notice weight change. Physical exam appears largely euvolemic. But cxr shows interval increase in cardiomegaly w/ interstitial prominence and small R sided pleural effusion. BNP is 427 at OSH pt is obese hence could have falsely low BNP in setting of HF exacerbation. Etiology may be ischemic in setting of angia  - fluid restriction 2000cc  - strict I/O   - lasix 40mg IV x1   - pta metoprolol and atorvastatin  - echo in AM    #RANDELL  Cr 1.3 from baseline of 0.9, no hx of renal diseases. Relatively euvolemic but has lab and radiographic evidence c/f HF exacerbation. Does not take diuretics at home. RANDELL likely 2/2 cardiorenal phenomenon from HF exacerbation   - diuresis as above  - daily cr  - holding lisinopril  - K=4, Mg =2    # Afib on warfarin  # Supratherapeutic INR  Afib documented as of 2015, currently in sinus. Takes warfarin pta. supratherapeutic INR around 3.9 from OSH.  - hold warfarin in anticipation of coronary angio in AM  - tele    # Chronic Problems  # SLE - pta plaquenil  # Normocytic anemia - hgb  stable around baseline 9-10, daily cbc    FEN: no IVF, monitor and replete lytes, cardiac diet   Prophylaxis: none for now, ambulatory  Consults: none    Code Status: FULL    Disposition: 6C    Patient seen and discussed with Dr. Moreno, who agrees with above plan.    Gunnar Cornell MD  Internal Medicine PGY-2  [phone]          Chief Complaint:   Angina, stable         History of Present Illness:   59yoF w/ hx of CAD s/p CABG, Afib, SLE presents as transfer from [facility] for stable angina and suspected HFpEF exacerbation   Pt has been having intermittent substernal chest pain for the past 2 weeks. Earlier on 10/15, started similar substernal chest pain radiating to the L arm and L side of neck. Also c/o increased fatigue and SOB w/ minimal exertion over the past 24 hours. Had some nausea before presentation but denies vomiting, abd pain, back pain. Denies orthopnea or bendopnea. Denies increased LE swelling or weight changes. Does not have nitroglycerin prescribed. Reports recent dry cough but denies fever/diaphoresis, sick contacts, diarrhea.   After admitting to ED at [facility], pt remains hemodynamically stable, but was found to have slightly elevated BNP of 400. Trop neg x2, no ST elevation on EKG. Pt given nitroglycerin w/ resolution of chest pain and transferred to [facility].     CABG (08/2009) :Triple vessel CABG: LIMA to LAD, SVG to OM and R post descending  Last Echocardiogram (03/2018)  Right ventricular function, chamber size, wall motion, and thickness are normal.  PFO again documented with color doppler and Bubble study.  Mild to moderate tricuspid insufficiency is present.  Right ventricular systolic pressure is 61mmHg above the right atrial pressure.  No pericardial effusion is present.  Lexiscan (09/2019):   1. Left ventricular cavity is Normal on the rest and stress studies.  2.  SPECT images demonstrate uniform radiotracer uptake of the  myocardium on both stress and rest images.  3.  Left ventricular  ejection fraction is 73%. Left ventricular  end-diastolic volume is 134 mL. End-systolic volume is 34 mL.         Past Medical History:     Medical History reviewed.          Past Surgical History:   Surgical History reviewed.   Past Surgical History:   Procedure Laterality Date     CARDIAC SURGERY  08/27/2009    triple vessel coronary artery bypass grafting on 8/27/09     CARPAL TUNNEL RELEASE RT/LT  1996    bilateral             Social History:   Social History reviewed.   Social History     Tobacco Use     Smoking status: Former Smoker     Smokeless tobacco: Never Used     Tobacco comment: 30 plus years ago.   Substance Use Topics     Alcohol use: No             Family History:   Family History reviewed.           Allergies:     Allergies   Allergen Reactions     Sulfa Drugs Rash and GI Disturbance             Medications:   Medications Reviewed.            Physical Exam:   Vitals were reviewed.  not currently breastfeeding.    Gen: sitting in bed, NAD  HEENT: MMM, sclera anicteric   CV: tachycardic, regular rhythm, no mrg, normal JVP, no hepatojugular reflux  Pulm: CTA, no wheezing/crackles  Abd: obese, soft, nontender  Ext: no LE edema  Neuro: AOX4, no focal signs

## 2019-10-17 NOTE — PROVIDER NOTIFICATION
"Pt hypertensive during plasma administration. Team notified.     BP (!) 170/90   Pulse 61   Temp 98.6  F (37  C)   Resp 15   Ht 1.6 m (5' 3\")   Wt 119.4 kg (263 lb 4.8 oz)   SpO2 95%   BMI 46.64 kg/m      "

## 2019-10-17 NOTE — PROGRESS NOTES
Admission          10/16/2019  9:45 PM  -----------------------------------------------------------  Diagnosis: Heart Failure,     Admitted from: [facility]  Report given from: Kaley RN  Via: stretcher, Paramedics  Accompanied by: Paramedics,  arrived later  Family Aware of Admission: Yes  Belongings: Placed in closet; valuables sent home with family  Admission Profile: complete  Teaching: orientation to unit, call don't fall, use of console, meal times, visiting hours,  when to call for the RN (angina/sob/dizzyness, etc.), and enforced importance of safety   Access: PIV  Telemetry:Placed on pt  Ht./Wt.: complete    Temp:  [99.4  F (37.4  C)] 99.4  F (37.4  C)  Heart Rate:  [68] 68  Resp:  [18] 18  BP: (144)/(81) 144/81  SpO2:  [100 %] 100 %

## 2019-10-17 NOTE — DISCHARGE SUMMARY
"    [address] [address]  p: [phone]    Discharge Summary: Cardiology Service    Luz Marina Live MRN# [id number]   YOB: 1960 Age: 59 year old       Admission Date: 10/16/2019  Discharge Date: 10/17/19      Discharge Diagnoses:  1. CAD s/p CABG  2. Stable Angina  3. Paroxysmal Atrial Fibrillation (on Coumadin)  4. Supratherapeutic INR  5. Acute Kidney Injury  6. Lupus  7. Chronic Anemia    Pertinent Procedures:  1. Coronary Angiogram    Consults:  N/A    Imaging with results:  Echocardiogram 10/17/19:  Interpretation Summary  Global and regional left ventricular function is normal with an EF of 60-65%.  Right ventricular function, chamber size, wall motion, and thickness are  normal.  No pericardial effusion is present.  IVC diameter and respiratory changes fall into an intermediate range  suggesting an RA pressure of 8 mmHg.  Mild pulmonary hypertension is present. Right ventricular systolic pressure is  43mmHg above the right atrial pressure.  Compared to prior, measured RVSP is lower, no other change.    Coronary Angiogram 10/17/19:  Final report pending  Pt s/p PCI OM1, grafts otherwise patent    Other imaging studies:  EKG 12 Lead 10/16/19: NSR with incomplete RBBB, HR 69      Brief HPI:  Luz Marina Live is a 59yoF with a PMHx of CAD s/p CABG, Paroxysmal Afib (on Coumadin) presents for sxs consistent w/ stable angina.    Hospital Course by Diagnosis:  # Angina, stable  # Hx of CAD s/p CABG  Hx of triple vessel disease s/p CABG in 2009 (LIMA to LAD, SVG to OM and R post descending), sxs consistent w/ stable angina, pt reports every time she takes a ANNA MARIE, she feels left sided substernal chest pain, that is occasionally radiates to left neck and left arm.  Chest pain is relieved with \"shallow breathing.\" Patient reports this is similar to pain felt prior to CABG. EKG neg for ST elevation and trop neg x2 at OSH. Had Lexiscan that " demonstrated no filling defect in 2018.  Troponin negative x 3. Concern for HFpEF on admission, , however echocardiogram with no diastolic dysfunction, patient appears euvolemic.    - Resume Warfarin  - Continue PTA ASA, until INR >2, then stop  - Brilinta 90 mg BID x 1 year uninterrupted   - Continue PTA Metoprolol 50 mg BID  - Continue PTA Lisinopril  - Continue Lipitor 40 mg daily  - OP Cardiac Rehab  - Follow up with Cardiology Clinic in 2 weeks for post PCI follow up    #RANDELL  Cr 1.3 from baseline of 0.9, no hx of renal diseases. Appears euvolemic on exam. Does not take diuretics at home. S/p IV Lasix x 1 with good UOP  - Follow up Cr/K in 1 week     # Paroxysmal Afib   # Supratherapeutic INR  Afib documented as of 2015, currently in sinus. Takes warfarin pta. supratherapeutic INR around 3.9 from OSH.  INR this am 2.98, pt given 2Units of FFP  - Continue Warfarin  - INR goal 2-3  - Continue PTA Metoprolol as listed above     # SLE   - pta plaquenil    # Normocytic anemia   hgb stable around baseline 9-10      Condition on discharge  Temp:  [98.1  F (36.7  C)-99.6  F (37.6  C)] 98.6  F (37  C)  Pulse:  [59-70] 61  Heart Rate:  [57-69] 61  Resp:  [14-25] 15  BP: (117-170)/(66-90) 170/90  SpO2:  [94 %-100 %] 95 %  General: Alert, interactive, NAD  Eyes: sclera anicteric, EOMI  Neck: JVP flat, carotid 2+ bilaterally  Cardiovascular: regular rate and rhythm, normal S1 and S2, no murmurs, gallops, or rubs  Resp: clear to auscultation bilaterally, no rales, wheezes, or rhonchi  GI: Soft, nontender, nondistended. +BS.  No HSM or masses, no rebound or guarding.  Extremities: no edema, no cyanosis or clubbing, dorsalis pedis and posterior tibialis pulses 2+ bilaterally  Skin: Warm and dry, no jaundice or rash  Neuro: CN 2-12 intact, moves all extremities equally  Psych: Alert & oriented x 3      Medication Changes:  - Start Brilinta 90 mg BID  - Start 81 mg ASA until INR > 2, then stop ASA    Discharge medications:    Current Discharge Medication List      START taking these medications    Details   ticagrelor (BRILINTA) 90 MG tablet Take 1 tablet (90 mg) by mouth every 12 hours  Qty: 180 tablet, Refills: 3    Associated Diagnoses: Stable angina (H); Coronary artery disease involving autologous artery coronary bypass graft without angina pectoris         CONTINUE these medications which have CHANGED    Details   metoprolol tartrate (LOPRESSOR) 50 MG tablet Take 2 tablets (100 mg) by mouth 2 times daily  Qty: 180 tablet, Refills: 3    Associated Diagnoses: Atrial fibrillation, unspecified type (H)         CONTINUE these medications which have NOT CHANGED    Details   aspirin 81 MG tablet Take by mouth daily      atorvastatin (LIPITOR) 40 MG tablet TAKE 1 TABLET (40 MG) BY MOUTH DAILY  Qty: 90 tablet, Refills: 3    Associated Diagnoses: Pure hypercholesterolemia      Calcium Carbonate-Vitamin D (CALCIUM 600-D PO) Take  by mouth 2 times daily.    Associated Diagnoses: SLE (systemic lupus erythematosus) (H)      hydroxychloroquine (PLAQUENIL) 200 MG tablet Take 1 tablet (200 mg) by mouth daily  Qty: 90 tablet, Refills: 3    Associated Diagnoses: Systemic lupus erythematosus with glomerular disease, unspecified SLE type (H); CKD (chronic kidney disease) stage 3, GFR 30-59 ml/min (H); Long-term use of Plaquenil; Long-term use of immunosuppressant medication      lisinopril (PRINIVIL/ZESTRIL) 40 MG tablet Take 1 tablet (40 mg) by mouth daily  Qty: 90 tablet, Refills: 3    Associated Diagnoses: Benign essential hypertension      MULTIPLE VITAMIN PO Take 1 tablet by mouth daily       Omega-3 Fatty Acids (FISH OIL) 1200 MG capsule Take 2 capsules by mouth 2 times daily       order for DME Autopap 10-16 cmH20  Qty: 1 Device, Refills: 0    Associated Diagnoses: KEITH (obstructive sleep apnea)      warfarin (COUMADIN) 5 MG tablet TAKE 1 TAB (5 MG) BY MOUTH ON FRI & 2 TABS (10 MG) ALL OTHER DAYS, OR AS DIRECTED  Qty: 160 tablet, Refills: 0     Associated Diagnoses: Atrial fibrillation, unspecified type (H)             Labs or imaging requiring follow-up after discharge:  Cr, K in 1 week  INR Monday      Follow-up:  - Follow up with PCP in 7-10 days for post hospitalization visit  - Follow up with Cardiology Clinic in 2 weeks for post PCI follow up    Code status:  Full    Patient Care Team:  Eliz Nguyen MD as PCP - General (Internal Medicine)  Eliz Nguyen MD as PCP - Internal Medicine (Internal Medicine)  Elsy Olivia MD as MD (Cardiology)  Lucie Szymanski, RN as Nurse Coordinator (Cardiology)  Twin Moreno MD as MD (Cardiology)  Mark Laughlin MD as Assigned PCP  Aurea Milton, RN as Specialty Care Coordinator (Cardiology)  Sonu Mac, RN as Specialty Care Coordinator (Cardiology)    Patricia Cortez, SERENA, CNP  [facility]  [phone]

## 2019-10-18 ENCOUNTER — TELEPHONE (OUTPATIENT)
Dept: CARDIOLOGY | Facility: CLINIC | Age: 59
End: 2019-10-18

## 2019-10-18 LAB — INTERPRETATION ECG - MUSE: NORMAL

## 2019-10-18 ASSESSMENT — ACTIVITIES OF DAILY LIVING (ADL): ADLS_ACUITY_SCORE: 11

## 2019-10-18 NOTE — PROGRESS NOTES
DISCHARGE     Discharged to: home  Via: automobile  Accompanied by: spouse, Kevin  Discharge Instructions: diet, activity, medications, follow up appointments, when to call the MD, and what to watch out for (i.e. s/s of infection, increasing SOB, palpitations, chest pain)  Prescriptions: picked up from discharge pharmacy; medication list reviewed & sent with pt  Follow Up Appointments: arranged; information given  Belongings: all sent with pt  IV: out  Telemetry: off  Pt exhibits understanding of above discharge instructions; all questions answered.  Discharge Paperwork: faxed

## 2019-10-18 NOTE — PROVIDER NOTIFICATION
Pt experiencing increased SOB and work of breathing. Vitals stable. Lungs clear. 2 LPM via NC applied for pt comfort. Provider notified, new orders for atarax and chest x-ray. Will continue to monitor.

## 2019-10-21 ENCOUNTER — HOSPITAL ENCOUNTER (OUTPATIENT)
Dept: GENERAL RADIOLOGY | Facility: CLINIC | Age: 59
Discharge: HOME | End: 2019-10-21
Attending: INTERNAL MEDICINE | Admitting: INTERNAL MEDICINE
Payer: COMMERCIAL

## 2019-10-21 ENCOUNTER — OFFICE VISIT (OUTPATIENT)
Dept: INTERNAL MEDICINE | Facility: CLINIC | Age: 59
End: 2019-10-21
Attending: INTERNAL MEDICINE
Payer: COMMERCIAL

## 2019-10-21 DIAGNOSIS — I10 HTN, GOAL BELOW 140/90: Chronic | ICD-10-CM

## 2019-10-21 DIAGNOSIS — I10 HTN, GOAL BELOW 140/90: Primary | Chronic | ICD-10-CM

## 2019-10-21 LAB
ANION GAP SERPL CALCULATED.3IONS-SCNC: 6 MMOL/L (ref 3–14)
BUN SERPL-MCNC: 44 MG/DL (ref 7–30)
CALCIUM SERPL-MCNC: 8.8 MG/DL (ref 8.5–10.1)
CHLORIDE SERPL-SCNC: 108 MMOL/L (ref 94–109)
CO2 SERPL-SCNC: 23 MMOL/L (ref 20–32)
CREAT SERPL-MCNC: 1.35 MG/DL (ref 0.52–1.04)
GFR SERPL CREATININE-BSD FRML MDRD: 43 ML/MIN/{1.73_M2}
GLUCOSE SERPL-MCNC: 91 MG/DL (ref 70–99)
POTASSIUM SERPL-SCNC: 4 MMOL/L (ref 3.4–5.3)
SODIUM SERPL-SCNC: 137 MMOL/L (ref 133–144)

## 2019-10-21 PROCEDURE — 25000128 H RX IP 250 OP 636: Mod: ZF

## 2019-10-21 PROCEDURE — 80048 BASIC METABOLIC PNL TOTAL CA: CPT | Performed by: INTERNAL MEDICINE

## 2019-10-21 PROCEDURE — 36415 COLL VENOUS BLD VENIPUNCTURE: CPT | Performed by: INTERNAL MEDICINE

## 2019-10-21 PROCEDURE — G0463 HOSPITAL OUTPT CLINIC VISIT: HCPCS | Mod: 25

## 2019-10-21 PROCEDURE — 90686 IIV4 VACC NO PRSV 0.5 ML IM: CPT | Mod: ZF

## 2019-10-21 PROCEDURE — G0008 ADMIN INFLUENZA VIRUS VAC: HCPCS | Mod: ZF

## 2019-10-21 PROCEDURE — 71046 X-RAY EXAM CHEST 2 VIEWS: CPT

## 2019-10-21 ASSESSMENT — ENCOUNTER SYMPTOMS
DYSPNEA ON EXERTION: 0
ALTERED TEMPERATURE REGULATION: 0
LEG SWELLING: 0
DECREASED CONCENTRATION: 0
INSOMNIA: 0
SINUS CONGESTION: 0
SWOLLEN GLANDS: 0
PANIC: 0
LEG PAIN: 0
BRUISES/BLEEDS EASILY: 0
POLYPHAGIA: 0
NERVOUS/ANXIOUS: 0
POLYDIPSIA: 0
COUGH: 0
FATIGUE: 0
DEPRESSION: 0
FEVER: 0

## 2019-10-21 ASSESSMENT — ANXIETY QUESTIONNAIRES
1. FEELING NERVOUS, ANXIOUS, OR ON EDGE: NOT AT ALL
GAD7 TOTAL SCORE: 1
7. FEELING AFRAID AS IF SOMETHING AWFUL MIGHT HAPPEN: NOT AT ALL
3. WORRYING TOO MUCH ABOUT DIFFERENT THINGS: NOT AT ALL
6. BECOMING EASILY ANNOYED OR IRRITABLE: SEVERAL DAYS
5. BEING SO RESTLESS THAT IT IS HARD TO SIT STILL: NOT AT ALL
2. NOT BEING ABLE TO STOP OR CONTROL WORRYING: NOT AT ALL

## 2019-10-21 ASSESSMENT — PATIENT HEALTH QUESTIONNAIRE - PHQ9
5. POOR APPETITE OR OVEREATING: NOT AT ALL
SUM OF ALL RESPONSES TO PHQ QUESTIONS 1-9: 2

## 2019-10-21 NOTE — PROGRESS NOTES
HPI  Patient is here for follow-up on her recent hospitalization.  She reports that she was admitted with chest pain as well as shortness of breath.  She underwent coronary angiogram and had 2 stents placed.  She was also started on Brilinta.  She is wondering if additional testing is needed at this time.  She is also planning to follow-up with Dr. Moreno within a few weeks.  Since her hospitalization patient reports that her shortness of breath has improved.  She continues to have intermittent twinges of chest pain.    Review of Systems     Constitutional:  Negative for fever and fatigue.   HENT:  Negative for ear pain, dry mouth and sinus congestion.    Respiratory:   Negative for cough and dyspnea on exertion.    Cardiovascular:  Positive for chest pain. Negative for dyspnea on exertion, leg swelling, leg pain and edema.   Skin:  Negative for itching and rash.   Endo/Heme:  Negative for anemia, swollen glands and bruises/bleeds easily.   Psychiatric/Behavioral:  Negative for depression, decreased concentration, mood swings and panic attacks.    Endocrine:  Negative for altered temperature regulation, polyphagia, polydipsia, unwanted hair growth and change in facial hair.    Current Outpatient Medications   Medication     aspirin 81 MG tablet     atorvastatin (LIPITOR) 40 MG tablet     Calcium Carbonate-Vitamin D (CALCIUM 600-D PO)     hydroxychloroquine (PLAQUENIL) 200 MG tablet     lisinopril (PRINIVIL/ZESTRIL) 40 MG tablet     metoprolol tartrate (LOPRESSOR) 50 MG tablet     MULTIPLE VITAMIN PO     Omega-3 Fatty Acids (FISH OIL) 1200 MG capsule     order for DME     ticagrelor (BRILINTA) 90 MG tablet     warfarin (COUMADIN) 5 MG tablet     No current facility-administered medications for this visit.      There were no vitals filed for this visit.        Physical Exam  Vitals signs and nursing note reviewed.   Constitutional:       Appearance: Normal appearance.   HENT:      Head: Normocephalic.   Eyes:       Pupils: Pupils are equal, round, and reactive to light.   Neck:      Musculoskeletal: Normal range of motion and neck supple.   Cardiovascular:      Rate and Rhythm: Normal rate and regular rhythm.   Pulmonary:      Effort: Pulmonary effort is normal.      Breath sounds: Normal breath sounds.   Abdominal:      General: Abdomen is flat.   Musculoskeletal:         General: No edema.   Neurological:      General: No focal deficit present.      Mental Status: She is alert.   Psychiatric:         Mood and Affect: Mood normal.         Behavior: Behavior normal.         Thought Content: Thought content normal.         Judgement: Judgment normal.         Assessment and plan:    Luz Marina was seen today for recheck.    Diagnoses and all orders for this visit:    HTN, goal below 140/90.  Blood pressure is currently within acceptable range.  Recommend checking chemistry panel as patient has had decreased kidney function.  We will also obtain chest x-ray to evaluate for changes in pleural effusions.  Patient will be advised on the need for follow-up at a later time.  -     Basic Metabolic Panel  -     XR Chest 2 Views; Future    Other orders  -     FLU VAC PRESRV FREE QUAD SPLIT VIR 3+YRS IM    Total time spent 25 minutes.  More than 50% of the time spent with Ms. Live on counseling / coordinating her care    Eliz Nguyen MD

## 2019-10-21 NOTE — PROGRESS NOTES
ANTICOAGULATION FOLLOW-UP CLINIC VISIT    Patient Name:  Luz Marina Live  Date:  10/21/2019  Contact Type:  Telephone    SUBJECTIVE:  Patient Findings     Comments:   I left a detailed voicemail with the orders reflected in flowsheet. I have also requested a call back if there have been any missed doses, concerns, illness, fever, or if there have been any changes in medications, activity level, or diet          Clinical Outcomes     Comments:   I left a detailed voicemail with the orders reflected in flowsheet. I have also requested a call back if there have been any missed doses, concerns, illness, fever, or if there have been any changes in medications, activity level, or diet             OBJECTIVE    INR   Date Value Ref Range Status   10/17/2019 2.98 (H) 0.86 - 1.14 Final       ASSESSMENT / PLAN  INR assessment THER    Recheck INR In: 4 WEEKS    INR Location Outside lab      Anticoagulation Summary  As of 10/17/2019    INR goal:   2.0-3.0   TTR:   --   INR used for dosin.98 (10/17/2019)   Warfarin maintenance plan:   7.5 mg (5 mg x 1.5) every day   Full warfarin instructions:   7.5 mg every day   Weekly warfarin total:   52.5 mg   No change documented:   Cait Hawthorne RN   Plan last modified:   Michael Quispe, RN (2019)   Next INR check:   2019   Target end date:       Indications    Long-term (current) use of anticoagulants [Z79.01] [Z79.01]  PAF (paroxysmal atrial fibrillation) (H) [I48.0]             Anticoagulation Episode Summary     INR check location:       Preferred lab:       Send INR reminders to:   [facility]    Comments:   5 mg tabs, [facility] (needs staff message) PM dose      Anticoagulation Care Providers     Provider Role Specialty Phone number    Eliz Nguyen MD Responsible Internal Medicine [phone]            See the Encounter Report to view Anticoagulation Flowsheet and Dosing Calendar (Go to Encounters tab in chart review, and find the  Anticoagulation Therapy Visit)    Dosage adjustment made based on physician directed care plan.    Cait Hawthorne RN

## 2019-10-21 NOTE — LETTER
10/21/2019       RE: Luz Marina Live  [address]  [place] [address]     Dear Colleague,    Thank you for referring your patient, Luz Marina Live, to the [facility]  at [facility]. Please see a copy of my visit note below.    HPI  Patient is here for follow-up on her recent hospitalization.  She reports that she was admitted with chest pain as well as shortness of breath.  She underwent coronary angiogram and had 2 stents placed.  She was also started on Brilinta.  She is wondering if additional testing is needed at this time.  She is also planning to follow-up with Dr. Moreno within a few weeks.  Since her hospitalization patient reports that her shortness of breath has improved.  She continues to have intermittent twinges of chest pain.    Review of Systems     Constitutional:  Negative for fever and fatigue.   HENT:  Negative for ear pain, dry mouth and sinus congestion.    Respiratory:   Negative for cough and dyspnea on exertion.    Cardiovascular:  Positive for chest pain. Negative for dyspnea on exertion, leg swelling, leg pain and edema.   Skin:  Negative for itching and rash.   Endo/Heme:  Negative for anemia, swollen glands and bruises/bleeds easily.   Psychiatric/Behavioral:  Negative for depression, decreased concentration, mood swings and panic attacks.    Endocrine:  Negative for altered temperature regulation, polyphagia, polydipsia, unwanted hair growth and change in facial hair.    Current Outpatient Medications   Medication     aspirin 81 MG tablet     atorvastatin (LIPITOR) 40 MG tablet     Calcium Carbonate-Vitamin D (CALCIUM 600-D PO)     hydroxychloroquine (PLAQUENIL) 200 MG tablet     lisinopril (PRINIVIL/ZESTRIL) 40 MG tablet     metoprolol tartrate (LOPRESSOR) 50 MG tablet     MULTIPLE VITAMIN PO     Omega-3 Fatty Acids (FISH OIL) 1200 MG capsule     order for DME     ticagrelor (BRILINTA) 90 MG tablet     warfarin  (COUMADIN) 5 MG tablet     No current facility-administered medications for this visit.      There were no vitals filed for this visit.    Physical Exam  Vitals signs and nursing note reviewed.   Constitutional:       Appearance: Normal appearance.   HENT:      Head: Normocephalic.   Eyes:      Pupils: Pupils are equal, round, and reactive to light.   Neck:      Musculoskeletal: Normal range of motion and neck supple.   Cardiovascular:      Rate and Rhythm: Normal rate and regular rhythm.   Pulmonary:      Effort: Pulmonary effort is normal.      Breath sounds: Normal breath sounds.   Abdominal:      General: Abdomen is flat.   Musculoskeletal:         General: No edema.   Neurological:      General: No focal deficit present.      Mental Status: She is alert.   Psychiatric:         Mood and Affect: Mood normal.         Behavior: Behavior normal.         Thought Content: Thought content normal.         Judgement: Judgment normal.       Assessment and plan:    Luz Marina was seen today for recheck.    Diagnoses and all orders for this visit:    HTN, goal below 140/90.  Blood pressure is currently within acceptable range.  Recommend checking chemistry panel as patient has had decreased kidney function.  We will also obtain chest x-ray to evaluate for changes in pleural effusions.  Patient will be advised on the need for follow-up at a later time.  -     Basic Metabolic Panel  -     XR Chest 2 Views; Future    Other orders  -     FLU VAC PRESRV FREE QUAD SPLIT VIR 3+YRS IM    Total time spent 25 minutes.  More than 50% of the time spent with Ms. Live on counseling / coordinating her care    Eliz Nguyen MD

## 2019-10-22 ASSESSMENT — ANXIETY QUESTIONNAIRES: GAD7 TOTAL SCORE: 1

## 2019-10-24 DIAGNOSIS — E78.00 PURE HYPERCHOLESTEROLEMIA: ICD-10-CM

## 2019-10-30 RX ORDER — ATORVASTATIN CALCIUM 40 MG/1
TABLET, FILM COATED ORAL
Qty: 90 TABLET | Refills: 3 | Status: SHIPPED | OUTPATIENT
Start: 2019-10-30 | End: 2020-11-23

## 2019-11-04 NOTE — PROGRESS NOTES
Impression  1. ASHD with recent stenting         3-vessel coronary artery disease s/p CAB       Patent LIMA-LAD, SVG-OM, SVG-RPDA       Significant stenosis of pLCx/OM1 s/p SHAHANA x2 to distal LM/pLCx/OM1    2. History of CAB  3. Elevated PA pressure  4. Hypertension  5. Hyperlipidemia  6. KEITH  7. PFO  8. Atrial fibrillation with warfarin anticoagulation/chronic  9. SLE  10. History of lupus nephritis    I had the opportunity to review Ms. Live chart as well as go over the results and findings.  She has exertional shortness of breath functional class 2 superimposed upon history of SLE, premature CAD resulting in by-pass, elevated body mass index and history suggestive of KEITH.  She has a history of atrial fibrillation and warfarin usage, facial cellulitis and has routine eye care in view of lupus medications.  She is not diabetic    The patient underwent recent stenting of the coronary arteries after presenting with chest pain. Her exertional shortness of breath is improved subsequent to stenting.  As noted, she has negative stress test in September, only to present with chest pain one month later which in turn resulted in angiogram and new stenting.      Her cholesterol measure were quite good in October, however, her CRP was 8.8 in Augus t5 an 17.0 today    Weight:  Wt Readings from Last 24 Encounters:   11/05/19 116.6 kg (257 lb)   10/16/19 119.4 kg (263 lb 4.8 oz)   10/11/19 120.7 kg (266 lb 1.6 oz)   09/04/19 122 kg (269 lb)   08/16/19 122 kg (269 lb)   08/13/19 122.5 kg (270 lb)   08/05/19 121.4 kg (267 lb 9.6 oz)   11/20/18 122.9 kg (271 lb)   10/29/18 122 kg (269 lb)   09/17/18 119.3 kg (263 lb)   09/04/18 118.3 kg (260 lb 14.4 oz)   06/25/18 118.4 kg (261 lb)   06/25/18 117.5 kg (259 lb)   05/31/18 116.9 kg (257 lb 12.8 oz)   04/30/18 113.4 kg (250 lb)   04/10/18 115 kg (253 lb 9.6 oz)   02/27/18 116.6 kg (257 lb)   11/06/17 119.3 kg (263 lb 1.6 oz)   08/01/17 119.7 kg (264 lb)   07/03/17 122 kg (269 lb)    04/03/17 123 kg (271 lb 1.6 oz)   03/15/17 123.2 kg (271 lb 9.6 oz)   12/22/16 122.5 kg (270 lb)   12/20/16 124.7 kg (275 lb)       Medication:  Current Outpatient Medications   Medication     atorvastatin (LIPITOR) 40 MG tablet     Calcium Carbonate-Vitamin D (CALCIUM 600-D PO)     hydroxychloroquine (PLAQUENIL) 200 MG tablet     lisinopril (PRINIVIL/ZESTRIL) 40 MG tablet     metoprolol tartrate (LOPRESSOR) 50 MG tablet     MULTIPLE VITAMIN PO     Omega-3 Fatty Acids (FISH OIL) 1200 MG capsule     order for DME     ticagrelor (BRILINTA) 90 MG tablet     warfarin (COUMADIN) 5 MG tablet     aspirin 81 MG tablet     No current facility-administered medications for this visit.        Laboratories:      Results for IDA PADRON RAMÍREZ (MRN [id number]) as of 11/5/2019 10:53   Ref. Range 8/5/2019 11:03 11/5/2019 10:16   CRP Inflammation Latest Ref Range: 0.0 - 8.0 mg/L 8.8 (H) 17.0 (H)     Results for IDA PADRON (MRN [id number]) as of 11/5/2019 10:53   Ref. Range 10/17/2019 17:06 10/17/2019 19:34 10/21/2019 12:33 10/21/2019 12:43 11/5/2019 10:16   Sodium Latest Ref Range: 133 - 144 mmol/L    137    Potassium Latest Ref Range: 3.4 - 5.3 mmol/L    4.0    Chloride Latest Ref Range: 94 - 109 mmol/L    108    Carbon Dioxide Latest Ref Range: 20 - 32 mmol/L    23    Urea Nitrogen Latest Ref Range: 7 - 30 mg/dL    44 (H)    Creatinine Latest Ref Range: 0.52 - 1.04 mg/dL    1.35 (H)    GFR Estimate Latest Ref Range: >60 mL/min/1.73_m2    43 (L)    GFR Estimate If Black Latest Ref Range: >60 mL/min/1.73_m2    50 (L)    Calcium Latest Ref Range: 8.5 - 10.1 mg/dL    8.8    Anion Gap Latest Ref Range: 3 - 14 mmol/L    6    Glucose Latest Ref Range: 70 - 99 mg/dL    91    WBC Latest Ref Range: 4.0 - 11.0 10e9/L     6.2   Hemoglobin Latest Ref Range: 11.7 - 15.7 g/dL     10.9 (L)   Hematocrit Latest Ref Range: 35.0 - 47.0 %     33.8 (L)   Platelet Count Latest Ref Range: 150 - 450 10e9/L     194   RBC Count Latest Ref Range: 3.8 - 5.2  10e12/L     3.66 (L)   MCV Latest Ref Range: 78 - 100 fl     92   MCH Latest Ref Range: 26.5 - 33.0 pg     29.8   MCHC Latest Ref Range: 31.5 - 36.5 g/dL     32.2   RDW Latest Ref Range: 10.0 - 15.0 %     14.2   Diff Method Unknown     Automated Method   % Neutrophils Latest Units: %     75.2   % Lymphocytes Latest Units: %     13.9   % Monocytes Latest Units: %     7.8   % Eosinophils Latest Units: %     1.9   % Basophils Latest Units: %     0.6   % Immature Granulocytes Latest Units: %     0.6   Nucleated RBCs Latest Ref Range: 0 /100     0   Absolute Neutrophil Latest Ref Range: 1.6 - 8.3 10e9/L     4.6   Absolute Lymphocytes Latest Ref Range: 0.8 - 5.3 10e9/L     0.9   Absolute Monocytes Latest Ref Range: 0.0 - 1.3 10e9/L     0.5   Absolute Eosinophils Latest Ref Range: 0.0 - 0.7 10e9/L     0.1   Absolute Basophils Latest Ref Range: 0.0 - 0.2 10e9/L     0.0   Abs Immature Granulocytes Latest Ref Range: 0 - 0.4 10e9/L     0.0   Absolute Nucleated RBC Unknown     0.0     Her new catherization:  Results (pressures in mmHg)  RA: 1/4/3  RV 32/3  PA 30/13/19;  PA Sat 65%  PCWP -/-/5;  PCW Sat --%  Kannan CO/CI 3.3/1.6 L/min; TD CO 4.1/2.0 L/min  PVR 4.2 bermudez; TPR 5.7 bermudez  Hb 12.3 g/dL  NIBP 167/81/116  SVR 2200 dynes*sec/cm^5     [place]:  SVC 62.7%  IVC 65.0%  Low RA 73.0%  Mid RA 68.0%  High RA 64.6%  RV 63%  PA 65%  PCW ---     Complications: None   Blood loss: Minimal blood loss     Conclusions:  1. Low right and left sided filling pressures.  2. Normal pulmonary artery pressures.  3. Low normal cardiac output.      REVIEW of SYMPTOMS    Constitutional: without fever, chills, night sweats.  Weight is down  HEENT: without dry eyes, dry mouth, sinusitis, corryza, visual changes  Endocrine: without polyuria, polydypsia, polyphagia, heat or cold intolerance, changing mental status  Cardiology: without chest pain, tightness, heaviness, pressure, paroxysmal dyspnea, orthopnea, palpitation, pre-syncope or syncope or device  discharge (if present)  Pulmonary: without asthma, wheezing, cough, hemoptysis  GI: without nausea, emesis, jaundice, pain, hematemesis, melena  : without frequency, urgency, hematuria, stones, pain, abnormal bleeding, frequency, urgency  Neurologic: without TIA, CVA, trauma, seizure  Dermatologic: without lesions, abrasion rash,   Orthopedic/Rheum: without significant joint pain, impairment, limb, polyserositis, ulceration, Raynauds  Heme: without mass, bruising, frequent infection, anemia  Psychiatric: without substance abuse, hallucination, medication, depression      Exercise tolerance:    Nuclear stress test:  PROTOCOL:    Rest and stress myocardial perfusion imaging was performed using  Tc-99m tetrafosmin intravenously. Pharmacological stress was performed  with 0.4 mg of regadenoson intravenously. The EKG showed normal sinus  rhythm at rest. No significant abnormalities were noted with infusion  of regadenoson.     FINDINGS:  1.  Overall quality of the study: Diagnostic.   2.  Left ventricular cavity is Normal on the rest and stress studies.  3.  SPECT images demonstrate uniform radiotracer uptake of the  myocardium on both stress and rest images.  4.  Left ventricular ejection fraction is 73%. Left ventricular  end-diastolic volume is 134 mL. End-systolic volume is 34 mL      Echocardiogram  Name: IDA PADRON  MRN: [id number]  : 1960  Study Date: 10/17/2019 10:24 AM  Age: 59 yrs  Gender: Female  Patient Location: [facility] [place]  Reason For Study: Chest Pain  Ordering Physician: MARIANELA ESCALONA  Performed By: Yasmany Campbell RDCS     BSA: 2.2 m2  Height: 63 in  Weight: 263 lb  HR: 58  BP: 144/81 mmHg  _____________________________________________________________________________  __        Procedure  Complete Portable Echo Adult. Contrast Optison. Optison (NDC #0349-4584-23)  given intravenously. Patient was given 6 ml mixture of 3 ml Optison and 6 ml  saline. 3 ml  wasted.  _____________________________________________________________________________  __        Interpretation Summary  Global and regional left ventricular function is normal with an EF of 60-65%.  Right ventricular function, chamber size, wall motion, and thickness are  normal.  No pericardial effusion is present.  IVC diameter and respiratory changes fall into an intermediate range  suggesting an RA pressure of 8 mmHg.  Mild pulmonary hypertension is present. Right ventricular systolic pressure is  43mmHg above the right atrial pressure.  Compared to prior, measured RVSP is lower, no other change.  _____________________________________________________________________________  __        Left Ventricle  Left ventricular size is normal. Global and regional left ventricular function  is normal with an EF of 60-65%. Mild concentric wall thickening consistent  with left ventricular hypertrophy is present. Left ventricular diastolic  function is indeterminate.     Right Ventricle  Right ventricular function, chamber size, wall motion, and thickness are  normal.     Atria  Moderate biatrial enlargement is present.     Mitral Valve  The mitral valve is normal.        Aortic Valve  Aortic valve is normal in structure and function.     Tricuspid Valve  Mild tricuspid insufficiency is present. Mild pulmonary hypertension is  present. Right ventricular systolic pressure is 43mmHg above the right atrial  pressure.     Pulmonic Valve  The pulmonic valve is normal. Trace pulmonic insufficiency is present.     Vessels  The aorta root is normal. The thoracic aorta is normal. Dilation of the  inferior vena cava is present with normal respiratory variation in diameter.  IVC diameter and respiratory changes fall into an intermediate range  suggesting an RA pressure of 8 mmHg.     Pericardium  No pericardial effusion is present.     _____________________________________________________________________________  __  MMode/2D  Measurements & Calculations  IVSd: 1.2 cm  LVIDd: 4.5 cm  LVIDs: 2.9 cm  LVPWd: 1.2 cm  FS: 36.6 %     LV mass(C)d: 197.9 grams  LV mass(C)dI: 91.1 grams/m2  asc Aorta Diam: 3.5 cm  LVOT diam: 2.1 cm  LVOT area: 3.5 cm2  LA Volume Index (BP): 43.9 ml/m2  RWT: 0.52  TAPSE: 2.3 cm        Doppler Measurements & Calculations  MV E max maria isabel: 84.8 cm/sec  MV A max maria isabel: 35.8 cm/sec  MV E/A: 2.4  MV dec slope: 473.3 cm/sec2  MV dec time: 0.18 sec     TV max P.0 mmHg  PA acc time: 0.09 sec  TR max maria isabel: 327.8 cm/sec  TR max P.0 mmHg  E/E' avg: 10.5  Lateral E/e': 9.0  Medial E/e': 12.0     ____________________________        Echocardiographic findings of TR, modest PA, normal systolic function.    Name: IDA PADRON  MRN: [id number]  : 1960  Study Date: 2018 11:48 AM  Age: 57 yrs  Gender: Female  Patient Location: [facility]  Reason For Study: Hx PFO, Elevated PA pressures  Ordering Physician: SANNA SALAZAR  Referring Physician: SANNA SALAZAR  Performed By: Tray Harvey RDCS     BSA: 2.2 m2  Height: 63 in  Weight: 257 lb  HR: 62  BP: 167/94 mmHg  _____________________________________________________________________________  __        Procedure  Bubble Echocardiogram with two-dimensional, color and spectral Doppler  performed. IV start location R Hand . Bateriostatic Saline used for Bubble  Study.  _____________________________________________________________________________  __        Interpretation Summary     Right ventricular function, chamber size, wall motion, and thickness are  normal.  PFO again documented with color doppler and Bubble study.  Mild to moderate tricuspid insufficiency is present.  Right ventricular systolic pressure is 61mmHg above the right atrial pressure.  No pericardial effusion is present.  _____________________________________________________________________________  __        Left Ventricle  Global and regional left ventricular function is normal with an EF of  55-60%.  Left ventricular wall thickness is normal. Left ventricular size is normal.  Left ventricular diastolic function is indeterminate. No regional wall motion  abnormalities are seen.     Right Ventricle  Right ventricular function, chamber size, wall motion, and thickness are  normal.     Atria  Moderate biatrial enlargement is present. PFO again documented with color  doppler and Bubble study.     Mitral Valve  Mild to moderate mitral insufficiency is present.        Aortic Valve  Aortic valve is normal in structure and function.     Tricuspid Valve  Mild to moderate tricuspid insufficiency is present. Right ventricular  systolic pressure is 61mmHg above the right atrial pressure.     Pulmonic Valve  The pulmonic valve is normal. Trace to mild pulmonic insufficiency is present.     Vessels  The aorta root is normal. The pulmonary artery is normal. The inferior vena  cava is normal.     Pericardium  No pericardial effusion is present.     _____________________________________________________________________________  __  MMode/2D Measurements & Calculations  IVSd: 0.89 cm  LVIDd: 5.3 cm  LVIDs: 3.1 cm  LVPWd: 0.75 cm  FS: 41.8 %  LV mass(C)d: 154.8 grams  LV mass(C)dI: 72.0 grams/m2     Ao root diam: 3.0 cm  LA dimension: 5.3 cm  asc Aorta Diam: 3.6 cm  LA/Ao: 1.8  LVOT diam: 2.2 cm  LVOT area: 3.7 cm2  LA Volume (BP): 96.0 ml  LA Volume Index (BP): 44.7 ml/m2  RWT: 0.28        Doppler Measurements & Calculations  MV E max maria isabel: 92.8 cm/sec  MV A max maria isabel: 58.2 cm/sec  MV E/A: 1.6  MV dec time: 0.15 sec     Ao V2 max: 147.4 cm/sec  Ao max P.0 mmHg  TARUN(V,D): 2.6 cm2  LV V1 max P.3 mmHg  LV V1 max: 104.1 cm/sec  LV V1 VTI: 24.8 cm  MR ERO: 0.27 cm2  CO(LVOT): 5.6 l/min  CI(LVOT): 2.6 l/min/m2  SV(LVOT): 92.9 ml  SI(LVOT): 43.2 ml/m2  PA V2 max: 110.6 cm/sec  PA max P.9 mmHg  PA acc time: 0.08 sec  PI end-d maria isabel: 154.4 cm/sec  PI max maria isabel: 255.8 cm/sec  PI max P.2 mmHg  TR max maria isabel: 385.4 cm/sec  TR  max P.4 mmHg  Pulm Sys Luis: 63.2 cm/sec  Pulm Peng Luis: 97.7 cm/sec  Pulm S/D: 0.65  E/E' av.1  Lateral E/e': 8.9  Medial E/e': 15.4        : 1960  Study Date: 2016 01:31 PM  Age: 56 yrs  Gender: Female  Patient Location: [facility]  Reason For Study:     History: tricuspid regurgitation  Ordering Physician: JOSE ARELLANO  Referring Physician: JOSE ARELLANO  Performed By: Hugh Sharpe MD     BSA: 2.2 m2  Height: 63 in  Weight: 269 lb  ______________________________________________________________________________     Interpretation Summary  Left ventricular function, chamber size, wall motion, and wall thickness are  normal.The EF is 55-60%.  Global right ventricular function is normal. Mild right ventricular dilation  is present.  Mild to moderate TR with mild prolapse of the posterior leaflet of the  tricupsid valve.  A small patent foramen ovale is present. There is no ASD (secundum, sinus  venosus or unroofed coronary sinus). All 4 pulmonary veins drain into the  left atrium.  Mild pulmonary hypertension is present with RVSP 45mmHg above RAP.     ______________________________________________________________________________        Procedure  Transesophageal Echocardiogram with color and spectral Doppler performed. The  procedure was performed in the Echo Lab. Informed consent for Transesophegeal  echo obtained. EVE Probe #12 was used during the procedure. Patient was  sedated using Fentanyl 100 mcg. Patient was sedated using Versed 4 mg. The  Transducer was inserted without difficulty . The patient tolerated the  procedure well. Complications None. ECG shows normal sinus rhythm. Good  quality two-dimensional was performed and interpreted.     Left Ventricle  Left ventricular function, chamber size, wall motion, and wall thickness are  normal.The EF is 55-60%.     Right Ventricle  Global right ventricular function is normal. Mild right ventricular dilation  is  present.     Atria  Both atria appear normal. The left atrial appendage is normal. It is free of  spontaneous echo contrast and thrombus. A small patent foramen ovale is  present. The patent foramen ovale was demonstrated by color Doppler and  agitated saline bubble study . The patent foramen ovale was demonstrated with  Valsalva's maneuver. No evidence of ASD.     Mitral Valve  The mitral valve is normal. Trace mitral insufficiency is present.     Aortic Valve  Aortic valve is normal in structure and function. The aortic valve is  tricuspid.  Tricuspid Valve  Mild to moderate tricuspid insufficiency is present. Right ventricular  systolic pressure is 45mmHg above the right atrial pressure. Mild pulmonary  hypertension is present. Mild prolapse of the posterior leaflet of the  tricupsid valve is noted.     Pulmonic Valve  The pulmonic valve is normal.     Vessels  The pulmonary artery is normal. The aorta root is normal. The thoracic aorta  is normal. All four pulmonary veins visualized with dampened velocity  waveforms.     Pericardium  No pericardial effusion is present.     Compared to Previous Study  This study was compared with the study from 2016. A small PFO has been  identified. .     ______________________________________________________________________________     Doppler Measurements & Calculations  TR max maria isabel: 336.7 cm/sec  TR max P.5 mmHg  ______________________________________________________________________________         Name: IDA PADRON  MRN: [id number]  : 1960  Study Date: 2016 01:31 PM  Age: 56 yrs  Gender: Female  Patient Location: [facility]  Reason For Study:     History: tricuspid regurgitation  Ordering Physician: JOSE ARELLANO  Referring Physician: JOSE ARELLANO  Performed By: Hugh Sharpe MD     BSA: 2.2 m2  Height: 63 in  Weight: 269 lb  ______________________________________________________________________________     Interpretation Summary  Left  ventricular function, chamber size, wall motion, and wall thickness are  normal.The EF is 55-60%.  Global right ventricular function is normal. Mild right ventricular dilation  is present.  Mild to moderate TR with mild prolapse of the posterior leaflet of the  tricupsid valve.  A small patent foramen ovale is present. There is no ASD (secundum, sinus  venosus or unroofed coronary sinus). All 4 pulmonary veins drain into the  left atrium.  Mild pulmonary hypertension is present with RVSP 45mmHg above RAP.     ______________________________________________________________________________        Procedure  Transesophageal Echocardiogram with color and spectral Doppler performed. The  procedure was performed in the Echo Lab. Informed consent for Transesophegeal  echo obtained. EVE Probe #12 was used during the procedure. Patient was  sedated using Fentanyl 100 mcg. Patient was sedated using Versed 4 mg. The  Transducer was inserted without difficulty . The patient tolerated the  procedure well. Complications None. ECG shows normal sinus rhythm. Good  quality two-dimensional was performed and interpreted.     Left Ventricle  Left ventricular function, chamber size, wall motion, and wall thickness are  normal.The EF is 55-60%.     Right Ventricle  Global right ventricular function is normal. Mild right ventricular dilation  is present.     Atria  Both atria appear normal. The left atrial appendage is normal. It is free of  spontaneous echo contrast and thrombus. A small patent foramen ovale is  present. The patent foramen ovale was demonstrated by color Doppler and  agitated saline bubble study . The patent foramen ovale was demonstrated with  Valsalva's maneuver. No evidence of ASD.     Mitral Valve  The mitral valve is normal. Trace mitral insufficiency is present.     Aortic Valve  Aortic valve is normal in structure and function. The aortic valve is  tricuspid.  Tricuspid Valve  Mild to moderate tricuspid  insufficiency is present. Right ventricular  systolic pressure is 45mmHg above the right atrial pressure. Mild pulmonary  hypertension is present. Mild prolapse of the posterior leaflet of the  tricupsid valve is noted.     Pulmonic Valve  The pulmonic valve is normal.     Vessels  The pulmonary artery is normal. The aorta root is normal. The thoracic aorta  is normal. All four pulmonary veins visualized with dampened velocity  waveforms.     Pericardium  No pericardial effusion is present.     Compared to Previous Study  This study was compared with the study from 2016. A small PFO has been  identified. .     ______________________________________________________________________________     Doppler Measurements & Calculations  TR max maria isabel: 336.7 cm/sec  TR max P.5 mmHg  ______________________________________________________________________________       Procedures:  ECHO: 09:   Interpretation Summary   The Ejection Fraction is estimated at 55-60%. No regional wall motion   abnormalities are seen. The right ventricle is normal size. Global right   ventricular function is normal. Right ventricular systolic pressure is 27mmHg   above the right atrial pressure. No pericardial effusion is present. The   inferior vena cava is normal.   Stress test: 08-10-09:   1. Abnormal study with a high degree of certainty.   2. There is a predominantly fixed medium sized moderately decreased   intensity myocardial perfusion defect with minimal periinfarct   reversibility involving the apical anterior, mid anterior, and apical   region of the heart. There is corresponding hypokinesis. No other   adenosine induced fixed or reversible myocardial perfusion defect.   3. Left ventricular size is enlarged at rest. Transient ischemic   dilation of the left ventricle did not occur.   4. The left ventricular ejection fraction is 56 %.   5. Summed Stress Score is calculated at 18, which is associated   with increased risk of  future coronary events.   CCL: 09: ARELLANO:   Mrs. Live is a 49 year old female with known coronary artery disease s/p MI in  found to have minimal disease on catheterization. Her cardiovascular risk factors include HTN and hyperlipidemia. She presented with a 2 week history of dyspnea with exertion and lower extremity edema. She underwent Adenosine MPI which revealed a fixed medium-sized defect with minimal periinfarct reversability and anterior hypokinesis. Her LVEF was preserved at 56%. CT angio of the coronaries revealed moderate stenosis in the pLAD and dRCA and mild to moderate stenosis of the pLCx.   Access: 6F long RFA, 6F RFV   Coronary Anatomy:   LMCA: normal   LAD: There is a long, discrete, ectatic 70-80% lesion in the ostial and proximal LAD. There is one D1 branch without any significant disease.   LCx: no significant disease. There is a large OM1 branch that has a 50-60% stenosis prior to the branch bifurcation.   RCA: The proximal and mid RCA have luminal irregularities without significance. The distal RCA has a complex bifurcation lesion prior to the take-off of the rPDA. There is disease in the ostial PL1 as well.   Conclusions:   1. 3-vessel coronary artery disease without left main lesion   Plan   Discussed the options in depth with the patient. Given the proximity of the LAD lesion to the LMCA and the complexity of the dRCA lesion, we recommended CABG for the patient. She will be admitted to the hospital.Discussed with Darleen Johnson MD.   Cardiac Surgery: 2009   Triple vessel coronary artery bypass grafting. Left internal mammary artery was placed to the left anterior descending coronary artery and separate reverse saphenous vein grafts were placed to the obtuse marginal and right posterior descending coronary arteries.     Name: IDA LIVE  MRN: [id number]  : 1960  Study Date: 2018 11:48 AM  Age: 57 yrs  Gender: Female  Patient Location: [facility]  Reason For Study:  Hx PFO, Elevated PA pressures  Ordering Physician: SANNA SALAZAR  Referring Physician: SANNA SALAZAR  Performed By: Tray Harvey RDCS     BSA: 2.2 m2  Height: 63 in  Weight: 257 lb  HR: 62  BP: 167/94 mmHg  _____________________________________________________________________________  __        Procedure  Bubble Echocardiogram with two-dimensional, color and spectral Doppler  performed. IV start location R Hand . Bateriostatic Saline used for Bubble  Study.  _____________________________________________________________________________  __        Interpretation Summary     Right ventricular function, chamber size, wall motion, and thickness are  normal.  PFO again documented with color doppler and Bubble study.  Mild to moderate tricuspid insufficiency is present.  Right ventricular systolic pressure is 61mmHg above the right atrial pressure.  No pericardial effusion is present.  _____________________________________________________________________________  __        Left Ventricle  Global and regional left ventricular function is normal with an EF of 55-60%.  Left ventricular wall thickness is normal. Left ventricular size is normal.  Left ventricular diastolic function is indeterminate. No regional wall motion  abnormalities are seen.     Right Ventricle  Right ventricular function, chamber size, wall motion, and thickness are  normal.     Atria  Moderate biatrial enlargement is present. PFO again documented with color  doppler and Bubble study.     Mitral Valve  Mild to moderate mitral insufficiency is present.        Aortic Valve  Aortic valve is normal in structure and function.     Tricuspid Valve  Mild to moderate tricuspid insufficiency is present. Right ventricular  systolic pressure is 61mmHg above the right atrial pressure.     Pulmonic Valve  The pulmonic valve is normal. Trace to mild pulmonic insufficiency is present.     Vessels  The aorta root is normal. The pulmonary artery is normal. The  inferior vena  cava is normal.     Pericardium  No pericardial effusion is present.     _____________________________________________________________________________  __  MMode/2D Measurements & Calculations  IVSd: 0.89 cm  LVIDd: 5.3 cm  LVIDs: 3.1 cm  LVPWd: 0.75 cm  FS: 41.8 %  LV mass(C)d: 154.8 grams  LV mass(C)dI: 72.0 grams/m2     Ao root diam: 3.0 cm  LA dimension: 5.3 cm  asc Aorta Diam: 3.6 cm  LA/Ao: 1.8  LVOT diam: 2.2 cm  LVOT area: 3.7 cm2  LA Volume (BP): 96.0 ml  LA Volume Index (BP): 44.7 ml/m2  RWT: 0.28        Doppler Measurements & Calculations  MV E max luis: 92.8 cm/sec  MV A max luis: 58.2 cm/sec  MV E/A: 1.6  MV dec time: 0.15 sec     Ao V2 max: 147.4 cm/sec  Ao max P.0 mmHg  TARUN(V,D): 2.6 cm2  LV V1 max P.3 mmHg  LV V1 max: 104.1 cm/sec  LV V1 VTI: 24.8 cm  MR ERO: 0.27 cm2  CO(LVOT): 5.6 l/min  CI(LVOT): 2.6 l/min/m2  SV(LVOT): 92.9 ml  SI(LVOT): 43.2 ml/m2  PA V2 max: 110.6 cm/sec  PA max P.9 mmHg  PA acc time: 0.08 sec  PI end-d luis: 154.4 cm/sec  PI max luis: 255.8 cm/sec  PI max P.2 mmHg  TR max luis: 385.4 cm/sec  TR max P.4 mmHg  Pulm Sys Luis: 63.2 cm/sec  Pulm Peng Luis: 97.7 cm/sec  Pulm S/D: 0.65  E/E' av.1  Lateral E/e': 8.9  Medial E/e': 15.4    Results (pressures in mmHg)  RA: /3  RV 32/3  PA 30//19;  PA Sat 65%  PCWP -/-/5;  PCW Sat --%  [place] CO/CI 3.3/1.6 L/min; TD CO 4.1/2.0 L/min  PVR 4.2 bermudez; TPR 5.7 bermudez  Hb 12.3 g/dL  NIBP 167/81/116  SVR 2200 dynes*sec/cm^5     [place]:  SVC 62.7%  IVC 65.0%  Low RA 73.0%  Mid RA 68.0%  High RA 64.6%  RV 63%  PA 65%  PCW ---     Complications: None   Blood loss: Minimal blood loss     Conclusions:  1. Low right and left sided filling pressures.  2. Normal pulmonary artery pressures.  3. Low normal cardiac output.         Assessment:    The patient was recently hospitalized for chest pain (false negative stress test 1 month prior) and found to have patent coronary grafting from , however, new stenting to  distal left main and circumflex.      ASA stopped secondary to Brilinta and warfarin.  Recent data suggests this is adequate.  May consider changing warfarin to NWOAC    She will be starting cardiac rehabilitation at [facility].    Refills for Brilinta    Immediate return if chest pain    Weight loss    Follow-up with sleep        CC:  Eliz Adame

## 2019-11-05 ENCOUNTER — OFFICE VISIT (OUTPATIENT)
Dept: CARDIOLOGY | Facility: CLINIC | Age: 59
End: 2019-11-05
Attending: INTERNAL MEDICINE
Payer: COMMERCIAL

## 2019-11-05 VITALS
SYSTOLIC BLOOD PRESSURE: 126 MMHG | WEIGHT: 257 LBS | OXYGEN SATURATION: 100 % | HEIGHT: 63 IN | HEART RATE: 56 BPM | BODY MASS INDEX: 45.54 KG/M2 | DIASTOLIC BLOOD PRESSURE: 81 MMHG

## 2019-11-05 DIAGNOSIS — I20.89 STABLE ANGINA (H): ICD-10-CM

## 2019-11-05 DIAGNOSIS — I25.83 CORONARY ARTERY DISEASE DUE TO LIPID RICH PLAQUE: ICD-10-CM

## 2019-11-05 DIAGNOSIS — E78.5 HYPERLIPIDEMIA LDL GOAL <130: ICD-10-CM

## 2019-11-05 DIAGNOSIS — Z79.899 LONG-TERM USE OF PLAQUENIL: ICD-10-CM

## 2019-11-05 DIAGNOSIS — I25.10 CORONARY ARTERY DISEASE DUE TO LIPID RICH PLAQUE: ICD-10-CM

## 2019-11-05 DIAGNOSIS — Z79.60 LONG-TERM USE OF IMMUNOSUPPRESSANT MEDICATION: Chronic | ICD-10-CM

## 2019-11-05 DIAGNOSIS — N18.30 CKD (CHRONIC KIDNEY DISEASE) STAGE 3, GFR 30-59 ML/MIN (H): Chronic | ICD-10-CM

## 2019-11-05 DIAGNOSIS — I25.810 CORONARY ARTERY DISEASE INVOLVING AUTOLOGOUS ARTERY CORONARY BYPASS GRAFT WITHOUT ANGINA PECTORIS: ICD-10-CM

## 2019-11-05 DIAGNOSIS — M32.14 SYSTEMIC LUPUS ERYTHEMATOSUS WITH GLOMERULAR DISEASE, UNSPECIFIED SLE TYPE (H): Chronic | ICD-10-CM

## 2019-11-05 LAB
ALBUMIN SERPL-MCNC: 3.2 G/DL (ref 3.4–5)
ALBUMIN UR-MCNC: 30 MG/DL
ALP SERPL-CCNC: 110 U/L (ref 40–150)
ALT SERPL W P-5'-P-CCNC: 35 U/L (ref 0–50)
ANION GAP SERPL CALCULATED.3IONS-SCNC: 7 MMOL/L (ref 3–14)
APPEARANCE UR: CLEAR
AST SERPL W P-5'-P-CCNC: 28 U/L (ref 0–45)
BASOPHILS # BLD AUTO: 0 10E9/L (ref 0–0.2)
BASOPHILS NFR BLD AUTO: 0.6 %
BILIRUB SERPL-MCNC: 0.8 MG/DL (ref 0.2–1.3)
BILIRUB UR QL STRIP: NEGATIVE
BUN SERPL-MCNC: 31 MG/DL (ref 7–30)
CALCIUM SERPL-MCNC: 9.4 MG/DL (ref 8.5–10.1)
CHLORIDE SERPL-SCNC: 108 MMOL/L (ref 94–109)
CO2 SERPL-SCNC: 22 MMOL/L (ref 20–32)
COLOR UR AUTO: YELLOW
CREAT SERPL-MCNC: 1.16 MG/DL (ref 0.52–1.04)
CREAT UR-MCNC: 79 MG/DL
CRP SERPL-MCNC: 17 MG/L (ref 0–8)
DIFFERENTIAL METHOD BLD: ABNORMAL
EOSINOPHIL # BLD AUTO: 0.1 10E9/L (ref 0–0.7)
EOSINOPHIL NFR BLD AUTO: 1.9 %
ERYTHROCYTE [DISTWIDTH] IN BLOOD BY AUTOMATED COUNT: 14.2 % (ref 10–15)
GFR SERPL CREATININE-BSD FRML MDRD: 51 ML/MIN/{1.73_M2}
GLUCOSE SERPL-MCNC: 91 MG/DL (ref 70–99)
GLUCOSE UR STRIP-MCNC: NEGATIVE MG/DL
HCT VFR BLD AUTO: 33.8 % (ref 35–47)
HGB BLD-MCNC: 10.9 G/DL (ref 11.7–15.7)
HGB UR QL STRIP: ABNORMAL
HYALINE CASTS #/AREA URNS LPF: 6 /LPF (ref 0–2)
IMM GRANULOCYTES # BLD: 0 10E9/L (ref 0–0.4)
IMM GRANULOCYTES NFR BLD: 0.6 %
KETONES UR STRIP-MCNC: NEGATIVE MG/DL
LEUKOCYTE ESTERASE UR QL STRIP: ABNORMAL
LYMPHOCYTES # BLD AUTO: 0.9 10E9/L (ref 0.8–5.3)
LYMPHOCYTES NFR BLD AUTO: 13.9 %
MCH RBC QN AUTO: 29.8 PG (ref 26.5–33)
MCHC RBC AUTO-ENTMCNC: 32.2 G/DL (ref 31.5–36.5)
MCV RBC AUTO: 92 FL (ref 78–100)
MONOCYTES # BLD AUTO: 0.5 10E9/L (ref 0–1.3)
MONOCYTES NFR BLD AUTO: 7.8 %
MUCOUS THREADS #/AREA URNS LPF: PRESENT /LPF
NEUTROPHILS # BLD AUTO: 4.6 10E9/L (ref 1.6–8.3)
NEUTROPHILS NFR BLD AUTO: 75.2 %
NITRATE UR QL: NEGATIVE
NRBC # BLD AUTO: 0 10*3/UL
NRBC BLD AUTO-RTO: 0 /100
NT-PROBNP SERPL-MCNC: 1044 PG/ML (ref 0–125)
PH UR STRIP: 6 PH (ref 5–7)
PLATELET # BLD AUTO: 194 10E9/L (ref 150–450)
POTASSIUM SERPL-SCNC: 4.4 MMOL/L (ref 3.4–5.3)
PROT SERPL-MCNC: 9.1 G/DL (ref 6.8–8.8)
PROT UR-MCNC: 0.53 G/L
PROT/CREAT 24H UR: 0.67 G/G CR (ref 0–0.2)
RBC # BLD AUTO: 3.66 10E12/L (ref 3.8–5.2)
RBC #/AREA URNS AUTO: 11 /HPF (ref 0–2)
SODIUM SERPL-SCNC: 138 MMOL/L (ref 133–144)
SOURCE: ABNORMAL
SP GR UR STRIP: 1.01 (ref 1–1.03)
SQUAMOUS #/AREA URNS AUTO: 1 /HPF (ref 0–1)
UROBILINOGEN UR STRIP-MCNC: 0 MG/DL (ref 0–2)
WBC # BLD AUTO: 6.2 10E9/L (ref 4–11)
WBC #/AREA URNS AUTO: 5 /HPF (ref 0–5)

## 2019-11-05 PROCEDURE — 85025 COMPLETE CBC W/AUTO DIFF WBC: CPT | Performed by: INTERNAL MEDICINE

## 2019-11-05 PROCEDURE — G0463 HOSPITAL OUTPT CLINIC VISIT: HCPCS | Mod: ZF

## 2019-11-05 PROCEDURE — 86160 COMPLEMENT ANTIGEN: CPT | Performed by: INTERNAL MEDICINE

## 2019-11-05 PROCEDURE — 86225 DNA ANTIBODY NATIVE: CPT | Performed by: INTERNAL MEDICINE

## 2019-11-05 PROCEDURE — 36415 COLL VENOUS BLD VENIPUNCTURE: CPT | Performed by: INTERNAL MEDICINE

## 2019-11-05 PROCEDURE — 81001 URINALYSIS AUTO W/SCOPE: CPT | Performed by: INTERNAL MEDICINE

## 2019-11-05 PROCEDURE — 83880 ASSAY OF NATRIURETIC PEPTIDE: CPT | Performed by: INTERNAL MEDICINE

## 2019-11-05 PROCEDURE — 86140 C-REACTIVE PROTEIN: CPT | Performed by: INTERNAL MEDICINE

## 2019-11-05 PROCEDURE — 80053 COMPREHEN METABOLIC PANEL: CPT | Performed by: INTERNAL MEDICINE

## 2019-11-05 PROCEDURE — 84156 ASSAY OF PROTEIN URINE: CPT | Performed by: INTERNAL MEDICINE

## 2019-11-05 PROCEDURE — 99214 OFFICE O/P EST MOD 30 MIN: CPT | Mod: ZP | Performed by: INTERNAL MEDICINE

## 2019-11-05 ASSESSMENT — PAIN SCALES - GENERAL: PAINLEVEL: NO PAIN (0)

## 2019-11-05 ASSESSMENT — MIFFLIN-ST. JEOR: SCORE: 1709.87

## 2019-11-05 NOTE — PATIENT INSTRUCTIONS
Thank you for your visit today.  Please call me with any questions or concerns.   Sonu Mac RN  Cardiology Care Coordinator  [phone], press option 1 then option 4

## 2019-11-05 NOTE — LETTER
11/5/2019    RE: Luz Marina Live  [address]  [place] [address]     Dear Colleague,    Thank you for the opportunity to participate in the care of your patient, Luz Marina Live, at the [facility]. Please see a copy of my visit note below.    Impression  1. ASHD with recent stenting         3-vessel coronary artery disease s/p CAB       Patent LIMA-LAD, SVG-OM, SVG-RPDA       Significant stenosis of pLCx/OM1 s/p SHAHANA x2 to distal LM/pLCx/OM1    2. History of CAB  3. Elevated PA pressure  4. Hypertension  5. Hyperlipidemia  6. KEITH  7. PFO  8. Atrial fibrillation with warfarin anticoagulation/chronic  9. SLE  10. History of lupus nephritis    I had the opportunity to review Ms. Live chart as well as go over the results and findings.  She has exertional shortness of breath functional class 2 superimposed upon history of SLE, premature CAD resulting in by-pass, elevated body mass index and history suggestive of KEITH.  She has a history of atrial fibrillation and warfarin usage, facial cellulitis and has routine eye care in view of lupus medications.  She is not diabetic    The patient underwent recent stenting of the coronary arteries after presenting with chest pain. Her exertional shortness of breath is improved subsequent to stenting.  As noted, she has negative stress test in September, only to present with chest pain one month later which in turn resulted in angiogram and new stenting.      Her cholesterol measure were quite good in October, however, her CRP was 8.8 in Augus t5 an 17.0 today    Weight:  Wt Readings from Last 24 Encounters:   11/05/19 116.6 kg (257 lb)   10/16/19 119.4 kg (263 lb 4.8 oz)   10/11/19 120.7 kg (266 lb 1.6 oz)   09/04/19 122 kg (269 lb)   08/16/19 122 kg (269 lb)   08/13/19 122.5 kg (270 lb)   08/05/19 121.4 kg (267 lb 9.6 oz)   11/20/18 122.9 kg (271 lb)   10/29/18 122 kg (269 lb)   09/17/18 119.3 kg (263 lb)    09/04/18 118.3 kg (260 lb 14.4 oz)   06/25/18 118.4 kg (261 lb)   06/25/18 117.5 kg (259 lb)   05/31/18 116.9 kg (257 lb 12.8 oz)   04/30/18 113.4 kg (250 lb)   04/10/18 115 kg (253 lb 9.6 oz)   02/27/18 116.6 kg (257 lb)   11/06/17 119.3 kg (263 lb 1.6 oz)   08/01/17 119.7 kg (264 lb)   07/03/17 122 kg (269 lb)   04/03/17 123 kg (271 lb 1.6 oz)   03/15/17 123.2 kg (271 lb 9.6 oz)   12/22/16 122.5 kg (270 lb)   12/20/16 124.7 kg (275 lb)       Medication:  Current Outpatient Medications   Medication     atorvastatin (LIPITOR) 40 MG tablet     Calcium Carbonate-Vitamin D (CALCIUM 600-D PO)     hydroxychloroquine (PLAQUENIL) 200 MG tablet     lisinopril (PRINIVIL/ZESTRIL) 40 MG tablet     metoprolol tartrate (LOPRESSOR) 50 MG tablet     MULTIPLE VITAMIN PO     Omega-3 Fatty Acids (FISH OIL) 1200 MG capsule     order for DME     ticagrelor (BRILINTA) 90 MG tablet     warfarin (COUMADIN) 5 MG tablet     aspirin 81 MG tablet     No current facility-administered medications for this visit.        Laboratories:      Results for IDA PADRON (MRN [id number]) as of 11/5/2019 10:53   Ref. Range 8/5/2019 11:03 11/5/2019 10:16   CRP Inflammation Latest Ref Range: 0.0 - 8.0 mg/L 8.8 (H) 17.0 (H)     Results for IDA PADRON (MRN [id number]) as of 11/5/2019 10:53   Ref. Range 10/17/2019 17:06 10/17/2019 19:34 10/21/2019 12:33 10/21/2019 12:43 11/5/2019 10:16   Sodium Latest Ref Range: 133 - 144 mmol/L    137    Potassium Latest Ref Range: 3.4 - 5.3 mmol/L    4.0    Chloride Latest Ref Range: 94 - 109 mmol/L    108    Carbon Dioxide Latest Ref Range: 20 - 32 mmol/L    23    Urea Nitrogen Latest Ref Range: 7 - 30 mg/dL    44 (H)    Creatinine Latest Ref Range: 0.52 - 1.04 mg/dL    1.35 (H)    GFR Estimate Latest Ref Range: >60 mL/min/1.73_m2    43 (L)    GFR Estimate If Black Latest Ref Range: >60 mL/min/1.73_m2    50 (L)    Calcium Latest Ref Range: 8.5 - 10.1 mg/dL    8.8    Anion Gap Latest Ref Range: 3 - 14 mmol/L    6     Glucose Latest Ref Range: 70 - 99 mg/dL    91    WBC Latest Ref Range: 4.0 - 11.0 10e9/L     6.2   Hemoglobin Latest Ref Range: 11.7 - 15.7 g/dL     10.9 (L)   Hematocrit Latest Ref Range: 35.0 - 47.0 %     33.8 (L)   Platelet Count Latest Ref Range: 150 - 450 10e9/L     194   RBC Count Latest Ref Range: 3.8 - 5.2 10e12/L     3.66 (L)   MCV Latest Ref Range: 78 - 100 fl     92   MCH Latest Ref Range: 26.5 - 33.0 pg     29.8   MCHC Latest Ref Range: 31.5 - 36.5 g/dL     32.2   RDW Latest Ref Range: 10.0 - 15.0 %     14.2   Diff Method Unknown     Automated Method   % Neutrophils Latest Units: %     75.2   % Lymphocytes Latest Units: %     13.9   % Monocytes Latest Units: %     7.8   % Eosinophils Latest Units: %     1.9   % Basophils Latest Units: %     0.6   % Immature Granulocytes Latest Units: %     0.6   Nucleated RBCs Latest Ref Range: 0 /100     0   Absolute Neutrophil Latest Ref Range: 1.6 - 8.3 10e9/L     4.6   Absolute Lymphocytes Latest Ref Range: 0.8 - 5.3 10e9/L     0.9   Absolute Monocytes Latest Ref Range: 0.0 - 1.3 10e9/L     0.5   Absolute Eosinophils Latest Ref Range: 0.0 - 0.7 10e9/L     0.1   Absolute Basophils Latest Ref Range: 0.0 - 0.2 10e9/L     0.0   Abs Immature Granulocytes Latest Ref Range: 0 - 0.4 10e9/L     0.0   Absolute Nucleated RBC Unknown     0.0     Her new catherization:  Results (pressures in mmHg)  RA: 1/4/3  RV 32/3  PA 30/13/19;  PA Sat 65%  PCWP -/-/5;  PCW Sat --%  Kannan CO/CI 3.3/1.6 L/min; TD CO 4.1/2.0 L/min  PVR 4.2 bermudez; TPR 5.7 bermudez  Hb 12.3 g/dL  NIBP 167/81/116  SVR 2200 dynes*sec/cm^5     [place]:  SVC 62.7%  IVC 65.0%  Low RA 73.0%  Mid RA 68.0%  High RA 64.6%  RV 63%  PA 65%  PCW ---     Complications: None   Blood loss: Minimal blood loss     Conclusions:  1. Low right and left sided filling pressures.  2. Normal pulmonary artery pressures.  3. Low normal cardiac output.      REVIEW of SYMPTOMS    Constitutional: without fever, chills, night sweats.  Weight is  down  HEENT: without dry eyes, dry mouth, sinusitis, corryza, visual changes  Endocrine: without polyuria, polydypsia, polyphagia, heat or cold intolerance, changing mental status  Cardiology: without chest pain, tightness, heaviness, pressure, paroxysmal dyspnea, orthopnea, palpitation, pre-syncope or syncope or device discharge (if present)  Pulmonary: without asthma, wheezing, cough, hemoptysis  GI: without nausea, emesis, jaundice, pain, hematemesis, melena  : without frequency, urgency, hematuria, stones, pain, abnormal bleeding, frequency, urgency  Neurologic: without TIA, CVA, trauma, seizure  Dermatologic: without lesions, abrasion rash,   Orthopedic/Rheum: without significant joint pain, impairment, limb, polyserositis, ulceration, Raynauds  Heme: without mass, bruising, frequent infection, anemia  Psychiatric: without substance abuse, hallucination, medication, depression      Exercise tolerance:    Nuclear stress test:  PROTOCOL:    Rest and stress myocardial perfusion imaging was performed using  Tc-99m tetrafosmin intravenously. Pharmacological stress was performed  with 0.4 mg of regadenoson intravenously. The EKG showed normal sinus  rhythm at rest. No significant abnormalities were noted with infusion  of regadenoson.     FINDINGS:  1.  Overall quality of the study: Diagnostic.   2.  Left ventricular cavity is Normal on the rest and stress studies.  3.  SPECT images demonstrate uniform radiotracer uptake of the  myocardium on both stress and rest images.  4.  Left ventricular ejection fraction is 73%. Left ventricular  end-diastolic volume is 134 mL. End-systolic volume is 34 mL      Echocardiogram  Name: IDA PADRON  MRN: [id number]  : 1960  Study Date: 10/17/2019 10:24 AM  Age: 59 yrs  Gender: Female  Patient Location: [facility]  Reason For Study: Chest Pain  Ordering Physician: MARIANELA ESCALONA  Performed By: Yasmany Campbell RDCS     BSA: 2.2 m2  Height: 63 in  Weight: 263 lb  HR: 58  BP:  144/81 mmHg  _____________________________________________________________________________  __        Procedure  Complete Portable Echo Adult. Contrast Optison. Optison (NDC #8609-7819-61)  given intravenously. Patient was given 6 ml mixture of 3 ml Optison and 6 ml  saline. 3 ml wasted.  _____________________________________________________________________________  __        Interpretation Summary  Global and regional left ventricular function is normal with an EF of 60-65%.  Right ventricular function, chamber size, wall motion, and thickness are  normal.  No pericardial effusion is present.  IVC diameter and respiratory changes fall into an intermediate range  suggesting an RA pressure of 8 mmHg.  Mild pulmonary hypertension is present. Right ventricular systolic pressure is  43mmHg above the right atrial pressure.  Compared to prior, measured RVSP is lower, no other change.  _____________________________________________________________________________  __        Left Ventricle  Left ventricular size is normal. Global and regional left ventricular function  is normal with an EF of 60-65%. Mild concentric wall thickening consistent  with left ventricular hypertrophy is present. Left ventricular diastolic  function is indeterminate.     Right Ventricle  Right ventricular function, chamber size, wall motion, and thickness are  normal.     Atria  Moderate biatrial enlargement is present.     Mitral Valve  The mitral valve is normal.        Aortic Valve  Aortic valve is normal in structure and function.     Tricuspid Valve  Mild tricuspid insufficiency is present. Mild pulmonary hypertension is  present. Right ventricular systolic pressure is 43mmHg above the right atrial  pressure.     Pulmonic Valve  The pulmonic valve is normal. Trace pulmonic insufficiency is present.     Vessels  The aorta root is normal. The thoracic aorta is normal. Dilation of the  inferior vena cava is present with normal respiratory  variation in diameter.  IVC diameter and respiratory changes fall into an intermediate range  suggesting an RA pressure of 8 mmHg.     Pericardium  No pericardial effusion is present.     _____________________________________________________________________________  __  MMode/2D Measurements & Calculations  IVSd: 1.2 cm  LVIDd: 4.5 cm  LVIDs: 2.9 cm  LVPWd: 1.2 cm  FS: 36.6 %     LV mass(C)d: 197.9 grams  LV mass(C)dI: 91.1 grams/m2  asc Aorta Diam: 3.5 cm  LVOT diam: 2.1 cm  LVOT area: 3.5 cm2  LA Volume Index (BP): 43.9 ml/m2  RWT: 0.52  TAPSE: 2.3 cm        Doppler Measurements & Calculations  MV E max maria isabel: 84.8 cm/sec  MV A max maria isabel: 35.8 cm/sec  MV E/A: 2.4  MV dec slope: 473.3 cm/sec2  MV dec time: 0.18 sec     TV max P.0 mmHg  PA acc time: 0.09 sec  TR max maria isabel: 327.8 cm/sec  TR max P.0 mmHg  E/E' avg: 10.5  Lateral E/e': 9.0  Medial E/e': 12.0     ____________________________        Echocardiographic findings of TR, modest PA, normal systolic function.    Name: IDA PADRON  MRN: [id number]  : 1960  Study Date: 2018 11:48 AM  Age: 57 yrs  Gender: Female  Patient Location: [facility]  Reason For Study: Hx PFO, Elevated PA pressures  Ordering Physician: SANNA SALAZAR  Referring Physician: SANNA SALAZAR  Performed By: Tray Harvey RDCS     BSA: 2.2 m2  Height: 63 in  Weight: 257 lb  HR: 62  BP: 167/94 mmHg  _____________________________________________________________________________  __        Procedure  Bubble Echocardiogram with two-dimensional, color and spectral Doppler  performed. IV start location R Hand . Bateriostatic Saline used for Bubble  Study.  _____________________________________________________________________________  __        Interpretation Summary     Right ventricular function, chamber size, wall motion, and thickness are  normal.  PFO again documented with color doppler and Bubble study.  Mild to moderate tricuspid insufficiency is present.  Right  ventricular systolic pressure is 61mmHg above the right atrial pressure.  No pericardial effusion is present.  _____________________________________________________________________________  __        Left Ventricle  Global and regional left ventricular function is normal with an EF of 55-60%.  Left ventricular wall thickness is normal. Left ventricular size is normal.  Left ventricular diastolic function is indeterminate. No regional wall motion  abnormalities are seen.     Right Ventricle  Right ventricular function, chamber size, wall motion, and thickness are  normal.     Atria  Moderate biatrial enlargement is present. PFO again documented with color  doppler and Bubble study.     Mitral Valve  Mild to moderate mitral insufficiency is present.        Aortic Valve  Aortic valve is normal in structure and function.     Tricuspid Valve  Mild to moderate tricuspid insufficiency is present. Right ventricular  systolic pressure is 61mmHg above the right atrial pressure.     Pulmonic Valve  The pulmonic valve is normal. Trace to mild pulmonic insufficiency is present.     Vessels  The aorta root is normal. The pulmonary artery is normal. The inferior vena  cava is normal.     Pericardium  No pericardial effusion is present.     _____________________________________________________________________________  __  MMode/2D Measurements & Calculations  IVSd: 0.89 cm  LVIDd: 5.3 cm  LVIDs: 3.1 cm  LVPWd: 0.75 cm  FS: 41.8 %  LV mass(C)d: 154.8 grams  LV mass(C)dI: 72.0 grams/m2     Ao root diam: 3.0 cm  LA dimension: 5.3 cm  asc Aorta Diam: 3.6 cm  LA/Ao: 1.8  LVOT diam: 2.2 cm  LVOT area: 3.7 cm2  LA Volume (BP): 96.0 ml  LA Volume Index (BP): 44.7 ml/m2  RWT: 0.28        Doppler Measurements & Calculations  MV E max maria isabel: 92.8 cm/sec  MV A max maria isabel: 58.2 cm/sec  MV E/A: 1.6  MV dec time: 0.15 sec     Ao V2 max: 147.4 cm/sec  Ao max P.0 mmHg  TARUN(V,D): 2.6 cm2  LV V1 max P.3 mmHg  LV V1 max: 104.1 cm/sec  LV V1 VTI:  24.8 cm  MR ERO: 0.27 cm2  CO(LVOT): 5.6 l/min  CI(LVOT): 2.6 l/min/m2  SV(LVOT): 92.9 ml  SI(LVOT): 43.2 ml/m2  PA V2 max: 110.6 cm/sec  PA max P.9 mmHg  PA acc time: 0.08 sec  PI end-d luis: 154.4 cm/sec  PI max luis: 255.8 cm/sec  PI max P.2 mmHg  TR max luis: 385.4 cm/sec  TR max P.4 mmHg  Pulm Sys Luis: 63.2 cm/sec  Pulm Peng Luis: 97.7 cm/sec  Pulm S/D: 0.65  E/E' av.1  Lateral E/e': 8.9  Medial E/e': 15.4        : 1960  Study Date: 2016 01:31 PM  Age: 56 yrs  Gender: Female  Patient [facility]: [facility]  Reason For Study:     History: tricuspid regurgitation  Ordering Physician: JOSE ARELLANO  Referring Physician: JOSE ARELLANO  Performed By: Hugh Sharpe MD     BSA: 2.2 m2  Height: 63 in  Weight: 269 lb  ______________________________________________________________________________     Interpretation Summary  Left ventricular function, chamber size, wall motion, and wall thickness are  normal.The EF is 55-60%.  Global right ventricular function is normal. Mild right ventricular dilation  is present.  Mild to moderate TR with mild prolapse of the posterior leaflet of the  tricupsid valve.  A small patent foramen ovale is present. There is no ASD (secundum, sinus  venosus or unroofed coronary sinus). All 4 pulmonary veins drain into the  left atrium.  Mild pulmonary hypertension is present with RVSP 45mmHg above RAP.     ______________________________________________________________________________        Procedure  Transesophageal Echocardiogram with color and spectral Doppler performed. The  procedure was performed in the Echo Lab. Informed consent for Transesophegeal  echo obtained. EVE Probe #12 was used during the procedure. Patient was  sedated using Fentanyl 100 mcg. Patient was sedated using Versed 4 mg. The  Transducer was inserted without difficulty . The patient tolerated the  procedure well. Complications None. ECG shows normal sinus rhythm. Good  quality  two-dimensional was performed and interpreted.     Left Ventricle  Left ventricular function, chamber size, wall motion, and wall thickness are  normal.The EF is 55-60%.     Right Ventricle  Global right ventricular function is normal. Mild right ventricular dilation  is present.     Atria  Both atria appear normal. The left atrial appendage is normal. It is free of  spontaneous echo contrast and thrombus. A small patent foramen ovale is  present. The patent foramen ovale was demonstrated by color Doppler and  agitated saline bubble study . The patent foramen ovale was demonstrated with  Valsalva's maneuver. No evidence of ASD.     Mitral Valve  The mitral valve is normal. Trace mitral insufficiency is present.     Aortic Valve  Aortic valve is normal in structure and function. The aortic valve is  tricuspid.  Tricuspid Valve  Mild to moderate tricuspid insufficiency is present. Right ventricular  systolic pressure is 45mmHg above the right atrial pressure. Mild pulmonary  hypertension is present. Mild prolapse of the posterior leaflet of the  tricupsid valve is noted.     Pulmonic Valve  The pulmonic valve is normal.     Vessels  The pulmonary artery is normal. The aorta root is normal. The thoracic aorta  is normal. All four pulmonary veins visualized with dampened velocity  waveforms.     Pericardium  No pericardial effusion is present.     Compared to Previous Study  This study was compared with the study from 2016. A small PFO has been  identified. .     ______________________________________________________________________________     Doppler Measurements & Calculations  TR max maria isabel: 336.7 cm/sec  TR max P.5 mmHg  ______________________________________________________________________________         Name: IDA PADRON  MRN: [id number]  : 1960  Study Date: 2016 01:31 PM  Age: 56 yrs  Gender: Female  Patient Location: [facility]  Reason For Study:     History: tricuspid  regurgitation  Ordering Physician: JOSE ARELLANO  Referring Physician: JOSE ARELLANO  Performed By: Hugh Sharpe MD     BSA: 2.2 m2  Height: 63 in  Weight: 269 lb  ______________________________________________________________________________     Interpretation Summary  Left ventricular function, chamber size, wall motion, and wall thickness are  normal.The EF is 55-60%.  Global right ventricular function is normal. Mild right ventricular dilation  is present.  Mild to moderate TR with mild prolapse of the posterior leaflet of the  tricupsid valve.  A small patent foramen ovale is present. There is no ASD (secundum, sinus  venosus or unroofed coronary sinus). All 4 pulmonary veins drain into the  left atrium.  Mild pulmonary hypertension is present with RVSP 45mmHg above RAP.     ______________________________________________________________________________        Procedure  Transesophageal Echocardiogram with color and spectral Doppler performed. The  procedure was performed in the Echo Lab. Informed consent for Transesophegeal  echo obtained. EVE Probe #12 was used during the procedure. Patient was  sedated using Fentanyl 100 mcg. Patient was sedated using Versed 4 mg. The  Transducer was inserted without difficulty . The patient tolerated the  procedure well. Complications None. ECG shows normal sinus rhythm. Good  quality two-dimensional was performed and interpreted.     Left Ventricle  Left ventricular function, chamber size, wall motion, and wall thickness are  normal.The EF is 55-60%.     Right Ventricle  Global right ventricular function is normal. Mild right ventricular dilation  is present.     Atria  Both atria appear normal. The left atrial appendage is normal. It is free of  spontaneous echo contrast and thrombus. A small patent foramen ovale is  present. The patent foramen ovale was demonstrated by color Doppler and  agitated saline bubble study . The patent foramen ovale was demonstrated  with  Valsalva's maneuver. No evidence of ASD.     Mitral Valve  The mitral valve is normal. Trace mitral insufficiency is present.     Aortic Valve  Aortic valve is normal in structure and function. The aortic valve is  tricuspid.  Tricuspid Valve  Mild to moderate tricuspid insufficiency is present. Right ventricular  systolic pressure is 45mmHg above the right atrial pressure. Mild pulmonary  hypertension is present. Mild prolapse of the posterior leaflet of the  tricupsid valve is noted.     Pulmonic Valve  The pulmonic valve is normal.     Vessels  The pulmonary artery is normal. The aorta root is normal. The thoracic aorta  is normal. All four pulmonary veins visualized with dampened velocity  waveforms.     Pericardium  No pericardial effusion is present.     Compared to Previous Study  This study was compared with the study from 2016. A small PFO has been  identified. .     ______________________________________________________________________________     Doppler Measurements & Calculations  TR max maria isabel: 336.7 cm/sec  TR max P.5 mmHg  ______________________________________________________________________________       Procedures:  ECHO: 09:   Interpretation Summary   The Ejection Fraction is estimated at 55-60%. No regional wall motion   abnormalities are seen. The right ventricle is normal size. Global right   ventricular function is normal. Right ventricular systolic pressure is 27mmHg   above the right atrial pressure. No pericardial effusion is present. The   inferior vena cava is normal.   Stress test: 08-10-09:   1. Abnormal study with a high degree of certainty.   2. There is a predominantly fixed medium sized moderately decreased   intensity myocardial perfusion defect with minimal periinfarct   reversibility involving the apical anterior, mid anterior, and apical   region of the heart. There is corresponding hypokinesis. No other   adenosine induced fixed or reversible myocardial  perfusion defect.   3. Left ventricular size is enlarged at rest. Transient ischemic   dilation of the left ventricle did not occur.   4. The left ventricular ejection fraction is 56 %.   5. Summed Stress Score is calculated at 18, which is associated   with increased risk of future coronary events.   CCL: 08-26-09: ARELLANO:   Mrs. Live is a 49 year old female with known coronary artery disease s/p MI in 1996 found to have minimal disease on catheterization. Her cardiovascular risk factors include HTN and hyperlipidemia. She presented with a 2 week history of dyspnea with exertion and lower extremity edema. She underwent Adenosine MPI which revealed a fixed medium-sized defect with minimal periinfarct reversability and anterior hypokinesis. Her LVEF was preserved at 56%. CT angio of the coronaries revealed moderate stenosis in the pLAD and dRCA and mild to moderate stenosis of the pLCx.   Access: 6F long RFA, 6F RFV   Coronary Anatomy:   LMCA: normal   LAD: There is a long, discrete, ectatic 70-80% lesion in the ostial and proximal LAD. There is one D1 branch without any significant disease.   LCx: no significant disease. There is a large OM1 branch that has a 50-60% stenosis prior to the branch bifurcation.   RCA: The proximal and mid RCA have luminal irregularities without significance. The distal RCA has a complex bifurcation lesion prior to the take-off of the rPDA. There is disease in the ostial PL1 as well.   Conclusions:   1. 3-vessel coronary artery disease without left main lesion   Plan   Discussed the options in depth with the patient. Given the proximity of the LAD lesion to the LMCA and the complexity of the dRCA lesion, we recommended CABG for the patient. She will be admitted to the hospital.Discussed with Darleen Johnson MD.   Cardiac Surgery: 8/27/2009   Triple vessel coronary artery bypass grafting. Left internal mammary artery was placed to the left anterior descending coronary artery and separate  reverse saphenous vein grafts were placed to the obtuse marginal and right posterior descending coronary arteries.     Name: IDA PADRON  MRN: [id number]  : 1960  Study Date: 2018 11:48 AM  Age: 57 yrs  Gender: Female  Patient Location: [facility]  Reason For Study: Hx PFO, Elevated PA pressures  Ordering Physician: SANNA SALAZAR  Referring Physician: SANNA SALAZAR  Performed By: Tray Harvey RDCS     BSA: 2.2 m2  Height: 63 in  Weight: 257 lb  HR: 62  BP: 167/94 mmHg  _____________________________________________________________________________  __        Procedure  Bubble Echocardiogram with two-dimensional, color and spectral Doppler  performed. IV start location R Hand . Bateriostatic Saline used for Bubble  Study.  _____________________________________________________________________________  __        Interpretation Summary     Right ventricular function, chamber size, wall motion, and thickness are  normal.  PFO again documented with color doppler and Bubble study.  Mild to moderate tricuspid insufficiency is present.  Right ventricular systolic pressure is 61mmHg above the right atrial pressure.  No pericardial effusion is present.  _____________________________________________________________________________  __        Left Ventricle  Global and regional left ventricular function is normal with an EF of 55-60%.  Left ventricular wall thickness is normal. Left ventricular size is normal.  Left ventricular diastolic function is indeterminate. No regional wall motion  abnormalities are seen.     Right Ventricle  Right ventricular function, chamber size, wall motion, and thickness are  normal.     Atria  Moderate biatrial enlargement is present. PFO again documented with color  doppler and Bubble study.     Mitral Valve  Mild to moderate mitral insufficiency is present.        Aortic Valve  Aortic valve is normal in structure and function.     Tricuspid Valve  Mild to moderate tricuspid  insufficiency is present. Right ventricular  systolic pressure is 61mmHg above the right atrial pressure.     Pulmonic Valve  The pulmonic valve is normal. Trace to mild pulmonic insufficiency is present.     Vessels  The aorta root is normal. The pulmonary artery is normal. The inferior vena  cava is normal.     Pericardium  No pericardial effusion is present.     _____________________________________________________________________________  __  MMode/2D Measurements & Calculations  IVSd: 0.89 cm  LVIDd: 5.3 cm  LVIDs: 3.1 cm  LVPWd: 0.75 cm  FS: 41.8 %  LV mass(C)d: 154.8 grams  LV mass(C)dI: 72.0 grams/m2     Ao root diam: 3.0 cm  LA dimension: 5.3 cm  asc Aorta Diam: 3.6 cm  LA/Ao: 1.8  LVOT diam: 2.2 cm  LVOT area: 3.7 cm2  LA Volume (BP): 96.0 ml  LA Volume Index (BP): 44.7 ml/m2  RWT: 0.28        Doppler Measurements & Calculations  MV E max luis: 92.8 cm/sec  MV A max luis: 58.2 cm/sec  MV E/A: 1.6  MV dec time: 0.15 sec     Ao V2 max: 147.4 cm/sec  Ao max P.0 mmHg  TARUN(V,D): 2.6 cm2  LV V1 max P.3 mmHg  LV V1 max: 104.1 cm/sec  LV V1 VTI: 24.8 cm  MR ERO: 0.27 cm2  CO(LVOT): 5.6 l/min  CI(LVOT): 2.6 l/min/m2  SV(LVOT): 92.9 ml  SI(LVOT): 43.2 ml/m2  PA V2 max: 110.6 cm/sec  PA max P.9 mmHg  PA acc time: 0.08 sec  PI end-d luis: 154.4 cm/sec  PI max luis: 255.8 cm/sec  PI max P.2 mmHg  TR max luis: 385.4 cm/sec  TR max P.4 mmHg  Pulm Sys Luis: 63.2 cm/sec  Pulm Peng Luis: 97.7 cm/sec  Pulm S/D: 0.65  E/E' av.1  Lateral E/e': 8.9  Medial E/e': 15.4    Results (pressures in mmHg)  RA: 1/4/3  RV 32/3  PA //19;  PA Sat 65%  PCWP -/-/5;  PCW Sat --%  Kannan CO/CI 3.3/1.6 L/min; TD CO 4.1/2.0 L/min  PVR 4.2 bermudez; TPR 5.7 bermudez  Hb 12.3 g/dL  NIBP 167/81/116  SVR 2200 dynes*sec/cm^5     [place]:  SVC 62.7%  IVC 65.0%  Low RA 73.0%  Mid RA 68.0%  High RA 64.6%  RV 63%  PA 65%  PCW ---     Complications: None   Blood loss: Minimal blood loss     Conclusions:  1. Low right and left sided filling  pressures.  2. Normal pulmonary artery pressures.  3. Low normal cardiac output.         Assessment:    The patient was recently hospitalized for chest pain (false negative stress test 1 month prior) and found to have patent coronary grafting from 2009, however, new stenting to distal left main and circumflex.      ASA stopped secondary to Brilinta and warfarin.  Recent data suggests this is adequate.  May consider changing warfarin to NWOAC    She will be starting cardiac rehabilitation at [facility].    Refills for Brilinta    Immediate return if chest pain    Weight loss    Follow-up with sleep    Please do not hesitate to contact me if you have any questions/concerns.     Sincerely,     Twin Moreno MD    CC:  Eliz Adame

## 2019-11-05 NOTE — NURSING NOTE
Chief Complaint   Patient presents with     Follow Up     2mo return HF     Vitals were taken and medications were reconciled.     GROVER Vivar

## 2019-11-06 ENCOUNTER — TELEPHONE (OUTPATIENT)
Dept: CARDIOLOGY | Facility: CLINIC | Age: 59
End: 2019-11-06

## 2019-11-06 ENCOUNTER — TELEPHONE (OUTPATIENT)
Dept: RHEUMATOLOGY | Facility: CLINIC | Age: 59
End: 2019-11-06

## 2019-11-06 LAB
C3 SERPL-MCNC: 50 MG/DL (ref 76–169)
C4 SERPL-MCNC: <2 MG/DL (ref 15–50)

## 2019-11-06 NOTE — TELEPHONE ENCOUNTER
The patient called and said she could not come in this Friday due to a visit conflict.  Scheduled for the next available on 12/13/18.  Thank you.

## 2019-11-06 NOTE — TELEPHONE ENCOUNTER
Called Luz Marina and left vm letting her know that Dr. Chau would like to see her for an appointment on Friday and asked her to call back.     ZEKE GalvinN, RN   Rheumatology Care Coordinator   [facility]

## 2019-11-06 NOTE — TELEPHONE ENCOUNTER
----- Message from Reji Chau MD sent at 11/6/2019 10:39 AM CST -----  Haley,    I would like this patient to see me in clinic on Friday at 8:30 in that open slot. She was recently hospitalized with heart disease, and I see that her kidney function has been a little worse and inflammatory markers elevated. We will discuss SLE treatment options.    Fitz

## 2019-11-07 LAB — DSDNA AB SER-ACNC: 173 IU/ML

## 2019-11-07 NOTE — TELEPHONE ENCOUNTER
No follow up needed.     ZEKE GalvinN, RN   Rheumatology Care Coordinator   [facility]

## 2019-11-08 ENCOUNTER — OFFICE VISIT (OUTPATIENT)
Dept: INTERNAL MEDICINE | Facility: CLINIC | Age: 59
End: 2019-11-08
Attending: INTERNAL MEDICINE
Payer: COMMERCIAL

## 2019-11-08 ENCOUNTER — OFFICE VISIT (OUTPATIENT)
Dept: RHEUMATOLOGY | Facility: CLINIC | Age: 59
End: 2019-11-08
Payer: COMMERCIAL

## 2019-11-08 VITALS
WEIGHT: 257 LBS | BODY MASS INDEX: 45.53 KG/M2 | SYSTOLIC BLOOD PRESSURE: 122 MMHG | HEART RATE: 58 BPM | DIASTOLIC BLOOD PRESSURE: 80 MMHG

## 2019-11-08 VITALS
RESPIRATION RATE: 18 BRPM | HEIGHT: 63 IN | WEIGHT: 257 LBS | HEART RATE: 59 BPM | BODY MASS INDEX: 45.54 KG/M2 | OXYGEN SATURATION: 100 % | DIASTOLIC BLOOD PRESSURE: 74 MMHG | SYSTOLIC BLOOD PRESSURE: 141 MMHG

## 2019-11-08 DIAGNOSIS — Z79.60 LONG-TERM USE OF IMMUNOSUPPRESSANT MEDICATION: Chronic | ICD-10-CM

## 2019-11-08 DIAGNOSIS — I25.10 CORONARY ARTERY DISEASE INVOLVING NATIVE HEART WITHOUT ANGINA PECTORIS, UNSPECIFIED VESSEL OR LESION TYPE: Chronic | ICD-10-CM

## 2019-11-08 DIAGNOSIS — I10 HTN, GOAL BELOW 140/90: ICD-10-CM

## 2019-11-08 DIAGNOSIS — N18.30 CKD (CHRONIC KIDNEY DISEASE) STAGE 3, GFR 30-59 ML/MIN (H): Primary | ICD-10-CM

## 2019-11-08 DIAGNOSIS — N18.30 CKD (CHRONIC KIDNEY DISEASE) STAGE 3, GFR 30-59 ML/MIN (H): Chronic | ICD-10-CM

## 2019-11-08 DIAGNOSIS — Z79.899 LONG-TERM USE OF PLAQUENIL: ICD-10-CM

## 2019-11-08 DIAGNOSIS — M32.14 SYSTEMIC LUPUS ERYTHEMATOSUS WITH GLOMERULAR DISEASE, UNSPECIFIED SLE TYPE (H): Primary | Chronic | ICD-10-CM

## 2019-11-08 PROCEDURE — G0463 HOSPITAL OUTPT CLINIC VISIT: HCPCS | Mod: ZF

## 2019-11-08 PROCEDURE — 99215 OFFICE O/P EST HI 40 MIN: CPT | Performed by: INTERNAL MEDICINE

## 2019-11-08 RX ORDER — HYDROXYCHLOROQUINE SULFATE 200 MG/1
400 TABLET, FILM COATED ORAL DAILY
Qty: 180 TABLET | Refills: 3 | Status: SHIPPED | OUTPATIENT
Start: 2019-11-08 | End: 2020-10-16

## 2019-11-08 ASSESSMENT — ANXIETY QUESTIONNAIRES
7. FEELING AFRAID AS IF SOMETHING AWFUL MIGHT HAPPEN: NOT AT ALL
2. NOT BEING ABLE TO STOP OR CONTROL WORRYING: NOT AT ALL
6. BECOMING EASILY ANNOYED OR IRRITABLE: NOT AT ALL
3. WORRYING TOO MUCH ABOUT DIFFERENT THINGS: NOT AT ALL
5. BEING SO RESTLESS THAT IT IS HARD TO SIT STILL: NOT AT ALL
1. FEELING NERVOUS, ANXIOUS, OR ON EDGE: NOT AT ALL
GAD7 TOTAL SCORE: 0

## 2019-11-08 ASSESSMENT — MIFFLIN-ST. JEOR: SCORE: 1709.87

## 2019-11-08 ASSESSMENT — PAIN SCALES - GENERAL: PAINLEVEL: NO PAIN (0)

## 2019-11-08 ASSESSMENT — ROUTINE ASSESSMENT OF PATIENT INDEX DATA (RAPID3)
RAPID3 INTERPRETATION: MODERATE 6.1-12.0
TOTAL RAPID3 SCORE: 9

## 2019-11-08 ASSESSMENT — PATIENT HEALTH QUESTIONNAIRE - PHQ9
5. POOR APPETITE OR OVEREATING: NOT AT ALL
SUM OF ALL RESPONSES TO PHQ QUESTIONS 1-9: 1

## 2019-11-08 NOTE — LETTER
11/8/2019       RE: Luz Marina Live  [address]  [place] [address]     Dear Colleague,    Thank you for referring your patient, Luz Marina Live, to the [facility]  at [facility]. Please see a copy of my visit note below.    HPI  Patient is here for follow up. She reports that she is feeling less short of breath. She continues to have dyspnea on exertion. She reports that she was referred to Nephrology for recommendations on medication choices.     Review of Systems     Constitutional:  Negative for fever, chills and fatigue.   Respiratory:   Negative for cough and dyspnea on exertion.    Cardiovascular:  Negative for chest pain, dyspnea on exertion and edema.   Gastrointestinal:  Negative for nausea, vomiting, abdominal pain, diarrhea and constipation.   Skin:  Negative for itching and rash.   Neurological:  Negative for weakness.   Endo/Heme:  Negative for anemia, swollen glands and bruises/bleeds easily.   Psychiatric/Behavioral:  Negative for depression, decreased concentration, mood swings and panic attacks.    Endocrine:  Negative for altered temperature regulation, polyphagia, polydipsia, unwanted hair growth and change in facial hair.    Current Outpatient Medications   Medication     aspirin 81 MG tablet     atorvastatin (LIPITOR) 40 MG tablet     Calcium Carbonate-Vitamin D (CALCIUM 600-D PO)     hydroxychloroquine (PLAQUENIL) 200 MG tablet     lisinopril (PRINIVIL/ZESTRIL) 40 MG tablet     metoprolol tartrate (LOPRESSOR) 50 MG tablet     MULTIPLE VITAMIN PO     Omega-3 Fatty Acids (FISH OIL) 1200 MG capsule     order for DME     ticagrelor (BRILINTA) 90 MG tablet     warfarin (COUMADIN) 5 MG tablet     No current facility-administered medications for this visit.      Vitals:    11/08/19 1119 11/08/19 1120 11/08/19 1121 11/08/19 1122   BP: 120/80 121/80 126/80 122/80   Pulse: 53 53 53 58   Weight: 116.6 kg (257 lb)            Physical  Exam  Vitals signs and nursing note reviewed.   Constitutional:       Appearance: Normal appearance.   HENT:      Head: Normocephalic and atraumatic.      Mouth/Throat:      Mouth: Mucous membranes are moist.      Pharynx: Oropharynx is clear.   Neck:      Musculoskeletal: Normal range of motion.   Cardiovascular:      Rate and Rhythm: Normal rate and regular rhythm.   Pulmonary:      Effort: Pulmonary effort is normal.   Musculoskeletal:         General: No edema.   Neurological:      General: No focal deficit present.      Mental Status: She is alert. Mental status is at baseline.   Psychiatric:         Mood and Affect: Mood normal.         Thought Content: Thought content normal.       Assessment and Plan:  Luz Marina was seen today for follow up.    Diagnoses and all orders for this visit:    CKD (chronic kidney disease) stage 3, GFR 30-59 ml/min (H). Discussed recent kidney function tests. Patient was advised that her kidney function has improved. Strongly favor contrast-induced nephropathy as the most likely cause of change of renal function. Patient was advised to continue with tight blood pressure control and follow up with Nephrology.     HTN, goal below 140/90. Blood pressure is at goal. Recommend to continue with current medical therapy.     Total time spent 25 minutes.  More than 50% of the time spent with Ms. Live on counseling / coordinating her care    Eliz Nguyen MD

## 2019-11-08 NOTE — PROGRESS NOTES
Ms. 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  +ROHAN, dsDNA, SSA, lupus inhibitor, and depressed complements. Modest proteinuria. Previous treatment with cytoxan and imuran. Current management with hydroxychloroquine 200 mg daily.     She was recently hospitalized for chest pain and shortness of breath, and her CAD was treated with a pair of stents. During hospitalization, it was noted that her kidney function was slightly worse and her inflammatory markers were elevated.     She denies joint pains, rash, ulcers, hair loss, or pleuritic chest pain. She has cold hands but no clear raynauds. No chest pains today, but she continues to have some KAYE. She will tire easily but this is not a change. Nil AM stiffness.    She tolerates the hydroxychloroquine 200 mg daily well. She is on low dose at her own request, and now questions whether it was a mistake to reduce her dose.    PMI:  Medical-  Active Ambulatory Problems     Diagnosis Date Noted     Hyperlipidemia LDL goal <100 12/05/2011     CAD (coronary artery disease) 12/05/2011     HTN, goal below 140/90 07/03/2013     PAF (paroxysmal atrial fibrillation) (H) 08/24/2015     CKD (chronic kidney disease) stage 3, GFR 30-59 ml/min (H) 09/14/2015     Long-term (current) use of anticoagulants [Z79.01] 04/05/2016     Moderate tricuspid insufficiency 12/22/2016     Systemic lupus erythematosus with glomerular disease, unspecified SLE type (H) 07/03/2017     Psychophysiological insomnia 06/11/2018     Morbid obesity (H) 06/11/2018     KEITH (obstructive sleep apnea)- severe (AHI 30) 06/18/2018     Long-term use of Plaquenil 09/04/2018     Long-term use of immunosuppressant medication 10/11/2019     Stable angina (H) 10/16/2019     Resolved Ambulatory Problems     Diagnosis Date Noted     SLE (systemic lupus erythematosus) (H) 11/01/2010     Hypovolemic shock (H) 02/28/2015     Sepsis (H) 08/22/2015     Secondary renal hyperparathyroidism  (H) 01/27/2016     No Additional Past Medical History     Surgical-  Past Surgical History:   Procedure Laterality Date     CARDIAC SURGERY  08/27/2009    triple vessel coronary artery bypass grafting on 8/27/09     CARPAL TUNNEL RELEASE RT/LT  1996    bilateral     CV CORONARY ANGIOGRAM  10/17/2019    Procedure: CV CORONARY ANGIOGRAM;  Surgeon: Mahendra Collins MD;  Location: [facility] CARDIAC CATH LAB     CV PCI STENT DRUG ELUTING N/A 10/17/2019    Procedure: PCI Stent Drug Eluting;  Surgeon: Mahendra Collins MD;  Location: [facility] CARDIAC CATH LAB     Injuries-none    SH:  Social History     Socioeconomic History     Marital status:      Spouse name: None     Number of children: 1 daughter     Years of education: None     Highest education level: None   Occupational History     None   Social Needs     Financial resource strain: None     Food insecurity:     Worry: None     Inability: None     Transportation needs:     Medical: None     Non-medical: None   Tobacco Use     Smoking status: Former Smoker     Smokeless tobacco: Never Used     Tobacco comment: 30 plus years ago.   Substance and Sexual Activity     Alcohol use: No     Drug use: No     Sexual activity: Yes     Partners: Male     Birth control/protection: Surgical     Comment: vasectomy   Lifestyle     Physical activity:     Days per week: None     Minutes per session: None     Stress: None   Relationships     Social connections:     Talks on phone: None     Gets together: None     Attends Spiritism service: None     Active member of club or organization: None     Attends meetings of clubs or organizations: None     Relationship status: None     Intimate partner violence:     Fear of current or ex partner: None     Emotionally abused: None     Physically abused: None     Forced sexual activity: None   Other Topics Concern     Parent/sibling w/ CABG, MI or angioplasty before 65F 55M? Not Asked   Social History Narrative      None     FH:  Family History   Problem Relation Age of Onset     Arthritis Mother         ra     Connective Tissue Disorder Mother         lupus     Congenital Anomalies Mother         wholein heart     Cerebrovascular Disease Mother         TIA's     Cerebrovascular Disease Father      Diabetes Father      Hypertension Father      Cardiovascular Father         stents     Genitourinary Problems Father         kidney failure     Kidney Disease Father         kidney injury related to acute illness around time of death (RANDELL likely)     Cataracts Father      Arthritis Paternal Grandmother      Diabetes Brother      Glaucoma No family hx of      Macular Degeneration No family hx of      ROS:  +as above in th HPI only  Remainder of the 14 point ROS obtained and found negative.    Physical Exam:  Constitutional: WD-WN-WG cooperative; +obese  Eyes: nl EOM, PERRLA, vision, conjunctiva, sclera  ENT: nl external ears, nose, hearing, lips, teeth, gums, throat  Neck: no mass or thyroid enlargement  Resp: lungs clear to auscultation, nl to palpation, nl effort  CV: RRR, no murmurs, rubs or gallops, no edema  GI: no ABD mass or tenderness, no HSM  : not tested  Lymph: no cervical or epitrochlear nodes  MS: +bilateral trace 1st MCP swelling;  All other TMJ, neck, shoulder, elbow, wrist, hand, spine, hip, knee, ankle, and foot joints were examined and otherwise found normal. Normal  strength. No deformity. Full ROM.  Skin: no nail pitting, alopecia, rash  Neuro: nl cranial nerves, strength, sensation, DTRs.   Psych: nl judgement, orientation, memory, affect.    Laboratory:    Component      Latest Ref Rng & Units 11/5/2019   WBC      4.0 - 11.0 10e9/L 6.2   RBC Count      3.8 - 5.2 10e12/L 3.66 (L)   Hemoglobin      11.7 - 15.7 g/dL 10.9 (L)   Hematocrit      35.0 - 47.0 % 33.8 (L)   MCV      78 - 100 fl 92   MCH      26.5 - 33.0 pg 29.8   MCHC      31.5 - 36.5 g/dL 32.2   RDW      10.0 - 15.0 % 14.2   Platelet Count       150 - 450 10e9/L 194   Diff Method       Automated Method   % Neutrophils      % 75.2   % Lymphocytes      % 13.9   % Monocytes      % 7.8   % Eosinophils      % 1.9   % Basophils      % 0.6   % Immature Granulocytes      % 0.6   Nucleated RBCs      0 /100 0   Absolute Neutrophil      1.6 - 8.3 10e9/L 4.6   Absolute Lymphocytes      0.8 - 5.3 10e9/L 0.9   Absolute Monocytes      0.0 - 1.3 10e9/L 0.5   Absolute Eosinophils      0.0 - 0.7 10e9/L 0.1   Absolute Basophils      0.0 - 0.2 10e9/L 0.0   Abs Immature Granulocytes      0 - 0.4 10e9/L 0.0   Absolute Nucleated RBC       0.0   Color Urine       Yellow   Appearance Urine       Clear   Glucose Urine      NEG:Negative mg/dL Negative   Bilirubin Urine      NEG:Negative Negative   Ketones Urine      NEG:Negative mg/dL Negative   Specific Gravity Urine      1.003 - 1.035 1.013   Blood Urine      NEG:Negative Small (A)   pH Urine      5.0 - 7.0 pH 6.0   Protein Albumin Urine      NEG:Negative mg/dL 30 (A)   Urobilinogen mg/dL      0.0 - 2.0 mg/dL 0.0   Nitrite Urine      NEG:Negative Negative   Leukocyte Esterase Urine      NEG:Negative Moderate (A)   Source       Midstream Urine   WBC Urine      0 - 5 /HPF 5   RBC Urine      0 - 2 /HPF 11 (H)   Squamous Epithelial /HPF Urine      0 - 1 /HPF 1   Mucous Urine      NEG:Negative /LPF Present (A)   Hyaline Casts      0 - 2 /LPF 6 (H)   Sodium      133 - 144 mmol/L 138   Potassium      3.4 - 5.3 mmol/L 4.4   Chloride      94 - 109 mmol/L 108   Carbon Dioxide      20 - 32 mmol/L 22   Anion Gap      3 - 14 mmol/L 7   Glucose      70 - 99 mg/dL 91   Urea Nitrogen      7 - 30 mg/dL 31 (H)   Creatinine      0.52 - 1.04 mg/dL 1.16 (H)   GFR Estimate      >60 mL/min/1.73:m2 51 (L)   GFR Estimate If Black      >60 mL/min/1.73:m2 60 (L)   Calcium      8.5 - 10.1 mg/dL 9.4   Bilirubin Total      0.2 - 1.3 mg/dL 0.8   Albumin      3.4 - 5.0 g/dL 3.2 (L)   Protein Total      6.8 - 8.8 g/dL 9.1 (H)   Alkaline Phosphatase      40 - 150  U/L 110   ALT      0 - 50 U/L 35   AST      0 - 45 U/L 28   Protein Random Urine      g/L 0.53   Protein Total Urine g/gr Creatinine      0 - 0.2 g/g Cr 0.67 (H)   CRP Inflammation      0.0 - 8.0 mg/L 17.0 (H)   Complement C3      76 - 169 mg/dL 50 (L)   Complement C4      15 - 50 mg/dL <2 (L)   DNA-ds      <10 IU/mL 173 (H)   N-Terminal Pro Bnp      0 - 125 pg/mL 1,044 (H)   Creatinine Urine      mg/dL 79     Component      Latest Ref Rng & Units 2/27/2018 8/5/2019 11/5/2019   Complement C3      76 - 169 mg/dL 71 (L) 53 (L) 50 (L)   Complement C4      15 - 50 mg/dL 13 (L) 4 (L) <2 (L)   CRP Inflammation      0.0 - 8.0 mg/L 3.4 8.8 (H) 17.0 (H)   DNA-ds      <10 IU/mL  110 (H) 173 (H)     Impression:    Systemic lupus erythematosis-with history of severe lupus nephritis. Most recently she has demonstrated laboratory evidence of uncontrolled inflammation associated with reduced kidney function. No change in proteinuria or new clinical symptoms. Taken together, I am concerned that her SLE is inadequately controlled and that she is at increased risk for cardiovascular or kidney immunopathology. Based on this, I will today increase her hydroxychloroquine to 400 mg. Consideration will be give to treatment with MMF if this fails to correct her biochemical abnormalities. Recommend follow up in Nephrology with Dr. Clark to consider her lupus nephritis kidney disease and make recommendations.    Bone health-we agree that she will discuss getting a repeat DEXA scan when she sees Dr. Nguyen today to further evaluate her bone health. Her history of high dose prednisone therapy puts her at increased risk for osteoporosis.    Long term management of immunosuppression-plan to repeat labs monthly. Get shingles vaccine.    RTC in 3 months.

## 2019-11-08 NOTE — PATIENT INSTRUCTIONS
If you have had any blood work, imaging or other testing completed we will be in touch within 1-2 weeks regarding the results.     If you need refills please contact your pharmacy.     It was a pleasure meeting with you today. Please let us know if there is anything else we can do for you so that we can be sure you are leaving completely satisfied with your care experience.     If you have any questions, concerns or need to schedule a follow up, please contact us at [phone] and our fax [phone].    Your Rheumatology Team at [facility]

## 2019-11-08 NOTE — PROGRESS NOTES
HPI  Patient is here for follow up. She reports that she is feeling less short of breath. She continues to have dyspnea on exertion. She reports that she was referred to Nephrology for recommendations on medication choices.     Review of Systems     Constitutional:  Negative for fever, chills and fatigue.   Respiratory:   Negative for cough and dyspnea on exertion.    Cardiovascular:  Negative for chest pain, dyspnea on exertion and edema.   Gastrointestinal:  Negative for nausea, vomiting, abdominal pain, diarrhea and constipation.   Skin:  Negative for itching and rash.   Neurological:  Negative for weakness.   Endo/Heme:  Negative for anemia, swollen glands and bruises/bleeds easily.   Psychiatric/Behavioral:  Negative for depression, decreased concentration, mood swings and panic attacks.    Endocrine:  Negative for altered temperature regulation, polyphagia, polydipsia, unwanted hair growth and change in facial hair.    Current Outpatient Medications   Medication     aspirin 81 MG tablet     atorvastatin (LIPITOR) 40 MG tablet     Calcium Carbonate-Vitamin D (CALCIUM 600-D PO)     hydroxychloroquine (PLAQUENIL) 200 MG tablet     lisinopril (PRINIVIL/ZESTRIL) 40 MG tablet     metoprolol tartrate (LOPRESSOR) 50 MG tablet     MULTIPLE VITAMIN PO     Omega-3 Fatty Acids (FISH OIL) 1200 MG capsule     order for DME     ticagrelor (BRILINTA) 90 MG tablet     warfarin (COUMADIN) 5 MG tablet     No current facility-administered medications for this visit.      Vitals:    11/08/19 1119 11/08/19 1120 11/08/19 1121 11/08/19 1122   BP: 120/80 121/80 126/80 122/80   Pulse: 53 53 53 58   Weight: 116.6 kg (257 lb)            Physical Exam  Vitals signs and nursing note reviewed.   Constitutional:       Appearance: Normal appearance.   HENT:      Head: Normocephalic and atraumatic.      Mouth/Throat:      Mouth: Mucous membranes are moist.      Pharynx: Oropharynx is clear.   Neck:      Musculoskeletal: Normal range of motion.    Cardiovascular:      Rate and Rhythm: Normal rate and regular rhythm.   Pulmonary:      Effort: Pulmonary effort is normal.   Musculoskeletal:         General: No edema.   Neurological:      General: No focal deficit present.      Mental Status: She is alert. Mental status is at baseline.   Psychiatric:         Mood and Affect: Mood normal.         Thought Content: Thought content normal.       Assessment and Plan:  Luz Marina was seen today for follow up.    Diagnoses and all orders for this visit:    CKD (chronic kidney disease) stage 3, GFR 30-59 ml/min (H). Discussed recent kidney function tests. Patient was advised that her kidney function has improved. Strongly favor contrast-induced nephropathy as the most likely cause of change of renal function. Patient was advised to continue with tight blood pressure control and follow up with Nephrology.     HTN, goal below 140/90. Blood pressure is at goal. Recommend to continue with current medical therapy.     Total time spent 25 minutes.  More than 50% of the time spent with Ms. Live on counseling / coordinating her care    Eliz Nguyen MD

## 2019-11-08 NOTE — NURSING NOTE
"Luz Marina Live's goals for this visit include: Return  She requests these members of her care team be copied on today's visit information: PCP    PCP: Eliz Nguyen    Referring Provider:  No referring provider defined for this encounter.    BP (!) 141/74   Pulse 59   Resp 18   Ht 1.6 m (5' 3\")   Wt 116.6 kg (257 lb)   SpO2 100%   BMI 45.53 kg/m      Do you need any medication refills at today's visit? N    "

## 2019-11-08 NOTE — NURSING NOTE
Chief Complaint   Patient presents with     Follow Up     2 week follow-up B/P check   Rosario Coffey LPN

## 2019-11-08 NOTE — LETTER
11/8/2019        RE: Luz Marina Live  [address]  [place] [address]        Ms. 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  +ROHAN, dsDNA, SSA, lupus inhibitor, and depressed complements. Modest proteinuria. Previous treatment with cytoxan and imuran. Current management with hydroxychloroquine 200 mg daily.     She was recently hospitalized for chest pain and shortness of breath, and her CAD was treated with a pair of stents. During hospitalization, it was noted that her kidney function was slightly worse and her inflammatory markers were elevated.     She denies joint pains, rash, ulcers, hair loss, or pleuritic chest pain. She has cold hands but no clear raynauds. No chest pains today, but she continues to have some KAYE. She will tire easily but this is not a change. Nil AM stiffness.    She tolerates the hydroxychloroquine 200 mg daily well. She is on low dose at her own request, and now questions whether it was a mistake to reduce her dose.    PMI:  Medical-  Active Ambulatory Problems     Diagnosis Date Noted     Hyperlipidemia LDL goal <100 12/05/2011     CAD (coronary artery disease) 12/05/2011     HTN, goal below 140/90 07/03/2013     PAF (paroxysmal atrial fibrillation) (H) 08/24/2015     CKD (chronic kidney disease) stage 3, GFR 30-59 ml/min (H) 09/14/2015     Long-term (current) use of anticoagulants [Z79.01] 04/05/2016     Moderate tricuspid insufficiency 12/22/2016     Systemic lupus erythematosus with glomerular disease, unspecified SLE type (H) 07/03/2017     Psychophysiological insomnia 06/11/2018     Morbid obesity (H) 06/11/2018     KEITH (obstructive sleep apnea)- severe (AHI 30) 06/18/2018     Long-term use of Plaquenil 09/04/2018     Long-term use of immunosuppressant medication 10/11/2019     Stable angina (H) 10/16/2019     Resolved Ambulatory Problems     Diagnosis Date Noted     SLE (systemic lupus erythematosus) (H) 11/01/2010      Hypovolemic shock (H) 02/28/2015     Sepsis (H) 08/22/2015     Secondary renal hyperparathyroidism (H) 01/27/2016     No Additional Past Medical History     Surgical-  Past Surgical History:   Procedure Laterality Date     CARDIAC SURGERY  08/27/2009    triple vessel coronary artery bypass grafting on 8/27/09     CARPAL TUNNEL RELEASE RT/LT  1996    bilateral     CV CORONARY ANGIOGRAM  10/17/2019    Procedure: CV CORONARY ANGIOGRAM;  Surgeon: Mahendra Collins MD;  [facility]     CV PCI STENT DRUG ELUTING N/A 10/17/2019    Procedure: PCI Stent Drug Eluting;  Surgeon: Mahendra Collins MD;  Location[facility]     Injuries-none    SH:  Social History     Socioeconomic History     Marital status:      Spouse name: None     Number of children: 1 daughter     Years of education: None     Highest education level: None   Occupational History     None   Social Needs     Financial resource strain: None     Food insecurity:     Worry: None     Inability: None     Transportation needs:     Medical: None     Non-medical: None   Tobacco Use     Smoking status: Former Smoker     Smokeless tobacco: Never Used     Tobacco comment: 30 plus years ago.   Substance and Sexual Activity     Alcohol use: No     Drug use: No     Sexual activity: Yes     Partners: Male     Birth control/protection: Surgical     Comment: vasectomy   Lifestyle     Physical activity:     Days per week: None     Minutes per session: None     Stress: None   Relationships     Social connections:     Talks on phone: None     Gets together: None     Attends Worship service: None     Active member of club or organization: None     Attends meetings of clubs or organizations: None     Relationship status: None     Intimate partner violence:     Fear of current or ex partner: None     Emotionally abused: None     Physically abused: None     Forced sexual activity: None   Other Topics Concern      Parent/sibling w/ CABG, MI or angioplasty before 65F 55M? Not Asked   Social History Narrative     None     FH:  Family History   Problem Relation Age of Onset     Arthritis Mother         ra     Connective Tissue Disorder Mother         lupus     Congenital Anomalies Mother         wholein heart     Cerebrovascular Disease Mother         TIA's     Cerebrovascular Disease Father      Diabetes Father      Hypertension Father      Cardiovascular Father         stents     Genitourinary Problems Father         kidney failure     Kidney Disease Father         kidney injury related to acute illness around time of death (RANDELL likely)     Cataracts Father      Arthritis Paternal Grandmother      Diabetes Brother      Glaucoma No family hx of      Macular Degeneration No family hx of      ROS:  +as above in th HPI only  Remainder of the 14 point ROS obtained and found negative.    Physical Exam:  Constitutional: WD-WN-WG cooperative; +obese  Eyes: nl EOM, PERRLA, vision, conjunctiva, sclera  ENT: nl external ears, nose, hearing, lips, teeth, gums, throat  Neck: no mass or thyroid enlargement  Resp: lungs clear to auscultation, nl to palpation, nl effort  CV: RRR, no murmurs, rubs or gallops, no edema  GI: no ABD mass or tenderness, no HSM  : not tested  Lymph: no cervical or epitrochlear nodes  MS: +bilateral trace 1st MCP swelling;  All other TMJ, neck, shoulder, elbow, wrist, hand, spine, hip, knee, ankle, and foot joints were examined and otherwise found normal. Normal  strength. No deformity. Full ROM.  Skin: no nail pitting, alopecia, rash  Neuro: nl cranial nerves, strength, sensation, DTRs.   Psych: nl judgement, orientation, memory, affect.    Laboratory:    Component      Latest Ref Rng & Units 11/5/2019   WBC      4.0 - 11.0 10e9/L 6.2   RBC Count      3.8 - 5.2 10e12/L 3.66 (L)   Hemoglobin      11.7 - 15.7 g/dL 10.9 (L)   Hematocrit      35.0 - 47.0 % 33.8 (L)   MCV      78 - 100 fl 92   MCH      26.5 -  33.0 pg 29.8   MCHC      31.5 - 36.5 g/dL 32.2   RDW      10.0 - 15.0 % 14.2   Platelet Count      150 - 450 10e9/L 194   Diff Method       Automated Method   % Neutrophils      % 75.2   % Lymphocytes      % 13.9   % Monocytes      % 7.8   % Eosinophils      % 1.9   % Basophils      % 0.6   % Immature Granulocytes      % 0.6   Nucleated RBCs      0 /100 0   Absolute Neutrophil      1.6 - 8.3 10e9/L 4.6   Absolute Lymphocytes      0.8 - 5.3 10e9/L 0.9   Absolute Monocytes      0.0 - 1.3 10e9/L 0.5   Absolute Eosinophils      0.0 - 0.7 10e9/L 0.1   Absolute Basophils      0.0 - 0.2 10e9/L 0.0   Abs Immature Granulocytes      0 - 0.4 10e9/L 0.0   Absolute Nucleated RBC       0.0   Color Urine       Yellow   Appearance Urine       Clear   Glucose Urine      NEG:Negative mg/dL Negative   Bilirubin Urine      NEG:Negative Negative   Ketones Urine      NEG:Negative mg/dL Negative   Specific Gravity Urine      1.003 - 1.035 1.013   Blood Urine      NEG:Negative Small (A)   pH Urine      5.0 - 7.0 pH 6.0   Protein Albumin Urine      NEG:Negative mg/dL 30 (A)   Urobilinogen mg/dL      0.0 - 2.0 mg/dL 0.0   Nitrite Urine      NEG:Negative Negative   Leukocyte Esterase Urine      NEG:Negative Moderate (A)   Source       Midstream Urine   WBC Urine      0 - 5 /HPF 5   RBC Urine      0 - 2 /HPF 11 (H)   Squamous Epithelial /HPF Urine      0 - 1 /HPF 1   Mucous Urine      NEG:Negative /LPF Present (A)   Hyaline Casts      0 - 2 /LPF 6 (H)   Sodium      133 - 144 mmol/L 138   Potassium      3.4 - 5.3 mmol/L 4.4   Chloride      94 - 109 mmol/L 108   Carbon Dioxide      20 - 32 mmol/L 22   Anion Gap      3 - 14 mmol/L 7   Glucose      70 - 99 mg/dL 91   Urea Nitrogen      7 - 30 mg/dL 31 (H)   Creatinine      0.52 - 1.04 mg/dL 1.16 (H)   GFR Estimate      >60 mL/min/1.73:m2 51 (L)   GFR Estimate If Black      >60 mL/min/1.73:m2 60 (L)   Calcium      8.5 - 10.1 mg/dL 9.4   Bilirubin Total      0.2 - 1.3 mg/dL 0.8   Albumin      3.4 -  5.0 g/dL 3.2 (L)   Protein Total      6.8 - 8.8 g/dL 9.1 (H)   Alkaline Phosphatase      40 - 150 U/L 110   ALT      0 - 50 U/L 35   AST      0 - 45 U/L 28   Protein Random Urine      g/L 0.53   Protein Total Urine g/gr Creatinine      0 - 0.2 g/g Cr 0.67 (H)   CRP Inflammation      0.0 - 8.0 mg/L 17.0 (H)   Complement C3      76 - 169 mg/dL 50 (L)   Complement C4      15 - 50 mg/dL <2 (L)   DNA-ds      <10 IU/mL 173 (H)   N-Terminal Pro Bnp      0 - 125 pg/mL 1,044 (H)   Creatinine Urine      mg/dL 79     Component      Latest Ref Rng & Units 2/27/2018 8/5/2019 11/5/2019   Complement C3      76 - 169 mg/dL 71 (L) 53 (L) 50 (L)   Complement C4      15 - 50 mg/dL 13 (L) 4 (L) <2 (L)   CRP Inflammation      0.0 - 8.0 mg/L 3.4 8.8 (H) 17.0 (H)   DNA-ds      <10 IU/mL  110 (H) 173 (H)     Impression:    Systemic lupus erythematosis-with history of severe lupus nephritis. Most recently she has demonstrated laboratory evidence of uncontrolled inflammation associated with reduced kidney function. No change in proteinuria or new clinical symptoms. Taken together, I am concerned that her SLE is inadequately controlled and that she is at increased risk for cardiovascular or kidney immunopathology. Based on this, I will today increase her hydroxychloroquine to 400 mg. Consideration will be give to treatment with MMF if this fails to correct her biochemical abnormalities. Recommend follow up in Nephrology with Dr. Clark to consider her lupus nephritis kidney disease and make recommendations.    Bone health-we agree that she will discuss getting a repeat DEXA scan when she sees Dr. Nguyen today to further evaluate her bone health. Her history of high dose prednisone therapy puts her at increased risk for osteoporosis.    Long term management of immunosuppression-plan to repeat labs monthly. Get shingles vaccine.    RTC in 3 months.      Sincerely,        Reji Chau MD

## 2019-11-09 ASSESSMENT — ANXIETY QUESTIONNAIRES: GAD7 TOTAL SCORE: 0

## 2019-11-10 ASSESSMENT — ENCOUNTER SYMPTOMS
BRUISES/BLEEDS EASILY: 0
WEAKNESS: 0
DECREASED CONCENTRATION: 0
CHILLS: 0
NAUSEA: 0
FATIGUE: 0
VOMITING: 0
DIARRHEA: 0
ABDOMINAL PAIN: 0
CONSTIPATION: 0
NERVOUS/ANXIOUS: 0
FEVER: 0
DEPRESSION: 0
ALTERED TEMPERATURE REGULATION: 0
PANIC: 0
POLYDIPSIA: 0
POLYPHAGIA: 0
COUGH: 0
SWOLLEN GLANDS: 0
INSOMNIA: 0
DYSPNEA ON EXERTION: 0

## 2019-11-11 DIAGNOSIS — I48.91 ATRIAL FIBRILLATION, UNSPECIFIED TYPE (H): ICD-10-CM

## 2019-11-11 DIAGNOSIS — N18.30 CKD (CHRONIC KIDNEY DISEASE) STAGE 3, GFR 30-59 ML/MIN (H): Primary | Chronic | ICD-10-CM

## 2019-11-14 ENCOUNTER — ANCILLARY PROCEDURE (OUTPATIENT)
Dept: MAMMOGRAPHY | Facility: CLINIC | Age: 59
End: 2019-11-14
Attending: INTERNAL MEDICINE
Payer: COMMERCIAL

## 2019-11-14 DIAGNOSIS — Z12.31 VISIT FOR SCREENING MAMMOGRAM: ICD-10-CM

## 2019-11-14 PROCEDURE — 77067 SCR MAMMO BI INCL CAD: CPT | Performed by: STUDENT IN AN ORGANIZED HEALTH CARE EDUCATION/TRAINING PROGRAM

## 2019-11-15 RX ORDER — WARFARIN SODIUM 5 MG/1
TABLET ORAL
Qty: 156 TABLET | Refills: 3 | Status: SHIPPED | OUTPATIENT
Start: 2019-11-15 | End: 2020-11-23

## 2019-11-15 NOTE — TELEPHONE ENCOUNTER
Received refill request for warfarin. Discussed with Dr. Nguyen who advised refilling for one year.   Warnings popped up for interactions with other medications - forwarding to Dr. Nguyen to sign.

## 2019-11-18 ENCOUNTER — TELEPHONE (OUTPATIENT)
Dept: ANTICOAGULATION | Facility: OTHER | Age: 59
End: 2019-11-18

## 2019-11-18 ENCOUNTER — ANTICOAGULATION THERAPY VISIT (OUTPATIENT)
Dept: ANTICOAGULATION | Facility: OTHER | Age: 59
End: 2019-11-18

## 2019-11-18 DIAGNOSIS — Z79.01 LONG TERM CURRENT USE OF ANTICOAGULANT THERAPY: ICD-10-CM

## 2019-11-18 DIAGNOSIS — I48.0 PAF (PAROXYSMAL ATRIAL FIBRILLATION) (H): ICD-10-CM

## 2019-11-18 DIAGNOSIS — Z79.01 LONG TERM CURRENT USE OF ANTICOAGULANTS WITH INR GOAL OF 2.0-3.0: ICD-10-CM

## 2019-11-18 LAB — INR BLD: 4.6 (ref 0.86–1.14)

## 2019-11-18 PROCEDURE — 99207 ZZC NO CHARGE NURSE ONLY: CPT | Performed by: INTERNAL MEDICINE

## 2019-11-18 PROCEDURE — 36416 COLLJ CAPILLARY BLOOD SPEC: CPT | Performed by: INTERNAL MEDICINE

## 2019-11-18 PROCEDURE — 85610 PROTHROMBIN TIME: CPT | Mod: QW | Performed by: INTERNAL MEDICINE

## 2019-11-22 ENCOUNTER — ANTICOAGULATION THERAPY VISIT (OUTPATIENT)
Dept: ANTICOAGULATION | Facility: OTHER | Age: 59
End: 2019-11-22
Payer: COMMERCIAL

## 2019-11-22 ENCOUNTER — TELEPHONE (OUTPATIENT)
Dept: ANTICOAGULATION | Facility: OTHER | Age: 59
End: 2019-11-22

## 2019-11-22 DIAGNOSIS — I48.0 PAF (PAROXYSMAL ATRIAL FIBRILLATION) (H): ICD-10-CM

## 2019-11-22 DIAGNOSIS — Z79.01 LONG TERM CURRENT USE OF ANTICOAGULANTS WITH INR GOAL OF 2.0-3.0: ICD-10-CM

## 2019-11-22 DIAGNOSIS — Z79.01 LONG TERM CURRENT USE OF ANTICOAGULANT THERAPY: ICD-10-CM

## 2019-11-22 LAB — INR BLD: 3.3 (ref 0.86–1.14)

## 2019-11-22 PROCEDURE — 36416 COLLJ CAPILLARY BLOOD SPEC: CPT | Performed by: INTERNAL MEDICINE

## 2019-11-22 PROCEDURE — 85610 PROTHROMBIN TIME: CPT | Mod: QW | Performed by: INTERNAL MEDICINE

## 2019-11-22 PROCEDURE — 99207 ZZC NO CHARGE NURSE ONLY: CPT | Performed by: INTERNAL MEDICINE

## 2019-11-22 NOTE — PROGRESS NOTES
ANTICOAGULATION FOLLOW-UP CLINIC VISIT    Patient Name:  Luz Marina Live  Date:  11/22/2019  Contact Type:  Telephone    SUBJECTIVE:  Patient Findings     Positives:   Change in medications (Pt was placed on Brilinta after her stent placement - this can increase INR. Maintenance dose decreased and will recheck in 1 week)             OBJECTIVE    INR Point of Care   Date Value Ref Range Status   11/22/2019 3.3 (H) 0.86 - 1.14 Final     Comment:     This test is intended for monitoring Coumadin therapy.  Results are not   accurate in patients with prolonged INR due to factor deficiency.         ASSESSMENT / PLAN  INR assessment SUPRA    Recheck INR In: 1 WEEK    INR Location Outside lab      Anticoagulation Summary  As of 11/22/2019    INR goal:   2.0-3.0   TTR:   60.9 % (10.2 mo)   INR used for dosing:   3.3! (11/22/2019)   Warfarin maintenance plan:   7.5 mg (5 mg x 1.5) every day   Full warfarin instructions:   11/22: 5 mg; 11/25: 5 mg; 11/27: 5 mg; Otherwise 7.5 mg every day   Weekly warfarin total:   52.5 mg   Plan last modified:   Michael Quispe RN (9/20/2019)   Next INR check:   11/29/2019   Target end date:       Indications    Long-term (current) use of anticoagulants [Z79.01] [Z79.01]  PAF (paroxysmal atrial fibrillation) (H) [I48.0]             Anticoagulation Episode Summary     INR check location:       Preferred lab:       Send INR reminders to:   [facility]    Comments:   5 mg tabs, [facility] (needs staff message) PM dose      Anticoagulation Care Providers     Provider Role Specialty Phone number    Eliz Nguyen MD [facility] Internal Medicine [phone]            See the Encounter Report to view Anticoagulation Flowsheet and Dosing Calendar (Go to Encounters tab in chart review, and find the Anticoagulation Therapy Visit)    Dosage adjustment made based on physician directed care plan.    Michael Quispe RN

## 2019-12-02 ENCOUNTER — ANTICOAGULATION THERAPY VISIT (OUTPATIENT)
Dept: ANTICOAGULATION | Facility: OTHER | Age: 59
End: 2019-12-02

## 2019-12-02 DIAGNOSIS — I48.0 PAF (PAROXYSMAL ATRIAL FIBRILLATION) (H): ICD-10-CM

## 2019-12-02 DIAGNOSIS — Z79.01 LONG TERM CURRENT USE OF ANTICOAGULANT THERAPY: ICD-10-CM

## 2019-12-02 DIAGNOSIS — Z79.01 LONG TERM CURRENT USE OF ANTICOAGULANTS WITH INR GOAL OF 2.0-3.0: ICD-10-CM

## 2019-12-02 LAB
CAPILLARY BLOOD COLLECTION: NORMAL
INR BLD: 2.8 (ref 0.86–1.14)

## 2019-12-02 PROCEDURE — 85610 PROTHROMBIN TIME: CPT | Mod: QW | Performed by: INTERNAL MEDICINE

## 2019-12-02 PROCEDURE — 99207 ZZC NO CHARGE NURSE ONLY: CPT | Performed by: INTERNAL MEDICINE

## 2019-12-02 PROCEDURE — 36416 COLLJ CAPILLARY BLOOD SPEC: CPT | Performed by: INTERNAL MEDICINE

## 2019-12-02 NOTE — PROGRESS NOTES
ANTICOAGULATION FOLLOW-UP CLINIC VISIT    Patient Name:  Luz Marina iLve  Date:  2019  Contact Type:  Telephone    SUBJECTIVE:  Patient Findings     Comments:   Pt reports that she took 5 mg Mon, Wed, Fri and 7.5 mg ROW.   Cait Hawthorne RN          Clinical Outcomes     Comments:   Pt reports that she took 5 mg Mon, Wed, Fri and 7.5 mg ROW.   Cait Hawthorne RN             OBJECTIVE    INR Point of Care   Date Value Ref Range Status   2019 2.8 (H) 0.86 - 1.14 Final     Comment:     This test is intended for monitoring Coumadin therapy.  Results are not   accurate in patients with prolonged INR due to factor deficiency.         ASSESSMENT / PLAN  INR assessment THER    Recheck INR In: 10 DAYS    INR Location Outside lab      Anticoagulation Summary  As of 2019    INR goal:   2.0-3.0   TTR:   59.0 % (10.2 mo)   INR used for dosin.8 (2019)   Warfarin maintenance plan:   5 mg (5 mg x 1) every Mon, Wed, Fri; 7.5 mg (5 mg x 1.5) all other days   Full warfarin instructions:   5 mg every Mon, Wed, Fri; 7.5 mg all other days   Weekly warfarin total:   45 mg   Plan last modified:   Cait Hawthorne RN (2019)   Next INR check:   2019   Target end date:       Indications    Long-term (current) use of anticoagulants [Z79.01] [Z79.01]  PAF (paroxysmal atrial fibrillation) (H) [I48.0]             Anticoagulation Episode Summary     INR check location:       Preferred lab:       Send INR reminders to:   [facility]    Comments:   5 mg tabs, [facility] (needs staff message) PM dose      Anticoagulation Care Providers     Provider Role Specialty Phone number    Eliz Nguyen MD [facility] Internal Medicine [phone]            See the Encounter Report to view Anticoagulation Flowsheet and Dosing Calendar (Go to Encounters tab in chart review, and find the Anticoagulation Therapy Visit)    Dosage adjustment made based on physician directed care plan.      Cait KILGORE  KATE Hawthorne

## 2019-12-13 ENCOUNTER — ANTICOAGULATION THERAPY VISIT (OUTPATIENT)
Dept: ANTICOAGULATION | Facility: OTHER | Age: 59
End: 2019-12-13

## 2019-12-13 ENCOUNTER — TELEPHONE (OUTPATIENT)
Dept: ANTICOAGULATION | Facility: OTHER | Age: 59
End: 2019-12-13

## 2019-12-13 DIAGNOSIS — Z79.01 LONG TERM CURRENT USE OF ANTICOAGULANT THERAPY: ICD-10-CM

## 2019-12-13 DIAGNOSIS — Z79.01 LONG TERM CURRENT USE OF ANTICOAGULANTS WITH INR GOAL OF 2.0-3.0: ICD-10-CM

## 2019-12-13 DIAGNOSIS — I48.0 PAF (PAROXYSMAL ATRIAL FIBRILLATION) (H): ICD-10-CM

## 2019-12-13 LAB — INR BLD: 1.5 (ref 0.86–1.14)

## 2019-12-13 PROCEDURE — 85610 PROTHROMBIN TIME: CPT | Mod: QW | Performed by: INTERNAL MEDICINE

## 2019-12-13 PROCEDURE — 36416 COLLJ CAPILLARY BLOOD SPEC: CPT | Performed by: INTERNAL MEDICINE

## 2019-12-13 PROCEDURE — 99207 ZZC NO CHARGE NURSE ONLY: CPT | Performed by: INTERNAL MEDICINE

## 2019-12-13 NOTE — PROGRESS NOTES
ANTICOAGULATION FOLLOW-UP CLINIC VISIT    Patient Name:  Luz Marina Live  Date:  2019  Contact Type:  Telephone    SUBJECTIVE:  Patient Findings     Positives:   Change in diet/appetite (Pt does admit to having spinach twice this week)             OBJECTIVE    INR Point of Care   Date Value Ref Range Status   2019 1.5 (H) 0.86 - 1.14 Final     Comment:     This test is intended for monitoring Coumadin therapy.  Results are not   accurate in patients with prolonged INR due to factor deficiency.         ASSESSMENT / PLAN  INR assessment SUB    Recheck INR In: 10 DAYS    INR Location Outside lab      Anticoagulation Summary  As of 2019    INR goal:   2.0-3.0   TTR:   57.6 % (10.2 mo)   INR used for dosin.5! (2019)   Warfarin maintenance plan:   5 mg (5 mg x 1) every Mon, Wed, Fri; 7.5 mg (5 mg x 1.5) all other days   Full warfarin instructions:   : 7.5 mg; Otherwise 5 mg every Mon, Wed, Fri; 7.5 mg all other days   Weekly warfarin total:   45 mg   Plan last modified:   Cait Hawthorne RN (2019)   Next INR check:   2019   Target end date:       Indications    Long-term (current) use of anticoagulants [Z79.01] [Z79.01]  PAF (paroxysmal atrial fibrillation) (H) [I48.0]             Anticoagulation Episode Summary     INR check location:       Preferred lab:       Send INR reminders to:   [facility]    Comments:   5 mg tabs, [facility] (needs staff message) PM dose      Anticoagulation Care Providers     Provider Role Specialty Phone number    Eliz Nguyen MD [facility] Internal Medicine [phone]            See the Encounter Report to view Anticoagulation Flowsheet and Dosing Calendar (Go to Encounters tab in chart review, and find the Anticoagulation Therapy Visit)    Dosage adjustment made based on physician directed care plan.    Michael Quispe, RN

## 2019-12-17 ENCOUNTER — TELEPHONE (OUTPATIENT)
Dept: CARDIOLOGY | Facility: CLINIC | Age: 59
End: 2019-12-17

## 2019-12-17 NOTE — TELEPHONE ENCOUNTER
"[facility]    Phone Message    May a detailed message be left on voicemail: yes    Reason for Call: Medication Refill Request    Has the patient contacted the pharmacy for the refill? Yes   Name of medication being requested: metoprolol tartrate (LOPRESSOR) 50 MG tablet  Provider who prescribed the medication: Dr. Moreno  Pharmacy: [id number] - [place], [place] - [address]  Date medication is needed: 12/17/19   Luz Marina said she was given this medication when she was discharged from the ED on 10/17/19. Luz Marina is nearly completely out of the medication and says she needs to get it refilled. Luz Marina also says she needs clarification on the instructions for the medication. The bottle Luz Marina has says \"Take 1 tablet (100 mg) by mouth 2 times daily\" but her discharge papers says \"Take 2 tablets (100 mg) by mouth 2 times daily.\" Luz Marina has been following the discharge paper orders she and the nurse went over. Luz Marina would like a call to discuss this. Please give Luz Marina a call back at your earliest convenience.    Action Taken: Message routed to:  [facility]  "

## 2019-12-18 DIAGNOSIS — I48.91 ATRIAL FIBRILLATION, UNSPECIFIED TYPE (H): ICD-10-CM

## 2019-12-18 RX ORDER — METOPROLOL TARTRATE 50 MG
100 TABLET ORAL 2 TIMES DAILY
Qty: 180 TABLET | Refills: 3 | Status: SHIPPED | OUTPATIENT
Start: 2019-12-18 | End: 2020-05-14

## 2019-12-23 ENCOUNTER — OFFICE VISIT (OUTPATIENT)
Dept: NEPHROLOGY | Facility: CLINIC | Age: 59
End: 2019-12-23
Payer: COMMERCIAL

## 2019-12-23 VITALS
OXYGEN SATURATION: 100 % | RESPIRATION RATE: 18 BRPM | WEIGHT: 256.3 LBS | DIASTOLIC BLOOD PRESSURE: 80 MMHG | SYSTOLIC BLOOD PRESSURE: 133 MMHG | HEIGHT: 63 IN | BODY MASS INDEX: 45.41 KG/M2 | HEART RATE: 71 BPM

## 2019-12-23 DIAGNOSIS — N18.30 CKD (CHRONIC KIDNEY DISEASE) STAGE 3, GFR 30-59 ML/MIN (H): ICD-10-CM

## 2019-12-23 DIAGNOSIS — M32.14 SYSTEMIC LUPUS ERYTHEMATOSUS WITH GLOMERULAR DISEASE, UNSPECIFIED SLE TYPE (H): Primary | ICD-10-CM

## 2019-12-23 DIAGNOSIS — Z79.01 LONG TERM CURRENT USE OF ANTICOAGULANTS WITH INR GOAL OF 2.0-3.0: ICD-10-CM

## 2019-12-23 DIAGNOSIS — I10 HYPERTENSION GOAL BP (BLOOD PRESSURE) < 130/80: ICD-10-CM

## 2019-12-23 DIAGNOSIS — N18.30 CKD (CHRONIC KIDNEY DISEASE) STAGE 3, GFR 30-59 ML/MIN (H): Chronic | ICD-10-CM

## 2019-12-23 DIAGNOSIS — I48.0 PAF (PAROXYSMAL ATRIAL FIBRILLATION) (H): ICD-10-CM

## 2019-12-23 LAB
ALBUMIN SERPL-MCNC: 3.1 G/DL (ref 3.4–5)
ANION GAP SERPL CALCULATED.3IONS-SCNC: 3 MMOL/L (ref 3–14)
BUN SERPL-MCNC: 28 MG/DL (ref 7–30)
CALCIUM SERPL-MCNC: 9.4 MG/DL (ref 8.5–10.1)
CHLORIDE SERPL-SCNC: 109 MMOL/L (ref 94–109)
CO2 SERPL-SCNC: 27 MMOL/L (ref 20–32)
CREAT SERPL-MCNC: 1.13 MG/DL (ref 0.52–1.04)
CREAT UR-MCNC: 71 MG/DL
GFR SERPL CREATININE-BSD FRML MDRD: 53 ML/MIN/{1.73_M2}
GLUCOSE SERPL-MCNC: 88 MG/DL (ref 70–99)
HGB BLD-MCNC: 10.1 G/DL (ref 11.7–15.7)
INR PPP: 2.39 (ref 0.86–1.14)
PHOSPHATE SERPL-MCNC: 3.3 MG/DL (ref 2.5–4.5)
POTASSIUM SERPL-SCNC: 4.1 MMOL/L (ref 3.4–5.3)
PROT UR-MCNC: 0.56 G/L
PROT/CREAT 24H UR: 0.78 G/G CR (ref 0–0.2)
PTH-INTACT SERPL-MCNC: 25 PG/ML (ref 18–80)
SODIUM SERPL-SCNC: 139 MMOL/L (ref 133–144)

## 2019-12-23 PROCEDURE — 36415 COLL VENOUS BLD VENIPUNCTURE: CPT | Performed by: INTERNAL MEDICINE

## 2019-12-23 PROCEDURE — 80069 RENAL FUNCTION PANEL: CPT | Performed by: INTERNAL MEDICINE

## 2019-12-23 PROCEDURE — 85610 PROTHROMBIN TIME: CPT | Performed by: INTERNAL MEDICINE

## 2019-12-23 PROCEDURE — 99214 OFFICE O/P EST MOD 30 MIN: CPT | Performed by: INTERNAL MEDICINE

## 2019-12-23 PROCEDURE — 82306 VITAMIN D 25 HYDROXY: CPT | Performed by: INTERNAL MEDICINE

## 2019-12-23 PROCEDURE — 85018 HEMOGLOBIN: CPT | Performed by: INTERNAL MEDICINE

## 2019-12-23 PROCEDURE — 83970 ASSAY OF PARATHORMONE: CPT | Performed by: INTERNAL MEDICINE

## 2019-12-23 PROCEDURE — 84156 ASSAY OF PROTEIN URINE: CPT | Performed by: INTERNAL MEDICINE

## 2019-12-23 ASSESSMENT — PAIN SCALES - GENERAL: PAINLEVEL: MILD PAIN (2)

## 2019-12-23 ASSESSMENT — MIFFLIN-ST. JEOR: SCORE: 1706.7

## 2019-12-23 NOTE — PATIENT INSTRUCTIONS
Tentative plan to follow labs monthly- You will schedule these on your own  Follow-up in April.   Stop the calcium supplement  I will update you on the vitamin D  Ok to continue the Multivitamin.

## 2019-12-23 NOTE — NURSING NOTE
"Luz Marina Live's goals for this visit include: Return  She requests these members of her care team be copied on today's visit information: PCP    PCP: Eliz Nguyen    Referring Provider:  Reji Chau MD  [address] [place] [id number]    /80 (BP Location: Left arm, Patient Position: Sitting)   Pulse 71   Resp 18   Ht 1.6 m (5' 3\")   Wt 116.3 kg (256 lb 4.8 oz)   SpO2 100%   BMI 45.40 kg/m      Do you need any medication refills at today's visit? N    "

## 2019-12-24 LAB
DEPRECATED CALCIDIOL+CALCIFEROL SERPL-MC: <38 UG/L (ref 20–75)
VITAMIN D2 SERPL-MCNC: <5 UG/L
VITAMIN D3 SERPL-MCNC: 33 UG/L

## 2020-01-07 ENCOUNTER — ALLIED HEALTH/NURSE VISIT (OUTPATIENT)
Dept: FAMILY MEDICINE | Facility: CLINIC | Age: 60
End: 2020-01-07
Payer: COMMERCIAL

## 2020-01-07 ENCOUNTER — ANTICOAGULATION THERAPY VISIT (OUTPATIENT)
Dept: ANTICOAGULATION | Facility: OTHER | Age: 60
End: 2020-01-07

## 2020-01-07 DIAGNOSIS — Z79.01 LONG TERM CURRENT USE OF ANTICOAGULANTS WITH INR GOAL OF 2.0-3.0: ICD-10-CM

## 2020-01-07 DIAGNOSIS — Z79.01 LONG TERM CURRENT USE OF ANTICOAGULANT THERAPY: ICD-10-CM

## 2020-01-07 DIAGNOSIS — I48.0 PAF (PAROXYSMAL ATRIAL FIBRILLATION) (H): ICD-10-CM

## 2020-01-07 DIAGNOSIS — Z23 NEED FOR VACCINATION: Primary | ICD-10-CM

## 2020-01-07 LAB
CAPILLARY BLOOD COLLECTION: NORMAL
INR BLD: 2.1 (ref 0.86–1.14)

## 2020-01-07 PROCEDURE — 99207 ZZC NO CHARGE NURSE ONLY: CPT | Performed by: INTERNAL MEDICINE

## 2020-01-07 PROCEDURE — 90750 HZV VACC RECOMBINANT IM: CPT

## 2020-01-07 PROCEDURE — 99207 ZZC NO CHARGE LOS: CPT

## 2020-01-07 PROCEDURE — 85610 PROTHROMBIN TIME: CPT | Mod: QW | Performed by: INTERNAL MEDICINE

## 2020-01-07 PROCEDURE — 36416 COLLJ CAPILLARY BLOOD SPEC: CPT | Performed by: INTERNAL MEDICINE

## 2020-01-07 PROCEDURE — 90471 IMMUNIZATION ADMIN: CPT

## 2020-01-07 NOTE — PROGRESS NOTES
ANTICOAGULATION FOLLOW-UP CLINIC VISIT    Patient Name:  Luz Marina Live  Date:  2020  Contact Type:  Telephone    SUBJECTIVE:  Patient Findings     Comments:   The patient was assessed for diet, medication, and activity level changes, missed or extra doses, bruising or bleeding, with no problem findings.          Clinical Outcomes     Comments:   The patient was assessed for diet, medication, and activity level changes, missed or extra doses, bruising or bleeding, with no problem findings.             OBJECTIVE    INR Point of Care   Date Value Ref Range Status   2020 2.1 (H) 0.86 - 1.14 Final     Comment:     This test is intended for monitoring Coumadin therapy.  Results are not   accurate in patients with prolonged INR due to factor deficiency.         ASSESSMENT / PLAN  INR assessment THER    Recheck INR In: 4 WEEKS    INR Location Outside lab      Anticoagulation Summary  As of 2020    INR goal:   2.0-3.0   TTR:   55.7 % (10.2 mo)   INR used for dosin.1 (2020)   Warfarin maintenance plan:   5 mg (5 mg x 1) every Mon, Wed, Fri; 7.5 mg (5 mg x 1.5) all other days   Full warfarin instructions:   5 mg every Mon, Wed, Fri; 7.5 mg all other days   Weekly warfarin total:   45 mg   No change documented:   Deborah Alatorre RN   Plan last modified:   Cait Hawthorne RN (2019)   Next INR check:   2020   Target end date:       Indications    Long-term (current) use of anticoagulants [Z79.01] [Z79.01]  PAF (paroxysmal atrial fibrillation) (H) [I48.0]             Anticoagulation Episode Summary     INR check location:       Preferred lab:       Send INR reminders to:   [facility]    Comments:   5 mg tabs, [facility] (needs staff message) PM dose      Anticoagulation Care Providers     Provider Role Specialty Phone number    Eliz Nguyen MD [facility] Internal Medicine [phone]            See the Encounter Report to view Anticoagulation Flowsheet and Dosing Calendar (Go  to Encounters tab in chart review, and find the Anticoagulation Therapy Visit)    Dosage adjustment made based on physician directed care plan.    Deborah Alatorre RN

## 2020-01-07 NOTE — NURSING NOTE
Prior to immunization administration, verified patients identity using patient s name and date of birth. Please see Immunization Activity for additional information.     Screening Questionnaire for Adult Immunization    Are you sick today?   No   Do you have allergies to medications, food, a vaccine component or latex?   No   Have you ever had a serious reaction after receiving a vaccination?   No   Do you have a long-term health problem with heart, lung, kidney, or metabolic disease (e.g., diabetes), asthma, a blood disorder, no spleen, complement component deficiency, a cochlear implant, or a spinal fluid leak?  Are you on long-term aspirin therapy?   No   Do you have cancer, leukemia, HIV/AIDS, or any other immune system problem?   No   Do you have a parent, brother, or sister with an immune system problem?   No   In the past 3 months, have you taken medications that affect  your immune system, such as prednisone, other steroids, or anticancer drugs; drugs for the treatment of rheumatoid arthritis, Crohn s disease, or psoriasis; or have you had radiation treatments?   No   Have you had a seizure, or a brain or other nervous system problem?   No   During the past year, have you received a transfusion of blood or blood    products, or been given immune (gamma) globulin or antiviral drug?   No   For women: Are you pregnant or is there a chance you could become       pregnant during the next month?   No   Have you received any vaccinations in the past 4 weeks?   No     Immunization questionnaire answers were all negative.        Patient instructed to remain in clinic for 15 minutes afterwards, and to report any adverse reaction to me immediately.       Screening performed by Kaylie Rios MA on 1/7/2020 at 2:47 PM.

## 2020-01-14 ENCOUNTER — OFFICE VISIT (OUTPATIENT)
Dept: INTERNAL MEDICINE | Facility: CLINIC | Age: 60
End: 2020-01-14
Attending: INTERNAL MEDICINE
Payer: COMMERCIAL

## 2020-01-14 VITALS
SYSTOLIC BLOOD PRESSURE: 135 MMHG | HEART RATE: 57 BPM | DIASTOLIC BLOOD PRESSURE: 84 MMHG | WEIGHT: 251 LBS | BODY MASS INDEX: 44.46 KG/M2

## 2020-01-14 DIAGNOSIS — R06.09 DYSPNEA ON EXERTION: Primary | ICD-10-CM

## 2020-01-14 PROCEDURE — G0463 HOSPITAL OUTPT CLINIC VISIT: HCPCS | Mod: ZF

## 2020-01-14 ASSESSMENT — ANXIETY QUESTIONNAIRES
6. BECOMING EASILY ANNOYED OR IRRITABLE: NOT AT ALL
2. NOT BEING ABLE TO STOP OR CONTROL WORRYING: NOT AT ALL
5. BEING SO RESTLESS THAT IT IS HARD TO SIT STILL: NOT AT ALL
7. FEELING AFRAID AS IF SOMETHING AWFUL MIGHT HAPPEN: NOT AT ALL
GAD7 TOTAL SCORE: 0
3. WORRYING TOO MUCH ABOUT DIFFERENT THINGS: NOT AT ALL
1. FEELING NERVOUS, ANXIOUS, OR ON EDGE: NOT AT ALL

## 2020-01-14 ASSESSMENT — ENCOUNTER SYMPTOMS
COUGH: 0
PANIC: 0
POLYDIPSIA: 0
FEVER: 0
DECREASED CONCENTRATION: 0
POLYPHAGIA: 0
DEPRESSION: 0
SINUS CONGESTION: 0
DYSPNEA ON EXERTION: 1
ALTERED TEMPERATURE REGULATION: 0
NERVOUS/ANXIOUS: 0
BACK PAIN: 0
INSOMNIA: 0
FATIGUE: 0

## 2020-01-14 ASSESSMENT — PAIN SCALES - GENERAL: PAINLEVEL: NO PAIN (0)

## 2020-01-14 ASSESSMENT — PATIENT HEALTH QUESTIONNAIRE - PHQ9
SUM OF ALL RESPONSES TO PHQ QUESTIONS 1-9: 1
5. POOR APPETITE OR OVEREATING: NOT AT ALL

## 2020-01-14 NOTE — PROGRESS NOTES
HPI  Patient is here for follow up. She reports that she has been having occasional shortness of breath. She reports some episodes with exertion and other episodes with exposure to cold air. She reports that her dyspnea on exertion is slowly improving.     Review of Systems     Constitutional:  Negative for fever and fatigue.   HENT:  Negative for ear pain and sinus congestion.    Respiratory:   Positive for dyspnea on exertion. Negative for cough.    Cardiovascular:  Positive for dyspnea on exertion. Negative for chest pain and edema.   Musculoskeletal:  Negative for back pain.   Skin:  Negative for itching and rash.   Psychiatric/Behavioral:  Negative for depression, decreased concentration, mood swings and panic attacks.    Endocrine:  Negative for altered temperature regulation, polyphagia, polydipsia, unwanted hair growth and change in facial hair.    Current Outpatient Medications   Medication     atorvastatin (LIPITOR) 40 MG tablet     Calcium Carbonate-Vitamin D (CALCIUM 600-D PO)     hydroxychloroquine (PLAQUENIL) 200 MG tablet     lisinopril (PRINIVIL/ZESTRIL) 40 MG tablet     metoprolol tartrate (LOPRESSOR) 50 MG tablet     MULTIPLE VITAMIN PO     Omega-3 Fatty Acids (FISH OIL) 1200 MG capsule     order for DME     ticagrelor (BRILINTA) 90 MG tablet     warfarin ANTICOAGULANT (COUMADIN) 5 MG tablet     No current facility-administered medications for this visit.      Vitals:    01/14/20 1407 01/14/20 1408 01/14/20 1409 01/14/20 1410   BP: 136/84 137/84 132/83 135/84   Pulse: 57 57 57 57   Weight: 113.9 kg (251 lb)          Physical Exam  Vitals signs and nursing note reviewed.   Constitutional:       Appearance: Normal appearance.   HENT:      Head: Normocephalic and atraumatic.   Eyes:      Conjunctiva/sclera: Conjunctivae normal.      Pupils: Pupils are equal, round, and reactive to light.   Cardiovascular:      Rate and Rhythm: Normal rate and regular rhythm.   Pulmonary:      Effort: Pulmonary effort  is normal. No respiratory distress.      Breath sounds: Normal breath sounds. No stridor. No wheezing, rhonchi or rales.   Chest:      Chest wall: No tenderness.   Musculoskeletal:         General: No edema.   Skin:     General: Skin is warm and dry.   Neurological:      General: No focal deficit present.      Mental Status: She is alert and oriented to person, place, and time. Mental status is at baseline.   Psychiatric:         Mood and Affect: Mood normal.         Behavior: Behavior normal.         Thought Content: Thought content normal.         Judgment: Judgment normal.       Assessment and Plan:  Luz Marina was seen today for follow up.    Diagnoses and all orders for this visit:    Dyspnea on exertion. Discussed possible causes of dyspnea on exertion with patient. Given lack of other cardiac symptoms, recommend evaluation with PFT. Patient will be advised on test results accordingly. Will also consider chest imaging for further evaluation. Patient expressed understanding and agreement with the plan.  -     General PFT Lab (Please always keep checked); Future  -     Pulmonary Function Test; Future        Total time spent 25 minutes.  More than 50% of the time spent with Ms. Live on counseling / coordinating her care    Eliz Nguyen MD

## 2020-01-14 NOTE — LETTER
1/14/2020       RE: Luz Marina Live  [address]  [place] [address]     Dear Colleague,    Thank you for referring your patient, Luz Marina Live, to the [facility]  at [facility]. Please see a copy of my visit note below.    HPI  Patient is here for follow up. She reports that she has been having occasional shortness of breath. She reports some episodes with exertion and other episodes with exposure to cold air. She reports that her dyspnea on exertion is slowly improving.     Review of Systems     Constitutional:  Negative for fever and fatigue.   HENT:  Negative for ear pain and sinus congestion.    Respiratory:   Positive for dyspnea on exertion. Negative for cough.    Cardiovascular:  Positive for dyspnea on exertion. Negative for chest pain and edema.   Musculoskeletal:  Negative for back pain.   Skin:  Negative for itching and rash.   Psychiatric/Behavioral:  Negative for depression, decreased concentration, mood swings and panic attacks.    Endocrine:  Negative for altered temperature regulation, polyphagia, polydipsia, unwanted hair growth and change in facial hair.    Current Outpatient Medications   Medication     atorvastatin (LIPITOR) 40 MG tablet     Calcium Carbonate-Vitamin D (CALCIUM 600-D PO)     hydroxychloroquine (PLAQUENIL) 200 MG tablet     lisinopril (PRINIVIL/ZESTRIL) 40 MG tablet     metoprolol tartrate (LOPRESSOR) 50 MG tablet     MULTIPLE VITAMIN PO     Omega-3 Fatty Acids (FISH OIL) 1200 MG capsule     order for DME     ticagrelor (BRILINTA) 90 MG tablet     warfarin ANTICOAGULANT (COUMADIN) 5 MG tablet     No current facility-administered medications for this visit.      Vitals:    01/14/20 1407 01/14/20 1408 01/14/20 1409 01/14/20 1410   BP: 136/84 137/84 132/83 135/84   Pulse: 57 57 57 57   Weight: 113.9 kg (251 lb)          Physical Exam  Vitals signs and nursing note reviewed.   Constitutional:       Appearance:  Normal appearance.   HENT:      Head: Normocephalic and atraumatic.   Eyes:      Conjunctiva/sclera: Conjunctivae normal.      Pupils: Pupils are equal, round, and reactive to light.   Cardiovascular:      Rate and Rhythm: Normal rate and regular rhythm.   Pulmonary:      Effort: Pulmonary effort is normal. No respiratory distress.      Breath sounds: Normal breath sounds. No stridor. No wheezing, rhonchi or rales.   Chest:      Chest wall: No tenderness.   Musculoskeletal:         General: No edema.   Skin:     General: Skin is warm and dry.   Neurological:      General: No focal deficit present.      Mental Status: She is alert and oriented to person, place, and time. Mental status is at baseline.   Psychiatric:         Mood and Affect: Mood normal.         Behavior: Behavior normal.         Thought Content: Thought content normal.         Judgment: Judgment normal.       Assessment and Plan:  Luz Marina was seen today for follow up.    Diagnoses and all orders for this visit:    Dyspnea on exertion. Discussed possible causes of dyspnea on exertion with patient. Given lack of other cardiac symptoms, recommend evaluation with PFT. Patient will be advised on test results accordingly. Will also consider chest imaging for further evaluation. Patient expressed understanding and agreement with the plan.  -     General PFT Lab (Please always keep checked); Future  -     Pulmonary Function Test; Future        Total time spent 25 minutes.  More than 50% of the time spent with Ms. Live on counseling / coordinating her care    Eliz Nguyen MD

## 2020-01-15 ASSESSMENT — ANXIETY QUESTIONNAIRES: GAD7 TOTAL SCORE: 0

## 2020-01-15 NOTE — PROGRESS NOTES
12/23/19  CC: CKD    HPI: Luz Marina Live is a 59 year old female who presents for follow-up of CKD. Ms. Live' hx is significant for SLE for which she follows with Dr. Adame. Her SLE has included renal involvement in the past - 1990s and included treatment with cytoxan, prednisone and later imuran. She has had intermittent low grade proteinuria since. Additional hx includes CABG in August 2009, Afib, and hypertension (dx around 20 years ago). Additionally she has had a few RANDELL episodes and NSAID related injury.    Creatinine has been 0.95-1.35 for the past year; 1.13 at this time. UPCR was 0.67 g/g in Nov. Which is up from 0.3-0.5 g/g previously. She underwent 2 cardiac stent placement in October - on brilinta and coumadin. She has noted a rash on her face, knee/hip pain but feels the pain may also be related to cardiac rehab. Blood pressure has been in the 110s at rehab.        Allergies   Allergen Reactions     Sulfa Drugs Rash and GI Disturbance       atorvastatin (LIPITOR) 40 MG tablet, TAKE 1 TABLET BY MOUTH EVERY DAY  Calcium Carbonate-Vitamin D (CALCIUM 600-D PO), Take  by mouth 2 times daily.  hydroxychloroquine (PLAQUENIL) 200 MG tablet, Take 2 tablets (400 mg) by mouth daily  lisinopril (PRINIVIL/ZESTRIL) 40 MG tablet, Take 1 tablet (40 mg) by mouth daily  metoprolol tartrate (LOPRESSOR) 50 MG tablet, Take 2 tablets (100 mg) by mouth 2 times daily  MULTIPLE VITAMIN PO, Take 1 tablet by mouth daily   Omega-3 Fatty Acids (FISH OIL) 1200 MG capsule, Take 2 capsules by mouth 2 times daily   order for DME, [employer] 10-16 cmH20  ticagrelor (BRILINTA) 90 MG tablet, Take 1 tablet (90 mg) by mouth every 12 hours  warfarin ANTICOAGULANT (COUMADIN) 5 MG tablet, TAKE 1 TAB (5 MG) BY MOUTH ON FRI & 2 TABS (10 MG) ALL OTHER DAYS, OR AS DIRECTED    No current facility-administered medications on file prior to visit.       Past Medical History:   Diagnosis Date     Hypovolemic shock (H) 2/28/2015     Sepsis (H) 8/22/2015  "      Past Surgical History:   Procedure Laterality Date     CARDIAC SURGERY  08/27/2009    triple vessel coronary artery bypass grafting on 8/27/09     CARPAL TUNNEL RELEASE RT/LT  1996    bilateral     CV CORONARY ANGIOGRAM  10/17/2019    Procedure: CV CORONARY ANGIOGRAM;  Surgeon: Mahendra Collins MD;  Location: [facility] CARDIAC CATH LAB     CV PCI STENT DRUG ELUTING N/A 10/17/2019    Procedure: PCI Stent Drug Eluting;  Surgeon: Mahendra Collins MD;  Location: [facility] CARDIAC CATH LAB       Social History     Tobacco Use     Smoking status: Former Smoker     Smokeless tobacco: Never Used     Tobacco comment: 30 plus years ago.   Substance Use Topics     Alcohol use: No     Drug use: No       Family History   Problem Relation Age of Onset     Arthritis Mother         ra     Connective Tissue Disorder Mother         lupus     Congenital Anomalies Mother         wholein heart     Cerebrovascular Disease Mother         TIA's     Cerebrovascular Disease Father      Diabetes Father      Hypertension Father      Cardiovascular Father         stents     Genitourinary Problems Father         kidney failure     Kidney Disease Father         kidney injury related to acute illness around time of death (RANDELL likely)     Cataracts Father      Arthritis Paternal Grandmother      Diabetes Brother      Glaucoma No family hx of      Macular Degeneration No family hx of        ROS: A 4 system review of systems was negative other than noted here or above.     Exam:  /80 (BP Location: Left arm, Patient Position: Sitting)   Pulse 71   Resp 18   Ht 1.6 m (5' 3\")   Wt 116.3 kg (256 lb 4.8 oz)   SpO2 100%   BMI 45.40 kg/m      GENERAL APPEARANCE: alert and no distress  RESP: lungs clear to auscultation   CV: regular rhythm, normal rate, no rub  Extremities: no clubbing, cyanosis, or edema  SKIN: no rash  NEURO: mentation intact and speech normal  PSYCH: affect normal/bright    Resul  Office Visit " on 12/23/2019   Component Date Value Ref Range Status     25 OH Vit D2 12/23/2019 <5  ug/L Final     25 OH Vit D3 12/23/2019 33  ug/L Final     25 OH Vit D total 12/23/2019 <38  20 - 75 ug/L Final    Comment: Season, race, dietary intake, and treatment affect the concentration of   25-hydroxy-Vitamin D. Values may decrease during winter months and increase   during summer months. Values 20-29 ug/L may indicate Vitamin D insufficiency   and values <20 ug/L may indicate Vitamin D deficiency.  This test was developed and its performance characteristics determined by the   [facility],  Special Chemistry Laboratory. It has   not been cleared or approved by the FDA. The laboratory is regulated under   CLIA as qualified to perform high-complexity testing. This test is used for   clinical purposes. It should not be regarded as investigational or for   research.     Orders Only on 12/23/2019   Component Date Value Ref Range Status     Hemoglobin 12/23/2019 10.1* 11.7 - 15.7 g/dL Final     Parathyroid Hormone Intact 12/23/2019 25  18 - 80 pg/mL Final     Sodium 12/23/2019 139  133 - 144 mmol/L Final     Potassium 12/23/2019 4.1  3.4 - 5.3 mmol/L Final     Chloride 12/23/2019 109  94 - 109 mmol/L Final     Carbon Dioxide 12/23/2019 27  20 - 32 mmol/L Final     Anion Gap 12/23/2019 3  3 - 14 mmol/L Final     Glucose 12/23/2019 88  70 - 99 mg/dL Final    Non Fasting     Urea Nitrogen 12/23/2019 28  7 - 30 mg/dL Final     Creatinine 12/23/2019 1.13* 0.52 - 1.04 mg/dL Final     GFR Estimate 12/23/2019 53* >60 mL/min/[1.73_m2] Final    Comment: Non  GFR Calc  Starting 12/18/2018, serum creatinine based estimated GFR (eGFR) will be   calculated using the Chronic Kidney Disease Epidemiology Collaboration   (CKD-EPI) equation.       GFR Estimate If Black 12/23/2019 61  >60 mL/min/[1.73_m2] Final    Comment:  GFR Calc  Starting 12/18/2018, serum creatinine based estimated GFR  (eGFR) will be   calculated using the Chronic Kidney Disease Epidemiology Collaboration   (CKD-EPI) equation.       Calcium 12/23/2019 9.4  8.5 - 10.1 mg/dL Final     Phosphorus 12/23/2019 3.3  2.5 - 4.5 mg/dL Final     Albumin 12/23/2019 3.1* 3.4 - 5.0 g/dL Final     Protein Random Urine 12/23/2019 0.56  g/L Final     Protein Total Urine g/gr Creatinine 12/23/2019 0.78* 0 - 0.2 g/g Cr Final     INR 12/23/2019 2.39* 0.86 - 1.14 Final     Creatinine Urine 12/23/2019 71  mg/dL Final    t   Assessment/Plan:   1. CKD stage 3: risk factors for CKD include previous lupus nephritis, hypertension, as well as acute kidney injuries. Creatinine is stable at this time but proteinuria is increased slightly. She is on lisinopril 40 mg daily. She had hematuria when checked in Nov. I feel we should recheck the urine at this time to assess for hematuria. She is on blood thinner now and was in Nov which may be impacting the blood In the urine. At this time, we are not able to do a kidney biopsy given the inability to pause blood thinner treatment. I plan to monitor labs closely for now to assure stability.     2. Hypertension: at goal of <130/80    3. Lupus: on plaquenil    4. BMD: calcium is at the  Upper limit of normal (corrected calcium 10.1). Want to avoid high calcium levels as can lead to afferent vasoconstriction. I recommend stopping the calcium supplement. Vitamin d level came back normal - feel she should stop the vitamin d supplement for now as well.     Patient Instructions   Tentative plan to follow labs monthly- You will schedule these on your own  Follow-up in April.   Stop the calcium supplement  I will update you on the vitamin D  Ok to continue the Multivitamin.            Lavern Clark, DO

## 2020-01-19 DIAGNOSIS — N18.30 CKD (CHRONIC KIDNEY DISEASE) STAGE 3, GFR 30-59 ML/MIN (H): Primary | ICD-10-CM

## 2020-01-23 DIAGNOSIS — M32.14 SYSTEMIC LUPUS ERYTHEMATOSUS WITH GLOMERULAR DISEASE, UNSPECIFIED SLE TYPE (H): ICD-10-CM

## 2020-01-23 LAB
ALBUMIN SERPL-MCNC: 3.3 G/DL (ref 3.4–5)
ALBUMIN UR-MCNC: 100 MG/DL
ANION GAP SERPL CALCULATED.3IONS-SCNC: 2 MMOL/L (ref 3–14)
APPEARANCE UR: CLEAR
BILIRUB UR QL STRIP: NEGATIVE
BUN SERPL-MCNC: 35 MG/DL (ref 7–30)
CALCIUM SERPL-MCNC: 9.4 MG/DL (ref 8.5–10.1)
CHLORIDE SERPL-SCNC: 112 MMOL/L (ref 94–109)
CO2 SERPL-SCNC: 25 MMOL/L (ref 20–32)
COLOR UR AUTO: YELLOW
CREAT SERPL-MCNC: 1.19 MG/DL (ref 0.52–1.04)
CREAT UR-MCNC: 62 MG/DL
CRP SERPL-MCNC: 10.7 MG/L (ref 0–8)
GFR SERPL CREATININE-BSD FRML MDRD: 50 ML/MIN/{1.73_M2}
GLUCOSE SERPL-MCNC: 90 MG/DL (ref 70–99)
GLUCOSE UR STRIP-MCNC: NEGATIVE MG/DL
HGB UR QL STRIP: ABNORMAL
KETONES UR STRIP-MCNC: NEGATIVE MG/DL
LEUKOCYTE ESTERASE UR QL STRIP: ABNORMAL
NITRATE UR QL: NEGATIVE
PH UR STRIP: 7 PH (ref 5–7)
PHOSPHATE SERPL-MCNC: 3.7 MG/DL (ref 2.5–4.5)
POTASSIUM SERPL-SCNC: 4.7 MMOL/L (ref 3.4–5.3)
PROT UR-MCNC: 0.69 G/L
PROT/CREAT 24H UR: 1.11 G/G CR (ref 0–0.2)
RBC #/AREA URNS AUTO: NORMAL /HPF
SODIUM SERPL-SCNC: 139 MMOL/L (ref 133–144)
SOURCE: ABNORMAL
SP GR UR STRIP: 1.02 (ref 1–1.03)
UROBILINOGEN UR STRIP-ACNC: 0.2 EU/DL (ref 0.2–1)
WBC #/AREA URNS AUTO: NORMAL /HPF

## 2020-01-23 PROCEDURE — 86140 C-REACTIVE PROTEIN: CPT | Performed by: INTERNAL MEDICINE

## 2020-01-23 PROCEDURE — 86225 DNA ANTIBODY NATIVE: CPT | Performed by: INTERNAL MEDICINE

## 2020-01-23 PROCEDURE — 84156 ASSAY OF PROTEIN URINE: CPT | Performed by: INTERNAL MEDICINE

## 2020-01-23 PROCEDURE — 86160 COMPLEMENT ANTIGEN: CPT | Performed by: INTERNAL MEDICINE

## 2020-01-23 PROCEDURE — 81001 URINALYSIS AUTO W/SCOPE: CPT | Performed by: INTERNAL MEDICINE

## 2020-01-23 PROCEDURE — 36415 COLL VENOUS BLD VENIPUNCTURE: CPT | Performed by: INTERNAL MEDICINE

## 2020-01-23 PROCEDURE — 80069 RENAL FUNCTION PANEL: CPT | Performed by: INTERNAL MEDICINE

## 2020-01-23 NOTE — ADDENDUM NOTE
Addended by: EUSEBIO MG on: 1/23/2020 03:31 PM     Modules accepted: Orders    
Discharge planning issues
Discharge planning issues
Primary osteoarthritis, unspecified site

## 2020-01-24 LAB
C3 SERPL-MCNC: 64 MG/DL (ref 81–157)
C4 SERPL-MCNC: 8 MG/DL (ref 13–39)
DSDNA AB SER-ACNC: 100 IU/ML

## 2020-01-29 ENCOUNTER — OFFICE VISIT (OUTPATIENT)
Dept: NURSING | Facility: CLINIC | Age: 60
End: 2020-01-29
Payer: COMMERCIAL

## 2020-01-29 DIAGNOSIS — R06.09 DYSPNEA ON EXERTION: ICD-10-CM

## 2020-01-29 PROCEDURE — 94375 RESPIRATORY FLOW VOLUME LOOP: CPT | Performed by: INTERNAL MEDICINE

## 2020-01-29 PROCEDURE — 94726 PLETHYSMOGRAPHY LUNG VOLUMES: CPT | Performed by: INTERNAL MEDICINE

## 2020-01-29 PROCEDURE — 94729 DIFFUSING CAPACITY: CPT | Performed by: INTERNAL MEDICINE

## 2020-02-03 ENCOUNTER — TELEPHONE (OUTPATIENT)
Dept: OBGYN | Facility: CLINIC | Age: 60
End: 2020-02-03

## 2020-02-03 LAB
DLCOUNC-%PRED-PRE: 64 %
DLCOUNC-PRE: 12.66 ML/MIN/MMHG
DLCOUNC-PRED: 19.62 ML/MIN/MMHG
ERV-%PRED-PRE: 139 %
ERV-PRE: 0.22 L
ERV-PRED: 0.16 L
EXPTIME-PRE: 6.76 SEC
FEF2575-%PRED-PRE: 100 %
FEF2575-PRE: 2.27 L/SEC
FEF2575-PRED: 2.27 L/SEC
FEFMAX-%PRED-PRE: 94 %
FEFMAX-PRE: 5.83 L/SEC
FEFMAX-PRED: 6.19 L/SEC
FEV1-%PRED-PRE: 82 %
FEV1-PRE: 2.01 L
FEV1FEV6-PRE: 83 %
FEV1FEV6-PRED: 81 %
FEV1FVC-PRE: 83 %
FEV1FVC-PRED: 80 %
FEV1SVC-PRE: 75 %
FEV1SVC-PRED: 77 %
FIFMAX-PRE: 3.74 L/SEC
FRCPLETH-%PRED-PRE: 73 %
FRCPLETH-PRE: 1.94 L
FRCPLETH-PRED: 2.64 L
FVC-%PRED-PRE: 78 %
FVC-PRE: 2.42 L
FVC-PRED: 3.09 L
IC-%PRED-PRE: 81 %
IC-PRE: 2.46 L
IC-PRED: 3.01 L
RVPLETH-%PRED-PRE: 93 %
RVPLETH-PRE: 1.72 L
RVPLETH-PRED: 1.84 L
TLCPLETH-%PRED-PRE: 92 %
TLCPLETH-PRE: 4.41 L
TLCPLETH-PRED: 4.77 L
VA-%PRED-PRE: 81 %
VA-PRE: 3.76 L
VC-%PRED-PRE: 84 %
VC-PRE: 2.69 L
VC-PRED: 3.17 L

## 2020-02-03 NOTE — TELEPHONE ENCOUNTER
Luz Marina is calling with concern for sinus infection symptoms including facial pressure and headache. Also notes some left eye pain associated with movement.     Advised patient that clinic visit is reasonable given symptoms. Scheduled with Dr. Nguyen on 2/5.

## 2020-02-04 ENCOUNTER — TELEPHONE (OUTPATIENT)
Dept: OPHTHALMOLOGY | Facility: CLINIC | Age: 60
End: 2020-02-04

## 2020-02-04 ENCOUNTER — NURSE TRIAGE (OUTPATIENT)
Dept: NURSING | Facility: CLINIC | Age: 60
End: 2020-02-04

## 2020-02-04 NOTE — TELEPHONE ENCOUNTER
"Pt developed a clump of slightly yellow to clear \"something\" on the outer corner of her eye which causes mild pain especially when she moves the eye. This seems different from the discharge that often collects in the inner corner of her eyes. Started yesterday, and \"seemed to have a couple flashes when she closed her eyes. Eye is somewhat better today. But still has mild pain with movement of the eye ball itself. Denies vision changes or flashes today, denies fever. No pain or redness on eyelids or the skin around the eye. Does have some sinus Warm transfer done for eye provider appt within 24 hour. She does have her frequent sinus fullness, but not concerned about that today. To be seen sooner if worsens in anyway.     Bindu Rosas RN  [facility]  Triage Nurse Advisors      Reason for Disposition    Eye pain present > 24 hours    Additional Information    Negative: Followed an eye injury    Negative: Eye pain from chemical in the eye    Negative: Eye pain from foreign body in eye    Negative: [1] Tender, red lump or pimple AND [2] located along the eyelid margin    Negative: Has sinus pain or pressure    Negative: Severe eye pain    Negative: Complete loss of vision in one or both eyes    Negative: [1] Eyelids are very swollen (shut or almost) AND [2] fever    Negative: [1] Eyelid (outer) is very red AND [2] fever    Negative: [1] Foreign body sensation (\"feels like something is in there\") AND [2] irrigation didn't help    Negative: Vomiting    Negative: Ulcer or sore seen on the cornea (clear center part of the eye)    Negative: [1] Recent eye surgery AND [2] increasing eye pain    Negative: [1] Blurred vision AND [2] new or worsening    Negative: Patient sounds very sick or weak to the triager    Negative: [1] Eyelids are very swollen (shut or almost) AND [2] no fever    Negative: [1] Painful rash near eye AND [2] multiple small blisters grouped together    Negative: Pain followed bright light exposure from " welding    Negative: Looking at light causes severe pain (i.e., photophobia)    Negative: Yellow or green pus occurs    Negative: [1] Eye pain/discomfort AND [2] more than mild    Protocols used: EYE PAIN-A-AH

## 2020-02-04 NOTE — TELEPHONE ENCOUNTER
Called patient back to schedule appointment. Offered an appointment with Dr. Cervantes for tomorrow but patient stated that she already had an appointment scheduled with PCP for tomorrow. She wants to wait to see what that doctor says first and will schedule with eye doctor if PCP says it's necessary.    Melanie Jeans, OA, 11:44 AM 02/04/2020

## 2020-02-05 ENCOUNTER — OFFICE VISIT (OUTPATIENT)
Dept: OPHTHALMOLOGY | Facility: CLINIC | Age: 60
End: 2020-02-05
Attending: OPHTHALMOLOGY
Payer: COMMERCIAL

## 2020-02-05 ENCOUNTER — TELEPHONE (OUTPATIENT)
Dept: OPHTHALMOLOGY | Facility: CLINIC | Age: 60
End: 2020-02-05

## 2020-02-05 ENCOUNTER — OFFICE VISIT (OUTPATIENT)
Dept: INTERNAL MEDICINE | Facility: CLINIC | Age: 60
End: 2020-02-05
Attending: INTERNAL MEDICINE
Payer: COMMERCIAL

## 2020-02-05 VITALS
WEIGHT: 246 LBS | DIASTOLIC BLOOD PRESSURE: 76 MMHG | HEART RATE: 70 BPM | TEMPERATURE: 98.4 F | SYSTOLIC BLOOD PRESSURE: 126 MMHG | BODY MASS INDEX: 43.58 KG/M2

## 2020-02-05 DIAGNOSIS — H53.2 DIPLOPIA: Primary | ICD-10-CM

## 2020-02-05 DIAGNOSIS — H05.20 PROPTOSIS: ICD-10-CM

## 2020-02-05 DIAGNOSIS — H49.22 SIXTH NERVE PALSY OF LEFT EYE: Primary | ICD-10-CM

## 2020-02-05 PROCEDURE — G0463 HOSPITAL OUTPT CLINIC VISIT: HCPCS | Mod: ZF

## 2020-02-05 PROCEDURE — G0463 HOSPITAL OUTPT CLINIC VISIT: HCPCS | Mod: 27,ZF

## 2020-02-05 ASSESSMENT — SLIT LAMP EXAM - LIDS
COMMENTS: NORMAL
COMMENTS: NORMAL

## 2020-02-05 ASSESSMENT — VISUAL ACUITY
CORRECTION_TYPE: GLASSES
OS_CC: 20/20
OD_CC+: -1
OD_CC: 20/20
OS_CC+: -3
METHOD: SNELLEN - LINEAR

## 2020-02-05 ASSESSMENT — REFRACTION_WEARINGRX
OS_AXIS: 130
OD_SPHERE: +1.50
SPECS_TYPE: PAL
OD_ADD: +2.50
OD_CYLINDER: +1.75
OS_CYLINDER: +1.25
OD_AXIS: 075
OS_ADD: +2.50
OS_SPHERE: +1.75

## 2020-02-05 ASSESSMENT — CONF VISUAL FIELD
OD_NORMAL: 1
OS_NORMAL: 1
METHOD: COUNTING FINGERS

## 2020-02-05 ASSESSMENT — CUP TO DISC RATIO
OD_RATIO: 0.0
OS_RATIO: 0.3

## 2020-02-05 ASSESSMENT — TONOMETRY
OD_IOP_MMHG: 15
OS_IOP_MMHG: 14
IOP_METHOD: TONOPEN

## 2020-02-05 ASSESSMENT — EXTERNAL EXAM - RIGHT EYE: OD_EXAM: NORMAL

## 2020-02-05 ASSESSMENT — EXTERNAL EXAM - LEFT EYE: OS_EXAM: PROPTOSIS

## 2020-02-05 ASSESSMENT — PAIN SCALES - GENERAL: PAINLEVEL: NO PAIN (0)

## 2020-02-05 NOTE — TELEPHONE ENCOUNTER
Dr. Nguyen referring pt to eye clinic today    Facial pain/pain with movement of eye and new diplopia, some proptosis noticed on exam    H/o autoimmune disease/stroke/no thyroid problems/facial cellulits    No s/s of stroke per referring clinic besides new diplopia with eye pain/pain with eye movement    Scheduled with Dr. Quesada this afternoon    Sonu Luevano RN 1:53 PM 02/05/20

## 2020-02-05 NOTE — PROGRESS NOTES
HPI:  Luz Marina Live is a 59 year old female who presents for the following:     HPI     New Patient     Severity is moderate.              Comments     The new patient presents with new eye symptoms.    The patient notes that four days ago she had new symptoms of left eye pain, pain with eye movement,and pain around the eye and in the sinus area.   Yesterday the symptoms started to subside.   She notes double vision started in today.    She uses a CPAP and she notes air comes out of left tear duct.  At two events the patient went septic after a sinus infection that spread to the eye.  HAYLEY Randall, COA 2:58 PM 02/05/2020           Last edited by Lashell Claros COA on 2/5/2020  2:58 PM. (History)        Sunday - pain sinus/brow/left eye, pain with looking sidetoside, with nausea and fatigue... by Tuesday the pain and pain with eye movement was improved, she was seen by PCP today who referred her here; She noted double vision that started today, it is horizontal and binocular. Reports dry eyes with use of CPAP. Vision is stable.     Denies discharge, proptosis, ringing in ears, scalp tenderness, jaw achiness, proximal muscle pain, fever, sweats, new rashes's, injection     POH: see impression  PMH: a fib, CKD stage 3, HTN, spesis x2 (started with sinus infection that spread to left eye), right facial palsy   FHx: no glaucoma    SHx: non-smoker   Ocular Meds: AT as needed      ASSESSMENT & PLAN:    Luz Marina Live is a 59 year old female with the following diagnoses:     # Sixth nerve palsy, left eye   # Proptosis, left eye   Patient presents with 2-3 days of left eye pain with movement and sinusitis, and one day of diplopia   Exam with left sixth and noted proptosis 96 mm 19/22 (paitent denies history and did not notice asymmetry), the globe appears quiet without injection or evidence of infection; this most likely represents a microvascular palsy given that and she no longer has pain with eye movements  and is without constitutional signs of infection; though her history of previous sinus infection that spread to left orbit raises the suspicion of possible early orbital cellulitis; with concurrent left globe proptosis it would be prudent to rule out any orbital mass effect; patient declines MRI given sever claustrophobia; will obtain CT head/orbits wow instead; discussed return precautions; she will use occlusion to alleviate diplopia     # Long-term use of plaquenil   - Taking for > 20 years, 1800 mg weekly - for Lupus   - complicated by lupus nephritis.  +ROHAN, dsDNA, SSA, lupus inhibitor, and depressed complements  - Follows with Dr. Davenport, last exam on 5/6/19 without evidence of toxicity     # Hyperiopia with astigmatism with presbyopia   - Improved vision with refraction today  - Monitor for now     Patient disposition: Return in about 1 week (around 2/12/2020) for vt.     Norman Tejeda MD  Ophthalmology Resident, PGY-4  [facility]     Teaching statement:  Complete documentation of historical and exam elements from today's encounter can be found in the full encounter summary report (not reduplicated in this progress note). I personally obtained the chief complaint(s) and history of present illness.  I confirmed and edited as necessary the review of systems, past medical/surgical history, family history, social history, and examination findings as documented by others; and I examined the patient myself. I personally reviewed the relevant tests, images, and reports as documented above.     I formulated and edited as necessary the assessment and plan and discussed the findings and management plan with the patient and family.    Jasmina Gordillo MD  Comprehensive Ophthalmology & Ocular Pathology  Department of Ophthalmology and Visual Neurosciences  [email or web address]  Pager [phone]

## 2020-02-05 NOTE — NURSING NOTE
.  Chief Complaint   Patient presents with     Follow Up     C/O possible sinus infection   Rosario Coffey LPN

## 2020-02-05 NOTE — NURSING NOTE
Chief Complaints and History of Present Illnesses   Patient presents with     New Patient     Chief Complaint(s) and History of Present Illness(es)     New Patient     Severity: moderate              Comments     The new patient presents with new eye symptoms.    The patient notes that four days ago she had new symptoms of left eye pain, pain with eye movement,and pain around the eye and in the sinus area.   Yesterday the symptoms started to subside.   She notes double vision started in today.    She uses a CPAP and she notes air comes out of left tear duct.  At two events the patient went septic after a sinus infection that spread to the eye.  Lashell Claros, COA, COA 2:58 PM 02/05/2020

## 2020-02-05 NOTE — LETTER
2/5/2020       RE: Luz Marina Live  [address]  [place] [address]     Dear Colleague,    Thank you for referring your patient, Luz Marina Live, to the [facility]  at [facility]. Please see a copy of my visit note below.    HPI  Patient is here for evaluation of left eye pain.  She reports that approximately 2 days ago she started to have pain in the left eye.  She noticed that pain is worse with movement of the eye from side to side.  She denies losing part of the visual field, however today she has noticed double vision which goes away with her closing 1 of the eyes.  She denies any issues with right eye.  Patient is concerned about cellulitis or sinus infection as she is immunosuppressed and has had significant infections in the past.    Review of Systems     Constitutional:  Negative for fever, chills and fatigue.   HENT:  Negative for ear pain and dry mouth.    Eyes:  Positive for double vision, pain, eye pain and flashing lights. Negative for eye dryness.   Respiratory:   Negative for cough and dyspnea on exertion.    Cardiovascular:  Negative for dyspnea on exertion.   Gastrointestinal:  Negative for abdominal pain and diarrhea.   Genitourinary:  Negative for dysuria.   Skin:  Negative for itching.   Endo/Heme:  Negative for anemia, swollen glands and bruises/bleeds easily.   Psychiatric/Behavioral:  Negative for depression, decreased concentration, mood swings and panic attacks.    Endocrine:  Negative for altered temperature regulation, polyphagia, polydipsia, unwanted hair growth and change in facial hair.    Physical Exam  Vitals signs and nursing note reviewed.   Constitutional:       Appearance: Normal appearance.   HENT:      Head: Normocephalic and atraumatic.      Mouth/Throat:      Mouth: Mucous membranes are dry.   Eyes:      General: Lids are normal.      Pupils: Pupils are equal, round, and reactive to light.      Comments:  Left eye proptosis   Neurological:      Mental Status: She is alert.     Assessment and plan:    Luz Marina was seen today for follow up.    Diagnoses and all orders for this visit:    Diplopia.  Patient has proptosis of the left eye.  Given significant changes in her vision that occurred acutely, recommend prompt evaluation by ophthalmology.  Clinic was contacted, and patient will be seen today.    Total time spent 15 minutes.  More than 50% of the time spent with Ms. Live on counseling / coordinating her care    Eliz Nguyen MD

## 2020-02-06 ENCOUNTER — MYC MEDICAL ADVICE (OUTPATIENT)
Dept: INTERNAL MEDICINE | Facility: CLINIC | Age: 60
End: 2020-02-06

## 2020-02-07 ENCOUNTER — ANCILLARY PROCEDURE (OUTPATIENT)
Dept: CT IMAGING | Facility: CLINIC | Age: 60
End: 2020-02-07
Attending: STUDENT IN AN ORGANIZED HEALTH CARE EDUCATION/TRAINING PROGRAM
Payer: COMMERCIAL

## 2020-02-07 DIAGNOSIS — H05.20 PROPTOSIS: ICD-10-CM

## 2020-02-07 DIAGNOSIS — H49.22 SIXTH NERVE PALSY OF LEFT EYE: ICD-10-CM

## 2020-02-07 PROCEDURE — 70487 CT MAXILLOFACIAL W/DYE: CPT | Performed by: RADIOLOGY

## 2020-02-07 RX ORDER — IOPAMIDOL 755 MG/ML
75 INJECTION, SOLUTION INTRAVASCULAR ONCE
Status: COMPLETED | OUTPATIENT
Start: 2020-02-07 | End: 2020-02-07

## 2020-02-07 RX ADMIN — IOPAMIDOL 75 ML: 755 INJECTION, SOLUTION INTRAVASCULAR at 11:16

## 2020-02-08 ENCOUNTER — HEALTH MAINTENANCE LETTER (OUTPATIENT)
Age: 60
End: 2020-02-08

## 2020-02-08 ASSESSMENT — ENCOUNTER SYMPTOMS
DECREASED CONCENTRATION: 0
NERVOUS/ANXIOUS: 0
COUGH: 0
DYSPNEA ON EXERTION: 0
EYE PAIN: 1
BRUISES/BLEEDS EASILY: 0
POLYPHAGIA: 0
POLYDIPSIA: 0
FEVER: 0
INSOMNIA: 0
ALTERED TEMPERATURE REGULATION: 0
PANIC: 0
DOUBLE VISION: 1
ABDOMINAL PAIN: 0
CHILLS: 0
DIARRHEA: 0
DYSURIA: 0
FATIGUE: 0
SWOLLEN GLANDS: 0
DEPRESSION: 0

## 2020-02-08 NOTE — PROGRESS NOTES
HPI  Patient is here for evaluation of left eye pain.  She reports that approximately 2 days ago she started to have pain in the left eye.  She noticed that pain is worse with movement of the eye from side to side.  She denies losing part of the visual field, however today she has noticed double vision which goes away with her closing 1 of the eyes.  She denies any issues with right eye.  Patient is concerned about cellulitis or sinus infection as she is immunosuppressed and has had significant infections in the past.    Review of Systems     Constitutional:  Negative for fever, chills and fatigue.   HENT:  Negative for ear pain and dry mouth.    Eyes:  Positive for double vision, pain, eye pain and flashing lights. Negative for eye dryness.   Respiratory:   Negative for cough and dyspnea on exertion.    Cardiovascular:  Negative for dyspnea on exertion.   Gastrointestinal:  Negative for abdominal pain and diarrhea.   Genitourinary:  Negative for dysuria.   Skin:  Negative for itching.   Endo/Heme:  Negative for anemia, swollen glands and bruises/bleeds easily.   Psychiatric/Behavioral:  Negative for depression, decreased concentration, mood swings and panic attacks.    Endocrine:  Negative for altered temperature regulation, polyphagia, polydipsia, unwanted hair growth and change in facial hair.        Physical Exam  Vitals signs and nursing note reviewed.   Constitutional:       Appearance: Normal appearance.   HENT:      Head: Normocephalic and atraumatic.      Mouth/Throat:      Mouth: Mucous membranes are dry.   Eyes:      General: Lids are normal.      Pupils: Pupils are equal, round, and reactive to light.      Comments: Left eye proptosis   Neurological:      Mental Status: She is alert.         Assessment and plan:    Luz Marina was seen today for follow up.    Diagnoses and all orders for this visit:    Diplopia.  Patient has proptosis of the left eye.  Given significant changes in her vision that occurred  acutely, recommend prompt evaluation by ophthalmology.  Clinic was contacted, and patient will be seen today.    Total time spent 15 minutes.  More than 50% of the time spent with Ms. Live on counseling / coordinating her care    Eliz Nguyen MD

## 2020-02-12 ENCOUNTER — OFFICE VISIT (OUTPATIENT)
Dept: OPHTHALMOLOGY | Facility: CLINIC | Age: 60
End: 2020-02-12
Attending: OPHTHALMOLOGY
Payer: COMMERCIAL

## 2020-02-12 DIAGNOSIS — H05.20 PROPTOSIS: ICD-10-CM

## 2020-02-12 DIAGNOSIS — Z79.899 LONG-TERM USE OF PLAQUENIL: ICD-10-CM

## 2020-02-12 DIAGNOSIS — H53.2 DIPLOPIA: Primary | ICD-10-CM

## 2020-02-12 DIAGNOSIS — H49.22 SIXTH NERVE PALSY OF LEFT EYE: ICD-10-CM

## 2020-02-12 LAB — TSH SERPL DL<=0.005 MIU/L-ACNC: 1.98 MU/L (ref 0.4–4)

## 2020-02-12 PROCEDURE — 84445 ASSAY OF TSI GLOBULIN: CPT | Mod: 90 | Performed by: STUDENT IN AN ORGANIZED HEALTH CARE EDUCATION/TRAINING PROGRAM

## 2020-02-12 PROCEDURE — 99000 SPECIMEN HANDLING OFFICE-LAB: CPT | Performed by: STUDENT IN AN ORGANIZED HEALTH CARE EDUCATION/TRAINING PROGRAM

## 2020-02-12 PROCEDURE — 36415 COLL VENOUS BLD VENIPUNCTURE: CPT | Performed by: STUDENT IN AN ORGANIZED HEALTH CARE EDUCATION/TRAINING PROGRAM

## 2020-02-12 PROCEDURE — G0463 HOSPITAL OUTPT CLINIC VISIT: HCPCS | Mod: ZF

## 2020-02-12 PROCEDURE — 84443 ASSAY THYROID STIM HORMONE: CPT | Performed by: STUDENT IN AN ORGANIZED HEALTH CARE EDUCATION/TRAINING PROGRAM

## 2020-02-12 ASSESSMENT — TONOMETRY
OD_IOP_MMHG: 14
OS_IOP_MMHG: 14
IOP_METHOD: ICARE

## 2020-02-12 ASSESSMENT — EXTERNAL EXAM - RIGHT EYE: OD_EXAM: NORMAL

## 2020-02-12 ASSESSMENT — REFRACTION_WEARINGRX
OD_SPHERE: +1.50
OS_AXIS: 130
OS_CYLINDER: +1.25
OD_ADD: +2.50
OD_AXIS: 075
OS_SPHERE: +1.75
OD_CYLINDER: +1.75
OS_ADD: +2.50
SPECS_TYPE: PAL

## 2020-02-12 ASSESSMENT — CONF VISUAL FIELD
OS_NORMAL: 1
OD_NORMAL: 1

## 2020-02-12 ASSESSMENT — VISUAL ACUITY
METHOD: SNELLEN - LINEAR
CORRECTION_TYPE: GLASSES
OD_CC: 20/20
OS_CC: 20/20

## 2020-02-12 ASSESSMENT — EXTERNAL EXAM - LEFT EYE: OS_EXAM: NORMAL

## 2020-02-12 NOTE — PROGRESS NOTES
HPI:  Luz Marina Live is a 59 year old female who presents for the following:     HPI     Sixth Nerve Palsy     In left eye.              Comments     Pt. States that she is still seeing double when taking taped glasses off. No change in VA BE. No pain BE.  Rosemary Irby COT 1:33 PM February 12, 2020             Last edited by Rosemary Irby on 2/12/2020  1:33 PM. (History)        Sunday - pain sinus/brow/left eye, pain with looking sidetoside, with nausea and fatigue... by Tuesday the pain and pain with eye movement was improved, she was seen by PCP today who referred her here; She noted double vision that started today, it is horizontal and binocular. Reports dry eyes with use of CPAP. Vision is stable.     Denies discharge, proptosis, ringing in ears, scalp tenderness, jaw achiness, proximal muscle pain, fever, sweats, new rashes's, injection     POH: see impression  PMH: a fib, CKD stage 3, HTN, spesis x2 (started with sinus infection that spread to left eye), right facial palsy   FHx: no glaucoma    SHx: non-smoker   Ocular Meds: AT as needed      CT Orbit 2.7.20: Bilateral proptosis without evidence of sinus infection or  orbital mass. Mildly bulky extraocular muscles. Consider laboratory  evaluation for possible thyroid orbitopathy.    ASSESSMENT & PLAN:  Luz Marina Live is a 59 year old female with the following diagnoses:     # Sixth nerve palsy, left eye   # Proptosis, left eye   Diplopia improving subjectively  Mild aBDuction limitation on exam  CT shows enlarged EOM, especially IR and MR (CT instead of MR due to claustrophobia)  No history of thyroid issues  Exam grossly normal, no lid retraction, no injection  Yamilet 21/22 at base 96 (compared to 19/22 at 96 last visit)  Continue occlusion therapy for diplopia  Will obtain Thyroid labs, follow up in 4 weeks or sooner if needed    # Long-term use of plaquenil   - Taking for > 20 years, 1800 mg weekly - for Lupus   - complicated by lupus nephritis.  +ROHAN,  dsDNA, SSA, lupus inhibitor, and depressed complements  - Follows with Dr. Davenport, last exam on 5/6/19 without evidence of toxicity     # Hyperiopia with astigmatism with presbyopia   - Improved vision with refraction today  - Monitor for now     Patient disposition: Return in about 4 weeks (around 3/11/2020) for VT.     Ronny Miguel MD  PGY-4 Ophthalmology Resident  [phone]    Teaching statement:  Complete documentation of historical and exam elements from today's encounter can be found in the full encounter summary report (not reduplicated in this progress note). I personally obtained the chief complaint(s) and history of present illness.  I confirmed and edited as necessary the review of systems, past medical/surgical history, family history, social history, and examination findings as documented by others; and I examined the patient myself. I personally reviewed the relevant tests, images, and reports as documented above.     I formulated and edited as necessary the assessment and plan and discussed the findings and management plan with the patient and family.    Jasmina Gordillo MD  Comprehensive Ophthalmology & Ocular Pathology  Department of Ophthalmology and Visual Neurosciences  [email or web address]  Pager [phone]

## 2020-02-12 NOTE — NURSING NOTE
Chief Complaints and History of Present Illnesses   Patient presents with     Sixth Nerve Palsy     Chief Complaint(s) and History of Present Illness(es)     Sixth Nerve Palsy     Laterality: left eye              Comments     Pt. States that she is still seeing double when taking taped glasses off. No change in VA BE. No pain BE.  Rosemary Irby COT 1:33 PM February 12, 2020

## 2020-02-18 LAB — TSI SER-ACNC: <1 TSI INDEX

## 2020-02-24 ENCOUNTER — TELEPHONE (OUTPATIENT)
Dept: OPHTHALMOLOGY | Facility: CLINIC | Age: 60
End: 2020-02-24

## 2020-02-24 DIAGNOSIS — H53.2 DIPLOPIA: Primary | ICD-10-CM

## 2020-02-24 NOTE — TELEPHONE ENCOUNTER
Called to inform patient of TSH and TSI results.  Left voicemail indicating normal results and advising follow up as scheduled.    Ronny Miguel MD  PGY-4 Ophthalmology Resident  [phone]

## 2020-02-27 ENCOUNTER — TELEPHONE (OUTPATIENT)
Dept: ANTICOAGULATION | Facility: OTHER | Age: 60
End: 2020-02-27

## 2020-02-27 ENCOUNTER — ANTICOAGULATION THERAPY VISIT (OUTPATIENT)
Dept: ANTICOAGULATION | Facility: OTHER | Age: 60
End: 2020-02-27

## 2020-02-27 DIAGNOSIS — Z79.01 LONG TERM CURRENT USE OF ANTICOAGULANT THERAPY: Primary | ICD-10-CM

## 2020-02-27 DIAGNOSIS — I48.0 PAF (PAROXYSMAL ATRIAL FIBRILLATION) (H): ICD-10-CM

## 2020-02-27 DIAGNOSIS — Z79.01 LONG TERM CURRENT USE OF ANTICOAGULANTS WITH INR GOAL OF 2.0-3.0: ICD-10-CM

## 2020-02-27 DIAGNOSIS — M32.14 SYSTEMIC LUPUS ERYTHEMATOSUS WITH GLOMERULAR DISEASE, UNSPECIFIED SLE TYPE (H): ICD-10-CM

## 2020-02-27 DIAGNOSIS — Z79.01 LONG TERM CURRENT USE OF ANTICOAGULANT THERAPY: ICD-10-CM

## 2020-02-27 DIAGNOSIS — N18.30 CKD (CHRONIC KIDNEY DISEASE) STAGE 3, GFR 30-59 ML/MIN (H): ICD-10-CM

## 2020-02-27 LAB
ALBUMIN SERPL-MCNC: 3 G/DL (ref 3.4–5)
ANION GAP SERPL CALCULATED.3IONS-SCNC: 7 MMOL/L (ref 3–14)
BUN SERPL-MCNC: 38 MG/DL (ref 7–30)
CALCIUM SERPL-MCNC: 9 MG/DL (ref 8.5–10.1)
CHLORIDE SERPL-SCNC: 107 MMOL/L (ref 94–109)
CO2 SERPL-SCNC: 23 MMOL/L (ref 20–32)
CREAT SERPL-MCNC: 1.2 MG/DL (ref 0.52–1.04)
CREAT UR-MCNC: 63 MG/DL
GFR SERPL CREATININE-BSD FRML MDRD: 49 ML/MIN/{1.73_M2}
GLUCOSE SERPL-MCNC: 83 MG/DL (ref 70–99)
INR BLD: 2 (ref 0.86–1.14)
PHOSPHATE SERPL-MCNC: 3.5 MG/DL (ref 2.5–4.5)
POTASSIUM SERPL-SCNC: 4.6 MMOL/L (ref 3.4–5.3)
PROT UR-MCNC: 0.39 G/L
PROT/CREAT 24H UR: 0.63 G/G CR (ref 0–0.2)
SODIUM SERPL-SCNC: 137 MMOL/L (ref 133–144)

## 2020-02-27 PROCEDURE — 84156 ASSAY OF PROTEIN URINE: CPT | Performed by: INTERNAL MEDICINE

## 2020-02-27 PROCEDURE — 85610 PROTHROMBIN TIME: CPT | Mod: QW | Performed by: INTERNAL MEDICINE

## 2020-02-27 PROCEDURE — 80069 RENAL FUNCTION PANEL: CPT | Performed by: INTERNAL MEDICINE

## 2020-02-27 PROCEDURE — 36415 COLL VENOUS BLD VENIPUNCTURE: CPT | Performed by: INTERNAL MEDICINE

## 2020-02-27 PROCEDURE — 99207 ZZC NO CHARGE NURSE ONLY: CPT | Performed by: INTERNAL MEDICINE

## 2020-02-27 PROCEDURE — 86160 COMPLEMENT ANTIGEN: CPT | Performed by: INTERNAL MEDICINE

## 2020-02-27 NOTE — PROGRESS NOTES
ANTICOAGULATION FOLLOW-UP CLINIC VISIT    Patient Name:  Luz Marina Live  Date:  2020  Contact Type:  Telephone    SUBJECTIVE:  Patient Findings     Comments:   Left a detailed message on VM with this information, pt is advised to call back if pt has any questions, missed doses or concerns such as symptoms of bleeding, clotting, infection, change in diet or other.          Clinical Outcomes     Comments:   Left a detailed message on VM with this information, pt is advised to call back if pt has any questions, missed doses or concerns such as symptoms of bleeding, clotting, infection, change in diet or other.             OBJECTIVE    INR Point of Care   Date Value Ref Range Status   2020 2.0 (H) 0.86 - 1.14 Final     Comment:     This test is intended for monitoring Coumadin therapy.  Results are not   accurate in patients with prolonged INR due to factor deficiency.         ASSESSMENT / PLAN  INR assessment THER    Recheck INR In: 4 WEEKS    INR Location Outside lab      Anticoagulation Summary  As of 2020    INR goal:   2.0-3.0   TTR:   55.7 % (10.2 mo)   INR used for dosin.0 (2020)   Warfarin maintenance plan:   5 mg (5 mg x 1) every Mon, Wed, Fri; 7.5 mg (5 mg x 1.5) all other days   Full warfarin instructions:   5 mg every Mon, Wed, Fri; 7.5 mg all other days   Weekly warfarin total:   45 mg   No change documented:   Deborah Alatorre, RN   Plan last modified:   Cait Hawthorne RN (2019)   Next INR check:   3/26/2020   Target end date:       Indications    Long-term (current) use of anticoagulants [Z79.01] [Z79.01]  PAF (paroxysmal atrial fibrillation) (H) [I48.0]             Anticoagulation Episode Summary     INR check location:       Preferred lab:       Send INR reminders to:   [facility] [place]    Comments:   5 mg tabs, [facility] (needs staff message) PM dose      Anticoagulation Care Providers     Provider Role Specialty Phone number    Eliz Nguyen MD Responsible  Internal Medicine [phone]            See the Encounter Report to view Anticoagulation Flowsheet and Dosing Calendar (Go to Encounters tab in chart review, and find the Anticoagulation Therapy Visit)    Dosage adjustment made based on physician directed care plan.    Deborah Alatorre RN

## 2020-03-02 LAB
C3 SERPL-MCNC: 65 MG/DL (ref 81–157)
C4 SERPL-MCNC: 8 MG/DL (ref 13–39)

## 2020-03-06 ENCOUNTER — OFFICE VISIT (OUTPATIENT)
Dept: RHEUMATOLOGY | Facility: CLINIC | Age: 60
End: 2020-03-06
Payer: COMMERCIAL

## 2020-03-06 VITALS
OXYGEN SATURATION: 98 % | HEIGHT: 63 IN | HEART RATE: 55 BPM | DIASTOLIC BLOOD PRESSURE: 78 MMHG | WEIGHT: 251.2 LBS | BODY MASS INDEX: 44.51 KG/M2 | SYSTOLIC BLOOD PRESSURE: 120 MMHG

## 2020-03-06 DIAGNOSIS — Z79.60 LONG-TERM USE OF IMMUNOSUPPRESSANT MEDICATION: Chronic | ICD-10-CM

## 2020-03-06 DIAGNOSIS — M94.9 DISORDER OF BONE AND CARTILAGE: ICD-10-CM

## 2020-03-06 DIAGNOSIS — N18.30 CKD (CHRONIC KIDNEY DISEASE) STAGE 3, GFR 30-59 ML/MIN (H): Chronic | ICD-10-CM

## 2020-03-06 DIAGNOSIS — M32.14 SYSTEMIC LUPUS ERYTHEMATOSUS WITH GLOMERULAR DISEASE, UNSPECIFIED SLE TYPE (H): Chronic | ICD-10-CM

## 2020-03-06 DIAGNOSIS — Z79.899 LONG-TERM USE OF PLAQUENIL: ICD-10-CM

## 2020-03-06 DIAGNOSIS — I25.10 CORONARY ARTERY DISEASE INVOLVING NATIVE HEART WITHOUT ANGINA PECTORIS, UNSPECIFIED VESSEL OR LESION TYPE: Chronic | ICD-10-CM

## 2020-03-06 DIAGNOSIS — M32.14 SYSTEMIC LUPUS ERYTHEMATOSUS WITH GLOMERULAR DISEASE, UNSPECIFIED SLE TYPE (H): Primary | Chronic | ICD-10-CM

## 2020-03-06 DIAGNOSIS — M89.9 DISORDER OF BONE AND CARTILAGE: ICD-10-CM

## 2020-03-06 LAB
ALBUMIN SERPL-MCNC: 3 G/DL (ref 3.4–5)
ALBUMIN UR-MCNC: 30 MG/DL
ALT SERPL W P-5'-P-CCNC: 29 U/L (ref 0–50)
APPEARANCE UR: CLEAR
AST SERPL W P-5'-P-CCNC: 27 U/L (ref 0–45)
BACTERIA #/AREA URNS HPF: ABNORMAL /HPF
BASOPHILS # BLD AUTO: 0.1 10E9/L (ref 0–0.2)
BASOPHILS NFR BLD AUTO: 0.7 %
BILIRUB UR QL STRIP: NEGATIVE
COLOR UR AUTO: YELLOW
CREAT SERPL-MCNC: 1.31 MG/DL (ref 0.52–1.04)
CREAT UR-MCNC: 99 MG/DL
CRP SERPL-MCNC: 21.5 MG/L (ref 0–8)
DIFFERENTIAL METHOD BLD: ABNORMAL
EOSINOPHIL # BLD AUTO: 0.2 10E9/L (ref 0–0.7)
EOSINOPHIL NFR BLD AUTO: 2.3 %
ERYTHROCYTE [DISTWIDTH] IN BLOOD BY AUTOMATED COUNT: 13.7 % (ref 10–15)
ERYTHROCYTE [SEDIMENTATION RATE] IN BLOOD BY WESTERGREN METHOD: 87 MM/H (ref 0–30)
GFR SERPL CREATININE-BSD FRML MDRD: 44 ML/MIN/{1.73_M2}
GLUCOSE UR STRIP-MCNC: NEGATIVE MG/DL
HCT VFR BLD AUTO: 34.2 % (ref 35–47)
HGB BLD-MCNC: 10.8 G/DL (ref 11.7–15.7)
HGB UR QL STRIP: ABNORMAL
HYALINE CASTS #/AREA URNS LPF: ABNORMAL /LPF
IMM GRANULOCYTES # BLD: 0.1 10E9/L (ref 0–0.4)
IMM GRANULOCYTES NFR BLD: 1.3 %
KETONES UR STRIP-MCNC: NEGATIVE MG/DL
LEUKOCYTE ESTERASE UR QL STRIP: NEGATIVE
LYMPHOCYTES # BLD AUTO: 1.2 10E9/L (ref 0.8–5.3)
LYMPHOCYTES NFR BLD AUTO: 17.6 %
MCH RBC QN AUTO: 30 PG (ref 26.5–33)
MCHC RBC AUTO-ENTMCNC: 31.6 G/DL (ref 31.5–36.5)
MCV RBC AUTO: 95 FL (ref 78–100)
MICROALBUMIN UR-MCNC: 170 MG/L
MICROALBUMIN/CREAT UR: 171.89 MG/G CR (ref 0–25)
MONOCYTES # BLD AUTO: 0.5 10E9/L (ref 0–1.3)
MONOCYTES NFR BLD AUTO: 7 %
NEUTROPHILS # BLD AUTO: 5 10E9/L (ref 1.6–8.3)
NEUTROPHILS NFR BLD AUTO: 71.1 %
NITRATE UR QL: NEGATIVE
NON-SQ EPI CELLS #/AREA URNS LPF: ABNORMAL /LPF
PH UR STRIP: 6.5 PH (ref 5–7)
PLATELET # BLD AUTO: 204 10E9/L (ref 150–450)
PROT UR-MCNC: 0.59 G/L
PROT/CREAT 24H UR: 0.62 G/G CR (ref 0–0.2)
RBC # BLD AUTO: 3.6 10E12/L (ref 3.8–5.2)
RBC #/AREA URNS AUTO: ABNORMAL /HPF
SOURCE: ABNORMAL
SP GR UR STRIP: 1.01 (ref 1–1.03)
UROBILINOGEN UR STRIP-MCNC: NORMAL MG/DL (ref 0–2)
WBC # BLD AUTO: 7 10E9/L (ref 4–11)
WBC #/AREA URNS AUTO: ABNORMAL /HPF

## 2020-03-06 PROCEDURE — 84460 ALANINE AMINO (ALT) (SGPT): CPT | Performed by: INTERNAL MEDICINE

## 2020-03-06 PROCEDURE — 82306 VITAMIN D 25 HYDROXY: CPT | Performed by: INTERNAL MEDICINE

## 2020-03-06 PROCEDURE — 84156 ASSAY OF PROTEIN URINE: CPT | Performed by: INTERNAL MEDICINE

## 2020-03-06 PROCEDURE — 84450 TRANSFERASE (AST) (SGOT): CPT | Performed by: INTERNAL MEDICINE

## 2020-03-06 PROCEDURE — 82040 ASSAY OF SERUM ALBUMIN: CPT | Performed by: INTERNAL MEDICINE

## 2020-03-06 PROCEDURE — 81001 URINALYSIS AUTO W/SCOPE: CPT | Performed by: INTERNAL MEDICINE

## 2020-03-06 PROCEDURE — 86140 C-REACTIVE PROTEIN: CPT | Performed by: INTERNAL MEDICINE

## 2020-03-06 PROCEDURE — 85025 COMPLETE CBC W/AUTO DIFF WBC: CPT | Performed by: INTERNAL MEDICINE

## 2020-03-06 PROCEDURE — 36415 COLL VENOUS BLD VENIPUNCTURE: CPT | Performed by: INTERNAL MEDICINE

## 2020-03-06 PROCEDURE — 85652 RBC SED RATE AUTOMATED: CPT | Performed by: INTERNAL MEDICINE

## 2020-03-06 PROCEDURE — 99214 OFFICE O/P EST MOD 30 MIN: CPT | Performed by: INTERNAL MEDICINE

## 2020-03-06 PROCEDURE — 82565 ASSAY OF CREATININE: CPT | Performed by: INTERNAL MEDICINE

## 2020-03-06 PROCEDURE — 82043 UR ALBUMIN QUANTITATIVE: CPT | Performed by: INTERNAL MEDICINE

## 2020-03-06 ASSESSMENT — PAIN SCALES - GENERAL: PAINLEVEL: NO PAIN (0)

## 2020-03-06 ASSESSMENT — MIFFLIN-ST. JEOR: SCORE: 1683.57

## 2020-03-06 ASSESSMENT — ROUTINE ASSESSMENT OF PATIENT INDEX DATA (RAPID3)
TOTAL RAPID3 SCORE: 5.2
RAPID3 INTERPRETATION: LOW 3.1-6

## 2020-03-06 NOTE — NURSING NOTE
"Luz Marina Live's goals for this visit include:   Chief Complaint   Patient presents with     RECHECK     SLE       She requests these members of her care team be copied on today's visit information:     PCP: Eliz Nguyen    Referring Provider:  No referring provider defined for this encounter.    /78   Pulse 55   Ht 1.6 m (5' 3\")   Wt 113.9 kg (251 lb 3.2 oz)   SpO2 98%   BMI 44.50 kg/m      "

## 2020-03-06 NOTE — PROGRESS NOTES
Ms. 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  +ROHAN, dsDNA, SSA, lupus inhibitor, and depressed complements. Modest proteinuria. Previous treatment with cytoxan and imuran. Current management with hydroxychloroquine 400 mg daily.     She feels okay. Energy is good and no AM stiffness. Her joints are good. She can have ankle swelling, but no other joint redness or warmth. No joint dysfunction. She notes heberden's nodes.    CPAP leads to some dry eyes and dry mouth.     She tolerates the hydroxychloroquine 400 mg daily well with normal eye exam 5-2019.    PMI:  Medical-  Active Ambulatory Problems     Diagnosis Date Noted     Hyperlipidemia LDL goal <100 12/05/2011     CAD (coronary artery disease) 12/05/2011     HTN, goal below 140/90 07/03/2013     PAF (paroxysmal atrial fibrillation) (H) 08/24/2015     CKD (chronic kidney disease) stage 3, GFR 30-59 ml/min (H) 09/14/2015     Long-term (current) use of anticoagulants [Z79.01] 04/05/2016     Moderate tricuspid insufficiency 12/22/2016     Systemic lupus erythematosus with glomerular disease, unspecified SLE type (H) 07/03/2017     Psychophysiological insomnia 06/11/2018     Morbid obesity (H) 06/11/2018     KEITH (obstructive sleep apnea)- severe (AHI 30) 06/18/2018     Long-term use of Plaquenil 09/04/2018     Long-term use of immunosuppressant medication 10/11/2019     Stable angina (H) 10/16/2019     Resolved Ambulatory Problems     Diagnosis Date Noted     SLE (systemic lupus erythematosus) (H) 11/01/2010     Hypovolemic shock (H) 02/28/2015     Sepsis (H) 08/22/2015     Secondary renal hyperparathyroidism (H) 01/27/2016     No Additional Past Medical History     Surgical-  Past Surgical History:   Procedure Laterality Date     CARDIAC SURGERY  08/27/2009    triple vessel coronary artery bypass grafting on 8/27/09     CARPAL TUNNEL RELEASE RT/LT  1996    bilateral     CV CORONARY ANGIOGRAM  10/17/2019    Procedure: CV  CORONARY ANGIOGRAM;  Surgeon: Mahendra Collins MD;  Location:  [facility]     CV PCI STENT DRUG ELUTING N/A 10/17/2019    Procedure: PCI Stent Drug Eluting;  Surgeon: Mahendra Collins MD;  Location:  [facility]     Injuries-none    SH:  Social History     Socioeconomic History     Marital status:      Spouse name: None     Number of children: 1 daughter     Years of education: None     Highest education level: None   Occupational History     None   Social Needs     Financial resource strain: None     Food insecurity:     Worry: None     Inability: None     Transportation needs:     Medical: None     Non-medical: None   Tobacco Use     Smoking status: Former Smoker     Smokeless tobacco: Never Used     Tobacco comment: 30 plus years ago.   Substance and Sexual Activity     Alcohol use: No     Drug use: No     Sexual activity: Yes     Partners: Male     Birth control/protection: Surgical     Comment: vasectomy   Lifestyle     Physical activity:     Days per week: None     Minutes per session: None     Stress: None   Relationships     Social connections:     Talks on phone: None     Gets together: None     Attends Congregation service: None     Active member of club or organization: None     Attends meetings of clubs or organizations: None     Relationship status: None     Intimate partner violence:     Fear of current or ex partner: None     Emotionally abused: None     Physically abused: None     Forced sexual activity: None   Other Topics Concern     Parent/sibling w/ CABG, MI or angioplasty before 65F 55M? Not Asked   Social History Narrative     None     FH:  Family History   Problem Relation Age of Onset     Arthritis Mother         ra     Connective Tissue Disorder Mother         lupus     Congenital Anomalies Mother         wholein heart     Cerebrovascular Disease Mother         TIA's     Cerebrovascular Disease Father      Diabetes Father       Hypertension Father      Cardiovascular Father         stents     Genitourinary Problems Father         kidney failure     Kidney Disease Father         kidney injury related to acute illness around time of death (RANDELL likely)     Cataracts Father      Arthritis Paternal Grandmother      Diabetes Brother      Glaucoma No family hx of      Macular Degeneration No family hx of      ROS:  +diplopia with new left 6th nerve palsy  +bruises easily  +increased rosacea rash  +cold induced dyspnea on occasion  +recent chest pain with CA stenting  Remainder of the 14 point ROS obtained and found negative.    Physical Exam:  Constitutional: WD-WN-WG cooperative; +obese  Eyes: nl EOM, PERRLA, vision, conjunctiva, sclera  ENT: nl external ears, nose, hearing, lips, teeth, gums, throat  Neck: no mass or thyroid enlargement  Resp: lungs clear to auscultation, nl to palpation, nl effort  CV: RRR, no murmurs, rubs or gallops, no edema  GI: no ABD mass or tenderness, no HSM  : not tested  Lymph: no cervical or epitrochlear nodes  MS: +bilateral trace 1st MCP swelling, bilateral R>L 2nd DIP heberden's nodes;  All other TMJ, neck, shoulder, elbow, wrist, hand, spine, hip, knee, ankle, and foot joints were examined and otherwise found normal. Normal  strength tuck and prayer. Full ROM.  Skin: no nail pitting, alopecia, rash  Neuro: nl cranial nerves, strength, sensation, DTRs.   Psych: nl judgement, orientation, memory, affect.    Laboratory:    Component      Latest Ref Rng & Units 3/6/2020   WBC      4.0 - 11.0 10e9/L 7.0   RBC Count      3.8 - 5.2 10e12/L 3.60 (L)   Hemoglobin      11.7 - 15.7 g/dL 10.8 (L)   Hematocrit      35.0 - 47.0 % 34.2 (L)   MCV      78 - 100 fl 95   MCH      26.5 - 33.0 pg 30.0   MCHC      31.5 - 36.5 g/dL 31.6   RDW      10.0 - 15.0 % 13.7   Platelet Count      150 - 450 10e9/L 204   Diff Method       Automated Method   % Neutrophils      % 71.1   % Lymphocytes      % 17.6   % Monocytes      % 7.0   %  Eosinophils      % 2.3   % Basophils      % 0.7   % Immature Granulocytes      % 1.3   Absolute Neutrophil      1.6 - 8.3 10e9/L 5.0   Absolute Lymphocytes      0.8 - 5.3 10e9/L 1.2   Absolute Monocytes      0.0 - 1.3 10e9/L 0.5   Absolute Eosinophils      0.0 - 0.7 10e9/L 0.2   Absolute Basophils      0.0 - 0.2 10e9/L 0.1   Abs Immature Granulocytes      0 - 0.4 10e9/L 0.1   Color Urine       Yellow   Appearance Urine       Clear   Glucose Urine      NEG:Negative mg/dL Negative   Bilirubin Urine      NEG:Negative Negative   Ketones Urine      NEG:Negative mg/dL Negative   Specific Gravity Urine      1.003 - 1.035 1.014   Blood Urine      NEG:Negative Trace (A)   pH Urine      5.0 - 7.0 pH 6.5   Protein Albumin Urine      NEG:Negative mg/dL 30 (A)   Urobilinogen mg/dL      0.0 - 2.0 mg/dL Normal   Nitrite Urine      NEG:Negative Negative   Leukocyte Esterase Urine      NEG:Negative Negative   Source       Midstream Urine   WBC Urine      OTO5:0 - 5 /HPF PENDING   RBC Urine      OTO2:O - 2 /HPF PENDING   Creatinine      0.52 - 1.04 mg/dL 1.31 (H)   GFR Estimate      >60 mL/min/1.73:m2 44 (L)   GFR Estimate If Black      >60 mL/min/1.73:m2 51 (L)   Albumin      3.4 - 5.0 g/dL 3.0 (L)   ALT      0 - 50 U/L 29   AST      0 - 45 U/L 27   CRP Inflammation      0.0 - 8.0 mg/L 21.5 (H)   Sed Rate      0 - 30 mm/h 87 (H)     Component      Latest Ref Rng & Units 2/27/2020   Sodium      133 - 144 mmol/L 137   Potassium      3.4 - 5.3 mmol/L 4.6   Chloride      94 - 109 mmol/L 107   Carbon Dioxide      20 - 32 mmol/L 23   Anion Gap      3 - 14 mmol/L 7   Glucose      70 - 99 mg/dL 83   Urea Nitrogen      7 - 30 mg/dL 38 (H)   Creatinine      0.52 - 1.04 mg/dL 1.20 (H)   GFR Estimate      >60 mL/min/1.73:m2 49 (L)   GFR Estimate If Black      >60 mL/min/1.73:m2 57 (L)   Calcium      8.5 - 10.1 mg/dL 9.0   Phosphorus      2.5 - 4.5 mg/dL 3.5   Albumin      3.4 - 5.0 g/dL 3.0 (L)   Protein Random Urine      g/L 0.39   Protein  Total Urine g/gr Creatinine      0 - 0.2 g/g Cr 0.63 (H)   Complement C4      13 - 39 mg/dL 8 (L)   Complement C3      81 - 157 mg/dL 65 (L)   INR Point of Care      0.86 - 1.14 2.0 (H)   Creatinine Urine      mg/dL 63     Impression:    Systemic lupus erythematosis-with history of severe lupus nephritis. Today, there are no signs of active systemic inflammatory disease by exam, although laboratory testing indicates stable increases in inflammatory markers and depression of complement levels. Therefore, I consider her disease activity low on the current regimen of hydroxychloroquine. Her kidney function is very stable. She tolerates the hydroxychloroquine well and normal eye check. Based on all of this I recommend that she continue the current regimen.    Bone health-with history of previous prednisone use. Get DEXA scanning and vitamin D level.    Long term management of immunosuppression-continue the routine lab screening and eye checks.    RTC in 4 months.

## 2020-03-06 NOTE — LETTER
3/6/2020        RE: Luz Marina Live  [address]  [place] [address]        Ms. 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  +ROHAN, dsDNA, SSA, lupus inhibitor, and depressed complements. Modest proteinuria. Previous treatment with cytoxan and imuran. Current management with hydroxychloroquine 400 mg daily.     She feels okay. Energy is good and no AM stiffness. Her joints are good. She can have ankle swelling, but no other joint redness or warmth. No joint dysfunction. She notes heberden's nodes.    CPAP leads to some dry eyes and dry mouth.     She tolerates the hydroxychloroquine 400 mg daily well with normal eye exam 5-2019.    PMI:  Medical-  Active Ambulatory Problems     Diagnosis Date Noted     Hyperlipidemia LDL goal <100 12/05/2011     CAD (coronary artery disease) 12/05/2011     HTN, goal below 140/90 07/03/2013     PAF (paroxysmal atrial fibrillation) (H) 08/24/2015     CKD (chronic kidney disease) stage 3, GFR 30-59 ml/min (H) 09/14/2015     Long-term (current) use of anticoagulants [Z79.01] 04/05/2016     Moderate tricuspid insufficiency 12/22/2016     Systemic lupus erythematosus with glomerular disease, unspecified SLE type (H) 07/03/2017     Psychophysiological insomnia 06/11/2018     Morbid obesity (H) 06/11/2018     KEITH (obstructive sleep apnea)- severe (AHI 30) 06/18/2018     Long-term use of Plaquenil 09/04/2018     Long-term use of immunosuppressant medication 10/11/2019     Stable angina (H) 10/16/2019     Resolved Ambulatory Problems     Diagnosis Date Noted     SLE (systemic lupus erythematosus) (H) 11/01/2010     Hypovolemic shock (H) 02/28/2015     Sepsis (H) 08/22/2015     Secondary renal hyperparathyroidism (H) 01/27/2016     No Additional Past Medical History     Surgical-  Past Surgical History:   Procedure Laterality Date     CARDIAC SURGERY  08/27/2009    triple vessel coronary artery bypass grafting on 8/27/09     CARPAL TUNNEL  RELEASE RT/LT  1996    bilateral     CV CORONARY ANGIOGRAM  10/17/2019    Procedure: CV CORONARY ANGIOGRAM;  Surgeon: Mahendra Collins MD;  Location: [facility]     CV PCI STENT DRUG ELUTING N/A 10/17/2019    Procedure: PCI Stent Drug Eluting;  Surgeon: Mahendra Collins MD;  Location: [facility]     Injuries-none    SH:  Social History     Socioeconomic History     Marital status:      Spouse name: None     Number of children: 1 daughter     Years of education: None     Highest education level: None   Occupational History     None   Social Needs     Financial resource strain: None     Food insecurity:     Worry: None     Inability: None     Transportation needs:     Medical: None     Non-medical: None   Tobacco Use     Smoking status: Former Smoker     Smokeless tobacco: Never Used     Tobacco comment: 30 plus years ago.   Substance and Sexual Activity     Alcohol use: No     Drug use: No     Sexual activity: Yes     Partners: Male     Birth control/protection: Surgical     Comment: vasectomy   Lifestyle     Physical activity:     Days per week: None     Minutes per session: None     Stress: None   Relationships     Social connections:     Talks on phone: None     Gets together: None     Attends Sikhism service: None     Active member of club or organization: None     Attends meetings of clubs or organizations: None     Relationship status: None     Intimate partner violence:     Fear of current or ex partner: None     Emotionally abused: None     Physically abused: None     Forced sexual activity: None   Other Topics Concern     Parent/sibling w/ CABG, MI or angioplasty before 65F 55M? Not Asked   Social History Narrative     None     FH:  Family History   Problem Relation Age of Onset     Arthritis Mother         ra     Connective Tissue Disorder Mother         lupus     Congenital Anomalies Mother         wholein heart     Cerebrovascular Disease  Mother         TIA's     Cerebrovascular Disease Father      Diabetes Father      Hypertension Father      Cardiovascular Father         stents     Genitourinary Problems Father         kidney failure     Kidney Disease Father         kidney injury related to acute illness around time of death (RANDELL likely)     Cataracts Father      Arthritis Paternal Grandmother      Diabetes Brother      Glaucoma No family hx of      Macular Degeneration No family hx of      ROS:  +diplopia with new left 6th nerve palsy  +bruises easily  +increased rosacea rash  +cold induced dyspnea on occasion  +recent chest pain with CA stenting  Remainder of the 14 point ROS obtained and found negative.    Physical Exam:  Constitutional: WD-WN-WG cooperative; +obese  Eyes: nl EOM, PERRLA, vision, conjunctiva, sclera  ENT: nl external ears, nose, hearing, lips, teeth, gums, throat  Neck: no mass or thyroid enlargement  Resp: lungs clear to auscultation, nl to palpation, nl effort  CV: RRR, no murmurs, rubs or gallops, no edema  GI: no ABD mass or tenderness, no HSM  : not tested  Lymph: no cervical or epitrochlear nodes  MS: +bilateral trace 1st MCP swelling, bilateral R>L 2nd DIP heberden's nodes;  All other TMJ, neck, shoulder, elbow, wrist, hand, spine, hip, knee, ankle, and foot joints were examined and otherwise found normal. Normal  strength tuck and prayer. Full ROM.  Skin: no nail pitting, alopecia, rash  Neuro: nl cranial nerves, strength, sensation, DTRs.   Psych: nl judgement, orientation, memory, affect.    Laboratory:    Component      Latest Ref Rng & Units 3/6/2020   WBC      4.0 - 11.0 10e9/L 7.0   RBC Count      3.8 - 5.2 10e12/L 3.60 (L)   Hemoglobin      11.7 - 15.7 g/dL 10.8 (L)   Hematocrit      35.0 - 47.0 % 34.2 (L)   MCV      78 - 100 fl 95   MCH      26.5 - 33.0 pg 30.0   MCHC      31.5 - 36.5 g/dL 31.6   RDW      10.0 - 15.0 % 13.7   Platelet Count      150 - 450 10e9/L 204   Diff Method       Automated Method   %  Neutrophils      % 71.1   % Lymphocytes      % 17.6   % Monocytes      % 7.0   % Eosinophils      % 2.3   % Basophils      % 0.7   % Immature Granulocytes      % 1.3   Absolute Neutrophil      1.6 - 8.3 10e9/L 5.0   Absolute Lymphocytes      0.8 - 5.3 10e9/L 1.2   Absolute Monocytes      0.0 - 1.3 10e9/L 0.5   Absolute Eosinophils      0.0 - 0.7 10e9/L 0.2   Absolute Basophils      0.0 - 0.2 10e9/L 0.1   Abs Immature Granulocytes      0 - 0.4 10e9/L 0.1   Color Urine       Yellow   Appearance Urine       Clear   Glucose Urine      NEG:Negative mg/dL Negative   Bilirubin Urine      NEG:Negative Negative   Ketones Urine      NEG:Negative mg/dL Negative   Specific Gravity Urine      1.003 - 1.035 1.014   Blood Urine      NEG:Negative Trace (A)   pH Urine      5.0 - 7.0 pH 6.5   Protein Albumin Urine      NEG:Negative mg/dL 30 (A)   Urobilinogen mg/dL      0.0 - 2.0 mg/dL Normal   Nitrite Urine      NEG:Negative Negative   Leukocyte Esterase Urine      NEG:Negative Negative   Source       Midstream Urine   WBC Urine      OTO5:0 - 5 /HPF PENDING   RBC Urine      OTO2:O - 2 /HPF PENDING   Creatinine      0.52 - 1.04 mg/dL 1.31 (H)   GFR Estimate      >60 mL/min/1.73:m2 44 (L)   GFR Estimate If Black      >60 mL/min/1.73:m2 51 (L)   Albumin      3.4 - 5.0 g/dL 3.0 (L)   ALT      0 - 50 U/L 29   AST      0 - 45 U/L 27   CRP Inflammation      0.0 - 8.0 mg/L 21.5 (H)   Sed Rate      0 - 30 mm/h 87 (H)     Component      Latest Ref Rng & Units 2/27/2020   Sodium      133 - 144 mmol/L 137   Potassium      3.4 - 5.3 mmol/L 4.6   Chloride      94 - 109 mmol/L 107   Carbon Dioxide      20 - 32 mmol/L 23   Anion Gap      3 - 14 mmol/L 7   Glucose      70 - 99 mg/dL 83   Urea Nitrogen      7 - 30 mg/dL 38 (H)   Creatinine      0.52 - 1.04 mg/dL 1.20 (H)   GFR Estimate      >60 mL/min/1.73:m2 49 (L)   GFR Estimate If Black      >60 mL/min/1.73:m2 57 (L)   Calcium      8.5 - 10.1 mg/dL 9.0   Phosphorus      2.5 - 4.5 mg/dL 3.5    Albumin      3.4 - 5.0 g/dL 3.0 (L)   Protein Random Urine      g/L 0.39   Protein Total Urine g/gr Creatinine      0 - 0.2 g/g Cr 0.63 (H)   Complement C4      13 - 39 mg/dL 8 (L)   Complement C3      81 - 157 mg/dL 65 (L)   INR Point of Care      0.86 - 1.14 2.0 (H)   Creatinine Urine      mg/dL 63     Impression:    Systemic lupus erythematosis-with history of severe lupus nephritis. Today, there are no signs of active systemic inflammatory disease by exam, although laboratory testing indicates stable increases in inflammatory markers and depression of complement levels. Therefore, I consider her disease activity low on the current regimen of hydroxychloroquine. Her kidney function is very stable. She tolerates the hydroxychloroquine well and normal eye check. Based on all of this I recommend that she continue the current regimen.    Bone health-with history of previous prednisone use. Get DEXA scanning and vitamin D level.    Long term management of immunosuppression-continue the routine lab screening and eye checks.    RTC in 4 months.      Sincerely,        Reji Chau MD

## 2020-03-10 LAB
DEPRECATED CALCIDIOL+CALCIFEROL SERPL-MC: <40 UG/L (ref 20–75)
VITAMIN D2 SERPL-MCNC: <5 UG/L
VITAMIN D3 SERPL-MCNC: 35 UG/L

## 2020-03-12 ENCOUNTER — OFFICE VISIT (OUTPATIENT)
Dept: OPHTHALMOLOGY | Facility: CLINIC | Age: 60
End: 2020-03-12
Attending: FAMILY MEDICINE
Payer: COMMERCIAL

## 2020-03-12 DIAGNOSIS — Z79.899 LONG-TERM USE OF PLAQUENIL: ICD-10-CM

## 2020-03-12 DIAGNOSIS — H49.22 SIXTH NERVE PALSY OF LEFT EYE: Primary | ICD-10-CM

## 2020-03-12 PROCEDURE — G0463 HOSPITAL OUTPT CLINIC VISIT: HCPCS | Mod: ZF

## 2020-03-12 ASSESSMENT — TONOMETRY
OS_IOP_MMHG: 15
IOP_METHOD: ICARE
OD_IOP_MMHG: 14

## 2020-03-12 ASSESSMENT — CONF VISUAL FIELD
OD_NORMAL: 1
METHOD: COUNTING FINGERS
OS_NORMAL: 1

## 2020-03-12 ASSESSMENT — REFRACTION_WEARINGRX
OD_CYLINDER: +1.75
OD_ADD: +2.50
OS_ADD: +2.50
OD_SPHERE: +1.50
SPECS_TYPE: PAL
OD_AXIS: 075
OS_CYLINDER: +1.25
OS_AXIS: 130
OS_SPHERE: +1.75

## 2020-03-12 ASSESSMENT — VISUAL ACUITY
METHOD: SNELLEN - LINEAR
OS_CC: 20/20
CORRECTION_TYPE: GLASSES
OD_CC: 20/20

## 2020-03-12 ASSESSMENT — EXTERNAL EXAM - LEFT EYE: OS_EXAM: NORMAL

## 2020-03-12 ASSESSMENT — EXTERNAL EXAM - RIGHT EYE: OD_EXAM: NORMAL

## 2020-03-12 NOTE — NURSING NOTE
Chief Complaints and History of Present Illnesses   Patient presents with     Sixth Nerve Palsy     Chief Complaint(s) and History of Present Illness(es)     Sixth Nerve Palsy     Laterality: left eye (States that she can see better  Has no diplopia in any direction)    Duration: months    Associated symptoms: Negative for eye pain    Pain scale: 0/10              Comments     +emmy Orellana COT 12:25 PM March 12, 2020

## 2020-03-12 NOTE — PROGRESS NOTES
HPI:  Luz Marina Live is a 59 year old female who presents for the following:     HPI     Sixth Nerve Palsy     In left eye (States that she can see better  Has no diplopia in any direction).  Duration of months.  Associated symptoms include Negative for eye pain.  Pain was noted as 0/10.              Comments     +emmy Orellana COT 12:25 PM March 12, 2020             Last edited by Yu Orellana on 3/12/2020 12:25 PM. (History)        Denies discharge, proptosis, ringing in ears, scalp tenderness, jaw achiness, proximal muscle pain, fever, sweats, new rashes's, injection     POH: see impression  PMH: a fib, CKD stage 3, HTN, spesis x2 (started with sinus infection that spread to left eye), right facial palsy   FHx: no glaucoma    SHx: non-smoker   Ocular Meds: AT as needed      CT Orbit 2.7.20: Bilateral proptosis without evidence of sinus infection or  orbital mass. Mildly bulky extraocular muscles. Consider laboratory  evaluation for possible thyroid orbitopathy.    ASSESSMENT & PLAN:  Luz Marina Live is a 59 year old female with the following diagnoses:     # Sixth nerve palsy, left eye - RESOLVED    Diplopia has resolved    Full EOMs today   CT shows enlarged EOM, especially IR and MR (CT instead of MR due to claustrophobia)  No history of thyroid issues, TSH and TSI negative  Exam grossly normal, no lid retraction, no injection  Yamilet 21/22 at base 96  Will monitor    # Long-term use of plaquenil   - Taking for > 20 years, 1800 mg weekly - for Lupus   - complicated by lupus nephritis.  +ROHAN, dsDNA, SSA, lupus inhibitor, and depressed complements  - Follows with Dr. Davenport, last exam on 5/6/19 without evidence of toxicity   - Follow up for annual plaquenil exam    # Hyperiopia with astigmatism with presbyopia   - Monitor for now     Patient disposition: Return in about 2 months (around 5/12/2020) for Plaquenil with Dr. Davenport.     Ronny Miguel MD  PGY-4 Ophthalmology  Resident  [phone]    Attending Physician Attestation:  Complete documentation of historical and exam elements from today's encounter can be found in the full encounter summary report (not reduplicated in this progress note).  I personally obtained the chief complaint(s) and history of present illness.  I confirmed and edited as necessary the review of systems, past medical/surgical history, family history, social history, and examination findings as documented by others; and I examined the patient myself.  I personally reviewed the relevant tests, images, and reports as documented above.  I formulated and edited as necessary the assessment and plan and discussed the findings and management plan with the patient and family. . - Fercho Dick MD

## 2020-03-19 ENCOUNTER — TELEPHONE (OUTPATIENT)
Dept: CARDIOLOGY | Facility: CLINIC | Age: 60
End: 2020-03-19

## 2020-03-19 DIAGNOSIS — I20.89 STABLE ANGINA (H): ICD-10-CM

## 2020-03-19 DIAGNOSIS — I25.810 CORONARY ARTERY DISEASE INVOLVING AUTOLOGOUS ARTERY CORONARY BYPASS GRAFT WITHOUT ANGINA PECTORIS: ICD-10-CM

## 2020-03-19 NOTE — TELEPHONE ENCOUNTER
Prior Authorization Retail Medication Request    Medication/Dose: Brilinta 90 MG twice daily  ICD code (if different than what is on RX):    Previously Tried and Failed:  Rationale: Effective safe treatment after coronary stenting      Insurance Name:    Insurance ID:  COVERAGE INFORMATION:  Primary Payor: [facility] Plan: [facility] Individual Family *   Subscriber: Luz Marina Live Group #:     Subscriber Sex: Female       Subscriber : 1960 Patient Rel'ship: Self   Subscriber Effective Date:   Member Effective Date: 2020    Subscriber ID [id number] Member ID: [id number]             Pharmacy Information (if different than what is on RX)  Name:    Phone:

## 2020-03-19 NOTE — TELEPHONE ENCOUNTER
PA Initiation    Medication: Brilinta 90 MG -   Insurance Company: [employer] - Phone [phone] Fax [phone]  Pharmacy Filling the Rx: [id number] - [place] - [address] [place] [address]  Filling Pharmacy Phone: [phone]  Filling Pharmacy Fax: [phone]  Start Date: 3/19/2020

## 2020-03-24 NOTE — TELEPHONE ENCOUNTER
Prior Authorization Not Needed per Insurance    Medication: Brilinta 90 MG - NOT NEEDED  Insurance Company: [employer] - Phone [phone] Fax [phone]  Expected CoPay:      Pharmacy Filling the Rx: [id number] - [place], [place] - [address]  Pharmacy Notified: YesComment:  Advised pharmacy to contact the pharmacy help desk if they are still having issues processing.  Patient Notified: Comment:  **Instructed pharmacy to notify patient when script is ready to /ship.**

## 2020-03-30 DIAGNOSIS — M32.14 SYSTEMIC LUPUS ERYTHEMATOSUS WITH GLOMERULAR DISEASE, UNSPECIFIED SLE TYPE (H): Primary | ICD-10-CM

## 2020-03-31 ENCOUNTER — ANTICOAGULATION THERAPY VISIT (OUTPATIENT)
Dept: ANTICOAGULATION | Facility: OTHER | Age: 60
End: 2020-03-31

## 2020-03-31 DIAGNOSIS — M32.14 SYSTEMIC LUPUS ERYTHEMATOSUS WITH GLOMERULAR DISEASE, UNSPECIFIED SLE TYPE (H): ICD-10-CM

## 2020-03-31 DIAGNOSIS — I48.0 PAF (PAROXYSMAL ATRIAL FIBRILLATION) (H): ICD-10-CM

## 2020-03-31 DIAGNOSIS — Z79.01 LONG TERM CURRENT USE OF ANTICOAGULANTS WITH INR GOAL OF 2.0-3.0: ICD-10-CM

## 2020-03-31 DIAGNOSIS — Z79.01 LONG TERM CURRENT USE OF ANTICOAGULANT THERAPY: ICD-10-CM

## 2020-03-31 LAB
ALBUMIN UR-MCNC: 30 MG/DL
APPEARANCE UR: CLEAR
BACTERIA #/AREA URNS HPF: ABNORMAL /HPF
BILIRUB UR QL STRIP: NEGATIVE
COLOR UR AUTO: YELLOW
GLUCOSE UR STRIP-MCNC: NEGATIVE MG/DL
HGB UR QL STRIP: ABNORMAL
INR BLD: 2.1 (ref 0.86–1.14)
KETONES UR STRIP-MCNC: NEGATIVE MG/DL
LEUKOCYTE ESTERASE UR QL STRIP: ABNORMAL
NITRATE UR QL: NEGATIVE
NON-SQ EPI CELLS #/AREA URNS LPF: ABNORMAL /LPF
PH UR STRIP: 6.5 PH (ref 5–7)
RBC #/AREA URNS AUTO: ABNORMAL /HPF
SOURCE: ABNORMAL
SP GR UR STRIP: 1.02 (ref 1–1.03)
UROBILINOGEN UR STRIP-ACNC: 0.2 EU/DL (ref 0.2–1)
WBC #/AREA URNS AUTO: ABNORMAL /HPF

## 2020-03-31 PROCEDURE — 84156 ASSAY OF PROTEIN URINE: CPT | Performed by: INTERNAL MEDICINE

## 2020-03-31 PROCEDURE — 99207 ZZC NO CHARGE NURSE ONLY: CPT | Performed by: INTERNAL MEDICINE

## 2020-03-31 PROCEDURE — 85610 PROTHROMBIN TIME: CPT | Mod: QW | Performed by: INTERNAL MEDICINE

## 2020-03-31 PROCEDURE — 36415 COLL VENOUS BLD VENIPUNCTURE: CPT | Performed by: INTERNAL MEDICINE

## 2020-03-31 PROCEDURE — 81001 URINALYSIS AUTO W/SCOPE: CPT | Performed by: INTERNAL MEDICINE

## 2020-03-31 PROCEDURE — 80069 RENAL FUNCTION PANEL: CPT | Performed by: INTERNAL MEDICINE

## 2020-03-31 PROCEDURE — 82043 UR ALBUMIN QUANTITATIVE: CPT | Performed by: INTERNAL MEDICINE

## 2020-03-31 NOTE — PROGRESS NOTES
ANTICOAGULATION FOLLOW-UP CLINIC VISIT    Patient Name:  Luz Marina Live  Date:  3/31/2020  Contact Type:  Telephone    SUBJECTIVE:  Patient Findings     Comments:   Patients next INR date pushed out 8 weeks due to Covid-19 and pt's has been quite stable. Patient is advised to call with any questions, missed doses or concerns such as symptoms of bleeding, clotting, infection, change in diet or other.               Clinical Outcomes     Comments:   Patients next INR date pushed out 8 weeks due to Covid-19 and pt's has been quite stable. Patient is advised to call with any questions, missed doses or concerns such as symptoms of bleeding, clotting, infection, change in diet or other.                  OBJECTIVE    INR Point of Care   Date Value Ref Range Status   2020 2.1 (H) 0.86 - 1.14 Final     Comment:     This test is intended for monitoring Coumadin therapy.  Results are not   accurate in patients with prolonged INR due to factor deficiency.         ASSESSMENT / PLAN  INR assessment THER    Recheck INR In: 8 WEEKS    INR Location Outside lab      Anticoagulation Summary  As of 3/31/2020    INR goal:   2.0-3.0   TTR:   55.7 % (10.2 mo)   INR used for dosin.1 (3/31/2020)   Warfarin maintenance plan:   5 mg (5 mg x 1) every Mon, Wed, Fri; 7.5 mg (5 mg x 1.5) all other days   Full warfarin instructions:   5 mg every Mon, Wed, Fri; 7.5 mg all other days   Weekly warfarin total:   45 mg   No change documented:   Deborah Alatorre, RN   Plan last modified:   Cait Hawthorne RN (2019)   Next INR check:   2020   Target end date:   Indefinite    Indications    Long-term (current) use of anticoagulants [Z79.01] [Z79.01]  PAF (paroxysmal atrial fibrillation) (H) [I48.0]             Anticoagulation Episode Summary     INR check location:       Preferred lab:       Send INR reminders to:   [facility]    Comments:   5 mg tabs, [facility] (needs staff message) PM dose      Anticoagulation Care  Providers     Provider Role Specialty Phone number    Eilz Nguyen MD Referring Internal Medicine [phone]            See the Encounter Report to view Anticoagulation Flowsheet and Dosing Calendar (Go to Encounters tab in chart review, and find the Anticoagulation Therapy Visit)    Dosage adjustment made based on physician directed care plan.    Deborah Alatorre RN

## 2020-04-01 LAB
ALBUMIN SERPL-MCNC: 3 G/DL (ref 3.4–5)
ANION GAP SERPL CALCULATED.3IONS-SCNC: 6 MMOL/L (ref 3–14)
BUN SERPL-MCNC: 39 MG/DL (ref 7–30)
CALCIUM SERPL-MCNC: 8.9 MG/DL (ref 8.5–10.1)
CHLORIDE SERPL-SCNC: 110 MMOL/L (ref 94–109)
CO2 SERPL-SCNC: 22 MMOL/L (ref 20–32)
CREAT SERPL-MCNC: 1.22 MG/DL (ref 0.52–1.04)
CREAT UR-MCNC: 76 MG/DL
GFR SERPL CREATININE-BSD FRML MDRD: 48 ML/MIN/{1.73_M2}
GLUCOSE SERPL-MCNC: 88 MG/DL (ref 70–99)
MICROALBUMIN UR-MCNC: 99 MG/L
MICROALBUMIN/CREAT UR: 130.57 MG/G CR (ref 0–25)
PHOSPHATE SERPL-MCNC: 3.2 MG/DL (ref 2.5–4.5)
POTASSIUM SERPL-SCNC: 4.8 MMOL/L (ref 3.4–5.3)
PROT UR-MCNC: 0.48 G/L
PROT/CREAT 24H UR: 0.64 G/G CR (ref 0–0.2)
SODIUM SERPL-SCNC: 138 MMOL/L (ref 133–144)

## 2020-04-01 NOTE — PROGRESS NOTES
.I conducted a virtual visit with this patient. We discussed the patient's current condition, reviewed interim history since last visit, current functional status, diet, and possible cardiac symptoms including: chest pain, tightness, heaviness, pressure, PND, orthopnea, ankle edema, increasing abdominal girth, weight gain, palpitation, pre-syncope or syncope.  She is doing well with decreased shortness of breath, increased activity and no symptoms suggestive of progressive CAD.  She is having trouble with Brillinta.   I called her pharmacy and called the patient back.  Her insurance has been changed around and in now [facility] with [employer].  I gave her the number for E.S.  She will call us back if we now need prior authorization for which we should ask based on her tenuous vascular condition and the fact she is on Warfarin and Brilinta and has not had bleeding complications.       20 minutes telephone    I spent 30 minutes reviewing chart, discussing the patient's condition and preparing report    We reviewed and suggested:  1. Viral epidemic safety suggestions  2. Continued adherence to medical regimen, diet, and exercise program as previously described  3. Current scheduled visit deferred in light of current situation  4. Call immediately should your health status change, and we will make arrangements to see you immediately  I conducted a virtual visit with this patient. We discussed the patient's current condition, reviewed interim history since last visit, current functional status, diet, and possible cardiac symptoms including: chest pain, tightness, heaviness, pressure, PND, orthopnea, ankle edema, increasing abdominal girth, weight gain, palpitation, pre-syncope or syncope.      I spent 30 minutes reviewing chart, discussing the patient's condition and preparing report    We reviewed and suggested:  5. Viral epidemic safety suggestions  6. Continued adherence to medical regimen, diet, and exercise  program as previously described  7. Current scheduled visit deferred in light of current situation  8. Call immediately should your health status change, and we will make arrangements to see you immediately    Impression  1. ASHD with recent stenting         3-vessel coronary artery disease s/p CAB       Patent LIMA-LAD, SVG-OM, SVG-RPDA       Significant stenosis of pLCx/OM1 s/p SHAHANA x2 to distal LM/pLCx/OM1    2. History of CAB  3. Elevated PA pressure  4. Hypertension  5. Hyperlipidemia  6. KEITH  7. PFO  8. Atrial fibrillation with warfarin anticoagulation/chronic  9. SLE  10. History of lupus nephritis              Weight:  Wt Readings from Last 24 Encounters:   03/06/20 113.9 kg (251 lb 3.2 oz)   02/05/20 111.6 kg (246 lb)   01/14/20 113.9 kg (251 lb)   12/23/19 116.3 kg (256 lb 4.8 oz)   11/08/19 116.6 kg (257 lb)   11/08/19 116.6 kg (257 lb)   11/05/19 116.6 kg (257 lb)   10/16/19 119.4 kg (263 lb 4.8 oz)   10/11/19 120.7 kg (266 lb 1.6 oz)   09/04/19 122 kg (269 lb)   08/16/19 122 kg (269 lb)   08/13/19 122.5 kg (270 lb)   08/05/19 121.4 kg (267 lb 9.6 oz)   11/20/18 122.9 kg (271 lb)   10/29/18 122 kg (269 lb)   09/17/18 119.3 kg (263 lb)   09/04/18 118.3 kg (260 lb 14.4 oz)   06/25/18 118.4 kg (261 lb)   06/25/18 117.5 kg (259 lb)   05/31/18 116.9 kg (257 lb 12.8 oz)   04/30/18 113.4 kg (250 lb)   04/10/18 115 kg (253 lb 9.6 oz)   02/27/18 116.6 kg (257 lb)   11/06/17 119.3 kg (263 lb 1.6 oz)       Medication:  Current Outpatient Medications   Medication     atorvastatin (LIPITOR) 40 MG tablet     Calcium Carbonate-Vitamin D (CALCIUM 600-D PO)     hydroxychloroquine (PLAQUENIL) 200 MG tablet     lisinopril (PRINIVIL/ZESTRIL) 40 MG tablet     metoprolol tartrate (LOPRESSOR) 50 MG tablet     MULTIPLE VITAMIN PO     Omega-3 Fatty Acids (FISH OIL) 1200 MG capsule     order for DME     ticagrelor (BRILINTA) 90 MG tablet     warfarin ANTICOAGULANT (COUMADIN) 5 MG tablet     No current facility-administered  medications for this visit.      Laboratories:  Results for IDA PADRON (MRN [id number]) as of 4/1/2020 13:50   Ref. Range 2/12/2020 17:17 2/27/2020 11:47 3/6/2020 08:50   Sodium Latest Ref Range: 133 - 144 mmol/L  137    Potassium Latest Ref Range: 3.4 - 5.3 mmol/L  4.6    Chloride Latest Ref Range: 94 - 109 mmol/L  107    Carbon Dioxide Latest Ref Range: 20 - 32 mmol/L  23    Urea Nitrogen Latest Ref Range: 7 - 30 mg/dL  38 (H)    Creatinine Latest Ref Range: 0.52 - 1.04 mg/dL  1.20 (H) 1.31 (H)   GFR Estimate Latest Ref Range: >60 mL/min/1.73_m2  49 (L) 44 (L)   GFR Estimate If Black Latest Ref Range: >60 mL/min/1.73_m2  57 (L) 51 (L)   Calcium Latest Ref Range: 8.5 - 10.1 mg/dL  9.0    Anion Gap Latest Ref Range: 3 - 14 mmol/L  7    Phosphorus Latest Ref Range: 2.5 - 4.5 mg/dL  3.5    Albumin Latest Ref Range: 3.4 - 5.0 g/dL  3.0 (L) 3.0 (L)   ALT Latest Ref Range: 0 - 50 U/L   29   AST Latest Ref Range: 0 - 45 U/L   27   25 OH Vit D total Latest Ref Range: 20 - 75 ug/L   <40   25 OH Vit D2 Latest Units: ug/L   <5   25 OH Vit D3 Latest Units: ug/L   35   Creatinine Urine Latest Units: mg/dL  63    CRP Inflammation Latest Ref Range: 0.0 - 8.0 mg/L   21.5 (H)   Protein Random Urine Latest Units: g/L  0.39    Protein Total Urine g/gr Creatinine Latest Ref Range: 0 - 0.2 g/g Cr  0.63 (H)    Thyroid Stim Immunog Latest Ref Range: <=1.3 TSI index <1.0     TSH Latest Ref Range: 0.40 - 4.00 mU/L 1.98     Glucose Latest Ref Range: 70 - 99 mg/dL  83    WBC Latest Ref Range: 4.0 - 11.0 10e9/L   7.0   Hemoglobin Latest Ref Range: 11.7 - 15.7 g/dL   10.8 (L)   Hematocrit Latest Ref Range: 35.0 - 47.0 %   34.2 (L)   Platelet Count Latest Ref Range: 150 - 450 10e9/L   204   RBC Count Latest Ref Range: 3.8 - 5.2 10e12/L   3.60 (L)   MCV Latest Ref Range: 78 - 100 fl   95   MCH Latest Ref Range: 26.5 - 33.0 pg   30.0   MCHC Latest Ref Range: 31.5 - 36.5 g/dL   31.6   RDW Latest Ref Range: 10.0 - 15.0 %   13.7   Diff Method  Unknown   Automated Method   % Neutrophils Latest Units: %   71.1   % Lymphocytes Latest Units: %   17.6   % Monocytes Latest Units: %   7.0   % Eosinophils Latest Units: %   2.3   % Basophils Latest Units: %   0.7   % Immature Granulocytes Latest Units: %   1.3   Absolute Neutrophil Latest Ref Range: 1.6 - 8.3 10e9/L   5.0   Absolute Lymphocytes Latest Ref Range: 0.8 - 5.3 10e9/L   1.2   Absolute Monocytes Latest Ref Range: 0.0 - 1.3 10e9/L   0.5   Absolute Eosinophils Latest Ref Range: 0.0 - 0.7 10e9/L   0.2   Absolute Basophils Latest Ref Range: 0.0 - 0.2 10e9/L   0.1   Abs Immature Granulocytes Latest Ref Range: 0 - 0.4 10e9/L   0.1   Sed Rate Latest Ref Range: 0 - 30 mm/h   87 (H)   INR Point of Care Latest Ref Range: 0.86 - 1.14   2.0 (H)    Complement C3 Latest Ref Range: 81 - 157 mg/dL  65 (L)    Complement C4 Latest Ref Range: 13 - 39 mg/dL  8 (L)        Results for IDA PADRON (MRN [id number]) as of 11/5/2019 10:53   Ref. Range 8/5/2019 11:03 11/5/2019 10:16   CRP Inflammation Latest Ref Range: 0.0 - 8.0 mg/L 8.8 (H) 17.0 (H)         Her new catherization:  Results (pressures in mmHg)  RA: 1/4/3  RV 32/3  PA 30/13/19;  PA Sat 65%  PCWP -/-/5;  PCW Sat --%  Kannan CO/CI 3.3/1.6 L/min; TD CO 4.1/2.0 L/min  PVR 4.2 bermudez; TPR 5.7 bermudez  Hb 12.3 g/dL  NIBP 167/81/116  SVR 2200 dynes*sec/cm^5     [place]:  SVC 62.7%  IVC 65.0%  Low RA 73.0%  Mid RA 68.0%  High RA 64.6%  RV 63%  PA 65%  PCW ---     Complications: None   Blood loss: Minimal blood loss     Conclusions:  1. Low right and left sided filling pressures.  2. Normal pulmonary artery pressures.  3. Low normal cardiac output.      REVIEW of SYMPTOMS    Constitutional: without fever, chills, night sweats.  Weight is down  HEENT: without dry eyes, dry mouth, sinusitis, corryza, visual changes  Endocrine: without polyuria, polydypsia, polyphagia, heat or cold intolerance, changing mental status  Cardiology: without chest pain, tightness, heaviness, pressure,  paroxysmal dyspnea, orthopnea, palpitation, pre-syncope or syncope or device discharge (if present)  Pulmonary: without asthma, wheezing, cough, hemoptysis  GI: without nausea, emesis, jaundice, pain, hematemesis, melena  : without frequency, urgency, hematuria, stones, pain, abnormal bleeding, frequency, urgency  Neurologic: without TIA, CVA, trauma, seizure  Dermatologic: without lesions, abrasion rash,   Orthopedic/Rheum: without significant joint pain, impairment, limb, polyserositis, ulceration, Raynauds  Heme: without mass, bruising, frequent infection, anemia  Psychiatric: without substance abuse, hallucination, medication, depression      Exercise tolerance:    Nuclear stress test:  PROTOCOL:    Rest and stress myocardial perfusion imaging was performed using  Tc-99m tetrafosmin intravenously. Pharmacological stress was performed  with 0.4 mg of regadenoson intravenously. The EKG showed normal sinus  rhythm at rest. No significant abnormalities were noted with infusion  of regadenoson.     FINDINGS:  1.  Overall quality of the study: Diagnostic.   2.  Left ventricular cavity is Normal on the rest and stress studies.  3.  SPECT images demonstrate uniform radiotracer uptake of the  myocardium on both stress and rest images.  4.  Left ventricular ejection fraction is 73%. Left ventricular  end-diastolic volume is 134 mL. End-systolic volume is 34 mL      Echocardiogram  Name: IDA PADRON  MRN: [id number]  : 1960  Study Date: 10/17/2019 10:24 AM  Age: 59 yrs  Gender: Female  Patient Location: [facility] [place]  Reason For Study: Chest Pain  Ordering Physician: MARIANELA ESCALONA  Performed By: Yasmany Campbell RDCS     BSA: 2.2 m2  Height: 63 in  Weight: 263 lb  HR: 58  BP: 144/81 mmHg  _____________________________________________________________________________  __        Procedure  Complete Portable Echo Adult. Contrast Optison. Optison (NDC #2361-3563-15)  given intravenously. Patient was given 6 ml mixture of  3 ml Optison and 6 ml  saline. 3 ml wasted.  _____________________________________________________________________________  __        Interpretation Summary  Global and regional left ventricular function is normal with an EF of 60-65%.  Right ventricular function, chamber size, wall motion, and thickness are  normal.  No pericardial effusion is present.  IVC diameter and respiratory changes fall into an intermediate range  suggesting an RA pressure of 8 mmHg.  Mild pulmonary hypertension is present. Right ventricular systolic pressure is  43mmHg above the right atrial pressure.  Compared to prior, measured RVSP is lower, no other change.  _____________________________________________________________________________  __        Left Ventricle  Left ventricular size is normal. Global and regional left ventricular function  is normal with an EF of 60-65%. Mild concentric wall thickening consistent  with left ventricular hypertrophy is present. Left ventricular diastolic  function is indeterminate.     Right Ventricle  Right ventricular function, chamber size, wall motion, and thickness are  normal.     Atria  Moderate biatrial enlargement is present.     Mitral Valve  The mitral valve is normal.        Aortic Valve  Aortic valve is normal in structure and function.     Tricuspid Valve  Mild tricuspid insufficiency is present. Mild pulmonary hypertension is  present. Right ventricular systolic pressure is 43mmHg above the right atrial  pressure.     Pulmonic Valve  The pulmonic valve is normal. Trace pulmonic insufficiency is present.     Vessels  The aorta root is normal. The thoracic aorta is normal. Dilation of the  inferior vena cava is present with normal respiratory variation in diameter.  IVC diameter and respiratory changes fall into an intermediate range  suggesting an RA pressure of 8 mmHg.     Pericardium  No pericardial effusion is present.      _____________________________________________________________________________  __  MMode/2D Measurements & Calculations  IVSd: 1.2 cm  LVIDd: 4.5 cm  LVIDs: 2.9 cm  LVPWd: 1.2 cm  FS: 36.6 %     LV mass(C)d: 197.9 grams  LV mass(C)dI: 91.1 grams/m2  asc Aorta Diam: 3.5 cm  LVOT diam: 2.1 cm  LVOT area: 3.5 cm2  LA Volume Index (BP): 43.9 ml/m2  RWT: 0.52  TAPSE: 2.3 cm        Doppler Measurements & Calculations  MV E max maria isabel: 84.8 cm/sec  MV A max maria isabel: 35.8 cm/sec  MV E/A: 2.4  MV dec slope: 473.3 cm/sec2  MV dec time: 0.18 sec     TV max P.0 mmHg  PA acc time: 0.09 sec  TR max maria isabel: 327.8 cm/sec  TR max P.0 mmHg  E/E' avg: 10.5  Lateral E/e': 9.0  Medial E/e': 12.0     ____________________________        Echocardiographic findings of TR, modest PA, normal systolic function.    Name: IDA PADRON  MRN: [id number]  : 1960  Study Date: 2018 11:48 AM  Age: 57 yrs  Gender: Female  Patient [facility]  Reason For Study: Hx PFO, Elevated PA pressures  Ordering Physician: SANNA SALAZAR  Referring Physician: SANNA SALAZAR  Performed By: Tray Harvey RDCS     BSA: 2.2 m2  Height: 63 in  Weight: 257 lb  HR: 62  BP: 167/94 mmHg  _____________________________________________________________________________  __        Procedure  Bubble Echocardiogram with two-dimensional, color and spectral Doppler  performed. IV start location R Hand . Bateriostatic Saline used for Bubble  Study.  _____________________________________________________________________________  __        Interpretation Summary     Right ventricular function, chamber size, wall motion, and thickness are  normal.  PFO again documented with color doppler and Bubble study.  Mild to moderate tricuspid insufficiency is present.  Right ventricular systolic pressure is 61mmHg above the right atrial pressure.  No pericardial effusion is  present.  _____________________________________________________________________________  __        Left Ventricle  Global and regional left ventricular function is normal with an EF of 55-60%.  Left ventricular wall thickness is normal. Left ventricular size is normal.  Left ventricular diastolic function is indeterminate. No regional wall motion  abnormalities are seen.     Right Ventricle  Right ventricular function, chamber size, wall motion, and thickness are  normal.     Atria  Moderate biatrial enlargement is present. PFO again documented with color  doppler and Bubble study.     Mitral Valve  Mild to moderate mitral insufficiency is present.        Aortic Valve  Aortic valve is normal in structure and function.     Tricuspid Valve  Mild to moderate tricuspid insufficiency is present. Right ventricular  systolic pressure is 61mmHg above the right atrial pressure.     Pulmonic Valve  The pulmonic valve is normal. Trace to mild pulmonic insufficiency is present.     Vessels  The aorta root is normal. The pulmonary artery is normal. The inferior vena  cava is normal.     Pericardium  No pericardial effusion is present.     _____________________________________________________________________________  __  MMode/2D Measurements & Calculations  IVSd: 0.89 cm  LVIDd: 5.3 cm  LVIDs: 3.1 cm  LVPWd: 0.75 cm  FS: 41.8 %  LV mass(C)d: 154.8 grams  LV mass(C)dI: 72.0 grams/m2     Ao root diam: 3.0 cm  LA dimension: 5.3 cm  asc Aorta Diam: 3.6 cm  LA/Ao: 1.8  LVOT diam: 2.2 cm  LVOT area: 3.7 cm2  LA Volume (BP): 96.0 ml  LA Volume Index (BP): 44.7 ml/m2  RWT: 0.28        Doppler Measurements & Calculations  MV E max maria isabel: 92.8 cm/sec  MV A max maria isabel: 58.2 cm/sec  MV E/A: 1.6  MV dec time: 0.15 sec     Ao V2 max: 147.4 cm/sec  Ao max P.0 mmHg  TARUN(V,D): 2.6 cm2  LV V1 max P.3 mmHg  LV V1 max: 104.1 cm/sec  LV V1 VTI: 24.8 cm  MR ERO: 0.27 cm2  CO(LVOT): 5.6 l/min  CI(LVOT): 2.6 l/min/m2  SV(LVOT): 92.9 ml  SI(LVOT):  43.2 ml/m2  PA V2 max: 110.6 cm/sec  PA max P.9 mmHg  PA acc time: 0.08 sec  PI end-d luis: 154.4 cm/sec  PI max luis: 255.8 cm/sec  PI max P.2 mmHg  TR max luis: 385.4 cm/sec  TR max P.4 mmHg  Pulm Sys Luis: 63.2 cm/sec  Pulm Peng Luis: 97.7 cm/sec  Pulm S/D: 0.65  E/E' av.1  Lateral E/e': 8.9  Medial E/e': 15.4        : 1960  Study Date: 2016 01:31 PM  Age: 56 yrs  Gender: Female  Patient Location: [facility]  Reason For Study:     History: tricuspid regurgitation  Ordering Physician: JOSE ARELLANO  Referring Physician: JOSE ARELLANO  Performed By: Hugh Sharpe MD     BSA: 2.2 m2  Height: 63 in  Weight: 269 lb  ______________________________________________________________________________     Interpretation Summary  Left ventricular function, chamber size, wall motion, and wall thickness are  normal.The EF is 55-60%.  Global right ventricular function is normal. Mild right ventricular dilation  is present.  Mild to moderate TR with mild prolapse of the posterior leaflet of the  tricupsid valve.  A small patent foramen ovale is present. There is no ASD (secundum, sinus  venosus or unroofed coronary sinus). All 4 pulmonary veins drain into the  left atrium.  Mild pulmonary hypertension is present with RVSP 45mmHg above RAP.     ______________________________________________________________________________        Procedure  Transesophageal Echocardiogram with color and spectral Doppler performed. The  procedure was performed in the Echo Lab. Informed consent for Transesophegeal  echo obtained. EVE Probe #12 was used during the procedure. Patient was  sedated using Fentanyl 100 mcg. Patient was sedated using Versed 4 mg. The  Transducer was inserted without difficulty . The patient tolerated the  procedure well. Complications None. ECG shows normal sinus rhythm. Good  quality two-dimensional was performed and interpreted.     Left Ventricle  Left ventricular function, chamber size,  wall motion, and wall thickness are  normal.The EF is 55-60%.     Right Ventricle  Global right ventricular function is normal. Mild right ventricular dilation  is present.     Atria  Both atria appear normal. The left atrial appendage is normal. It is free of  spontaneous echo contrast and thrombus. A small patent foramen ovale is  present. The patent foramen ovale was demonstrated by color Doppler and  agitated saline bubble study . The patent foramen ovale was demonstrated with  Valsalva's maneuver. No evidence of ASD.     Mitral Valve  The mitral valve is normal. Trace mitral insufficiency is present.     Aortic Valve  Aortic valve is normal in structure and function. The aortic valve is  tricuspid.  Tricuspid Valve  Mild to moderate tricuspid insufficiency is present. Right ventricular  systolic pressure is 45mmHg above the right atrial pressure. Mild pulmonary  hypertension is present. Mild prolapse of the posterior leaflet of the  tricupsid valve is noted.     Pulmonic Valve  The pulmonic valve is normal.     Vessels  The pulmonary artery is normal. The aorta root is normal. The thoracic aorta  is normal. All four pulmonary veins visualized with dampened velocity  waveforms.     Pericardium  No pericardial effusion is present.     Compared to Previous Study  This study was compared with the study from 2016. A small PFO has been  identified. .     ______________________________________________________________________________     Doppler Measurements & Calculations  TR max maria isabel: 336.7 cm/sec  TR max P.5 mmHg  ______________________________________________________________________________         Name: IDA PADRON  MRN: [id number]  : 1960  Study Date: 2016 01:31 PM  Age: 56 yrs  Gender: Female  Patient Location: [facility]  Reason For Study:     History: tricuspid regurgitation  Ordering Physician: JOSE ARELLANO  Referring Physician: JOSE ARELLANO  Performed By: Hugh Sharpe,  MD     BSA: 2.2 m2  Height: 63 in  Weight: 269 lb  ______________________________________________________________________________     Interpretation Summary  Left ventricular function, chamber size, wall motion, and wall thickness are  normal.The EF is 55-60%.  Global right ventricular function is normal. Mild right ventricular dilation  is present.  Mild to moderate TR with mild prolapse of the posterior leaflet of the  tricupsid valve.  A small patent foramen ovale is present. There is no ASD (secundum, sinus  venosus or unroofed coronary sinus). All 4 pulmonary veins drain into the  left atrium.  Mild pulmonary hypertension is present with RVSP 45mmHg above RAP.     ______________________________________________________________________________        Procedure  Transesophageal Echocardiogram with color and spectral Doppler performed. The  procedure was performed in the Echo Lab. Informed consent for Transesophegeal  echo obtained. EVE Probe #12 was used during the procedure. Patient was  sedated using Fentanyl 100 mcg. Patient was sedated using Versed 4 mg. The  Transducer was inserted without difficulty . The patient tolerated the  procedure well. Complications None. ECG shows normal sinus rhythm. Good  quality two-dimensional was performed and interpreted.     Left Ventricle  Left ventricular function, chamber size, wall motion, and wall thickness are  normal.The EF is 55-60%.     Right Ventricle  Global right ventricular function is normal. Mild right ventricular dilation  is present.     Atria  Both atria appear normal. The left atrial appendage is normal. It is free of  spontaneous echo contrast and thrombus. A small patent foramen ovale is  present. The patent foramen ovale was demonstrated by color Doppler and  agitated saline bubble study . The patent foramen ovale was demonstrated with  Valsalva's maneuver. No evidence of ASD.     Mitral Valve  The mitral valve is normal. Trace mitral insufficiency is  present.     Aortic Valve  Aortic valve is normal in structure and function. The aortic valve is  tricuspid.  Tricuspid Valve  Mild to moderate tricuspid insufficiency is present. Right ventricular  systolic pressure is 45mmHg above the right atrial pressure. Mild pulmonary  hypertension is present. Mild prolapse of the posterior leaflet of the  tricupsid valve is noted.     Pulmonic Valve  The pulmonic valve is normal.     Vessels  The pulmonary artery is normal. The aorta root is normal. The thoracic aorta  is normal. All four pulmonary veins visualized with dampened velocity  waveforms.     Pericardium  No pericardial effusion is present.     Compared to Previous Study  This study was compared with the study from 2016. A small PFO has been  identified. .     ______________________________________________________________________________     Doppler Measurements & Calculations  TR max maria isabel: 336.7 cm/sec  TR max P.5 mmHg  ______________________________________________________________________________       Procedures:  ECHO: 09:   Interpretation Summary   The Ejection Fraction is estimated at 55-60%. No regional wall motion   abnormalities are seen. The right ventricle is normal size. Global right   ventricular function is normal. Right ventricular systolic pressure is 27mmHg   above the right atrial pressure. No pericardial effusion is present. The   inferior vena cava is normal.   Stress test: 08-10-09:   1. Abnormal study with a high degree of certainty.   2. There is a predominantly fixed medium sized moderately decreased   intensity myocardial perfusion defect with minimal periinfarct   reversibility involving the apical anterior, mid anterior, and apical   region of the heart. There is corresponding hypokinesis. No other   adenosine induced fixed or reversible myocardial perfusion defect.   3. Left ventricular size is enlarged at rest. Transient ischemic   dilation of the left ventricle did not  occur.   4. The left ventricular ejection fraction is 56 %.   5. Summed Stress Score is calculated at 18, which is associated   with increased risk of future coronary events.   CCL: 08-26-09: ARELLANO:   Mrs. Live is a 49 year old female with known coronary artery disease s/p MI in 1996 found to have minimal disease on catheterization. Her cardiovascular risk factors include HTN and hyperlipidemia. She presented with a 2 week history of dyspnea with exertion and lower extremity edema. She underwent Adenosine MPI which revealed a fixed medium-sized defect with minimal periinfarct reversability and anterior hypokinesis. Her LVEF was preserved at 56%. CT angio of the coronaries revealed moderate stenosis in the pLAD and dRCA and mild to moderate stenosis of the pLCx.   Access: 6F long RFA, 6F RFV   Coronary Anatomy:   LMCA: normal   LAD: There is a long, discrete, ectatic 70-80% lesion in the ostial and proximal LAD. There is one D1 branch without any significant disease.   LCx: no significant disease. There is a large OM1 branch that has a 50-60% stenosis prior to the branch bifurcation.   RCA: The proximal and mid RCA have luminal irregularities without significance. The distal RCA has a complex bifurcation lesion prior to the take-off of the rPDA. There is disease in the ostial PL1 as well.   Conclusions:   1. 3-vessel coronary artery disease without left main lesion   Plan   Discussed the options in depth with the patient. Given the proximity of the LAD lesion to the LMCA and the complexity of the dRCA lesion, we recommended CABG for the patient. She will be admitted to the hospital.Discussed with Darleen Johnson MD.   Cardiac Surgery: 8/27/2009   Triple vessel coronary artery bypass grafting. Left internal mammary artery was placed to the left anterior descending coronary artery and separate reverse saphenous vein grafts were placed to the obtuse marginal and right posterior descending coronary arteries.     Name:  IDA PADRON  MRN: [id number]  : 1960  Study Date: 2018 11:48 AM  Age: 57 yrs  Gender: Female  Patient Location: [facility]  Reason For Study: Hx PFO, Elevated PA pressures  Ordering Physician: SANNA SALAZAR  Referring Physician: SANNA SALAZAR  Performed By: Tray Harvey RDCS     BSA: 2.2 m2  Height: 63 in  Weight: 257 lb  HR: 62  BP: 167/94 mmHg  _____________________________________________________________________________  __        Procedure  Bubble Echocardiogram with two-dimensional, color and spectral Doppler  performed. IV start location R Hand . Bateriostatic Saline used for Bubble  Study.  _____________________________________________________________________________  __        Interpretation Summary     Right ventricular function, chamber size, wall motion, and thickness are  normal.  PFO again documented with color doppler and Bubble study.  Mild to moderate tricuspid insufficiency is present.  Right ventricular systolic pressure is 61mmHg above the right atrial pressure.  No pericardial effusion is present.  _____________________________________________________________________________  __        Left Ventricle  Global and regional left ventricular function is normal with an EF of 55-60%.  Left ventricular wall thickness is normal. Left ventricular size is normal.  Left ventricular diastolic function is indeterminate. No regional wall motion  abnormalities are seen.     Right Ventricle  Right ventricular function, chamber size, wall motion, and thickness are  normal.     Atria  Moderate biatrial enlargement is present. PFO again documented with color  doppler and Bubble study.     Mitral Valve  Mild to moderate mitral insufficiency is present.        Aortic Valve  Aortic valve is normal in structure and function.     Tricuspid Valve  Mild to moderate tricuspid insufficiency is present. Right ventricular  systolic pressure is 61mmHg above the right atrial pressure.     Pulmonic  Valve  The pulmonic valve is normal. Trace to mild pulmonic insufficiency is present.     Vessels  The aorta root is normal. The pulmonary artery is normal. The inferior vena  cava is normal.     Pericardium  No pericardial effusion is present.     _____________________________________________________________________________  __  MMode/2D Measurements & Calculations  IVSd: 0.89 cm  LVIDd: 5.3 cm  LVIDs: 3.1 cm  LVPWd: 0.75 cm  FS: 41.8 %  LV mass(C)d: 154.8 grams  LV mass(C)dI: 72.0 grams/m2     Ao root diam: 3.0 cm  LA dimension: 5.3 cm  asc Aorta Diam: 3.6 cm  LA/Ao: 1.8  LVOT diam: 2.2 cm  LVOT area: 3.7 cm2  LA Volume (BP): 96.0 ml  LA Volume Index (BP): 44.7 ml/m2  RWT: 0.28        Doppler Measurements & Calculations  MV E max luis: 92.8 cm/sec  MV A max luis: 58.2 cm/sec  MV E/A: 1.6  MV dec time: 0.15 sec     Ao V2 max: 147.4 cm/sec  Ao max P.0 mmHg  TARUN(V,D): 2.6 cm2  LV V1 max P.3 mmHg  LV V1 max: 104.1 cm/sec  LV V1 VTI: 24.8 cm  MR ERO: 0.27 cm2  CO(LVOT): 5.6 l/min  CI(LVOT): 2.6 l/min/m2  SV(LVOT): 92.9 ml  SI(LVOT): 43.2 ml/m2  PA V2 max: 110.6 cm/sec  PA max P.9 mmHg  PA acc time: 0.08 sec  PI end-d luis: 154.4 cm/sec  PI max luis: 255.8 cm/sec  PI max P.2 mmHg  TR max luis: 385.4 cm/sec  TR max P.4 mmHg  Pulm Sys Luis: 63.2 cm/sec  Pulm Peng Luis: 97.7 cm/sec  Pulm S/D: 0.65  E/E' av.1  Lateral E/e': 8.9  Medial E/e': 15.4    Results (pressures in mmHg)  RA: 1/3  RV 32/3  PA 30/13/19;  PA Sat 65%  PCWP -/-/5;  PCW Sat --%  Kannan CO/CI 3.3/1.6 L/min; TD CO 4.1/2.0 L/min  PVR 4.2 bermudez; TPR 5.7 bermudez  Hb 12.3 g/dL  NIBP 167/81/116  SVR 2200 dynes*sec/cm^5     [place]:  SVC 62.7%  IVC 65.0%  Low RA 73.0%  Mid RA 68.0%  High RA 64.6%  RV 63%  PA 65%  PCW ---     Complications: None   Blood loss: Minimal blood loss     Conclusions:  1. Low right and left sided filling pressures.  2. Normal pulmonary artery pressures.  3. Low normal cardiac output.                     CC:  Eliz  Ael Adame

## 2020-04-07 ENCOUNTER — VIRTUAL VISIT (OUTPATIENT)
Dept: CARDIOLOGY | Facility: CLINIC | Age: 60
End: 2020-04-07
Attending: INTERNAL MEDICINE
Payer: COMMERCIAL

## 2020-04-07 ENCOUNTER — CARE COORDINATION (OUTPATIENT)
Dept: CARDIOLOGY | Facility: CLINIC | Age: 60
End: 2020-04-07

## 2020-04-07 DIAGNOSIS — I25.810 CORONARY ARTERY DISEASE INVOLVING AUTOLOGOUS ARTERY CORONARY BYPASS GRAFT WITHOUT ANGINA PECTORIS: ICD-10-CM

## 2020-04-07 DIAGNOSIS — I25.83 CORONARY ARTERY DISEASE DUE TO LIPID RICH PLAQUE: ICD-10-CM

## 2020-04-07 DIAGNOSIS — I25.10 CORONARY ARTERY DISEASE DUE TO LIPID RICH PLAQUE: Primary | ICD-10-CM

## 2020-04-07 DIAGNOSIS — I25.83 CORONARY ARTERY DISEASE DUE TO LIPID RICH PLAQUE: Primary | ICD-10-CM

## 2020-04-07 DIAGNOSIS — I25.10 CORONARY ARTERY DISEASE DUE TO LIPID RICH PLAQUE: ICD-10-CM

## 2020-04-07 PROCEDURE — 99213 OFFICE O/P EST LOW 20 MIN: CPT | Mod: TEL | Performed by: INTERNAL MEDICINE

## 2020-04-15 ENCOUNTER — TELEPHONE (OUTPATIENT)
Dept: CARDIOLOGY | Facility: CLINIC | Age: 60
End: 2020-04-15

## 2020-04-15 DIAGNOSIS — I25.810 CORONARY ARTERY DISEASE INVOLVING AUTOLOGOUS ARTERY CORONARY BYPASS GRAFT WITHOUT ANGINA PECTORIS: ICD-10-CM

## 2020-04-15 DIAGNOSIS — I20.89 STABLE ANGINA (H): ICD-10-CM

## 2020-04-15 NOTE — TELEPHONE ENCOUNTER
Prior Authorization Retail Medication Request    Medication/Dose: Brilinta (ticagrelor)  90 MG twice daily  ICD code (if different than what is on RX):    Previously Tried and Failed:    Rationale:   we now need prior authorization for which we should ask based on her tenuous vascular condition and the fact she is on Warfarin and Brilinta and has not had bleeding complications.     Insurance Name:   Insurance ID:   COVERAGE INFORMATION:  Primary Payor: [facility] Plan: [facility] Individual Family *   Subscriber: Luz Marina Live Group #:     Subscriber Sex: Female       Subscriber : 1960 Patient Rel'ship: Self   Subscriber Effective Date:   Member Effective Date: 2020    Subscriber ID [id number] Member ID: [id number]            Pharmacy Information (if different than what is on RX)  Name:    Phone:    Her insurance has been changed around and is now Applyful with [facility].

## 2020-04-15 NOTE — TELEPHONE ENCOUNTER
PA Initiation    Medication: Brilinta (ticagrelor)  90 MG -   Insurance Company: [employer] - Phone [phone] Fax [phone]  Pharmacy Filling the Rx: [facility] - [place], [place] - [address] [place] [address]  Filling Pharmacy Phone: [phone]  Filling Pharmacy Fax: [phone]  Start Date: 4/15/2020

## 2020-04-20 NOTE — TELEPHONE ENCOUNTER
Prior Authorization Approval    Medication: Brilinta (ticagrelor)  90 MG - APPROVED was approved on 4/15/2020  Effective: 3/16/2020 to 7/14/2020  Reference #: [id number]  Approved Dose/Quantity:   Insurance Company: [employer] - Phone [phone] Fax [phone]  Expected CoPay:    Pharmacy Filling the Rx: [facility] - [address]  Pharmacy Notified: Yes  Patient Notified: Comment:  **Faxed approval to mail order pharmacy and instructed pharmacy to notify patient when script is ready to ship.**

## 2020-04-30 DIAGNOSIS — M32.14 SYSTEMIC LUPUS ERYTHEMATOSUS WITH GLOMERULAR DISEASE, UNSPECIFIED SLE TYPE (H): Chronic | ICD-10-CM

## 2020-04-30 DIAGNOSIS — Z79.60 LONG-TERM USE OF IMMUNOSUPPRESSANT MEDICATION: Chronic | ICD-10-CM

## 2020-04-30 DIAGNOSIS — Z79.899 LONG-TERM USE OF PLAQUENIL: ICD-10-CM

## 2020-04-30 DIAGNOSIS — N18.30 CKD (CHRONIC KIDNEY DISEASE) STAGE 3, GFR 30-59 ML/MIN (H): Chronic | ICD-10-CM

## 2020-04-30 LAB
ALBUMIN SERPL-MCNC: 3.3 G/DL (ref 3.4–5)
ALBUMIN UR-MCNC: 30 MG/DL
ALT SERPL W P-5'-P-CCNC: 28 U/L (ref 0–50)
ANION GAP SERPL CALCULATED.3IONS-SCNC: 5 MMOL/L (ref 3–14)
APPEARANCE UR: CLEAR
AST SERPL W P-5'-P-CCNC: 26 U/L (ref 0–45)
BASOPHILS # BLD AUTO: 0 10E9/L (ref 0–0.2)
BASOPHILS NFR BLD AUTO: 0.6 %
BILIRUB UR QL STRIP: NEGATIVE
BUN SERPL-MCNC: 48 MG/DL (ref 7–30)
CALCIUM SERPL-MCNC: 9 MG/DL (ref 8.5–10.1)
CHLORIDE SERPL-SCNC: 108 MMOL/L (ref 94–109)
CO2 SERPL-SCNC: 24 MMOL/L (ref 20–32)
COLOR UR AUTO: YELLOW
CREAT SERPL-MCNC: 1.44 MG/DL (ref 0.52–1.04)
CREAT UR-MCNC: 89 MG/DL
CRP SERPL-MCNC: 14.3 MG/L (ref 0–8)
DIFFERENTIAL METHOD BLD: ABNORMAL
EOSINOPHIL # BLD AUTO: 0.1 10E9/L (ref 0–0.7)
EOSINOPHIL NFR BLD AUTO: 1.6 %
ERYTHROCYTE [DISTWIDTH] IN BLOOD BY AUTOMATED COUNT: 14.6 % (ref 10–15)
GFR SERPL CREATININE-BSD FRML MDRD: 39 ML/MIN/{1.73_M2}
GLUCOSE SERPL-MCNC: 92 MG/DL (ref 70–99)
GLUCOSE UR STRIP-MCNC: NEGATIVE MG/DL
HCT VFR BLD AUTO: 32.5 % (ref 35–47)
HGB BLD-MCNC: 10.4 G/DL (ref 11.7–15.7)
HGB UR QL STRIP: ABNORMAL
KETONES UR STRIP-MCNC: NEGATIVE MG/DL
LEUKOCYTE ESTERASE UR QL STRIP: NEGATIVE
LYMPHOCYTES # BLD AUTO: 1 10E9/L (ref 0.8–5.3)
LYMPHOCYTES NFR BLD AUTO: 15.2 %
MCH RBC QN AUTO: 30 PG (ref 26.5–33)
MCHC RBC AUTO-ENTMCNC: 32 G/DL (ref 31.5–36.5)
MCV RBC AUTO: 94 FL (ref 78–100)
MICROALBUMIN UR-MCNC: 122 MG/L
MICROALBUMIN/CREAT UR: 136.92 MG/G CR (ref 0–25)
MONOCYTES # BLD AUTO: 0.6 10E9/L (ref 0–1.3)
MONOCYTES NFR BLD AUTO: 8.5 %
NEUTROPHILS # BLD AUTO: 5 10E9/L (ref 1.6–8.3)
NEUTROPHILS NFR BLD AUTO: 74.1 %
NITRATE UR QL: NEGATIVE
NON-SQ EPI CELLS #/AREA URNS LPF: ABNORMAL /LPF
PH UR STRIP: 5.5 PH (ref 5–7)
PHOSPHATE SERPL-MCNC: 4.1 MG/DL (ref 2.5–4.5)
PLATELET # BLD AUTO: 198 10E9/L (ref 150–450)
POTASSIUM SERPL-SCNC: 4.8 MMOL/L (ref 3.4–5.3)
PROT UR-MCNC: 0.5 G/L
PROT/CREAT 24H UR: 0.56 G/G CR (ref 0–0.2)
RBC # BLD AUTO: 3.47 10E12/L (ref 3.8–5.2)
RBC #/AREA URNS AUTO: ABNORMAL /HPF
SODIUM SERPL-SCNC: 137 MMOL/L (ref 133–144)
SOURCE: ABNORMAL
SP GR UR STRIP: 1.02 (ref 1–1.03)
UROBILINOGEN UR STRIP-ACNC: 0.2 EU/DL (ref 0.2–1)
WBC # BLD AUTO: 6.7 10E9/L (ref 4–11)
WBC #/AREA URNS AUTO: ABNORMAL /HPF

## 2020-04-30 PROCEDURE — 86160 COMPLEMENT ANTIGEN: CPT | Performed by: INTERNAL MEDICINE

## 2020-04-30 PROCEDURE — 80069 RENAL FUNCTION PANEL: CPT | Performed by: INTERNAL MEDICINE

## 2020-04-30 PROCEDURE — 84460 ALANINE AMINO (ALT) (SGPT): CPT | Performed by: INTERNAL MEDICINE

## 2020-04-30 PROCEDURE — 81001 URINALYSIS AUTO W/SCOPE: CPT | Performed by: INTERNAL MEDICINE

## 2020-04-30 PROCEDURE — 86225 DNA ANTIBODY NATIVE: CPT | Performed by: INTERNAL MEDICINE

## 2020-04-30 PROCEDURE — 84156 ASSAY OF PROTEIN URINE: CPT | Performed by: INTERNAL MEDICINE

## 2020-04-30 PROCEDURE — 36415 COLL VENOUS BLD VENIPUNCTURE: CPT | Performed by: INTERNAL MEDICINE

## 2020-04-30 PROCEDURE — 85025 COMPLETE CBC W/AUTO DIFF WBC: CPT | Performed by: INTERNAL MEDICINE

## 2020-04-30 PROCEDURE — 82043 UR ALBUMIN QUANTITATIVE: CPT | Performed by: INTERNAL MEDICINE

## 2020-04-30 PROCEDURE — 86140 C-REACTIVE PROTEIN: CPT | Performed by: INTERNAL MEDICINE

## 2020-04-30 PROCEDURE — 84450 TRANSFERASE (AST) (SGOT): CPT | Performed by: INTERNAL MEDICINE

## 2020-05-01 LAB
C3 SERPL-MCNC: 63 MG/DL (ref 81–157)
C4 SERPL-MCNC: 2 MG/DL (ref 13–39)
DSDNA AB SER-ACNC: 134 IU/ML

## 2020-05-12 DIAGNOSIS — I48.91 ATRIAL FIBRILLATION, UNSPECIFIED TYPE (H): ICD-10-CM

## 2020-05-14 RX ORDER — METOPROLOL TARTRATE 50 MG
100 TABLET ORAL 2 TIMES DAILY
Qty: 180 TABLET | Refills: 3 | Status: SHIPPED | OUTPATIENT
Start: 2020-05-14 | End: 2020-11-09

## 2020-06-01 ENCOUNTER — TRANSFERRED RECORDS (OUTPATIENT)
Dept: HEALTH INFORMATION MANAGEMENT | Facility: CLINIC | Age: 60
End: 2020-06-01

## 2020-06-02 ENCOUNTER — ANTICOAGULATION THERAPY VISIT (OUTPATIENT)
Dept: ANTICOAGULATION | Facility: OTHER | Age: 60
End: 2020-06-02
Payer: COMMERCIAL

## 2020-06-02 DIAGNOSIS — I48.0 PAF (PAROXYSMAL ATRIAL FIBRILLATION) (H): ICD-10-CM

## 2020-06-02 DIAGNOSIS — M32.14 SYSTEMIC LUPUS ERYTHEMATOSUS WITH GLOMERULAR DISEASE, UNSPECIFIED SLE TYPE (H): Chronic | ICD-10-CM

## 2020-06-02 DIAGNOSIS — Z79.01 LONG TERM CURRENT USE OF ANTICOAGULANT THERAPY: ICD-10-CM

## 2020-06-02 LAB
ALBUMIN SERPL-MCNC: 3.3 G/DL (ref 3.4–5)
ALBUMIN UR-MCNC: 100 MG/DL
ANION GAP SERPL CALCULATED.3IONS-SCNC: 4 MMOL/L (ref 3–14)
APPEARANCE UR: CLEAR
BILIRUB UR QL STRIP: NEGATIVE
BUN SERPL-MCNC: 47 MG/DL (ref 7–30)
CALCIUM SERPL-MCNC: 8.8 MG/DL (ref 8.5–10.1)
CHLORIDE SERPL-SCNC: 112 MMOL/L (ref 94–109)
CO2 SERPL-SCNC: 24 MMOL/L (ref 20–32)
COLOR UR AUTO: YELLOW
CREAT SERPL-MCNC: 1.41 MG/DL (ref 0.52–1.04)
CREAT UR-MCNC: 116 MG/DL
GFR SERPL CREATININE-BSD FRML MDRD: 40 ML/MIN/{1.73_M2}
GLUCOSE SERPL-MCNC: 100 MG/DL (ref 70–99)
GLUCOSE UR STRIP-MCNC: NEGATIVE MG/DL
HGB UR QL STRIP: ABNORMAL
INR BLD: 1.6 (ref 0.86–1.14)
KETONES UR STRIP-MCNC: NEGATIVE MG/DL
LEUKOCYTE ESTERASE UR QL STRIP: NEGATIVE
MICROALBUMIN UR-MCNC: 235 MG/L
MICROALBUMIN/CREAT UR: 202.59 MG/G CR (ref 0–25)
NITRATE UR QL: NEGATIVE
NON-SQ EPI CELLS #/AREA URNS LPF: NORMAL /LPF
PH UR STRIP: 6 PH (ref 5–7)
PHOSPHATE SERPL-MCNC: 4.6 MG/DL (ref 2.5–4.5)
POTASSIUM SERPL-SCNC: 4.8 MMOL/L (ref 3.4–5.3)
PROT UR-MCNC: 0.74 G/L
PROT/CREAT 24H UR: 0.64 G/G CR (ref 0–0.2)
RBC #/AREA URNS AUTO: NORMAL /HPF
SODIUM SERPL-SCNC: 140 MMOL/L (ref 133–144)
SOURCE: ABNORMAL
SP GR UR STRIP: 1.02 (ref 1–1.03)
UROBILINOGEN UR STRIP-ACNC: 0.2 EU/DL (ref 0.2–1)
WBC #/AREA URNS AUTO: NORMAL /HPF

## 2020-06-02 PROCEDURE — 85610 PROTHROMBIN TIME: CPT | Mod: QW | Performed by: INTERNAL MEDICINE

## 2020-06-02 PROCEDURE — 80069 RENAL FUNCTION PANEL: CPT | Performed by: INTERNAL MEDICINE

## 2020-06-02 PROCEDURE — 81001 URINALYSIS AUTO W/SCOPE: CPT | Performed by: INTERNAL MEDICINE

## 2020-06-02 PROCEDURE — 36415 COLL VENOUS BLD VENIPUNCTURE: CPT | Performed by: INTERNAL MEDICINE

## 2020-06-02 PROCEDURE — 82043 UR ALBUMIN QUANTITATIVE: CPT | Performed by: INTERNAL MEDICINE

## 2020-06-02 PROCEDURE — 99207 ZZC NO CHARGE NURSE ONLY: CPT | Performed by: INTERNAL MEDICINE

## 2020-06-02 PROCEDURE — 84156 ASSAY OF PROTEIN URINE: CPT | Performed by: INTERNAL MEDICINE

## 2020-06-02 NOTE — PROGRESS NOTES
ANTICOAGULATION FOLLOW-UP CLINIC VISIT    Patient Name:  Luz Marina Live  Date:  2020  Contact Type:  Telephone    SUBJECTIVE:  Patient Findings     Positives:   Change in diet/appetite (Pt said she had more greens this past week, will resume her usual intake, she'd like to keep her dose the same)             OBJECTIVE    Recent labs: (last 7 days)     20  1519   INR 1.6*       ASSESSMENT / PLAN  INR assessment SUB    Recheck INR In: 2 WEEKS    INR Location Outside lab      Anticoagulation Summary  As of 2020    INR goal:   2.0-3.0   TTR:   48.4 % (10.2 mo)   INR used for dosin.6! (2020)   Warfarin maintenance plan:   5 mg (5 mg x 1) every Mon, Wed, Fri; 7.5 mg (5 mg x 1.5) all other days   Full warfarin instructions:   : 10 mg; Otherwise 5 mg every Mon, Wed, Fri; 7.5 mg all other days   Weekly warfarin total:   45 mg   Plan last modified:   Cait Hawthorne RN (2019)   Next INR check:   2020   Target end date:   Indefinite    Indications    Long-term (current) use of anticoagulants [Z79.01] [Z79.01]  PAF (paroxysmal atrial fibrillation) (H) [I48.0]             Anticoagulation Episode Summary     INR check location:       Preferred lab:       Send INR reminders to:   [facility]    Comments:   5 mg tabs, [facility] (needs staff message) PM dose      Anticoagulation Care Providers     Provider Role Specialty Phone number    Eliz Nguyen MD Referring Internal Medicine [phone]            See the Encounter Report to view Anticoagulation Flowsheet and Dosing Calendar (Go to Encounters tab in chart review, and find the Anticoagulation Therapy Visit)    Dosage adjustment made based on physician directed care plan.    Deborah Alatorre RN

## 2020-06-15 ENCOUNTER — TELEPHONE (OUTPATIENT)
Dept: CARDIOLOGY | Facility: CLINIC | Age: 60
End: 2020-06-15

## 2020-06-16 NOTE — TELEPHONE ENCOUNTER
Prior Authorization Approval    Medication: ticagrelor (BRILINTA) 90 MG tablet - APPROVED was approved on 6/16/2020  Effective: 5/17/2020 to 6/16/2021  Reference #: [id number]  Approved Dose/Quantity:   Insurance Company: [employer] - Phone [phone] Fax [phone]  Expected CoPay:    Pharmacy Filling the Rx: [facility] - [address]  Pharmacy Notified: No  Patient Notified: Comment:  Renewal only

## 2020-06-16 NOTE — TELEPHONE ENCOUNTER
PA Initiation    Medication: ticagrelor (BRILINTA) 90 MG tablet -   [facility] - Phone [phone] Fax [phone]  Pharmacy Filling the Rx: [facility] - [address]  Filling Pharmacy Phone: [phone]  Filling Pharmacy Fax: [phone]  Start Date: 6/16/2020

## 2020-07-07 ENCOUNTER — ANTICOAGULATION THERAPY VISIT (OUTPATIENT)
Dept: ANTICOAGULATION | Facility: OTHER | Age: 60
End: 2020-07-07

## 2020-07-07 DIAGNOSIS — I25.83 CORONARY ARTERY DISEASE DUE TO LIPID RICH PLAQUE: ICD-10-CM

## 2020-07-07 DIAGNOSIS — I48.0 PAF (PAROXYSMAL ATRIAL FIBRILLATION) (H): ICD-10-CM

## 2020-07-07 DIAGNOSIS — M32.14 SYSTEMIC LUPUS ERYTHEMATOSUS WITH GLOMERULAR DISEASE, UNSPECIFIED SLE TYPE (H): Chronic | ICD-10-CM

## 2020-07-07 DIAGNOSIS — I25.10 CORONARY ARTERY DISEASE DUE TO LIPID RICH PLAQUE: ICD-10-CM

## 2020-07-07 DIAGNOSIS — I25.810 CORONARY ARTERY DISEASE INVOLVING AUTOLOGOUS ARTERY CORONARY BYPASS GRAFT WITHOUT ANGINA PECTORIS: ICD-10-CM

## 2020-07-07 DIAGNOSIS — Z79.01 LONG TERM CURRENT USE OF ANTICOAGULANT THERAPY: ICD-10-CM

## 2020-07-07 LAB
ALBUMIN SERPL-MCNC: 2.9 G/DL (ref 3.4–5)
ALBUMIN SERPL-MCNC: 2.9 G/DL (ref 3.4–5)
ALBUMIN UR-MCNC: 30 MG/DL
ALP SERPL-CCNC: 108 U/L (ref 40–150)
ALT SERPL W P-5'-P-CCNC: 26 U/L (ref 0–50)
ANION GAP SERPL CALCULATED.3IONS-SCNC: 7 MMOL/L (ref 3–14)
ANION GAP SERPL CALCULATED.3IONS-SCNC: 8 MMOL/L (ref 3–14)
APPEARANCE UR: CLEAR
AST SERPL W P-5'-P-CCNC: 27 U/L (ref 0–45)
BILIRUB SERPL-MCNC: 0.4 MG/DL (ref 0.2–1.3)
BILIRUB UR QL STRIP: NEGATIVE
BUN SERPL-MCNC: 51 MG/DL (ref 7–30)
BUN SERPL-MCNC: 53 MG/DL (ref 7–30)
CALCIUM SERPL-MCNC: 8.6 MG/DL (ref 8.5–10.1)
CALCIUM SERPL-MCNC: 8.7 MG/DL (ref 8.5–10.1)
CHLORIDE SERPL-SCNC: 113 MMOL/L (ref 94–109)
CHLORIDE SERPL-SCNC: 113 MMOL/L (ref 94–109)
CO2 SERPL-SCNC: 18 MMOL/L (ref 20–32)
CO2 SERPL-SCNC: 19 MMOL/L (ref 20–32)
COLOR UR AUTO: YELLOW
CREAT SERPL-MCNC: 1.73 MG/DL (ref 0.52–1.04)
CREAT SERPL-MCNC: 1.73 MG/DL (ref 0.52–1.04)
CREAT UR-MCNC: 74 MG/DL
ERYTHROCYTE [DISTWIDTH] IN BLOOD BY AUTOMATED COUNT: 14.1 % (ref 10–15)
GFR SERPL CREATININE-BSD FRML MDRD: 32 ML/MIN/{1.73_M2}
GFR SERPL CREATININE-BSD FRML MDRD: 32 ML/MIN/{1.73_M2}
GLUCOSE SERPL-MCNC: 102 MG/DL (ref 70–99)
GLUCOSE SERPL-MCNC: 104 MG/DL (ref 70–99)
GLUCOSE UR STRIP-MCNC: NEGATIVE MG/DL
HCT VFR BLD AUTO: 31.1 % (ref 35–47)
HGB BLD-MCNC: 10.1 G/DL (ref 11.7–15.7)
HGB UR QL STRIP: ABNORMAL
INR BLD: 2.2 (ref 0.86–1.14)
KETONES UR STRIP-MCNC: NEGATIVE MG/DL
LEUKOCYTE ESTERASE UR QL STRIP: NEGATIVE
MCH RBC QN AUTO: 30.3 PG (ref 26.5–33)
MCHC RBC AUTO-ENTMCNC: 32.5 G/DL (ref 31.5–36.5)
MCV RBC AUTO: 93 FL (ref 78–100)
MICROALBUMIN UR-MCNC: 123 MG/L
MICROALBUMIN/CREAT UR: 166.89 MG/G CR (ref 0–25)
NITRATE UR QL: NEGATIVE
NON-SQ EPI CELLS #/AREA URNS LPF: NORMAL /LPF
PH UR STRIP: 6 PH (ref 5–7)
PHOSPHATE SERPL-MCNC: 3.8 MG/DL (ref 2.5–4.5)
PLATELET # BLD AUTO: 203 10E9/L (ref 150–450)
POTASSIUM SERPL-SCNC: 4.7 MMOL/L (ref 3.4–5.3)
POTASSIUM SERPL-SCNC: 4.7 MMOL/L (ref 3.4–5.3)
PROT SERPL-MCNC: 9.3 G/DL (ref 6.8–8.8)
PROT UR-MCNC: 0.65 G/L
PROT/CREAT 24H UR: 0.88 G/G CR (ref 0–0.2)
RBC # BLD AUTO: 3.33 10E12/L (ref 3.8–5.2)
RBC #/AREA URNS AUTO: NORMAL /HPF
SODIUM SERPL-SCNC: 139 MMOL/L (ref 133–144)
SODIUM SERPL-SCNC: 139 MMOL/L (ref 133–144)
SOURCE: ABNORMAL
SP GR UR STRIP: 1.02 (ref 1–1.03)
UROBILINOGEN UR STRIP-ACNC: 0.2 EU/DL (ref 0.2–1)
WBC # BLD AUTO: 7.1 10E9/L (ref 4–11)
WBC #/AREA URNS AUTO: NORMAL /HPF

## 2020-07-07 PROCEDURE — 85610 PROTHROMBIN TIME: CPT | Mod: QW | Performed by: INTERNAL MEDICINE

## 2020-07-07 PROCEDURE — 80069 RENAL FUNCTION PANEL: CPT | Performed by: INTERNAL MEDICINE

## 2020-07-07 PROCEDURE — 81001 URINALYSIS AUTO W/SCOPE: CPT | Performed by: INTERNAL MEDICINE

## 2020-07-07 PROCEDURE — 36415 COLL VENOUS BLD VENIPUNCTURE: CPT | Performed by: INTERNAL MEDICINE

## 2020-07-07 PROCEDURE — 84156 ASSAY OF PROTEIN URINE: CPT | Performed by: INTERNAL MEDICINE

## 2020-07-07 PROCEDURE — 99207 ZZC NO CHARGE NURSE ONLY: CPT | Performed by: INTERNAL MEDICINE

## 2020-07-07 PROCEDURE — 82043 UR ALBUMIN QUANTITATIVE: CPT | Performed by: INTERNAL MEDICINE

## 2020-07-07 PROCEDURE — 85027 COMPLETE CBC AUTOMATED: CPT | Performed by: INTERNAL MEDICINE

## 2020-07-07 PROCEDURE — 80053 COMPREHEN METABOLIC PANEL: CPT | Performed by: INTERNAL MEDICINE

## 2020-07-07 NOTE — PROGRESS NOTES
ANTICOAGULATION FOLLOW-UP CLINIC VISIT    Patient Name:  Luz Marina Live  Date:  2020  Contact Type:  Telephone    SUBJECTIVE:  Patient Findings     Comments:   The patient was assessed for diet, medication, and activity level changes, missed or extra doses, bruising or bleeding, with no problem findings.          Clinical Outcomes     Comments:   The patient was assessed for diet, medication, and activity level changes, missed or extra doses, bruising or bleeding, with no problem findings.             OBJECTIVE    Recent labs: (last 7 days)     20  1416   INR 2.2*       ASSESSMENT / PLAN  INR assessment THER    Recheck INR In: 4 WEEKS    INR Location Outside lab      Anticoagulation Summary  As of 2020    INR goal:   2.0-3.0   TTR:   52.2 % (10.2 mo)   INR used for dosin.2 (2020)   Warfarin maintenance plan:   5 mg (5 mg x 1) every Mon, Wed, Fri; 7.5 mg (5 mg x 1.5) all other days   Full warfarin instructions:   5 mg every Mon, Wed, Fri; 7.5 mg all other days   Weekly warfarin total:   45 mg   No change documented:   Deborah Alatorre RN   Plan last modified:   Cait Hawthorne RN (2019)   Next INR check:   2020   Target end date:   Indefinite    Indications    Long-term (current) use of anticoagulants [Z79.01] [Z79.01]  PAF (paroxysmal atrial fibrillation) (H) [I48.0]             Anticoagulation Episode Summary     INR check location:       Preferred lab:       Send INR reminders to:   [facility] [place] [facility]    Comments:   5 mg tabs, [facility] (needs staff message) PM dose      Anticoagulation Care Providers     Provider Role Specialty Phone number    Eliz Nguyen MD Referring Internal Medicine [phone]            See the Encounter Report to view Anticoagulation Flowsheet and Dosing Calendar (Go to Encounters tab in chart review, and find the Anticoagulation Therapy Visit)    Dosage adjustment made based on physician directed care plan.    Deborah Alatorre RN

## 2020-07-17 ENCOUNTER — TELEPHONE (OUTPATIENT)
Dept: NEPHROLOGY | Facility: CLINIC | Age: 60
End: 2020-07-17

## 2020-07-17 NOTE — TELEPHONE ENCOUNTER
Result Note Message received from Dr. Clark:    Your kidney function is slightly elevated on this most recent check. Given the protein in the urine and history of lupus in the kidney, I recommend a follow-up appt at this time to discuss.     I do have an opening at 430 this Monday if you are available. We can do a virtual visit given we are discussing the lab tests. Please let us know ASAP if this works for you.   Sincerely,   Lavern Clark, DO     Called and spoke with pt.  Read back Dr. Clark's Result Note above with recommendations.  Pt agreed with plan.  Video Visit scheduled.  Pt will access via [email or web address].    Jessica Calhoun LPN

## 2020-07-20 ENCOUNTER — OFFICE VISIT (OUTPATIENT)
Dept: OPHTHALMOLOGY | Facility: CLINIC | Age: 60
End: 2020-07-20
Payer: COMMERCIAL

## 2020-07-20 ENCOUNTER — VIRTUAL VISIT (OUTPATIENT)
Dept: NEPHROLOGY | Facility: CLINIC | Age: 60
End: 2020-07-20
Payer: COMMERCIAL

## 2020-07-20 VITALS — WEIGHT: 251 LBS | BODY MASS INDEX: 44.46 KG/M2

## 2020-07-20 DIAGNOSIS — M32.14 SYSTEMIC LUPUS ERYTHEMATOSUS WITH GLOMERULAR DISEASE, UNSPECIFIED SLE TYPE (H): ICD-10-CM

## 2020-07-20 DIAGNOSIS — N18.30 CKD (CHRONIC KIDNEY DISEASE) STAGE 3, GFR 30-59 ML/MIN (H): Primary | ICD-10-CM

## 2020-07-20 DIAGNOSIS — Z79.899 LONG-TERM USE OF PLAQUENIL: Primary | ICD-10-CM

## 2020-07-20 PROCEDURE — 92014 COMPRE OPH EXAM EST PT 1/>: CPT | Performed by: OPHTHALMOLOGY

## 2020-07-20 PROCEDURE — 92083 EXTENDED VISUAL FIELD XM: CPT | Performed by: OPHTHALMOLOGY

## 2020-07-20 PROCEDURE — 92134 CPTRZ OPH DX IMG PST SGM RTA: CPT | Performed by: OPHTHALMOLOGY

## 2020-07-20 PROCEDURE — 92015 DETERMINE REFRACTIVE STATE: CPT | Performed by: OPHTHALMOLOGY

## 2020-07-20 PROCEDURE — 99213 OFFICE O/P EST LOW 20 MIN: CPT | Mod: TEL | Performed by: INTERNAL MEDICINE

## 2020-07-20 ASSESSMENT — REFRACTION_MANIFEST
OS_SPHERE: +1.75
OD_ADD: +2.50
OD_AXIS: 080
OD_SPHERE: +1.50
OS_ADD: +2.50
OS_AXIS: 120
OS_CYLINDER: +1.00
OD_CYLINDER: +1.75

## 2020-07-20 ASSESSMENT — TONOMETRY
IOP_METHOD: ICARE
OS_IOP_MMHG: 12
OD_IOP_MMHG: 13

## 2020-07-20 ASSESSMENT — REFRACTION_WEARINGRX
OS_AXIS: 129
OS_ADD: +2.50
OD_ADD: +2.50
OD_SPHERE: +1.50
OD_AXIS: 075
SPECS_TYPE: PAL
OS_SPHERE: +1.75
OS_CYLINDER: +1.25
OD_CYLINDER: +1.75

## 2020-07-20 ASSESSMENT — EXTERNAL EXAM - LEFT EYE: OS_EXAM: NORMAL

## 2020-07-20 ASSESSMENT — SLIT LAMP EXAM - LIDS
COMMENTS: NORMAL
COMMENTS: NORMAL

## 2020-07-20 ASSESSMENT — CUP TO DISC RATIO
OD_RATIO: 0.3
OS_RATIO: 0.3

## 2020-07-20 ASSESSMENT — VISUAL ACUITY
OD_CC+: -1
METHOD: SNELLEN - LINEAR
CORRECTION_TYPE: GLASSES
OS_CC: 20/20
OD_CC: 20/20

## 2020-07-20 ASSESSMENT — EXTERNAL EXAM - RIGHT EYE: OD_EXAM: NORMAL

## 2020-07-20 ASSESSMENT — CONF VISUAL FIELD
OS_NORMAL: 1
OD_NORMAL: 1
METHOD: COUNTING FINGERS

## 2020-07-20 ASSESSMENT — PAIN SCALES - GENERAL: PAINLEVEL: NO PAIN (0)

## 2020-07-20 NOTE — PROGRESS NOTES
7/20/20  CC: CKD    HPI: Luz Marina Live is a 59 year old female who presents for follow-up of CKD. Ms. Live' hx is significant for SLE for which she follows with Dr. Adame. Her SLE has included renal involvement in the past - 1990s and included treatment with cytoxan, prednisone and later imuran. She has had intermittent low grade proteinuria since. Additional hx includes CABG in August 2009, Afib, and hypertension (dx around 20 years ago). Additionally she has had a few RANDELL episodes and NSAID related injury.    Creatinine has been 0.95-1.35 for the past year; 1.13 at this time. UPCR was 0.67 g/g in Nov. Which is up from 0.3-0.5 g/g previously. She underwent 2 cardiac stent placement in October - on brilinta and coumadin.   Her Creatinine is now up slightly at 1.73 (up from 1.1-1.4 previously). No hematuria. She reports seasonal sinus issues - on claritin. She denies NSAIDs. She feels dehydration is a possibility. BP controlled at 120/78 on last check in March.      Allergies   Allergen Reactions     Seasonal Allergies      Sulfa Drugs Rash and GI Disturbance       atorvastatin (LIPITOR) 40 MG tablet, TAKE 1 TABLET BY MOUTH EVERY DAY  hydroxychloroquine (PLAQUENIL) 200 MG tablet, Take 2 tablets (400 mg) by mouth daily  metoprolol tartrate (LOPRESSOR) 50 MG tablet, Take 2 tablets (100 mg) by mouth 2 times daily  metroNIDAZOLE (METROCREAM) 0.75 % external cream, Apply topically 2 times daily  MULTIPLE VITAMIN PO, Take 1 tablet by mouth daily   Omega-3 Fatty Acids (FISH OIL) 1200 MG capsule, Take 2 capsules by mouth 2 times daily   order for DME, [employer] 10-16 cmH20  ticagrelor (BRILINTA) 90 MG tablet, Take 1 tablet (90 mg) by mouth every 12 hours  warfarin ANTICOAGULANT (COUMADIN) 5 MG tablet, TAKE 1 TAB (5 MG) BY MOUTH ON FRI & 2 TABS (10 MG) ALL OTHER DAYS, OR AS DIRECTED  Calcium Carbonate-Vitamin D (CALCIUM 600-D PO), Take  by mouth 2 times daily.    No current facility-administered medications on file prior to  visit.       Past Medical History:   Diagnosis Date     Hypertension      Hypovolemic shock (H) 2/28/2015     Sepsis (H) 8/22/2015       Past Surgical History:   Procedure Laterality Date     CARDIAC SURGERY  08/27/2009    triple vessel coronary artery bypass grafting on 8/27/09     CARPAL TUNNEL RELEASE RT/LT  1996    bilateral     CV CORONARY ANGIOGRAM  10/17/2019    Procedure: CV CORONARY ANGIOGRAM;  Surgeon: Mahendra Collins MD;  [facility]     CV PCI STENT DRUG ELUTING N/A 10/17/2019    Procedure: PCI Stent Drug Eluting;  Surgeon: Mahendra Collins MD;  [facility]       Social History     Tobacco Use     Smoking status: Former Smoker     Smokeless tobacco: Never Used     Tobacco comment: 30 plus years ago.   Substance Use Topics     Alcohol use: No     Drug use: No       Family History   Problem Relation Age of Onset     Arthritis Mother         ra     Connective Tissue Disorder Mother         lupus     Congenital Anomalies Mother         wholein heart     Cerebrovascular Disease Mother         TIA's     Cerebrovascular Disease Father      Diabetes Father      Hypertension Father      Cardiovascular Father         stents     Genitourinary Problems Father         kidney failure     Kidney Disease Father         kidney injury related to acute illness around time of death (RANDELL likely)     Cataracts Father      Arthritis Paternal Grandmother      Diabetes Brother      Glaucoma No family hx of      Macular Degeneration No family hx of        ROS: A 4 system review of systems was negative other than noted here or above.     Exam:  Wt 113.9 kg (251 lb)   BMI 44.46 kg/m      Telephone visit - no exam.    Results:   No visits with results within 1 Day(s) from this visit.   Latest known visit with results is:   Orders Only on 07/07/2020   Component Date Value Ref Range Status     Sodium 07/07/2020 139  133 - 144 mmol/L Final     Potassium  07/07/2020 4.7  3.4 - 5.3 mmol/L Final     Chloride 07/07/2020 113* 94 - 109 mmol/L Final     Carbon Dioxide 07/07/2020 19* 20 - 32 mmol/L Final     Anion Gap 07/07/2020 7  3 - 14 mmol/L Final     Glucose 07/07/2020 102* 70 - 99 mg/dL Final     Urea Nitrogen 07/07/2020 51* 7 - 30 mg/dL Final     Creatinine 07/07/2020 1.73* 0.52 - 1.04 mg/dL Final     GFR Estimate 07/07/2020 32* >60 mL/min/[1.73_m2] Final    Comment: Non  GFR Calc  Starting 12/18/2018, serum creatinine based estimated GFR (eGFR) will be   calculated using the Chronic Kidney Disease Epidemiology Collaboration   (CKD-EPI) equation.       GFR Estimate If Black 07/07/2020 37* >60 mL/min/[1.73_m2] Final    Comment:  GFR Calc  Starting 12/18/2018, serum creatinine based estimated GFR (eGFR) will be   calculated using the Chronic Kidney Disease Epidemiology Collaboration   (CKD-EPI) equation.       Calcium 07/07/2020 8.6  8.5 - 10.1 mg/dL Final     Bilirubin Total 07/07/2020 0.4  0.2 - 1.3 mg/dL Final     Albumin 07/07/2020 2.9* 3.4 - 5.0 g/dL Final     Protein Total 07/07/2020 9.3* 6.8 - 8.8 g/dL Final     Alkaline Phosphatase 07/07/2020 108  40 - 150 U/L Final     ALT 07/07/2020 26  0 - 50 U/L Final     AST 07/07/2020 27  0 - 45 U/L Final     WBC 07/07/2020 7.1  4.0 - 11.0 10e9/L Final     RBC Count 07/07/2020 3.33* 3.8 - 5.2 10e12/L Final     Hemoglobin 07/07/2020 10.1* 11.7 - 15.7 g/dL Final     Hematocrit 07/07/2020 31.1* 35.0 - 47.0 % Final     MCV 07/07/2020 93  78 - 100 fl Final     MCH 07/07/2020 30.3  26.5 - 33.0 pg Final     MCHC 07/07/2020 32.5  31.5 - 36.5 g/dL Final     RDW 07/07/2020 14.1  10.0 - 15.0 % Final     Platelet Count 07/07/2020 203  150 - 450 10e9/L Final     INR Point of Care 07/07/2020 2.2* 0.86 - 1.14 Final    Comment: This test is intended for monitoring Coumadin therapy.  Results are not   accurate in patients with prolonged INR due to factor deficiency.       Creatinine Urine 07/07/2020 74   mg/dL Final     Albumin Urine mg/L 07/07/2020 123  mg/L Final     Albumin Urine mg/g Cr 07/07/2020 166.89* 0 - 25 mg/g Cr Final     Protein Random Urine 07/07/2020 0.65  g/L Final     Protein Total Urine g/gr Creatinine 07/07/2020 0.88* 0 - 0.2 g/g Cr Final     Sodium 07/07/2020 139  133 - 144 mmol/L Final     Potassium 07/07/2020 4.7  3.4 - 5.3 mmol/L Final     Chloride 07/07/2020 113* 94 - 109 mmol/L Final     Carbon Dioxide 07/07/2020 18* 20 - 32 mmol/L Final     Anion Gap 07/07/2020 8  3 - 14 mmol/L Final     Glucose 07/07/2020 104* 70 - 99 mg/dL Final     Urea Nitrogen 07/07/2020 53* 7 - 30 mg/dL Final     Creatinine 07/07/2020 1.73* 0.52 - 1.04 mg/dL Final     GFR Estimate 07/07/2020 32* >60 mL/min/[1.73_m2] Final    Comment: Non  GFR Calc  Starting 12/18/2018, serum creatinine based estimated GFR (eGFR) will be   calculated using the Chronic Kidney Disease Epidemiology Collaboration   (CKD-EPI) equation.       GFR Estimate If Black 07/07/2020 37* >60 mL/min/[1.73_m2] Final    Comment:  GFR Calc  Starting 12/18/2018, serum creatinine based estimated GFR (eGFR) will be   calculated using the Chronic Kidney Disease Epidemiology Collaboration   (CKD-EPI) equation.       Calcium 07/07/2020 8.7  8.5 - 10.1 mg/dL Final     Phosphorus 07/07/2020 3.8  2.5 - 4.5 mg/dL Final     Albumin 07/07/2020 2.9* 3.4 - 5.0 g/dL Final     Color Urine 07/07/2020 Yellow   Final     Appearance Urine 07/07/2020 Clear   Final     Glucose Urine 07/07/2020 Negative  NEG^Negative mg/dL Final     Bilirubin Urine 07/07/2020 Negative  NEG^Negative Final     Ketones Urine 07/07/2020 Negative  NEG^Negative mg/dL Final     Specific Gravity Urine 07/07/2020 1.020  1.003 - 1.035 Final     Blood Urine 07/07/2020 Trace* NEG^Negative Final     pH Urine 07/07/2020 6.0  5.0 - 7.0 pH Final     Protein Albumin Urine 07/07/2020 30* NEG^Negative mg/dL Final     Urobilinogen Urine 07/07/2020 0.2  0.2 - 1.0 EU/dL Final      "Nitrite Urine 07/07/2020 Negative  NEG^Negative Final     Leukocyte Esterase Urine 07/07/2020 Negative  NEG^Negative Final     Source 07/07/2020 Unspecified Urine   Final     WBC Urine 07/07/2020 0 - 5  OTO5^0 - 5 /HPF Final     RBC Urine 07/07/2020 O - 2  OTO2^O - 2 /HPF Final     Squamous Epithelial /LPF Urine 07/07/2020 Few  FEW^Few /LPF Final        Assessment/Plan:   1. CKD stage 3: risk factors for CKD include previous lupus nephritis, hypertension, as well as acute kidney injuries.  She is on lisinopril 40 mg daily. At this time, we are not able to do a kidney biopsy given the inability to pause blood thinner treatment.    She has proteinuria but no hematuria. With rising creatinine I do question whether biopsy should be pursued - will discuss with cardiology as to when her plavix could be held.     2. Hypertension: at goal of <130/80    3. Lupus: on plaquenil    4. BMD: calcium is at the  Upper limit of normal (corrected calcium 10.1). Want to avoid high calcium levels as can lead to afferent vasoconstriction.           DO Luz Marina Genao Calos is a 60 year old female who is being evaluated via a billable video visit.      The patient has been notified of following:     \"This video visit will be conducted via a call between you and your physician/provider. We have found that certain health care needs can be provided without the need for an in-person physical exam.  This service lets us provide the care you need with a video conversation.  If a prescription is necessary we can send it directly to your pharmacy.  If lab work is needed we can place an order for that and you can then stop by our lab to have the test done at a later time.    Video visits are billed at different rates depending on your insurance coverage.  Please reach out to your insurance provider with any questions.    If during the course of the call the physician/provider feels a video visit is not appropriate, you will " "not be charged for this service.\"    Patient has given verbal consent for Video visit? Yes  How would you like to obtain your AVS? [place]  If you are dropped from the video visit, the video invite should be resent to:  [employer] [employer]  Will anyone else be joining your video visit? Anamika Calhoun LPN      Luz Marina Live is a 60 year old female who is being evaluated via a billable telephone visit.      The patient has been notified of following:     \"This telephone visit will be conducted via a call between you and your physician/provider. We have found that certain health care needs can be provided without the need for a physical exam.  This service lets us provide the care you need with a short phone conversation.  If a prescription is necessary we can send it directly to your pharmacy.  If lab work is needed we can place an order for that and you can then stop by our lab to have the test done at a later time.    Telephone visits are billed at different rates depending on your insurance coverage. During this emergency period, for some insurers they may be billed the same as an in-person visit.  Please reach out to your insurance provider with any questions.    If during the course of the call the physician/provider feels a telephone visit is not appropriate, you will not be charged for this service.\"    Patient has given verbal consent for Telephone visit?  Yes        Phone call duration: 17 minutes    Lavern Clark MD      "

## 2020-07-20 NOTE — NURSING NOTE
Chief Complaints and History of Present Illnesses   Patient presents with     Annual Eye Exam     Monitor Current High Risk Meds       Chief Complaint(s) and History of Present Illness(es)     Annual Eye Exam               Monitor Current High Risk Meds               Comments     Patient is currently taking 400mg Plaquenil daily.     Pt had problems with a headache on left side of head and double vision in February. Went to a doctor at the . Had CT done 2/7/2020. Dx with Sixth Nerve Palsy. Had her do patching. Got better but then starting happening on right side during the beginning of COVID-19 so she patched again.    After those incidents vision went back to normal. Patient has no concerns with vision today.    Ocular meds: artificial tears when she remembers.                Melanie Jeans, OA

## 2020-08-04 DIAGNOSIS — N18.30 CKD (CHRONIC KIDNEY DISEASE) STAGE 3, GFR 30-59 ML/MIN (H): ICD-10-CM

## 2020-08-04 DIAGNOSIS — M32.14 SYSTEMIC LUPUS ERYTHEMATOSUS WITH GLOMERULAR DISEASE, UNSPECIFIED SLE TYPE (H): ICD-10-CM

## 2020-08-04 LAB
ALBUMIN SERPL-MCNC: 3 G/DL (ref 3.4–5)
ALBUMIN UR-MCNC: 30 MG/DL
ANION GAP SERPL CALCULATED.3IONS-SCNC: 4 MMOL/L (ref 3–14)
APPEARANCE UR: CLEAR
BILIRUB UR QL STRIP: NEGATIVE
BUN SERPL-MCNC: 48 MG/DL (ref 7–30)
CALCIUM SERPL-MCNC: 9.3 MG/DL (ref 8.5–10.1)
CHLORIDE SERPL-SCNC: 111 MMOL/L (ref 94–109)
CO2 SERPL-SCNC: 22 MMOL/L (ref 20–32)
COLOR UR AUTO: ABNORMAL
CREAT SERPL-MCNC: 1.87 MG/DL (ref 0.52–1.04)
CREAT UR-MCNC: 83 MG/DL
GFR SERPL CREATININE-BSD FRML MDRD: 29 ML/MIN/{1.73_M2}
GLUCOSE SERPL-MCNC: 89 MG/DL (ref 70–99)
GLUCOSE UR STRIP-MCNC: NEGATIVE MG/DL
HGB UR QL STRIP: ABNORMAL
HYALINE CASTS #/AREA URNS LPF: ABNORMAL /LPF
KETONES UR STRIP-MCNC: NEGATIVE MG/DL
LEUKOCYTE ESTERASE UR QL STRIP: NEGATIVE
NITRATE UR QL: NEGATIVE
NON-SQ EPI CELLS #/AREA URNS LPF: ABNORMAL /LPF
PH UR STRIP: 6 PH (ref 5–7)
PHOSPHATE SERPL-MCNC: 4 MG/DL (ref 2.5–4.5)
POTASSIUM SERPL-SCNC: 5.2 MMOL/L (ref 3.4–5.3)
PROT UR-MCNC: 0.82 G/L
PROT/CREAT 24H UR: 0.99 G/G CR (ref 0–0.2)
RBC #/AREA URNS AUTO: ABNORMAL /HPF
SODIUM SERPL-SCNC: 137 MMOL/L (ref 133–144)
SOURCE: ABNORMAL
SP GR UR STRIP: 1.01 (ref 1–1.03)
UROBILINOGEN UR STRIP-MCNC: NORMAL MG/DL (ref 0–2)
WBC #/AREA URNS AUTO: ABNORMAL /HPF

## 2020-08-04 PROCEDURE — 84156 ASSAY OF PROTEIN URINE: CPT | Performed by: INTERNAL MEDICINE

## 2020-08-04 PROCEDURE — 80069 RENAL FUNCTION PANEL: CPT | Performed by: INTERNAL MEDICINE

## 2020-08-04 PROCEDURE — 86160 COMPLEMENT ANTIGEN: CPT | Performed by: INTERNAL MEDICINE

## 2020-08-04 PROCEDURE — 81001 URINALYSIS AUTO W/SCOPE: CPT | Performed by: INTERNAL MEDICINE

## 2020-08-04 PROCEDURE — 86225 DNA ANTIBODY NATIVE: CPT | Performed by: INTERNAL MEDICINE

## 2020-08-04 PROCEDURE — 36415 COLL VENOUS BLD VENIPUNCTURE: CPT | Performed by: INTERNAL MEDICINE

## 2020-08-05 LAB
C3 SERPL-MCNC: 75 MG/DL (ref 81–157)
C4 SERPL-MCNC: 3 MG/DL (ref 13–39)
DSDNA AB SER-ACNC: 108 IU/ML

## 2020-08-08 DIAGNOSIS — I10 BENIGN ESSENTIAL HYPERTENSION: ICD-10-CM

## 2020-08-13 DIAGNOSIS — I10 BENIGN ESSENTIAL HYPERTENSION: ICD-10-CM

## 2020-08-13 RX ORDER — LISINOPRIL 40 MG/1
40 TABLET ORAL DAILY
Qty: 90 TABLET | Refills: 3 | Status: SHIPPED | OUTPATIENT
Start: 2020-08-13 | End: 2021-07-29

## 2020-08-13 NOTE — TELEPHONE ENCOUNTER
Received refill request for lisinopril.  Recent clinic visits. Last TSH February 2020, WNL. Refill sent.

## 2020-08-14 RX ORDER — LISINOPRIL 40 MG/1
TABLET ORAL
Qty: 90 TABLET | Refills: 3 | OUTPATIENT
Start: 2020-08-14

## 2020-09-10 NOTE — PATIENT INSTRUCTIONS
You were seen today in the Cardiovascular Clinic at the [facility].   Cardiology Providers you saw during your visit:    Dr. Twin Moreno  Recommendations:   **Labs Today  Lisinopril 10 mg Daily  Follow-up:   Labs in 2 weeks   Sleep Study with Dr. Rangel  Follow up with Us in 3 months    For emergencies call 911.     If you have any questions regarding your visit please contact your care team:     Rima Sanchez RN  [facility]  Cardiology Care Coordinator-Heart Failure    Appointment scheduling or nurse questions: [phone]    On Call Cardiologist for after hours or on weekends: [phone]    option #4    If you need a medication refill please contact your pharmacy.  Please allow 3 business days for your refill to be completed.    As always, thank you for trusting us with your health care needs!   Alert and oriented, no focal deficits, no motor or sensory deficits.

## 2020-09-16 ENCOUNTER — TELEPHONE (OUTPATIENT)
Dept: NEPHROLOGY | Facility: CLINIC | Age: 60
End: 2020-09-16

## 2020-09-16 ENCOUNTER — MYC MEDICAL ADVICE (OUTPATIENT)
Dept: NEPHROLOGY | Facility: CLINIC | Age: 60
End: 2020-09-16

## 2020-09-16 DIAGNOSIS — M32.14 SYSTEMIC LUPUS ERYTHEMATOSUS WITH GLOMERULAR DISEASE, UNSPECIFIED SLE TYPE (H): ICD-10-CM

## 2020-09-16 DIAGNOSIS — D64.9 ANEMIA, UNSPECIFIED TYPE: ICD-10-CM

## 2020-09-16 DIAGNOSIS — N18.30 CKD (CHRONIC KIDNEY DISEASE) STAGE 3, GFR 30-59 ML/MIN (H): Primary | ICD-10-CM

## 2020-09-16 NOTE — TELEPHONE ENCOUNTER
Dr. Moreno responded to Dr. Clark:    Best case stop Brillenta x 5 days and keep on ASA   If necessary stop both x 5-7 days and restart within 24 -36 hours post renal biopsy.  While the incidence of stent thrombosis or progress in [place] is small, it can happen.  However, lupus nephritis is not a good disease.  Twin     Routing to Dr. Clark to advise on next steps. Med list reports that patient is on Brillenta and Warfarin.     Tasha Granados, RN, BSN  Nephrology Care Coordinator  [facility]

## 2020-09-22 ENCOUNTER — ANTICOAGULATION THERAPY VISIT (OUTPATIENT)
Dept: ANTICOAGULATION | Facility: OTHER | Age: 60
End: 2020-09-22
Payer: COMMERCIAL

## 2020-09-22 DIAGNOSIS — N18.30 CKD (CHRONIC KIDNEY DISEASE) STAGE 3, GFR 30-59 ML/MIN (H): ICD-10-CM

## 2020-09-22 DIAGNOSIS — Z79.01 LONG TERM CURRENT USE OF ANTICOAGULANT THERAPY: ICD-10-CM

## 2020-09-22 DIAGNOSIS — M32.14 SYSTEMIC LUPUS ERYTHEMATOSUS WITH GLOMERULAR DISEASE, UNSPECIFIED SLE TYPE (H): ICD-10-CM

## 2020-09-22 DIAGNOSIS — I48.0 PAF (PAROXYSMAL ATRIAL FIBRILLATION) (H): ICD-10-CM

## 2020-09-22 DIAGNOSIS — D64.9 ANEMIA, UNSPECIFIED TYPE: ICD-10-CM

## 2020-09-22 LAB
ALBUMIN SERPL-MCNC: 3.1 G/DL (ref 3.4–5)
ALBUMIN UR-MCNC: 10 MG/DL
ANION GAP SERPL CALCULATED.3IONS-SCNC: 5 MMOL/L (ref 3–14)
APPEARANCE UR: CLEAR
BACTERIA #/AREA URNS HPF: ABNORMAL /HPF
BILIRUB UR QL STRIP: NEGATIVE
BUN SERPL-MCNC: 49 MG/DL (ref 7–30)
CALCIUM SERPL-MCNC: 8.7 MG/DL (ref 8.5–10.1)
CHLORIDE SERPL-SCNC: 110 MMOL/L (ref 94–109)
CO2 SERPL-SCNC: 22 MMOL/L (ref 20–32)
COLOR UR AUTO: ABNORMAL
CREAT SERPL-MCNC: 1.97 MG/DL (ref 0.52–1.04)
CREAT UR-MCNC: 80 MG/DL
ERYTHROCYTE [DISTWIDTH] IN BLOOD BY AUTOMATED COUNT: 13.9 % (ref 10–15)
FERRITIN SERPL-MCNC: 212 NG/ML (ref 8–252)
GFR SERPL CREATININE-BSD FRML MDRD: 27 ML/MIN/{1.73_M2}
GLUCOSE SERPL-MCNC: 85 MG/DL (ref 70–99)
GLUCOSE UR STRIP-MCNC: NEGATIVE MG/DL
HCT VFR BLD AUTO: 31.3 % (ref 35–47)
HGB BLD-MCNC: 10 G/DL (ref 11.7–15.7)
HGB UR QL STRIP: ABNORMAL
HYALINE CASTS #/AREA URNS LPF: ABNORMAL /LPF
INR PPP: 1.65 (ref 0.86–1.14)
IRON SATN MFR SERPL: 20 % (ref 15–46)
IRON SERPL-MCNC: 45 UG/DL (ref 35–180)
KETONES UR STRIP-MCNC: NEGATIVE MG/DL
LEUKOCYTE ESTERASE UR QL STRIP: NEGATIVE
MCH RBC QN AUTO: 30.1 PG (ref 26.5–33)
MCHC RBC AUTO-ENTMCNC: 31.9 G/DL (ref 31.5–36.5)
MCV RBC AUTO: 94 FL (ref 78–100)
MICROALBUMIN UR-MCNC: 91 MG/L
MICROALBUMIN/CREAT UR: 113.66 MG/G CR (ref 0–25)
NITRATE UR QL: NEGATIVE
NON-SQ EPI CELLS #/AREA URNS LPF: ABNORMAL /LPF
PH UR STRIP: 6.5 PH (ref 5–7)
PHOSPHATE SERPL-MCNC: 4.5 MG/DL (ref 2.5–4.5)
PLATELET # BLD AUTO: 190 10E9/L (ref 150–450)
POTASSIUM SERPL-SCNC: 4.7 MMOL/L (ref 3.4–5.3)
PROT UR-MCNC: 0.49 G/L
PROT/CREAT 24H UR: 0.6 G/G CR (ref 0–0.2)
PTH-INTACT SERPL-MCNC: 63 PG/ML (ref 18–80)
RBC # BLD AUTO: 3.32 10E12/L (ref 3.8–5.2)
RBC #/AREA URNS AUTO: ABNORMAL /HPF
SODIUM SERPL-SCNC: 137 MMOL/L (ref 133–144)
SOURCE: ABNORMAL
SP GR UR STRIP: 1.01 (ref 1–1.03)
TIBC SERPL-MCNC: 221 UG/DL (ref 240–430)
UROBILINOGEN UR STRIP-MCNC: NORMAL MG/DL (ref 0–2)
WBC # BLD AUTO: 6.3 10E9/L (ref 4–11)
WBC #/AREA URNS AUTO: ABNORMAL /HPF

## 2020-09-22 PROCEDURE — 85610 PROTHROMBIN TIME: CPT | Performed by: INTERNAL MEDICINE

## 2020-09-22 PROCEDURE — 86160 COMPLEMENT ANTIGEN: CPT | Performed by: INTERNAL MEDICINE

## 2020-09-22 PROCEDURE — 85027 COMPLETE CBC AUTOMATED: CPT | Performed by: INTERNAL MEDICINE

## 2020-09-22 PROCEDURE — 36415 COLL VENOUS BLD VENIPUNCTURE: CPT | Performed by: INTERNAL MEDICINE

## 2020-09-22 PROCEDURE — 80069 RENAL FUNCTION PANEL: CPT | Performed by: INTERNAL MEDICINE

## 2020-09-22 PROCEDURE — 82043 UR ALBUMIN QUANTITATIVE: CPT | Performed by: INTERNAL MEDICINE

## 2020-09-22 PROCEDURE — 81001 URINALYSIS AUTO W/SCOPE: CPT | Performed by: INTERNAL MEDICINE

## 2020-09-22 PROCEDURE — 83540 ASSAY OF IRON: CPT | Performed by: INTERNAL MEDICINE

## 2020-09-22 PROCEDURE — 83550 IRON BINDING TEST: CPT | Performed by: INTERNAL MEDICINE

## 2020-09-22 PROCEDURE — 83970 ASSAY OF PARATHORMONE: CPT | Performed by: INTERNAL MEDICINE

## 2020-09-22 PROCEDURE — 86225 DNA ANTIBODY NATIVE: CPT | Performed by: INTERNAL MEDICINE

## 2020-09-22 PROCEDURE — 99207 ZZC NO CHARGE NURSE ONLY: CPT | Performed by: INTERNAL MEDICINE

## 2020-09-22 PROCEDURE — 82728 ASSAY OF FERRITIN: CPT | Performed by: INTERNAL MEDICINE

## 2020-09-22 PROCEDURE — 84156 ASSAY OF PROTEIN URINE: CPT | Performed by: INTERNAL MEDICINE

## 2020-09-23 LAB
C3 SERPL-MCNC: 75 MG/DL (ref 81–157)
C4 SERPL-MCNC: 3 MG/DL (ref 13–39)
DSDNA AB SER-ACNC: 85 IU/ML

## 2020-09-30 NOTE — PROGRESS NOTES
Assessment & Plan     Luz Marina Live is a 57 year old female who presents with:   Review of systems for the eyes was negative other than the pertinent positives and negatives noted in the HPI.     High risk medication use  - SD-OCT Macula Optovue OU (both eyes)  - HVF 10-2 OU  - Without retinopathy     Hyperopia with astigmatism and presbyopia, bilateral  - REFRACTION        Return in 12 months for annual exam.    Attending Physician Attestation:  Complete documentation of historical and exam elements from today's encounter can be found in the full encounter summary report (not reduplicated in this progress note).  I personally obtained the chief complaint(s) and history of present illness.  I confirmed and edited as necessary the review of systems, past medical/surgical history, family history, social history, and examination findings as documented by others; and I examined the patient myself.  I personally reviewed the relevant tests, images, and reports as documented above.  I formulated and edited as necessary the assessment and plan and discussed the findings and management plan with the patient and family. - Baljeet Davenport MD

## 2020-10-12 ENCOUNTER — ANTICOAGULATION THERAPY VISIT (OUTPATIENT)
Dept: ANTICOAGULATION | Facility: OTHER | Age: 60
End: 2020-10-12
Payer: COMMERCIAL

## 2020-10-12 ENCOUNTER — ALLIED HEALTH/NURSE VISIT (OUTPATIENT)
Dept: FAMILY MEDICINE | Facility: CLINIC | Age: 60
End: 2020-10-12
Payer: COMMERCIAL

## 2020-10-12 DIAGNOSIS — Z79.01 LONG TERM CURRENT USE OF ANTICOAGULANT THERAPY: ICD-10-CM

## 2020-10-12 DIAGNOSIS — I48.0 PAF (PAROXYSMAL ATRIAL FIBRILLATION) (H): ICD-10-CM

## 2020-10-12 DIAGNOSIS — Z23 NEED FOR PROPHYLACTIC VACCINATION AND INOCULATION AGAINST INFLUENZA: Primary | ICD-10-CM

## 2020-10-12 LAB — INR BLD: 1.6 (ref 0.86–1.14)

## 2020-10-12 PROCEDURE — 90471 IMMUNIZATION ADMIN: CPT

## 2020-10-12 PROCEDURE — 36416 COLLJ CAPILLARY BLOOD SPEC: CPT | Performed by: INTERNAL MEDICINE

## 2020-10-12 PROCEDURE — 90682 RIV4 VACC RECOMBINANT DNA IM: CPT

## 2020-10-12 PROCEDURE — 99207 PR NO CHARGE NURSE ONLY: CPT

## 2020-10-12 PROCEDURE — 99207 PR NO CHARGE NURSE ONLY: CPT | Performed by: INTERNAL MEDICINE

## 2020-10-12 PROCEDURE — 85610 PROTHROMBIN TIME: CPT | Performed by: INTERNAL MEDICINE

## 2020-10-12 NOTE — PROGRESS NOTES
ANTICOAGULATION FOLLOW-UP CLINIC VISIT    Patient Name:  Luz Marina Live  Date:  10/12/2020  Contact Type:  Telephone    SUBJECTIVE:  Patient Findings     Comments:  unable to identify a reason for subtherapeutic INR.          Clinical Outcomes     Comments:  unable to identify a reason for subtherapeutic INR.             OBJECTIVE    Recent labs: (last 7 days)     10/12/20  1439   INR 1.6*       ASSESSMENT / PLAN  INR assessment SUB    Recheck INR In: 2 WEEKS    INR Location Outside lab      Anticoagulation Summary  As of 10/12/2020    INR goal:  2.0-3.0   TTR:  50.6 % (11 mo)   INR used for dosin.6 (10/12/2020)   Warfarin maintenance plan:  5 mg (5 mg x 1) every Wed, Fri; 7.5 mg (5 mg x 1.5) all other days   Full warfarin instructions:  5 mg every Wed, Fri; 7.5 mg all other days   Weekly warfarin total:  47.5 mg   Plan last modified:  Deborah Alatorre RN (10/12/2020)   Next INR check:  10/26/2020   Target end date:  Indefinite    Indications    Long-term (current) use of anticoagulants [Z79.01] [Z79.01]  PAF (paroxysmal atrial fibrillation) (H) [I48.0]             Anticoagulation Episode Summary     INR check location:      Preferred lab:      Send INR reminders to:  [facility]    Comments:  5 mg tabs, [facility] (needs staff message) PM dose      Anticoagulation Care Providers     Provider Role Specialty Phone number    Eliz Nguyen MD Referring Internal Medicine [phone]            See the Encounter Report to view Anticoagulation Flowsheet and Dosing Calendar (Go to Encounters tab in chart review, and find the Anticoagulation Therapy Visit)    Dosage adjustment made based on physician directed care plan.    Deborah Alatorre RN

## 2020-10-13 ENCOUNTER — VIRTUAL VISIT (OUTPATIENT)
Dept: NEPHROLOGY | Facility: CLINIC | Age: 60
End: 2020-10-13
Payer: COMMERCIAL

## 2020-10-13 ENCOUNTER — TELEPHONE (OUTPATIENT)
Dept: NEPHROLOGY | Facility: CLINIC | Age: 60
End: 2020-10-13

## 2020-10-13 DIAGNOSIS — N18.4 CKD (CHRONIC KIDNEY DISEASE) STAGE 4, GFR 15-29 ML/MIN (H): Primary | ICD-10-CM

## 2020-10-13 DIAGNOSIS — M32.14 SYSTEMIC LUPUS ERYTHEMATOSUS WITH GLOMERULAR DISEASE, UNSPECIFIED SLE TYPE (H): ICD-10-CM

## 2020-10-13 PROCEDURE — 99214 OFFICE O/P EST MOD 30 MIN: CPT | Mod: 95 | Performed by: INTERNAL MEDICINE

## 2020-10-13 RX ORDER — LORATADINE 10 MG/1
10 TABLET ORAL DAILY
COMMUNITY

## 2020-10-13 NOTE — PROGRESS NOTES
10/13/20  CC: CKD    HPI: Luz Marina Live is a 60 year old female who presents for follow-up of CKD. Ms. Live' hx is significant for SLE for which she follows with Dr. Adame. Her SLE has included renal involvement in the past - 1990s and included treatment with cytoxan, prednisone and later imuran. She has had intermittent low grade proteinuria since. Additional hx includes CABG in August 2009, Afib, and hypertension (dx around 20 years ago). Additionally she has had a few RANDELL episodes and NSAID related injury.    Creatinine has been 0.95-1.35 for the past year; 1.13 at this time. UPCR was 0.67 g/g in Nov. Which is up from 0.3-0.5 g/g previously. She underwent 2 cardiac stent placement in October - on brilinta and coumadin.     10/13/20: recently with rising creatinine, however, with only mild proteinuria. I would expect hematuria in the setting of active lupus nephritis. She reports feeling fine - no specific concerns. I spent time reviewing the change in her kidney function over last year. I also explained the risks of kidney biopsy, especially as we would need to hold blood thinners.      Allergies   Allergen Reactions     Seasonal Allergies      Sulfa Drugs Rash and GI Disturbance            atorvastatin (LIPITOR) 40 MG tablet, TAKE 1 TABLET BY MOUTH EVERY DAY       lisinopril (ZESTRIL) 40 MG tablet, Take 1 tablet (40 mg) by mouth daily       loratadine (CLARITIN) 10 MG tablet, Take 10 mg by mouth daily       metroNIDAZOLE (METROCREAM) 0.75 % external cream, Apply topically 2 times daily       MULTIPLE VITAMIN PO, Take 1 tablet by mouth daily        Omega-3 Fatty Acids (FISH OIL) 1200 MG capsule, Take 2 capsules by mouth 2 times daily        order for DME, [employer] 10-16 cmH20       ticagrelor (BRILINTA) 90 MG tablet, Take 1 tablet (90 mg) by mouth every 12 hours       warfarin ANTICOAGULANT (COUMADIN) 5 MG tablet, TAKE 1 TAB (5 MG) BY MOUTH ON FRI & 2 TABS (10 MG) ALL OTHER DAYS, OR AS DIRECTED       Calcium  Carbonate-Vitamin D (CALCIUM 600-D PO), Take  by mouth 2 times daily.    No current facility-administered medications on file prior to visit.       Past Medical History:   Diagnosis Date     Hypertension      Hypovolemic shock (H) 2/28/2015     Sepsis (H) 8/22/2015       Past Surgical History:   Procedure Laterality Date     CARDIAC SURGERY  08/27/2009    triple vessel coronary artery bypass grafting on 8/27/09     CARPAL TUNNEL RELEASE RT/LT  1996    bilateral     CV CORONARY ANGIOGRAM  10/17/2019    Procedure: CV CORONARY ANGIOGRAM;  Surgeon: Mahendra Collins MD;  Location: [facility] CARDIAC CATH LAB     CV PCI STENT DRUG ELUTING N/A 10/17/2019    Procedure: PCI Stent Drug Eluting;  Surgeon: Mahendra Collins MD;  Location: [facility] CARDIAC CATH LAB       Social History     Tobacco Use     Smoking status: Former Smoker     Smokeless tobacco: Never Used     Tobacco comment: 30 plus years ago.   Substance Use Topics     Alcohol use: No     Drug use: No       Family History   Problem Relation Age of Onset     Arthritis Mother         ra     Connective Tissue Disorder Mother         lupus     Congenital Anomalies Mother         wholein heart     Cerebrovascular Disease Mother         TIA's     Cerebrovascular Disease Father      Diabetes Father      Hypertension Father      Cardiovascular Father         stents     Genitourinary Problems Father         kidney failure     Kidney Disease Father         kidney injury related to acute illness around time of death (RANDELL likely)     Cataracts Father      Arthritis Paternal Grandmother      Diabetes Brother      Glaucoma No family hx of      Macular Degeneration No family hx of        ROS: A 4 system review of systems was negative other than noted here or above.     Exam:  There were no vitals taken for this visit.  GENERAL: Healthy, alert and no distress  EYES: Eyes grossly normal to inspection.  No discharge or erythema, or obvious  scleral/conjunctival abnormalities.  RESP: No audible wheeze, cough, or visible cyanosis.  No visible retractions or increased work of breathing.    SKIN: Visible skin clear. No significant rash, abnormal pigmentation or lesions.  NEURO: Cranial nerves grossly intact.  Mentation and speech appropriate for age.  PSYCH: Mentation appears normal, affect normal/bright, judgement and insight intact, normal speech and appearance well-groomed.     Results:   No visits with results within 1 Day(s) from this visit.   Latest known visit with results is:   Orders Only on 07/07/2020   Component Date Value Ref Range Status     Sodium 07/07/2020 139  133 - 144 mmol/L Final     Potassium 07/07/2020 4.7  3.4 - 5.3 mmol/L Final     Chloride 07/07/2020 113* 94 - 109 mmol/L Final     Carbon Dioxide 07/07/2020 19* 20 - 32 mmol/L Final     Anion Gap 07/07/2020 7  3 - 14 mmol/L Final     Glucose 07/07/2020 102* 70 - 99 mg/dL Final     Urea Nitrogen 07/07/2020 51* 7 - 30 mg/dL Final     Creatinine 07/07/2020 1.73* 0.52 - 1.04 mg/dL Final     GFR Estimate 07/07/2020 32* >60 mL/min/[1.73_m2] Final    Comment: Non  GFR Calc  Starting 12/18/2018, serum creatinine based estimated GFR (eGFR) will be   calculated using the Chronic Kidney Disease Epidemiology Collaboration   (CKD-EPI) equation.       GFR Estimate If Black 07/07/2020 37* >60 mL/min/[1.73_m2] Final    Comment:  GFR Calc  Starting 12/18/2018, serum creatinine based estimated GFR (eGFR) will be   calculated using the Chronic Kidney Disease Epidemiology Collaboration   (CKD-EPI) equation.       Calcium 07/07/2020 8.6  8.5 - 10.1 mg/dL Final     Bilirubin Total 07/07/2020 0.4  0.2 - 1.3 mg/dL Final     Albumin 07/07/2020 2.9* 3.4 - 5.0 g/dL Final     Protein Total 07/07/2020 9.3* 6.8 - 8.8 g/dL Final     Alkaline Phosphatase 07/07/2020 108  40 - 150 U/L Final     ALT 07/07/2020 26  0 - 50 U/L Final     AST 07/07/2020 27  0 - 45 U/L Final     WBC  07/07/2020 7.1  4.0 - 11.0 10e9/L Final     RBC Count 07/07/2020 3.33* 3.8 - 5.2 10e12/L Final     Hemoglobin 07/07/2020 10.1* 11.7 - 15.7 g/dL Final     Hematocrit 07/07/2020 31.1* 35.0 - 47.0 % Final     MCV 07/07/2020 93  78 - 100 fl Final     MCH 07/07/2020 30.3  26.5 - 33.0 pg Final     MCHC 07/07/2020 32.5  31.5 - 36.5 g/dL Final     RDW 07/07/2020 14.1  10.0 - 15.0 % Final     Platelet Count 07/07/2020 203  150 - 450 10e9/L Final     INR Point of Care 07/07/2020 2.2* 0.86 - 1.14 Final    Comment: This test is intended for monitoring Coumadin therapy.  Results are not   accurate in patients with prolonged INR due to factor deficiency.       Creatinine Urine 07/07/2020 74  mg/dL Final     Albumin Urine mg/L 07/07/2020 123  mg/L Final     Albumin Urine mg/g Cr 07/07/2020 166.89* 0 - 25 mg/g Cr Final     Protein Random Urine 07/07/2020 0.65  g/L Final     Protein Total Urine g/gr Creatinine 07/07/2020 0.88* 0 - 0.2 g/g Cr Final     Sodium 07/07/2020 139  133 - 144 mmol/L Final     Potassium 07/07/2020 4.7  3.4 - 5.3 mmol/L Final     Chloride 07/07/2020 113* 94 - 109 mmol/L Final     Carbon Dioxide 07/07/2020 18* 20 - 32 mmol/L Final     Anion Gap 07/07/2020 8  3 - 14 mmol/L Final     Glucose 07/07/2020 104* 70 - 99 mg/dL Final     Urea Nitrogen 07/07/2020 53* 7 - 30 mg/dL Final     Creatinine 07/07/2020 1.73* 0.52 - 1.04 mg/dL Final     GFR Estimate 07/07/2020 32* >60 mL/min/[1.73_m2] Final    Comment: Non  GFR Calc  Starting 12/18/2018, serum creatinine based estimated GFR (eGFR) will be   calculated using the Chronic Kidney Disease Epidemiology Collaboration   (CKD-EPI) equation.       GFR Estimate If Black 07/07/2020 37* >60 mL/min/[1.73_m2] Final    Comment:  GFR Calc  Starting 12/18/2018, serum creatinine based estimated GFR (eGFR) will be   calculated using the Chronic Kidney Disease Epidemiology Collaboration   (CKD-EPI) equation.       Calcium 07/07/2020 8.7  8.5 - 10.1  mg/dL Final     Phosphorus 07/07/2020 3.8  2.5 - 4.5 mg/dL Final     Albumin 07/07/2020 2.9* 3.4 - 5.0 g/dL Final     Color Urine 07/07/2020 Yellow   Final     Appearance Urine 07/07/2020 Clear   Final     Glucose Urine 07/07/2020 Negative  NEG^Negative mg/dL Final     Bilirubin Urine 07/07/2020 Negative  NEG^Negative Final     Ketones Urine 07/07/2020 Negative  NEG^Negative mg/dL Final     Specific Gravity Urine 07/07/2020 1.020  1.003 - 1.035 Final     Blood Urine 07/07/2020 Trace* NEG^Negative Final     pH Urine 07/07/2020 6.0  5.0 - 7.0 pH Final     Protein Albumin Urine 07/07/2020 30* NEG^Negative mg/dL Final     Urobilinogen Urine 07/07/2020 0.2  0.2 - 1.0 EU/dL Final     Nitrite Urine 07/07/2020 Negative  NEG^Negative Final     Leukocyte Esterase Urine 07/07/2020 Negative  NEG^Negative Final     Source 07/07/2020 Unspecified Urine   Final     WBC Urine 07/07/2020 0 - 5  OTO5^0 - 5 /HPF Final     RBC Urine 07/07/2020 O - 2  OTO2^O - 2 /HPF Final     Squamous Epithelial /LPF Urine 07/07/2020 Few  FEW^Few /LPF Final        Assessment/Plan:   1. CKD stage 3: risk factors for CKD include previous lupus nephritis, hypertension, as well as acute kidney injuries.  She is on lisinopril 40 mg daily. At this time, we are not able to do a kidney biopsy given the inability to pause blood thinner treatment.    She has proteinuria but no hematuria. With rising creatinine I do question whether biopsy should be pursued. There are reports of creatinine rise occurring with Brilinta. I spoke with Dr. Moreno and he agreed to hold the brilinta for now while she will remain on coumadin and restart ASA 81 mg daily. Will plan to repeat labs again in the coming weeks. If not seeing improvement, would have a low threshold to pursue kidney biopsy to assure no evidence of lupus nephritis. She is agreeable with this plan.     2. Hypertension: at goal of <130/80    3. Lupus: on plaquenil    4. BMD: calcium is at the  Upper limit of  "normal (corrected calcium 10.1). Want to avoid high calcium levels as can lead to afferent vasoconstriction.       Lavern Clark DO         Luz Marina Live is a 60 year old female who is being evaluated via a billable video visit.      The patient has been notified of following:     \"This video visit will be conducted via a call between you and your physician/provider. We have found that certain health care needs can be provided without the need for an in-person physical exam.  This service lets us provide the care you need with a video conversation.  If a prescription is necessary we can send it directly to your pharmacy.  If lab work is needed we can place an order for that and you can then stop by our lab to have the test done at a later time.    Video visits are billed at different rates depending on your insurance coverage.  Please reach out to your insurance provider with any questions.    If during the course of the call the physician/provider feels a video visit is not appropriate, you will not be charged for this service.\"    Video visit - text invite to [phone] (X)  Patient has given verbal consent for Video visit? Yes  How would you like to obtain your AVS? My Chart  If you are dropped from the video visit, the video invite should be resent to: [phone] (P)  Will anyone else be joining your video visit? No       Leobardo Gutiérrez LPN    Rheumatology / Pulmonology  [facility]            Video-Visit Details    Type of service:  Video Visit    Video Start Time: 413 PM  Video End Time: 427 PM    Originating Location (pt. Location): Home    Distant Location (provider location):  [facility]     Platform used for Video Visit: Rachel Clark MD      "

## 2020-10-13 NOTE — TELEPHONE ENCOUNTER
Left message for Luz Marina to return call to see if she could do a video preferred or telephone visit with Dr. Clark at 4 PM today 10/13  to review lab results.     Call center- okay to schedule if patient returns call. Spot is open, for Luz Marina only.    Tasha Granados, RN, BSN  Nephrology Care Coordinator  [facility]

## 2020-10-16 ENCOUNTER — VIRTUAL VISIT (OUTPATIENT)
Dept: RHEUMATOLOGY | Facility: CLINIC | Age: 60
End: 2020-10-16
Payer: COMMERCIAL

## 2020-10-16 DIAGNOSIS — Z79.60 LONG-TERM USE OF IMMUNOSUPPRESSANT MEDICATION: Chronic | ICD-10-CM

## 2020-10-16 DIAGNOSIS — I25.10 CORONARY ARTERY DISEASE INVOLVING NATIVE HEART WITHOUT ANGINA PECTORIS, UNSPECIFIED VESSEL OR LESION TYPE: Chronic | ICD-10-CM

## 2020-10-16 DIAGNOSIS — M32.14 SYSTEMIC LUPUS ERYTHEMATOSUS WITH GLOMERULAR DISEASE, UNSPECIFIED SLE TYPE (H): Chronic | ICD-10-CM

## 2020-10-16 DIAGNOSIS — Z79.899 LONG-TERM USE OF PLAQUENIL: ICD-10-CM

## 2020-10-16 PROCEDURE — 99214 OFFICE O/P EST MOD 30 MIN: CPT | Mod: 95 | Performed by: INTERNAL MEDICINE

## 2020-10-16 RX ORDER — HYDROXYCHLOROQUINE SULFATE 200 MG/1
400 TABLET, FILM COATED ORAL DAILY
Qty: 180 TABLET | Refills: 3 | Status: SHIPPED | OUTPATIENT
Start: 2020-10-16 | End: 2021-11-09

## 2020-10-16 NOTE — PROGRESS NOTES
"Luz Marina Live is a 60 year old female who is being evaluated via a billable video visit.      The patient has been notified of following:     \"This video visit will be conducted via a call between you and your physician/provider. We have found that certain health care needs can be provided without the need for an in-person physical exam.  This service lets us provide the care you need with a video conversation.  If a prescription is necessary we can send it directly to your pharmacy.  If lab work is needed we can place an order for that and you can then stop by our lab to have the test done at a later time.    Video visits are billed at different rates depending on your insurance coverage.  Please reach out to your insurance provider with any questions.    If during the course of the call the physician/provider feels a video visit is not appropriate, you will not be charged for this service.\"      Video visit - please text invite to [phone] (J)  Patient has given verbal consent for Video visit? Yes    How would you like to obtain your AVS? My Chart  If you are dropped from the video visit, the video invite should be resent to: [phone] (B)  Will anyone else be joining your video visit? No        Leobardo Gutiérrez LPN    Rheumatology / Pulmonology  [facility]      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  +ROHAN, dsDNA, SSA, lupus inhibitor, and depressed complements. Modest proteinuria. Previous treatment with remote cytoxan and imuran. Current management with hydroxychloroquine 400 mg daily.     Recently she has had a rising serum creatinine of uncertain etiology. A renal biopsy is being planned, but this is complicated by her anticoagulation.    She does not know if her lupus is active. She has a lot of ongoing sinus problems and pain, and is taking Claritin. She has lost some hair. She can get occasional nose ulcers but no mouth sores. She has a history of pleurisy in the past and " current occasional sharp chest wall pain with KAYE. She has stable raynaud's in the hands. Her joints are fine. No joint pains, swelling, redness or warmth. Good function and no deformities.     Hydroxychloroquine 400 mg daily is well tolerated and no eye problems.     PMI:  Medical-  Active Ambulatory Problems     Diagnosis Date Noted     Hyperlipidemia LDL goal <100 12/05/2011     CAD (coronary artery disease) 12/05/2011     HTN, goal below 140/90 07/03/2013     PAF (paroxysmal atrial fibrillation) (H) 08/24/2015     CKD (chronic kidney disease) stage 3, GFR 30-59 ml/min 09/14/2015     Long-term (current) use of anticoagulants [Z79.01] 04/05/2016     Moderate tricuspid insufficiency 12/22/2016     Systemic lupus erythematosus with glomerular disease, unspecified SLE type (H) 07/03/2017     Psychophysiological insomnia 06/11/2018     Morbid obesity (H) 06/11/2018     KEITH (obstructive sleep apnea)- severe (AHI 30) 06/18/2018     Long-term use of Plaquenil 09/04/2018     Long-term use of immunosuppressant medication 10/11/2019     Stable angina (H) 10/16/2019     Resolved Ambulatory Problems     Diagnosis Date Noted     SLE (systemic lupus erythematosus) (H) 11/01/2010     Hypovolemic shock (H) 02/28/2015     Sepsis (H) 08/22/2015     Secondary renal hyperparathyroidism (H) 01/27/2016     Past Medical History:   Diagnosis Date     Hypertension      Surgical-  Past Surgical History:   Procedure Laterality Date     CARDIAC SURGERY  08/27/2009    triple vessel coronary artery bypass grafting on 8/27/09     CARPAL TUNNEL RELEASE RT/LT  1996    bilateral     CV CORONARY ANGIOGRAM  10/17/2019    Procedure: CV CORONARY ANGIOGRAM;  Surgeon: Mahendra Collins MD;  Location:  [facility]     CV PCI STENT DRUG ELUTING N/A 10/17/2019    Procedure: PCI Stent Drug Eluting;  Surgeon: Mahendra Collins MD;  Location:  [facility]     Injuries-none    SH:  Social History      Socioeconomic History     Marital status:      Spouse name: None     Number of children: 1 daughter     Years of education: None     Highest education level: None   Occupational History     None   Social Needs     Financial resource strain: None     Food insecurity:     Worry: None     Inability: None     Transportation needs:     Medical: None     Non-medical: None   Tobacco Use     Smoking status: Former Smoker     Smokeless tobacco: Never Used     Tobacco comment: 30 plus years ago.   Substance and Sexual Activity     Alcohol use: No     Drug use: No     Sexual activity: Yes     Partners: Male     Birth control/protection: Surgical     Comment: vasectomy   Lifestyle     Physical activity:     Days per week: None     Minutes per session: None     Stress: None   Relationships     Social connections:     Talks on phone: None     Gets together: None     Attends [employer] service: None     Active member of club or organization: None     Attends meetings of clubs or organizations: None     Relationship status: None     Intimate partner violence:     Fear of current or ex partner: None     Emotionally abused: None     Physically abused: None     Forced sexual activity: None   Other Topics Concern     Parent/sibling w/ CABG, MI or angioplasty before 65F 55M? Not Asked   Social History Narrative     None     FH:  Family History   Problem Relation Age of Onset     Arthritis Mother         ra     Connective Tissue Disorder Mother         lupus     Congenital Anomalies Mother         wholein heart     Cerebrovascular Disease Mother         TIA's     Cerebrovascular Disease Father      Diabetes Father      Hypertension Father      Cardiovascular Father         stents     Genitourinary Problems Father         kidney failure     Kidney Disease Father         kidney injury related to acute illness around time of death (RANDELL likely)     Cataracts Father      Arthritis Paternal Grandmother      Diabetes Brother       Glaucoma No family hx of      Macular Degeneration No family hx of      ROS:  +right side pleuritic chest pain at times and dyspnea on exertion  +worsened heartburn at night on occasion  +headaches with sinus problems  +easy bruising  Remainder of the 14 point ROS obtained and found negative.    Physical Exam:  Constitutional: WD-[id number] cooperative; +obese  Eyes: nl EOM, sclera  ENT: nl external ears, nose, hearing, lips  MS: +bilateral trace 1st MCP swelling, bilateral R>L 2nd DIP heberden's nodes;  All other neck, shoulder, elbow, wrist, hand joints were examined and otherwise found normal. Normal tuck and prayer. Full ROM.  Skin: no alopecia, rash  Psych: nl judgement, affect    Laboratory:    Component      Latest Ref Rng & Units 9/22/2020   Sodium      133 - 144 mmol/L 137   Potassium      3.4 - 5.3 mmol/L 4.7   Chloride      94 - 109 mmol/L 110 (H)   Carbon Dioxide      20 - 32 mmol/L 22   Anion Gap      3 - 14 mmol/L 5   Glucose      70 - 99 mg/dL 85   Urea Nitrogen      7 - 30 mg/dL 49 (H)   Creatinine      0.52 - 1.04 mg/dL 1.97 (H)   GFR Estimate      >60 mL/min/1.73:m2 27 (L)   GFR Estimate If Black      >60 mL/min/1.73:m2 31 (L)   Calcium      8.5 - 10.1 mg/dL 8.7   Phosphorus      2.5 - 4.5 mg/dL 4.5   Albumin      3.4 - 5.0 g/dL 3.1 (L)   Color Urine       Light Yellow   Appearance Urine       Clear   Glucose Urine      NEG:Negative mg/dL Negative   Bilirubin Urine      NEG:Negative Negative   Ketones Urine      NEG:Negative mg/dL Negative   Specific Gravity Urine      1.003 - 1.035 1.011   Blood Urine      NEG:Negative Trace (A)   pH Urine      5.0 - 7.0 pH 6.5   Protein Albumin Urine      NEG:Negative mg/dL 10 (A)   Urobilinogen mg/dL      0.0 - 2.0 mg/dL Normal   Nitrite Urine      NEG:Negative Negative   Leukocyte Esterase Urine      NEG:Negative Negative   Source       Midstream Urine   WBC      4.0 - 11.0 10e9/L 6.3   RBC Count      3.8 - 5.2 10e12/L 3.32 (L)   Hemoglobin      11.7 - 15.7 g/dL  10.0 (L)   Hematocrit      35.0 - 47.0 % 31.3 (L)   MCV      78 - 100 fl 94   MCH      26.5 - 33.0 pg 30.1   MCHC      31.5 - 36.5 g/dL 31.9   RDW      10.0 - 15.0 % 13.9   Platelet Count      150 - 450 10e9/L 190   WBC Urine      OTO5:0 - 5 /HPF 0 - 5   RBC Urine      OTO2:O - 2 /HPF O - 2   Bacteria Urine      NEG:Negative /HPF Few (A)   Squamous Epithelial /LPF Urine      FEW:Few /LPF Few   Hyaline Casts      OTO2:O - 2 /LPF O - 2   Creatinine Urine      mg/dL 80   Albumin Urine mg/L      mg/L 91   Albumin Urine mg/g Cr      0 - 25 mg/g Cr 113.66 (H)   Iron      35 - 180 ug/dL 45   Iron Binding Cap      240 - 430 ug/dL 221 (L)   Iron Saturation Index      15 - 46 % 20   Protein Random Urine      g/L 0.49   Protein Total Urine g/gr Creatinine      0 - 0.2 g/g Cr 0.60 (H)   Complement C3      81 - 157 mg/dL 75 (L)   Complement C4      13 - 39 mg/dL 3 (L)   DNA-ds      <10 IU/mL 85 (H)   Ferritin      8 - 252 ng/mL 212   Parathyroid Hormone Intact      18 - 80 pg/mL 63     Impression:    Systemic lupus erythematosis-with history of severe lupus nephritis. Now with worsening glomerular filtration of unclear etiology. Given her history of previous SLE nephritis, it is quite possible that this represents a relapse of disease activity in the kidneys. In our visit today I do not find much evidence of acute disease flaring. Nevertheless, she maintains a constant state of hypocomplementemia and has persistent dsDNA elevations. Furthermore, her chronic management with hydroxychloroquine monotherapy may not have the ability to prevent lupus nephritis relapse. Nevertheless, her relatively bland urine is reassuring. Given all of this, I will not institute a change in therapy right now, but do recommend biopsy of the kidney immediately to identify the disease etiology. However, if a tissue diagnosis is not forthcoming, then aggressive treatment with corticosteroids and cellcept may be indicated to get control of this renal  insufficiency. Good tolerance of the hydroxychloroquine and we will continue this regimen.    Bone health-with history of previous prednisone use. Get DEXA scanning now.    Long term management of immunosuppression-with worsening kidney function. I doubt this represents a toxicity related hydroxychloroquine. Continue the routine eye checks. We also agree that the benefits of continued immunosuppression outweigh the risks of COVID-19 infection.     RTC in 3 months.      Video-Visit Details    Type of service:  Video Visit    Video Start Time: 11:27 AM  Video End Time: 11:57 AM    Originating Location (pt. Location): Home    Distant Location (provider location):  [facility]     Platform used for Video Visit: [employer]    Reji Chau MD

## 2020-10-16 NOTE — Clinical Note
This could be an SLE nephritis relapse, although the urine is bland. I will not institute increased immunosuppression yet, but rather will wait on your biopsy.    Fitz

## 2020-10-16 NOTE — PATIENT INSTRUCTIONS
Continue the hydroxychloroquine medication and routine eye checks  Get a kidney biopsy to evaluate the reason for the loss of kidney function  Get a DEXA scan. Contact imaging scheduling at [phone] to schedule DEXA scan  Follow up with me in 3 months

## 2020-10-23 ENCOUNTER — OFFICE VISIT (OUTPATIENT)
Dept: CARDIOLOGY | Facility: CLINIC | Age: 60
End: 2020-10-23
Payer: COMMERCIAL

## 2020-10-23 DIAGNOSIS — I48.91 ATRIAL FIBRILLATION, UNSPECIFIED TYPE (H): Primary | ICD-10-CM

## 2020-10-23 DIAGNOSIS — I25.83 CORONARY ARTERY DISEASE DUE TO LIPID RICH PLAQUE: ICD-10-CM

## 2020-10-23 DIAGNOSIS — I25.810 CORONARY ARTERY DISEASE INVOLVING AUTOLOGOUS ARTERY CORONARY BYPASS GRAFT WITHOUT ANGINA PECTORIS: ICD-10-CM

## 2020-10-23 DIAGNOSIS — I25.10 CORONARY ARTERY DISEASE DUE TO LIPID RICH PLAQUE: ICD-10-CM

## 2020-10-23 DIAGNOSIS — M32.14 SYSTEMIC LUPUS ERYTHEMATOSUS WITH GLOMERULAR DISEASE, UNSPECIFIED SLE TYPE (H): ICD-10-CM

## 2020-10-23 DIAGNOSIS — E78.5 HYPERLIPIDEMIA LDL GOAL <130: ICD-10-CM

## 2020-10-23 PROCEDURE — 99214 OFFICE O/P EST MOD 30 MIN: CPT | Mod: 95 | Performed by: INTERNAL MEDICINE

## 2020-10-23 NOTE — LETTER
10/23/2020      RE: Luz Marina Live  [address]  [place] [address]       Dear Colleague,    Thank you for the opportunity to participate in the care of your patient, Luz Marina Live, at the [facility]. Please see a copy of my visit note below.        Impression  1. ASHD with recent stenting         3-vessel coronary artery disease s/p CAB       Patent LIMA-LAD, SVG-OM, SVG-RPDA       Significant stenosis of pLCx/OM1 s/p SHAHANA x2 to distal LM/pLCx/OM1    2. History of CAB  3. Elevated PA pressure  4. Hypertension  5. Hyperlipidemia  6. KEITH  7. PFO  8. Atrial fibrillation with warfarin anticoagulation/chronic  9. SLE  10. History of lupus nephritis      Video visit: FACETIME  Patient permission, yes  Patient location: home  Duration 30 minutes of which >50% involved counseling and direct coordination of care       I conducted a virtual visit with this patient. We discussed the patient's current condition, reviewed interim history since last visit, current functional status, diet, and possible cardiac symptoms including: chest pain, tightness, heaviness, pressure, PND, orthopnea, ankle edema, increasing abdominal girth, weight gain, palpitation, pre-syncope or syncope.  She is doing well with decreased shortness of breath, increased activity and no symptoms suggestive of progressive CAD.  She is having trouble with Brillinta.  Since our last visit her creatinine has risen without obvious explanation from intercurrent conditions such as SLE and remote history of lupus nephritis.  60 year old female with premature CAD associated with SLE who has undergone previous CABG as well as subsequent stenting (immediatley following false negative non exercise nuclear stress test see via video visit to assess current status and potential to discontinue anti-platelet agent which cause cause incrase in creatinine.     She has no interim hi tory of  chest pain, tightness, heaviness pressure at rest, with emotion or exertion or nocturnally.  She does have exertional shortness of breath which anti-dates her recurrent manifestation of CAD which led to stenting last year.         We reviewed and suggested:  1. Viral epidemic safety suggestions  2. Continued adherence to medical regimen, diet, and exercise program as previously described  3. Current scheduled visit deferred in light of current situation  4. Discontinue Brilinta and start ASA  5.   Follow creatinine carefully with Dr. Clark  6.   Seek immediate medical attention if recurrent chest pain or bleeding  7    Exercise nuclear medicine stress test in early 2021        Current Outpatient Medications   Medication     atorvastatin (LIPITOR) 40 MG tablet     Calcium Carbonate-Vitamin D (CALCIUM 600-D PO)     hydroxychloroquine (PLAQUENIL) 200 MG tablet     lisinopril (ZESTRIL) 40 MG tablet     loratadine (CLARITIN) 10 MG tablet     metoprolol tartrate (LOPRESSOR) 50 MG tablet     metroNIDAZOLE (METROCREAM) 0.75 % external cream     MULTIPLE VITAMIN PO     Omega-3 Fatty Acids (FISH OIL) 1200 MG capsule     order for DME     ticagrelor (BRILINTA) 90 MG tablet     warfarin ANTICOAGULANT (COUMADIN) 5 MG tablet     No current facility-administered medications for this visit.          Weight:  Wt Readings from Last 24 Encounters:   07/20/20 113.9 kg (251 lb)   03/06/20 113.9 kg (251 lb 3.2 oz)   02/05/20 111.6 kg (246 lb)   01/14/20 113.9 kg (251 lb)   12/23/19 116.3 kg (256 lb 4.8 oz)   11/08/19 116.6 kg (257 lb)   11/08/19 116.6 kg (257 lb)   11/05/19 116.6 kg (257 lb)   10/16/19 119.4 kg (263 lb 4.8 oz)   10/11/19 120.7 kg (266 lb 1.6 oz)   09/04/19 122 kg (269 lb)   08/16/19 122 kg (269 lb)   08/13/19 122.5 kg (270 lb)   08/05/19 121.4 kg (267 lb 9.6 oz)   11/20/18 122.9 kg (271 lb)   10/29/18 122 kg (269 lb)   09/17/18 119.3 kg (263 lb)   09/04/18 118.3 kg (260 lb 14.4 oz)   06/25/18 118.4 kg (261 lb)    06/25/18 117.5 kg (259 lb)   05/31/18 116.9 kg (257 lb 12.8 oz)   04/30/18 113.4 kg (250 lb)   04/10/18 115 kg (253 lb 9.6 oz)   02/27/18 116.6 kg (257 lb)       Medication:  Current Outpatient Medications   Medication     atorvastatin (LIPITOR) 40 MG tablet     Calcium Carbonate-Vitamin D (CALCIUM 600-D PO)     hydroxychloroquine (PLAQUENIL) 200 MG tablet     lisinopril (ZESTRIL) 40 MG tablet     loratadine (CLARITIN) 10 MG tablet     metoprolol tartrate (LOPRESSOR) 50 MG tablet     metroNIDAZOLE (METROCREAM) 0.75 % external cream     MULTIPLE VITAMIN PO     Omega-3 Fatty Acids (FISH OIL) 1200 MG capsule     order for DME     ticagrelor (BRILINTA) 90 MG tablet     warfarin ANTICOAGULANT (COUMADIN) 5 MG tablet     No current facility-administered medications for this visit.      Laboratories:    Results for IDA PADRON (MRN [id number]) as of 10/23/2020 14:44   Ref. Range 3/31/2020 13:40 4/30/2020 13:32 6/2/2020 15:19 7/7/2020 14:16 7/7/2020 14:17 8/4/2020 12:57   Creatinine Latest Ref Range: 0.52 - 1.04 mg/dL 1.22 (H) 1.44 (H) 1.41 (H) 1.73 (H) 1.73 (H) 1.87 (H)       Results for IDA PADRON (MRN [id number]) as of 4/1/2020 13:50   Ref. Range 2/12/2020 17:17 2/27/2020 11:47 3/6/2020 08:50   Sodium Latest Ref Range: 133 - 144 mmol/L  137    Potassium Latest Ref Range: 3.4 - 5.3 mmol/L  4.6    Chloride Latest Ref Range: 94 - 109 mmol/L  107    Carbon Dioxide Latest Ref Range: 20 - 32 mmol/L  23    Urea Nitrogen Latest Ref Range: 7 - 30 mg/dL  38 (H)    Creatinine Latest Ref Range: 0.52 - 1.04 mg/dL  1.20 (H) 1.31 (H)   GFR Estimate Latest Ref Range: >60 mL/min/1.73_m2  49 (L) 44 (L)   GFR Estimate If Black Latest Ref Range: >60 mL/min/1.73_m2  57 (L) 51 (L)   Calcium Latest Ref Range: 8.5 - 10.1 mg/dL  9.0    Anion Gap Latest Ref Range: 3 - 14 mmol/L  7    Phosphorus Latest Ref Range: 2.5 - 4.5 mg/dL  3.5    Albumin Latest Ref Range: 3.4 - 5.0 g/dL  3.0 (L) 3.0 (L)   ALT Latest Ref Range: 0 - 50 U/L   29   AST  Latest Ref Range: 0 - 45 U/L   27   25 OH Vit D total Latest Ref Range: 20 - 75 ug/L   <40   25 OH Vit D2 Latest Units: ug/L   <5   25 OH Vit D3 Latest Units: ug/L   35   Creatinine Urine Latest Units: mg/dL  63    CRP Inflammation Latest Ref Range: 0.0 - 8.0 mg/L   21.5 (H)   Protein Random Urine Latest Units: g/L  0.39    Protein Total Urine g/gr Creatinine Latest Ref Range: 0 - 0.2 g/g Cr  0.63 (H)    Thyroid Stim Immunog Latest Ref Range: <=1.3 TSI index <1.0     TSH Latest Ref Range: 0.40 - 4.00 mU/L 1.98     Glucose Latest Ref Range: 70 - 99 mg/dL  83    WBC Latest Ref Range: 4.0 - 11.0 10e9/L   7.0   Hemoglobin Latest Ref Range: 11.7 - 15.7 g/dL   10.8 (L)   Hematocrit Latest Ref Range: 35.0 - 47.0 %   34.2 (L)   Platelet Count Latest Ref Range: 150 - 450 10e9/L   204   RBC Count Latest Ref Range: 3.8 - 5.2 10e12/L   3.60 (L)   MCV Latest Ref Range: 78 - 100 fl   95   MCH Latest Ref Range: 26.5 - 33.0 pg   30.0   MCHC Latest Ref Range: 31.5 - 36.5 g/dL   31.6   RDW Latest Ref Range: 10.0 - 15.0 %   13.7   Diff Method Unknown   Automated Method   % Neutrophils Latest Units: %   71.1   % Lymphocytes Latest Units: %   17.6   % Monocytes Latest Units: %   7.0   % Eosinophils Latest Units: %   2.3   % Basophils Latest Units: %   0.7   % Immature Granulocytes Latest Units: %   1.3   Absolute Neutrophil Latest Ref Range: 1.6 - 8.3 10e9/L   5.0   Absolute Lymphocytes Latest Ref Range: 0.8 - 5.3 10e9/L   1.2   Absolute Monocytes Latest Ref Range: 0.0 - 1.3 10e9/L   0.5   Absolute Eosinophils Latest Ref Range: 0.0 - 0.7 10e9/L   0.2   Absolute Basophils Latest Ref Range: 0.0 - 0.2 10e9/L   0.1   Abs Immature Granulocytes Latest Ref Range: 0 - 0.4 10e9/L   0.1   Sed Rate Latest Ref Range: 0 - 30 mm/h   87 (H)   INR Point of Care Latest Ref Range: 0.86 - 1.14   2.0 (H)    Complement C3 Latest Ref Range: 81 - 157 mg/dL  65 (L)    Complement C4 Latest Ref Range: 13 - 39 mg/dL  8 (L)        Results for IDA PADRON (MRN  [id number]) as of 11/5/2019 10:53   Ref. Range 8/5/2019 11:03 11/5/2019 10:16   CRP Inflammation Latest Ref Range: 0.0 - 8.0 mg/L 8.8 (H) 17.0 (H)         Her new catherization:  Results (pressures in mmHg)  RA: 1/4/3  RV 32/3  PA 30/13/19;  PA Sat 65%  PCWP -/-/5;  PCW Sat --%  Kannan CO/CI 3.3/1.6 L/min; TD CO 4.1/2.0 L/min  PVR 4.2 bermudez; TPR 5.7 bermudez  Hb 12.3 g/dL  NIBP 167/81/116  SVR 2200 dynes*sec/cm^5     [place]:  SVC 62.7%  IVC 65.0%  Low RA 73.0%  Mid RA 68.0%  High RA 64.6%  RV 63%  PA 65%  PCW ---          Conclusions:  1. Low right and left sided filling pressures.  2. Normal pulmonary artery pressures.  3. Low normal cardiac output.      REVIEW of SYMPTOMS    Constitutional: without fever, chills, night sweats.  Weight is down  HEENT: without dry eyes, dry mouth, sinusitis, corryza, visual changes  Endocrine: without polyuria, polydypsia, polyphagia, heat or cold intolerance, changing mental status  Cardiology: without chest pain, tightness, heaviness, pressure, paroxysmal dyspnea, orthopnea, palpitation, pre-syncope or syncope or device discharge (if present)  Pulmonary: without asthma, wheezing, cough, hemoptysis but longstanding exertional shortness of breath  GI: without nausea, emesis, jaundice, pain, hematemesis, melena  : without frequency, urgency, hematuria, stones, pain, abnormal bleeding, frequency, urgency  Neurologic: without TIA, CVA, trauma, seizure  Dermatologic: without lesions, abrasion rash,   Orthopedic/Rheum: without significant joint pain, impairment, limb, polyserositis, ulceration, Raynauds  Heme: without mass, bruising, frequent infection, anemia  Psychiatric: without substance abuse, hallucination, medication, depression      Exercise tolerance:    Nuclear stress test: (this was false negative study as patient has an acute coronary event shortly afterwards)  PROTOCOL:    Rest and stress myocardial perfusion imaging was performed using  Tc-99m tetrafosmin intravenously.  Pharmacological stress was performed  with 0.4 mg of regadenoson intravenously. The EKG showed normal sinus  rhythm at rest. No significant abnormalities were noted with infusion  of regadenoson.     FINDINGS:  1.  Overall quality of the study: Diagnostic.   2.  Left ventricular cavity is Normal on the rest and stress studies.  3.  SPECT images demonstrate uniform radiotracer uptake of the  myocardium on both stress and rest images.  4.  Left ventricular ejection fraction is 73%. Left ventricular  end-diastolic volume is 134 mL. End-systolic volume is 34 mL      Echocardiogram  Name: IDA PADRON  MRN: [id number]  : 1960  Study Date: 10/17/2019 10:24 AM  Age: 59 yrs  Gender: Female  [place] [facility] [place] Study: Chest Pain  Ordering Physician: MARIANELA ESCALONA  Performed By: Yasmany Campbell RDCS     BSA: 2.2 m2  Height: 63 in  Weight: 263 lb  HR: 58  BP: 144/81 mmHg  _____________________________________________________________________________  __        Procedure  Complete Portable Echo Adult. Contrast Optison. Optison (NDC #1187-2716-23)  given intravenously. Patient was given 6 ml mixture of 3 ml Optison and 6 ml  saline. 3 ml wasted.  _____________________________________________________________________________  __        Interpretation Summary  Global and regional left ventricular function is normal with an EF of 60-65%.  Right ventricular function, chamber size, wall motion, and thickness are  normal.  No pericardial effusion is present.  IVC diameter and respiratory changes fall into an intermediate range  suggesting an RA pressure of 8 mmHg.  Mild pulmonary hypertension is present. Right ventricular systolic pressure is  43mmHg above the right atrial pressure.  Compared to prior, measured RVSP is lower, no other change.  _____________________________________________________________________________  __        Left Ventricle  Left ventricular size is normal. Global and regional left  ventricular function  is normal with an EF of 60-65%. Mild concentric wall thickening consistent  with left ventricular hypertrophy is present. Left ventricular diastolic  function is indeterminate.     Right Ventricle  Right ventricular function, chamber size, wall motion, and thickness are  normal.     Atria  Moderate biatrial enlargement is present.     Mitral Valve  The mitral valve is normal.        Aortic Valve  Aortic valve is normal in structure and function.     Tricuspid Valve  Mild tricuspid insufficiency is present. Mild pulmonary hypertension is  present. Right ventricular systolic pressure is 43mmHg above the right atrial  pressure.     Pulmonic Valve  The pulmonic valve is normal. Trace pulmonic insufficiency is present.     Vessels  The aorta root is normal. The thoracic aorta is normal. Dilation of the  inferior vena cava is present with normal respiratory variation in diameter.  IVC diameter and respiratory changes fall into an intermediate range  suggesting an RA pressure of 8 mmHg.     Pericardium  No pericardial effusion is present.     _____________________________________________________________________________  __  MMode/2D Measurements & Calculations  IVSd: 1.2 cm  LVIDd: 4.5 cm  LVIDs: 2.9 cm  LVPWd: 1.2 cm  FS: 36.6 %     LV mass(C)d: 197.9 grams  LV mass(C)dI: 91.1 grams/m2  asc Aorta Diam: 3.5 cm  LVOT diam: 2.1 cm  LVOT area: 3.5 cm2  LA Volume Index (BP): 43.9 ml/m2  RWT: 0.52  TAPSE: 2.3 cm        Doppler Measurements & Calculations  MV E max maria isabel: 84.8 cm/sec  MV A max maria isabel: 35.8 cm/sec  MV E/A: 2.4  MV dec slope: 473.3 cm/sec2  MV dec time: 0.18 sec     TV max P.0 mmHg  PA acc time: 0.09 sec  TR max maria isabel: 327.8 cm/sec  TR max P.0 mmHg  E/E' avg: 10.5  Lateral E/e': 9.0  Medial E/e': 12.0     ____________________________        Echocardiographic findings of TR, modest PA, normal systolic function.    Name: IDA PADRON RAMÍREZ  MRN: [id number]  : 1960  Study Date:  03/12/2018 11:48 AM  Age: 57 yrs  Gender: Female  Patient Location: [facility]  Reason For Study: Hx PFO, Elevated PA pressures  Ordering Physician: SANNA SALAZAR  Referring Physician: SANNA SALAZAR  Performed By: Tray Harvey RDCS     BSA: 2.2 m2  Height: 63 in  Weight: 257 lb  HR: 62  BP: 167/94 mmHg  _____________________________________________________________________________  __        Procedure  Bubble Echocardiogram with two-dimensional, color and spectral Doppler  performed. IV start location R Hand . Bateriostatic Saline used for Bubble  Study.  _____________________________________________________________________________  __        Interpretation Summary     Right ventricular function, chamber size, wall motion, and thickness are  normal.  PFO again documented with color doppler and Bubble study.  Mild to moderate tricuspid insufficiency is present.  Right ventricular systolic pressure is 61mmHg above the right atrial pressure.  No pericardial effusion is present.  _____________________________________________________________________________  __        Left Ventricle  Global and regional left ventricular function is normal with an EF of 55-60%.  Left ventricular wall thickness is normal. Left ventricular size is normal.  Left ventricular diastolic function is indeterminate. No regional wall motion  abnormalities are seen.     Right Ventricle  Right ventricular function, chamber size, wall motion, and thickness are  normal.     Atria  Moderate biatrial enlargement is present. PFO again documented with color  doppler and Bubble study.     Mitral Valve  Mild to moderate mitral insufficiency is present.        Aortic Valve  Aortic valve is normal in structure and function.     Tricuspid Valve  Mild to moderate tricuspid insufficiency is present. Right ventricular  systolic pressure is 61mmHg above the right atrial pressure.     Pulmonic Valve  The pulmonic valve is normal. Trace to mild pulmonic  insufficiency is present.     Vessels  The aorta root is normal. The pulmonary artery is normal. The inferior vena  cava is normal.     Pericardium  No pericardial effusion is present.     _____________________________________________________________________________  __  MMode/2D Measurements & Calculations  IVSd: 0.89 cm  LVIDd: 5.3 cm  LVIDs: 3.1 cm  LVPWd: 0.75 cm  FS: 41.8 %  LV mass(C)d: 154.8 grams  LV mass(C)dI: 72.0 grams/m2     Ao root diam: 3.0 cm  LA dimension: 5.3 cm  asc Aorta Diam: 3.6 cm  LA/Ao: 1.8  LVOT diam: 2.2 cm  LVOT area: 3.7 cm2  LA Volume (BP): 96.0 ml  LA Volume Index (BP): 44.7 ml/m2  RWT: 0.28        Doppler Measurements & Calculations  MV E max luis: 92.8 cm/sec  MV A max luis: 58.2 cm/sec  MV E/A: 1.6  MV dec time: 0.15 sec     Ao V2 max: 147.4 cm/sec  Ao max P.0 mmHg  TARUN(V,D): 2.6 cm2  LV V1 max P.3 mmHg  LV V1 max: 104.1 cm/sec  LV V1 VTI: 24.8 cm  MR ERO: 0.27 cm2  CO(LVOT): 5.6 l/min  CI(LVOT): 2.6 l/min/m2  SV(LVOT): 92.9 ml  SI(LVOT): 43.2 ml/m2  PA V2 max: 110.6 cm/sec  PA max P.9 mmHg  PA acc time: 0.08 sec  PI end-d luis: 154.4 cm/sec  PI max luis: 255.8 cm/sec  PI max P.2 mmHg  TR max luis: 385.4 cm/sec  TR max P.4 mmHg  Pulm Sys Luis: 63.2 cm/sec  Pulm Peng Luis: 97.7 cm/sec  Pulm S/D: 0.65  E/E' av.1  Lateral E/e': 8.9  Medial E/e': 15.4        : 1960  Study Date: 2016 01:31 PM  Age: 56 yrs  Gender: Female  Patient [facility]  Reason For Study:     History: tricuspid regurgitation  Ordering Physician: JOSE ARELLANO  Referring Physician: JOSE ARELLANO  Performed By: Hugh Sharpe MD     BSA: 2.2 m2  Height: 63 in  Weight: 269 lb  ______________________________________________________________________________     Interpretation Summary  Left ventricular function, chamber size, wall motion, and wall thickness are  normal.The EF is 55-60%.  Global right ventricular function is normal. Mild right ventricular dilation  is  present.  Mild to moderate TR with mild prolapse of the posterior leaflet of the  tricupsid valve.  A small patent foramen ovale is present. There is no ASD (secundum, sinus  venosus or unroofed coronary sinus). All 4 pulmonary veins drain into the  left atrium.  Mild pulmonary hypertension is present with RVSP 45mmHg above RAP.     ______________________________________________________________________________        Procedure  Transesophageal Echocardiogram with color and spectral Doppler performed. The  procedure was performed in the Echo Lab. Informed consent for Transesophegeal  echo obtained. EVE Probe #12 was used during the procedure. Patient was  sedated using Fentanyl 100 mcg. Patient was sedated using Versed 4 mg. The  Transducer was inserted without difficulty . The patient tolerated the  procedure well. Complications None. ECG shows normal sinus rhythm. Good  quality two-dimensional was performed and interpreted.     Left Ventricle  Left ventricular function, chamber size, wall motion, and wall thickness are  normal.The EF is 55-60%.     Right Ventricle  Global right ventricular function is normal. Mild right ventricular dilation  is present.     Atria  Both atria appear normal. The left atrial appendage is normal. It is free of  spontaneous echo contrast and thrombus. A small patent foramen ovale is  present. The patent foramen ovale was demonstrated by color Doppler and  agitated saline bubble study . The patent foramen ovale was demonstrated with  Valsalva's maneuver. No evidence of ASD.     Mitral Valve  The mitral valve is normal. Trace mitral insufficiency is present.     Aortic Valve  Aortic valve is normal in structure and function. The aortic valve is  tricuspid.  Tricuspid Valve  Mild to moderate tricuspid insufficiency is present. Right ventricular  systolic pressure is 45mmHg above the right atrial pressure. Mild pulmonary  hypertension is present. Mild prolapse of the posterior leaflet of  the  tricupsid valve is noted.     Pulmonic Valve  The pulmonic valve is normal.     Vessels  The pulmonary artery is normal. The aorta root is normal. The thoracic aorta  is normal. All four pulmonary veins visualized with dampened velocity  waveforms.     Pericardium  No pericardial effusion is present.     Compared to Previous Study  This study was compared with the study from 2016. A small PFO has been  identified. .     ______________________________________________________________________________     Doppler Measurements & Calculations  TR max maria isabel: 336.7 cm/sec  TR max P.5 mmHg  ______________________________________________________________________________         Name: IDA PADRON  MRN: [id number]  : 1960  Study Date: 2016 01:31 PM  Age: 56 yrs  Gender: Female  Patient Location: [facility] [place]  Reason For Study:     History: tricuspid regurgitation  Ordering Physician: JOSE ARELLANO  Referring Physician: JOSE ARELLANO  Performed By: Hugh Sharpe MD     BSA: 2.2 m2  Height: 63 in  Weight: 269 lb  ______________________________________________________________________________     Interpretation Summary  Left ventricular function, chamber size, wall motion, and wall thickness are  normal.The EF is 55-60%.  Global right ventricular function is normal. Mild right ventricular dilation  is present.  Mild to moderate TR with mild prolapse of the posterior leaflet of the  tricupsid valve.  A small patent foramen ovale is present. There is no ASD (secundum, sinus  venosus or unroofed coronary sinus). All 4 pulmonary veins drain into the  left atrium.  Mild pulmonary hypertension is present with RVSP 45mmHg above RAP.     ______________________________________________________________________________        Procedure  Transesophageal Echocardiogram with color and spectral Doppler performed. The  procedure was performed in the Echo Lab. Informed consent for Transesophegeal  echo obtained.  EVE Probe #12 was used during the procedure. Patient was  sedated using Fentanyl 100 mcg. Patient was sedated using Versed 4 mg. The  Transducer was inserted without difficulty . The patient tolerated the  procedure well. Complications None. ECG shows normal sinus rhythm. Good  quality two-dimensional was performed and interpreted.     Left Ventricle  Left ventricular function, chamber size, wall motion, and wall thickness are  normal.The EF is 55-60%.     Right Ventricle  Global right ventricular function is normal. Mild right ventricular dilation  is present.     Atria  Both atria appear normal. The left atrial appendage is normal. It is free of  spontaneous echo contrast and thrombus. A small patent foramen ovale is  present. The patent foramen ovale was demonstrated by color Doppler and  agitated saline bubble study . The patent foramen ovale was demonstrated with  Valsalva's maneuver. No evidence of ASD.     Mitral Valve  The mitral valve is normal. Trace mitral insufficiency is present.     Aortic Valve  Aortic valve is normal in structure and function. The aortic valve is  tricuspid.  Tricuspid Valve  Mild to moderate tricuspid insufficiency is present. Right ventricular  systolic pressure is 45mmHg above the right atrial pressure. Mild pulmonary  hypertension is present. Mild prolapse of the posterior leaflet of the  tricupsid valve is noted.     Pulmonic Valve  The pulmonic valve is normal.     Vessels  The pulmonary artery is normal. The aorta root is normal. The thoracic aorta  is normal. All four pulmonary veins visualized with dampened velocity  waveforms.     Pericardium  No pericardial effusion is present.     Compared to Previous Study  This study was compared with the study from 2016. A small PFO has been  identified. .     ______________________________________________________________________________     Doppler Measurements & Calculations  TR max maria isabel: 336.7 cm/sec  TR max P.5  mmHg  ______________________________________________________________________________       Procedures:  ECHO: 07-29-09:   Interpretation Summary   The Ejection Fraction is estimated at 55-60%. No regional wall motion   abnormalities are seen. The right ventricle is normal size. Global right   ventricular function is normal. Right ventricular systolic pressure is 27mmHg   above the right atrial pressure. No pericardial effusion is present. The   inferior vena cava is normal.   Stress test: 08-10-09:   1. Abnormal study with a high degree of certainty.   2. There is a predominantly fixed medium sized moderately decreased   intensity myocardial perfusion defect with minimal periinfarct   reversibility involving the apical anterior, mid anterior, and apical   region of the heart. There is corresponding hypokinesis. No other   adenosine induced fixed or reversible myocardial perfusion defect.   3. Left ventricular size is enlarged at rest. Transient ischemic   dilation of the left ventricle did not occur.   4. The left ventricular ejection fraction is 56 %.   5. Summed Stress Score is calculated at 18, which is associated   with increased risk of future coronary events.   CCL: 08-26-09: ARELLANO:   Mrs. Live is a 49 year old female with known coronary artery disease s/p MI in 1996 found to have minimal disease on catheterization. Her cardiovascular risk factors include HTN and hyperlipidemia. She presented with a 2 week history of dyspnea with exertion and lower extremity edema. She underwent Adenosine MPI which revealed a fixed medium-sized defect with minimal periinfarct reversability and anterior hypokinesis. Her LVEF was preserved at 56%. CT angio of the coronaries revealed moderate stenosis in the pLAD and dRCA and mild to moderate stenosis of the pLCx.   Access: 6F long RFA, 6F RFV   Coronary Anatomy:   LMCA: normal   LAD: There is a long, discrete, ectatic 70-80% lesion in the ostial and proximal LAD. There is one D1  branch without any significant disease.   LCx: no significant disease. There is a large OM1 branch that has a 50-60% stenosis prior to the branch bifurcation.   RCA: The proximal and mid RCA have luminal irregularities without significance. The distal RCA has a complex bifurcation lesion prior to the take-off of the rPDA. There is disease in the ostial PL1 as well.   Conclusions:   1. 3-vessel coronary artery disease without left main lesion   Plan   Discussed the options in depth with the patient. Given the proximity of the LAD lesion to the LMCA and the complexity of the dRCA lesion, we recommended CABG for the patient. She will be admitted to the hospital.Discussed with Darleen Johnson MD.   Cardiac Surgery: 2009   Triple vessel coronary artery bypass grafting. Left internal mammary artery was placed to the left anterior descending coronary artery and separate reverse saphenous vein grafts were placed to the obtuse marginal and right posterior descending coronary arteries.     Name: IDA PADRON  MRN: [id number]  : 1960  Study Date: 2018 11:48 AM  Age: 57 yrs  Gender: Female  Patient Location: [facility]  Reason For Study: Hx PFO, Elevated PA pressures  Ordering Physician: SANNA SALAZAR  Referring Physician: SANNA SALAZAR  Performed By: Tray Harvey RDCS     BSA: 2.2 m2  Height: 63 in  Weight: 257 lb  HR: 62  BP: 167/94 mmHg  _____________________________________________________________________________  __        Procedure  Bubble Echocardiogram with two-dimensional, color and spectral Doppler  performed. IV start location R Hand . Bateriostatic Saline used for Bubble  Study.  _____________________________________________________________________________  __        Interpretation Summary     Right ventricular function, chamber size, wall motion, and thickness are  normal.  PFO again documented with color doppler and Bubble study.  Mild to moderate tricuspid insufficiency is  present.  Right ventricular systolic pressure is 61mmHg above the right atrial pressure.  No pericardial effusion is present.  _____________________________________________________________________________  __        Left Ventricle  Global and regional left ventricular function is normal with an EF of 55-60%.  Left ventricular wall thickness is normal. Left ventricular size is normal.  Left ventricular diastolic function is indeterminate. No regional wall motion  abnormalities are seen.     Right Ventricle  Right ventricular function, chamber size, wall motion, and thickness are  normal.     Atria  Moderate biatrial enlargement is present. PFO again documented with color  doppler and Bubble study.     Mitral Valve  Mild to moderate mitral insufficiency is present.        Aortic Valve  Aortic valve is normal in structure and function.     Tricuspid Valve  Mild to moderate tricuspid insufficiency is present. Right ventricular  systolic pressure is 61mmHg above the right atrial pressure.     Pulmonic Valve  The pulmonic valve is normal. Trace to mild pulmonic insufficiency is present.     Vessels  The aorta root is normal. The pulmonary artery is normal. The inferior vena  cava is normal.     Pericardium  No pericardial effusion is present.     _____________________________________________________________________________  __  MMode/2D Measurements & Calculations  IVSd: 0.89 cm  LVIDd: 5.3 cm  LVIDs: 3.1 cm  LVPWd: 0.75 cm  FS: 41.8 %  LV mass(C)d: 154.8 grams  LV mass(C)dI: 72.0 grams/m2     Ao root diam: 3.0 cm  LA dimension: 5.3 cm  asc Aorta Diam: 3.6 cm  LA/Ao: 1.8  LVOT diam: 2.2 cm  LVOT area: 3.7 cm2  LA Volume (BP): 96.0 ml  LA Volume Index (BP): 44.7 ml/m2  RWT: 0.28        Doppler Measurements & Calculations  MV E max maria isabel: 92.8 cm/sec  MV A max maria isabel: 58.2 cm/sec  MV E/A: 1.6  MV dec time: 0.15 sec     Ao V2 max: 147.4 cm/sec  Ao max P.0 mmHg  TARUN(V,D): 2.6 cm2  LV V1 max P.3 mmHg  LV V1 max: 104.1  cm/sec  LV V1 VTI: 24.8 cm  MR ERO: 0.27 cm2  CO(LVOT): 5.6 l/min  CI(LVOT): 2.6 l/min/m2  SV(LVOT): 92.9 ml  SI(LVOT): 43.2 ml/m2  PA V2 max: 110.6 cm/sec  PA max P.9 mmHg  PA acc time: 0.08 sec  PI end-d luis: 154.4 cm/sec  PI max luis: 255.8 cm/sec  PI max P.2 mmHg  TR max luis: 385.4 cm/sec  TR max P.4 mmHg  Pulm Sys Luis: 63.2 cm/sec  Pulm Peng Luis: 97.7 cm/sec  Pulm S/D: 0.65  E/E' av.1  Lateral E/e': 8.9  Medial E/e': 15.4    Results (pressures in mmHg)  RA: /3  RV 32/3  PA //;  PA Sat 65%  PCWP -/-/5;  PCW Sat --%  Kannan CO/CI 3.3/1.6 L/min; TD CO 4.1/2.0 L/min  PVR 4.2 bermudez; TPR 5.7 bermudez  Hb 12.3 g/dL  NIBP 167/81/116  SVR 2200 dynes*sec/cm^5     [place]:  SVC 62.7%  IVC 65.0%  Low RA 73.0%  Mid RA 68.0%  High RA 64.6%  RV 63%  PA 65%  PCW ---     Complications: None   Blood loss: Minimal blood loss     Conclusions:  1. Low right and left sided filling pressures.  2. Normal pulmonary artery pressures.  3. Low normal cardiac output.    CC:  Eliz Adame      Please do not hesitate to contact me if you have any questions/concerns.     Sincerely,     Twin Moreno MD

## 2020-10-23 NOTE — PROGRESS NOTES
Impression  1. ASHD with recent stenting         3-vessel coronary artery disease s/p CAB       Patent LIMA-LAD, SVG-OM, SVG-RPDA       Significant stenosis of pLCx/OM1 s/p SHAHANA x2 to distal LM/pLCx/OM1    2. History of CAB  3. Elevated PA pressure  4. Hypertension  5. Hyperlipidemia  6. KEITH  7. PFO  8. Atrial fibrillation with warfarin anticoagulation/chronic  9. SLE  10. History of lupus nephritis      Video visit: FACETIME  Patient permission, yes  Patient location: home  Duration 30 minutes of which >50% involved counseling and direct coordination of care       I conducted a virtual visit with this patient. We discussed the patient's current condition, reviewed interim history since last visit, current functional status, diet, and possible cardiac symptoms including: chest pain, tightness, heaviness, pressure, PND, orthopnea, ankle edema, increasing abdominal girth, weight gain, palpitation, pre-syncope or syncope.  She is doing well with decreased shortness of breath, increased activity and no symptoms suggestive of progressive CAD.  She is having trouble with Brillinta.  Since our last visit her creatinine has risen without obvious explanation from intercurrent conditions such as SLE and remote history of lupus nephritis.  60 year old female with premature CAD associated with SLE who has undergone previous CABG as well as subsequent stenting (immediatley following false negative non exercise nuclear stress test see via video visit to assess current status and potential to discontinue anti-platelet agent which cause cause incrase in creatinine.     She has no interim hi tory of chest pain, tightness, heaviness pressure at rest, with emotion or exertion or nocturnally.  She does have exertional shortness of breath which anti-dates her recurrent manifestation of CAD which led to stenting last year.         We reviewed and suggested:  1. Viral epidemic safety suggestions  2. Continued adherence to medical  regimen, diet, and exercise program as previously described  3. Current scheduled visit deferred in light of current situation  4. Discontinue Brilinta and start ASA  5.   Follow creatinine carefully with Dr. Clark  6.   Seek immediate medical attention if recurrent chest pain or bleeding  7    Exercise nuclear medicine stress test in early 2021        Current Outpatient Medications   Medication     atorvastatin (LIPITOR) 40 MG tablet     Calcium Carbonate-Vitamin D (CALCIUM 600-D PO)     hydroxychloroquine (PLAQUENIL) 200 MG tablet     lisinopril (ZESTRIL) 40 MG tablet     loratadine (CLARITIN) 10 MG tablet     metoprolol tartrate (LOPRESSOR) 50 MG tablet     metroNIDAZOLE (METROCREAM) 0.75 % external cream     MULTIPLE VITAMIN PO     Omega-3 Fatty Acids (FISH OIL) 1200 MG capsule     order for DME     ticagrelor (BRILINTA) 90 MG tablet     warfarin ANTICOAGULANT (COUMADIN) 5 MG tablet     No current facility-administered medications for this visit.          Weight:  Wt Readings from Last 24 Encounters:   07/20/20 113.9 kg (251 lb)   03/06/20 113.9 kg (251 lb 3.2 oz)   02/05/20 111.6 kg (246 lb)   01/14/20 113.9 kg (251 lb)   12/23/19 116.3 kg (256 lb 4.8 oz)   11/08/19 116.6 kg (257 lb)   11/08/19 116.6 kg (257 lb)   11/05/19 116.6 kg (257 lb)   10/16/19 119.4 kg (263 lb 4.8 oz)   10/11/19 120.7 kg (266 lb 1.6 oz)   09/04/19 122 kg (269 lb)   08/16/19 122 kg (269 lb)   08/13/19 122.5 kg (270 lb)   08/05/19 121.4 kg (267 lb 9.6 oz)   11/20/18 122.9 kg (271 lb)   10/29/18 122 kg (269 lb)   09/17/18 119.3 kg (263 lb)   09/04/18 118.3 kg (260 lb 14.4 oz)   06/25/18 118.4 kg (261 lb)   06/25/18 117.5 kg (259 lb)   05/31/18 116.9 kg (257 lb 12.8 oz)   04/30/18 113.4 kg (250 lb)   04/10/18 115 kg (253 lb 9.6 oz)   02/27/18 116.6 kg (257 lb)       Medication:  Current Outpatient Medications   Medication     atorvastatin (LIPITOR) 40 MG tablet     Calcium Carbonate-Vitamin D (CALCIUM 600-D PO)     hydroxychloroquine  (PLAQUENIL) 200 MG tablet     lisinopril (ZESTRIL) 40 MG tablet     loratadine (CLARITIN) 10 MG tablet     metoprolol tartrate (LOPRESSOR) 50 MG tablet     metroNIDAZOLE (METROCREAM) 0.75 % external cream     MULTIPLE VITAMIN PO     Omega-3 Fatty Acids (FISH OIL) 1200 MG capsule     order for DME     ticagrelor (BRILINTA) 90 MG tablet     warfarin ANTICOAGULANT (COUMADIN) 5 MG tablet     No current facility-administered medications for this visit.      Laboratories:    Results for IDA PADRON (MRN [id number]) as of 10/23/2020 14:44   Ref. Range 3/31/2020 13:40 4/30/2020 13:32 6/2/2020 15:19 7/7/2020 14:16 7/7/2020 14:17 8/4/2020 12:57   Creatinine Latest Ref Range: 0.52 - 1.04 mg/dL 1.22 (H) 1.44 (H) 1.41 (H) 1.73 (H) 1.73 (H) 1.87 (H)       Results for IDA PADRON (MRN [id number]) as of 4/1/2020 13:50   Ref. Range 2/12/2020 17:17 2/27/2020 11:47 3/6/2020 08:50   Sodium Latest Ref Range: 133 - 144 mmol/L  137    Potassium Latest Ref Range: 3.4 - 5.3 mmol/L  4.6    Chloride Latest Ref Range: 94 - 109 mmol/L  107    Carbon Dioxide Latest Ref Range: 20 - 32 mmol/L  23    Urea Nitrogen Latest Ref Range: 7 - 30 mg/dL  38 (H)    Creatinine Latest Ref Range: 0.52 - 1.04 mg/dL  1.20 (H) 1.31 (H)   GFR Estimate Latest Ref Range: >60 mL/min/1.73_m2  49 (L) 44 (L)   GFR Estimate If Black Latest Ref Range: >60 mL/min/1.73_m2  57 (L) 51 (L)   Calcium Latest Ref Range: 8.5 - 10.1 mg/dL  9.0    Anion Gap Latest Ref Range: 3 - 14 mmol/L  7    Phosphorus Latest Ref Range: 2.5 - 4.5 mg/dL  3.5    Albumin Latest Ref Range: 3.4 - 5.0 g/dL  3.0 (L) 3.0 (L)   ALT Latest Ref Range: 0 - 50 U/L   29   AST Latest Ref Range: 0 - 45 U/L   27   25 OH Vit D total Latest Ref Range: 20 - 75 ug/L   <40   25 OH Vit D2 Latest Units: ug/L   <5   25 OH Vit D3 Latest Units: ug/L   35   Creatinine Urine Latest Units: mg/dL  63    CRP Inflammation Latest Ref Range: 0.0 - 8.0 mg/L   21.5 (H)   Protein Random Urine Latest Units: g/L  0.39    Protein  Total Urine g/gr Creatinine Latest Ref Range: 0 - 0.2 g/g Cr  0.63 (H)    Thyroid Stim Immunog Latest Ref Range: <=1.3 TSI index <1.0     TSH Latest Ref Range: 0.40 - 4.00 mU/L 1.98     Glucose Latest Ref Range: 70 - 99 mg/dL  83    WBC Latest Ref Range: 4.0 - 11.0 10e9/L   7.0   Hemoglobin Latest Ref Range: 11.7 - 15.7 g/dL   10.8 (L)   Hematocrit Latest Ref Range: 35.0 - 47.0 %   34.2 (L)   Platelet Count Latest Ref Range: 150 - 450 10e9/L   204   RBC Count Latest Ref Range: 3.8 - 5.2 10e12/L   3.60 (L)   MCV Latest Ref Range: 78 - 100 fl   95   MCH Latest Ref Range: 26.5 - 33.0 pg   30.0   MCHC Latest Ref Range: 31.5 - 36.5 g/dL   31.6   RDW Latest Ref Range: 10.0 - 15.0 %   13.7   Diff Method Unknown   Automated Method   % Neutrophils Latest Units: %   71.1   % Lymphocytes Latest Units: %   17.6   % Monocytes Latest Units: %   7.0   % Eosinophils Latest Units: %   2.3   % Basophils Latest Units: %   0.7   % Immature Granulocytes Latest Units: %   1.3   Absolute Neutrophil Latest Ref Range: 1.6 - 8.3 10e9/L   5.0   Absolute Lymphocytes Latest Ref Range: 0.8 - 5.3 10e9/L   1.2   Absolute Monocytes Latest Ref Range: 0.0 - 1.3 10e9/L   0.5   Absolute Eosinophils Latest Ref Range: 0.0 - 0.7 10e9/L   0.2   Absolute Basophils Latest Ref Range: 0.0 - 0.2 10e9/L   0.1   Abs Immature Granulocytes Latest Ref Range: 0 - 0.4 10e9/L   0.1   Sed Rate Latest Ref Range: 0 - 30 mm/h   87 (H)   INR Point of Care Latest Ref Range: 0.86 - 1.14   2.0 (H)    Complement C3 Latest Ref Range: 81 - 157 mg/dL  65 (L)    Complement C4 Latest Ref Range: 13 - 39 mg/dL  8 (L)        Results for IDA PADORN (MRN [id number]) as of 11/5/2019 10:53   Ref. Range 8/5/2019 11:03 11/5/2019 10:16   CRP Inflammation Latest Ref Range: 0.0 - 8.0 mg/L 8.8 (H) 17.0 (H)         Her new catherization:  Results (pressures in mmHg)  RA: 1/4/3  RV 32/3  PA 30/13/19;  PA Sat 65%  PCWP -/-/5;  PCW Sat --%  Kannan CO/CI 3.3/1.6 L/min; TD CO 4.1/2.0 L/min  PVR 4.2  bermudez; TPR 5.7 bermudez  Hb 12.3 g/dL  NIBP 167/81/116  SVR 2200 dynes*sec/cm^5     [place]:  SVC 62.7%  IVC 65.0%  Low RA 73.0%  Mid RA 68.0%  High RA 64.6%  RV 63%  PA 65%  PCW ---          Conclusions:  1. Low right and left sided filling pressures.  2. Normal pulmonary artery pressures.  3. Low normal cardiac output.      REVIEW of SYMPTOMS    Constitutional: without fever, chills, night sweats.  Weight is down  HEENT: without dry eyes, dry mouth, sinusitis, corryza, visual changes  Endocrine: without polyuria, polydypsia, polyphagia, heat or cold intolerance, changing mental status  Cardiology: without chest pain, tightness, heaviness, pressure, paroxysmal dyspnea, orthopnea, palpitation, pre-syncope or syncope or device discharge (if present)  Pulmonary: without asthma, wheezing, cough, hemoptysis but longstanding exertional shortness of breath  GI: without nausea, emesis, jaundice, pain, hematemesis, melena  : without frequency, urgency, hematuria, stones, pain, abnormal bleeding, frequency, urgency  Neurologic: without TIA, CVA, trauma, seizure  Dermatologic: without lesions, abrasion rash,   Orthopedic/Rheum: without significant joint pain, impairment, limb, polyserositis, ulceration, Raynauds  Heme: without mass, bruising, frequent infection, anemia  Psychiatric: without substance abuse, hallucination, medication, depression      Exercise tolerance:    Nuclear stress test: (this was false negative study as patient has an acute coronary event shortly afterwards)  PROTOCOL:    Rest and stress myocardial perfusion imaging was performed using  Tc-99m tetrafosmin intravenously. Pharmacological stress was performed  with 0.4 mg of regadenoson intravenously. The EKG showed normal sinus  rhythm at rest. No significant abnormalities were noted with infusion  of regadenoson.     FINDINGS:  1.  Overall quality of the study: Diagnostic.   2.  Left ventricular cavity is Normal on the rest and stress studies.  3.  SPECT  images demonstrate uniform radiotracer uptake of the  myocardium on both stress and rest images.  4.  Left ventricular ejection fraction is 73%. Left ventricular  end-diastolic volume is 134 mL. End-systolic volume is 34 mL      Echocardiogram  Name: IDA PADRON  MRN: [id number]  : 1960  Study Date: 10/17/2019 10:24 AM  Age: 59 yrs  Gender: Female  [place] [facility] [place]: Chest [place]  Ordering Physician: MARIANELA ESCALONA  Performed By: Yasmany Campbell DUC     BSA: 2.2 m2  Height: 63 in  Weight: 263 lb  HR: 58  BP: 144/81 mmHg  _____________________________________________________________________________  __        Procedure  Complete Portable Echo Adult. Contrast Optison. Optison (NDC #7237-9928-90)  given intravenously. Patient was given 6 ml mixture of 3 ml Optison and 6 ml  saline. 3 ml wasted.  _____________________________________________________________________________  __        Interpretation Summary  Global and regional left ventricular function is normal with an EF of 60-65%.  Right ventricular function, chamber size, wall motion, and thickness are  normal.  No pericardial effusion is present.  IVC diameter and respiratory changes fall into an intermediate range  suggesting an RA pressure of 8 mmHg.  Mild pulmonary hypertension is present. Right ventricular systolic pressure is  43mmHg above the right atrial pressure.  Compared to prior, measured RVSP is lower, no other change.  _____________________________________________________________________________  __        Left Ventricle  Left ventricular size is normal. Global and regional left ventricular function  is normal with an EF of 60-65%. Mild concentric wall thickening consistent  with left ventricular hypertrophy is present. Left ventricular diastolic  function is indeterminate.     Right Ventricle  Right ventricular function, chamber size, wall motion, and thickness are  normal.     Atria  Moderate biatrial enlargement  is present.     Mitral Valve  The mitral valve is normal.        Aortic Valve  Aortic valve is normal in structure and function.     Tricuspid Valve  Mild tricuspid insufficiency is present. Mild pulmonary hypertension is  present. Right ventricular systolic pressure is 43mmHg above the right atrial  pressure.     Pulmonic Valve  The pulmonic valve is normal. Trace pulmonic insufficiency is present.     Vessels  The aorta root is normal. The thoracic aorta is normal. Dilation of the  inferior vena cava is present with normal respiratory variation in diameter.  IVC diameter and respiratory changes fall into an intermediate range  suggesting an RA pressure of 8 mmHg.     Pericardium  No pericardial effusion is present.     _____________________________________________________________________________  __  MMode/2D Measurements & Calculations  IVSd: 1.2 cm  LVIDd: 4.5 cm  LVIDs: 2.9 cm  LVPWd: 1.2 cm  FS: 36.6 %     LV mass(C)d: 197.9 grams  LV mass(C)dI: 91.1 grams/m2  asc Aorta Diam: 3.5 cm  LVOT diam: 2.1 cm  LVOT area: 3.5 cm2  LA Volume Index (BP): 43.9 ml/m2  RWT: 0.52  TAPSE: 2.3 cm        Doppler Measurements & Calculations  MV E max maria isabel: 84.8 cm/sec  MV A max maria isabel: 35.8 cm/sec  MV E/A: 2.4  MV dec slope: 473.3 cm/sec2  MV dec time: 0.18 sec     TV max P.0 mmHg  PA acc time: 0.09 sec  TR max maria isabel: 327.8 cm/sec  TR max P.0 mmHg  E/E' avg: 10.5  Lateral E/e': 9.0  Medial E/e': 12.0     ____________________________        Echocardiographic findings of TR, modest PA, normal systolic function.    Name: IDA PADRON  MRN: [id number]  : 1960  Study Date: 2018 11:48 AM  Age: 57 yrs  Gender: Female  Patient Location: [facility]  Reason For Study: Hx PFO, Elevated PA pressures  Ordering Physician: SANNA SALAZAR  Referring Physician: SANNA SALAZAR  Performed By: Tray Harvey RDCS     BSA: 2.2 m2  Height: 63 in  Weight: 257 lb  HR: 62  BP: 167/94  mmHg  _____________________________________________________________________________  __        Procedure  Bubble Echocardiogram with two-dimensional, color and spectral Doppler  performed. IV start location R Hand . Bateriostatic Saline used for Bubble  Study.  _____________________________________________________________________________  __        Interpretation Summary     Right ventricular function, chamber size, wall motion, and thickness are  normal.  PFO again documented with color doppler and Bubble study.  Mild to moderate tricuspid insufficiency is present.  Right ventricular systolic pressure is 61mmHg above the right atrial pressure.  No pericardial effusion is present.  _____________________________________________________________________________  __        Left Ventricle  Global and regional left ventricular function is normal with an EF of 55-60%.  Left ventricular wall thickness is normal. Left ventricular size is normal.  Left ventricular diastolic function is indeterminate. No regional wall motion  abnormalities are seen.     Right Ventricle  Right ventricular function, chamber size, wall motion, and thickness are  normal.     Atria  Moderate biatrial enlargement is present. PFO again documented with color  doppler and Bubble study.     Mitral Valve  Mild to moderate mitral insufficiency is present.        Aortic Valve  Aortic valve is normal in structure and function.     Tricuspid Valve  Mild to moderate tricuspid insufficiency is present. Right ventricular  systolic pressure is 61mmHg above the right atrial pressure.     Pulmonic Valve  The pulmonic valve is normal. Trace to mild pulmonic insufficiency is present.     Vessels  The aorta root is normal. The pulmonary artery is normal. The inferior vena  cava is normal.     Pericardium  No pericardial effusion is present.     _____________________________________________________________________________  __  MMode/2D Measurements &  Calculations  IVSd: 0.89 cm  LVIDd: 5.3 cm  LVIDs: 3.1 cm  LVPWd: 0.75 cm  FS: 41.8 %  LV mass(C)d: 154.8 grams  LV mass(C)dI: 72.0 grams/m2     Ao root diam: 3.0 cm  LA dimension: 5.3 cm  asc Aorta Diam: 3.6 cm  LA/Ao: 1.8  LVOT diam: 2.2 cm  LVOT area: 3.7 cm2  LA Volume (BP): 96.0 ml  LA Volume Index (BP): 44.7 ml/m2  RWT: 0.28        Doppler Measurements & Calculations  MV E max luis: 92.8 cm/sec  MV A max luis: 58.2 cm/sec  MV E/A: 1.6  MV dec time: 0.15 sec     Ao V2 max: 147.4 cm/sec  Ao max P.0 mmHg  TARUN(V,D): 2.6 cm2  LV V1 max P.3 mmHg  LV V1 max: 104.1 cm/sec  LV V1 VTI: 24.8 cm  MR ERO: 0.27 cm2  CO(LVOT): 5.6 l/min  CI(LVOT): 2.6 l/min/m2  SV(LVOT): 92.9 ml  SI(LVOT): 43.2 ml/m2  PA V2 max: 110.6 cm/sec  PA max P.9 mmHg  PA acc time: 0.08 sec  PI end-d luis: 154.4 cm/sec  PI max luis: 255.8 cm/sec  PI max P.2 mmHg  TR max luis: 385.4 cm/sec  TR max P.4 mmHg  Pulm Sys Luis: 63.2 cm/sec  Pulm Peng Luis: 97.7 cm/sec  Pulm S/D: 0.65  E/E' av.1  Lateral E/e': 8.9  Medial E/e': 15.4        : 1960  Study Date: 2016 01:31 PM  Age: 56 yrs  Gender: Female  Patient Location: [facility]  Reason For Study:     History: tricuspid regurgitation  Ordering Physician: JOSE ARELLANO  Referring Physician: JOSE ARELLANO  Performed By: Hugh Sharpe MD     BSA: 2.2 m2  Height: 63 in  Weight: 269 lb  ______________________________________________________________________________     Interpretation Summary  Left ventricular function, chamber size, wall motion, and wall thickness are  normal.The EF is 55-60%.  Global right ventricular function is normal. Mild right ventricular dilation  is present.  Mild to moderate TR with mild prolapse of the posterior leaflet of the  tricupsid valve.  A small patent foramen ovale is present. There is no ASD (secundum, sinus  venosus or unroofed coronary sinus). All 4 pulmonary veins drain into the  left atrium.  Mild pulmonary hypertension is present  with RVSP 45mmHg above RAP.     ______________________________________________________________________________        Procedure  Transesophageal Echocardiogram with color and spectral Doppler performed. The  procedure was performed in the Echo Lab. Informed consent for Transesophegeal  echo obtained. EVE Probe #12 was used during the procedure. Patient was  sedated using Fentanyl 100 mcg. Patient was sedated using Versed 4 mg. The  Transducer was inserted without difficulty . The patient tolerated the  procedure well. Complications None. ECG shows normal sinus rhythm. Good  quality two-dimensional was performed and interpreted.     Left Ventricle  Left ventricular function, chamber size, wall motion, and wall thickness are  normal.The EF is 55-60%.     Right Ventricle  Global right ventricular function is normal. Mild right ventricular dilation  is present.     Atria  Both atria appear normal. The left atrial appendage is normal. It is free of  spontaneous echo contrast and thrombus. A small patent foramen ovale is  present. The patent foramen ovale was demonstrated by color Doppler and  agitated saline bubble study . The patent foramen ovale was demonstrated with  Valsalva's maneuver. No evidence of ASD.     Mitral Valve  The mitral valve is normal. Trace mitral insufficiency is present.     Aortic Valve  Aortic valve is normal in structure and function. The aortic valve is  tricuspid.  Tricuspid Valve  Mild to moderate tricuspid insufficiency is present. Right ventricular  systolic pressure is 45mmHg above the right atrial pressure. Mild pulmonary  hypertension is present. Mild prolapse of the posterior leaflet of the  tricupsid valve is noted.     Pulmonic Valve  The pulmonic valve is normal.     Vessels  The pulmonary artery is normal. The aorta root is normal. The thoracic aorta  is normal. All four pulmonary veins visualized with dampened velocity  waveforms.     Pericardium  No pericardial effusion is  present.     Compared to Previous Study  This study was compared with the study from 2016. A small PFO has been  identified. .     ______________________________________________________________________________     Doppler Measurements & Calculations  TR max maria isabel: 336.7 cm/sec  TR max P.5 mmHg  ______________________________________________________________________________         Name: IDA PADRON  MRN: [id number]  : 1960  Study Date: 2016 01:31 PM  Age: 56 yrs  Gender: Female  Patient Location: [facility]  Reason For Study:     History: tricuspid regurgitation  Ordering Physician: JOSE ARELLANO  Referring Physician: JOSE ARELLANO  Performed By: Hugh Sharpe MD     BSA: 2.2 m2  Height: 63 in  Weight: 269 lb  ______________________________________________________________________________     Interpretation Summary  Left ventricular function, chamber size, wall motion, and wall thickness are  normal.The EF is 55-60%.  Global right ventricular function is normal. Mild right ventricular dilation  is present.  Mild to moderate TR with mild prolapse of the posterior leaflet of the  tricupsid valve.  A small patent foramen ovale is present. There is no ASD (secundum, sinus  venosus or unroofed coronary sinus). All 4 pulmonary veins drain into the  left atrium.  Mild pulmonary hypertension is present with RVSP 45mmHg above RAP.     ______________________________________________________________________________        Procedure  Transesophageal Echocardiogram with color and spectral Doppler performed. The  procedure was performed in the Echo Lab. Informed consent for Transesophegeal  echo obtained. EVE Probe #12 was used during the procedure. Patient was  sedated using Fentanyl 100 mcg. Patient was sedated using Versed 4 mg. The  Transducer was inserted without difficulty . The patient tolerated the  procedure well. Complications None. ECG shows normal sinus rhythm. Good  quality  two-dimensional was performed and interpreted.     Left Ventricle  Left ventricular function, chamber size, wall motion, and wall thickness are  normal.The EF is 55-60%.     Right Ventricle  Global right ventricular function is normal. Mild right ventricular dilation  is present.     Atria  Both atria appear normal. The left atrial appendage is normal. It is free of  spontaneous echo contrast and thrombus. A small patent foramen ovale is  present. The patent foramen ovale was demonstrated by color Doppler and  agitated saline bubble study . The patent foramen ovale was demonstrated with  Valsalva's maneuver. No evidence of ASD.     Mitral Valve  The mitral valve is normal. Trace mitral insufficiency is present.     Aortic Valve  Aortic valve is normal in structure and function. The aortic valve is  tricuspid.  Tricuspid Valve  Mild to moderate tricuspid insufficiency is present. Right ventricular  systolic pressure is 45mmHg above the right atrial pressure. Mild pulmonary  hypertension is present. Mild prolapse of the posterior leaflet of the  tricupsid valve is noted.     Pulmonic Valve  The pulmonic valve is normal.     Vessels  The pulmonary artery is normal. The aorta root is normal. The thoracic aorta  is normal. All four pulmonary veins visualized with dampened velocity  waveforms.     Pericardium  No pericardial effusion is present.     Compared to Previous Study  This study was compared with the study from 2016. A small PFO has been  identified. .     ______________________________________________________________________________     Doppler Measurements & Calculations  TR max maria isabel: 336.7 cm/sec  TR max P.5 mmHg  ______________________________________________________________________________       Procedures:  ECHO: 09:   Interpretation Summary   The Ejection Fraction is estimated at 55-60%. No regional wall motion   abnormalities are seen. The right ventricle is normal size. Global right    ventricular function is normal. Right ventricular systolic pressure is 27mmHg   above the right atrial pressure. No pericardial effusion is present. The   inferior vena cava is normal.   Stress test: 08-10-09:   1. Abnormal study with a high degree of certainty.   2. There is a predominantly fixed medium sized moderately decreased   intensity myocardial perfusion defect with minimal periinfarct   reversibility involving the apical anterior, mid anterior, and apical   region of the heart. There is corresponding hypokinesis. No other   adenosine induced fixed or reversible myocardial perfusion defect.   3. Left ventricular size is enlarged at rest. Transient ischemic   dilation of the left ventricle did not occur.   4. The left ventricular ejection fraction is 56 %.   5. Summed Stress Score is calculated at 18, which is associated   with increased risk of future coronary events.   CCL: 08-26-09: ARELLANO:   Mrs. Live is a 49 year old female with known coronary artery disease s/p MI in 1996 found to have minimal disease on catheterization. Her cardiovascular risk factors include HTN and hyperlipidemia. She presented with a 2 week history of dyspnea with exertion and lower extremity edema. She underwent Adenosine MPI which revealed a fixed medium-sized defect with minimal periinfarct reversability and anterior hypokinesis. Her LVEF was preserved at 56%. CT angio of the coronaries revealed moderate stenosis in the pLAD and dRCA and mild to moderate stenosis of the pLCx.   Access: 6F long RFA, 6F RFV   Coronary Anatomy:   LMCA: normal   LAD: There is a long, discrete, ectatic 70-80% lesion in the ostial and proximal LAD. There is one D1 branch without any significant disease.   LCx: no significant disease. There is a large OM1 branch that has a 50-60% stenosis prior to the branch bifurcation.   RCA: The proximal and mid RCA have luminal irregularities without significance. The distal RCA has a complex bifurcation lesion  prior to the take-off of the rPDA. There is disease in the ostial PL1 as well.   Conclusions:   1. 3-vessel coronary artery disease without left main lesion   Plan   Discussed the options in depth with the patient. Given the proximity of the LAD lesion to the LMCA and the complexity of the dRCA lesion, we recommended CABG for the patient. She will be admitted to the hospital.Discussed with Darleen Johnson MD.   Cardiac Surgery: 2009   Triple vessel coronary artery bypass grafting. Left internal mammary artery was placed to the left anterior descending coronary artery and separate reverse saphenous vein grafts were placed to the obtuse marginal and right posterior descending coronary arteries.     Name: IDA PADRON  MRN: [id number]  : 1960  Study Date: 2018 11:48 AM  Age: 57 yrs  Gender: Female  Patient Location: [facility]  Reason For Study: Hx PFO, Elevated PA pressures  Ordering Physician: SANNA SALAZAR  Referring Physician: SANNA SALAZAR  Performed By: Tray Harvey RDCS     BSA: 2.2 m2  Height: 63 in  Weight: 257 lb  HR: 62  BP: 167/94 mmHg  _____________________________________________________________________________  __        Procedure  Bubble Echocardiogram with two-dimensional, color and spectral Doppler  performed. IV start location R Hand . Bateriostatic Saline used for Bubble  Study.  _____________________________________________________________________________  __        Interpretation Summary     Right ventricular function, chamber size, wall motion, and thickness are  normal.  PFO again documented with color doppler and Bubble study.  Mild to moderate tricuspid insufficiency is present.  Right ventricular systolic pressure is 61mmHg above the right atrial pressure.  No pericardial effusion is present.  _____________________________________________________________________________  __        Left Ventricle  Global and regional left ventricular function is normal with an EF of  55-60%.  Left ventricular wall thickness is normal. Left ventricular size is normal.  Left ventricular diastolic function is indeterminate. No regional wall motion  abnormalities are seen.     Right Ventricle  Right ventricular function, chamber size, wall motion, and thickness are  normal.     Atria  Moderate biatrial enlargement is present. PFO again documented with color  doppler and Bubble study.     Mitral Valve  Mild to moderate mitral insufficiency is present.        Aortic Valve  Aortic valve is normal in structure and function.     Tricuspid Valve  Mild to moderate tricuspid insufficiency is present. Right ventricular  systolic pressure is 61mmHg above the right atrial pressure.     Pulmonic Valve  The pulmonic valve is normal. Trace to mild pulmonic insufficiency is present.     Vessels  The aorta root is normal. The pulmonary artery is normal. The inferior vena  cava is normal.     Pericardium  No pericardial effusion is present.     _____________________________________________________________________________  __  MMode/2D Measurements & Calculations  IVSd: 0.89 cm  LVIDd: 5.3 cm  LVIDs: 3.1 cm  LVPWd: 0.75 cm  FS: 41.8 %  LV mass(C)d: 154.8 grams  LV mass(C)dI: 72.0 grams/m2     Ao root diam: 3.0 cm  LA dimension: 5.3 cm  asc Aorta Diam: 3.6 cm  LA/Ao: 1.8  LVOT diam: 2.2 cm  LVOT area: 3.7 cm2  LA Volume (BP): 96.0 ml  LA Volume Index (BP): 44.7 ml/m2  RWT: 0.28        Doppler Measurements & Calculations  MV E max maria isabel: 92.8 cm/sec  MV A max maria isabel: 58.2 cm/sec  MV E/A: 1.6  MV dec time: 0.15 sec     Ao V2 max: 147.4 cm/sec  Ao max P.0 mmHg  TARUN(V,D): 2.6 cm2  LV V1 max P.3 mmHg  LV V1 max: 104.1 cm/sec  LV V1 VTI: 24.8 cm  MR ERO: 0.27 cm2  CO(LVOT): 5.6 l/min  CI(LVOT): 2.6 l/min/m2  SV(LVOT): 92.9 ml  SI(LVOT): 43.2 ml/m2  PA V2 max: 110.6 cm/sec  PA max P.9 mmHg  PA acc time: 0.08 sec  PI end-d maria isabel: 154.4 cm/sec  PI max maria isabel: 255.8 cm/sec  PI max P.2 mmHg  TR max maria isabel: 385.4 cm/sec  TR  max P.4 mmHg  Pulm Sys Luis: 63.2 cm/sec  Pulm Peng Luis: 97.7 cm/sec  Pulm S/D: 0.65  E/E' av.1  Lateral E/e': 8.9  Medial E/e': 15.4    Results (pressures in mmHg)  RA: 1/4/3  RV 32/3  PA 30/13/19;  PA Sat 65%  PCWP -/-/5;  PCW Sat --%  Kannan CO/CI 3.3/1.6 L/min; TD CO 4.1/2.0 L/min  PVR 4.2 bermudez; TPR 5.7 bermudez  Hb 12.3 g/dL  NIBP 167/81/116  SVR 2200 dynes*sec/cm^5     [place]:  SVC 62.7%  IVC 65.0%  Low RA 73.0%  Mid RA 68.0%  High RA 64.6%  RV 63%  PA 65%  PCW ---     Complications: None   Blood loss: Minimal blood loss     Conclusions:  1. Low right and left sided filling pressures.  2. Normal pulmonary artery pressures.  3. Low normal cardiac output.                     CC:  Eliz Adame

## 2020-10-27 ENCOUNTER — MYC MEDICAL ADVICE (OUTPATIENT)
Dept: NEPHROLOGY | Facility: CLINIC | Age: 60
End: 2020-10-27

## 2020-10-28 ENCOUNTER — ANTICOAGULATION THERAPY VISIT (OUTPATIENT)
Dept: ANTICOAGULATION | Facility: OTHER | Age: 60
End: 2020-10-28

## 2020-10-28 DIAGNOSIS — D64.9 ANEMIA, UNSPECIFIED TYPE: ICD-10-CM

## 2020-10-28 DIAGNOSIS — I48.0 PAF (PAROXYSMAL ATRIAL FIBRILLATION) (H): ICD-10-CM

## 2020-10-28 DIAGNOSIS — Z79.01 LONG TERM CURRENT USE OF ANTICOAGULANT THERAPY: ICD-10-CM

## 2020-10-28 DIAGNOSIS — N18.32 STAGE 3B CHRONIC KIDNEY DISEASE (H): Primary | ICD-10-CM

## 2020-10-28 LAB
CAPILLARY BLOOD COLLECTION: NORMAL
INR BLD: 2.5 (ref 0.86–1.14)

## 2020-10-28 PROCEDURE — 36416 COLLJ CAPILLARY BLOOD SPEC: CPT | Performed by: INTERNAL MEDICINE

## 2020-10-28 PROCEDURE — 99207 PR NO CHARGE NURSE ONLY: CPT | Performed by: INTERNAL MEDICINE

## 2020-10-28 PROCEDURE — 85610 PROTHROMBIN TIME: CPT | Performed by: INTERNAL MEDICINE

## 2020-10-28 NOTE — PROGRESS NOTES
ANTICOAGULATION FOLLOW-UP CLINIC VISIT    Patient Name:  Luz Marina Live  Date:  10/28/2020  Contact Type:  Telephone    SUBJECTIVE:  Patient Findings     Comments:  The patient was assessed for diet, medication, and activity level changes, missed or extra doses, bruising or bleeding, with no problem findings.          Clinical Outcomes     Comments:  The patient was assessed for diet, medication, and activity level changes, missed or extra doses, bruising or bleeding, with no problem findings.             OBJECTIVE    Recent labs: (last 7 days)     10/28/20  1429   INR 2.5*       ASSESSMENT / PLAN  INR assessment THER    Recheck INR In: 4 WEEKS    INR Location Outside lab      Anticoagulation Summary  As of 10/28/2020    INR goal:  2.0-3.0   TTR:  50.8 % (11.5 mo)   INR used for dosin.5 (10/28/2020)   Warfarin maintenance plan:  5 mg (5 mg x 1) every Wed, Fri; 7.5 mg (5 mg x 1.5) all other days   Full warfarin instructions:  5 mg every Wed, Fri; 7.5 mg all other days   Weekly warfarin total:  47.5 mg   No change documented:  Deborah Alatorre RN   Plan last modified:  Deborah Alatorre RN (10/12/2020)   Next INR check:  2020   Target end date:  Indefinite    Indications    Long-term (current) use of anticoagulants [Z79.01] [Z79.01]  PAF (paroxysmal atrial fibrillation) (H) [I48.0]             Anticoagulation Episode Summary     INR check location:      Preferred lab:      Send INR reminders to:  [facility]    Comments:  5 mg tabs, [facility] (needs staff message) PM dose      Anticoagulation Care Providers     Provider Role Specialty Phone number    Eliz Nguyen MD Referring Internal Medicine [phone]            See the Encounter Report to view Anticoagulation Flowsheet and Dosing Calendar (Go to Encounters tab in chart review, and find the Anticoagulation Therapy Visit)    Dosage adjustment made based on physician directed care plan.    Deborah Alatorre RN

## 2020-11-05 DIAGNOSIS — I48.91 ATRIAL FIBRILLATION, UNSPECIFIED TYPE (H): ICD-10-CM

## 2020-11-09 RX ORDER — METOPROLOL TARTRATE 50 MG
100 TABLET ORAL 2 TIMES DAILY
Qty: 360 TABLET | Refills: 3 | Status: SHIPPED | OUTPATIENT
Start: 2020-11-09 | End: 2021-11-09

## 2020-11-11 ENCOUNTER — MYC MEDICAL ADVICE (OUTPATIENT)
Dept: NEPHROLOGY | Facility: CLINIC | Age: 60
End: 2020-11-11

## 2020-11-16 DIAGNOSIS — D64.9 ANEMIA, UNSPECIFIED TYPE: ICD-10-CM

## 2020-11-16 DIAGNOSIS — N18.32 STAGE 3B CHRONIC KIDNEY DISEASE (H): ICD-10-CM

## 2020-11-16 LAB
ALBUMIN SERPL-MCNC: 3.1 G/DL (ref 3.4–5)
ALBUMIN UR-MCNC: 30 MG/DL
AMORPH CRY #/AREA URNS HPF: ABNORMAL /HPF
ANION GAP SERPL CALCULATED.3IONS-SCNC: 4 MMOL/L (ref 3–14)
APPEARANCE UR: CLEAR
BILIRUB UR QL STRIP: NEGATIVE
BUN SERPL-MCNC: 40 MG/DL (ref 7–30)
CALCIUM SERPL-MCNC: 9.4 MG/DL (ref 8.5–10.1)
CHLORIDE SERPL-SCNC: 108 MMOL/L (ref 94–109)
CO2 SERPL-SCNC: 23 MMOL/L (ref 20–32)
COLOR UR AUTO: YELLOW
CREAT SERPL-MCNC: 1.73 MG/DL (ref 0.52–1.04)
FERRITIN SERPL-MCNC: 182 NG/ML (ref 8–252)
GFR SERPL CREATININE-BSD FRML MDRD: 31 ML/MIN/{1.73_M2}
GLUCOSE SERPL-MCNC: 87 MG/DL (ref 70–99)
GLUCOSE UR STRIP-MCNC: NEGATIVE MG/DL
HGB BLD-MCNC: 10.4 G/DL (ref 11.7–15.7)
HGB UR QL STRIP: ABNORMAL
IRON SATN MFR SERPL: 20 % (ref 15–46)
IRON SERPL-MCNC: 44 UG/DL (ref 35–180)
KETONES UR STRIP-MCNC: NEGATIVE MG/DL
LEUKOCYTE ESTERASE UR QL STRIP: NEGATIVE
NITRATE UR QL: NEGATIVE
NON-SQ EPI CELLS #/AREA URNS LPF: ABNORMAL /LPF
PH UR STRIP: 6.5 PH (ref 5–7)
PHOSPHATE SERPL-MCNC: 3.9 MG/DL (ref 2.5–4.5)
POTASSIUM SERPL-SCNC: 5 MMOL/L (ref 3.4–5.3)
PROT UR-MCNC: 0.47 G/L
PROT/CREAT 24H UR: 0.69 G/G CR (ref 0–0.2)
RBC #/AREA URNS AUTO: ABNORMAL /HPF
SODIUM SERPL-SCNC: 135 MMOL/L (ref 133–144)
SOURCE: ABNORMAL
SP GR UR STRIP: 1.01 (ref 1–1.03)
TIBC SERPL-MCNC: 219 UG/DL (ref 240–430)
UROBILINOGEN UR STRIP-ACNC: 0.2 EU/DL (ref 0.2–1)
WBC #/AREA URNS AUTO: ABNORMAL /HPF

## 2020-11-16 PROCEDURE — 83540 ASSAY OF IRON: CPT | Performed by: INTERNAL MEDICINE

## 2020-11-16 PROCEDURE — 85018 HEMOGLOBIN: CPT | Performed by: INTERNAL MEDICINE

## 2020-11-16 PROCEDURE — 84156 ASSAY OF PROTEIN URINE: CPT | Performed by: INTERNAL MEDICINE

## 2020-11-16 PROCEDURE — 81001 URINALYSIS AUTO W/SCOPE: CPT | Performed by: INTERNAL MEDICINE

## 2020-11-16 PROCEDURE — 36415 COLL VENOUS BLD VENIPUNCTURE: CPT | Performed by: INTERNAL MEDICINE

## 2020-11-16 PROCEDURE — 83550 IRON BINDING TEST: CPT | Performed by: INTERNAL MEDICINE

## 2020-11-16 PROCEDURE — 82728 ASSAY OF FERRITIN: CPT | Performed by: INTERNAL MEDICINE

## 2020-11-16 PROCEDURE — 80069 RENAL FUNCTION PANEL: CPT | Performed by: INTERNAL MEDICINE

## 2020-11-17 DIAGNOSIS — N18.4 CKD (CHRONIC KIDNEY DISEASE) STAGE 4, GFR 15-29 ML/MIN (H): Primary | ICD-10-CM

## 2020-11-17 DIAGNOSIS — N18.32 STAGE 3B CHRONIC KIDNEY DISEASE (H): ICD-10-CM

## 2020-11-23 DIAGNOSIS — E78.00 PURE HYPERCHOLESTEROLEMIA: ICD-10-CM

## 2020-11-23 DIAGNOSIS — I48.91 ATRIAL FIBRILLATION, UNSPECIFIED TYPE (H): ICD-10-CM

## 2020-11-23 RX ORDER — WARFARIN SODIUM 5 MG/1
TABLET ORAL
Qty: 156 TABLET | Refills: 3 | Status: SHIPPED | OUTPATIENT
Start: 2020-11-23 | End: 2022-01-13

## 2020-11-23 RX ORDER — ATORVASTATIN CALCIUM 40 MG/1
TABLET, FILM COATED ORAL
Qty: 90 TABLET | Refills: 3 | Status: SHIPPED | OUTPATIENT
Start: 2020-11-23 | End: 2021-12-01

## 2020-11-23 NOTE — TELEPHONE ENCOUNTER
Received refill request for atorvastatin and warfarin.  Last in clinic 2/5/20 for problem visit.  Patient states that she is going out of town tomorrow, and needs filled today.    Refilled and advised Luz Marina to schedule an annual exam after the first of the year.

## 2020-12-09 ENCOUNTER — ANTICOAGULATION THERAPY VISIT (OUTPATIENT)
Dept: ANTICOAGULATION | Facility: OTHER | Age: 60
End: 2020-12-09

## 2020-12-09 DIAGNOSIS — I48.0 PAF (PAROXYSMAL ATRIAL FIBRILLATION) (H): ICD-10-CM

## 2020-12-09 DIAGNOSIS — Z79.01 LONG TERM CURRENT USE OF ANTICOAGULANT THERAPY: ICD-10-CM

## 2020-12-09 DIAGNOSIS — I25.10 CORONARY ARTERY DISEASE DUE TO LIPID RICH PLAQUE: ICD-10-CM

## 2020-12-09 DIAGNOSIS — I25.83 CORONARY ARTERY DISEASE DUE TO LIPID RICH PLAQUE: ICD-10-CM

## 2020-12-09 DIAGNOSIS — I25.810 CORONARY ARTERY DISEASE INVOLVING AUTOLOGOUS ARTERY CORONARY BYPASS GRAFT WITHOUT ANGINA PECTORIS: ICD-10-CM

## 2020-12-09 DIAGNOSIS — N18.32 STAGE 3B CHRONIC KIDNEY DISEASE (H): ICD-10-CM

## 2020-12-09 LAB
ALBUMIN SERPL-MCNC: 3.3 G/DL (ref 3.4–5)
ALBUMIN UR-MCNC: 30 MG/DL
ANION GAP SERPL CALCULATED.3IONS-SCNC: 3 MMOL/L (ref 3–14)
APPEARANCE UR: CLEAR
BILIRUB UR QL STRIP: NEGATIVE
BUN SERPL-MCNC: 47 MG/DL (ref 7–30)
CALCIUM SERPL-MCNC: 9.2 MG/DL (ref 8.5–10.1)
CHLORIDE SERPL-SCNC: 113 MMOL/L (ref 94–109)
CO2 SERPL-SCNC: 24 MMOL/L (ref 20–32)
COLOR UR AUTO: YELLOW
CREAT SERPL-MCNC: 1.72 MG/DL (ref 0.52–1.04)
CREAT UR-MCNC: 86 MG/DL
GFR SERPL CREATININE-BSD FRML MDRD: 32 ML/MIN/{1.73_M2}
GLUCOSE SERPL-MCNC: 85 MG/DL (ref 70–99)
GLUCOSE UR STRIP-MCNC: NEGATIVE MG/DL
HGB UR QL STRIP: ABNORMAL
INR BLD: 1.7 (ref 0.86–1.14)
KETONES UR STRIP-MCNC: NEGATIVE MG/DL
LEUKOCYTE ESTERASE UR QL STRIP: NEGATIVE
MICROALBUMIN UR-MCNC: 138 MG/L
MICROALBUMIN/CREAT UR: 160.46 MG/G CR (ref 0–25)
NITRATE UR QL: NEGATIVE
NON-SQ EPI CELLS #/AREA URNS LPF: ABNORMAL /LPF
PH UR STRIP: 6.5 PH (ref 5–7)
PHOSPHATE SERPL-MCNC: 4.2 MG/DL (ref 2.5–4.5)
POTASSIUM SERPL-SCNC: 4.9 MMOL/L (ref 3.4–5.3)
PROT UR-MCNC: 0.6 G/L
PROT/CREAT 24H UR: 0.7 G/G CR (ref 0–0.2)
RBC #/AREA URNS AUTO: ABNORMAL /HPF
SODIUM SERPL-SCNC: 140 MMOL/L (ref 133–144)
SOURCE: ABNORMAL
SP GR UR STRIP: 1.02 (ref 1–1.03)
UROBILINOGEN UR STRIP-ACNC: 0.2 EU/DL (ref 0.2–1)
WBC #/AREA URNS AUTO: ABNORMAL /HPF

## 2020-12-09 PROCEDURE — 81001 URINALYSIS AUTO W/SCOPE: CPT | Performed by: INTERNAL MEDICINE

## 2020-12-09 PROCEDURE — 80069 RENAL FUNCTION PANEL: CPT | Performed by: INTERNAL MEDICINE

## 2020-12-09 PROCEDURE — 85610 PROTHROMBIN TIME: CPT | Performed by: INTERNAL MEDICINE

## 2020-12-09 PROCEDURE — 99207 PR NO CHARGE NURSE ONLY: CPT | Performed by: INTERNAL MEDICINE

## 2020-12-09 PROCEDURE — 36415 COLL VENOUS BLD VENIPUNCTURE: CPT | Performed by: INTERNAL MEDICINE

## 2020-12-09 PROCEDURE — 84156 ASSAY OF PROTEIN URINE: CPT | Performed by: INTERNAL MEDICINE

## 2020-12-09 PROCEDURE — 82043 UR ALBUMIN QUANTITATIVE: CPT | Performed by: INTERNAL MEDICINE

## 2020-12-09 NOTE — PROGRESS NOTES
ANTICOAGULATION FOLLOW-UP CLINIC VISIT    Patient Name:  Luz Marina Live  Date:  2020  Contact Type:  Telephone    SUBJECTIVE:  Patient Findings     Comments:  Pt reports she's been taking her dose opposite of what we had down- has been taking 7.5 mg Wed, Fri and 5 mg all other days        Clinical Outcomes     Comments:  Pt reports she's been taking her dose opposite of what we had down- has been taking 7.5 mg Wed, Fri and 5 mg all other days           OBJECTIVE    Recent labs: (last 7 days)     20  1252   INR 1.7*       ASSESSMENT / PLAN  INR assessment SUB    Recheck INR In: 2 WEEKS    INR Location Outside lab      Anticoagulation Summary  As of 2020    INR goal:  2.0-3.0   TTR:  52.2 % (1 y)   INR used for dosin.7 (2020)   Warfarin maintenance plan:  7.5 mg (5 mg x 1.5) every Mon, Wed, Fri; 5 mg (5 mg x 1) all other days   Full warfarin instructions:  : 10 mg; Otherwise 7.5 mg every Mon, Wed, Fri; 5 mg all other days   Weekly warfarin total:  42.5 mg   Plan last modified:  Deborah Alatorre RN (2020)   Next INR check:  2020   Target end date:  Indefinite    Indications    Long-term (current) use of anticoagulants [Z79.01] [Z79.01]  PAF (paroxysmal atrial fibrillation) (H) [I48.0]             Anticoagulation Episode Summary     INR check location:      Preferred lab:      Send INR reminders to:  [facility]    Comments:  5 mg tabs, [facility] (needs staff message) PM dose      Anticoagulation Care Providers     Provider Role Specialty Phone number    Eliz Nguyen MD Referring Internal Medicine [phone]            See the Encounter Report to view Anticoagulation Flowsheet and Dosing Calendar (Go to Encounters tab in chart review, and find the Anticoagulation Therapy Visit)    Dosage adjustment made based on physician directed care plan.    Deborah Alatorre RN

## 2020-12-16 DIAGNOSIS — M32.14 SYSTEMIC LUPUS ERYTHEMATOSUS WITH GLOMERULAR DISEASE, UNSPECIFIED SLE TYPE (H): Primary | ICD-10-CM

## 2021-01-09 NOTE — MR AVS SNAPSHOT
After Visit Summary   6/13/2018    Luz Marina Live    MRN: [id number]           Patient Information     Date Of Birth          1960        Visit Information        Provider Department      6/13/2018 8:00 PM [facility] BED 4 [facility]        Today's Diagnoses     Dyspnea on exertion        Obstructive sleep apnea syndrome        Psychophysiological insomnia           Follow-ups after your visit        Your next 10 appointments already scheduled     Jun 25, 2018 11:20 AM CDT   Return Visit with Eliz Nguyen MD   [facility]  ([facility])    [address] [address] [phone]            Jun 25, 2018  1:30 PM CDT   Return Sleep Patient with Rodrigo Nguyễn MD   [facility] [place], Sleep Study ([facility])    [address] [facility] [id number] [phone]            Aug 06, 2018 11:30 AM CDT   Return Visit with Ever Adame MD   [facility] ([facility])    [address] [facility] [address] [phone]              Who to contact     If you have questions or need follow up information about today's clinic visit or your schedule please contact [facility] directly at [phone].  Normal or non-critical lab and imaging results will be communicated to you by MyChart, letter or phone within 4 business days after the clinic has received the results. If you do not hear from us within 7 days, please contact the clinic through MyChart or phone. If you have a critical or abnormal lab result, we will notify you by phone as soon as possible.  Submit refill requests through [employer] or call your pharmacy and they will forward the refill request to us. Please allow 3 business days for your refill to be completed.          Additional Information About Your Visit        MyChart  "Information     [employer] lets you send messages to your doctor, view your test results, renew your prescriptions, schedule appointments and more. To sign up, go to [email or web address] . Click on \"Log in\" on the left side of the screen, which will take you to the Welcome page. Then click on \"Sign up Now\" on the right side of the page.     You will be asked to enter the access code listed below, as well as some personal information. Please follow the directions to create your username and password.     Your access code is: [email or web address]  Expires: 8/15/2018  6:31 AM     Your access code will  in 90 days. If you need help or a new code, please call your [facility] clinic or [phone].        Care EveryWhere ID     This is your Care EveryWhere ID. This could be used by other organizations to access your [facility] medical records  [id number]         Blood Pressure from Last 3 Encounters:   18 145/84   18 155/77   04/10/18 120/68    Weight from Last 3 Encounters:   18 116.9 kg (257 lb 12.8 oz)   18 113.4 kg (250 lb)   04/10/18 115 kg (253 lb 9.6 oz)              We Performed the Following     Comprehensive Sleep Study        Primary Care Provider Office Phone # Fax #    Eliz My Nguyen [phone]       [facility] [address] [facility] [address]        Equal Access to Services     [facility] Center[facility]SE[facility]EL : Hadii aad ku hadasho Soomaali, waaxda luqadaha, qaybta kaalmada adeegyada, mayte roberts. So [facility] [phone].    ATENCIÓN: Si habla español, tiene a bennett disposición servicios gratuitos de asistencia lingüística. Llame al [phone].    We comply with applicable federal civil rights laws and [place] laws. We do not discriminate on the basis of race, color, national origin, age, disability, sex, sexual orientation, or gender identity.            Thank you!     Thank you for choosing [facility]  for your care. " Our goal is always to provide you with excellent care. Hearing back from our patients is one way we can continue to improve our services. Please take a few minutes to complete the written survey that you may receive in the mail after your visit with us. Thank you!             Your Updated Medication List - Protect others around you: Learn how to safely use, store and throw away your medicines at www.disposemymeds.org.          This list is accurate as of 6/13/18 11:59 PM.  Always use your most recent med list.                   Brand Name Dispense Instructions for use Diagnosis    aspirin 81 MG tablet      Take by mouth daily        atorvastatin 40 MG tablet    LIPITOR    90 tablet    Take 1 tablet (40 mg) by mouth daily    Pure hypercholesterolemia       CALCIUM 600-D PO      Take  by mouth 2 times daily.    SLE (systemic lupus erythematosus) (H)       erythromycin ophthalmic ointment    ROMYCIN    1 Tube    Place 1 Application Into the left eye 4 times daily    Conjunctivitis of both eyes, unspecified conjunctivitis type       hydroxychloroquine 200 MG tablet    PLAQUENIL    60 tablet    Take 1 tablet (200 mg) by mouth 2 times daily    Systemic lupus erythematosus with glomerular disease, unspecified SLE type (H)       lisinopril 20 MG tablet    PRINIVIL/ZESTRIL    145 tablet    Take 1.5 tablets (30 mg) by mouth daily    Systemic lupus erythematosus with glomerular disease, unspecified SLE type (H)       metoprolol tartrate 50 MG tablet    LOPRESSOR    180 tablet    TAKE 1 TABLET (50 MG) BY MOUTH 2 TIMES DAILY    Atrial fibrillation, unspecified type (H)       MULTIPLE VITAMIN PO      Take 1 tablet by mouth daily        mupirocin 2 % nasal ointment    BACTROBAN NASAL    10 g    Place a thin layer inside right nose on septum BID x 2 weeks    Nasal septum ulceration       neomycin-polymyxin-hydrocortisone 3.5-19206-6 otic suspension    CORTISPORIN    10 mL    Place 3 drops into both ears 3 times daily    Infective  otitis externa, bilateral       omega-3 fatty acids 1200 MG capsule      Take 2 capsules by mouth 2 times daily        warfarin 5 MG tablet    COUMADIN    160 tablet    Take 5 mg on Friday and 10 mg all other days, or as directed by the Coumadin Clinic.    Atrial fibrillation, unspecified type (H)       zolpidem 5 MG tablet    AMBIEN    1 tablet    Take tablet by mouth 15 minutes prior to sleep, for Sleep Study    Dyspnea on exertion, Obstructive sleep apnea syndrome, Psychophysiological insomnia          - - -

## 2021-01-29 ENCOUNTER — TELEPHONE (OUTPATIENT)
Dept: ANTICOAGULATION | Facility: CLINIC | Age: 61
End: 2021-01-29

## 2021-01-29 NOTE — TELEPHONE ENCOUNTER
Outreach 1-    ANTICOAGULATION     Luz Marina M Calos is overdue for INR check.      Left message for patient to call and schedule lab appointment as soon as possible. If returning call, please schedule.     Cait Hawthorne RN

## 2021-02-01 ENCOUNTER — ANTICOAGULATION THERAPY VISIT (OUTPATIENT)
Dept: ANTICOAGULATION | Facility: CLINIC | Age: 61
End: 2021-02-01

## 2021-02-01 ENCOUNTER — TELEPHONE (OUTPATIENT)
Dept: ANTICOAGULATION | Facility: OTHER | Age: 61
End: 2021-02-01

## 2021-02-01 DIAGNOSIS — Z79.01 LONG TERM CURRENT USE OF ANTICOAGULANT THERAPY: ICD-10-CM

## 2021-02-01 DIAGNOSIS — M32.14 SYSTEMIC LUPUS ERYTHEMATOSUS WITH GLOMERULAR DISEASE, UNSPECIFIED SLE TYPE (H): ICD-10-CM

## 2021-02-01 DIAGNOSIS — I48.0 PAF (PAROXYSMAL ATRIAL FIBRILLATION) (H): ICD-10-CM

## 2021-02-01 DIAGNOSIS — Z79.01 LONG TERM CURRENT USE OF ANTICOAGULANT THERAPY: Primary | ICD-10-CM

## 2021-02-01 LAB
ALBUMIN SERPL-MCNC: 3.1 G/DL (ref 3.4–5)
ALBUMIN UR-MCNC: 30 MG/DL
ANION GAP SERPL CALCULATED.3IONS-SCNC: 5 MMOL/L (ref 3–14)
APPEARANCE UR: CLEAR
BILIRUB UR QL STRIP: NEGATIVE
BUN SERPL-MCNC: 38 MG/DL (ref 7–30)
CALCIUM SERPL-MCNC: 8.8 MG/DL (ref 8.5–10.1)
CASTS #/AREA URNS LPF: ABNORMAL /LPF (ref 0–2)
CHLORIDE SERPL-SCNC: 109 MMOL/L (ref 94–109)
CO2 SERPL-SCNC: 25 MMOL/L (ref 20–32)
COLOR UR AUTO: YELLOW
CREAT SERPL-MCNC: 1.53 MG/DL (ref 0.52–1.04)
CREAT UR-MCNC: 101 MG/DL
GFR SERPL CREATININE-BSD FRML MDRD: 36 ML/MIN/{1.73_M2}
GLUCOSE SERPL-MCNC: 98 MG/DL (ref 70–99)
GLUCOSE UR STRIP-MCNC: NEGATIVE MG/DL
HGB UR QL STRIP: ABNORMAL
INR BLD: 1.6 (ref 0.86–1.14)
KETONES UR STRIP-MCNC: NEGATIVE MG/DL
LEUKOCYTE ESTERASE UR QL STRIP: NEGATIVE
MICROALBUMIN UR-MCNC: 142 MG/L
MICROALBUMIN/CREAT UR: 140.59 MG/G CR (ref 0–25)
NITRATE UR QL: NEGATIVE
NON-SQ EPI CELLS #/AREA URNS LPF: ABNORMAL /LPF
PH UR STRIP: 7 PH (ref 5–7)
PHOSPHATE SERPL-MCNC: 4 MG/DL (ref 2.5–4.5)
POTASSIUM SERPL-SCNC: 4.5 MMOL/L (ref 3.4–5.3)
PROT UR-MCNC: 0.61 G/L
PROT/CREAT 24H UR: 0.6 G/G CR (ref 0–0.2)
RBC #/AREA URNS AUTO: ABNORMAL /HPF
SODIUM SERPL-SCNC: 139 MMOL/L (ref 133–144)
SOURCE: ABNORMAL
SP GR UR STRIP: 1.02 (ref 1–1.03)
UROBILINOGEN UR STRIP-ACNC: 0.2 EU/DL (ref 0.2–1)
WBC #/AREA URNS AUTO: ABNORMAL /HPF

## 2021-02-01 PROCEDURE — 82043 UR ALBUMIN QUANTITATIVE: CPT | Performed by: INTERNAL MEDICINE

## 2021-02-01 PROCEDURE — 84156 ASSAY OF PROTEIN URINE: CPT | Performed by: INTERNAL MEDICINE

## 2021-02-01 PROCEDURE — 81001 URINALYSIS AUTO W/SCOPE: CPT | Performed by: INTERNAL MEDICINE

## 2021-02-01 PROCEDURE — 36415 COLL VENOUS BLD VENIPUNCTURE: CPT | Performed by: INTERNAL MEDICINE

## 2021-02-01 PROCEDURE — 85610 PROTHROMBIN TIME: CPT | Performed by: INTERNAL MEDICINE

## 2021-02-01 PROCEDURE — 80069 RENAL FUNCTION PANEL: CPT | Performed by: INTERNAL MEDICINE

## 2021-02-01 NOTE — PROGRESS NOTES
ANTICOAGULATION MANAGEMENT     Patient Name:  Luz Marina Live  Date:  2021    ASSESSMENT /SUBJECTIVE:    Today's INR result of 1.6 is subtherapeutic. Goal INR of 2.0-3.0      Warfarin dose taken: Warfarin taken as instructed    Diet: No new diet changes affecting INR    Medication changes/ interactions: No new medications/supplements affecting INR    Previous INR: Subtherapeutic     S/S of bleeding or thromboembolism: No    New injury or illness: No    Upcoming surgery, procedure or cardioversion: No    Additional findings: None      PLAN:    Telephone call with Luz Marina regarding INR result and instructed:     Warfarin Dosing Instructions: 10 mg tonight then change your warfarin dose to 5 mg Tues, Thurs, Sat and 7.5 mg ROW  . (5.9 % change)    Instructed patient to follow up no later than: 2 weeks  Patient offered & declined to schedule next visit    Education provided: Importance of following up for INR monitoring at instructed interval      Pt verbalizes understanding and agrees to warfarin dosing plan.    Instructed to call the Anticoagulation Clinic for any changes, questions or concerns. (#[phone])        Cait Hawthorne RN      OBJECTIVE:  Recent labs: (last 7 days)     21  1557   INR 1.6*         INR assessment SUB    Recheck INR In: 2 WEEKS    INR Location Outside lab      Anticoagulation Summary  As of 2021    INR goal:  2.0-3.0   TTR:  39.9 % (1 y)   INR used for dosin.6 (2021)   Warfarin maintenance plan:  5 mg (5 mg x 1) every Tue, Thu, Sat; 7.5 mg (5 mg x 1.5) all other days   Full warfarin instructions:  : 10 mg; Otherwise 5 mg every Tue, Thu, Sat; 7.5 mg all other days   Weekly warfarin total:  45 mg   Plan last modified:  Cait Hawthorne RN (2021)   Next INR check:  2/15/2021   Target end date:  Indefinite    Indications    Long-term (current) use of anticoagulants [Z79.01] [Z79.01]  PAF (paroxysmal atrial fibrillation) (H) [I48.0]             Anticoagulation  Episode Summary     INR check location:      Preferred lab:      Send INR reminders to:  [facility]    Comments:  5 mg tabs, [facility] (needs staff message) PM dose      Anticoagulation Care Providers     Provider Role Specialty Phone number    Eliz Nguyen MD Referring Internal Medicine [phone]

## 2021-02-16 ENCOUNTER — TELEPHONE (OUTPATIENT)
Dept: ANTICOAGULATION | Facility: OTHER | Age: 61
End: 2021-02-16

## 2021-02-16 NOTE — TELEPHONE ENCOUNTER
ANTICOAGULATION     Luz Marina Live is overdue for INR check.      Left message for patient to call and schedule lab appointment as soon as possible. If returning call, please schedule.     Deborah Alatorre RN

## 2021-02-17 ENCOUNTER — ANTICOAGULATION THERAPY VISIT (OUTPATIENT)
Dept: ANTICOAGULATION | Facility: OTHER | Age: 61
End: 2021-02-17

## 2021-02-17 DIAGNOSIS — I48.0 PAF (PAROXYSMAL ATRIAL FIBRILLATION) (H): ICD-10-CM

## 2021-02-17 DIAGNOSIS — Z79.01 LONG TERM CURRENT USE OF ANTICOAGULANT THERAPY: ICD-10-CM

## 2021-02-17 LAB — INR BLD: 1.6 (ref 0.86–1.14)

## 2021-02-17 PROCEDURE — 36416 COLLJ CAPILLARY BLOOD SPEC: CPT | Performed by: INTERNAL MEDICINE

## 2021-02-17 PROCEDURE — 85610 PROTHROMBIN TIME: CPT | Performed by: INTERNAL MEDICINE

## 2021-02-17 NOTE — PROGRESS NOTES
ANTICOAGULATION MANAGEMENT     Patient Name:  Luz Marina Live  Date:  2021    ASSESSMENT /SUBJECTIVE:    Today's INR result of 1.6 is subtherapeutic. Goal INR of 2.0-3.0      Warfarin dose taken: Warfarin taken as instructed    Diet: No new diet changes affecting INR    Medication changes/ interactions: No new medications/supplements affecting INR    Previous INR: Subtherapeutic     S/S of bleeding or thromboembolism: No    New injury or illness: No    Upcoming surgery, procedure or cardioversion: No    Additional findings: None      PLAN:    Telephone call with Luz Marina regarding INR result and instructed:     Warfarin Dosing Instructions: Change your warfarin dose to 5 mg Tues and 7.5 mg ROW  . (11.1 % change)    Instructed patient to follow up no later than: 2 weeks  Patient offered & declined to schedule next visit    Education provided: None required      Pt verbalizes understanding and agrees to warfarin dosing plan.    Instructed to call the Anticoagulation Clinic for any changes, questions or concerns. (#[phone])        Cait Hawthorne RN      OBJECTIVE:  Recent labs: (last 7 days)     21  1323   INR 1.6*         INR assessment SUB    Recheck INR In: 2 WEEKS    INR Location Outside lab      Anticoagulation Summary  As of 2021    INR goal:  2.0-3.0   TTR:  35.5 % (1 y)   INR used for dosin.6 (2021)   Warfarin maintenance plan:  5 mg (5 mg x 1) every Tue; 7.5 mg (5 mg x 1.5) all other days   Full warfarin instructions:  5 mg every Tue; 7.5 mg all other days   Weekly warfarin total:  50 mg   Plan last modified:  Cait Hawthorne RN (2021)   Next INR check:  3/3/2021   Priority:  Maintenance   Target end date:  Indefinite    Indications    Long-term (current) use of anticoagulants [Z79.01] [Z79.01]  PAF (paroxysmal atrial fibrillation) (H) [I48.0]             Anticoagulation Episode Summary     INR check location:      Preferred lab:      Send INR reminders to:  DEBBIE GARZA  SANNA    Comments:  5 mg tabs, [facility] (needs staff message) PM dose      Anticoagulation Care Providers     Provider Role Specialty Phone number    Eliz Nguyen MD Referring Internal Medicine [phone]

## 2021-03-03 ENCOUNTER — ANTICOAGULATION THERAPY VISIT (OUTPATIENT)
Dept: ANTICOAGULATION | Facility: CLINIC | Age: 61
End: 2021-03-03

## 2021-03-03 DIAGNOSIS — I48.0 PAF (PAROXYSMAL ATRIAL FIBRILLATION) (H): ICD-10-CM

## 2021-03-03 DIAGNOSIS — Z79.01 LONG TERM CURRENT USE OF ANTICOAGULANT THERAPY: ICD-10-CM

## 2021-03-03 LAB — INR BLD: 2 (ref 0.86–1.14)

## 2021-03-03 PROCEDURE — 85610 PROTHROMBIN TIME: CPT | Performed by: INTERNAL MEDICINE

## 2021-03-03 PROCEDURE — 36416 COLLJ CAPILLARY BLOOD SPEC: CPT | Performed by: INTERNAL MEDICINE

## 2021-03-03 NOTE — PROGRESS NOTES
ANTICOAGULATION MANAGEMENT     Patient Name:  Luz Marina Live  Date:  3/3/2021    ASSESSMENT /SUBJECTIVE:    Today's INR result of 2.0 is therapeutic. Goal INR of 2.0-3.0      Warfarin dose taken: Warfarin taken as instructed    Diet: No new diet changes affecting INR    Medication changes/ interactions: No new medications/supplements affecting INR    Previous INR: Subtherapeutic     S/S of bleeding or thromboembolism: No    New injury or illness: No    Upcoming surgery, procedure or cardioversion: No    Additional findings: None      PLAN:    Telephone call with Luz Marina regarding INR result and instructed:     Warfarin Dosing Instructions: Continue your current warfarin dose 5 mg Tues and 7.5 mg ROW    Instructed patient to follow up no later than: 3 weeks  Patient offered & declined to schedule next visit    Education provided: None required      Pt verbalizes understanding and agrees to warfarin dosing plan.    Instructed to call the Anticoagulation Clinic for any changes, questions or concerns. (#[phone])        Cait Hawthorne RN      OBJECTIVE:  Recent labs: (last 7 days)     21  1255   INR 2.0*         INR assessment THER    Recheck INR In: 3 WEEKS    INR Location Outside lab      Anticoagulation Summary  As of 3/3/2021    INR goal:  2.0-3.0   TTR:  31.7 % (1 y)   INR used for dosin.0 (3/3/2021)   Warfarin maintenance plan:  5 mg (5 mg x 1) every Tue; 7.5 mg (5 mg x 1.5) all other days   Full warfarin instructions:  5 mg every Tue; 7.5 mg all other days   Weekly warfarin total:  50 mg   No change documented:  Cait Hawthorne RN   Plan last modified:  Cait Hawthorne RN (2021)   Next INR check:  3/24/2021   Priority:  Maintenance   Target end date:  Indefinite    Indications    Long-term (current) use of anticoagulants [Z79.01] [Z79.01]  PAF (paroxysmal atrial fibrillation) (H) [I48.0]             Anticoagulation Episode Summary     INR check location:      Preferred lab:      Send  INR reminders to:  [facility]    Comments:  5 mg tabsZeb lab (needs staff message) PM dose      Anticoagulation Care Providers     Provider Role Specialty Phone number    Eliz Nguyen MD Referring Internal Medicine [phone]

## 2021-03-28 ENCOUNTER — HEALTH MAINTENANCE LETTER (OUTPATIENT)
Age: 61
End: 2021-03-28

## 2021-04-05 ENCOUNTER — TELEPHONE (OUTPATIENT)
Dept: OBGYN | Facility: CLINIC | Age: 61
End: 2021-04-05

## 2021-04-05 NOTE — TELEPHONE ENCOUNTER
----- Message from Bella Wick RN sent at 4/5/2021  3:46 PM CDT -----  Regarding: cough  Pt vonnie had a cough since 3/25/21.  The cough is worse at night - should she go to urgent care or get an appt?

## 2021-04-07 ENCOUNTER — ANTICOAGULATION THERAPY VISIT (OUTPATIENT)
Dept: ANTICOAGULATION | Facility: CLINIC | Age: 61
End: 2021-04-07

## 2021-04-07 DIAGNOSIS — Z79.01 LONG TERM CURRENT USE OF ANTICOAGULANT THERAPY: ICD-10-CM

## 2021-04-07 DIAGNOSIS — M32.14 SYSTEMIC LUPUS ERYTHEMATOSUS WITH GLOMERULAR DISEASE, UNSPECIFIED SLE TYPE (H): ICD-10-CM

## 2021-04-07 DIAGNOSIS — I48.0 PAF (PAROXYSMAL ATRIAL FIBRILLATION) (H): ICD-10-CM

## 2021-04-07 LAB
ALBUMIN SERPL-MCNC: 3.2 G/DL (ref 3.4–5)
ALBUMIN UR-MCNC: 30 MG/DL
ANION GAP SERPL CALCULATED.3IONS-SCNC: 4 MMOL/L (ref 3–14)
APPEARANCE UR: CLEAR
BILIRUB UR QL STRIP: NEGATIVE
BUN SERPL-MCNC: 48 MG/DL (ref 7–30)
CALCIUM SERPL-MCNC: 9.3 MG/DL (ref 8.5–10.1)
CHLORIDE SERPL-SCNC: 109 MMOL/L (ref 94–109)
CO2 SERPL-SCNC: 24 MMOL/L (ref 20–32)
COLOR UR AUTO: YELLOW
CREAT SERPL-MCNC: 1.69 MG/DL (ref 0.52–1.04)
CREAT UR-MCNC: 172 MG/DL
GFR SERPL CREATININE-BSD FRML MDRD: 32 ML/MIN/{1.73_M2}
GLUCOSE SERPL-MCNC: 80 MG/DL (ref 70–99)
GLUCOSE UR STRIP-MCNC: NEGATIVE MG/DL
HGB UR QL STRIP: NEGATIVE
INR BLD: 2.3 (ref 0.86–1.14)
KETONES UR STRIP-MCNC: NEGATIVE MG/DL
LEUKOCYTE ESTERASE UR QL STRIP: NEGATIVE
MICROALBUMIN UR-MCNC: 96 MG/L
MICROALBUMIN/CREAT UR: 55.81 MG/G CR (ref 0–25)
NITRATE UR QL: NEGATIVE
PH UR STRIP: 6 PH (ref 5–7)
PHOSPHATE SERPL-MCNC: 3.8 MG/DL (ref 2.5–4.5)
POTASSIUM SERPL-SCNC: 4.8 MMOL/L (ref 3.4–5.3)
PROT UR-MCNC: 0.58 G/L
PROT/CREAT 24H UR: 0.34 G/G CR (ref 0–0.2)
RBC #/AREA URNS AUTO: NORMAL /HPF
SODIUM SERPL-SCNC: 137 MMOL/L (ref 133–144)
SOURCE: ABNORMAL
SP GR UR STRIP: >1.03 (ref 1–1.03)
UROBILINOGEN UR STRIP-ACNC: 0.2 EU/DL (ref 0.2–1)
WBC #/AREA URNS AUTO: NORMAL /HPF

## 2021-04-07 PROCEDURE — 80069 RENAL FUNCTION PANEL: CPT | Performed by: INTERNAL MEDICINE

## 2021-04-07 PROCEDURE — 81001 URINALYSIS AUTO W/SCOPE: CPT | Performed by: INTERNAL MEDICINE

## 2021-04-07 PROCEDURE — 84156 ASSAY OF PROTEIN URINE: CPT | Performed by: INTERNAL MEDICINE

## 2021-04-07 PROCEDURE — 36415 COLL VENOUS BLD VENIPUNCTURE: CPT | Performed by: INTERNAL MEDICINE

## 2021-04-07 PROCEDURE — 82043 UR ALBUMIN QUANTITATIVE: CPT | Performed by: INTERNAL MEDICINE

## 2021-04-07 PROCEDURE — 85610 PROTHROMBIN TIME: CPT | Performed by: INTERNAL MEDICINE

## 2021-04-07 NOTE — PROGRESS NOTES
ANTICOAGULATION MANAGEMENT     Patient Name:  Luz Marina Live  Date:  2021    ASSESSMENT /SUBJECTIVE:    Today's INR result of 2.3 is therapeutic. Goal INR of 2.0-3.0      Warfarin dose taken: Warfarin taken as instructed    Diet: No new diet changes affecting INR    Medication changes/ interactions: Potential interaction between antibiotic - pt states she thinks its doxycycline but isnt sure and warfarin which may affect subsequent INRs. She states that she was started on an antibiotic on Monday. I advised an appt on  if she notices bleeding/bruising as most antibiotics an increase INR    Previous INR: Therapeutic     S/S of bleeding or thromboembolism: No    New injury or illness: No    Upcoming surgery, procedure or cardioversion: No    Additional findings: None      PLAN:    Telephone call with Luz Marina regarding INR result and instructed:     Warfarin Dosing Instructions: Continue your current warfarin dose 5 mg Tues and 7.5 mg ROW    Instructed patient to follow up no later than: 4 weeks  Patient offered & declined to schedule next visit    Education provided: Potential interaction between warfarin and antibiotic       pt verbalizes understanding and agrees to warfarin dosing plan.    Instructed to call the Anticoagulation Clinic for any changes, questions or concerns. (#[phone])        Cait Hawthorne RN      OBJECTIVE:  Recent labs: (last 7 days)     21  1332   INR 2.3*         INR assessment THER    Recheck INR In: 4 WEEKS    INR Location Outside lab      Anticoagulation Summary  As of 2021    INR goal:  2.0-3.0   TTR:  31.7 % (1 y)   INR used for dosin.3 (2021)   Warfarin maintenance plan:  5 mg (5 mg x 1) every Tue; 7.5 mg (5 mg x 1.5) all other days   Full warfarin instructions:  5 mg every Tue; 7.5 mg all other days   Weekly warfarin total:  50 mg   No change documented:  Cait Hawthorne, RN   Plan last modified:  Cait Hawthorne, RN (2021)   Next INR check:   5/5/2021   Priority:  Maintenance   Target end date:  Indefinite    Indications    Long-term (current) use of anticoagulants [Z79.01] [Z79.01]  PAF (paroxysmal atrial fibrillation) (H) [I48.0]             Anticoagulation Episode Summary     INR check location:      Preferred lab:      Send INR reminders to:  [facility]    Comments:  5 mg tabsZeb lab (needs staff message) PM dose      Anticoagulation Care Providers     Provider Role Specialty Phone number    Eliz Nguyen MD Referring Internal Medicine [phone]

## 2021-04-22 ENCOUNTER — IMMUNIZATION (OUTPATIENT)
Dept: PEDIATRICS | Facility: CLINIC | Age: 61
End: 2021-04-22
Payer: COMMERCIAL

## 2021-04-22 PROCEDURE — 91300 PR COVID VAC PFIZER DIL RECON 30 MCG/0.3 ML IM: CPT

## 2021-04-22 PROCEDURE — 0001A PR COVID VAC PFIZER DIL RECON 30 MCG/0.3 ML IM: CPT

## 2021-05-12 ENCOUNTER — TELEPHONE (OUTPATIENT)
Dept: ANTICOAGULATION | Facility: OTHER | Age: 61
End: 2021-05-12

## 2021-05-12 NOTE — TELEPHONE ENCOUNTER
ANTICOAGULATION     Luz Marina Live is overdue for INR check.      Left message for patient to call and schedule lab appointment as soon as possible. If returning call, please schedule. She is coming into the [facility] tomorrow for her COVID vaccine so I have asked that she swing into the lab for an INR while she is there.     Michael Quispe RN

## 2021-05-13 ENCOUNTER — IMMUNIZATION (OUTPATIENT)
Dept: PEDIATRICS | Facility: CLINIC | Age: 61
End: 2021-05-13
Attending: INTERNAL MEDICINE
Payer: COMMERCIAL

## 2021-05-13 ENCOUNTER — ANTICOAGULATION THERAPY VISIT (OUTPATIENT)
Dept: ANTICOAGULATION | Facility: OTHER | Age: 61
End: 2021-05-13

## 2021-05-13 DIAGNOSIS — Z79.01 LONG TERM CURRENT USE OF ANTICOAGULANT THERAPY: ICD-10-CM

## 2021-05-13 DIAGNOSIS — I48.0 PAF (PAROXYSMAL ATRIAL FIBRILLATION) (H): ICD-10-CM

## 2021-05-13 DIAGNOSIS — M32.14 SYSTEMIC LUPUS ERYTHEMATOSUS WITH GLOMERULAR DISEASE, UNSPECIFIED SLE TYPE (H): ICD-10-CM

## 2021-05-13 LAB
ALBUMIN SERPL-MCNC: 3.5 G/DL (ref 3.4–5)
ALBUMIN UR-MCNC: 10 MG/DL
ANION GAP SERPL CALCULATED.3IONS-SCNC: 2 MMOL/L (ref 3–14)
APPEARANCE UR: CLEAR
BACTERIA #/AREA URNS HPF: ABNORMAL /HPF
BILIRUB UR QL STRIP: NEGATIVE
BUN SERPL-MCNC: 34 MG/DL (ref 7–30)
CALCIUM SERPL-MCNC: 9.2 MG/DL (ref 8.5–10.1)
CAPILLARY BLOOD COLLECTION: NORMAL
CHLORIDE SERPL-SCNC: 108 MMOL/L (ref 94–109)
CO2 SERPL-SCNC: 28 MMOL/L (ref 20–32)
COLOR UR AUTO: ABNORMAL
CREAT SERPL-MCNC: 1.41 MG/DL (ref 0.52–1.04)
CREAT UR-MCNC: 49 MG/DL
GFR SERPL CREATININE-BSD FRML MDRD: 40 ML/MIN/{1.73_M2}
GLUCOSE SERPL-MCNC: 91 MG/DL (ref 70–99)
GLUCOSE UR STRIP-MCNC: NEGATIVE MG/DL
HGB UR QL STRIP: NEGATIVE
INR BLD: 1.4 (ref 0.86–1.14)
KETONES UR STRIP-MCNC: NEGATIVE MG/DL
LEUKOCYTE ESTERASE UR QL STRIP: NEGATIVE
MICROALBUMIN UR-MCNC: 67 MG/L
MICROALBUMIN/CREAT UR: 136.71 MG/G CR (ref 0–25)
NITRATE UR QL: NEGATIVE
NON-SQ EPI CELLS #/AREA URNS LPF: ABNORMAL /LPF
PH UR STRIP: 6.5 PH (ref 5–7)
PHOSPHATE SERPL-MCNC: 3.4 MG/DL (ref 2.5–4.5)
POTASSIUM SERPL-SCNC: 4.9 MMOL/L (ref 3.4–5.3)
PROT UR-MCNC: 0.25 G/L
PROT/CREAT 24H UR: 0.53 G/G CR (ref 0–0.2)
RBC #/AREA URNS AUTO: ABNORMAL /HPF
SODIUM SERPL-SCNC: 138 MMOL/L (ref 133–144)
SOURCE: ABNORMAL
SP GR UR STRIP: 1.01 (ref 1–1.03)
UROBILINOGEN UR STRIP-MCNC: NORMAL MG/DL (ref 0–2)
WBC #/AREA URNS AUTO: ABNORMAL /HPF

## 2021-05-13 PROCEDURE — 84156 ASSAY OF PROTEIN URINE: CPT | Performed by: INTERNAL MEDICINE

## 2021-05-13 PROCEDURE — 85610 PROTHROMBIN TIME: CPT | Performed by: INTERNAL MEDICINE

## 2021-05-13 PROCEDURE — 91300 PR COVID VAC PFIZER DIL RECON 30 MCG/0.3 ML IM: CPT

## 2021-05-13 PROCEDURE — 36415 COLL VENOUS BLD VENIPUNCTURE: CPT | Performed by: INTERNAL MEDICINE

## 2021-05-13 PROCEDURE — 80069 RENAL FUNCTION PANEL: CPT | Performed by: INTERNAL MEDICINE

## 2021-05-13 PROCEDURE — 82043 UR ALBUMIN QUANTITATIVE: CPT | Performed by: INTERNAL MEDICINE

## 2021-05-13 PROCEDURE — 0002A PR COVID VAC PFIZER DIL RECON 30 MCG/0.3 ML IM: CPT

## 2021-05-13 PROCEDURE — 81001 URINALYSIS AUTO W/SCOPE: CPT | Performed by: INTERNAL MEDICINE

## 2021-05-13 NOTE — TELEPHONE ENCOUNTER
Re-routing for pended [facility] order, which we need signed annually to continue management.  pateint has not been seen by you in >1 year, are you OK with signing since has rip seen by other members of care team more recently, including cardiology.  If patient is due for visit with you first, please also let us know that.    Thank you!  Radha Mcduffie, PharmD BCACP  Anticoagulation Clinical Pharmacist

## 2021-05-13 NOTE — PROGRESS NOTES
Anticoagulation Management    Unable to reach Luz Marina today.    Today's INR result of 1.4 is subtherapeutic (goal INR of 2.0-3.0).  Result received from: Clinic Lab    Follow up required to confirm warfarin dose taken and assess for changes    Left message to take a booster dose of warfarin,  10 mg tonight.      Anticoagulation clinic to follow up    Michael Quispe RN

## 2021-05-14 NOTE — PROGRESS NOTES
Anticoagulation Management    Unable to reach Luz Marina today.    Today's INR result of 1.4 is subtherapeutic (goal INR of 2.0-3.0).  Result received from: Clinic Lab    Follow up required to confirm warfarin dose taken and assess for changes    LM to call [facility] back to discuss.  [place] [facility] RN's can be reached for return calls at [phone] (internal staff only). Please do not give this number out to patients or cold transfer. Thank you!         Anticoagulation clinic to follow up    Michael Quispe RN

## 2021-05-17 NOTE — PROGRESS NOTES
Anticoagulation Management    Unable to reach pt today.    INR on Thursday 5/13 was 1.4, which is  subtherapeutic (goal INR of 2.0-3.0).  Result received from: Clinic Lab    Follow up required to confirm warfarin dose taken and assess for changes    VM left to call [facility] back. Unsure if she took the advised 10 mg on Thursday? Will need to try later.       Anticoagulation clinic to follow up    Cait Hawthorne RN

## 2021-05-18 NOTE — PROGRESS NOTES
Anticoagulation Management     Unable to reach pt today.     INR on Thursday 5/13 was 1.4, which is  subtherapeutic (goal INR of 2.0-3.0).  Result received from: Clinic Lab     Follow up required to confirm warfarin dose taken and assess for changes     VM left to call [facility] back. Unsure if she took the advised 10 mg on Thursday? Will need to try later.        *I have also sent a [employer] message      Anticoagulation clinic to follow up     Cait Hawthorne RN

## 2021-05-20 ENCOUNTER — ANTICOAGULATION THERAPY VISIT (OUTPATIENT)
Dept: ANTICOAGULATION | Facility: OTHER | Age: 61
End: 2021-05-20

## 2021-05-20 DIAGNOSIS — I48.0 PAF (PAROXYSMAL ATRIAL FIBRILLATION) (H): ICD-10-CM

## 2021-05-20 DIAGNOSIS — Z79.01 LONG TERM CURRENT USE OF ANTICOAGULANT THERAPY: ICD-10-CM

## 2021-05-20 LAB
CAPILLARY BLOOD COLLECTION: NORMAL
INR BLD: 1.4 (ref 0.86–1.14)

## 2021-05-20 PROCEDURE — 36416 COLLJ CAPILLARY BLOOD SPEC: CPT | Performed by: INTERNAL MEDICINE

## 2021-05-20 PROCEDURE — 85610 PROTHROMBIN TIME: CPT | Performed by: INTERNAL MEDICINE

## 2021-05-20 NOTE — PROGRESS NOTES
Anticoagulation Management    Unable to reach Luz Marina today.    Today's INR result of 1.4 is subtherapeutic (goal INR of 2.0-3.0).  Result received from: Clinic Lab    Follow up required to confirm warfarin dose taken and assess for changes    LM to call [facility] back to discuss.  [place] [facility] RN's can be reached for return calls at [phone] (internal staff only). Please do not give this number out to patients or cold transfer. Thank you!         Anticoagulation clinic to follow up    Michael Quispe RN

## 2021-05-20 NOTE — PROGRESS NOTES
ANTICOAGULATION MANAGEMENT     Patient Name:  Luz Marina Live  Date:  2021    ASSESSMENT /SUBJECTIVE:    Today's INR result of 1.4 is subtherapeutic. Goal INR of 2.0-3.0      Warfarin dose taken: Warfarin taken as instructed    Diet: No new diet changes affecting INR    Medication changes/ interactions: No new medications/supplements affecting INR    Previous INR: Subtherapeutic     S/S of bleeding or thromboembolism: No    New injury or illness: No    Upcoming surgery, procedure or cardioversion: No    Additional findings: None      PLAN:    Corresponded via [employer] regarding INR result and instructed:     Warfarin Dosing Instructions: Change your warfarin dose to 10 mg Thurs and 7.5 mg all other days  . (10 % change)    Instructed patient to follow up no later than: 1 week  Left detailed message with recommended recheck date    Education provided: Please call back if any changes to your diet, medications or how you've been taking warfarin      I left a detailed Mychart with the orders below. I have also requested a call back if there have been any missed doses, concerns, illness, fever, or if there have been any changes in medications, activity level, or diet      Instructed to call the Anticoagulation Clinic for any changes, questions or concerns. (#[phone])        Michael Quispe, KATE      OBJECTIVE:  Recent labs: (last 7 days)     21  1421   INR 1.4*         No question data found.  Anticoagulation Summary  As of 2021    INR goal:  2.0-3.0   TTR:  33.3 % (1 y)   INR used for dosin.4 (2021)   Warfarin maintenance plan:  10 mg (5 mg x 2) every Thu; 7.5 mg (5 mg x 1.5) all other days   Full warfarin instructions:  10 mg every Thu; 7.5 mg all other days   Weekly warfarin total:  55 mg   Plan last modified:  Michael Quispe RN (2021)   Next INR check:  2021   Priority:  Maintenance   Target end date:  Indefinite    Indications    Long-term (current) use of anticoagulants  [Z79.01] [Z79.01]  PAF (paroxysmal atrial fibrillation) (H) [I48.0]             Anticoagulation Episode Summary     INR check location:      Preferred lab:      Send INR reminders to:  [facility]    Comments:  5 mg tabs, [facility] (needs staff message) PM dose      Anticoagulation Care Providers     Provider Role Specialty Phone number    Eliz Nguyen MD Referring Internal Medicine [phone]

## 2021-05-28 ENCOUNTER — ANTICOAGULATION THERAPY VISIT (OUTPATIENT)
Dept: ANTICOAGULATION | Facility: OTHER | Age: 61
End: 2021-05-28

## 2021-05-28 DIAGNOSIS — Z79.01 LONG TERM CURRENT USE OF ANTICOAGULANT THERAPY: ICD-10-CM

## 2021-05-28 DIAGNOSIS — I48.0 PAF (PAROXYSMAL ATRIAL FIBRILLATION) (H): ICD-10-CM

## 2021-05-28 LAB
CAPILLARY BLOOD COLLECTION: NORMAL
INR BLD: 2 (ref 0.86–1.14)

## 2021-05-28 PROCEDURE — 85610 PROTHROMBIN TIME: CPT | Performed by: INTERNAL MEDICINE

## 2021-05-28 PROCEDURE — 36416 COLLJ CAPILLARY BLOOD SPEC: CPT | Performed by: INTERNAL MEDICINE

## 2021-05-28 NOTE — PROGRESS NOTES
ANTICOAGULATION MANAGEMENT     Patient Name:  Luz Marina Live  Date:  2021    ASSESSMENT /SUBJECTIVE:    Today's INR result of 2.0 is therapeutic. Goal INR of 2.0-3.0      Warfarin dose taken: LM    Diet: LM    Medication changes/ interactions: LM    Previous INR: Subtherapeutic     S/S of bleeding or thromboembolism: No    New injury or illness: No    Upcoming surgery, procedure or cardioversion: No    Additional findings: None      PLAN:    Detailed voice message left for Luz Marina with dosing instructions and follow up date.  regarding INR result and instructed:     Warfarin Dosing Instructions: Continue your current warfarin dose 10 mg TH and 7.5 mg all other days    Instructed patient to follow up no later than: 7-10 days  Left detailed message with recommended recheck date    Education provided: Please call back if any changes to your diet, medications or how you've been taking warfarin        Instructed to call the Anticoagulation Clinic for any changes, questions or concerns. (#[phone])        Deborah Alatorre RN      OBJECTIVE:  Recent labs: (last 7 days)     21  1332   INR 2.0*         INR assessment THER    Recheck INR In: 1 WEEK    INR Location Outside lab      Anticoagulation Summary  As of 2021    INR goal:  2.0-3.0   TTR:  33.3 % (1 y)   INR used for dosin.0 (2021)   Warfarin maintenance plan:  10 mg (5 mg x 2) every Thu; 7.5 mg (5 mg x 1.5) all other days   Full warfarin instructions:  10 mg every Thu; 7.5 mg all other days   Weekly warfarin total:  55 mg   No change documented:  Deborah Alatorre RN   Plan last modified:  Michael Quispe RN (2021)   Next INR check:  2021   Priority:  Maintenance   Target end date:  Indefinite    Indications    Long-term (current) use of anticoagulants [Z79.01] [Z79.01]  PAF (paroxysmal atrial fibrillation) (H) [I48.0]             Anticoagulation Episode Summary     INR check location:      Preferred lab:      Send INR reminders to:   [facility]    Comments:  5 mg tabsZeb lab (needs staff message) PM dose      Anticoagulation Care Providers     Provider Role Specialty Phone number    Eliz Nguyen MD Referring Internal Medicine [phone]

## 2021-06-15 ENCOUNTER — MYC MEDICAL ADVICE (OUTPATIENT)
Dept: ANTICOAGULATION | Facility: CLINIC | Age: 61
End: 2021-06-15

## 2021-06-15 NOTE — TELEPHONE ENCOUNTER
ANTICOAGULATION     Luz Marina Live is overdue for INR check.      My chart message sent.    Red Blackman RN

## 2021-06-17 ENCOUNTER — ANCILLARY PROCEDURE (OUTPATIENT)
Dept: BONE DENSITY | Facility: CLINIC | Age: 61
End: 2021-06-17
Attending: INTERNAL MEDICINE
Payer: COMMERCIAL

## 2021-06-17 ENCOUNTER — TELEPHONE (OUTPATIENT)
Dept: TRANSPLANT | Facility: CLINIC | Age: 61
End: 2021-06-17

## 2021-06-17 ENCOUNTER — ANTICOAGULATION THERAPY VISIT (OUTPATIENT)
Dept: ANTICOAGULATION | Facility: OTHER | Age: 61
End: 2021-06-17

## 2021-06-17 DIAGNOSIS — Z79.60 LONG-TERM USE OF IMMUNOSUPPRESSANT MEDICATION: Chronic | ICD-10-CM

## 2021-06-17 DIAGNOSIS — M94.9 DISORDER OF BONE AND CARTILAGE: ICD-10-CM

## 2021-06-17 DIAGNOSIS — Z79.01 LONG TERM CURRENT USE OF ANTICOAGULANT THERAPY: ICD-10-CM

## 2021-06-17 DIAGNOSIS — M32.14 SYSTEMIC LUPUS ERYTHEMATOSUS WITH GLOMERULAR DISEASE, UNSPECIFIED SLE TYPE (H): Chronic | ICD-10-CM

## 2021-06-17 DIAGNOSIS — I48.0 PAF (PAROXYSMAL ATRIAL FIBRILLATION) (H): ICD-10-CM

## 2021-06-17 DIAGNOSIS — M81.8 OTHER OSTEOPOROSIS WITHOUT CURRENT PATHOLOGICAL FRACTURE: ICD-10-CM

## 2021-06-17 DIAGNOSIS — N18.30 CKD (CHRONIC KIDNEY DISEASE) STAGE 3, GFR 30-59 ML/MIN (H): Chronic | ICD-10-CM

## 2021-06-17 DIAGNOSIS — M89.9 DISORDER OF BONE AND CARTILAGE: ICD-10-CM

## 2021-06-17 DIAGNOSIS — N64.4 BREAST PAIN, LEFT: Primary | ICD-10-CM

## 2021-06-17 DIAGNOSIS — M32.14 SYSTEMIC LUPUS ERYTHEMATOSUS WITH GLOMERULAR DISEASE, UNSPECIFIED SLE TYPE (H): ICD-10-CM

## 2021-06-17 LAB
ALBUMIN SERPL-MCNC: 3.4 G/DL (ref 3.4–5)
ALBUMIN UR-MCNC: 10 MG/DL
ANION GAP SERPL CALCULATED.3IONS-SCNC: 4 MMOL/L (ref 3–14)
APPEARANCE UR: CLEAR
BILIRUB UR QL STRIP: NEGATIVE
BUN SERPL-MCNC: 46 MG/DL (ref 7–30)
CALCIUM SERPL-MCNC: 8.5 MG/DL (ref 8.5–10.1)
CHLORIDE SERPL-SCNC: 111 MMOL/L (ref 94–109)
CO2 SERPL-SCNC: 24 MMOL/L (ref 20–32)
COLOR UR AUTO: ABNORMAL
CREAT SERPL-MCNC: 1.65 MG/DL (ref 0.52–1.04)
CREAT UR-MCNC: 66 MG/DL
GFR SERPL CREATININE-BSD FRML MDRD: 33 ML/MIN/{1.73_M2}
GLUCOSE SERPL-MCNC: 94 MG/DL (ref 70–99)
GLUCOSE UR STRIP-MCNC: NEGATIVE MG/DL
HGB UR QL STRIP: NEGATIVE
INR PPP: 1.73 (ref 0.86–1.14)
KETONES UR STRIP-MCNC: NEGATIVE MG/DL
LEUKOCYTE ESTERASE UR QL STRIP: NEGATIVE
MICROALBUMIN UR-MCNC: 130 MG/L
MICROALBUMIN/CREAT UR: 198.47 MG/G CR (ref 0–25)
NITRATE UR QL: NEGATIVE
NON-SQ EPI CELLS #/AREA URNS LPF: ABNORMAL /LPF
PH UR STRIP: 6.5 PH (ref 5–7)
PHOSPHATE SERPL-MCNC: 3.2 MG/DL (ref 2.5–4.5)
POTASSIUM SERPL-SCNC: 5.5 MMOL/L (ref 3.4–5.3)
PROT UR-MCNC: 0.34 G/L
PROT/CREAT 24H UR: 0.51 G/G CR (ref 0–0.2)
RBC #/AREA URNS AUTO: ABNORMAL /HPF
SODIUM SERPL-SCNC: 139 MMOL/L (ref 133–144)
SOURCE: ABNORMAL
SP GR UR STRIP: 1.01 (ref 1–1.03)
UROBILINOGEN UR STRIP-MCNC: NORMAL MG/DL (ref 0–2)
WBC #/AREA URNS AUTO: ABNORMAL /HPF

## 2021-06-17 PROCEDURE — 77080 DXA BONE DENSITY AXIAL: CPT | Performed by: RADIOLOGY

## 2021-06-17 PROCEDURE — 84156 ASSAY OF PROTEIN URINE: CPT | Performed by: INTERNAL MEDICINE

## 2021-06-17 PROCEDURE — 36415 COLL VENOUS BLD VENIPUNCTURE: CPT | Performed by: INTERNAL MEDICINE

## 2021-06-17 PROCEDURE — 80069 RENAL FUNCTION PANEL: CPT | Performed by: INTERNAL MEDICINE

## 2021-06-17 PROCEDURE — 85610 PROTHROMBIN TIME: CPT | Performed by: INTERNAL MEDICINE

## 2021-06-17 PROCEDURE — 82043 UR ALBUMIN QUANTITATIVE: CPT | Performed by: INTERNAL MEDICINE

## 2021-06-17 PROCEDURE — 77081 DXA BONE DENSITY APPENDICULR: CPT | Mod: 59 | Performed by: RADIOLOGY

## 2021-06-17 PROCEDURE — 81001 URINALYSIS AUTO W/SCOPE: CPT | Performed by: INTERNAL MEDICINE

## 2021-06-17 NOTE — PROGRESS NOTES
Anticoagulation Management    Unable to reach pt today.    Today's INR result of 1.73 is subtherapeutic (goal INR of 2.0-3.0).  Result received from: Clinic Lab    Follow up required to confirm warfarin dose taken and assess for changes    LM I have attempted to contact this patient by phone, left message to return call to the coumadin clinic.  Will try again later.        Anticoagulation clinic to follow up    Deborah Alatorre RN

## 2021-06-18 NOTE — PROGRESS NOTES
ANTICOAGULATION MANAGEMENT     Patient Name:  Luz Marina Live  Date:  2021    ASSESSMENT /SUBJECTIVE:    Today's INR result of 1.73 is subtherapeutic. Goal INR of 2.0-3.0      Warfarin dose taken: Warfarin taken as instructed    Diet: No new diet changes identified    Medication changes/ interactions: No new medications/supplements affecting INR    Previous INR: Therapeutic     S/S of bleeding or thromboembolism: No    New injury or illness: No    Upcoming surgery, procedure or cardioversion: No    Additional findings: None      PLAN:    Warfarin Dosing Instructions: Change your warfarin dose to 10 mg Tues, Thurs, Sat and 7.5 mg all other days (9.1% change)    Instructed patient to follow up no later than: 1 week  Contact [phone]  to schedule and with any changes, questions or concerns.     Education provided: Please call back if any changes to your diet, medications or how you've been taking warfarin    Sent [employer] message with dosing and follow up instructions    Instructed to call the Anticoagulation Clinic for any changes, questions or concerns. (#[phone])        Michael Quispe RN      OBJECTIVE:  Recent labs: (last 7 days)     21  1234   INR 1.73*         No question data found.  Anticoagulation Summary  As of 2021    INR goal:  2.0-3.0   TTR:  33.4 % (1 y)   INR used for dosin.73 (2021)   Warfarin maintenance plan:  10 mg (5 mg x 2) every Tue, Thu, Sat; 7.5 mg (5 mg x 1.5) all other days   Full warfarin instructions:  10 mg every Tue, Thu, Sat; 7.5 mg all other days   Weekly warfarin total:  60 mg   Plan last modified:  Deborah Alatorre, RN (2021)   Next INR check:  2021   Priority:  Maintenance   Target end date:  Indefinite    Indications    Long-term (current) use of anticoagulants [Z79.01] [Z79.01]  PAF (paroxysmal atrial fibrillation) (H) [I48.0]             Anticoagulation Episode Summary     INR check location:      Preferred lab:      Send INR reminders to:   [facility] IS[facility]    Comments:  5 mg tabsZeb lab (needs staff message) PM dose      Anticoagulation Care Providers     Provider Role Specialty Phone number    Eliz Nguyen MD Referring Internal Medicine [phone]

## 2021-06-22 ENCOUNTER — ANCILLARY PROCEDURE (OUTPATIENT)
Dept: ULTRASOUND IMAGING | Facility: CLINIC | Age: 61
End: 2021-06-22
Attending: INTERNAL MEDICINE
Payer: COMMERCIAL

## 2021-06-22 ENCOUNTER — ANCILLARY PROCEDURE (OUTPATIENT)
Dept: MAMMOGRAPHY | Facility: CLINIC | Age: 61
End: 2021-06-22
Attending: INTERNAL MEDICINE
Payer: COMMERCIAL

## 2021-06-22 DIAGNOSIS — N64.4 BREAST PAIN, LEFT: ICD-10-CM

## 2021-06-22 PROCEDURE — 77066 DX MAMMO INCL CAD BI: CPT

## 2021-06-22 PROCEDURE — 76642 ULTRASOUND BREAST LIMITED: CPT | Mod: LT

## 2021-06-22 PROCEDURE — G0279 TOMOSYNTHESIS, MAMMO: HCPCS

## 2021-06-24 ENCOUNTER — ANTICOAGULATION THERAPY VISIT (OUTPATIENT)
Dept: TRANSPLANT | Facility: CLINIC | Age: 61
End: 2021-06-24

## 2021-06-24 DIAGNOSIS — Z79.01 LONG TERM CURRENT USE OF ANTICOAGULANT THERAPY: ICD-10-CM

## 2021-06-24 DIAGNOSIS — I48.0 PAF (PAROXYSMAL ATRIAL FIBRILLATION) (H): ICD-10-CM

## 2021-06-24 LAB
CAPILLARY BLOOD COLLECTION: NORMAL
INR BLD: 1.9 (ref 0.86–1.14)

## 2021-06-24 PROCEDURE — 85610 PROTHROMBIN TIME: CPT | Performed by: INTERNAL MEDICINE

## 2021-06-24 PROCEDURE — 36416 COLLJ CAPILLARY BLOOD SPEC: CPT | Performed by: INTERNAL MEDICINE

## 2021-06-24 NOTE — PROGRESS NOTES
ANTICOAGULATION MANAGEMENT     Luz Marina Live 60 year old female is on warfarin with subtherapeutic INR result. (Goal INR 2.0-3.0)    Recent labs: (last 7 days)     06/24/21  1332   INR 1.9*       ASSESSMENT     Source(s): Chart Review       Warfarin doses taken: Warfarin taken as instructed    Diet: No new diet changes identified    New illness, injury, or hospitalization: No    Medication/supplement changes: None noted    Signs or symptoms of bleeding or clotting: No    Previous INR: Subtherapeutic    Additional findings: None     PLAN     Recommended plan for no diet, medication or health factor changes affecting INR     Dosing Instructions: Booster dose then continue your current warfarin dose with next INR in 2 weeks       Summary  As of 6/24/2021    Full warfarin instructions:  6/24: 12.5 mg; Otherwise 10 mg every Tue, Thu, Sat; 7.5 mg all other days   Next INR check:  7/8/2021             Sent [employer] message with dosing and follow up instructions    Contact [phone]  to schedule and with any changes, questions or concerns.     Education provided: Please call back if any changes to your diet, medications or how you've been taking warfarin    Plan made per [facility] anticoagulation protocol    Michael Quispe, RN  Anticoagulation Clinic  6/24/2021    _______________________________________________________________________     Anticoagulation Episode Summary     Current INR goal:  2.0-3.0   TTR:  33.3 % (1 y)   Target end date:  Indefinite   [facility]    Indications    Long-term (current) use of anticoagulants [Z79.01] [Z79.01]  PAF (paroxysmal atrial fibrillation) (H) [I48.0]           Comments:  5 mg tabs, [facility] (needs staff message) PM dose         Anticoagulation Care Providers     Provider Role Specialty Phone number    Eliz Nguyen MD Referring Internal Medicine [phone]

## 2021-06-29 DIAGNOSIS — N18.4 CKD (CHRONIC KIDNEY DISEASE) STAGE 4, GFR 15-29 ML/MIN (H): Primary | ICD-10-CM

## 2021-06-30 ENCOUNTER — TELEPHONE (OUTPATIENT)
Dept: NEPHROLOGY | Facility: CLINIC | Age: 61
End: 2021-06-30

## 2021-06-30 NOTE — CONFIDENTIAL NOTE
Left message for Luz Marina to review results/review on [employer] and return call or send message with updates.    Urine protein remains low level. No blood in the urine which is reassuring. Your kidney function is stable but not normal as you are aware.   On this last lab, your potassium came back slightly elevated.     Recommendations/questions:   1. Please clarify, are you currently on or off of Brilinta? I had in my mind that we had stopped this previously but I see it on your med list so wanted to clarify. It is possible we stopped it but it just did not get taken off of your med list. Please update when able.     2. We need to repeat your lab at this time to assure the potassium normalized. I will place this order in your chart.     Tasha Granados, RN, BSN  Nephrology Care Coordinator  [facility]

## 2021-07-01 NOTE — CONFIDENTIAL NOTE
Left message for patient. Message #2. Also advised to look at mychart result.     Tasha Granados RN

## 2021-07-08 ENCOUNTER — ANTICOAGULATION THERAPY VISIT (OUTPATIENT)
Dept: ANTICOAGULATION | Facility: OTHER | Age: 61
End: 2021-07-08

## 2021-07-08 DIAGNOSIS — Z79.01 LONG TERM CURRENT USE OF ANTICOAGULANT THERAPY: ICD-10-CM

## 2021-07-08 DIAGNOSIS — M32.14 SYSTEMIC LUPUS ERYTHEMATOSUS WITH GLOMERULAR DISEASE, UNSPECIFIED SLE TYPE (H): ICD-10-CM

## 2021-07-08 DIAGNOSIS — I48.0 PAF (PAROXYSMAL ATRIAL FIBRILLATION) (H): ICD-10-CM

## 2021-07-08 DIAGNOSIS — N18.4 CKD (CHRONIC KIDNEY DISEASE) STAGE 4, GFR 15-29 ML/MIN (H): ICD-10-CM

## 2021-07-08 LAB
ALBUMIN SERPL-MCNC: 3.4 G/DL (ref 3.4–5)
ALBUMIN UR-MCNC: 30 MG/DL
ANION GAP SERPL CALCULATED.3IONS-SCNC: 5 MMOL/L (ref 3–14)
APPEARANCE UR: CLEAR
BILIRUB UR QL STRIP: NEGATIVE
BUN SERPL-MCNC: 43 MG/DL (ref 7–30)
CALCIUM SERPL-MCNC: 8.8 MG/DL (ref 8.5–10.1)
CHLORIDE SERPL-SCNC: 110 MMOL/L (ref 94–109)
CO2 SERPL-SCNC: 25 MMOL/L (ref 20–32)
COLOR UR AUTO: YELLOW
CREAT SERPL-MCNC: 1.49 MG/DL (ref 0.52–1.04)
CREAT UR-MCNC: 105 MG/DL
GFR SERPL CREATININE-BSD FRML MDRD: 38 ML/MIN/{1.73_M2}
GLUCOSE SERPL-MCNC: 104 MG/DL (ref 70–99)
GLUCOSE UR STRIP-MCNC: NEGATIVE MG/DL
HGB UR QL STRIP: ABNORMAL
INR BLD: 2.2 (ref 0.86–1.14)
KETONES UR STRIP-MCNC: NEGATIVE MG/DL
LEUKOCYTE ESTERASE UR QL STRIP: NEGATIVE
MICROALBUMIN UR-MCNC: 109 MG/L
MICROALBUMIN/CREAT UR: 103.81 MG/G CR (ref 0–25)
NITRATE UR QL: NEGATIVE
PH UR STRIP: 6.5 PH (ref 5–7)
PHOSPHATE SERPL-MCNC: 3.4 MG/DL (ref 2.5–4.5)
POTASSIUM SERPL-SCNC: 4.7 MMOL/L (ref 3.4–5.3)
PROT UR-MCNC: 0.41 G/L
PROT/CREAT 24H UR: 0.41 G/G CR (ref 0–0.2)
RBC #/AREA URNS AUTO: NORMAL /HPF
SODIUM SERPL-SCNC: 140 MMOL/L (ref 133–144)
SOURCE: ABNORMAL
SP GR UR STRIP: 1.02 (ref 1–1.03)
UROBILINOGEN UR STRIP-ACNC: 0.2 EU/DL (ref 0.2–1)
WBC #/AREA URNS AUTO: NORMAL /HPF

## 2021-07-08 PROCEDURE — 36415 COLL VENOUS BLD VENIPUNCTURE: CPT | Performed by: INTERNAL MEDICINE

## 2021-07-08 PROCEDURE — 81001 URINALYSIS AUTO W/SCOPE: CPT | Performed by: INTERNAL MEDICINE

## 2021-07-08 PROCEDURE — 80069 RENAL FUNCTION PANEL: CPT | Performed by: INTERNAL MEDICINE

## 2021-07-08 PROCEDURE — 85610 PROTHROMBIN TIME: CPT | Performed by: INTERNAL MEDICINE

## 2021-07-08 PROCEDURE — 82043 UR ALBUMIN QUANTITATIVE: CPT | Performed by: INTERNAL MEDICINE

## 2021-07-08 PROCEDURE — 84156 ASSAY OF PROTEIN URINE: CPT | Performed by: INTERNAL MEDICINE

## 2021-07-08 NOTE — PROGRESS NOTES
ANTICOAGULATION MANAGEMENT     Luz Marina Liev 60 year old female is on warfarin with therapeutic INR result. (Goal INR 2.0-3.0)      Recent labs: (last 7 days)     07/08/21  1333   INR 2.2*       ASSESSMENT     Source(s): Chart Review       Warfarin doses taken: Warfarin taken as instructed and Reviewed in chart    Diet: No new diet changes identified    New illness, injury, or hospitalization: No    Medication/supplement changes: None noted    Signs or symptoms of bleeding or clotting: No    Previous INR: Subtherapeutic    Additional findings: None     PLAN     Recommended plan for no diet, medication or health factor changes affecting INR     Dosing Instructions: Continue your current warfarin dose with next INR in 3 weeks       Summary  As of 7/8/2021    Full warfarin instructions:  10 mg every Tue, Thu, Sat; 7.5 mg all other days   Next INR check:  7/29/2021             Sent [employer] message with dosing and follow up instructions    Contact [phone]  to schedule and with any changes, questions or concerns.     Education provided: Please call back if any changes to your diet, medications or how you've been taking warfarin    Plan made per [facility] anticoagulation protocol    Michael Quispe, RN  Anticoagulation Clinic  7/8/2021    _______________________________________________________________________     Anticoagulation Episode Summary     Current INR goal:  2.0-3.0   TTR:  32.6 % (1 y)   Target end date:  Indefinite   Send INR reminders to:  [facility]    Indications    Long-term (current) use of anticoagulants [Z79.01] [Z79.01]  PAF (paroxysmal atrial fibrillation) (H) [I48.0]           Comments:  5 mg tabs, [facility] (needs staff message) PM dose         Anticoagulation Care Providers     Provider Role Specialty Phone number    Eliz Nguyen MD Referring Internal Medicine [phone]

## 2021-07-13 ENCOUNTER — OFFICE VISIT (OUTPATIENT)
Dept: INTERNAL MEDICINE | Facility: CLINIC | Age: 61
End: 2021-07-13
Attending: INTERNAL MEDICINE
Payer: COMMERCIAL

## 2021-07-13 ENCOUNTER — APPOINTMENT (OUTPATIENT)
Dept: LAB | Facility: CLINIC | Age: 61
End: 2021-07-13
Attending: INTERNAL MEDICINE
Payer: COMMERCIAL

## 2021-07-13 VITALS
BODY MASS INDEX: 48.71 KG/M2 | SYSTOLIC BLOOD PRESSURE: 128 MMHG | HEART RATE: 60 BPM | DIASTOLIC BLOOD PRESSURE: 80 MMHG | WEIGHT: 275 LBS

## 2021-07-13 DIAGNOSIS — Z00.00 ROUTINE GENERAL MEDICAL EXAMINATION AT A HEALTH CARE FACILITY: ICD-10-CM

## 2021-07-13 DIAGNOSIS — Z00.00 PREVENTATIVE HEALTH CARE: ICD-10-CM

## 2021-07-13 DIAGNOSIS — R73.01 IMPAIRED FASTING GLUCOSE: ICD-10-CM

## 2021-07-13 DIAGNOSIS — I10 BENIGN ESSENTIAL HYPERTENSION: Primary | ICD-10-CM

## 2021-07-13 DIAGNOSIS — R06.09 DYSPNEA ON EXERTION: ICD-10-CM

## 2021-07-13 LAB
ALBUMIN SERPL-MCNC: 3.4 G/DL (ref 3.4–5)
ALP SERPL-CCNC: 118 U/L (ref 40–150)
ALT SERPL W P-5'-P-CCNC: 28 U/L (ref 0–50)
ANION GAP SERPL CALCULATED.3IONS-SCNC: 4 MMOL/L (ref 3–14)
AST SERPL W P-5'-P-CCNC: 24 U/L (ref 0–45)
BILIRUB SERPL-MCNC: 0.8 MG/DL (ref 0.2–1.3)
BUN SERPL-MCNC: 38 MG/DL (ref 7–30)
CALCIUM SERPL-MCNC: 8.9 MG/DL (ref 8.5–10.1)
CHLORIDE BLD-SCNC: 111 MMOL/L (ref 94–109)
CHOLEST SERPL-MCNC: 98 MG/DL
CO2 SERPL-SCNC: 23 MMOL/L (ref 20–32)
CREAT SERPL-MCNC: 1.34 MG/DL (ref 0.52–1.04)
FASTING STATUS PATIENT QL REPORTED: YES
GFR SERPL CREATININE-BSD FRML MDRD: 43 ML/MIN/1.73M2
GLUCOSE BLD-MCNC: 90 MG/DL (ref 70–99)
HBA1C MFR BLD: 5.2 % (ref 0–5.6)
HDLC SERPL-MCNC: 40 MG/DL
HIV 1+2 AB+HIV1 P24 AG SERPL QL IA: NONREACTIVE
LDLC SERPL CALC-MCNC: 44 MG/DL
NONHDLC SERPL-MCNC: 58 MG/DL
POTASSIUM BLD-SCNC: 4.8 MMOL/L (ref 3.4–5.3)
PROT SERPL-MCNC: 8.3 G/DL (ref 6.8–8.8)
SODIUM SERPL-SCNC: 138 MMOL/L (ref 133–144)
T4 FREE SERPL-MCNC: 1.12 NG/DL (ref 0.76–1.46)
TRIGL SERPL-MCNC: 69 MG/DL
TSH SERPL DL<=0.005 MIU/L-ACNC: 5.75 MU/L (ref 0.4–4)

## 2021-07-13 PROCEDURE — 99396 PREV VISIT EST AGE 40-64: CPT | Performed by: INTERNAL MEDICINE

## 2021-07-13 PROCEDURE — 87389 HIV-1 AG W/HIV-1&-2 AB AG IA: CPT | Performed by: INTERNAL MEDICINE

## 2021-07-13 PROCEDURE — 83036 HEMOGLOBIN GLYCOSYLATED A1C: CPT | Performed by: INTERNAL MEDICINE

## 2021-07-13 PROCEDURE — 80061 LIPID PANEL: CPT | Performed by: INTERNAL MEDICINE

## 2021-07-13 PROCEDURE — G0463 HOSPITAL OUTPT CLINIC VISIT: HCPCS

## 2021-07-13 PROCEDURE — 36415 COLL VENOUS BLD VENIPUNCTURE: CPT | Performed by: INTERNAL MEDICINE

## 2021-07-13 PROCEDURE — 84439 ASSAY OF FREE THYROXINE: CPT | Performed by: INTERNAL MEDICINE

## 2021-07-13 PROCEDURE — 84443 ASSAY THYROID STIM HORMONE: CPT | Performed by: INTERNAL MEDICINE

## 2021-07-13 PROCEDURE — 82040 ASSAY OF SERUM ALBUMIN: CPT | Performed by: INTERNAL MEDICINE

## 2021-07-13 ASSESSMENT — ANXIETY QUESTIONNAIRES
2. NOT BEING ABLE TO STOP OR CONTROL WORRYING: NOT AT ALL
6. BECOMING EASILY ANNOYED OR IRRITABLE: SEVERAL DAYS
3. WORRYING TOO MUCH ABOUT DIFFERENT THINGS: NOT AT ALL
5. BEING SO RESTLESS THAT IT IS HARD TO SIT STILL: NOT AT ALL
1. FEELING NERVOUS, ANXIOUS, OR ON EDGE: NOT AT ALL
GAD7 TOTAL SCORE: 1
7. FEELING AFRAID AS IF SOMETHING AWFUL MIGHT HAPPEN: NOT AT ALL

## 2021-07-13 ASSESSMENT — PAIN SCALES - GENERAL: PAINLEVEL: NO PAIN (0)

## 2021-07-13 ASSESSMENT — PATIENT HEALTH QUESTIONNAIRE - PHQ9
SUM OF ALL RESPONSES TO PHQ QUESTIONS 1-9: 2
5. POOR APPETITE OR OVEREATING: NOT AT ALL

## 2021-07-13 NOTE — PROGRESS NOTES
SUBJECTIVE:   CC: Luz Marina Live is an 61 year old woman who presents for preventive health visit.       Patient has been advised of split billing requirements and indicates understanding: Yes  Healthy Habits:    Do you get at least three servings of calcium containing foods daily (dairy, green leafy vegetables, etc.)? yes    Amount of exercise or daily activities, outside of work: not physically active    Problems taking medications regularly No    Medication side effects: No    Have you had an eye exam in the past two years? yes    Do you see a dentist twice per year? yes    Do you have sleep apnea, excessive snoring or daytime drowsiness?yes      -------------------------------------    Today's PHQ-2 Score:   PHQ-2 ( 1999 [employer]) 7/13/2021 1/14/2020   Q1: Little interest or pleasure in doing things 0 0   Q2: Feeling down, depressed or hopeless 0 0   PHQ-2 Score 0 0   Q1: Little interest or pleasure in doing things - -   Q2: Feeling down, depressed or hopeless - -   PHQ-2 Score - -       Abuse: Current or Past(Physical, Sexual or Emotional)- No  Do you feel safe in your environment? Yes    Have you ever done Advance Care Planning? (For example, a Health Directive, POLST, or a discussion with a medical provider or your loved ones about your wishes): No, advance care planning information given to patient to review.  Patient plans to discuss their wishes with loved ones or provider.      Social History     Tobacco Use     Smoking status: Former Smoker     Smokeless tobacco: Never Used     Tobacco comment: 30 plus years ago.   Substance Use Topics     Alcohol use: No     If you drink alcohol do you typically have >3 drinks per day or >7 drinks per week? No                     Reviewed orders with patient.  Reviewed health maintenance and updated orders accordingly - Yes  Labs reviewed in EPIC    FHS-7: No flowsheet data found.    Mammogram Screening: Recommended mammography every 1-2 years with patient discussion and  risk factor consideration  Pertinent mammograms are reviewed under the imaging tab.    Pertinent mammograms are reviewed under the imaging tab.  History of abnormal Pap smear: Status post benign hysterectomy. Health Maintenance and Surgical History updated.  PAP / HPV Latest Ref Rng & Units 10/29/2018 6/22/2012 6/20/2012   PAP - NIL - NIL   HPV 16 DNA NEG:Negative Negative - -   HPV 18 DNA NEG:Negative Negative - -   OTHER HR HPV NEG:Negative Negative - -   HPVSUR RESULT - - Negative  Reference range: Negative  (Note)  INTERPRETIVE INFORMATION: HPV DNA Probe, High Risk, [facility]    The high-risk HPV test detects HPV genotypes 16, 18, 31,  33, 35, 39, 45, 51, 52, 56, 58, 59, and 68, which are  associated with cervical cancer and its precursor lesions.  However, cross-reactions with other genotypes may occur.  Results should be correlated with cytologic and histologic  findings. Sensitivity may be affected by cellularity of  specimen.    The performance characteristics of this test were  determined by [facility].  Performed by [facility],  [address] [place] [address] [phone]  [email or web address], Karma Brooks MD, Lab. Director -     Reviewed and updated as needed this visit by clinical staff  Tobacco  Allergies               Reviewed and updated as needed this visit by Provider                Past Medical History:   Diagnosis Date     Hypertension      Hypovolemic shock (H) 2/28/2015     Sepsis (H) 8/22/2015      Past Surgical History:   Procedure Laterality Date     CARDIAC SURGERY  08/27/2009    triple vessel coronary artery bypass grafting on 8/27/09     CARPAL TUNNEL RELEASE RT/LT  1996    bilateral     CV CORONARY ANGIOGRAM  10/17/2019    Procedure: CV CORONARY ANGIOGRAM;  Surgeon: Mahendra Collins MD;  Location:  [facility] CARD[facility] CATH LAB     CV PCI STENT DRUG ELUTING N/A 10/17/2019    Procedure: PCI Stent Drug Eluting;  Surgeon: Mahendra Collins MD;   Location:  HE[facility] CARDIAC CATH LAB       ROS:  CONSTITUTIONAL: NEGATIVE for fever, chills, change in weight  INTEGUMENTARY/SKIN: NEGATIVE for worrisome rashes, moles or lesions  EYES: NEGATIVE for vision changes or irritation  ENT: NEGATIVE for ear, mouth and throat problems  RESP: NEGATIVE for significant cough or SOB  BREAST: NEGATIVE for masses, tenderness or discharge  CV: NEGATIVE for chest pain, palpitations or peripheral edema  GI: NEGATIVE for nausea, abdominal pain, heartburn, or change in bowel habits  : NEGATIVE for unusual urinary or vaginal symptoms. No vaginal bleeding.  MUSCULOSKELETAL: NEGATIVE for significant arthralgias or myalgia  NEURO: NEGATIVE for weakness, dizziness or paresthesias  PSYCHIATRIC: NEGATIVE for changes in mood or affect     OBJECTIVE:   /80   Pulse 60   Wt 124.7 kg (275 lb)   Breastfeeding No   BMI 48.71 kg/m    EXAM:  GENERAL: healthy, alert and no distress  EYES: Eyes grossly normal to inspection, PERRL and conjunctivae and sclerae normal  NECK: no adenopathy, no asymmetry, masses, or scars and thyroid normal to palpation  RESP: lungs clear to auscultation - no rales, rhonchi or wheezes  CV: regular rate and rhythm, normal S1 S2, no S3 or S4, no murmur, click or rub, no peripheral edema and peripheral pulses strong  ABDOMEN: soft, nontender and bowel sounds normal  MS: no gross musculoskeletal defects noted, no edema    Diagnostic Test Results:  Labs reviewed in Epic    ASSESSMENT/PLAN:   1. Benign essential hypertension  Blood pressure is currently within acceptable range. Recommend to continue with current medical therapy without changes.  Referral to weight management was provided to the patient today.  - Comprehensive Weight Management  - Comprehensive metabolic panel  - Lipid Profile  - TSH with free T4 reflex  - T4 free    2. Dyspnea on exertion  Discussed possible causes of dyspnea on exertion with patient.  Recommend cardiology consultation as patient was  "advised on the need for stress test in the beginning of this year.  - Comprehensive Weight Management    3. Impaired fasting glucose  Recommend checking hemoglobin A1c.  - Hgb A1c    4. Preventative health care  Discussed healthcare needs.  Recommend screening for HIV.  - HIV Antigen Antibody Combo    5. Routine general medical examination at a health care facility  Patient is up-to-date on age-appropriate screening and vaccinations.      Patient has been advised of split billing requirements and indicates understanding: Yes  COUNSELING:   Reviewed preventive health counseling, as reflected in patient instructions       Regular exercise       Healthy diet/nutrition       Vision screening    Estimated body mass index is 48.71 kg/m  as calculated from the following:    Height as of 3/6/20: 1.6 m (5' 3\").    Weight as of this encounter: 124.7 kg (275 lb).        She reports that she has quit smoking. She has never used smokeless tobacco.      Counseling Resources:  ATP IV Guidelines  Pooled Cohorts Equation Calculator  Breast Cancer Risk Calculator  BRCA-Related Cancer Risk Assessment: FHS-7 Tool  FRAX Risk Assessment  ICSI Preventive Guidelines  Dietary Guidelines for Americans, 2010  USDA's MyPlate  ASA Prophylaxis  Lung CA Screening    Eliz Nguyen MD  [facility] [place]  "

## 2021-07-13 NOTE — LETTER
7/13/2021       RE: Luz Marina Live  [address]  [place] [address]     Dear Colleague,    Thank you for referring your patient, Luz Marina Live, to the [facility] [place] at [facility]. Please see a copy of my visit note below.     SUBJECTIVE:   CC: Luz Marina Live is an 61 year old woman who presents for preventive health visit.       Patient has been advised of split billing requirements and indicates understanding: Yes  Healthy Habits:    Do you get at least three servings of calcium containing foods daily (dairy, green leafy vegetables, etc.)? yes    Amount of exercise or daily activities, outside of work: not physically active    Problems taking medications regularly No    Medication side effects: No    Have you had an eye exam in the past two years? yes    Do you see a dentist twice per year? yes    Do you have sleep apnea, excessive snoring or daytime drowsiness?yes      -------------------------------------    Today's PHQ-2 Score:   PHQ-2 ( 1999 [employer]izer) 7/13/2021 1/14/2020   Q1: Little interest or pleasure in doing things 0 0   Q2: Feeling down, depressed or hopeless 0 0   PHQ-2 Score 0 0   Q1: Little interest or pleasure in doing things - -   Q2: Feeling down, depressed or hopeless - -   PHQ-2 Score - -       Abuse: Current or Past(Physical, Sexual or Emotional)- No  Do you feel safe in your environment? Yes    Have you ever done Advance Care Planning? (For example, a Health Directive, POLST, or a discussion with a medical provider or your loved ones about your wishes): No, advance care planning information given to patient to review.  Patient plans to discuss their wishes with loved ones or provider.      Social History     Tobacco Use     Smoking status: Former Smoker     Smokeless tobacco: Never Used     Tobacco comment: 30 plus years ago.   Substance Use Topics     Alcohol use: No     If you drink alcohol do you typically  have >3 drinks per day or >7 drinks per week? No                     Reviewed orders with patient.  Reviewed health maintenance and updated orders accordingly - Yes  Labs reviewed in EPIC    FHS-: No flowsheet data found.    Mammogram Screening: Recommended mammography every 1-2 years with patient discussion and risk factor consideration  Pertinent mammograms are reviewed under the imaging tab.    Pertinent mammograms are reviewed under the imaging tab.  History of abnormal Pap smear: Status post benign hysterectomy. Health Maintenance and Surgical History updated.  PAP / HPV Latest Ref Rng & Units 10/29/2018 6/22/2012 6/20/2012   PAP - NIL - NIL   HPV 16 DNA NEG:Negative Negative - -   HPV 18 DNA NEG:Negative Negative - -   OTHER HR HPV NEG:Negative Negative - -   HPVSUR RESULT - - Negative  Reference range: Negative  (Note)  INTERPRETIVE INFORMATION: HPV DNA Probe, High Risk, [facility]    The high-risk HPV test detects HPV genotypes 16, 18, 31,  33, 35, 39, 45, 51, 52, 56, 58, 59, and 68, which are  associated with cervical cancer and its precursor lesions.  However, cross-reactions with other genotypes may occur.  Results should be correlated with cytologic and histologic  findings. Sensitivity may be affected by cellularity of  specimen.    The performance characteristics of this test were  determined by [employer].  Performed by [facility],  [address] [place] [address] [phone]  [email or web address], Karma Brooks MD, Lab. Director -     Reviewed and updated as needed this visit by clinical staff  Tobacco  Allergies               Reviewed and updated as needed this visit by Provider                Past Medical History:   Diagnosis Date     Hypertension      Hypovolemic shock (H) 2/28/2015     Sepsis (H) 8/22/2015      Past Surgical History:   Procedure Laterality Date     CARDIAC SURGERY  08/27/2009    triple vessel coronary artery bypass grafting on 8/27/09     CARPAL TUNNEL RELEASE  RT/LT  1996    bilateral     CV CORONARY ANGIOGRAM  10/17/2019    Procedure: CV CORONARY ANGIOGRAM;  Surgeon: Mahendra Collins MD;  Location:  HEART CARDIAC CATH LAB     CV PCI STENT DRUG ELUTING N/A 10/17/2019    Procedure: PCI Stent Drug Eluting;  Surgeon: Mahendra Collins MD;  Location:  HEART CARDIAC CATH LAB       ROS:  CONSTITUTIONAL: NEGATIVE for fever, chills, change in weight  INTEGUMENTARY/SKIN: NEGATIVE for worrisome rashes, moles or lesions  EYES: NEGATIVE for vision changes or irritation  ENT: NEGATIVE for ear, mouth and throat problems  RESP: NEGATIVE for significant cough or SOB  BREAST: NEGATIVE for masses, tenderness or discharge  CV: NEGATIVE for chest pain, palpitations or peripheral edema  GI: NEGATIVE for nausea, abdominal pain, heartburn, or change in bowel habits  : NEGATIVE for unusual urinary or vaginal symptoms. No vaginal bleeding.  MUSCULOSKELETAL: NEGATIVE for significant arthralgias or myalgia  NEURO: NEGATIVE for weakness, dizziness or paresthesias  PSYCHIATRIC: NEGATIVE for changes in mood or affect     OBJECTIVE:   /80   Pulse 60   Wt 124.7 kg (275 lb)   Breastfeeding No   BMI 48.71 kg/m    EXAM:  GENERAL: healthy, alert and no distress  EYES: Eyes grossly normal to inspection, PERRL and conjunctivae and sclerae normal  NECK: no adenopathy, no asymmetry, masses, or scars and thyroid normal to palpation  RESP: lungs clear to auscultation - no rales, rhonchi or wheezes  CV: regular rate and rhythm, normal S1 S2, no S3 or S4, no murmur, click or rub, no peripheral edema and peripheral pulses strong  ABDOMEN: soft, nontender and bowel sounds normal  MS: no gross musculoskeletal defects noted, no edema    Diagnostic Test Results:  Labs reviewed in Epic    ASSESSMENT/PLAN:   1. Benign essential hypertension  Blood pressure is currently within acceptable range. Recommend to continue with current medical therapy without changes.  Referral to weight  "management was provided to the patient today.  - Comprehensive Weight Management  - Comprehensive metabolic panel  - Lipid Profile  - TSH with free T4 reflex  - T4 free    2. Dyspnea on exertion  Discussed possible causes of dyspnea on exertion with patient.  Recommend cardiology consultation as patient was advised on the need for stress test in the beginning of this year.  - Comprehensive Weight Management    3. Impaired fasting glucose  Recommend checking hemoglobin A1c.  - Hgb A1c    4. Preventative health care  Discussed healthcare needs.  Recommend screening for HIV.  - HIV Antigen Antibody Combo    5. Routine general medical examination at a health care facility  Patient is up-to-date on age-appropriate screening and vaccinations.      Patient has been advised of split billing requirements and indicates understanding: Yes  COUNSELING:   Reviewed preventive health counseling, as reflected in patient instructions       Regular exercise       Healthy diet/nutrition       Vision screening    Estimated body mass index is 48.71 kg/m  as calculated from the following:    Height as of 3/6/20: 1.6 m (5' 3\").    Weight as of this encounter: 124.7 kg (275 lb).        She reports that she has quit smoking. She has never used smokeless tobacco.      Counseling Resources:  ATP IV Guidelines  Pooled Cohorts Equation Calculator  Breast Cancer Risk Calculator  BRCA-Related Cancer Risk Assessment: FHS-7 Tool  FRAX Risk Assessment  ICSI Preventive Guidelines  Dietary Guidelines for Americans, 2010  [employer]'s MyPlate  ASA Prophylaxis  Lung CA Screening    Eliz Nguyen MD  [facility]    "

## 2021-07-14 ASSESSMENT — ANXIETY QUESTIONNAIRES: GAD7 TOTAL SCORE: 1

## 2021-07-27 DIAGNOSIS — R79.89 HIGH SERUM THYROID STIMULATING HORMONE (TSH): Primary | ICD-10-CM

## 2021-07-27 NOTE — RESULT ENCOUNTER NOTE
Message received from Dr. Nguyen that patient should follow up in one month with repeat TSH.    Called and spoke with patient. Discussed repeating TSH in one month. Pt verbalized understanding and agreement, denied further questions/concerns.

## 2021-07-28 DIAGNOSIS — I10 BENIGN ESSENTIAL HYPERTENSION: ICD-10-CM

## 2021-07-29 ENCOUNTER — LAB (OUTPATIENT)
Dept: LAB | Facility: CLINIC | Age: 61
End: 2021-07-29
Payer: COMMERCIAL

## 2021-07-29 ENCOUNTER — ANTICOAGULATION THERAPY VISIT (OUTPATIENT)
Dept: ANTICOAGULATION | Facility: OTHER | Age: 61
End: 2021-07-29

## 2021-07-29 DIAGNOSIS — I48.0 PAF (PAROXYSMAL ATRIAL FIBRILLATION) (H): ICD-10-CM

## 2021-07-29 DIAGNOSIS — Z79.01 LONG TERM CURRENT USE OF ANTICOAGULANT THERAPY: ICD-10-CM

## 2021-07-29 DIAGNOSIS — Z79.01 LONG TERM CURRENT USE OF ANTICOAGULANT THERAPY: Primary | ICD-10-CM

## 2021-07-29 LAB — INR BLD: 3.5 (ref 0.9–1.1)

## 2021-07-29 PROCEDURE — 36416 COLLJ CAPILLARY BLOOD SPEC: CPT

## 2021-07-29 PROCEDURE — 85610 PROTHROMBIN TIME: CPT

## 2021-07-29 RX ORDER — LISINOPRIL 40 MG/1
TABLET ORAL
Qty: 90 TABLET | Refills: 3 | Status: SHIPPED | OUTPATIENT
Start: 2021-07-29 | End: 2021-08-13

## 2021-07-29 NOTE — PROGRESS NOTES
ANTICOAGULATION MANAGEMENT     Luz Marina Live 61 year old female is on warfarin with supratherapeutic INR result. (Goal INR 2.0-3.0)    Recent labs: (last 7 days)     07/29/21  1333   INR 3.5*       ASSESSMENT     Source(s): Chart Review       Warfarin doses taken: Warfarin taken as instructed    Diet: No new diet changes identified    New illness, injury, or hospitalization: No    Medication/supplement changes: None noted    Signs or symptoms of bleeding or clotting: No    Previous INR: Therapeutic last visit; previously outside of goal range    Additional findings: None     PLAN     Recommended plan for no diet, medication or health factor changes affecting INR     Dosing Instructions: Partial hold then continue your current warfarin dose with next INR in 2 weeks       Summary  As of 7/29/2021    Full warfarin instructions:  7/29: 5 mg; Otherwise 10 mg every Tue, Thu, Sat; 7.5 mg all other days   Next INR check:  8/12/2021             Sent [employer] message with dosing and follow up instructions    Contact [phone]  to schedule and with any changes, questions or concerns.     Education provided: Please call back if any changes to your diet, medications or how you've been taking warfarin    Plan made per [facility] anticoagulation protocol    Michael Quispe, RN  Anticoagulation Clinic  7/29/2021    _______________________________________________________________________     Anticoagulation Episode Summary     Current INR goal:  2.0-3.0   TTR:  30.4 % (1 y)   Target end date:  Indefinite   Send INR reminders to:  [facility] [place]    Indications    Long-term (current) use of anticoagulants [Z79.01] [Z79.01]  PAF (paroxysmal atrial fibrillation) (H) [I48.0]           Comments:  5 mg tabs, [facility] (needs staff message) PM dose         Anticoagulation Care Providers     Provider Role Specialty Phone number    Eliz Nguyen MD Referring Internal Medicine [phone]

## 2021-08-02 ENCOUNTER — PRE VISIT (OUTPATIENT)
Dept: CARDIOLOGY | Facility: CLINIC | Age: 61
End: 2021-08-02

## 2021-08-02 ENCOUNTER — TELEPHONE (OUTPATIENT)
Dept: CARDIOLOGY | Facility: CLINIC | Age: 61
End: 2021-08-02

## 2021-08-02 DIAGNOSIS — E78.5 HYPERLIPIDEMIA LDL GOAL <100: ICD-10-CM

## 2021-08-02 DIAGNOSIS — R06.02 SOB (SHORTNESS OF BREATH): ICD-10-CM

## 2021-08-02 DIAGNOSIS — I25.10 CORONARY ARTERY DISEASE INVOLVING NATIVE HEART WITHOUT ANGINA PECTORIS, UNSPECIFIED VESSEL OR LESION TYPE: Primary | ICD-10-CM

## 2021-08-02 DIAGNOSIS — I10 HTN, GOAL BELOW 140/90: ICD-10-CM

## 2021-08-02 NOTE — TELEPHONE ENCOUNTER
----- Message from Kaylie Tran RN sent at 8/2/2021  9:58 AM CDT -----  Regarding: stress test prior to 8/11 Tiffanie rodríguezt  Hello,    This patient has a phone visit with Dr. Angulo on 8/11.  Per her previous visit she was suppose to have an Exercise Nuclear Medicine stress test in early 2021.  Looks like that hasn't happened yet. I just put the order in.  Would you please call her and coordinate this prior to her phone visit with Dr. Moreno.  If we can't get the stress test done prior to 8/11, then lets move back the [employer] appt.    Please let me know when this is scheduled and I'll get Patient Instructions over to her.    Thanks!Ramses

## 2021-08-02 NOTE — TELEPHONE ENCOUNTER
----- Message from Kaylie Tran RN sent at 8/2/2021  9:58 AM CDT -----  Regarding: stress test prior to 8/11 Tiffanie rodríguezt  Hello,    This patient has a phone visit with Dr. Angulo on 8/11.  Per her previous visit she was suppose to have an Exercise Nuclear Medicine stress test in early 2021.  Looks like that hasn't happened yet. I just put the order in.  Would you please call her and coordinate this prior to her phone visit with Dr. Moreno.  If we can't get the stress test done prior to 8/11, then lets move back the [employer] [employer] appt.    Please let me know when this is scheduled and I'll get Patient Instructions over to her.    Thanks!Ramses

## 2021-08-05 ENCOUNTER — LAB (OUTPATIENT)
Dept: LAB | Facility: CLINIC | Age: 61
End: 2021-08-05
Attending: INTERNAL MEDICINE
Payer: COMMERCIAL

## 2021-08-05 ENCOUNTER — HOSPITAL ENCOUNTER (OUTPATIENT)
Dept: NUCLEAR MEDICINE | Facility: CLINIC | Age: 61
Setting detail: NUCLEAR MEDICINE
End: 2021-08-05
Attending: INTERNAL MEDICINE
Payer: COMMERCIAL

## 2021-08-05 ENCOUNTER — HOSPITAL ENCOUNTER (OUTPATIENT)
Dept: CARDIOLOGY | Facility: CLINIC | Age: 61
End: 2021-08-05
Attending: INTERNAL MEDICINE
Payer: COMMERCIAL

## 2021-08-05 DIAGNOSIS — R06.02 SOB (SHORTNESS OF BREATH): ICD-10-CM

## 2021-08-05 DIAGNOSIS — I25.10 CORONARY ARTERY DISEASE INVOLVING NATIVE HEART WITHOUT ANGINA PECTORIS, UNSPECIFIED VESSEL OR LESION TYPE: ICD-10-CM

## 2021-08-05 DIAGNOSIS — I10 HTN, GOAL BELOW 140/90: ICD-10-CM

## 2021-08-05 DIAGNOSIS — R79.89 HIGH SERUM THYROID STIMULATING HORMONE (TSH): ICD-10-CM

## 2021-08-05 DIAGNOSIS — E78.5 HYPERLIPIDEMIA LDL GOAL <100: ICD-10-CM

## 2021-08-05 LAB
ALBUMIN SERPL-MCNC: 2.9 G/DL (ref 3.4–5)
ALP SERPL-CCNC: 138 U/L (ref 40–150)
ALT SERPL W P-5'-P-CCNC: 57 U/L (ref 0–50)
ANION GAP SERPL CALCULATED.3IONS-SCNC: 6 MMOL/L (ref 3–14)
AST SERPL W P-5'-P-CCNC: 50 U/L (ref 0–45)
BILIRUB SERPL-MCNC: 0.6 MG/DL (ref 0.2–1.3)
BUN SERPL-MCNC: 34 MG/DL (ref 7–30)
CALCIUM SERPL-MCNC: 9.5 MG/DL (ref 8.5–10.1)
CHLORIDE BLD-SCNC: 109 MMOL/L (ref 94–109)
CO2 SERPL-SCNC: 24 MMOL/L (ref 20–32)
CREAT SERPL-MCNC: 1.22 MG/DL (ref 0.52–1.04)
CV STRESS MAX HR HE: 116
ERYTHROCYTE [DISTWIDTH] IN BLOOD BY AUTOMATED COUNT: 13.6 % (ref 10–15)
GFR SERPL CREATININE-BSD FRML MDRD: 48 ML/MIN/1.73M2
GLUCOSE BLD-MCNC: 96 MG/DL (ref 70–99)
HCT VFR BLD AUTO: 37.9 % (ref 35–47)
HGB BLD-MCNC: 11.7 G/DL (ref 11.7–15.7)
MCH RBC QN AUTO: 28.9 PG (ref 26.5–33)
MCHC RBC AUTO-ENTMCNC: 30.9 G/DL (ref 31.5–36.5)
MCV RBC AUTO: 94 FL (ref 78–100)
NT-PROBNP SERPL-MCNC: 1268 PG/ML (ref 0–125)
PLATELET # BLD AUTO: 263 10E3/UL (ref 150–450)
POTASSIUM BLD-SCNC: 4.9 MMOL/L (ref 3.4–5.3)
PROT SERPL-MCNC: 8.3 G/DL (ref 6.8–8.8)
RATE PRESSURE PRODUCT: NORMAL
RBC # BLD AUTO: 4.05 10E6/UL (ref 3.8–5.2)
SODIUM SERPL-SCNC: 139 MMOL/L (ref 133–144)
STRESS ECHO BASELINE DIASTOLIC HE: 74
STRESS ECHO BASELINE HR: 65
STRESS ECHO BASELINE SYSTOLIC BP: 157
STRESS ECHO CALCULATED PERCENT HR: 73 %
STRESS ECHO LAST STRESS DIASTOLIC BP: 70
STRESS ECHO LAST STRESS SYSTOLIC BP: 122
STRESS ECHO POST EXERCISE DUR MIN: 7 MIN
STRESS ECHO POST EXERCISE DUR SEC: 30 SEC
STRESS ECHO TARGET HR: 159
TSH SERPL DL<=0.005 MIU/L-ACNC: 3.62 MU/L (ref 0.4–4)
WBC # BLD AUTO: 7 10E3/UL (ref 4–11)

## 2021-08-05 PROCEDURE — 36415 COLL VENOUS BLD VENIPUNCTURE: CPT

## 2021-08-05 PROCEDURE — 78452 HT MUSCLE IMAGE SPECT MULT: CPT

## 2021-08-05 PROCEDURE — 84443 ASSAY THYROID STIM HORMONE: CPT

## 2021-08-05 PROCEDURE — 82040 ASSAY OF SERUM ALBUMIN: CPT

## 2021-08-05 PROCEDURE — 93018 CV STRESS TEST I&R ONLY: CPT | Performed by: INTERNAL MEDICINE

## 2021-08-05 PROCEDURE — 85048 AUTOMATED LEUKOCYTE COUNT: CPT

## 2021-08-05 PROCEDURE — 343N000001 HC RX 343: Performed by: INTERNAL MEDICINE

## 2021-08-05 PROCEDURE — 83880 ASSAY OF NATRIURETIC PEPTIDE: CPT

## 2021-08-05 PROCEDURE — 78452 HT MUSCLE IMAGE SPECT MULT: CPT | Mod: 26

## 2021-08-05 PROCEDURE — 82247 BILIRUBIN TOTAL: CPT

## 2021-08-05 PROCEDURE — 93016 CV STRESS TEST SUPVJ ONLY: CPT | Performed by: INTERNAL MEDICINE

## 2021-08-05 PROCEDURE — 93017 CV STRESS TEST TRACING ONLY: CPT

## 2021-08-05 PROCEDURE — A9502 TC99M TETROFOSMIN: HCPCS | Performed by: INTERNAL MEDICINE

## 2021-08-05 RX ADMIN — TETROFOSMIN 42 MCI.: 1.38 INJECTION, POWDER, LYOPHILIZED, FOR SOLUTION INTRAVENOUS at 12:08

## 2021-08-05 RX ADMIN — TETROFOSMIN 10.1 MCI.: 1.38 INJECTION, POWDER, LYOPHILIZED, FOR SOLUTION INTRAVENOUS at 10:52

## 2021-08-06 ENCOUNTER — CARE COORDINATION (OUTPATIENT)
Dept: CARDIOLOGY | Facility: CLINIC | Age: 61
End: 2021-08-06

## 2021-08-06 ENCOUNTER — PATIENT OUTREACH (OUTPATIENT)
Dept: CARDIOLOGY | Facility: CLINIC | Age: 61
End: 2021-08-06

## 2021-08-06 ENCOUNTER — TELEPHONE (OUTPATIENT)
Dept: CARDIOLOGY | Facility: CLINIC | Age: 61
End: 2021-08-06

## 2021-08-06 DIAGNOSIS — R94.39 POSITIVE CARDIAC STRESS TEST: Primary | ICD-10-CM

## 2021-08-06 RX ORDER — POTASSIUM CHLORIDE 1500 MG/1
20 TABLET, EXTENDED RELEASE ORAL
Status: CANCELLED | OUTPATIENT
Start: 2021-08-06

## 2021-08-06 RX ORDER — ASPIRIN 325 MG
325 TABLET ORAL ONCE
Status: CANCELLED | OUTPATIENT
Start: 2021-08-06 | End: 2021-08-06

## 2021-08-06 RX ORDER — ASPIRIN 81 MG/1
243 TABLET, CHEWABLE ORAL ONCE
Status: CANCELLED | OUTPATIENT
Start: 2021-08-06

## 2021-08-06 RX ORDER — LIDOCAINE 40 MG/G
CREAM TOPICAL
Status: CANCELLED | OUTPATIENT
Start: 2021-08-06

## 2021-08-06 RX ORDER — SODIUM CHLORIDE 9 MG/ML
INJECTION, SOLUTION INTRAVENOUS CONTINUOUS
Status: CANCELLED | OUTPATIENT
Start: 2021-08-06

## 2021-08-06 RX ORDER — POTASSIUM CHLORIDE 1500 MG/1
40 TABLET, EXTENDED RELEASE ORAL
Status: CANCELLED | OUTPATIENT
Start: 2021-08-06

## 2021-08-09 DIAGNOSIS — Z11.59 ENCOUNTER FOR SCREENING FOR OTHER VIRAL DISEASES: ICD-10-CM

## 2021-08-09 NOTE — PROGRESS NOTES
Positive exercise stress test results and labs reviewed by Dr. Moreno and called to Luz Marina.  Dr. Moreno has ordered a RHC and Coronary angiogram.  Discussed with Luz Marina and this will be scheduled for 8/20/21.  Luz Marina will still have her phone visit with Dr. Moreno on 8/11/21. Test Prep instructions will be sent in [employer]. Luz Marina verbalizes understanding and agrees with plan of care. Kaylie Tran RN       Date: 8/9/2021  Time of Call: 11:32 AM  Diagnosis:  Positive stress test  VOR[facility] - Ordering provider: Dr. Moreno  Order: RHC and Coronary angiogram  Order received by: Kaylie Tran RN   Follow-up/additional notes: Scheduled for 8/20/21.  Could not be sooner due to scheduling and also Luz Marina will be out of town from 13th-16th.

## 2021-08-09 NOTE — PROGRESS NOTES
Impression  1. ASHD with  stenting         3-vessel coronary artery disease s/p CAB       Patent LIMA-LAD, SVG-OM, SVG-RPDA       Significant stenosis of pLCx/OM1 s/p SHAHANA x2 to distal LM/pLCx/OM1    2. History of CAB  3. Elevated PA pressure  4. Hypertension  5. Hyperlipidemia  6. KEITH  7. PFO  8. Atrial fibrillation with warfarin anticoagulation/chronic  9. SLE  10. History of lupus nephritis      Video visit: [id number]  Patient permission, yes  Patient location: home  Duration 30 minutes of which >50% involved counseling and direct coordination of care       I conducted a virtual visit with this patient. We discussed the patient's current condition, reviewed interim history since last visit, current functional status, diet, and possible cardiac symptoms including: chest pain, tightness, heaviness, pressure, PND, orthopnea, ankle edema, increasing abdominal girth, weight gain, palpitation, pre-syncope or syncope.  She is doing well with decreased shortness of breath, increased activity and no symptoms suggestive of progressive CAD.  She is having trouble with Brillinta.  Since our last visit her creatinine has risen without obvious explanation from intercurrent conditions such as SLE and remote history of lupus nephritis.  60 year old female with premature CAD associated with SLE who has undergone previous CABG as well as subsequent stenting (immediatley following false negative non exercise nuclear stress test see via video visit to assess current status and potential to discontinue anti-platelet agent which cause cause incrase in creatinine.     Her most recent stress test demonstrated an apical hypoperfusion area.  The patient has SLE/CABG.  She has exertional shortness of breath without chest pain, palpitation, pre-syncope or syncope.  She occasionally has pleuropericardial pan    She has no interim hi tory of chest pain, tightness, heaviness pressure at rest, with emotion or exertion or nocturnally.   She does have exertional shortness of breath which anti-dates her recurrent manifestation of CAD which led to stenting last year.         We reviewed and suggested:  1. Viral epidemic safety suggestions  2. Continued adherence to medical regimen, diet, and exercise program as previously described  3. Discontinue Brilinta and start ASA  5.   Follow creatinine carefully with Dr. Clark  6.   Seek immediate medical attention if recurrent chest pain or bleeding  7    Exercise nuclear medicine stress test  Reviewed and suggested BHC, coronary angiogram, possible PTCA  8. Weight loss    Complete lab orders placed by me for tomorrow    I thoroughly discussed the risks and benefits of the contemplated catherization including bleeding, vessel rupture, death, arrhythmia, embolism, stroke, renal failure perforation of pulmonary artery, aortic,lung or heart.  I discussed how the procedure would be performed and alternative diagnostic and therapeutic management strategies.  All questions were answered.          Current Outpatient Medications   Medication     atorvastatin (LIPITOR) 40 MG tablet     Calcium Carbonate-Vitamin D (CALCIUM 600-D PO)     hydroxychloroquine (PLAQUENIL) 200 MG tablet     lisinopril (ZESTRIL) 40 MG tablet     loratadine (CLARITIN) 10 MG tablet     metoprolol tartrate (LOPRESSOR) 50 MG tablet     metroNIDAZOLE (METROCREAM) 0.75 % external cream     MULTIPLE VITAMIN PO     Omega-3 Fatty Acids (FISH OIL) 1200 MG capsule     order for DME     warfarin ANTICOAGULANT (COUMADIN) 5 MG tablet     No current facility-administered medications for this visit.         Weight:  Wt Readings from Last 24 Encounters:   07/13/21 124.7 kg (275 lb)   07/20/20 113.9 kg (251 lb)   03/06/20 113.9 kg (251 lb 3.2 oz)   02/05/20 111.6 kg (246 lb)   01/14/20 113.9 kg (251 lb)   12/23/19 116.3 kg (256 lb 4.8 oz)   11/08/19 116.6 kg (257 lb)   11/08/19 116.6 kg (257 lb)   11/05/19 116.6 kg (257 lb)   10/16/19 119.4 kg (263 lb 4.8  oz)   10/11/19 120.7 kg (266 lb 1.6 oz)   09/04/19 122 kg (269 lb)   08/16/19 122 kg (269 lb)   08/13/19 122.5 kg (270 lb)   08/05/19 121.4 kg (267 lb 9.6 oz)   11/20/18 122.9 kg (271 lb)   10/29/18 122 kg (269 lb)   09/17/18 119.3 kg (263 lb)   09/04/18 118.3 kg (260 lb 14.4 oz)   06/25/18 118.4 kg (261 lb)   06/25/18 117.5 kg (259 lb)   05/31/18 116.9 kg (257 lb 12.8 oz)   04/30/18 113.4 kg (250 lb)   04/10/18 115 kg (253 lb 9.6 oz)       Medication:  Current Outpatient Medications   Medication     atorvastatin (LIPITOR) 40 MG tablet     Calcium Carbonate-Vitamin D (CALCIUM 600-D PO)     hydroxychloroquine (PLAQUENIL) 200 MG tablet     lisinopril (ZESTRIL) 40 MG tablet     loratadine (CLARITIN) 10 MG tablet     metoprolol tartrate (LOPRESSOR) 50 MG tablet     metroNIDAZOLE (METROCREAM) 0.75 % external cream     MULTIPLE VITAMIN PO     Omega-3 Fatty Acids (FISH OIL) 1200 MG capsule     order for DME     warfarin ANTICOAGULANT (COUMADIN) 5 MG tablet     No current facility-administered medications for this visit.     Laboratories:    Results for IDA PADRON (MRN [id number]) as of 10/23/2020 14:44   Ref. Range 3/31/2020 13:40 4/30/2020 13:32 6/2/2020 15:19 7/7/2020 14:16 7/7/2020 14:17 8/4/2020 12:57   Creatinine Latest Ref Range: 0.52 - 1.04 mg/dL 1.22 (H) 1.44 (H) 1.41 (H) 1.73 (H) 1.73 (H) 1.87 (H)       Results for IDA PADRON (MRN [id number]) as of 4/1/2020 13:50   Ref. Range 2/12/2020 17:17 2/27/2020 11:47 3/6/2020 08:50   Sodium Latest Ref Range: 133 - 144 mmol/L  137    Potassium Latest Ref Range: 3.4 - 5.3 mmol/L  4.6    Chloride Latest Ref Range: 94 - 109 mmol/L  107    Carbon Dioxide Latest Ref Range: 20 - 32 mmol/L  23    Urea Nitrogen Latest Ref Range: 7 - 30 mg/dL  38 (H)    Creatinine Latest Ref Range: 0.52 - 1.04 mg/dL  1.20 (H) 1.31 (H)   GFR Estimate Latest Ref Range: >60 mL/min/1.73_m2  49 (L) 44 (L)   GFR Estimate If Black Latest Ref Range: >60 mL/min/1.73_m2  57 (L) 51 (L)   Calcium Latest  Ref Range: 8.5 - 10.1 mg/dL  9.0    Anion Gap Latest Ref Range: 3 - 14 mmol/L  7    Phosphorus Latest Ref Range: 2.5 - 4.5 mg/dL  3.5    Albumin Latest Ref Range: 3.4 - 5.0 g/dL  3.0 (L) 3.0 (L)   ALT Latest Ref Range: 0 - 50 U/L   29   AST Latest Ref Range: 0 - 45 U/L   27   25 OH Vit D total Latest Ref Range: 20 - 75 ug/L   <40   25 OH Vit D2 Latest Units: ug/L   <5   25 OH Vit D3 Latest Units: ug/L   35   Creatinine Urine Latest Units: mg/dL  63    CRP Inflammation Latest Ref Range: 0.0 - 8.0 mg/L   21.5 (H)   Protein Random Urine Latest Units: g/L  0.39    Protein Total Urine g/gr Creatinine Latest Ref Range: 0 - 0.2 g/g Cr  0.63 (H)    Thyroid Stim Immunog Latest Ref Range: <=1.3 TSI index <1.0     TSH Latest Ref Range: 0.40 - 4.00 mU/L 1.98     Glucose Latest Ref Range: 70 - 99 mg/dL  83    WBC Latest Ref Range: 4.0 - 11.0 10e9/L   7.0   Hemoglobin Latest Ref Range: 11.7 - 15.7 g/dL   10.8 (L)   Hematocrit Latest Ref Range: 35.0 - 47.0 %   34.2 (L)   Platelet Count Latest Ref Range: 150 - 450 10e9/L   204   RBC Count Latest Ref Range: 3.8 - 5.2 10e12/L   3.60 (L)   MCV Latest Ref Range: 78 - 100 fl   95   MCH Latest Ref Range: 26.5 - 33.0 pg   30.0   MCHC Latest Ref Range: 31.5 - 36.5 g/dL   31.6   RDW Latest Ref Range: 10.0 - 15.0 %   13.7   Diff Method Unknown   Automated Method   % Neutrophils Latest Units: %   71.1   % Lymphocytes Latest Units: %   17.6   % Monocytes Latest Units: %   7.0   % Eosinophils Latest Units: %   2.3   % Basophils Latest Units: %   0.7   % Immature Granulocytes Latest Units: %   1.3   Absolute Neutrophil Latest Ref Range: 1.6 - 8.3 10e9/L   5.0   Absolute Lymphocytes Latest Ref Range: 0.8 - 5.3 10e9/L   1.2   Absolute Monocytes Latest Ref Range: 0.0 - 1.3 10e9/L   0.5   Absolute Eosinophils Latest Ref Range: 0.0 - 0.7 10e9/L   0.2   Absolute Basophils Latest Ref Range: 0.0 - 0.2 10e9/L   0.1   Abs Immature Granulocytes Latest Ref Range: 0 - 0.4 10e9/L   0.1   Sed Rate Latest Ref  Range: 0 - 30 mm/h   87 (H)   INR Point of Care Latest Ref Range: 0.86 - 1.14   2.0 (H)    Complement C3 Latest Ref Range: 81 - 157 mg/dL  65 (L)    Complement C4 Latest Ref Range: 13 - 39 mg/dL  8 (L)        Results for IDA PADRON (MRN [id number]) as of 11/5/2019 10:53   Ref. Range 8/5/2019 11:03 11/5/2019 10:16   CRP Inflammation Latest Ref Range: 0.0 - 8.0 mg/L 8.8 (H) 17.0 (H)         Her new catherization:  Results (pressures in mmHg)  RA: 1/4/3  RV 32/3  PA 30/13/19;  PA Sat 65%  PCWP -/-/5;  PCW Sat --%  Kannan CO/CI 3.3/1.6 L/min; TD CO 4.1/2.0 L/min  PVR 4.2 bermudez; TPR 5.7 bermudez  Hb 12.3 g/dL  NIBP 167/81/116  SVR 2200 dynes*sec/cm^5     [place]:  SVC 62.7%  IVC 65.0%  Low RA 73.0%  Mid RA 68.0%  High RA 64.6%  RV 63%  PA 65%  PCW ---          Conclusions:  1. Low right and left sided filling pressures.  2. Normal pulmonary artery pressures.  3. Low normal cardiac output.      REVIEW of SYMPTOMS    Constitutional: without fever, chills, night sweats.  Weight is down  HEENT: without dry eyes, dry mouth, sinusitis, corryza, visual changes  Endocrine: without polyuria, polydypsia, polyphagia, heat or cold intolerance, changing mental status  Cardiology: without chest pain, tightness, heaviness, pressure, paroxysmal dyspnea, orthopnea, palpitation, pre-syncope or syncope or device discharge (if present)  Pulmonary: without asthma, wheezing, cough, hemoptysis but longstanding exertional shortness of breath  GI: without nausea, emesis, jaundice, pain, hematemesis, melena  : without frequency, urgency, hematuria, stones, pain, abnormal bleeding, frequency, urgency  Neurologic: without TIA, CVA, trauma, seizure  Dermatologic: without lesions, abrasion rash,   Orthopedic/Rheum: without significant joint pain, impairment, limb, polyserositis, ulceration, Raynauds  Heme: without mass, bruising, frequent infection, anemia  Psychiatric: without substance abuse, hallucination, medication, depression      Exercise  tolerance:    Nuclear stress test: (this was false negative study as patient has an acute coronary event shortly afterwards)  PROTOCOL:    Rest and stress myocardial perfusion imaging was performed using  Tc-99m tetrafosmin intravenously. Pharmacological stress was performed  with 0.4 mg of regadenoson intravenously. The EKG showed normal sinus  rhythm at rest. No significant abnormalities were noted with infusion  of regadenoson.     FINDINGS:  1.  Overall quality of the study: Diagnostic.   2.  Left ventricular cavity is Normal on the rest and stress studies.  3.  SPECT images demonstrate uniform radiotracer uptake of the  myocardium on both stress and rest images.  4.  Left ventricular ejection fraction is 73%. Left ventricular  end-diastolic volume is 134 mL. End-systolic volume is 34 mL      Echocardiogram  Name: IDA PADRON  MRN: [id number]  : 1960  Study Date: 10/17/2019 10:24 AM  Age: 59 yrs  Gender: Female  Patient Location: [facility]  Reason For Study: Chest Pain  Ordering Physician: MARIANELA ESCALONA  Performed By: Yasmany Campbell RDCS     BSA: 2.2 m2  Height: 63 in  Weight: 263 lb  HR: 58  BP: 144/81 mmHg  _____________________________________________________________________________  __        Procedure  Complete Portable Echo Adult. Contrast Optison. Optison (NDC #7979-3860-95)  given intravenously. Patient was given 6 ml mixture of 3 ml Optison and 6 ml  saline. 3 ml wasted.  _____________________________________________________________________________  __        Interpretation Summary  Global and regional left ventricular function is normal with an EF of 60-65%.  Right ventricular function, chamber size, wall motion, and thickness are  normal.  No pericardial effusion is present.  IVC diameter and respiratory changes fall into an intermediate range  suggesting an RA pressure of 8 mmHg.  Mild pulmonary hypertension is present. Right ventricular systolic pressure is  43mmHg above the right atrial  pressure.  Compared to prior, measured RVSP is lower, no other change.  _____________________________________________________________________________  __        Left Ventricle  Left ventricular size is normal. Global and regional left ventricular function  is normal with an EF of 60-65%. Mild concentric wall thickening consistent  with left ventricular hypertrophy is present. Left ventricular diastolic  function is indeterminate.     Right Ventricle  Right ventricular function, chamber size, wall motion, and thickness are  normal.     Atria  Moderate biatrial enlargement is present.     Mitral Valve  The mitral valve is normal.        Aortic Valve  Aortic valve is normal in structure and function.     Tricuspid Valve  Mild tricuspid insufficiency is present. Mild pulmonary hypertension is  present. Right ventricular systolic pressure is 43mmHg above the right atrial  pressure.     Pulmonic Valve  The pulmonic valve is normal. Trace pulmonic insufficiency is present.     Vessels  The aorta root is normal. The thoracic aorta is normal. Dilation of the  inferior vena cava is present with normal respiratory variation in diameter.  IVC diameter and respiratory changes fall into an intermediate range  suggesting an RA pressure of 8 mmHg.     Pericardium  No pericardial effusion is present.     _____________________________________________________________________________  __  MMode/2D Measurements & Calculations  IVSd: 1.2 cm  LVIDd: 4.5 cm  LVIDs: 2.9 cm  LVPWd: 1.2 cm  FS: 36.6 %     LV mass(C)d: 197.9 grams  LV mass(C)dI: 91.1 grams/m2  asc Aorta Diam: 3.5 cm  LVOT diam: 2.1 cm  LVOT area: 3.5 cm2  LA Volume Index (BP): 43.9 ml/m2  RWT: 0.52  TAPSE: 2.3 cm        Doppler Measurements & Calculations  MV E max maria isabel: 84.8 cm/sec  MV A max maria isabel: 35.8 cm/sec  MV E/A: 2.4  MV dec slope: 473.3 cm/sec2  MV dec time: 0.18 sec     TV max P.0 mmHg  PA acc time: 0.09 sec  TR max maria isabel: 327.8 cm/sec  TR max P.0 mmHg  E/E' avg:  10.5  Lateral E/e': 9.0  Medial E/e': 12.0     ____________________________        Echocardiographic findings of TR, modest PA, normal systolic function.    Name: IDA PADRON  MRN: [id number]  : 1960  Study Date: 2018 11:48 AM  Age: 57 yrs  Gender: Female  Patient Location: [facility]  Reason For Study: Hx PFO, Elevated PA pressures  Ordering Physician: SANNA SALAZAR  Referring Physician: SANNA SALAZAR  Performed By: Tray Harvey RDCS     BSA: 2.2 m2  Height: 63 in  Weight: 257 lb  HR: 62  BP: 167/94 mmHg  _____________________________________________________________________________  __        Procedure  Bubble Echocardiogram with two-dimensional, color and spectral Doppler  performed. IV start location R Hand . Bateriostatic Saline used for Bubble  Study.  _____________________________________________________________________________  __        Interpretation Summary     Right ventricular function, chamber size, wall motion, and thickness are  normal.  PFO again documented with color doppler and Bubble study.  Mild to moderate tricuspid insufficiency is present.  Right ventricular systolic pressure is 61mmHg above the right atrial pressure.  No pericardial effusion is present.  _____________________________________________________________________________  __        Left Ventricle  Global and regional left ventricular function is normal with an EF of 55-60%.  Left ventricular wall thickness is normal. Left ventricular size is normal.  Left ventricular diastolic function is indeterminate. No regional wall motion  abnormalities are seen.     Right Ventricle  Right ventricular function, chamber size, wall motion, and thickness are  normal.     Atria  Moderate biatrial enlargement is present. PFO again documented with color  doppler and Bubble study.     Mitral Valve  Mild to moderate mitral insufficiency is present.        Aortic Valve  Aortic valve is normal in structure and  function.     Tricuspid Valve  Mild to moderate tricuspid insufficiency is present. Right ventricular  systolic pressure is 61mmHg above the right atrial pressure.     Pulmonic Valve  The pulmonic valve is normal. Trace to mild pulmonic insufficiency is present.     Vessels  The aorta root is normal. The pulmonary artery is normal. The inferior vena  cava is normal.     Pericardium  No pericardial effusion is present.     _____________________________________________________________________________  __  MMode/2D Measurements & Calculations  IVSd: 0.89 cm  LVIDd: 5.3 cm  LVIDs: 3.1 cm  LVPWd: 0.75 cm  FS: 41.8 %  LV mass(C)d: 154.8 grams  LV mass(C)dI: 72.0 grams/m2     Ao root diam: 3.0 cm  LA dimension: 5.3 cm  asc Aorta Diam: 3.6 cm  LA/Ao: 1.8  LVOT diam: 2.2 cm  LVOT area: 3.7 cm2  LA Volume (BP): 96.0 ml  LA Volume Index (BP): 44.7 ml/m2  RWT: 0.28        Doppler Measurements & Calculations  MV E max luis: 92.8 cm/sec  MV A max luis: 58.2 cm/sec  MV E/A: 1.6  MV dec time: 0.15 sec     Ao V2 max: 147.4 cm/sec  Ao max P.0 mmHg  TARUN(V,D): 2.6 cm2  LV V1 max P.3 mmHg  LV V1 max: 104.1 cm/sec  LV V1 VTI: 24.8 cm  MR ERO: 0.27 cm2  CO(LVOT): 5.6 l/min  CI(LVOT): 2.6 l/min/m2  SV(LVOT): 92.9 ml  SI(LVOT): 43.2 ml/m2  PA V2 max: 110.6 cm/sec  PA max P.9 mmHg  PA acc time: 0.08 sec  PI end-d luis: 154.4 cm/sec  PI max luis: 255.8 cm/sec  PI max P.2 mmHg  TR max luis: 385.4 cm/sec  TR max P.4 mmHg  Pulm Sys Luis: 63.2 cm/sec  Pulm Peng Luis: 97.7 cm/sec  Pulm S/D: 0.65  E/E' av.1  Lateral E/e': 8.9  Medial E/e': 15.4        : 1960  Study Date: 2016 01:31 PM  Age: 56 yrs  Gender: Female  Patient Location: [facility]  Reason For Study:     History: tricuspid regurgitation  Ordering Physician: JOSE ARELLANO  Referring Physician: JOSE ARELLANO  Performed By: Hugh Sharpe MD     BSA: 2.2 m2  Height: 63 in  Weight: 269  lb  ______________________________________________________________________________     Interpretation Summary  Left ventricular function, chamber size, wall motion, and wall thickness are  normal.The EF is 55-60%.  Global right ventricular function is normal. Mild right ventricular dilation  is present.  Mild to moderate TR with mild prolapse of the posterior leaflet of the  tricupsid valve.  A small patent foramen ovale is present. There is no ASD (secundum, sinus  venosus or unroofed coronary sinus). All 4 pulmonary veins drain into the  left atrium.  Mild pulmonary hypertension is present with RVSP 45mmHg above RAP.     ______________________________________________________________________________        Procedure  Transesophageal Echocardiogram with color and spectral Doppler performed. The  procedure was performed in the Echo Lab. Informed consent for Transesophegeal  echo obtained. EVE Probe #12 was used during the procedure. Patient was  sedated using Fentanyl 100 mcg. Patient was sedated using Versed 4 mg. The  Transducer was inserted without difficulty . The patient tolerated the  procedure well. Complications None. ECG shows normal sinus rhythm. Good  quality two-dimensional was performed and interpreted.     Left Ventricle  Left ventricular function, chamber size, wall motion, and wall thickness are  normal.The EF is 55-60%.     Right Ventricle  Global right ventricular function is normal. Mild right ventricular dilation  is present.     Atria  Both atria appear normal. The left atrial appendage is normal. It is free of  spontaneous echo contrast and thrombus. A small patent foramen ovale is  present. The patent foramen ovale was demonstrated by color Doppler and  agitated saline bubble study . The patent foramen ovale was demonstrated with  Valsalva's maneuver. No evidence of ASD.     Mitral Valve  The mitral valve is normal. Trace mitral insufficiency is present.     Aortic Valve  Aortic valve is normal  in structure and function. The aortic valve is  tricuspid.  Tricuspid Valve  Mild to moderate tricuspid insufficiency is present. Right ventricular  systolic pressure is 45mmHg above the right atrial pressure. Mild pulmonary  hypertension is present. Mild prolapse of the posterior leaflet of the  tricupsid valve is noted.     Pulmonic Valve  The pulmonic valve is normal.     Vessels  The pulmonary artery is normal. The aorta root is normal. The thoracic aorta  is normal. All four pulmonary veins visualized with dampened velocity  waveforms.     Pericardium  No pericardial effusion is present.     Compared to Previous Study  This study was compared with the study from 2016. A small PFO has been  identified. .     ______________________________________________________________________________     Doppler Measurements & Calculations  TR max maria isabel: 336.7 cm/sec  TR max P.5 mmHg  ______________________________________________________________________________         Name: IDA PADRON  MRN: [id number]  : 1960  Study Date: 2016 01:31 PM  Age: 56 yrs  Gender: Female  [facility]  Reason For Study:     History: tricuspid regurgitation  Ordering Physician: JOSE ARELLANO  Referring Physician: JOSE ARELLANO  Performed By: Hugh Sharpe MD     BSA: 2.2 m2  Height: 63 in  Weight: 269 lb  ______________________________________________________________________________     Interpretation Summary  Left ventricular function, chamber size, wall motion, and wall thickness are  normal.The EF is 55-60%.  Global right ventricular function is normal. Mild right ventricular dilation  is present.  Mild to moderate TR with mild prolapse of the posterior leaflet of the  tricupsid valve.  A small patent foramen ovale is present. There is no ASD (secundum, sinus  venosus or unroofed coronary sinus). All 4 pulmonary veins drain into the  left atrium.  Mild pulmonary hypertension is present with RVSP  45mmHg above RAP.     ______________________________________________________________________________        Procedure  Transesophageal Echocardiogram with color and spectral Doppler performed. The  procedure was performed in the Echo Lab. Informed consent for Transesophegeal  echo obtained. EVE Probe #12 was used during the procedure. Patient was  sedated using Fentanyl 100 mcg. Patient was sedated using Versed 4 mg. The  Transducer was inserted without difficulty . The patient tolerated the  procedure well. Complications None. ECG shows normal sinus rhythm. Good  quality two-dimensional was performed and interpreted.     Left Ventricle  Left ventricular function, chamber size, wall motion, and wall thickness are  normal.The EF is 55-60%.     Right Ventricle  Global right ventricular function is normal. Mild right ventricular dilation  is present.     Atria  Both atria appear normal. The left atrial appendage is normal. It is free of  spontaneous echo contrast and thrombus. A small patent foramen ovale is  present. The patent foramen ovale was demonstrated by color Doppler and  agitated saline bubble study . The patent foramen ovale was demonstrated with  Valsalva's maneuver. No evidence of ASD.     Mitral Valve  The mitral valve is normal. Trace mitral insufficiency is present.     Aortic Valve  Aortic valve is normal in structure and function. The aortic valve is  tricuspid.  Tricuspid Valve  Mild to moderate tricuspid insufficiency is present. Right ventricular  systolic pressure is 45mmHg above the right atrial pressure. Mild pulmonary  hypertension is present. Mild prolapse of the posterior leaflet of the  tricupsid valve is noted.     Pulmonic Valve  The pulmonic valve is normal.     Vessels  The pulmonary artery is normal. The aorta root is normal. The thoracic aorta  is normal. All four pulmonary veins visualized with dampened velocity  waveforms.     Pericardium  No pericardial effusion is  present.     Compared to Previous Study  This study was compared with the study from 2016. A small PFO has been  identified. .     ______________________________________________________________________________     Doppler Measurements & Calculations  TR max maria isabel: 336.7 cm/sec  TR max P.5 mmHg  ______________________________________________________________________________       Procedures:  ECHO: 09:   Interpretation Summary   The Ejection Fraction is estimated at 55-60%. No regional wall motion   abnormalities are seen. The right ventricle is normal size. Global right   ventricular function is normal. Right ventricular systolic pressure is 27mmHg   above the right atrial pressure. No pericardial effusion is present. The   inferior vena cava is normal.   Stress test: 08-10-09:   1. Abnormal study with a high degree of certainty.   2. There is a predominantly fixed medium sized moderately decreased   intensity myocardial perfusion defect with minimal periinfarct   reversibility involving the apical anterior, mid anterior, and apical   region of the heart. There is corresponding hypokinesis. No other   adenosine induced fixed or reversible myocardial perfusion defect.   3. Left ventricular size is enlarged at rest. Transient ischemic   dilation of the left ventricle did not occur.   4. The left ventricular ejection fraction is 56 %.   5. Summed Stress Score is calculated at 18, which is associated   with increased risk of future coronary events.   CCL: 09: ARELLANO:   Mrs. Live is a 49 year old female with known coronary artery disease s/p MI in  found to have minimal disease on catheterization. Her cardiovascular risk factors include HTN and hyperlipidemia. She presented with a 2 week history of dyspnea with exertion and lower extremity edema. She underwent Adenosine MPI which revealed a fixed medium-sized defect with minimal periinfarct reversability and anterior hypokinesis. Her LVEF was  preserved at 56%. CT angio of the coronaries revealed moderate stenosis in the pLAD and dRCA and mild to moderate stenosis of the pLCx.   Access: 6F long RFA, 6F RFV   Coronary Anatomy:   LMCA: normal   LAD: There is a long, discrete, ectatic 70-80% lesion in the ostial and proximal LAD. There is one D1 branch without any significant disease.   LCx: no significant disease. There is a large OM1 branch that has a 50-60% stenosis prior to the branch bifurcation.   RCA: The proximal and mid RCA have luminal irregularities without significance. The distal RCA has a complex bifurcation lesion prior to the take-off of the rPDA. There is disease in the ostial PL1 as well.   Conclusions:   1. 3-vessel coronary artery disease without left main lesion   Plan   Discussed the options in depth with the patient. Given the proximity of the LAD lesion to the LMCA and the complexity of the dRCA lesion, we recommended CABG for the patient. She will be admitted to the hospital.Discussed with Darleen Johnson MD.   Cardiac Surgery: 2009   Triple vessel coronary artery bypass grafting. Left internal mammary artery was placed to the left anterior descending coronary artery and separate reverse saphenous vein grafts were placed to the obtuse marginal and right posterior descending coronary arteries.     Name: IDA PADRON  MRN: [id number]  : 1960  Study Date: 2018 11:48 AM  Age: 57 yrs  Gender: Female  Patient Location: [facility]  Reason For Study: Hx PFO, Elevated PA pressures  Ordering Physician: SANNA SALAZAR  Referring Physician: SANNA SALAZAR  Performed By: Tray Harvey RDCS     BSA: 2.2 m2  Height: 63 in  Weight: 257 lb  HR: 62  BP: 167/94 mmHg  _____________________________________________________________________________  __        Procedure  Bubble Echocardiogram with two-dimensional, color and spectral Doppler  performed. IV start location R Hand . Bateriostatic Saline used for  Bubble  Study.  _____________________________________________________________________________  __        Interpretation Summary     Right ventricular function, chamber size, wall motion, and thickness are  normal.  PFO again documented with color doppler and Bubble study.  Mild to moderate tricuspid insufficiency is present.  Right ventricular systolic pressure is 61mmHg above the right atrial pressure.  No pericardial effusion is present.  _____________________________________________________________________________  __        Left Ventricle  Global and regional left ventricular function is normal with an EF of 55-60%.  Left ventricular wall thickness is normal. Left ventricular size is normal.  Left ventricular diastolic function is indeterminate. No regional wall motion  abnormalities are seen.     Right Ventricle  Right ventricular function, chamber size, wall motion, and thickness are  normal.     Atria  Moderate biatrial enlargement is present. PFO again documented with color  doppler and Bubble study.     Mitral Valve  Mild to moderate mitral insufficiency is present.        Aortic Valve  Aortic valve is normal in structure and function.     Tricuspid Valve  Mild to moderate tricuspid insufficiency is present. Right ventricular  systolic pressure is 61mmHg above the right atrial pressure.     Pulmonic Valve  The pulmonic valve is normal. Trace to mild pulmonic insufficiency is present.     Vessels  The aorta root is normal. The pulmonary artery is normal. The inferior vena  cava is normal.     Pericardium  No pericardial effusion is present.     _____________________________________________________________________________  __  MMode/2D Measurements & Calculations  IVSd: 0.89 cm  LVIDd: 5.3 cm  LVIDs: 3.1 cm  LVPWd: 0.75 cm  FS: 41.8 %  LV mass(C)d: 154.8 grams  LV mass(C)dI: 72.0 grams/m2     Ao root diam: 3.0 cm  LA dimension: 5.3 cm  asc Aorta Diam: 3.6 cm  LA/Ao: 1.8  LVOT diam: 2.2 cm  LVOT area: 3.7  cm2  LA Volume (BP): 96.0 ml  LA Volume Index (BP): 44.7 ml/m2  RWT: 0.28        Doppler Measurements & Calculations  MV E max luis: 92.8 cm/sec  MV A max luis: 58.2 cm/sec  MV E/A: 1.6  MV dec time: 0.15 sec     Ao V2 max: 147.4 cm/sec  Ao max P.0 mmHg  TARUN(V,D): 2.6 cm2  LV V1 max P.3 mmHg  LV V1 max: 104.1 cm/sec  LV V1 VTI: 24.8 cm  MR ERO: 0.27 cm2  CO(LVOT): 5.6 l/min  CI(LVOT): 2.6 l/min/m2  SV(LVOT): 92.9 ml  SI(LVOT): 43.2 ml/m2  PA V2 max: 110.6 cm/sec  PA max P.9 mmHg  PA acc time: 0.08 sec  PI end-d luis: 154.4 cm/sec  PI max luis: 255.8 cm/sec  PI max P.2 mmHg  TR max luis: 385.4 cm/sec  TR max P.4 mmHg  Pulm Sys Luis: 63.2 cm/sec  Pulm Peng Luis: 97.7 cm/sec  Pulm S/D: 0.65  E/E' av.1  Lateral E/e': 8.9  Medial E/e': 15.4    Results (pressures in mmHg)  RA: 1/4/3  RV 32/3  PA 30/13/19;  PA Sat 65%  PCWP -/-/5;  PCW Sat --%  Kannan CO/CI 3.3/1.6 L/min; TD CO 4.1/2.0 L/min  PVR 4.2 bermudez; TPR 5.7 bermudez  Hb 12.3 g/dL  NIBP 167/81/116  SVR 2200 dynes*sec/cm^5     [place]:  SVC 62.7%  IVC 65.0%  Low RA 73.0%  Mid RA 68.0%  High RA 64.6%  RV 63%  PA 65%  PCW ---     Complications: None   Blood loss: Minimal blood loss     Conclusions:  1. Low right and left sided filling pressures.  2. Normal pulmonary artery pressures.  3. Low normal cardiac output.            The nuclear stress test is equivocal.     There is a small area of mild ischemia in the distal apical segment(s) of the left ventricle. This appears to be in the left anterior descending distribution.     Left ventricular function is normal.     A prior study was conducted on 9/3/2019.  This study has changes noted when compared with the prior study. New questionable apical ischemia, small?            CC:  Eliz Adame

## 2021-08-11 ENCOUNTER — VIRTUAL VISIT (OUTPATIENT)
Dept: CARDIOLOGY | Facility: CLINIC | Age: 61
End: 2021-08-11
Attending: INTERNAL MEDICINE
Payer: COMMERCIAL

## 2021-08-11 DIAGNOSIS — I48.20 CHRONIC ATRIAL FIBRILLATION (H): Primary | ICD-10-CM

## 2021-08-11 PROCEDURE — 99214 OFFICE O/P EST MOD 30 MIN: CPT | Mod: 95 | Performed by: INTERNAL MEDICINE

## 2021-08-11 NOTE — PATIENT INSTRUCTIONS
"You were seen Virtually at the Cardiovascular Clinic at the [facility].      Cardiology Providers you saw during your visit:  Dr. Twin Moreno     Recommendations:   1.  Please have labs completed tomorrow. We will call you if changes based on labs are needed.  2.  Continue with plan of Right Heart Cath and Angiogram as scheduled on 21 due to your recent positive exercise stress test.      Eat a heart healthy, low sodium diet.  Get 20 to 30 minutes of aerobic exercise 4 to 5 times per week as tolerated. (Examples of aerobic exercise include: walking, bicycling, swimming, running).     Thank you for your visit today!   Please MyChart message or call if you have any questions or concerns.      During Business Hours:  [phone], option # 1 (University)  Then option # 4 (medical questions)     After hours, weekends or holidays:   [phone], Option #4  Ask to speak to the On-Call Cardiologist. Inform them you are a heart failure patient at the [facility].      Kaylie Tran RN BSN CHFN  Cardiology Care Coordinator - [facility]  Questions and [phone]  First press #1 for the University and then press #4 for \"Your Care Team\" to reach us Cardiology Nurses.                "

## 2021-08-11 NOTE — LETTER
8/11/2021      RE: Luz Marina Live  [address]  [place] [address]       Dear Colleague,    Thank you for the opportunity to participate in the care of your patient, Luz Marina Live, at the [facility] [place] [facility]. Please see a copy of my visit note below.        Impression  1. ASHD with  stenting         3-vessel coronary artery disease s/p CAB       Patent LIMA-LAD, SVG-OM, SVG-RPDA       Significant stenosis of pLCx/OM1 s/p SHAHANA x2 to distal LM/pLCx/OM1    2. History of CAB  3. Elevated PA pressure  4. Hypertension  5. Hyperlipidemia  6. KEITH  7. PFO  8. Atrial fibrillation with warfarin anticoagulation/chronic  9. SLE  10. History of lupus nephritis      Video visit: [id number]  Patient permission, yes  Patient location: home  Duration 30 minutes of which >50% involved counseling and direct coordination of care       I conducted a virtual visit with this patient. We discussed the patient's current condition, reviewed interim history since last visit, current functional status, diet, and possible cardiac symptoms including: chest pain, tightness, heaviness, pressure, PND, orthopnea, ankle edema, increasing abdominal girth, weight gain, palpitation, pre-syncope or syncope.  She is doing well with decreased shortness of breath, increased activity and no symptoms suggestive of progressive CAD.  She is having trouble with Brillinta.  Since our last visit her creatinine has risen without obvious explanation from intercurrent conditions such as SLE and remote history of lupus nephritis.  60 year old female with premature CAD associated with SLE who has undergone previous CABG as well as subsequent stenting (immediatley following false negative non exercise nuclear stress test see via video visit to assess current status and potential to discontinue anti-platelet agent which cause cause incrase in creatinine.     Her most recent  stress test demonstrated an apical hypoperfusion area.  The patient has SLE/CABG.  She has exertional shortness of breath without chest pain, palpitation, pre-syncope or syncope.  She occasionally has pleuropericardial pan    She has no interim hi tory of chest pain, tightness, heaviness pressure at rest, with emotion or exertion or nocturnally.  She does have exertional shortness of breath which anti-dates her recurrent manifestation of CAD which led to stenting last year.         We reviewed and suggested:  1. Viral epidemic safety suggestions  2. Continued adherence to medical regimen, diet, and exercise program as previously described  3. Discontinue Brilinta and start ASA  5.   Follow creatinine carefully with Dr. Clark  6.   Seek immediate medical attention if recurrent chest pain or bleeding  7    Exercise nuclear medicine stress test  Reviewed and suggested BHC, coronary angiogram, possible PTCA  8. Weight loss    Complete lab orders placed by me for tomorrow    I thoroughly discussed the risks and benefits of the contemplated catherization including bleeding, vessel rupture, death, arrhythmia, embolism, stroke, renal failure perforation of pulmonary artery, aortic,lung or heart.  I discussed how the procedure would be performed and alternative diagnostic and therapeutic management strategies.  All questions were answered.          Current Outpatient Medications   Medication     atorvastatin (LIPITOR) 40 MG tablet     Calcium Carbonate-Vitamin D (CALCIUM 600-D PO)     hydroxychloroquine (PLAQUENIL) 200 MG tablet     lisinopril (ZESTRIL) 40 MG tablet     loratadine (CLARITIN) 10 MG tablet     metoprolol tartrate (LOPRESSOR) 50 MG tablet     metroNIDAZOLE (METROCREAM) 0.75 % external cream     MULTIPLE VITAMIN PO     Omega-3 Fatty Acids (FISH OIL) 1200 MG capsule     order for DME     warfarin ANTICOAGULANT (COUMADIN) 5 MG tablet     No current facility-administered medications for this visit.          Weight:  Wt Readings from Last 24 Encounters:   07/13/21 124.7 kg (275 lb)   07/20/20 113.9 kg (251 lb)   03/06/20 113.9 kg (251 lb 3.2 oz)   02/05/20 111.6 kg (246 lb)   01/14/20 113.9 kg (251 lb)   12/23/19 116.3 kg (256 lb 4.8 oz)   11/08/19 116.6 kg (257 lb)   11/08/19 116.6 kg (257 lb)   11/05/19 116.6 kg (257 lb)   10/16/19 119.4 kg (263 lb 4.8 oz)   10/11/19 120.7 kg (266 lb 1.6 oz)   09/04/19 122 kg (269 lb)   08/16/19 122 kg (269 lb)   08/13/19 122.5 kg (270 lb)   08/05/19 121.4 kg (267 lb 9.6 oz)   11/20/18 122.9 kg (271 lb)   10/29/18 122 kg (269 lb)   09/17/18 119.3 kg (263 lb)   09/04/18 118.3 kg (260 lb 14.4 oz)   06/25/18 118.4 kg (261 lb)   06/25/18 117.5 kg (259 lb)   05/31/18 116.9 kg (257 lb 12.8 oz)   04/30/18 113.4 kg (250 lb)   04/10/18 115 kg (253 lb 9.6 oz)       Medication:  Current Outpatient Medications   Medication     atorvastatin (LIPITOR) 40 MG tablet     Calcium Carbonate-Vitamin D (CALCIUM 600-D PO)     hydroxychloroquine (PLAQUENIL) 200 MG tablet     lisinopril (ZESTRIL) 40 MG tablet     loratadine (CLARITIN) 10 MG tablet     metoprolol tartrate (LOPRESSOR) 50 MG tablet     metroNIDAZOLE (METROCREAM) 0.75 % external cream     MULTIPLE VITAMIN PO     Omega-3 Fatty Acids (FISH OIL) 1200 MG capsule     order for DME     warfarin ANTICOAGULANT (COUMADIN) 5 MG tablet     No current facility-administered medications for this visit.     Laboratories:    Results for IDA PADRON (MRN [id number]) as of 10/23/2020 14:44   Ref. Range 3/31/2020 13:40 4/30/2020 13:32 6/2/2020 15:19 7/7/2020 14:16 7/7/2020 14:17 8/4/2020 12:57   Creatinine Latest Ref Range: 0.52 - 1.04 mg/dL 1.22 (H) 1.44 (H) 1.41 (H) 1.73 (H) 1.73 (H) 1.87 (H)       Results for IDA PADRON (MRN [id number]) as of 4/1/2020 13:50   Ref. Range 2/12/2020 17:17 2/27/2020 11:47 3/6/2020 08:50   Sodium Latest Ref Range: 133 - 144 mmol/L  137    Potassium Latest Ref Range: 3.4 - 5.3 mmol/L  4.6    Chloride Latest Ref Range:  94 - 109 mmol/L  107    Carbon Dioxide Latest Ref Range: 20 - 32 mmol/L  23    Urea Nitrogen Latest Ref Range: 7 - 30 mg/dL  38 (H)    Creatinine Latest Ref Range: 0.52 - 1.04 mg/dL  1.20 (H) 1.31 (H)   GFR Estimate Latest Ref Range: >60 mL/min/1.73_m2  49 (L) 44 (L)   GFR Estimate If Black Latest Ref Range: >60 mL/min/1.73_m2  57 (L) 51 (L)   Calcium Latest Ref Range: 8.5 - 10.1 mg/dL  9.0    Anion Gap Latest Ref Range: 3 - 14 mmol/L  7    Phosphorus Latest Ref Range: 2.5 - 4.5 mg/dL  3.5    Albumin Latest Ref Range: 3.4 - 5.0 g/dL  3.0 (L) 3.0 (L)   ALT Latest Ref Range: 0 - 50 U/L   29   AST Latest Ref Range: 0 - 45 U/L   27   25 OH Vit D total Latest Ref Range: 20 - 75 ug/L   <40   25 OH Vit D2 Latest Units: ug/L   <5   25 OH Vit D3 Latest Units: ug/L   35   Creatinine Urine Latest Units: mg/dL  63    CRP Inflammation Latest Ref Range: 0.0 - 8.0 mg/L   21.5 (H)   Protein Random Urine Latest Units: g/L  0.39    Protein Total Urine g/gr Creatinine Latest Ref Range: 0 - 0.2 g/g Cr  0.63 (H)    Thyroid Stim Immunog Latest Ref Range: <=1.3 TSI index <1.0     TSH Latest Ref Range: 0.40 - 4.00 mU/L 1.98     Glucose Latest Ref Range: 70 - 99 mg/dL  83    WBC Latest Ref Range: 4.0 - 11.0 10e9/L   7.0   Hemoglobin Latest Ref Range: 11.7 - 15.7 g/dL   10.8 (L)   Hematocrit Latest Ref Range: 35.0 - 47.0 %   34.2 (L)   Platelet Count Latest Ref Range: 150 - 450 10e9/L   204   RBC Count Latest Ref Range: 3.8 - 5.2 10e12/L   3.60 (L)   MCV Latest Ref Range: 78 - 100 fl   95   MCH Latest Ref Range: 26.5 - 33.0 pg   30.0   MCHC Latest Ref Range: 31.5 - 36.5 g/dL   31.6   RDW Latest Ref Range: 10.0 - 15.0 %   13.7   Diff Method Unknown   Automated Method   % Neutrophils Latest Units: %   71.1   % Lymphocytes Latest Units: %   17.6   % Monocytes Latest Units: %   7.0   % Eosinophils Latest Units: %   2.3   % Basophils Latest Units: %   0.7   % Immature Granulocytes Latest Units: %   1.3   Absolute Neutrophil Latest Ref Range: 1.6  - 8.3 10e9/L   5.0   Absolute Lymphocytes Latest Ref Range: 0.8 - 5.3 10e9/L   1.2   Absolute Monocytes Latest Ref Range: 0.0 - 1.3 10e9/L   0.5   Absolute Eosinophils Latest Ref Range: 0.0 - 0.7 10e9/L   0.2   Absolute Basophils Latest Ref Range: 0.0 - 0.2 10e9/L   0.1   Abs Immature Granulocytes Latest Ref Range: 0 - 0.4 10e9/L   0.1   Sed Rate Latest Ref Range: 0 - 30 mm/h   87 (H)   INR Point of Care Latest Ref Range: 0.86 - 1.14   2.0 (H)    Complement C3 Latest Ref Range: 81 - 157 mg/dL  65 (L)    Complement C4 Latest Ref Range: 13 - 39 mg/dL  8 (L)        Results for IDA PADRON (MRN [id number]) as of 11/5/2019 10:53   Ref. Range 8/5/2019 11:03 11/5/2019 10:16   CRP Inflammation Latest Ref Range: 0.0 - 8.0 mg/L 8.8 (H) 17.0 (H)         Her new catherization:  Results (pressures in mmHg)  RA: 1/4/3  RV 32/3  PA 30/13/19;  PA Sat 65%  PCWP -/-/5;  PCW Sat --%  Kannan CO/CI 3.3/1.6 L/min; TD CO 4.1/2.0 L/min  PVR 4.2 bermudez; TPR 5.7 bermudez  Hb 12.3 g/dL  NIBP 167/81/116  SVR 2200 dynes*sec/cm^5     [place]:  SVC 62.7%  IVC 65.0%  Low RA 73.0%  Mid RA 68.0%  High RA 64.6%  RV 63%  PA 65%  PCW ---          Conclusions:  1. Low right and left sided filling pressures.  2. Normal pulmonary artery pressures.  3. Low normal cardiac output.      REVIEW of SYMPTOMS    Constitutional: without fever, chills, night sweats.  Weight is down  HEENT: without dry eyes, dry mouth, sinusitis, corryza, visual changes  Endocrine: without polyuria, polydypsia, polyphagia, heat or cold intolerance, changing mental status  Cardiology: without chest pain, tightness, heaviness, pressure, paroxysmal dyspnea, orthopnea, palpitation, pre-syncope or syncope or device discharge (if present)  Pulmonary: without asthma, wheezing, cough, hemoptysis but longstanding exertional shortness of breath  GI: without nausea, emesis, jaundice, pain, hematemesis, melena  : without frequency, urgency, hematuria, stones, pain, abnormal bleeding, frequency,  urgency  Neurologic: without TIA, CVA, trauma, seizure  Dermatologic: without lesions, abrasion rash,   Orthopedic/Rheum: without significant joint pain, impairment, limb, polyserositis, ulceration, Raynauds  Heme: without mass, bruising, frequent infection, anemia  Psychiatric: without substance abuse, hallucination, medication, depression      Exercise tolerance:    Nuclear stress test: (this was false negative study as patient has an acute coronary event shortly afterwards)  PROTOCOL:    Rest and stress myocardial perfusion imaging was performed using  Tc-99m tetrafosmin intravenously. Pharmacological stress was performed  with 0.4 mg of regadenoson intravenously. The EKG showed normal sinus  rhythm at rest. No significant abnormalities were noted with infusion  of regadenoson.     FINDINGS:  1.  Overall quality of the study: Diagnostic.   2.  Left ventricular cavity is Normal on the rest and stress studies.  3.  SPECT images demonstrate uniform radiotracer uptake of the  myocardium on both stress and rest images.  4.  Left ventricular ejection fraction is 73%. Left ventricular  end-diastolic volume is 134 mL. End-systolic volume is 34 mL      Echocardiogram  Name: IDA PADRON  MRN: [id number]  : 1960  Study Date: 10/17/2019 10:24 AM  Age: 59 yrs  Gender: Female  Patient Location: [facility]  Reason For Study: Chest Pain  Ordering Physician: MARIANELA ESCALONA  Performed By: Yasmany Campbell RDCS     BSA: 2.2 m2  Height: 63 in  Weight: 263 lb  HR: 58  BP: 144/81 mmHg  _____________________________________________________________________________  __        Procedure  Complete Portable Echo Adult. Contrast Optison. Optison (NDC #0876-5746-06)  given intravenously. Patient was given 6 ml mixture of 3 ml Optison and 6 ml  saline. 3 ml wasted.  _____________________________________________________________________________  __        Interpretation Summary  Global and regional left ventricular function is normal with  an EF of 60-65%.  Right ventricular function, chamber size, wall motion, and thickness are  normal.  No pericardial effusion is present.  IVC diameter and respiratory changes fall into an intermediate range  suggesting an RA pressure of 8 mmHg.  Mild pulmonary hypertension is present. Right ventricular systolic pressure is  43mmHg above the right atrial pressure.  Compared to prior, measured RVSP is lower, no other change.  _____________________________________________________________________________  __        Left Ventricle  Left ventricular size is normal. Global and regional left ventricular function  is normal with an EF of 60-65%. Mild concentric wall thickening consistent  with left ventricular hypertrophy is present. Left ventricular diastolic  function is indeterminate.     Right Ventricle  Right ventricular function, chamber size, wall motion, and thickness are  normal.     Atria  Moderate biatrial enlargement is present.     Mitral Valve  The mitral valve is normal.        Aortic Valve  Aortic valve is normal in structure and function.     Tricuspid Valve  Mild tricuspid insufficiency is present. Mild pulmonary hypertension is  present. Right ventricular systolic pressure is 43mmHg above the right atrial  pressure.     Pulmonic Valve  The pulmonic valve is normal. Trace pulmonic insufficiency is present.     Vessels  The aorta root is normal. The thoracic aorta is normal. Dilation of the  inferior vena cava is present with normal respiratory variation in diameter.  IVC diameter and respiratory changes fall into an intermediate range  suggesting an RA pressure of 8 mmHg.     Pericardium  No pericardial effusion is present.     _____________________________________________________________________________  __  MMode/2D Measurements & Calculations  IVSd: 1.2 cm  LVIDd: 4.5 cm  LVIDs: 2.9 cm  LVPWd: 1.2 cm  FS: 36.6 %     LV mass(C)d: 197.9 grams  LV mass(C)dI: 91.1 grams/m2  asc Aorta Diam: 3.5 cm  LVOT diam:  2.1 cm  LVOT area: 3.5 cm2  LA Volume Index (BP): 43.9 ml/m2  RWT: 0.52  TAPSE: 2.3 cm        Doppler Measurements & Calculations  MV E max maria isabel: 84.8 cm/sec  MV A max maria isabel: 35.8 cm/sec  MV E/A: 2.4  MV dec slope: 473.3 cm/sec2  MV dec time: 0.18 sec     TV max P.0 mmHg  PA acc time: 0.09 sec  TR max maria isabel: 327.8 cm/sec  TR max P.0 mmHg  E/E' avg: 10.5  Lateral E/e': 9.0  Medial E/e': 12.0     ____________________________        Echocardiographic findings of TR, modest PA, normal systolic function.    Name: IDA PADRON  MRN: [id number]  : 1960  Study Date: 2018 11:48 AM  Age: 57 yrs  Gender: Female  Patient Location: [facility]  Reason For Study: Hx PFO, Elevated PA pressures  Ordering Physician: SANNA SALAZAR  Referring Physician: SANNA SALAZAR  Performed By: Tray Harvey RDCS     BSA: 2.2 m2  Height: 63 in  Weight: 257 lb  HR: 62  BP: 167/94 mmHg  _____________________________________________________________________________  __        Procedure  Bubble Echocardiogram with two-dimensional, color and spectral Doppler  performed. IV start location R Hand . Bateriostatic Saline used for Bubble  Study.  _____________________________________________________________________________  __        Interpretation Summary     Right ventricular function, chamber size, wall motion, and thickness are  normal.  PFO again documented with color doppler and Bubble study.  Mild to moderate tricuspid insufficiency is present.  Right ventricular systolic pressure is 61mmHg above the right atrial pressure.  No pericardial effusion is present.  _____________________________________________________________________________  __        Left Ventricle  Global and regional left ventricular function is normal with an EF of 55-60%.  Left ventricular wall thickness is normal. Left ventricular size is normal.  Left ventricular diastolic function is indeterminate. No regional wall motion  abnormalities are  seen.     Right Ventricle  Right ventricular function, chamber size, wall motion, and thickness are  normal.     Atria  Moderate biatrial enlargement is present. PFO again documented with color  doppler and Bubble study.     Mitral Valve  Mild to moderate mitral insufficiency is present.        Aortic Valve  Aortic valve is normal in structure and function.     Tricuspid Valve  Mild to moderate tricuspid insufficiency is present. Right ventricular  systolic pressure is 61mmHg above the right atrial pressure.     Pulmonic Valve  The pulmonic valve is normal. Trace to mild pulmonic insufficiency is present.     Vessels  The aorta root is normal. The pulmonary artery is normal. The inferior vena  cava is normal.     Pericardium  No pericardial effusion is present.     _____________________________________________________________________________  __  MMode/2D Measurements & Calculations  IVSd: 0.89 cm  LVIDd: 5.3 cm  LVIDs: 3.1 cm  LVPWd: 0.75 cm  FS: 41.8 %  LV mass(C)d: 154.8 grams  LV mass(C)dI: 72.0 grams/m2     Ao root diam: 3.0 cm  LA dimension: 5.3 cm  asc Aorta Diam: 3.6 cm  LA/Ao: 1.8  LVOT diam: 2.2 cm  LVOT area: 3.7 cm2  LA Volume (BP): 96.0 ml  LA Volume Index (BP): 44.7 ml/m2  RWT: 0.28        Doppler Measurements & Calculations  MV E max luis: 92.8 cm/sec  MV A max luis: 58.2 cm/sec  MV E/A: 1.6  MV dec time: 0.15 sec     Ao V2 max: 147.4 cm/sec  Ao max P.0 mmHg  TARUN(V,D): 2.6 cm2  LV V1 max P.3 mmHg  LV V1 max: 104.1 cm/sec  LV V1 VTI: 24.8 cm  MR ERO: 0.27 cm2  CO(LVOT): 5.6 l/min  CI(LVOT): 2.6 l/min/m2  SV(LVOT): 92.9 ml  SI(LVOT): 43.2 ml/m2  PA V2 max: 110.6 cm/sec  PA max P.9 mmHg  PA acc time: 0.08 sec  PI end-d luis: 154.4 cm/sec  PI max luis: 255.8 cm/sec  PI max P.2 mmHg  TR max luis: 385.4 cm/sec  TR max P.4 mmHg  Pulm Sys Luis: 63.2 cm/sec  Pulm Peng Luis: 97.7 cm/sec  Pulm S/D: 0.65  E/E' av.1  Lateral E/e': 8.9  Medial E/e': 15.4        : 1960  Study Date:  12/28/2016 01:31 PM  Age: 56 yrs  Gender: Female  Patient Location: [facility]  Reason For Study:     History: tricuspid regurgitation  Ordering Physician: JOSE ARELLANO  Referring Physician: JOSE ARELLANO  Performed By: Hugh Sharpe MD     BSA: 2.2 m2  Height: 63 in  Weight: 269 lb  ______________________________________________________________________________     Interpretation Summary  Left ventricular function, chamber size, wall motion, and wall thickness are  normal.The EF is 55-60%.  Global right ventricular function is normal. Mild right ventricular dilation  is present.  Mild to moderate TR with mild prolapse of the posterior leaflet of the  tricupsid valve.  A small patent foramen ovale is present. There is no ASD (secundum, sinus  venosus or unroofed coronary sinus). All 4 pulmonary veins drain into the  left atrium.  Mild pulmonary hypertension is present with RVSP 45mmHg above RAP.     ______________________________________________________________________________        Procedure  Transesophageal Echocardiogram with color and spectral Doppler performed. The  procedure was performed in the Echo Lab. Informed consent for Transesophegeal  echo obtained. EVE Probe #12 was used during the procedure. Patient was  sedated using Fentanyl 100 mcg. Patient was sedated using Versed 4 mg. The  Transducer was inserted without difficulty . The patient tolerated the  procedure well. Complications None. ECG shows normal sinus rhythm. Good  quality two-dimensional was performed and interpreted.     Left Ventricle  Left ventricular function, chamber size, wall motion, and wall thickness are  normal.The EF is 55-60%.     Right Ventricle  Global right ventricular function is normal. Mild right ventricular dilation  is present.     Atria  Both atria appear normal. The left atrial appendage is normal. It is free of  spontaneous echo contrast and thrombus. A small patent foramen ovale is  present. The patent foramen  ovale was demonstrated by color Doppler and  agitated saline bubble study . The patent foramen ovale was demonstrated with  Valsalva's maneuver. No evidence of ASD.     Mitral Valve  The mitral valve is normal. Trace mitral insufficiency is present.     Aortic Valve  Aortic valve is normal in structure and function. The aortic valve is  tricuspid.  Tricuspid Valve  Mild to moderate tricuspid insufficiency is present. Right ventricular  systolic pressure is 45mmHg above the right atrial pressure. Mild pulmonary  hypertension is present. Mild prolapse of the posterior leaflet of the  tricupsid valve is noted.     Pulmonic Valve  The pulmonic valve is normal.     Vessels  The pulmonary artery is normal. The aorta root is normal. The thoracic aorta  is normal. All four pulmonary veins visualized with dampened velocity  waveforms.     Pericardium  No pericardial effusion is present.     Compared to Previous Study  This study was compared with the study from 2016. A small PFO has been  identified. .     ______________________________________________________________________________     Doppler Measurements & Calculations  TR max maria isabel: 336.7 cm/sec  TR max P.5 mmHg  ______________________________________________________________________________         Name: IDA PADRON  MRN: [id number]  : 1960  Study Date: 2016 01:31 PM  Age: 56 yrs  Gender: Female  [facility]  Reason For Study:     History: tricuspid regurgitation  Ordering Physician: JOSE ARELLANO  Referring Physician: JOSE ARELLANO  Performed By: Hugh Sahrpe MD     BSA: 2.2 m2  Height: 63 in  Weight: 269 lb  ______________________________________________________________________________     Interpretation Summary  Left ventricular function, chamber size, wall motion, and wall thickness are  normal.The EF is 55-60%.  Global right ventricular function is normal. Mild right ventricular dilation  is present.  Mild to  moderate TR with mild prolapse of the posterior leaflet of the  tricupsid valve.  A small patent foramen ovale is present. There is no ASD (secundum, sinus  venosus or unroofed coronary sinus). All 4 pulmonary veins drain into the  left atrium.  Mild pulmonary hypertension is present with RVSP 45mmHg above RAP.     ______________________________________________________________________________        Procedure  Transesophageal Echocardiogram with color and spectral Doppler performed. The  procedure was performed in the Echo Lab. Informed consent for Transesophegeal  echo obtained. EVE Probe #12 was used during the procedure. Patient was  sedated using Fentanyl 100 mcg. Patient was sedated using Versed 4 mg. The  Transducer was inserted without difficulty . The patient tolerated the  procedure well. Complications None. ECG shows normal sinus rhythm. Good  quality two-dimensional was performed and interpreted.     Left Ventricle  Left ventricular function, chamber size, wall motion, and wall thickness are  normal.The EF is 55-60%.     Right Ventricle  Global right ventricular function is normal. Mild right ventricular dilation  is present.     Atria  Both atria appear normal. The left atrial appendage is normal. It is free of  spontaneous echo contrast and thrombus. A small patent foramen ovale is  present. The patent foramen ovale was demonstrated by color Doppler and  agitated saline bubble study . The patent foramen ovale was demonstrated with  Valsalva's maneuver. No evidence of ASD.     Mitral Valve  The mitral valve is normal. Trace mitral insufficiency is present.     Aortic Valve  Aortic valve is normal in structure and function. The aortic valve is  tricuspid.  Tricuspid Valve  Mild to moderate tricuspid insufficiency is present. Right ventricular  systolic pressure is 45mmHg above the right atrial pressure. Mild pulmonary  hypertension is present. Mild prolapse of the posterior leaflet of the  tricupsid valve  is noted.     Pulmonic Valve  The pulmonic valve is normal.     Vessels  The pulmonary artery is normal. The aorta root is normal. The thoracic aorta  is normal. All four pulmonary veins visualized with dampened velocity  waveforms.     Pericardium  No pericardial effusion is present.     Compared to Previous Study  This study was compared with the study from 2016. A small PFO has been  identified. .     ______________________________________________________________________________     Doppler Measurements & Calculations  TR max maria isabel: 336.7 cm/sec  TR max P.5 mmHg  ______________________________________________________________________________       Procedures:  ECHO: 09:   Interpretation Summary   The Ejection Fraction is estimated at 55-60%. No regional wall motion   abnormalities are seen. The right ventricle is normal size. Global right   ventricular function is normal. Right ventricular systolic pressure is 27mmHg   above the right atrial pressure. No pericardial effusion is present. The   inferior vena cava is normal.   Stress test: 08-10-09:   1. Abnormal study with a high degree of certainty.   2. There is a predominantly fixed medium sized moderately decreased   intensity myocardial perfusion defect with minimal periinfarct   reversibility involving the apical anterior, mid anterior, and apical   region of the heart. There is corresponding hypokinesis. No other   adenosine induced fixed or reversible myocardial perfusion defect.   3. Left ventricular size is enlarged at rest. Transient ischemic   dilation of the left ventricle did not occur.   4. The left ventricular ejection fraction is 56 %.   5. Summed Stress Score is calculated at 18, which is associated   with increased risk of future coronary events.   CCL: 09: ARELLANO:   Mrs. Live is a 49 year old female with known coronary artery disease s/p MI in  found to have minimal disease on catheterization. Her cardiovascular risk  factors include HTN and hyperlipidemia. She presented with a 2 week history of dyspnea with exertion and lower extremity edema. She underwent Adenosine MPI which revealed a fixed medium-sized defect with minimal periinfarct reversability and anterior hypokinesis. Her LVEF was preserved at 56%. CT angio of the coronaries revealed moderate stenosis in the pLAD and dRCA and mild to moderate stenosis of the pLCx.   Access: 6F long RFA, 6F RFV   Coronary Anatomy:   LMCA: normal   LAD: There is a long, discrete, ectatic 70-80% lesion in the ostial and proximal LAD. There is one D1 branch without any significant disease.   LCx: no significant disease. There is a large OM1 branch that has a 50-60% stenosis prior to the branch bifurcation.   RCA: The proximal and mid RCA have luminal irregularities without significance. The distal RCA has a complex bifurcation lesion prior to the take-off of the rPDA. There is disease in the ostial PL1 as well.   Conclusions:   1. 3-vessel coronary artery disease without left main lesion   Plan   Discussed the options in depth with the patient. Given the proximity of the LAD lesion to the LMCA and the complexity of the dRCA lesion, we recommended CABG for the patient. She will be admitted to the hospital.Discussed with Darleen Johnson MD.   Cardiac Surgery: 2009   Triple vessel coronary artery bypass grafting. Left internal mammary artery was placed to the left anterior descending coronary artery and separate reverse saphenous vein grafts were placed to the obtuse marginal and right posterior descending coronary arteries.     Name: IDA PADRON  MRN: [id number]  : 1960  Study Date: 2018 11:48 AM  Age: 57 yrs  Gender: Female  Patient Location: [facility]  Reason For Study: Hx PFO, Elevated PA pressures  Ordering Physician: SANNA SALAZAR  Referring Physician: SANNA SALAZAR  Performed By: Tray Harvey RDCS     BSA: 2.2 m2  Height: 63 in  Weight: 257 lb  HR: 62  BP:  167/94 mmHg  _____________________________________________________________________________  __        Procedure  Bubble Echocardiogram with two-dimensional, color and spectral Doppler  performed. IV start location R Hand . Bateriostatic Saline used for Bubble  Study.  _____________________________________________________________________________  __        Interpretation Summary     Right ventricular function, chamber size, wall motion, and thickness are  normal.  PFO again documented with color doppler and Bubble study.  Mild to moderate tricuspid insufficiency is present.  Right ventricular systolic pressure is 61mmHg above the right atrial pressure.  No pericardial effusion is present.  _____________________________________________________________________________  __        Left Ventricle  Global and regional left ventricular function is normal with an EF of 55-60%.  Left ventricular wall thickness is normal. Left ventricular size is normal.  Left ventricular diastolic function is indeterminate. No regional wall motion  abnormalities are seen.     Right Ventricle  Right ventricular function, chamber size, wall motion, and thickness are  normal.     Atria  Moderate biatrial enlargement is present. PFO again documented with color  doppler and Bubble study.     Mitral Valve  Mild to moderate mitral insufficiency is present.        Aortic Valve  Aortic valve is normal in structure and function.     Tricuspid Valve  Mild to moderate tricuspid insufficiency is present. Right ventricular  systolic pressure is 61mmHg above the right atrial pressure.     Pulmonic Valve  The pulmonic valve is normal. Trace to mild pulmonic insufficiency is present.     Vessels  The aorta root is normal. The pulmonary artery is normal. The inferior vena  cava is normal.     Pericardium  No pericardial effusion is present.     _____________________________________________________________________________  __  MMode/2D Measurements &  Calculations  IVSd: 0.89 cm  LVIDd: 5.3 cm  LVIDs: 3.1 cm  LVPWd: 0.75 cm  FS: 41.8 %  LV mass(C)d: 154.8 grams  LV mass(C)dI: 72.0 grams/m2     Ao root diam: 3.0 cm  LA dimension: 5.3 cm  asc Aorta Diam: 3.6 cm  LA/Ao: 1.8  LVOT diam: 2.2 cm  LVOT area: 3.7 cm2  LA Volume (BP): 96.0 ml  LA Volume Index (BP): 44.7 ml/m2  RWT: 0.28        Doppler Measurements & Calculations  MV E max luis: 92.8 cm/sec  MV A max luis: 58.2 cm/sec  MV E/A: 1.6  MV dec time: 0.15 sec     Ao V2 max: 147.4 cm/sec  Ao max P.0 mmHg  TARUN(V,D): 2.6 cm2  LV V1 max P.3 mmHg  LV V1 max: 104.1 cm/sec  LV V1 VTI: 24.8 cm  MR ERO: 0.27 cm2  CO(LVOT): 5.6 l/min  CI(LVOT): 2.6 l/min/m2  SV(LVOT): 92.9 ml  SI(LVOT): 43.2 ml/m2  PA V2 max: 110.6 cm/sec  PA max P.9 mmHg  PA acc time: 0.08 sec  PI end-d luis: 154.4 cm/sec  PI max luis: 255.8 cm/sec  PI max P.2 mmHg  TR max luis: 385.4 cm/sec  TR max P.4 mmHg  Pulm Sys Luis: 63.2 cm/sec  Pulm Peng Luis: 97.7 cm/sec  Pulm S/D: 0.65  E/E' av.1  Lateral E/e': 8.9  Medial E/e': 15.4    Results (pressures in mmHg)  RA: 1/4/3  RV 32/3  PA ;  PA Sat 65%  PCWP -/-/5;  PCW Sat --%  Kannan CO/CI 3.3/1.6 L/min; TD CO 4.1/2.0 L/min  PVR 4.2 bermudez; TPR 5.7 bermudez  Hb 12.3 g/dL  NIBP 167/81/116  SVR 2200 dynes*sec/cm^5     [place]:  SVC 62.7%  IVC 65.0%  Low RA 73.0%  Mid RA 68.0%  High RA 64.6%  RV 63%  PA 65%  PCW ---     Complications: None   Blood loss: Minimal blood loss     Conclusions:  1. Low right and left sided filling pressures.  2. Normal pulmonary artery pressures.  3. Low normal cardiac output.       The nuclear stress test is equivocal.     There is a small area of mild ischemia in the distal apical segment(s) of the left ventricle. This appears to be in the left anterior descending distribution.     Left ventricular function is normal.     A prior study was conducted on 9/3/2019.  This study has changes noted when compared with the prior study. New questionable apical ischemia,  small?    Please do not hesitate to contact me if you have any questions/concerns.     Sincerely,     Twin Moreno MD  CC:  Eliz Adame

## 2021-08-11 NOTE — PROGRESS NOTES
Luz Marina is a 61 year old who is being evaluated via a billable video visit.      How would you like to obtain your AVS? Studentgemshart  If the video visit is dropped, the invitation should be resent by: Text to cell phone: [phone]  Will anyone else be joining your video visit? No

## 2021-08-12 ENCOUNTER — TELEPHONE (OUTPATIENT)
Dept: LAB | Facility: CLINIC | Age: 61
End: 2021-08-12

## 2021-08-12 ENCOUNTER — LAB (OUTPATIENT)
Dept: LAB | Facility: CLINIC | Age: 61
End: 2021-08-12
Payer: COMMERCIAL

## 2021-08-12 DIAGNOSIS — I48.20 CHRONIC ATRIAL FIBRILLATION (H): ICD-10-CM

## 2021-08-12 DIAGNOSIS — N18.32 STAGE 3B CHRONIC KIDNEY DISEASE (H): Primary | ICD-10-CM

## 2021-08-12 DIAGNOSIS — N18.31 STAGE 3A CHRONIC KIDNEY DISEASE (H): ICD-10-CM

## 2021-08-12 LAB
ALBUMIN SERPL-MCNC: 3.3 G/DL (ref 3.4–5)
ALP SERPL-CCNC: 148 U/L (ref 40–150)
ALT SERPL W P-5'-P-CCNC: 39 U/L (ref 0–50)
ANION GAP SERPL CALCULATED.3IONS-SCNC: 4 MMOL/L (ref 3–14)
AST SERPL W P-5'-P-CCNC: 30 U/L (ref 0–45)
BILIRUB SERPL-MCNC: 0.8 MG/DL (ref 0.2–1.3)
BUN SERPL-MCNC: 52 MG/DL (ref 7–30)
CALCIUM SERPL-MCNC: 8.7 MG/DL (ref 8.5–10.1)
CHLORIDE BLD-SCNC: 109 MMOL/L (ref 94–109)
CO2 SERPL-SCNC: 25 MMOL/L (ref 20–32)
CREAT SERPL-MCNC: 1.74 MG/DL (ref 0.52–1.04)
CRP SERPL-MCNC: 10.4 MG/L (ref 0–8)
GFR SERPL CREATININE-BSD FRML MDRD: 31 ML/MIN/1.73M2
GLUCOSE BLD-MCNC: 97 MG/DL (ref 70–99)
INR PPP: 2.22 (ref 0.85–1.15)
NT-PROBNP SERPL-MCNC: 1028 PG/ML (ref 0–125)
POTASSIUM BLD-SCNC: 6 MMOL/L (ref 3.4–5.3)
PROT SERPL-MCNC: 8.1 G/DL (ref 6.8–8.8)
SODIUM SERPL-SCNC: 138 MMOL/L (ref 133–144)

## 2021-08-12 PROCEDURE — 83880 ASSAY OF NATRIURETIC PEPTIDE: CPT

## 2021-08-12 PROCEDURE — 36415 COLL VENOUS BLD VENIPUNCTURE: CPT

## 2021-08-12 PROCEDURE — 85610 PROTHROMBIN TIME: CPT

## 2021-08-12 PROCEDURE — 86140 C-REACTIVE PROTEIN: CPT

## 2021-08-12 PROCEDURE — 80053 COMPREHEN METABOLIC PANEL: CPT

## 2021-08-12 NOTE — PROGRESS NOTES
Patient was in for labwork today and is due for her monthly labs. Orders in her chart have . If clinically indicated, please reorder.     Thank you,  MIAN Cuadra

## 2021-08-13 ENCOUNTER — LAB (OUTPATIENT)
Dept: LAB | Facility: CLINIC | Age: 61
End: 2021-08-13
Payer: COMMERCIAL

## 2021-08-13 ENCOUNTER — PATIENT OUTREACH (OUTPATIENT)
Dept: CARDIOLOGY | Facility: CLINIC | Age: 61
End: 2021-08-13

## 2021-08-13 ENCOUNTER — TELEPHONE (OUTPATIENT)
Dept: CARDIOLOGY | Facility: CLINIC | Age: 61
End: 2021-08-13

## 2021-08-13 ENCOUNTER — DOCUMENTATION ONLY (OUTPATIENT)
Dept: LAB | Facility: CLINIC | Age: 61
End: 2021-08-13

## 2021-08-13 DIAGNOSIS — I25.10 CORONARY ARTERY DISEASE INVOLVING NATIVE HEART WITHOUT ANGINA PECTORIS, UNSPECIFIED VESSEL OR LESION TYPE: ICD-10-CM

## 2021-08-13 DIAGNOSIS — I10 BENIGN ESSENTIAL HYPERTENSION: ICD-10-CM

## 2021-08-13 DIAGNOSIS — I10 HTN, GOAL BELOW 140/90: ICD-10-CM

## 2021-08-13 DIAGNOSIS — M32.14 SYSTEMIC LUPUS ERYTHEMATOSUS WITH GLOMERULAR DISEASE, UNSPECIFIED SLE TYPE (H): ICD-10-CM

## 2021-08-13 DIAGNOSIS — M32.14 SYSTEMIC LUPUS ERYTHEMATOSUS WITH GLOMERULAR DISEASE, UNSPECIFIED SLE TYPE (H): Primary | ICD-10-CM

## 2021-08-13 LAB
ANION GAP SERPL CALCULATED.3IONS-SCNC: 2 MMOL/L (ref 3–14)
BUN SERPL-MCNC: 46 MG/DL (ref 7–30)
CALCIUM SERPL-MCNC: 9.2 MG/DL (ref 8.5–10.1)
CHLORIDE BLD-SCNC: 111 MMOL/L (ref 94–109)
CO2 SERPL-SCNC: 27 MMOL/L (ref 20–32)
CREAT SERPL-MCNC: 1.55 MG/DL (ref 0.52–1.04)
GFR SERPL CREATININE-BSD FRML MDRD: 36 ML/MIN/1.73M2
GLUCOSE BLD-MCNC: 88 MG/DL (ref 70–99)
POTASSIUM BLD-SCNC: 5.3 MMOL/L (ref 3.4–5.3)
SODIUM SERPL-SCNC: 140 MMOL/L (ref 133–144)

## 2021-08-13 PROCEDURE — 36415 COLL VENOUS BLD VENIPUNCTURE: CPT

## 2021-08-13 PROCEDURE — 80048 BASIC METABOLIC PNL TOTAL CA: CPT

## 2021-08-13 RX ORDER — LISINOPRIL 10 MG/1
30 TABLET ORAL DAILY
Qty: 90 TABLET | Refills: 3 | Status: SHIPPED | OUTPATIENT
Start: 2021-08-13 | End: 2021-11-09

## 2021-08-13 RX ORDER — LISINOPRIL 40 MG/1
TABLET ORAL
Qty: 90 TABLET | Refills: 3 | Status: SHIPPED | OUTPATIENT
Start: 2021-08-13 | End: 2021-08-13

## 2021-08-13 NOTE — TELEPHONE ENCOUNTER
Results received and reviewed by Dr. Moreno. Potassium quite high and creatinine elevated above baseline.  I called Luz Marina to discuss and investigated if any medication were changed recently, no recent med changes.  Still on lisinopril 40mg daily, metoprolol tartrate 100mg BID.  Reports SOB, but states that's been going on for a while.  She does ask if the med from the stress test could have contributed.  Advised Luz Marina to go to the [facility] lab for STAT labs.  Will call her back when results received.     Date: 8/13/2021  Time of Call: 10:37 AM  Diagnosis:  Hyperkalemia, creatinine elevated  VORB - Ordering provider: Dr. Moreno  Order: Repeat BMP  Order received by: Kaylie Tran RN       Component      Latest Ref Rng & Units 7/13/2021 8/5/2021 8/12/2021   Sodium      133 - 144 mmol/L 138 139 138   Potassium      3.4 - 5.3 mmol/L 4.8 4.9 6.0 (H)   Chloride      94 - 109 mmol/L 111 (H) 109 109   Carbon Dioxide      20 - 32 mmol/L 23 24 25   Anion Gap      3 - 14 mmol/L 4 6 4   Urea Nitrogen      7 - 30 mg/dL 38 (H) 34 (H) 52 (H)   Creatinine      0.52 - 1.04 mg/dL 1.34 (H) 1.22 (H) 1.74 (H)   Calcium      8.5 - 10.1 mg/dL 8.9 9.5 8.7   Glucose      70 - 99 mg/dL 90 96 97   Alkaline Phosphatase      40 - 150 U/L 118 138 148   AST      0 - 45 U/L 24 50 (H) 30   ALT      0 - 50 U/L 28 57 (H) 39   Protein Total      6.8 - 8.8 g/dL 8.3 8.3 8.1   Albumin      3.4 - 5.0 g/dL 3.4 2.9 (L) 3.3 (L)   Bilirubin Total      0.2 - 1.3 mg/dL 0.8 0.6 0.8   GFR Estimate      >60 mL/min/1.73m2 43 (L) 48 (L) 31 (L)   WBC      4.0 - 11.0 10e3/uL  7.0    RBC Count      3.80 - 5.20 10e6/uL  4.05    Hemoglobin      11.7 - 15.7 g/dL  11.7    Hematocrit      35.0 - 47.0 %  37.9    MCV      78 - 100 fL  94    MCH      26.5 - 33.0 pg  28.9    MCHC      31.5 - 36.5 g/dL  30.9 (L)    RDW      10.0 - 15.0 %  13.6    Platelet Count      150 - 450 10e3/uL  263    N-Terminal Pro Bnp      0 - 125 pg/mL  1,268 (H) 1,028 (H)   CRP  Inflammation      0.0 - 8.0 mg/L   10.4 (H)

## 2021-08-13 NOTE — PROGRESS NOTES
Hi can you please fix orders from you for this patient as they are currently signed and held and we can not do anything with that. She is coming in at 11 here soon and says they are STAT labs for you to be done.

## 2021-08-13 NOTE — TELEPHONE ENCOUNTER
[facility] Call Center    Phone Message    May a detailed message be left on voicemail: yes     Reason for Call: Other: Pharmacy would like a call back to verify medication for lisonopril , please reach out to discuss     Action Taken: Message routed to:  [facility] ([facility] Cardio    Travel Screening: Not Applicable

## 2021-08-13 NOTE — TELEPHONE ENCOUNTER
Repeat stat BMP received, discussed with Dr. Moreno then call to Luz Marina with plan. Luz Marina verbalizes understanding and agrees with plan of care. Kaylie Tran RN       Date: 8/13/2021  Time of Call: 12:04 PM  Diagnosis:  hyperkalemia  VORB - Ordering provider: Dr. Moreno  Order: Decrease lisinopril to 30mg daily.  Requesting upcoming cath be in biplane room d/t lupus nephritis.   Order received by: Kaylie Tran RN     Component      Latest Ref Rng & Units 8/12/2021 8/13/2021   Sodium      133 - 144 mmol/L 138 140   Potassium      3.4 - 5.3 mmol/L 6.0 (H) 5.3   Chloride      94 - 109 mmol/L 109 111 (H)   Carbon Dioxide      20 - 32 mmol/L 25 27   Anion Gap      3 - 14 mmol/L 4 2 (L)   Urea Nitrogen      7 - 30 mg/dL 52 (H) 46 (H)   Creatinine      0.52 - 1.04 mg/dL 1.74 (H) 1.55 (H)   Calcium      8.5 - 10.1 mg/dL 8.7 9.2   Glucose      70 - 99 mg/dL 97 88   Alkaline Phosphatase      40 - 150 U/L 148    AST      0 - 45 U/L 30    ALT      0 - 50 U/L 39    Protein Total      6.8 - 8.8 g/dL 8.1    Albumin      3.4 - 5.0 g/dL 3.3 (L)    Bilirubin Total      0.2 - 1.3 mg/dL 0.8    GFR Estimate      >60 mL/min/1.73m2 31 (L) 36 (L)

## 2021-08-17 ENCOUNTER — LAB (OUTPATIENT)
Dept: LAB | Facility: CLINIC | Age: 61
End: 2021-08-17
Attending: INTERNAL MEDICINE
Payer: COMMERCIAL

## 2021-08-17 DIAGNOSIS — Z11.59 ENCOUNTER FOR SCREENING FOR OTHER VIRAL DISEASES: ICD-10-CM

## 2021-08-17 PROCEDURE — U0003 INFECTIOUS AGENT DETECTION BY NUCLEIC ACID (DNA OR RNA); SEVERE ACUTE RESPIRATORY SYNDROME CORONAVIRUS 2 (SARS-COV-2) (CORONAVIRUS DISEASE [COVID-19]), AMPLIFIED PROBE TECHNIQUE, MAKING USE OF HIGH THROUGHPUT TECHNOLOGIES AS DESCRIBED BY CMS-2020-01-R: HCPCS

## 2021-08-17 PROCEDURE — U0005 INFEC AGEN DETEC AMPLI PROBE: HCPCS

## 2021-08-19 ENCOUNTER — TELEPHONE (OUTPATIENT)
Dept: CARDIOLOGY | Facility: CLINIC | Age: 61
End: 2021-08-19

## 2021-08-19 LAB — SARS-COV-2 RNA RESP QL NAA+PROBE: NEGATIVE

## 2021-08-20 ENCOUNTER — HOSPITAL ENCOUNTER (OUTPATIENT)
Facility: CLINIC | Age: 61
Discharge: HOME | End: 2021-08-20
Attending: INTERNAL MEDICINE | Admitting: INTERNAL MEDICINE
Payer: COMMERCIAL

## 2021-08-20 ENCOUNTER — ANTICOAGULATION THERAPY VISIT (OUTPATIENT)
Dept: ANTICOAGULATION | Facility: OTHER | Age: 61
End: 2021-08-20

## 2021-08-20 ENCOUNTER — TELEPHONE (OUTPATIENT)
Dept: TRANSPLANT | Facility: CLINIC | Age: 61
End: 2021-08-20

## 2021-08-20 ENCOUNTER — APPOINTMENT (OUTPATIENT)
Dept: MEDSURG UNIT | Facility: CLINIC | Age: 61
End: 2021-08-20
Attending: INTERNAL MEDICINE
Payer: COMMERCIAL

## 2021-08-20 ENCOUNTER — APPOINTMENT (OUTPATIENT)
Dept: LAB | Facility: CLINIC | Age: 61
End: 2021-08-20
Attending: INTERNAL MEDICINE
Payer: COMMERCIAL

## 2021-08-20 VITALS
DIASTOLIC BLOOD PRESSURE: 69 MMHG | HEART RATE: 51 BPM | RESPIRATION RATE: 18 BRPM | BODY MASS INDEX: 46.6 KG/M2 | OXYGEN SATURATION: 99 % | SYSTOLIC BLOOD PRESSURE: 135 MMHG | TEMPERATURE: 97.9 F | HEIGHT: 63 IN | WEIGHT: 263 LBS

## 2021-08-20 DIAGNOSIS — I48.0 PAF (PAROXYSMAL ATRIAL FIBRILLATION) (H): ICD-10-CM

## 2021-08-20 DIAGNOSIS — Z79.01 LONG TERM CURRENT USE OF ANTICOAGULANT THERAPY: Primary | ICD-10-CM

## 2021-08-20 DIAGNOSIS — R94.39 POSITIVE CARDIAC STRESS TEST: ICD-10-CM

## 2021-08-20 DIAGNOSIS — I50.32 CHRONIC DIASTOLIC CONGESTIVE HEART FAILURE (H): Primary | ICD-10-CM

## 2021-08-20 PROBLEM — Z98.890 STATUS POST CORONARY ANGIOGRAM: Status: ACTIVE | Noted: 2021-08-20

## 2021-08-20 LAB
ANION GAP SERPL CALCULATED.3IONS-SCNC: 1 MMOL/L (ref 3–14)
APTT PPP: 37 SECONDS (ref 22–38)
BUN SERPL-MCNC: 35 MG/DL (ref 7–30)
CALCIUM SERPL-MCNC: 9.2 MG/DL (ref 8.5–10.1)
CHLORIDE BLD-SCNC: 112 MMOL/L (ref 94–109)
CO2 SERPL-SCNC: 24 MMOL/L (ref 20–32)
CREAT SERPL-MCNC: 1.43 MG/DL (ref 0.52–1.04)
ERYTHROCYTE [DISTWIDTH] IN BLOOD BY AUTOMATED COUNT: 14.3 % (ref 10–15)
GFR SERPL CREATININE-BSD FRML MDRD: 40 ML/MIN/1.73M2
GLUCOSE BLD-MCNC: 99 MG/DL (ref 70–99)
HCT VFR BLD AUTO: 37.5 % (ref 35–47)
HGB BLD-MCNC: 10.9 G/DL (ref 11.7–15.7)
HGB BLD-MCNC: 11 G/DL (ref 11.7–15.7)
HGB BLD-MCNC: 11.8 G/DL (ref 11.7–15.7)
INR PPP: 2.36 (ref 0.85–1.15)
MCH RBC QN AUTO: 29.5 PG (ref 26.5–33)
MCHC RBC AUTO-ENTMCNC: 31.5 G/DL (ref 31.5–36.5)
MCV RBC AUTO: 94 FL (ref 78–100)
OXYHGB MFR BLDV: 59 % (ref 92–100)
OXYHGB MFR BLDV: 60 % (ref 92–100)
PLATELET # BLD AUTO: 182 10E3/UL (ref 150–450)
POTASSIUM BLD-SCNC: 5 MMOL/L (ref 3.4–5.3)
RBC # BLD AUTO: 4 10E6/UL (ref 3.8–5.2)
SODIUM SERPL-SCNC: 137 MMOL/L (ref 133–144)
WBC # BLD AUTO: 5.4 10E3/UL (ref 4–11)

## 2021-08-20 PROCEDURE — 36415 COLL VENOUS BLD VENIPUNCTURE: CPT | Performed by: INTERNAL MEDICINE

## 2021-08-20 PROCEDURE — 93456 R HRT CORONARY ARTERY ANGIO: CPT | Performed by: INTERNAL MEDICINE

## 2021-08-20 PROCEDURE — 250N000009 HC RX 250: Performed by: INTERNAL MEDICINE

## 2021-08-20 PROCEDURE — 93010 ELECTROCARDIOGRAM REPORT: CPT | Performed by: INTERNAL MEDICINE

## 2021-08-20 PROCEDURE — 93457 R HRT ART/GRFT ANGIO: CPT | Performed by: INTERNAL MEDICINE

## 2021-08-20 PROCEDURE — 82810 BLOOD GASES O2 SAT ONLY: CPT

## 2021-08-20 PROCEDURE — 93005 ELECTROCARDIOGRAM TRACING: CPT

## 2021-08-20 PROCEDURE — 85610 PROTHROMBIN TIME: CPT | Performed by: INTERNAL MEDICINE

## 2021-08-20 PROCEDURE — 85027 COMPLETE CBC AUTOMATED: CPT | Performed by: INTERNAL MEDICINE

## 2021-08-20 PROCEDURE — 272N000002 HC OR SUPPLY OTHER OPNP: Performed by: INTERNAL MEDICINE

## 2021-08-20 PROCEDURE — 99153 MOD SED SAME PHYS/QHP EA: CPT | Performed by: INTERNAL MEDICINE

## 2021-08-20 PROCEDURE — 999N000054 HC STATISTIC EKG NON-CHARGEABLE

## 2021-08-20 PROCEDURE — 999N000128 HC STATISTIC PERIPHERAL IV START W/O US GUIDANCE

## 2021-08-20 PROCEDURE — 85018 HEMOGLOBIN: CPT

## 2021-08-20 PROCEDURE — 85730 THROMBOPLASTIN TIME PARTIAL: CPT | Performed by: INTERNAL MEDICINE

## 2021-08-20 PROCEDURE — 999N000142 HC STATISTIC PROCEDURE PREP ONLY

## 2021-08-20 PROCEDURE — 258N000003 HC RX IP 258 OP 636: Performed by: INTERNAL MEDICINE

## 2021-08-20 PROCEDURE — C1894 INTRO/SHEATH, NON-LASER: HCPCS | Performed by: INTERNAL MEDICINE

## 2021-08-20 PROCEDURE — 99152 MOD SED SAME PHYS/QHP 5/>YRS: CPT | Performed by: INTERNAL MEDICINE

## 2021-08-20 PROCEDURE — 250N000011 HC RX IP 250 OP 636: Performed by: INTERNAL MEDICINE

## 2021-08-20 PROCEDURE — 80048 BASIC METABOLIC PNL TOTAL CA: CPT | Performed by: INTERNAL MEDICINE

## 2021-08-20 PROCEDURE — 250N000013 HC RX MED GY IP 250 OP 250 PS 637: Performed by: INTERNAL MEDICINE

## 2021-08-20 PROCEDURE — 272N000001 HC OR GENERAL SUPPLY STERILE: Performed by: INTERNAL MEDICINE

## 2021-08-20 RX ORDER — FLUMAZENIL 0.1 MG/ML
0.2 INJECTION, SOLUTION INTRAVENOUS
Status: DISCONTINUED | OUTPATIENT
Start: 2021-08-20 | End: 2021-08-20 | Stop reason: HOSPADM

## 2021-08-20 RX ORDER — LIDOCAINE 40 MG/G
CREAM TOPICAL
Status: DISCONTINUED | OUTPATIENT
Start: 2021-08-20 | End: 2021-08-20 | Stop reason: HOSPADM

## 2021-08-20 RX ORDER — IOPAMIDOL 755 MG/ML
INJECTION, SOLUTION INTRAVASCULAR
Status: DISCONTINUED | OUTPATIENT
Start: 2021-08-20 | End: 2021-08-20 | Stop reason: HOSPADM

## 2021-08-20 RX ORDER — HEPARIN SODIUM 1000 [USP'U]/ML
INJECTION, SOLUTION INTRAVENOUS; SUBCUTANEOUS
Status: DISCONTINUED | OUTPATIENT
Start: 2021-08-20 | End: 2021-08-20 | Stop reason: HOSPADM

## 2021-08-20 RX ORDER — NALOXONE HYDROCHLORIDE 0.4 MG/ML
0.4 INJECTION, SOLUTION INTRAMUSCULAR; INTRAVENOUS; SUBCUTANEOUS
Status: DISCONTINUED | OUTPATIENT
Start: 2021-08-20 | End: 2021-08-20 | Stop reason: HOSPADM

## 2021-08-20 RX ORDER — ACETAMINOPHEN 325 MG/1
650 TABLET ORAL EVERY 4 HOURS PRN
Status: DISCONTINUED | OUTPATIENT
Start: 2021-08-20 | End: 2021-08-20 | Stop reason: HOSPADM

## 2021-08-20 RX ORDER — FUROSEMIDE 20 MG
TABLET ORAL
Qty: 60 TABLET | Refills: 3 | Status: SHIPPED | OUTPATIENT
Start: 2021-08-20 | End: 2021-09-15

## 2021-08-20 RX ORDER — ONDANSETRON 4 MG/1
4 TABLET, FILM COATED ORAL EVERY 6 HOURS PRN
Status: DISCONTINUED | OUTPATIENT
Start: 2021-08-20 | End: 2021-08-20 | Stop reason: HOSPADM

## 2021-08-20 RX ORDER — ASPIRIN 325 MG
325 TABLET ORAL ONCE
Status: COMPLETED | OUTPATIENT
Start: 2021-08-20 | End: 2021-08-20

## 2021-08-20 RX ORDER — OXYCODONE HYDROCHLORIDE 5 MG/1
5 TABLET ORAL EVERY 4 HOURS PRN
Status: DISCONTINUED | OUTPATIENT
Start: 2021-08-20 | End: 2021-08-20 | Stop reason: HOSPADM

## 2021-08-20 RX ORDER — HEPARIN SODIUM 10000 [USP'U]/100ML
100-1000 INJECTION, SOLUTION INTRAVENOUS CONTINUOUS PRN
Status: DISCONTINUED | OUTPATIENT
Start: 2021-08-20 | End: 2021-08-20 | Stop reason: HOSPADM

## 2021-08-20 RX ORDER — POTASSIUM CHLORIDE 750 MG/1
40 TABLET, EXTENDED RELEASE ORAL
Status: DISCONTINUED | OUTPATIENT
Start: 2021-08-20 | End: 2021-08-20 | Stop reason: HOSPADM

## 2021-08-20 RX ORDER — ATROPINE SULFATE 0.1 MG/ML
0.5 INJECTION INTRAVENOUS
Status: DISCONTINUED | OUTPATIENT
Start: 2021-08-20 | End: 2021-08-20 | Stop reason: HOSPADM

## 2021-08-20 RX ORDER — NITROGLYCERIN 5 MG/ML
VIAL (ML) INTRAVENOUS
Status: DISCONTINUED | OUTPATIENT
Start: 2021-08-20 | End: 2021-08-20 | Stop reason: HOSPADM

## 2021-08-20 RX ORDER — OXYCODONE HYDROCHLORIDE 10 MG/1
10 TABLET ORAL EVERY 4 HOURS PRN
Status: DISCONTINUED | OUTPATIENT
Start: 2021-08-20 | End: 2021-08-20 | Stop reason: HOSPADM

## 2021-08-20 RX ORDER — NALOXONE HYDROCHLORIDE 0.4 MG/ML
0.2 INJECTION, SOLUTION INTRAMUSCULAR; INTRAVENOUS; SUBCUTANEOUS
Status: DISCONTINUED | OUTPATIENT
Start: 2021-08-20 | End: 2021-08-20 | Stop reason: HOSPADM

## 2021-08-20 RX ORDER — NITROGLYCERIN 20 MG/100ML
10-200 INJECTION INTRAVENOUS CONTINUOUS PRN
Status: DISCONTINUED | OUTPATIENT
Start: 2021-08-20 | End: 2021-08-20 | Stop reason: HOSPADM

## 2021-08-20 RX ORDER — ARGATROBAN 1 MG/ML
150 INJECTION, SOLUTION INTRAVENOUS
Status: DISCONTINUED | OUTPATIENT
Start: 2021-08-20 | End: 2021-08-20 | Stop reason: HOSPADM

## 2021-08-20 RX ORDER — ASPIRIN 81 MG/1
243 TABLET, CHEWABLE ORAL ONCE
Status: COMPLETED | OUTPATIENT
Start: 2021-08-20 | End: 2021-08-20

## 2021-08-20 RX ORDER — EPTIFIBATIDE 2 MG/ML
2 INJECTION, SOLUTION INTRAVENOUS CONTINUOUS PRN
Status: DISCONTINUED | OUTPATIENT
Start: 2021-08-20 | End: 2021-08-20 | Stop reason: HOSPADM

## 2021-08-20 RX ORDER — NICARDIPINE HYDROCHLORIDE 2.5 MG/ML
INJECTION INTRAVENOUS
Status: DISCONTINUED | OUTPATIENT
Start: 2021-08-20 | End: 2021-08-20 | Stop reason: HOSPADM

## 2021-08-20 RX ORDER — POTASSIUM CHLORIDE 750 MG/1
20 TABLET, EXTENDED RELEASE ORAL
Status: DISCONTINUED | OUTPATIENT
Start: 2021-08-20 | End: 2021-08-20 | Stop reason: HOSPADM

## 2021-08-20 RX ORDER — EPTIFIBATIDE 2 MG/ML
180 INJECTION, SOLUTION INTRAVENOUS EVERY 10 MIN PRN
Status: DISCONTINUED | OUTPATIENT
Start: 2021-08-20 | End: 2021-08-20 | Stop reason: HOSPADM

## 2021-08-20 RX ORDER — TIROFIBAN HYDROCHLORIDE 50 UG/ML
0.07 INJECTION INTRAVENOUS CONTINUOUS PRN
Status: DISCONTINUED | OUTPATIENT
Start: 2021-08-20 | End: 2021-08-20 | Stop reason: HOSPADM

## 2021-08-20 RX ORDER — FENTANYL CITRATE 50 UG/ML
INJECTION, SOLUTION INTRAMUSCULAR; INTRAVENOUS
Status: DISCONTINUED | OUTPATIENT
Start: 2021-08-20 | End: 2021-08-20 | Stop reason: HOSPADM

## 2021-08-20 RX ORDER — SODIUM CHLORIDE 9 MG/ML
INJECTION, SOLUTION INTRAVENOUS CONTINUOUS
Status: DISCONTINUED | OUTPATIENT
Start: 2021-08-20 | End: 2021-08-20 | Stop reason: HOSPADM

## 2021-08-20 RX ORDER — DOBUTAMINE HYDROCHLORIDE 200 MG/100ML
2-20 INJECTION INTRAVENOUS CONTINUOUS PRN
Status: DISCONTINUED | OUTPATIENT
Start: 2021-08-20 | End: 2021-08-20 | Stop reason: HOSPADM

## 2021-08-20 RX ORDER — FENTANYL CITRATE 50 UG/ML
25 INJECTION, SOLUTION INTRAMUSCULAR; INTRAVENOUS
Status: DISCONTINUED | OUTPATIENT
Start: 2021-08-20 | End: 2021-08-20 | Stop reason: HOSPADM

## 2021-08-20 RX ORDER — DOPAMINE HYDROCHLORIDE 160 MG/100ML
2-20 INJECTION, SOLUTION INTRAVENOUS CONTINUOUS PRN
Status: DISCONTINUED | OUTPATIENT
Start: 2021-08-20 | End: 2021-08-20 | Stop reason: HOSPADM

## 2021-08-20 RX ORDER — ARGATROBAN 1 MG/ML
350 INJECTION, SOLUTION INTRAVENOUS
Status: DISCONTINUED | OUTPATIENT
Start: 2021-08-20 | End: 2021-08-20 | Stop reason: HOSPADM

## 2021-08-20 RX ADMIN — ASPIRIN 325 MG ORAL TABLET 325 MG: 325 PILL ORAL at 10:54

## 2021-08-20 RX ADMIN — LIDOCAINE: 40 CREAM TOPICAL at 10:53

## 2021-08-20 RX ADMIN — SODIUM CHLORIDE: 9 INJECTION, SOLUTION INTRAVENOUS at 11:41

## 2021-08-20 ASSESSMENT — MIFFLIN-ST. JEOR: SCORE: 1727.09

## 2021-08-20 NOTE — PROGRESS NOTES
Prepped for cors/RH/LH procedures.  Denies pain.   waiting in gold waiting room.  H & P current.  Consent signed.  Labs complete.

## 2021-08-20 NOTE — PRE-PROCEDURE
GENERAL PRE-PROCEDURE:   Procedure:  Right heart catheterization, coronary angiogram with possible PCI  Date/Time:  8/20/2021 11:43 AM    Verbal consent obtained?: Yes    Written consent obtained?: Yes    Risks and benefits: Risks, benefits and alternatives were discussed    DC Plan: Appropriate discharge home plan in place for patients who are going home after procedure   Consent given by:  Patient  Patient states understanding of procedure being performed: Yes    Patient's understanding of procedure matches consent: Yes    Procedure consent matches procedure scheduled: Yes    Expected level of sedation:  Moderate  Appropriately NPO:  Yes  Mallampati  :  Grade 2- soft palate, base of uvula, tonsillar pillars, and portion of posterior pharyngeal wall visible  Lungs:  Lungs clear with good breath sounds bilaterally  Heart:  Normal heart sounds and rate  History & Physical reviewed:  History and physical reviewed and no updates needed  Statement of review:  I have reviewed the lab findings, diagnostic data, medications, and the plan for sedation    INR 2.3; pt reports holding Warfarin since Tuesday 8/17. Dr. Escoto aware    Patricia Gomez, APRN, CNP  [facility]

## 2021-08-20 NOTE — PROGRESS NOTES
I p;ersonally saw he patient following angiogram and RHC.  Graft, stents, and native arteries patent.  RHC demonstrates high RA and PCP and therefore started furosemide 20mgs tablets two QAM, daily weights, Follow-up BMP BNP next Tues or Wednesday. Twin Moreno M.D.

## 2021-08-20 NOTE — PLAN OF CARE
D/I/A:  Patient is tolerating liquids and foods, ambulating, urinating, puncture sites are stable (no bleeding and no hematoma) and patient has a .  A+O x4 and making needs known.  CCL access sites C/D/I; no bleeding or hematoma; CMS intact.  VSSA.  Sinus teresa on monitor.  IV access removed.  Dr. Moreno stopped by and saw pt. Per provider, pt to start taking lasix daily. Prescription called in to pt's pharmacy of choice by provider. Discharge education completed and outlined in AVS or handout: medications reviewed with patient.  Questions answered prior to discharge.  Belongings returned to patient at discharge.    P: Discharged to self care.  Patient to follow up with appts as per discharge instruction.

## 2021-08-20 NOTE — TELEPHONE ENCOUNTER
ANTICOAGULATION     Luz Marina Live is overdue for INR check.      Spoke with patient who is currently on bedrest due to having a RHC and angiogram today.      Tanya Montgomery RN

## 2021-08-20 NOTE — DISCHARGE INSTRUCTIONS
Going home after a Coronary Angiogram    PROCEDURE SITE:   Radial (Wrist)  It is normal to have soreness and small bruising at the puncture site as well as mild tingling in your hand for up to 3 days. You may shower but please do not use a hot tub, bath tub or pool for 2 days. Do not apply any lotion or powder near the site for 3 days. For 3 days do not use your affected hand/arm to support your weight (like rising up out of a chair) or lifting >5 pounds.    MEDICATIONS:   1. If you are on Metformin (Glucophage) or any medications that contain this, you should not take it for at least 48 hours (2 days) from the time of your procedure. If you have baseline kidney problems, you may be instructed to also have a lab test drawn in 2-3 days before getting the okay to restart it.  2. If you have not been continued on other medications you were previously taking at home it is probably for reason though please discuss your concerns with any of your doctors.     DIET:  We recommend a diet low in saturated fat, trans fat and cholesterol. In addition it will be helpful to be cautious of sodium intake and carbohydrates. Try to increase the amount of lean meats you eat like fish and chicken, but avoid frying; and reduce the amount of red meat you eat. Eat more fresh fruits and vegetables and try to avoid canned and processed food. Please reference the handouts you received for more specific information.      OTHER INFORMATION:  1. If you are a smoker, quitting smoking will be one of the most important things you can do for yourself. There are nicotine replacement options or medications they might be able to be prescribed. Please discuss this with your doctors. Consider calling the [employer]uitPlan at [phone] ([phone]) as they can offer ongoing support after discharge.    CALL YOUR DOCTOR IF:  -You have a large or growing lump/bump around the procedure site  -The site is red, swollen, hot, tender or has drainage  -You have hives, a  rash or unusual itching  -You have increasing or worsening shortness of breath or chest pain    FOLLOW UP:  We prefer you to follow up with your primary care provider within one week. You will be arranged to see the cardiologist (heart doctor) in clinic in about one month.     Should you need to contact us:  Cardiology clinic for scheduling or triage nurse questions/concerns:  [phone]

## 2021-08-23 LAB
ATRIAL RATE - MUSE: 57 BPM
DIASTOLIC BLOOD PRESSURE - MUSE: NORMAL MMHG
INTERPRETATION ECG - MUSE: NORMAL
P AXIS - MUSE: 46 DEGREES
PR INTERVAL - MUSE: 196 MS
QRS DURATION - MUSE: 144 MS
QT - MUSE: 446 MS
QTC - MUSE: 434 MS
R AXIS - MUSE: -3 DEGREES
SYSTOLIC BLOOD PRESSURE - MUSE: NORMAL MMHG
T AXIS - MUSE: 84 DEGREES
VENTRICULAR RATE- MUSE: 57 BPM

## 2021-08-23 NOTE — PROGRESS NOTES
ANTICOAGULATION MANAGEMENT     Luz Marina Live 61 year old female is on warfarin with therapeutic INR result. (Goal INR 2.0-3.0)    Recent labs: (last 7 days)     08/20/21  1003   INR 2.36*       ASSESSMENT     Source(s): Chart Review I left a detailed voicemail with the orders below. I have also requested a call back if there have been any missed doses, concerns, illness, fever, or if there have been any changes in medications, activity level, or diet        Warfarin doses taken: Reviewed in chart    Diet: No new diet changes identified    New illness, injury, or hospitalization: Yes: Positive cardiac stress test    Medication/supplement changes: None noted    Signs or symptoms of bleeding or clotting: No    Previous INR: Therapeutic last visit; previously outside of goal range    Additional findings: None     PLAN     Recommended plan for temporary change(s) affecting INR     Dosing Instructions: Continue your current warfarin dose with next INR in 1 week       Summary  As of 8/20/2021    Full warfarin instructions:  10 mg every Tue, Thu, Sat; 7.5 mg all other days   Next INR check:  8/27/2021             Detailed voice message left for Luz Marina with dosing instructions and follow up date.     Contact [phone]  to schedule and with any changes, questions or concerns.     Education provided: Please call back if any changes to your diet, medications or how you've been taking warfarin, Goal range and significance of current result, Importance of therapeutic range and Importance of following up at instructed interval    Plan made per [facility] anticoagulation protocol    Michael Quispe RN  Anticoagulation Clinic  8/23/2021    _______________________________________________________________________     Anticoagulation Episode Summary     Current INR goal:  2.0-3.0   TTR:  33.3 % (1 y)   Target end date:  Indefinite   Send INR reminders to:  DEBBIE ISIDRO    Indications    Long-term (current) use of anticoagulants  [Z79.01] [Z79.01]  PAF (paroxysmal atrial fibrillation) (H) [I48.0]           Comments:  5 mg tabs, [facility] (needs staff message) PM dose         Anticoagulation Care Providers     Provider Role Specialty Phone number    Eliz Nguyen MD Referring Internal Medicine [phone]

## 2021-08-25 ENCOUNTER — LAB (OUTPATIENT)
Dept: LAB | Facility: CLINIC | Age: 61
End: 2021-08-25
Payer: COMMERCIAL

## 2021-08-25 DIAGNOSIS — N18.31 STAGE 3A CHRONIC KIDNEY DISEASE (H): ICD-10-CM

## 2021-08-25 LAB
ALBUMIN SERPL-MCNC: 3.4 G/DL (ref 3.4–5)
ALBUMIN UR-MCNC: NEGATIVE MG/DL
ANION GAP SERPL CALCULATED.3IONS-SCNC: 4 MMOL/L (ref 3–14)
APPEARANCE UR: CLEAR
BILIRUB UR QL STRIP: NEGATIVE
BUN SERPL-MCNC: 50 MG/DL (ref 7–30)
CALCIUM SERPL-MCNC: 8.8 MG/DL (ref 8.5–10.1)
CHLORIDE BLD-SCNC: 106 MMOL/L (ref 94–109)
CO2 SERPL-SCNC: 27 MMOL/L (ref 20–32)
COLOR UR AUTO: YELLOW
CREAT SERPL-MCNC: 1.65 MG/DL (ref 0.52–1.04)
CREAT UR-MCNC: 35 MG/DL
GFR SERPL CREATININE-BSD FRML MDRD: 33 ML/MIN/1.73M2
GLUCOSE BLD-MCNC: 91 MG/DL (ref 70–99)
GLUCOSE UR STRIP-MCNC: NEGATIVE MG/DL
HGB BLD-MCNC: 11.5 G/DL (ref 11.7–15.7)
HGB UR QL STRIP: ABNORMAL
KETONES UR STRIP-MCNC: NEGATIVE MG/DL
LEUKOCYTE ESTERASE UR QL STRIP: NEGATIVE
NITRATE UR QL: NEGATIVE
PH UR STRIP: 6.5 [PH] (ref 5–7)
PHOSPHATE SERPL-MCNC: 3.5 MG/DL (ref 2.5–4.5)
POTASSIUM BLD-SCNC: 5.1 MMOL/L (ref 3.4–5.3)
PROT UR-MCNC: 0.07 G/L
PROT/CREAT 24H UR: 0.2 G/G CR (ref 0–0.2)
PTH-INTACT SERPL-MCNC: 93 PG/ML (ref 18–80)
RBC #/AREA URNS AUTO: ABNORMAL /HPF
SODIUM SERPL-SCNC: 137 MMOL/L (ref 133–144)
SP GR UR STRIP: 1.01 (ref 1–1.03)
SQUAMOUS #/AREA URNS AUTO: ABNORMAL /LPF
UROBILINOGEN UR STRIP-ACNC: 0.2 E.U./DL
WBC #/AREA URNS AUTO: ABNORMAL /HPF

## 2021-08-25 PROCEDURE — 85018 HEMOGLOBIN: CPT

## 2021-08-25 PROCEDURE — 84156 ASSAY OF PROTEIN URINE: CPT

## 2021-08-25 PROCEDURE — 81001 URINALYSIS AUTO W/SCOPE: CPT

## 2021-08-25 PROCEDURE — 80069 RENAL FUNCTION PANEL: CPT

## 2021-08-25 PROCEDURE — 36415 COLL VENOUS BLD VENIPUNCTURE: CPT

## 2021-08-25 PROCEDURE — 83970 ASSAY OF PARATHORMONE: CPT

## 2021-08-27 VITALS — WEIGHT: 260 LBS | BODY MASS INDEX: 46.07 KG/M2 | HEIGHT: 63 IN

## 2021-08-27 RX ORDER — ASPIRIN 81 MG/1
81 TABLET ORAL DAILY
COMMUNITY

## 2021-08-27 ASSESSMENT — MIFFLIN-ST. JEOR: SCORE: 1713.48

## 2021-08-27 NOTE — PROGRESS NOTES
Luz Marina Live is a 61 year old who is being evaluated via a billable video visit.      How would you like to obtain your AVS? MyChart  If the video visit is dropped, the invitation should be resent by: Text to cell phone: [phone]  Will anyone else be joining your video visit? No    Does patient have any form of state insurance? Yes    Do you have wifi? Yes  Do you have a smart phone? Yes  Can you download an marcos on your phone comfortably with out assistance? Yes  Can you watch a Youtube video? Yes          Nimco Hernandez CMA    Video Start Time: 10:40    Video-Visit Details    Type of service:  Video Visit    Video End Time:11:15 AM    Originating Location (pt. Location): Home    Distant Location (provider location):  @[email or web address]@     Platform used for Video Visit: [facility]   Outpatient Sleep Medicine Follow-up Visit  August 31, 2021    Name: Luz Marina Live MRN# [id number]   Age: 61 year old YOB: 1960     Date of Consultation: August 31, 2021  Consultation is requested by: No referring provider defined for this encounter.  Primary care provider: Eliz Nguyen           Assessment and Plan:     Sleep Diagnoses:    Insomnia in the setting of delayed sleep-wake phase and circadian misalignment    Severe obstructive sleep apnea    Comorbid conditions:    Coronary arrtery disease    Tricuspid insufficiency    Paroxysmal atrial fibrillation onanticoagulation    Systemic lupus erythematosis with renal disease stage 3 on plaquenil      Summary Recommendations:    Replacement nasal pillow mask; use your device regularly with a preset target bedtime of 12:30 AM    Match your intention to fall asleep with presleep behaviors including avoidance of lighted screens for 2 hours before your targeted bedtime of midnight to 12:30 AM    Protect your morning sleep blocking light from windows with curtains and plan wake-up time 8-9 AM    Return to clinic in 6 to 8  "weeks    Orders placed for new mask    If patient is not able to wear CPAP consistently we will review alternative therapies next visit.  Patient understands that if she is not using this device consistently she will not be supported by the CMS payer for supplies and equipment.         History of Present Illness:     Luz Marina Live is a 61 year old female with a history of severe sleep apnea and hypoxemia in the setting of atrial fibrillation, patent foramen ovale and mild pulmonary hypertension with tricuspid insufficiency.  She cites no difficulty with pain, rumination of restless legs at bedtime but has difficulty initiating sleep naturally as result of tendency for sleep phase delay.  She has difficulty getting to sleep and consistently wearing her CPAP on a regular basis at night with intermittent use typically lasting 3 to 5 hours.       PREVIOUS SLEEP STUDIES:  Study Date: 6/13/2018  MRN: [id number]  Referring Provider: Dr. Moreno  Ordering Provider: Dr. Adrian     Indications for Polysomnography: The patient is a 57 y old Female who is 5' 3\" and weighs 257.0 lbs. Her BMI is 45.5, [place] sleepiness scale 3.0 and neck circumference is 40.0 cm. Relevant medical history includes coronary artery disease, paroxysmal atrial fibrillation, hypertension, SLE.  A diagnostic polysomnogram was performed to evaluate for possible sleep apnea, hypoventilation.  Polysomnogram Data: A full night polysomnogram recorded the standard physiologic parameters including EEG, EOG, EMG, ECG, nasal and oral airflow. Respiratory parameters of chest and abdominal movements were recorded with respiratory inductance plethysmography. Oxygen saturation was recorded by pulse oximetry. Hypopnea scoring rule used: 1B 4%.  Sleep Architecture:   The total recording time of the polysomnogram was 486.5 minutes. The total sleep time was 378.5 minutes. Sleep latency was at 50.5 minutes from lights off and 27.0 minutes from stopping use of smartphone " with the use of a sleep aid (zolpidem). REM latency was 64.5 minutes. Arousal index was 17.9 arousals per hour. Sleep efficiency was decreased at 77.8%. Wake after sleep onset was 56.5 minutes. The patient spent 6.6% of total sleep time in Stage N1, 56.1% in Stage N2, 15.9% in Stage N3, and 21.4% in REM. Time in REM supine was 41.5 minutes.  Respiration:     Events ? The polysomnogram revealed a presence of 97 obstructive, 3 central, and 4 mixed apneas resulting in an apnea index of 16.5 events per hour. There were 91 obstructive hypopneas and 0 central hypopneas resulting in an obstructive hypopnea index of 14.4 and central hypopnea index of 0 events per hour. The combined apnea/hypopnea index was 30.9 events per hour (central apnea/hypopnea index was 0.5 events per hour). The REM AHI was 76.3 events per hour. The supine AHI was 48.7 events per hour. The RERA index was 1.6 events per hour.  The RDI was 32.5 events per hour.    Snoring - was reported as moderate to loud.    Respiratory rate and pattern - was notable for normal respiratory rate and pattern.    Sustained Sleep Associated Hypoventilation - Transcutaneous carbon dioxide monitoring was used, however severe hypoventilation was not suggested with a maximum change from baseline of 35-38 mmHg to a peak of 46 mmHg and 0 minutes at or greater than 55 mmHg.    Sleep Associated Hypoxemia - (Greater than 5 minutes O2 sat at or below 88%) was present. Baseline oxygen saturation was 94.3%. Lowest oxygen saturation was 71.1%. Time spent less than or equal to 88% was 20.8 minutes. Time spent less than or equal to 89% was 25.7 minutes.  Movement Activity:     Periodic Limb Activity - There were 143 PLMs during the entire study. The PLM index was 22.7 movements per hour. The PLM Arousal Index was 0.3 per hour.    REM EMG Activity - Excessive transient/sustained muscle activity was not present.    Nocturnal Behavior - Abnormal sleep related behaviors were not  noted    Bruxism - None apparent.  Cardiac Summary:   The average pulse rate was 57.9 bpm. The minimum pulse rate was 48.9 bpm while the maximum pulse rate was 71.1 bpm.  Arrhythmias were not noted.      Most Recent SCALES:    EPWORTH SLEEPINESS SCALE WITHIN 1 YEAR WITHIN 10 DAYS   Sitting and reading 0 0   Watching TV 0 0   Sitting, inactive in a public place (theatre or mtg.) 0  0    As a passenger in a car 0 0   Lying down to rest in the afternoon when circumstance permit 2 2   Sitting and talking to someone 0 0   Sitting quietly after lunch without alcohol 0 0   In a car, while stopped for a few minutes in traffic 0 0   TOTAL SCORE 2 2       INSOMNIA SEVERITY INDEX WITHIN 1 YEAR   Difficulty falling asleep 1   Difficult staying asleep 1   Problems waking up to early 0   How SATISFIED/DISSATISFIED are you with your CURRENT sleep pattern? 2   How NOTICEABLE to others do you think your sleep pattern is in terms of your quality of life? 0   How WORRIED/DISTRESSED are you about your current sleep pattern? 0   To what extent do you consider your sleep problem to INTERFERE with your daily fuctioning(e.g. daytime fatigue, mood, ability to function at work/daily chores, concentration, mood,etc.) CURRENTLY? 0   INSOMNIA SEVERITY INDEX TOTAL SCORE 4    --absence of insomnia (0-7); sub-threshold insomnia (8-14); moderate insomnia (15-21); and severe insomnia (22-28)--                     Medications:     Current Outpatient Medications   Medication Sig     aspirin 81 MG EC tablet Take 81 mg by mouth daily     atorvastatin (LIPITOR) 40 MG tablet TAKE 1 TABLET BY MOUTH EVERY DAY     furosemide (LASIX) 20 MG tablet Take 2 twenty mg tablets daily     hydroxychloroquine (PLAQUENIL) 200 MG tablet Take 2 tablets (400 mg) by mouth daily     lisinopril (ZESTRIL) 10 MG tablet Take 3 tablets (30 mg) by mouth daily     loratadine (CLARITIN) 10 MG tablet Take 10 mg by mouth daily     metoprolol tartrate (LOPRESSOR) 50 MG tablet Take 2  tablets (100 mg) by mouth 2 times daily     metroNIDAZOLE (METROCREAM) 0.75 % external cream Apply topically 2 times daily     MULTIPLE VITAMIN PO Take 1 tablet by mouth daily      Omega-3 Fatty Acids (FISH OIL) 1200 MG capsule Take 2 capsules by mouth 2 times daily      order for DME Autopap 10-16 cmH20     warfarin ANTICOAGULANT (COUMADIN) 5 MG tablet TAKE 1 TAB (5 MG) BY MOUTH ON FRI & 2 TABS (10 MG) ALL OTHER DAYS, OR AS DIRECTED     No current facility-administered medications for this visit.        Allergies   Allergen Reactions     Seasonal Allergies      Sulfa Drugs Rash and GI Disturbance            Problem List:     Patient Active Problem List   Diagnosis     Hyperlipidemia LDL goal <100     CAD (coronary artery disease)     HTN, goal below 140/90     PAF (paroxysmal atrial fibrillation) (H)     CKD (chronic kidney disease) stage 3, GFR 30-59 ml/min     Long-term (current) use of anticoagulants [Z79.01]     Moderate tricuspid insufficiency     Systemic lupus erythematosus with glomerular disease, unspecified SLE type (H)     Psychophysiological insomnia     Morbid obesity (H)     KEITH (obstructive sleep apnea)- severe (AHI 30)     Long-term use of Plaquenil     Long-term use of immunosuppressant medication     Stable angina (H)     Positive cardiac stress test     Status post coronary angiogram            Past Medical History:     Does not need 02 supplement at night   Past Medical History:   Diagnosis Date     Hypertension      Hypovolemic shock (H) 2/28/2015     Lupus (H)      Sepsis (H) 8/22/2015             Past Surgical History:    No h/o  upper airway surgery  Past Surgical History:   Procedure Laterality Date     CARDIAC SURGERY  08/27/2009    triple vessel coronary artery bypass grafting on 8/27/09     CARPAL TUNNEL RELEASE RT/LT  1996    bilateral     CV CORONARY ANGIOGRAM  10/17/2019    Procedure: CV CORONARY ANGIOGRAM;  Surgeon: Mahendra Collins MD;  Location:  [facility] CATH  LAB     CV CORONARY ANGIOGRAM N/A 8/20/2021    Procedure: CV CORONARY ANGIOGRAM;  Surgeon: Derek Escoto MD;  Location:  [facility] CATH LAB     CV PCI STENT DRUG ELUTING N/A 10/17/2019    Procedure: PCI Stent Drug Eluting;  Surgeon: Mahendra Collins MD;  Location:  HE[facility] CARDI[facility] CATH LAB     CV RIGHT HEART CATH MEASUREMENTS RECORDED N/A 8/20/2021    Procedure: CV RIGHT HEART CATH;  Surgeon: Derek Escoto MD;  Location:  HEART CARDIAC CATH LAB              Physical Examination:   Objective:    Reported vitals:  There were no vitals taken for this visit.   GENERAL: Healthy, alert and no distress  EYES: Eyes grossly normal to inspection.  No discharge or erythema, or obvious scleral/conjunctival abnormalities.  RESP: No audible wheeze, cough, or visible cyanosis.  No visible retractions or increased work of breathing.    SKIN: Visible skin clear. No significant rash, abnormal pigmentation or lesions.  NEURO: Cranial nerves grossly intact.  Mentation and speech appropriate for age.  PSYCH: Mentation appears normal, affect normal/bright, judgement and insight intact, normal speech and appearance well-groomed.        EVE ECHO 2016:  Interpretation Summary  Left ventricular function, chamber size, wall motion, and wall thickness are  normal.The EF is 55-60%.  Global right ventricular function is normal. Mild right ventricular dilation  is present.  Mild to moderate TR with mild prolapse of the posterior leaflet of the  tricupsid valve.  A small patent foramen ovale is present. There is no ASD (secundum, sinus  venosus or unroofed coronary sinus). All 4 pulmonary veins drain into the  left atrium.  Mild pulmonary hypertension is present with RVSP 45mmHg above RAP.     ECHO 2019:  Interpretation Summary  Global and regional left ventricular function is normal with an EF of 60-65%.  Right ventricular function, chamber size, wall motion, and thickness are  normal.  No pericardial  effusion is present.  IVC diameter and respiratory changes fall into an intermediate range  suggesting an RA pressure of 8 mmHg.  Mild pulmonary hypertension is present. Right ventricular systolic pressure is  43mmHg above the right atrial pressure.  Compared to prior, measured RVSP is lower, no other change.    R Heart Cath : 8/20/2021  Hemodynamics RA: --/--/15  RV: 62/18/--  PA: 62/28/38  PCWP: --/--/28     PA Sat: 59%  Ao Sat: 99%    Kannan CO/CI: 5.41/2.49    PVR: 1.8  SVR: 1,214 dynes-sec/cm5 Right sided filling pressures are moderately elevated. Left sided filling pressures are moderately elevated. Moderately elevated pulmonary artery hypertension. Normal cardiac output level.       Copy to: Eliz Nguyen MD 8/31/2021       Total time spent with patient: 35min >50% counseling and 25 minutes documenting and reviewing records

## 2021-08-27 NOTE — PATIENT INSTRUCTIONS
Your BMI is Body mass index is 46.06 kg/m .  Weight management is a personal decision.  If you are interested in exploring weight loss strategies, the following discussion covers the approaches that may be successful. Body mass index (BMI) is one way to tell whether you are at a healthy weight, overweight, or obese. It measures your weight in relation to your height.  A BMI of 18.5 to 24.9 is in the healthy range. A person with a BMI of 25 to 29.9 is considered overweight, and someone with a BMI of 30 or greater is considered obese. More than two-thirds of American adults are considered overweight or obese.  Being overweight or obese increases the risk for further weight gain. Excess weight may lead to heart disease and diabetes.  Creating and following plans for healthy eating and physical activity may help you improve your health.  Weight control is part of healthy lifestyle and includes exercise, emotional health, and healthy eating habits. Careful eating habits lifelong are the mainstay of weight control. Though there are significant health benefits from weight loss, long-term weight loss with diet alone may be very difficult to achieve- studies show long-term success with dietary management in less than 10% of people. Attaining a healthy weight may be especially difficult to achieve in those with severe obesity. In some cases, medications, devices and surgical management might be considered.  What can you do?  If you are overweight or obese and are interested in methods for weight loss, you should discuss this with your provider.     Consider reducing daily calorie intake by 500 calories.     Keep a food journal.     Avoiding skipping meals, consider cutting portions instead.    Diet combined with exercise helps maintain muscle while optimizing fat loss. Strength training is particularly important for building and maintaining muscle mass. Exercise helps reduce stress, increase energy, and improves fitness.  Increasing exercise without diet control, however, may not burn enough calories to loose weight.       Start walking three days a week 10-20 minutes at a time    Work towards walking thirty minutes five days a week     Eventually, increase the speed of your walking for 1-2 minutes at time    In addition, we recommend that you review healthy lifestyles and methods for weight loss available through the National Institutes of Health patient information sites:  http://win.niddk.nih.gov/publications/index.htm    And look into health and wellness programs that may be available through your health insurance provider, employer, local community center, or [employer].    Weight management plan: Patient was referred to their PCP to discuss a diet and exercise plan.

## 2021-08-31 ENCOUNTER — VIRTUAL VISIT (OUTPATIENT)
Dept: SLEEP MEDICINE | Facility: CLINIC | Age: 61
End: 2021-08-31
Payer: COMMERCIAL

## 2021-08-31 DIAGNOSIS — E66.01 MORBID OBESITY (H): ICD-10-CM

## 2021-08-31 DIAGNOSIS — G47.33 OSA (OBSTRUCTIVE SLEEP APNEA): ICD-10-CM

## 2021-08-31 PROCEDURE — 99205 OFFICE O/P NEW HI 60 MIN: CPT | Mod: 95 | Performed by: INTERNAL MEDICINE

## 2021-09-01 ENCOUNTER — TELEPHONE (OUTPATIENT)
Dept: OBGYN | Facility: CLINIC | Age: 61
End: 2021-09-01

## 2021-09-01 DIAGNOSIS — M25.561 CHRONIC PAIN OF BOTH KNEES: Primary | ICD-10-CM

## 2021-09-01 DIAGNOSIS — M25.562 CHRONIC PAIN OF BOTH KNEES: Primary | ICD-10-CM

## 2021-09-01 DIAGNOSIS — G89.29 CHRONIC PAIN OF BOTH KNEES: Primary | ICD-10-CM

## 2021-09-01 NOTE — TELEPHONE ENCOUNTER
Pt is requesting PT referral from Dr Nguyen.  No order is placed in the system.  Message routed to Dr Nguyen to request a PT referral.      ----- Message from Bella Wick RN sent at 9/1/2021  1:03 PM CDT -----  Regarding: referrels?  Dr Nguyen was supposed to set up referral and other things.  Not sure if it has been done.  Please call back.

## 2021-09-08 DIAGNOSIS — I10 BENIGN ESSENTIAL HYPERTENSION: ICD-10-CM

## 2021-09-10 ENCOUNTER — TELEPHONE (OUTPATIENT)
Dept: TRANSPLANT | Facility: CLINIC | Age: 61
End: 2021-09-10

## 2021-09-10 NOTE — TELEPHONE ENCOUNTER
ANTICOAGULATION     Luz Marina Live is overdue for INR check.      Spoke with Luz Marina and scheduled lab appointment on 9/13/21    Tanya Montgomery RN

## 2021-09-11 ENCOUNTER — HEALTH MAINTENANCE LETTER (OUTPATIENT)
Age: 61
End: 2021-09-11

## 2021-09-11 RX ORDER — LISINOPRIL 10 MG/1
TABLET ORAL
Qty: 90 TABLET | Refills: 3 | OUTPATIENT
Start: 2021-09-11

## 2021-09-12 DIAGNOSIS — I50.32 CHRONIC DIASTOLIC CONGESTIVE HEART FAILURE (H): ICD-10-CM

## 2021-09-13 ENCOUNTER — ANTICOAGULATION THERAPY VISIT (OUTPATIENT)
Dept: ANTICOAGULATION | Facility: OTHER | Age: 61
End: 2021-09-13

## 2021-09-13 ENCOUNTER — LAB (OUTPATIENT)
Dept: LAB | Facility: CLINIC | Age: 61
End: 2021-09-13
Payer: COMMERCIAL

## 2021-09-13 DIAGNOSIS — I48.0 PAF (PAROXYSMAL ATRIAL FIBRILLATION) (H): ICD-10-CM

## 2021-09-13 DIAGNOSIS — Z79.01 LONG TERM CURRENT USE OF ANTICOAGULANT THERAPY: Primary | ICD-10-CM

## 2021-09-13 DIAGNOSIS — Z79.01 LONG TERM CURRENT USE OF ANTICOAGULANT THERAPY: ICD-10-CM

## 2021-09-13 LAB — INR BLD: 2.9 (ref 0.9–1.1)

## 2021-09-13 PROCEDURE — 85610 PROTHROMBIN TIME: CPT

## 2021-09-13 PROCEDURE — 36415 COLL VENOUS BLD VENIPUNCTURE: CPT

## 2021-09-13 NOTE — PROGRESS NOTES
ANTICOAGULATION MANAGEMENT     Luz Marina Live 61 year old female is on warfarin with therapeutic INR result. (Goal INR 2.0-3.0)    Recent labs: (last 7 days)     09/13/21  1353   INR 2.9*       ASSESSMENT     Source(s): Chart Review       Warfarin doses taken: Reviewed in chart    Diet: LM    New illness, injury, or hospitalization: No    Medication/supplement changes: None noted    Signs or symptoms of bleeding or clotting: No    Previous INR: Therapeutic last 2(+) visits    Additional findings: None     PLAN     Recommended plan for no diet, medication or health factor changes affecting INR     Dosing Instructions: Continue your current warfarin dose with next INR in 4 weeks       Summary  As of 9/13/2021    Full warfarin instructions:  10 mg every Tue, Thu, Sat; 7.5 mg all other days   Next INR check:               Detailed voice message left for Luz Marina with dosing instructions and follow up date.     Contact [phone]  to schedule and with any changes, questions or concerns.     Education provided: Please call back if any changes to your diet, medications or how you've been taking warfarin    Plan made per [facility] anticoagulation protocol    Deborah Alatorre RN  Anticoagulation Clinic  9/13/2021    _______________________________________________________________________     Anticoagulation Episode Summary     Current INR goal:  2.0-3.0   TTR:  39.6 % (1 y)   Target end date:  Indefinite   Send INR reminders to:  [facility]    Indications    Long-term (current) use of anticoagulants [Z79.01] [Z79.01]  PAF (paroxysmal atrial fibrillation) (H) [I48.0]           Comments:  5 mg tabs, [facility] (needs staff message) PM dose         Anticoagulation Care Providers     Provider Role Specialty Phone number    Eliz Nguyen MD Referring Internal Medicine [phone]

## 2021-09-15 RX ORDER — FUROSEMIDE 20 MG
40 TABLET ORAL DAILY
Qty: 180 TABLET | Refills: 3 | Status: SHIPPED | OUTPATIENT
Start: 2021-09-15 | End: 2022-09-16

## 2021-09-15 NOTE — TELEPHONE ENCOUNTER
FUROSEMIDE 20 MG TABLET  Last Written Prescription Date: 8/20/2021  Last Fill Quantity: 60,   # refills: 3  Last Office Visit : 8/11/2021  Future Office visit:  10/6/2021    Routing refill request to provider for review/approval because:  Please resend this order.   First order was sent to print and not [facility].     Also would Provider like a 90 day supply with refills sent to pharm for this order.   Please advise      Thank you       Yana Richarsd RN  Central Triage Red Flags/Med Refills

## 2021-09-16 ENCOUNTER — DOCUMENTATION ONLY (OUTPATIENT)
Dept: TRANSPLANT | Facility: CLINIC | Age: 61
End: 2021-09-16
Payer: COMMERCIAL

## 2021-09-16 ENCOUNTER — OFFICE VISIT (OUTPATIENT)
Dept: URGENT CARE | Facility: URGENT CARE | Age: 61
End: 2021-09-16
Payer: COMMERCIAL

## 2021-09-16 VITALS
SYSTOLIC BLOOD PRESSURE: 151 MMHG | HEART RATE: 57 BPM | DIASTOLIC BLOOD PRESSURE: 83 MMHG | TEMPERATURE: 98.3 F | RESPIRATION RATE: 22 BRPM | OXYGEN SATURATION: 100 %

## 2021-09-16 DIAGNOSIS — Z79.01 LONG TERM CURRENT USE OF ANTICOAGULANT THERAPY: ICD-10-CM

## 2021-09-16 DIAGNOSIS — Z79.60 LONG-TERM USE OF IMMUNOSUPPRESSANT MEDICATION: ICD-10-CM

## 2021-09-16 DIAGNOSIS — H10.023 PINK EYE DISEASE OF BOTH EYES: ICD-10-CM

## 2021-09-16 DIAGNOSIS — N18.30 STAGE 3 CHRONIC KIDNEY DISEASE, UNSPECIFIED WHETHER STAGE 3A OR 3B CKD (H): Primary | ICD-10-CM

## 2021-09-16 DIAGNOSIS — Z53.9 ERRONEOUS ENCOUNTER--DISREGARD: Primary | ICD-10-CM

## 2021-09-16 PROCEDURE — 99214 OFFICE O/P EST MOD 30 MIN: CPT | Performed by: PHYSICIAN ASSISTANT

## 2021-09-16 PROCEDURE — 10000001 PR ERRONEOUS ENCOUNTER--DISREGARD: Performed by: INTERNAL MEDICINE

## 2021-09-16 RX ORDER — POLYMYXIN B SULFATE AND TRIMETHOPRIM 1; 10000 MG/ML; [USP'U]/ML
2 SOLUTION OPHTHALMIC EVERY 4 HOURS
Qty: 5 ML | Refills: 0 | Status: SHIPPED | OUTPATIENT
Start: 2021-09-16 | End: 2021-09-23

## 2021-09-16 ASSESSMENT — ENCOUNTER SYMPTOMS
COUGH: 0
DIZZINESS: 0
ALLERGIC/IMMUNOLOGIC NEGATIVE: 1
NAUSEA: 0
SORE THROAT: 0
ARTHRALGIAS: 0
EYE PAIN: 0
CHILLS: 0
WOUND: 0
DIARRHEA: 0
MUSCULOSKELETAL NEGATIVE: 1
EYE REDNESS: 1
LIGHT-HEADEDNESS: 0
WEAKNESS: 0
NECK STIFFNESS: 0
JOINT SWELLING: 0
RHINORRHEA: 0
NECK PAIN: 0
RESPIRATORY NEGATIVE: 1
EYE ITCHING: 0
PALPITATIONS: 0
FEVER: 0
CARDIOVASCULAR NEGATIVE: 1
EYE DISCHARGE: 1
MYALGIAS: 0
VOMITING: 0
BACK PAIN: 0
SHORTNESS OF BREATH: 0
ENDOCRINE NEGATIVE: 1
PHOTOPHOBIA: 0
HEADACHES: 0

## 2021-09-16 NOTE — PROGRESS NOTES
Chief Complaint:      Chief Complaint   Patient presents with     Red Eye Both Eyes       Medical Decision Making:    Differential Diagnosis:  Eye Problem: Bacterial conjunctivitis  Viral conjunctivitis  Allergic conjunctivitis  Stye (external)  Stye (internal)     ASSESSMENT     1. Stage 3 chronic kidney disease, unspecified whether stage 3a or 3b CKD    2. Long-term use of immunosuppressant medication    3. Long-term (current) use of anticoagulants [Z79.01]    4. Pink eye disease of both eyes         PLAN  Rx for Polytrim drops.  No dosage adjustment needed for CKD per up to date.  Message sent to [facility] staff alerting them to new medication use.  Artifical tears for irritation  Warm compresses with baby shampoo for mattering.   If symptoms are not improving follow up with your eye doctor in 2-3 days.  See your eye doctor immediately if symptoms worsen.  Worrisome symptoms discussed with instructions to go to the ED.  Patient verbalized understanding and agreed with this plan.    Labs:    No results found for any visits on 09/16/21.       Current Meds    Current Outpatient Medications:      trimethoprim-polymyxin b (POLYTRIM) 59981-5.1 UNIT/ML-% ophthalmic solution, Place 2 drops into both eyes every 4 hours for 7 days, Disp: 5 mL, Rfl: 0     aspirin 81 MG EC tablet, Take 81 mg by mouth daily, Disp: , Rfl:      atorvastatin (LIPITOR) 40 MG tablet, TAKE 1 TABLET BY MOUTH EVERY DAY, Disp: 90 tablet, Rfl: 3     furosemide (LASIX) 20 MG tablet, Take 2 tablets (40 mg) by mouth daily, Disp: 180 tablet, Rfl: 3     hydroxychloroquine (PLAQUENIL) 200 MG tablet, Take 2 tablets (400 mg) by mouth daily, Disp: 180 tablet, Rfl: 3     lisinopril (ZESTRIL) 10 MG tablet, Take 3 tablets (30 mg) by mouth daily, Disp: 90 tablet, Rfl: 3     loratadine (CLARITIN) 10 MG tablet, Take 10 mg by mouth daily, Disp: , Rfl:      metoprolol tartrate (LOPRESSOR) 50 MG tablet, Take 2 tablets (100 mg) by mouth 2 times daily, Disp: 360 tablet, Rfl:  3     metroNIDAZOLE (METROCREAM) 0.75 % external cream, Apply topically 2 times daily, Disp: 45 g, Rfl: 11     MULTIPLE VITAMIN PO, Take 1 tablet by mouth daily , Disp: , Rfl:      Omega-3 Fatty Acids (FISH OIL) 1200 MG capsule, Take 2 capsules by mouth 2 times daily , Disp: , Rfl:      order for DME, [employer] 10-16 cmH20, Disp: 1 Device, Rfl: 0     warfarin ANTICOAGULANT (COUMADIN) 5 MG tablet, TAKE 1 TAB (5 MG) BY MOUTH ON FRI & 2 TABS (10 MG) ALL OTHER DAYS, OR AS DIRECTED, Disp: 156 tablet, Rfl: 3    Allergies  Allergies   Allergen Reactions     Seasonal Allergies      Sulfa Drugs Rash and GI Disturbance         SUBJECTIVE    HPI: Luz Marina Live is a 61 year old female presenting with both eyes discharge, mattering, pain, redness  for the past 2 days.  Patient has a complicated medical Hx including immunosuppressed state, CKD, and anticoagulated with Coumadin.  There has not been exposure to pink eye.  There has not been trauma to the eye.    Luz Marina Live does not wear contact lenses.    No problems with vision, or eye pain.     No recent viral illness or seasonal allergies.    ROS:     Review of Systems   Constitutional: Negative for chills and fever.   HENT: Negative for congestion, ear pain, rhinorrhea and sore throat.    Eyes: Positive for discharge and redness. Negative for photophobia, pain, itching and visual disturbance.   Respiratory: Negative.  Negative for cough and shortness of breath.    Cardiovascular: Negative.  Negative for chest pain and palpitations.   Gastrointestinal: Negative for diarrhea, nausea and vomiting.   Endocrine: Negative.    Genitourinary: Negative.    Musculoskeletal: Negative.  Negative for arthralgias, back pain, joint swelling, myalgias, neck pain and neck stiffness.   Skin: Negative.  Negative for rash and wound.   Allergic/Immunologic: Negative.  Negative for immunocompromised state.   Neurological: Negative for dizziness, weakness, light-headedness and headaches.            Family History   Family History   Problem Relation Age of Onset     Arthritis Mother         ra     Connective Tissue Disorder Mother         lupus     Congenital Anomalies Mother         wholein heart     Cerebrovascular Disease Mother         TIA's     Cerebrovascular Disease Father      Diabetes Father      Hypertension Father      Cardiovascular Father         stents     Genitourinary Problems Father         kidney failure     Kidney Disease Father         kidney injury related to acute illness around time of death (RANDELL likely)     Cataracts Father      Arthritis Paternal Grandmother      Diabetes Brother      Glaucoma No family hx of      Macular Degeneration No family hx of         Problem history  Patient Active Problem List   Diagnosis     Hyperlipidemia LDL goal <100     CAD (coronary artery disease)     HTN, goal below 140/90     PAF (paroxysmal atrial fibrillation) (H)     CKD (chronic kidney disease) stage 3, GFR 30-59 ml/min     Long-term (current) use of anticoagulants [Z79.01]     Moderate tricuspid insufficiency     Systemic lupus erythematosus with glomerular disease, unspecified SLE type (H)     Psychophysiological insomnia     Morbid obesity (H)     KEITH (obstructive sleep apnea)- severe (AHI 30)     Long-term use of Plaquenil     Long-term use of immunosuppressant medication     Stable angina (H)     Positive cardiac stress test     Status post coronary angiogram           Social History  Social History     Socioeconomic History     Marital status:      Spouse name: Not on file     Number of children: 1     Years of education: Not on file     Highest education level: Not on file   Occupational History     Not on file   Tobacco Use     Smoking status: Former Smoker     Smokeless tobacco: Never Used     Tobacco comment: 30 plus years ago.   Substance and Sexual Activity     Alcohol use: No     Drug use: No     Sexual activity: Yes     Partners: Male     Birth control/protection: Surgical      Comment: vasectomy   Other Topics Concern     Parent/sibling w/ CABG, MI or angioplasty before 65F 55M? Not Asked   Social History Narrative     Not on file     Social Determinants of Health     Financial Resource Strain:      Difficulty of Paying Living Expenses:    Food Insecurity:      Worried About Running Out of Food in the Last Year:      Ran Out of Food in the Last Year:    Transportation Needs:      Lack of Transportation (Medical):      Lack of Transportation (Non-Medical):    Physical Activity:      Days of Exercise per Week:      Minutes of Exercise per Session:    Stress:      Feeling of Stress :    Social Connections:      Frequency of Communication with Friends and Family:      Frequency of Social Gatherings with Friends and Family:      Attends Hindu Services:      Active Member of Clubs or Organizations:      Attends Club or Organization Meetings:      Marital Status:    Intimate Partner Violence:      Fear of Current or Ex-Partner:      Emotionally Abused:      Physically Abused:      Sexually Abused:         OBJECTIVE     Vital signs reviewed by Tomasz Padilla PA-C  BP (!) 151/83   Pulse 57   Temp 98.3  F (36.8  C)   Resp 22   SpO2 100%      Physical Exam  Vitals and nursing note reviewed.   Constitutional:       General: She is not in acute distress.     Appearance: She is well-developed. She is not ill-appearing, toxic-appearing or diaphoretic.   HENT:      Head: Normocephalic and atraumatic.      Right Ear: Hearing, tympanic membrane, ear canal and external ear normal. Tympanic membrane is not perforated, erythematous, retracted or bulging.      Left Ear: Hearing, tympanic membrane, ear canal and external ear normal. Tympanic membrane is not perforated, erythematous, retracted or bulging.      Nose: Congestion present. No mucosal edema or rhinorrhea.      Right Sinus: No maxillary sinus tenderness or frontal sinus tenderness.      Left Sinus: No maxillary sinus tenderness or frontal  sinus tenderness.      Mouth/Throat:      Pharynx: Posterior oropharyngeal erythema present. No pharyngeal swelling, oropharyngeal exudate or uvula swelling.      Tonsils: No tonsillar exudate or tonsillar abscesses. 0 on the right. 0 on the left.   Eyes:      General: Lids are normal.         Right eye: No foreign body, discharge or hordeolum.         Left eye: No foreign body, discharge or hordeolum.      Extraocular Movements:      Right eye: Normal extraocular motion.      Left eye: Normal extraocular motion.      Conjunctiva/sclera:      Right eye: Right conjunctiva is injected. No chemosis, exudate or hemorrhage.     Left eye: Left conjunctiva is injected. No chemosis, exudate or hemorrhage.     Pupils: Pupils are equal, round, and reactive to light.   Cardiovascular:      Rate and Rhythm: Normal rate and regular rhythm.      Heart sounds: Normal heart sounds. No murmur heard.   No friction rub. No gallop.    Pulmonary:      Effort: Pulmonary effort is normal. No respiratory distress.      Breath sounds: Normal breath sounds. No decreased breath sounds, wheezing, rhonchi or rales.   Chest:      Chest wall: No tenderness.   Abdominal:      General: Bowel sounds are normal. There is no distension.      Palpations: Abdomen is soft. There is no mass.      Tenderness: There is no abdominal tenderness. There is no guarding.   Musculoskeletal:      Cervical back: Normal range of motion and neck supple.   Lymphadenopathy:      Head:      Right side of head: No submental, submandibular, tonsillar, preauricular or posterior auricular adenopathy.      Left side of head: No submental, submandibular, tonsillar, preauricular or posterior auricular adenopathy.      Cervical: No cervical adenopathy.      Right cervical: No superficial or posterior cervical adenopathy.     Left cervical: No superficial or posterior cervical adenopathy.   Skin:     General: Skin is warm and dry.      Findings: No rash.   Neurological:       Mental Status: She is alert and oriented to person, place, and time.      Cranial Nerves: No cranial nerve deficit.      Deep Tendon Reflexes: Reflexes are normal and symmetric.   Psychiatric:         Behavior: Behavior normal. Behavior is cooperative.         Thought Content: Thought content normal.         Judgment: Judgment normal.            Tomasz Padilla PA-C  9/16/2021, 12:09 PM

## 2021-09-16 NOTE — PATIENT INSTRUCTIONS
Patient Education     Bacterial Conjunctivitis    You have an infection in the membranes covering the white part of the eye. This part of the eye is called the conjunctiva. The infection is called conjunctivitis. The most common symptoms of conjunctivitis include a thick, pus-like discharge from the eye, swollen eyelids, redness, eyelids sticking together upon awakening, and a gritty or scratchy feeling in the eye. Your infection was caused by bacteria. It may be treated with medicine. With treatment, the infection takes about 7 to 10 days to resolve.   Home care    Use prescribed antibiotic eye drops or ointment as directed to treat the infection.    Apply a warm compress (towel soaked in warm water) to the affected eye 3 to 4 times a day. Do this just before applying medicine to the eye.    Use a warm, wet cloth to wipe away crusting of the eyelids in the morning. This is caused by mucus drainage during the night. You may also use saline irrigating solution or artificial tears to rinse away mucus in the eye. Do not put a patch over the eye.    Wash your hands before and after touching the infected eye. This is to prevent spreading the infection to the other eye, and to other people. Don't share your towels or washcloths with others.    You may use acetaminophen or ibuprofen to control pain, unless another medicine was prescribed. Talk with your healthcare provider before using these medicines if you have chronic liver or kidney disease. Also talk with your provider if you have ever had a stomach ulcer or digestive bleeding.    Don't wear contact lenses until your eyes have healed and all symptoms are gone.    Follow-up care  Follow up with your healthcare provider, or as advised.  When to seek medical advice  Call your healthcare provider right away if any of these occur:    Worsening vision    Increasing pain in the eye    Increasing swelling or redness of the eyelid    Redness spreading around the  eye  [facility] last reviewed this educational content on 4/1/2020 2000-2021 [employer]. All rights reserved. This information is not intended as a substitute for professional medical care. Always follow your healthcare professional's instructions.

## 2021-09-28 ENCOUNTER — PRE VISIT (OUTPATIENT)
Dept: CARDIOLOGY | Facility: CLINIC | Age: 61
End: 2021-09-28

## 2021-09-28 DIAGNOSIS — Z98.890 STATUS POST CORONARY ANGIOGRAM: ICD-10-CM

## 2021-09-28 DIAGNOSIS — M32.14 SYSTEMIC LUPUS ERYTHEMATOSUS WITH GLOMERULAR DISEASE, UNSPECIFIED SLE TYPE (H): Primary | ICD-10-CM

## 2021-09-28 DIAGNOSIS — I10 HTN, GOAL BELOW 140/90: ICD-10-CM

## 2021-09-28 DIAGNOSIS — I25.10 CORONARY ARTERY DISEASE INVOLVING NATIVE HEART WITHOUT ANGINA PECTORIS, UNSPECIFIED VESSEL OR LESION TYPE: ICD-10-CM

## 2021-10-04 ENCOUNTER — OFFICE VISIT (OUTPATIENT)
Dept: OPHTHALMOLOGY | Facility: CLINIC | Age: 61
End: 2021-10-04
Payer: COMMERCIAL

## 2021-10-04 DIAGNOSIS — H18.523 ANTERIOR BASEMENT MEMBRANE DYSTROPHY OF BOTH EYES: ICD-10-CM

## 2021-10-04 DIAGNOSIS — H25.813 COMBINED FORM OF AGE-RELATED CATARACT, BOTH EYES: ICD-10-CM

## 2021-10-04 DIAGNOSIS — H02.59 FLOPPY EYELID SYNDROME OF BOTH EYES: ICD-10-CM

## 2021-10-04 DIAGNOSIS — H04.129 DRY EYE: ICD-10-CM

## 2021-10-04 DIAGNOSIS — G51.0 FACIAL NERVE PALSY: ICD-10-CM

## 2021-10-04 DIAGNOSIS — Z79.899 LONG-TERM USE OF PLAQUENIL: Primary | ICD-10-CM

## 2021-10-04 PROCEDURE — 99214 OFFICE O/P EST MOD 30 MIN: CPT | Performed by: OPHTHALMOLOGY

## 2021-10-04 PROCEDURE — 99207 PR NO BILLABLE SERVICE THIS VISIT: CPT

## 2021-10-04 PROCEDURE — 92083 EXTENDED VISUAL FIELD XM: CPT | Performed by: OPHTHALMOLOGY

## 2021-10-04 PROCEDURE — 92134 CPTRZ OPH DX IMG PST SGM RTA: CPT | Performed by: OPHTHALMOLOGY

## 2021-10-04 ASSESSMENT — REFRACTION_WEARINGRX
SPECS_TYPE: PAL
OS_AXIS: 126
OD_CYLINDER: +1.75
OD_ADD: +2.50
OD_AXIS: 079
OS_CYLINDER: +1.25
OD_SPHERE: +1.50
OS_ADD: +2.50
OS_SPHERE: +1.75

## 2021-10-04 ASSESSMENT — VISUAL ACUITY
OD_CC: 20/25
CORRECTION_TYPE: GLASSES
OS_CC: 20/30
OD_CC+: -1
OS_CC+: -1
METHOD: SNELLEN - LINEAR

## 2021-10-04 ASSESSMENT — REFRACTION_MANIFEST
OD_SPHERE: +1.50
OS_ADD: +2.75
OD_ADD: +2.75
OS_SPHERE: +2.25
OD_AXIS: 080
OD_CYLINDER: +1.75
OS_CYLINDER: +1.25
OS_AXIS: 140

## 2021-10-04 ASSESSMENT — TONOMETRY
IOP_METHOD: ICARE
OD_IOP_MMHG: 12
OS_IOP_MMHG: 12

## 2021-10-04 ASSESSMENT — CUP TO DISC RATIO
OD_RATIO: 0.3
OS_RATIO: 0.3

## 2021-10-04 NOTE — PROGRESS NOTES
Impression  1. ASHD with  stenting         3-vessel coronary artery disease s/p CAB       Patent LIMA-LAD, SVG-OM, SVG-RPDA       Significant stenosis of pLCx/OM1 s/p SHAHANA x2 to distal LM/pLCx/OM1    2. History of CAB  3. Elevated PA pressure with elevated RA, PCP and PVR 1.8  4. Hypertension  5. Hyperlipidemia  6. KEITH  7. PFO  8. Atrial fibrillation with warfarin anticoagulation/chronic  9. SLE  10. History of lupus nephritis    Plan:  1. Take an extra 20mgs of furosemide on Wed and Sunday  2. Check kidney 1 and 3 weeks after starting  3. See Dr Clark and check kidney function in November  4. [employer] enrolled!  5. Water aerobics  6. See January virtual or in person        I conducted a virtual visit with this patient. We discussed the patient's current condition, reviewed interim history since last visit, current functional status, diet, and possible cardiac symptoms including: chest pain, tightness, heaviness, pressure, PND, orthopnea, ankle edema, increasing abdominal girth, weight gain, palpitation, pre-syncope or syncope.  She is doing well with decreased shortness of breath, increased activity and no symptoms suggestive of progressive CAD.  She is having trouble with Brillinta.  Since our last visit her creatinine has risen without obvious explanation from intercurrent conditions such as SLE and remote history of lupus nephritis.  60 year old female with premature CAD associated with SLE who has undergone previous CABG as well as subsequent stenting (immediatley following false negative non exercise nuclear stress test see via video visit to assess current status and potential to discontinue anti-platelet agent which cause cause incrase in creatinine.     Her most recent stress test demonstrated an apical hypoperfusion area.  The patient has SLE/CABG.  She has exertional shortness of breath without chest pain, palpitation, pre-syncope or syncope.  She occasionally has pleuropericardial pan    She  has no interim hi tory of chest pain, tightness, heaviness pressure at rest, with emotion or exertion or nocturnally.  She does have exertional shortness of breath which anti-dates her recurrent manifestation of CAD which led to stenting last year.               Weight:  Wt Readings from Last 24 Encounters:   08/27/21 117.9 kg (260 lb)   08/20/21 119.3 kg (263 lb)   07/13/21 124.7 kg (275 lb)   07/20/20 113.9 kg (251 lb)   03/06/20 113.9 kg (251 lb 3.2 oz)   02/05/20 111.6 kg (246 lb)   01/14/20 113.9 kg (251 lb)   12/23/19 116.3 kg (256 lb 4.8 oz)   11/08/19 116.6 kg (257 lb)   11/08/19 116.6 kg (257 lb)   11/05/19 116.6 kg (257 lb)   10/16/19 119.4 kg (263 lb 4.8 oz)   10/11/19 120.7 kg (266 lb 1.6 oz)   09/04/19 122 kg (269 lb)   08/16/19 122 kg (269 lb)   08/13/19 122.5 kg (270 lb)   08/05/19 121.4 kg (267 lb 9.6 oz)   11/20/18 122.9 kg (271 lb)   10/29/18 122 kg (269 lb)   09/17/18 119.3 kg (263 lb)   09/04/18 118.3 kg (260 lb 14.4 oz)   06/25/18 118.4 kg (261 lb)   06/25/18 117.5 kg (259 lb)   05/31/18 116.9 kg (257 lb 12.8 oz)     Medication:      Laboratories:          Results for IDA PADRON (MRN [id number]) as of 11/5/2019 10:53   Ref. Range 8/5/2019 11:03 11/5/2019 10:16   CRP Inflammation Latest Ref Range: 0.0 - 8.0 mg/L 8.8 (H) 17.0 (H)     Catheterization 9/2021      Right sided filling pressures are moderately elevated.    Moderately elevated pulmonary artery hypertension.    Left sided filling pressures are moderately elevated.    Normal cardiac output level.    Unchanged coronary artery disease with patent LIMA-LAD, SVG-OM1, SVG-RPDA              Plan              Coronary Findings      Diagnostic  Dominance: Right    Left Anterior Descending   Ost LAD to Mid LAD lesion is 100% stenosed. The lesion is chronic total occlusion.   Second Diagonal Branch   The vessel is small. There is mild diffuse disease throughout the vessel.   Left Circumflex   Previously placed Ost Cx to Prox Cx stent (unknown  type) is widely patent.   Prox Cx to Mid Cx lesion is 20% stenosed.   First Obtuse Marginal Branch   The vessel is moderate in size.   1st Mrg lesion is 30% stenosed with 100% stenosed side branch in Lat 1st Mrg. The lesion was previously treated using a stent of unknown type.   Lateral First Obtuse Marginal Branch   The vessel is small.   Second Obtuse Marginal Branch   The vessel is moderate in size.   Third Obtuse Marginal Branch   The vessel is small.   Right Coronary Artery   Prox RCA lesion is 40% stenosed. The lesion is eccentric.   Mid RCA to Dist RCA lesion is 10% stenosed.   Dist RCA lesion is 100% stenosed. The lesion is chronic total occlusion.   Right Posterior Descending Artery   RPDA lesion is 20% stenosed.   LIMA Graft To Dist LAD   The graft is large. The graft is angiographically normal.   Graft To Lat 1st Mrg   The graft is large. The graft is angiographically normal.   Graft To RPDA   The graft is large. The graft is angiographically normal.       Intervention      No interventions have been documented.    Hemodynamics    Hemodynamics RA: --/--/15  RV: 62/18/--  PA: 62/28/38  PCWP: --/--/28     PA Sat: 59%  Ao Sat: 99%    Kannan CO/CI: 5.41/2.49    PVR: 1.8  SVR: 1,214 dynes-sec/cm5 Right sided filling pressures are moderately elevated. Left sided filling pressures are moderately elevated. Moderately elevated pulmonary artery hypertension. Normal cardiac output level.     Pressures Phase: Baseline     Time Systolic (mmHg) Diastolic (mmHg) Mean (mmHg) A Wave (mmHg) V Wave (mmHg) EDP (mmHg) Max dp/dt (mmHg/sec) HR (bpm) Content (mL/dL) SAT (%)   RA Pressures  2:48 PM   15    18    17      54        RV Pressures  2:23 PM       291           2:48 PM 62        18     54        PA Pressures  2:51 PM 62    28    38        54        PCW Pressures  2:49 PM   28    29    30      54          Blood Flow Results Phase: Baseline     Time Results  Indexed Values (L/min/m2)   QP  2:23 PM 5.41 L/min    2.49      QS   2:23 PM 5.41 L/min    2.49        Blood Oximetry Phase: Baseline     Time Hb  SAT(%)  PO2  Content (mL/dL) PA Sat (%)   PA  2:23 PM  59 %      59      Art  2:23 PM  99 %     14.81         Cardiac Output Phase: Baseline     Time TDCO (L/min) TDCI (L/min/m2) Kannan C.O. (L/min) Kannan C.I. (L/min/m2) Kannan HR (bpm)   Cardiac Output Results  2:23 PM   5.41    2.49         Resistance Results Phase: Baseline     Time PVR  SVR  TPR  TVR  PVR/SVR  TPR/TVR    Resistance Results (Metric)  2:23 .86 dsc-5     561.88 dsc-5         Resistance Results (Wood)  2:23 PM 1.85 BERMUDEZ     7.03 BERMUDEZ           Stoke Volume Results Phase: Baseline     Time RVSW (gm*m) LVSW (gm*m) RVSW-I (gm*m/m2) LVSW-I (gm*m/m2)   Stroke Work Results  2:23 PM 31.33     14.42             Her new catherization:  Results (pressures in mmHg)  RA: 1/4/3  RV 32/3  PA 30/13/19;  PA Sat 65%  PCWP -/-/5;  PCW Sat --%  Kannan CO/CI 3.3/1.6 L/min; TD CO 4.1/2.0 L/min  PVR 4.2 bermudez; TPR 5.7 bermudez  Hb 12.3 g/dL  NIBP 167/81/116  SVR 2200 dynes*sec/cm^5     [place]:  SVC 62.7%  IVC 65.0%  Low RA 73.0%  Mid RA 68.0%  High RA 64.6%  RV 63%  PA 65%  PCW ---          Conclusions:  1. Low right and left sided filling pressures.  2. Normal pulmonary artery pressures.  3. Low normal cardiac output.      REVIEW of SYMPTOMS    Constitutional: without fever, chills, night sweats.  Weight is down  HEENT: without dry eyes, dry mouth, sinusitis, corryza, visual changes  Endocrine: without polyuria, polydypsia, polyphagia, heat or cold intolerance, changing mental status  Cardiology: without chest pain, tightness, heaviness, pressure, paroxysmal dyspnea, orthopnea, palpitation, pre-syncope or syncope or device discharge (if present)  Pulmonary: without asthma, wheezing, cough, hemoptysis but longstanding exertional shortness of breath  GI: without nausea, emesis, jaundice, pain, hematemesis, melena  : without frequency, urgency, hematuria, stones, pain, abnormal bleeding, frequency,  urgency  Neurologic: without TIA, CVA, trauma, seizure  Dermatologic: without lesions, abrasion rash,   Orthopedic/Rheum: without significant joint pain, impairment, limb, polyserositis, ulceration, Raynauds  Heme: without mass, bruising, frequent infection, anemia  Psychiatric: without substance abuse, hallucination, medication, depression      Exercise tolerance:    Nuclear stress test: (this was false negative study as patient has an acute coronary event shortly afterwards)  PROTOCOL:    Rest and stress myocardial perfusion imaging was performed using  Tc-99m tetrafosmin intravenously. Pharmacological stress was performed  with 0.4 mg of regadenoson intravenously. The EKG showed normal sinus  rhythm at rest. No significant abnormalities were noted with infusion  of regadenoson.     FINDINGS:  1.  Overall quality of the study: Diagnostic.   2.  Left ventricular cavity is Normal on the rest and stress studies.  3.  SPECT images demonstrate uniform radiotracer uptake of the  myocardium on both stress and rest images.  4.  Left ventricular ejection fraction is 73%. Left ventricular  end-diastolic volume is 134 mL. End-systolic volume is 34 mL      Echocardiogram  Name: IDA PADRON  MRN: [id number]  : 1960  Study Date: 10/17/2019 10:24 AM  Age: 59 yrs  Gender: Female  Patient Location: [facility] [place]  Reason For Study: Chest Pain  Ordering Physician: MARIANELA ESCALONA  Performed By: Yasmany Campbell RDCS     BSA: 2.2 m2  Height: 63 in  Weight: 263 lb  HR: 58  BP: 144/81 mmHg  _____________________________________________________________________________  __        Procedure  Complete Portable Echo Adult. Contrast Optison. Optison (NDC #3754-7472-70)  given intravenously. Patient was given 6 ml mixture of 3 ml Optison and 6 ml  saline. 3 ml wasted.  _____________________________________________________________________________  __        Interpretation Summary  Global and regional left ventricular function is normal with  an EF of 60-65%.  Right ventricular function, chamber size, wall motion, and thickness are  normal.  No pericardial effusion is present.  IVC diameter and respiratory changes fall into an intermediate range  suggesting an RA pressure of 8 mmHg.  Mild pulmonary hypertension is present. Right ventricular systolic pressure is  43mmHg above the right atrial pressure.  Compared to prior, measured RVSP is lower, no other change.  _____________________________________________________________________________  __        Left Ventricle  Left ventricular size is normal. Global and regional left ventricular function  is normal with an EF of 60-65%. Mild concentric wall thickening consistent  with left ventricular hypertrophy is present. Left ventricular diastolic  function is indeterminate.     Right Ventricle  Right ventricular function, chamber size, wall motion, and thickness are  normal.     Atria  Moderate biatrial enlargement is present.     Mitral Valve  The mitral valve is normal.        Aortic Valve  Aortic valve is normal in structure and function.     Tricuspid Valve  Mild tricuspid insufficiency is present. Mild pulmonary hypertension is  present. Right ventricular systolic pressure is 43mmHg above the right atrial  pressure.     Pulmonic Valve  The pulmonic valve is normal. Trace pulmonic insufficiency is present.     Vessels  The aorta root is normal. The thoracic aorta is normal. Dilation of the  inferior vena cava is present with normal respiratory variation in diameter.  IVC diameter and respiratory changes fall into an intermediate range  suggesting an RA pressure of 8 mmHg.     Pericardium  No pericardial effusion is present.     _____________________________________________________________________________  __  MMode/2D Measurements & Calculations  IVSd: 1.2 cm  LVIDd: 4.5 cm  LVIDs: 2.9 cm  LVPWd: 1.2 cm  FS: 36.6 %     LV mass(C)d: 197.9 grams  LV mass(C)dI: 91.1 grams/m2  asc Aorta Diam: 3.5 cm  LVOT diam:  2.1 cm  LVOT area: 3.5 cm2  LA Volume Index (BP): 43.9 ml/m2  RWT: 0.52  TAPSE: 2.3 cm        Doppler Measurements & Calculations  MV E max maria isabel: 84.8 cm/sec  MV A max maria isabel: 35.8 cm/sec  MV E/A: 2.4  MV dec slope: 473.3 cm/sec2  MV dec time: 0.18 sec     TV max P.0 mmHg  PA acc time: 0.09 sec  TR max maria isabel: 327.8 cm/sec  TR max P.0 mmHg  E/E' avg: 10.5  Lateral E/e': 9.0  Medial E/e': 12.0     ____________________________        Echocardiographic findings of TR, modest PA, normal systolic function.    Name: IDA PADRON  MRN: [id number]  : 1960  Study Date: 2018 11:48 AM  Age: 57 yrs  Gender: Female  Patient [facility]: [facility]  Reason For Study: Hx PFO, Elevated PA pressures  Ordering Physician: SANNA SALAZAR  Referring Physician: SANNA SALAZAR  Performed By: Tray Harvey RDCS     BSA: 2.2 m2  Height: 63 in  Weight: 257 lb  HR: 62  BP: 167/94 mmHg  _____________________________________________________________________________  __        Procedure  Bubble Echocardiogram with two-dimensional, color and spectral Doppler  performed. IV start location R Hand . Bateriostatic Saline used for Bubble  Study.  _____________________________________________________________________________  __        Interpretation Summary     Right ventricular function, chamber size, wall motion, and thickness are  normal.  PFO again documented with color doppler and Bubble study.  Mild to moderate tricuspid insufficiency is present.  Right ventricular systolic pressure is 61mmHg above the right atrial pressure.  No pericardial effusion is present.  _____________________________________________________________________________  __        Left Ventricle  Global and regional left ventricular function is normal with an EF of 55-60%.  Left ventricular wall thickness is normal. Left ventricular size is normal.  Left ventricular diastolic function is indeterminate. No regional wall motion  abnormalities are  seen.     Right Ventricle  Right ventricular function, chamber size, wall motion, and thickness are  normal.     Atria  Moderate biatrial enlargement is present. PFO again documented with color  doppler and Bubble study.     Mitral Valve  Mild to moderate mitral insufficiency is present.        Aortic Valve  Aortic valve is normal in structure and function.     Tricuspid Valve  Mild to moderate tricuspid insufficiency is present. Right ventricular  systolic pressure is 61mmHg above the right atrial pressure.     Pulmonic Valve  The pulmonic valve is normal. Trace to mild pulmonic insufficiency is present.     Vessels  The aorta root is normal. The pulmonary artery is normal. The inferior vena  cava is normal.     Pericardium  No pericardial effusion is present.     _____________________________________________________________________________  __  MMode/2D Measurements & Calculations  IVSd: 0.89 cm  LVIDd: 5.3 cm  LVIDs: 3.1 cm  LVPWd: 0.75 cm  FS: 41.8 %  LV mass(C)d: 154.8 grams  LV mass(C)dI: 72.0 grams/m2     Ao root diam: 3.0 cm  LA dimension: 5.3 cm  asc Aorta Diam: 3.6 cm  LA/Ao: 1.8  LVOT diam: 2.2 cm  LVOT area: 3.7 cm2  LA Volume (BP): 96.0 ml  LA Volume Index (BP): 44.7 ml/m2  RWT: 0.28        Doppler Measurements & Calculations  MV E max luis: 92.8 cm/sec  MV A max luis: 58.2 cm/sec  MV E/A: 1.6  MV dec time: 0.15 sec     Ao V2 max: 147.4 cm/sec  Ao max P.0 mmHg  TARUN(V,D): 2.6 cm2  LV V1 max P.3 mmHg  LV V1 max: 104.1 cm/sec  LV V1 VTI: 24.8 cm  MR ERO: 0.27 cm2  CO(LVOT): 5.6 l/min  CI(LVOT): 2.6 l/min/m2  SV(LVOT): 92.9 ml  SI(LVOT): 43.2 ml/m2  PA V2 max: 110.6 cm/sec  PA max P.9 mmHg  PA acc time: 0.08 sec  PI end-d luis: 154.4 cm/sec  PI max luis: 255.8 cm/sec  PI max P.2 mmHg  TR max lusi: 385.4 cm/sec  TR max P.4 mmHg  Pulm Sys Luis: 63.2 cm/sec  Pulm Peng Luis: 97.7 cm/sec  Pulm S/D: 0.65  E/E' av.1  Lateral E/e': 8.9  Medial E/e': 15.4        : 1960  Study Date:  12/28/2016 01:31 PM  Age: 56 yrs  Gender: Female  [facility]  Reason For Study:     History: tricuspid regurgitation  Ordering Physician: JOSE ARELLANO  Referring Physician: JOSE ARELLANO  Performed By: Hugh Sharpe MD     BSA: 2.2 m2  Height: 63 in  Weight: 269 lb  ______________________________________________________________________________     Interpretation Summary  Left ventricular function, chamber size, wall motion, and wall thickness are  normal.The EF is 55-60%.  Global right ventricular function is normal. Mild right ventricular dilation  is present.  Mild to moderate TR with mild prolapse of the posterior leaflet of the  tricupsid valve.  A small patent foramen ovale is present. There is no ASD (secundum, sinus  venosus or unroofed coronary sinus). All 4 pulmonary veins drain into the  left atrium.  Mild pulmonary hypertension is present with RVSP 45mmHg above RAP.     ______________________________________________________________________________        Procedure  Transesophageal Echocardiogram with color and spectral Doppler performed. The  procedure was performed in the Echo Lab. Informed consent for Transesophegeal  echo obtained. EVE Probe #12 was used during the procedure. Patient was  sedated using Fentanyl 100 mcg. Patient was sedated using Versed 4 mg. The  Transducer was inserted without difficulty . The patient tolerated the  procedure well. Complications None. ECG shows normal sinus rhythm. Good  quality two-dimensional was performed and interpreted.     Left Ventricle  Left ventricular function, chamber size, wall motion, and wall thickness are  normal.The EF is 55-60%.     Right Ventricle  Global right ventricular function is normal. Mild right ventricular dilation  is present.     Atria  Both atria appear normal. The left atrial appendage is normal. It is free of  spontaneous echo contrast and thrombus. A small patent foramen ovale is  present. The patent foramen  ovale was demonstrated by color Doppler and  agitated saline bubble study . The patent foramen ovale was demonstrated with  Valsalva's maneuver. No evidence of ASD.     Mitral Valve  The mitral valve is normal. Trace mitral insufficiency is present.     Aortic Valve  Aortic valve is normal in structure and function. The aortic valve is  tricuspid.  Tricuspid Valve  Mild to moderate tricuspid insufficiency is present. Right ventricular  systolic pressure is 45mmHg above the right atrial pressure. Mild pulmonary  hypertension is present. Mild prolapse of the posterior leaflet of the  tricupsid valve is noted.     Pulmonic Valve  The pulmonic valve is normal.     Vessels  The pulmonary artery is normal. The aorta root is normal. The thoracic aorta  is normal. All four pulmonary veins visualized with dampened velocity  waveforms.     Pericardium  No pericardial effusion is present.     Compared to Previous Study  This study was compared with the study from 2016. A small PFO has been  identified. .     ______________________________________________________________________________     Doppler Measurements & Calculations  TR max maria isabel: 336.7 cm/sec  TR max P.5 mmHg  ______________________________________________________________________________         Name: IDA PADRON  MRN: [id number]  : 1960  Study Date: 2016 01:31 PM  Age: 56 yrs  Gender: Female  Patient Location: [facility]  Reason For Study:     History: tricuspid regurgitation  Ordering Physician: JOSE ARELLANO  Referring Physician: JOSE ARELLANO  Performed By: Hugh Sharpe MD     BSA: 2.2 m2  Height: 63 in  Weight: 269 lb  ______________________________________________________________________________     Interpretation Summary  Left ventricular function, chamber size, wall motion, and wall thickness are  normal.The EF is 55-60%.  Global right ventricular function is normal. Mild right ventricular dilation  is present.  Mild to  moderate TR with mild prolapse of the posterior leaflet of the  tricupsid valve.  A small patent foramen ovale is present. There is no ASD (secundum, sinus  venosus or unroofed coronary sinus). All 4 pulmonary veins drain into the  left atrium.  Mild pulmonary hypertension is present with RVSP 45mmHg above RAP.     ______________________________________________________________________________        Procedure  Transesophageal Echocardiogram with color and spectral Doppler performed. The  procedure was performed in the Echo Lab. Informed consent for Transesophegeal  echo obtained. EVE Probe #12 was used during the procedure. Patient was  sedated using Fentanyl 100 mcg. Patient was sedated using Versed 4 mg. The  Transducer was inserted without difficulty . The patient tolerated the  procedure well. Complications None. ECG shows normal sinus rhythm. Good  quality two-dimensional was performed and interpreted.     Left Ventricle  Left ventricular function, chamber size, wall motion, and wall thickness are  normal.The EF is 55-60%.     Right Ventricle  Global right ventricular function is normal. Mild right ventricular dilation  is present.     Atria  Both atria appear normal. The left atrial appendage is normal. It is free of  spontaneous echo contrast and thrombus. A small patent foramen ovale is  present. The patent foramen ovale was demonstrated by color Doppler and  agitated saline bubble study . The patent foramen ovale was demonstrated with  Valsalva's maneuver. No evidence of ASD.     Mitral Valve  The mitral valve is normal. Trace mitral insufficiency is present.     Aortic Valve  Aortic valve is normal in structure and function. The aortic valve is  tricuspid.  Tricuspid Valve  Mild to moderate tricuspid insufficiency is present. Right ventricular  systolic pressure is 45mmHg above the right atrial pressure. Mild pulmonary  hypertension is present. Mild prolapse of the posterior leaflet of the  tricupsid valve  is noted.     Pulmonic Valve  The pulmonic valve is normal.     Vessels  The pulmonary artery is normal. The aorta root is normal. The thoracic aorta  is normal. All four pulmonary veins visualized with dampened velocity  waveforms.     Pericardium  No pericardial effusion is present.     Compared to Previous Study  This study was compared with the study from 2016. A small PFO has been  identified. .     ______________________________________________________________________________     Doppler Measurements & Calculations  TR max maria isabel: 336.7 cm/sec  TR max P.5 mmHg  ______________________________________________________________________________       Procedures:  ECHO: 09:   Interpretation Summary   The Ejection Fraction is estimated at 55-60%. No regional wall motion   abnormalities are seen. The right ventricle is normal size. Global right   ventricular function is normal. Right ventricular systolic pressure is 27mmHg   above the right atrial pressure. No pericardial effusion is present. The   inferior vena cava is normal.   Stress test: 08-10-09:   1. Abnormal study with a high degree of certainty.   2. There is a predominantly fixed medium sized moderately decreased   intensity myocardial perfusion defect with minimal periinfarct   reversibility involving the apical anterior, mid anterior, and apical   region of the heart. There is corresponding hypokinesis. No other   adenosine induced fixed or reversible myocardial perfusion defect.   3. Left ventricular size is enlarged at rest. Transient ischemic   dilation of the left ventricle did not occur.   4. The left ventricular ejection fraction is 56 %.   5. Summed Stress Score is calculated at 18, which is associated   with increased risk of future coronary events.   CCL: 09: ARELLANO:   Mrs. Live is a 49 year old female with known coronary artery disease s/p MI in  found to have minimal disease on catheterization. Her cardiovascular risk  factors include HTN and hyperlipidemia. She presented with a 2 week history of dyspnea with exertion and lower extremity edema. She underwent Adenosine MPI which revealed a fixed medium-sized defect with minimal periinfarct reversability and anterior hypokinesis. Her LVEF was preserved at 56%. CT angio of the coronaries revealed moderate stenosis in the pLAD and dRCA and mild to moderate stenosis of the pLCx.   Access: 6F long RFA, 6F RFV   Coronary Anatomy:   LMCA: normal   LAD: There is a long, discrete, ectatic 70-80% lesion in the ostial and proximal LAD. There is one D1 branch without any significant disease.   LCx: no significant disease. There is a large OM1 branch that has a 50-60% stenosis prior to the branch bifurcation.   RCA: The proximal and mid RCA have luminal irregularities without significance. The distal RCA has a complex bifurcation lesion prior to the take-off of the rPDA. There is disease in the ostial PL1 as well.   Conclusions:   1. 3-vessel coronary artery disease without left main lesion   Plan   Discussed the options in depth with the patient. Given the proximity of the LAD lesion to the LMCA and the complexity of the dRCA lesion, we recommended CABG for the patient. She will be admitted to the hospital.Discussed with Darleen Johnson MD.   Cardiac Surgery: 2009   Triple vessel coronary artery bypass grafting. Left internal mammary artery was placed to the left anterior descending coronary artery and separate reverse saphenous vein grafts were placed to the obtuse marginal and right posterior descending coronary arteries.     Name: IDA PADRON  MRN: [id number]  : 1960  Study Date: 2018 11:48 AM  Age: 57 yrs  Gender: Female  Patient Location: [facility]  Reason For Study: Hx PFO, Elevated PA pressures  Ordering Physician: SANNA SALAZAR  Referring Physician: SANNA SALAZAR  Performed By: Tray Harvey RDCS     BSA: 2.2 m2  Height: 63 in  Weight: 257 lb  HR: 62  BP:  167/94 mmHg  _____________________________________________________________________________  __        Procedure  Bubble Echocardiogram with two-dimensional, color and spectral Doppler  performed. IV start location R Hand . Bateriostatic Saline used for Bubble  Study.  _____________________________________________________________________________  __        Interpretation Summary     Right ventricular function, chamber size, wall motion, and thickness are  normal.  PFO again documented with color doppler and Bubble study.  Mild to moderate tricuspid insufficiency is present.  Right ventricular systolic pressure is 61mmHg above the right atrial pressure.  No pericardial effusion is present.  _____________________________________________________________________________  __        Left Ventricle  Global and regional left ventricular function is normal with an EF of 55-60%.  Left ventricular wall thickness is normal. Left ventricular size is normal.  Left ventricular diastolic function is indeterminate. No regional wall motion  abnormalities are seen.     Right Ventricle  Right ventricular function, chamber size, wall motion, and thickness are  normal.     Atria  Moderate biatrial enlargement is present. PFO again documented with color  doppler and Bubble study.     Mitral Valve  Mild to moderate mitral insufficiency is present.        Aortic Valve  Aortic valve is normal in structure and function.     Tricuspid Valve  Mild to moderate tricuspid insufficiency is present. Right ventricular  systolic pressure is 61mmHg above the right atrial pressure.     Pulmonic Valve  The pulmonic valve is normal. Trace to mild pulmonic insufficiency is present.     Vessels  The aorta root is normal. The pulmonary artery is normal. The inferior vena  cava is normal.     Pericardium  No pericardial effusion is present.     _____________________________________________________________________________  __  MMode/2D Measurements &  Calculations  IVSd: 0.89 cm  LVIDd: 5.3 cm  LVIDs: 3.1 cm  LVPWd: 0.75 cm  FS: 41.8 %  LV mass(C)d: 154.8 grams  LV mass(C)dI: 72.0 grams/m2     Ao root diam: 3.0 cm  LA dimension: 5.3 cm  asc Aorta Diam: 3.6 cm  LA/Ao: 1.8  LVOT diam: 2.2 cm  LVOT area: 3.7 cm2  LA Volume (BP): 96.0 ml  LA Volume Index (BP): 44.7 ml/m2  RWT: 0.28        Doppler Measurements & Calculations  MV E max luis: 92.8 cm/sec  MV A max luis: 58.2 cm/sec  MV E/A: 1.6  MV dec time: 0.15 sec     Ao V2 max: 147.4 cm/sec  Ao max P.0 mmHg  TARUN(V,D): 2.6 cm2  LV V1 max P.3 mmHg  LV V1 max: 104.1 cm/sec  LV V1 VTI: 24.8 cm  MR ERO: 0.27 cm2  CO(LVOT): 5.6 l/min  CI(LVOT): 2.6 l/min/m2  SV(LVOT): 92.9 ml  SI(LVOT): 43.2 ml/m2  PA V2 max: 110.6 cm/sec  PA max P.9 mmHg  PA acc time: 0.08 sec  PI end-d luis: 154.4 cm/sec  PI max luis: 255.8 cm/sec  PI max P.2 mmHg  TR max luis: 385.4 cm/sec  TR max P.4 mmHg  Pulm Sys Luis: 63.2 cm/sec  Pulm Peng Luis: 97.7 cm/sec  Pulm S/D: 0.65  E/E' av.1  Lateral E/e': 8.9  Medial E/e': 15.4    Results (pressures in mmHg)  RA: 1/4/3  RV 32/3  PA ;  PA Sat 65%  PCWP -/-/5;  PCW Sat --%  [place]an CO/CI 3.3/1.6 L/min; TD CO 4.1/2.0 L/min  PVR 4.2 bermudez; TPR 5.7 bermudez  Hb 12.3 g/dL  NIBP 167/81/116  SVR 2200 dynes*sec/cm^5     [place]:  SVC 62.7%  IVC 65.0%  Low RA 73.0%  Mid RA 68.0%  High RA 64.6%  RV 63%  PA 65%  PCW ---     Complications: None   Blood loss: Minimal blood loss     Conclusions:  1. Low right and left sided filling pressures.  2. Normal pulmonary artery pressures.  3. Low normal cardiac output.            The nuclear stress test is equivocal.     There is a small area of mild ischemia in the distal apical segment(s) of the left ventricle. This appears to be in the left anterior descending distribution.     Left ventricular function is normal.     A prior study was conducted on 9/3/2019.  This study has changes noted when compared with the prior study. New questionable apical ischemia,  small?            CC:  Eliz Adame

## 2021-10-04 NOTE — PROGRESS NOTES
HPI:  Luz Marina Live is a 61 year old female presenting for Plaquenil eye exam.    Last visit with Dr. Davenport 7/2020 without Plaquenil toxicity.  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    Follows with Dr. Chau for lupus.   Lupus has been doing well. Tolerating Plaquenil well.  Feels like glasses need to be updated. Gets a lot of eye watering, eyes get very dry wearing CPAP at night.  Seen in urgent care and was told she had bacterial conjunctivitis, given course of Polytrim.    Feels air come through the left lower lid punctum when she plugs her nose and blows. Also sometimes when she sneezes will get discharge through the left punctum. Not getting infections.    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    FH:  No known family history of blindness, glaucoma, macular degeneration    ASSESSMENT and PLAN:  1. Long-term use of Plaquenil  - no clinical maculopathy  - on Plaquenil for SLE; follows with Dr. Chau  - HVF 10-2 10/4/21  Right Eye: mod reliability (4/15 FL), essentially full field  Left Eye: reliable, full field  - OCT Macula 10/4/21  Right Eye: posterior hyaloid partially detached, single isolated very small possible IR cyst just inferior to fovea seen only on single cut without distortion of fovea, no other IRF, no SRF, no evidence of Plaquenil toxicity  Left Eye: posterior hyaloid partially detached, no IRF/SRF, preserved foveal contour, no Plaquenil toxicity    - discussed with patient single isolated possible IR cyst left eye; do not suspect this represents Plaquenil toxicity; may be imaging artifact vs effect of posterior hyaloid; plan to monitor and reassess with repeat OCT at next visit; she understands to call/come in for any changes in vision  - OK to  continue Plaquenil from eye standpoint  - discuss with Dr. Chau    2. Combined form of age-related cataract, both eyes  - MRx to 20/20; updated MRx dispensed    3. Anterior basement membrane dystrophy of both eyes  - monitor    4. 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 Facial nerve palsy  - h/o right facial nerve palsy with lupus flare, improved but never fully resolved  - residual mild right facial palsy with brow ptosis and using left brow more  - discussed may contribute to increased dryness left side and she understands      Follow up in 1 year or sooner PRN        -----------------------------------------------------------------------------------    Attestation:  Complete documentation of historical and exam elements from today's encounter can be found in the full encounter summary report (not reduplicated in this progress note). I personally obtained the chief complaint(s) and history of present illness.  I confirmed and edited as necessary the review of systems, past medical/surgical history, family history, social history, and examination findings as documented by others; and I examined the patient myself. I personally reviewed the relevant tests, images, and reports as documented above.     I formulated and edited as necessary the assessment and plan and discussed the findings and management plan with the patient and family.      Lima Joe MD

## 2021-10-04 NOTE — NURSING NOTE
Chief Complaints and History of Present Illnesses   Patient presents with     Annual Eye Exam       Chief Complaint(s) and History of Present Illness(es)     Annual Eye Exam     Laterality: both eyes              Comments     Patient here for yearly plaquenil exam, taking 400 mg of plaquenil daily. Pt states both of her eyes water a lot. Recently had eye infection in both eyes, done with drops she had been put on for that (unsure of the name). Uses ATs, especially at night before bed as she has a CPAP and it tends to dry out her eyes.                 Antoinette Wall, COA

## 2021-10-06 ENCOUNTER — OFFICE VISIT (OUTPATIENT)
Dept: CARDIOLOGY | Facility: CLINIC | Age: 61
End: 2021-10-06
Attending: INTERNAL MEDICINE
Payer: COMMERCIAL

## 2021-10-06 ENCOUNTER — LAB (OUTPATIENT)
Dept: LAB | Facility: CLINIC | Age: 61
End: 2021-10-06
Payer: COMMERCIAL

## 2021-10-06 ENCOUNTER — LAB (OUTPATIENT)
Dept: LAB | Facility: CLINIC | Age: 61
End: 2021-10-06
Attending: INTERNAL MEDICINE

## 2021-10-06 DIAGNOSIS — I10 HTN, GOAL BELOW 140/90: ICD-10-CM

## 2021-10-06 DIAGNOSIS — M32.14 SYSTEMIC LUPUS ERYTHEMATOSUS WITH GLOMERULAR DISEASE, UNSPECIFIED SLE TYPE (H): ICD-10-CM

## 2021-10-06 DIAGNOSIS — I25.10 CORONARY ARTERY DISEASE INVOLVING NATIVE HEART WITHOUT ANGINA PECTORIS, UNSPECIFIED VESSEL OR LESION TYPE: Primary | ICD-10-CM

## 2021-10-06 DIAGNOSIS — I25.10 CORONARY ARTERY DISEASE INVOLVING NATIVE HEART WITHOUT ANGINA PECTORIS, UNSPECIFIED VESSEL OR LESION TYPE: ICD-10-CM

## 2021-10-06 DIAGNOSIS — N18.31 STAGE 3A CHRONIC KIDNEY DISEASE (H): ICD-10-CM

## 2021-10-06 DIAGNOSIS — Z98.890 STATUS POST CORONARY ANGIOGRAM: ICD-10-CM

## 2021-10-06 LAB
ALBUMIN SERPL-MCNC: 3.3 G/DL (ref 3.4–5)
ALBUMIN UR-MCNC: NEGATIVE MG/DL
ALP SERPL-CCNC: 128 U/L (ref 40–150)
ALT SERPL W P-5'-P-CCNC: 32 U/L (ref 0–50)
ANION GAP SERPL CALCULATED.3IONS-SCNC: 5 MMOL/L (ref 3–14)
APPEARANCE UR: CLEAR
AST SERPL W P-5'-P-CCNC: 33 U/L (ref 0–45)
BILIRUB SERPL-MCNC: 0.9 MG/DL (ref 0.2–1.3)
BILIRUB UR QL STRIP: NEGATIVE
BUN SERPL-MCNC: 46 MG/DL (ref 7–30)
CALCIUM SERPL-MCNC: 9.4 MG/DL (ref 8.5–10.1)
CHLORIDE BLD-SCNC: 109 MMOL/L (ref 94–109)
CO2 SERPL-SCNC: 25 MMOL/L (ref 20–32)
COLOR UR AUTO: YELLOW
CREAT SERPL-MCNC: 1.68 MG/DL (ref 0.52–1.04)
CREAT UR-MCNC: 69 MG/DL
ERYTHROCYTE [DISTWIDTH] IN BLOOD BY AUTOMATED COUNT: 15 % (ref 10–15)
GFR SERPL CREATININE-BSD FRML MDRD: 33 ML/MIN/1.73M2
GLUCOSE BLD-MCNC: 92 MG/DL (ref 70–99)
GLUCOSE UR STRIP-MCNC: NEGATIVE MG/DL
HCT VFR BLD AUTO: 35.5 % (ref 35–47)
HGB BLD-MCNC: 11.6 G/DL (ref 11.7–15.7)
HGB UR QL STRIP: NEGATIVE
KETONES UR STRIP-MCNC: NEGATIVE MG/DL
LEUKOCYTE ESTERASE UR QL STRIP: NEGATIVE
MCH RBC QN AUTO: 30 PG (ref 26.5–33)
MCHC RBC AUTO-ENTMCNC: 32.7 G/DL (ref 31.5–36.5)
MCV RBC AUTO: 92 FL (ref 78–100)
NITRATE UR QL: NEGATIVE
NT-PROBNP SERPL-MCNC: 647 PG/ML (ref 0–125)
PH UR STRIP: 7 [PH] (ref 5–7)
PHOSPHATE SERPL-MCNC: 3.1 MG/DL (ref 2.5–4.5)
PLATELET # BLD AUTO: 163 10E3/UL (ref 150–450)
POTASSIUM BLD-SCNC: 5.1 MMOL/L (ref 3.4–5.3)
PROT SERPL-MCNC: 8.2 G/DL (ref 6.8–8.8)
PROT UR-MCNC: 0.2 G/L
PROT/CREAT 24H UR: 0.29 G/G CR (ref 0–0.2)
PTH-INTACT SERPL-MCNC: 116 PG/ML (ref 18–80)
RBC # BLD AUTO: 3.87 10E6/UL (ref 3.8–5.2)
SODIUM SERPL-SCNC: 139 MMOL/L (ref 133–144)
SP GR UR STRIP: 1.01 (ref 1–1.03)
UROBILINOGEN UR STRIP-MCNC: NORMAL MG/DL
WBC # BLD AUTO: 6.5 10E3/UL (ref 4–11)

## 2021-10-06 PROCEDURE — 84156 ASSAY OF PROTEIN URINE: CPT | Performed by: PATHOLOGY

## 2021-10-06 PROCEDURE — 36415 COLL VENOUS BLD VENIPUNCTURE: CPT | Performed by: PATHOLOGY

## 2021-10-06 PROCEDURE — 80053 COMPREHEN METABOLIC PANEL: CPT | Performed by: PATHOLOGY

## 2021-10-06 PROCEDURE — 84100 ASSAY OF PHOSPHORUS: CPT | Performed by: PATHOLOGY

## 2021-10-06 PROCEDURE — 83970 ASSAY OF PARATHORMONE: CPT | Mod: 90 | Performed by: PATHOLOGY

## 2021-10-06 PROCEDURE — G0463 HOSPITAL OUTPT CLINIC VISIT: HCPCS

## 2021-10-06 PROCEDURE — 81003 URINALYSIS AUTO W/O SCOPE: CPT | Performed by: PATHOLOGY

## 2021-10-06 PROCEDURE — 99214 OFFICE O/P EST MOD 30 MIN: CPT | Mod: 95 | Performed by: INTERNAL MEDICINE

## 2021-10-06 PROCEDURE — 85027 COMPLETE CBC AUTOMATED: CPT | Performed by: PATHOLOGY

## 2021-10-06 PROCEDURE — 83880 ASSAY OF NATRIURETIC PEPTIDE: CPT | Performed by: PATHOLOGY

## 2021-10-06 NOTE — PATIENT INSTRUCTIONS
"You were seen today in the Cardiovascular Clinic at the [facility].      Cardiology Providers you saw during your visit:  Dr. Twin Moreno     Recommendations:   1.  Please increase your furosemide/Lasix to taking an extra 20mg on  and .  2.  Get labs today on your way out.    3.  Have labs repeated in 3 weeks.    4.  Please follow-up with in January with Dr. Moreno - virtual appt would be ok.       Eat a heart healthy, low sodium diet.  Get 20 to 30 minutes of aerobic exercise 4 to 5 times per week as tolerated. (Examples of aerobic exercise include: walking, bicycling, swimming, running).     Thank you for your visit today!   Please MyChart message or call if you have any questions or concerns.      During Business Hours:  [phone], option # 1 (University)  Then option # 4 (medical questions)     After hours, weekends or holidays:   [phone], Option #4  Ask to speak to the On-Call Cardiologist. Inform them you are a heart failure patient at the [facility].      Kaylie Tran RN BSN CHFN  Cardiology Care Coordinator - [facility]  Questions and [phone]  First press #1 for the University and then press #4 for \"Your Care Team\" to reach us Cardiology Nurses.                "

## 2021-10-06 NOTE — LETTER
10/6/2021      RE: Luz Marina Live  [address]  [place] [address]       Dear Colleague,    Thank you for the opportunity to participate in the care of your patient, Luz Marina Live, at the [facility] [place] at [facility]. Please see a copy of my visit note below.    Impression  1. ASHD with  stenting         3-vessel coronary artery disease s/p CAB       Patent LIMA-LAD, SVG-OM, SVG-RPDA       Significant stenosis of pLCx/OM1 s/p SHAHANA x2 to distal LM/pLCx/OM1    2. History of CAB  3. Elevated PA pressure with elevated RA, PCP and PVR 1.8  4. Hypertension  5. Hyperlipidemia  6. KEITH  7. PFO  8. Atrial fibrillation with warfarin anticoagulation/chronic  9. SLE  10. History of lupus nephritis    Plan:  1. Take an extra 20mgs of furosemide on Wed and Sunday  2. Check kidney 1 and 3 weeks after starting  3. See Dr Clark and check kidney function in November  4. [employer] enrolled!  5. Water aerobics  6. See January virtual or in person        I conducted a virtual visit with this patient. We discussed the patient's current condition, reviewed interim history since last visit, current functional status, diet, and possible cardiac symptoms including: chest pain, tightness, heaviness, pressure, PND, orthopnea, ankle edema, increasing abdominal girth, weight gain, palpitation, pre-syncope or syncope.  She is doing well with decreased shortness of breath, increased activity and no symptoms suggestive of progressive CAD.  She is having trouble with Brillinta.  Since our last visit her creatinine has risen without obvious explanation from intercurrent conditions such as SLE and remote history of lupus nephritis.  60 year old female with premature CAD associated with SLE who has undergone previous CABG as well as subsequent stenting (immediatley following false negative non exercise nuclear stress test see via video visit to assess current status  and potential to discontinue anti-platelet agent which cause cause incrase in creatinine.     Her most recent stress test demonstrated an apical hypoperfusion area.  The patient has SLE/CABG.  She has exertional shortness of breath without chest pain, palpitation, pre-syncope or syncope.  She occasionally has pleuropericardial pan    She has no interim hi tory of chest pain, tightness, heaviness pressure at rest, with emotion or exertion or nocturnally.  She does have exertional shortness of breath which anti-dates her recurrent manifestation of CAD which led to stenting last year.     Weight:  Wt Readings from Last 24 Encounters:   08/27/21 117.9 kg (260 lb)   08/20/21 119.3 kg (263 lb)   07/13/21 124.7 kg (275 lb)   07/20/20 113.9 kg (251 lb)   03/06/20 113.9 kg (251 lb 3.2 oz)   02/05/20 111.6 kg (246 lb)   01/14/20 113.9 kg (251 lb)   12/23/19 116.3 kg (256 lb 4.8 oz)   11/08/19 116.6 kg (257 lb)   11/08/19 116.6 kg (257 lb)   11/05/19 116.6 kg (257 lb)   10/16/19 119.4 kg (263 lb 4.8 oz)   10/11/19 120.7 kg (266 lb 1.6 oz)   09/04/19 122 kg (269 lb)   08/16/19 122 kg (269 lb)   08/13/19 122.5 kg (270 lb)   08/05/19 121.4 kg (267 lb 9.6 oz)   11/20/18 122.9 kg (271 lb)   10/29/18 122 kg (269 lb)   09/17/18 119.3 kg (263 lb)   09/04/18 118.3 kg (260 lb 14.4 oz)   06/25/18 118.4 kg (261 lb)   06/25/18 117.5 kg (259 lb)   05/31/18 116.9 kg (257 lb 12.8 oz)     Medication:      Laboratories:          Results for IDA PADRON (MRN [id number]) as of 11/5/2019 10:53   Ref. Range 8/5/2019 11:03 11/5/2019 10:16   CRP Inflammation Latest Ref Range: 0.0 - 8.0 mg/L 8.8 (H) 17.0 (H)     Catheterization 9/2021      Right sided filling pressures are moderately elevated.    Moderately elevated pulmonary artery hypertension.    Left sided filling pressures are moderately elevated.    Normal cardiac output level.    Unchanged coronary artery disease with patent LIMA-LAD, SVG-OM1, SVG-RPDA              Plan              Coronary  Findings      Diagnostic  Dominance: Right    Left Anterior Descending   Ost LAD to Mid LAD lesion is 100% stenosed. The lesion is chronic total occlusion.   Second Diagonal Branch   The vessel is small. There is mild diffuse disease throughout the vessel.   Left Circumflex   Previously placed Ost Cx to Prox Cx stent (unknown type) is widely patent.   Prox Cx to Mid Cx lesion is 20% stenosed.   First Obtuse Marginal Branch   The vessel is moderate in size.   1st Mrg lesion is 30% stenosed with 100% stenosed side branch in Lat 1st Mrg. The lesion was previously treated using a stent of unknown type.   Lateral First Obtuse Marginal Branch   The vessel is small.   Second Obtuse Marginal Branch   The vessel is moderate in size.   Third Obtuse Marginal Branch   The vessel is small.   Right Coronary Artery   Prox RCA lesion is 40% stenosed. The lesion is eccentric.   Mid RCA to Dist RCA lesion is 10% stenosed.   Dist RCA lesion is 100% stenosed. The lesion is chronic total occlusion.   Right Posterior Descending Artery   RPDA lesion is 20% stenosed.   LIMA Graft To Dist LAD   The graft is large. The graft is angiographically normal.   Graft To Lat 1st Mrg   The graft is large. The graft is angiographically normal.   Graft To RPDA   The graft is large. The graft is angiographically normal.       Intervention      No interventions have been documented.    Hemodynamics    Hemodynamics RA: --/--/15  RV: 62/18/--  PA: 62/28/38  PCWP: --/--/28     PA Sat: 59%  Ao Sat: 99%    Kannan CO/CI: 5.41/2.49    PVR: 1.8  SVR: 1,214 dynes-sec/cm5 Right sided filling pressures are moderately elevated. Left sided filling pressures are moderately elevated. Moderately elevated pulmonary artery hypertension. Normal cardiac output level.     Pressures Phase: Baseline     Time Systolic (mmHg) Diastolic (mmHg) Mean (mmHg) A Wave (mmHg) V Wave (mmHg) EDP (mmHg) Max dp/dt (mmHg/sec) HR (bpm) Content (mL/dL) SAT (%)   RA Pressures  2:48 PM   15    18     17      54        RV Pressures  2:23 PM       291           2:48 PM 62        18     54        PA Pressures  2:51 PM 62    28    38        54        PCW Pressures  2:49 PM   28    29    30      54          Blood Flow Results Phase: Baseline     Time Results  Indexed Values (L/min/m2)   QP  2:23 PM 5.41 L/min    2.49      QS  2:23 PM 5.41 L/min    2.49        Blood Oximetry Phase: Baseline     Time Hb  SAT(%)  PO2  Content (mL/dL) PA Sat (%)   PA  2:23 PM  59 %      59      Art  2:23 PM  99 %     14.81         Cardiac Output Phase: Baseline     Time TDCO (L/min) TDCI (L/min/m2) Kannan C.O. (L/min) Kannan C.I. (L/min/m2) Kannan HR (bpm)   Cardiac Output Results  2:23 PM   5.41    2.49         Resistance Results Phase: Baseline     Time PVR  SVR  TPR  TVR  PVR/SVR  TPR/TVR    Resistance Results (Metric)  2:23 .86 dsc-5     561.88 dsc-5         Resistance Results (Wood)  2:23 PM 1.85 BERMUDEZ     7.03 BERMUDEZ           Stoke Volume Results Phase: Baseline     Time RVSW (gm*m) LVSW (gm*m) RVSW-I (gm*m/m2) LVSW-I (gm*m/m2)   Stroke Work Results  2:23 PM 31.33     14.42             Her new catherization:  Results (pressures in mmHg)  RA: 1/4/3  RV 32/3  PA 30/13/19;  PA Sat 65%  PCWP -/-/5;  PCW Sat --%  Kannan CO/CI 3.3/1.6 L/min; TD CO 4.1/2.0 L/min  PVR 4.2 bermudez; TPR 5.7 bermudez  Hb 12.3 g/dL  NIBP 167/81/116  SVR 2200 dynes*sec/cm^5     [place]:  SVC 62.7%  IVC 65.0%  Low RA 73.0%  Mid RA 68.0%  High RA 64.6%  RV 63%  PA 65%  PCW ---          Conclusions:  1. Low right and left sided filling pressures.  2. Normal pulmonary artery pressures.  3. Low normal cardiac output.      REVIEW of SYMPTOMS    Constitutional: without fever, chills, night sweats.  Weight is down  HEENT: without dry eyes, dry mouth, sinusitis, corryza, visual changes  Endocrine: without polyuria, polydypsia, polyphagia, heat or cold intolerance, changing mental status  Cardiology: without chest pain, tightness, heaviness, pressure, paroxysmal dyspnea, orthopnea,  palpitation, pre-syncope or syncope or device discharge (if present)  Pulmonary: without asthma, wheezing, cough, hemoptysis but longstanding exertional shortness of breath  GI: without nausea, emesis, jaundice, pain, hematemesis, melena  : without frequency, urgency, hematuria, stones, pain, abnormal bleeding, frequency, urgency  Neurologic: without TIA, CVA, trauma, seizure  Dermatologic: without lesions, abrasion rash,   Orthopedic/Rheum: without significant joint pain, impairment, limb, polyserositis, ulceration, Raynauds  Heme: without mass, bruising, frequent infection, anemia  Psychiatric: without substance abuse, hallucination, medication, depression      Exercise tolerance:    Nuclear stress test: (this was false negative study as patient has an acute coronary event shortly afterwards)  PROTOCOL:    Rest and stress myocardial perfusion imaging was performed using  Tc-99m tetrafosmin intravenously. Pharmacological stress was performed  with 0.4 mg of regadenoson intravenously. The EKG showed normal sinus  rhythm at rest. No significant abnormalities were noted with infusion  of regadenoson.     FINDINGS:  1.  Overall quality of the study: Diagnostic.   2.  Left ventricular cavity is Normal on the rest and stress studies.  3.  SPECT images demonstrate uniform radiotracer uptake of the  myocardium on both stress and rest images.  4.  Left ventricular ejection fraction is 73%. Left ventricular  end-diastolic volume is 134 mL. End-systolic volume is 34 mL      Echocardiogram  Name: IDA PADRON  MRN: [id number]  : 1960  Study Date: 10/17/2019 10:24 AM  Age: 59 yrs  Gender: Female  Patient Location: [facility]  Reason For Study: Chest Pain  Ordering Physician: MARIANELA ESCALONA  Performed By: Yasmany Campbell RDCS     BSA: 2.2 m2  Height: 63 in  Weight: 263 lb  HR: 58  BP: 144/81 mmHg  _____________________________________________________________________________  __        Procedure  Complete Portable Echo  Adult. Contrast Optison. Optison (NDC #6284-6291-87)  given intravenously. Patient was given 6 ml mixture of 3 ml Optison and 6 ml  saline. 3 ml wasted.  _____________________________________________________________________________  __        Interpretation Summary  Global and regional left ventricular function is normal with an EF of 60-65%.  Right ventricular function, chamber size, wall motion, and thickness are  normal.  No pericardial effusion is present.  IVC diameter and respiratory changes fall into an intermediate range  suggesting an RA pressure of 8 mmHg.  Mild pulmonary hypertension is present. Right ventricular systolic pressure is  43mmHg above the right atrial pressure.  Compared to prior, measured RVSP is lower, no other change.  _____________________________________________________________________________  __        Left Ventricle  Left ventricular size is normal. Global and regional left ventricular function  is normal with an EF of 60-65%. Mild concentric wall thickening consistent  with left ventricular hypertrophy is present. Left ventricular diastolic  function is indeterminate.     Right Ventricle  Right ventricular function, chamber size, wall motion, and thickness are  normal.     Atria  Moderate biatrial enlargement is present.     Mitral Valve  The mitral valve is normal.        Aortic Valve  Aortic valve is normal in structure and function.     Tricuspid Valve  Mild tricuspid insufficiency is present. Mild pulmonary hypertension is  present. Right ventricular systolic pressure is 43mmHg above the right atrial  pressure.     Pulmonic Valve  The pulmonic valve is normal. Trace pulmonic insufficiency is present.     Vessels  The aorta root is normal. The thoracic aorta is normal. Dilation of the  inferior vena cava is present with normal respiratory variation in diameter.  IVC diameter and respiratory changes fall into an intermediate range  suggesting an RA pressure of 8 mmHg.      Pericardium  No pericardial effusion is present.     _____________________________________________________________________________  __  MMode/2D Measurements & Calculations  IVSd: 1.2 cm  LVIDd: 4.5 cm  LVIDs: 2.9 cm  LVPWd: 1.2 cm  FS: 36.6 %     LV mass(C)d: 197.9 grams  LV mass(C)dI: 91.1 grams/m2  asc Aorta Diam: 3.5 cm  LVOT diam: 2.1 cm  LVOT area: 3.5 cm2  LA Volume Index (BP): 43.9 ml/m2  RWT: 0.52  TAPSE: 2.3 cm        Doppler Measurements & Calculations  MV E max maria isabel: 84.8 cm/sec  MV A max maria isabel: 35.8 cm/sec  MV E/A: 2.4  MV dec slope: 473.3 cm/sec2  MV dec time: 0.18 sec     TV max P.0 mmHg  PA acc time: 0.09 sec  TR max maria isabel: 327.8 cm/sec  TR max P.0 mmHg  E/E' avg: 10.5  Lateral E/e': 9.0  Medial E/e': 12.0     ____________________________        Echocardiographic findings of TR, modest PA, normal systolic function.    Name: IDA PADRON  MRN: [id number]  : 1960  Study Date: 2018 11:48 AM  Age: 57 yrs  Gender: Female  Patient Location: [facility]  Reason For Study: Hx PFO, Elevated PA pressures  Ordering Physician: SANNA SALAZAR  Referring Physician: SANNA SALAZAR  Performed By: Tray Harvey RDCS     BSA: 2.2 m2  Height: 63 in  Weight: 257 lb  HR: 62  BP: 167/94 mmHg  _____________________________________________________________________________  __        Procedure  Bubble Echocardiogram with two-dimensional, color and spectral Doppler  performed. IV start location R Hand . Bateriostatic Saline used for Bubble  Study.  _____________________________________________________________________________  __        Interpretation Summary     Right ventricular function, chamber size, wall motion, and thickness are  normal.  PFO again documented with color doppler and Bubble study.  Mild to moderate tricuspid insufficiency is present.  Right ventricular systolic pressure is 61mmHg above the right atrial pressure.  No pericardial effusion is  present.  _____________________________________________________________________________  __        Left Ventricle  Global and regional left ventricular function is normal with an EF of 55-60%.  Left ventricular wall thickness is normal. Left ventricular size is normal.  Left ventricular diastolic function is indeterminate. No regional wall motion  abnormalities are seen.     Right Ventricle  Right ventricular function, chamber size, wall motion, and thickness are  normal.     Atria  Moderate biatrial enlargement is present. PFO again documented with color  doppler and Bubble study.     Mitral Valve  Mild to moderate mitral insufficiency is present.        Aortic Valve  Aortic valve is normal in structure and function.     Tricuspid Valve  Mild to moderate tricuspid insufficiency is present. Right ventricular  systolic pressure is 61mmHg above the right atrial pressure.     Pulmonic Valve  The pulmonic valve is normal. Trace to mild pulmonic insufficiency is present.     Vessels  The aorta root is normal. The pulmonary artery is normal. The inferior vena  cava is normal.     Pericardium  No pericardial effusion is present.     _____________________________________________________________________________  __  MMode/2D Measurements & Calculations  IVSd: 0.89 cm  LVIDd: 5.3 cm  LVIDs: 3.1 cm  LVPWd: 0.75 cm  FS: 41.8 %  LV mass(C)d: 154.8 grams  LV mass(C)dI: 72.0 grams/m2     Ao root diam: 3.0 cm  LA dimension: 5.3 cm  asc Aorta Diam: 3.6 cm  LA/Ao: 1.8  LVOT diam: 2.2 cm  LVOT area: 3.7 cm2  LA Volume (BP): 96.0 ml  LA Volume Index (BP): 44.7 ml/m2  RWT: 0.28        Doppler Measurements & Calculations  MV E max maria isabel: 92.8 cm/sec  MV A max maria isabel: 58.2 cm/sec  MV E/A: 1.6  MV dec time: 0.15 sec     Ao V2 max: 147.4 cm/sec  Ao max P.0 mmHg  TARUN(V,D): 2.6 cm2  LV V1 max P.3 mmHg  LV V1 max: 104.1 cm/sec  LV V1 VTI: 24.8 cm  MR ERO: 0.27 cm2  CO(LVOT): 5.6 l/min  CI(LVOT): 2.6 l/min/m2  SV(LVOT): 92.9 ml  SI(LVOT):  43.2 ml/m2  PA V2 max: 110.6 cm/sec  PA max P.9 mmHg  PA acc time: 0.08 sec  PI end-d luis: 154.4 cm/sec  PI max luis: 255.8 cm/sec  PI max P.2 mmHg  TR max luis: 385.4 cm/sec  TR max P.4 mmHg  Pulm Sys Luis: 63.2 cm/sec  Pulm Peng Luis: 97.7 cm/sec  Pulm S/D: 0.65  E/E' av.1  Lateral E/e': 8.9  Medial E/e': 15.4        : 1960  Study Date: 2016 01:31 PM  Age: 56 yrs  Gender: Female  Patient Location: [facility]  Reason For Study:     History: tricuspid regurgitation  Ordering Physician: JOSE ARELLANO  Referring Physician: JOSE ARELLANO  Performed By: Hugh Sharpe MD     BSA: 2.2 m2  Height: 63 in  Weight: 269 lb  ______________________________________________________________________________     Interpretation Summary  Left ventricular function, chamber size, wall motion, and wall thickness are  normal.The EF is 55-60%.  Global right ventricular function is normal. Mild right ventricular dilation  is present.  Mild to moderate TR with mild prolapse of the posterior leaflet of the  tricupsid valve.  A small patent foramen ovale is present. There is no ASD (secundum, sinus  venosus or unroofed coronary sinus). All 4 pulmonary veins drain into the  left atrium.  Mild pulmonary hypertension is present with RVSP 45mmHg above RAP.     ______________________________________________________________________________        Procedure  Transesophageal Echocardiogram with color and spectral Doppler performed. The  procedure was performed in the Echo Lab. Informed consent for Transesophegeal  echo obtained. EVE Probe #12 was used during the procedure. Patient was  sedated using Fentanyl 100 mcg. Patient was sedated using Versed 4 mg. The  Transducer was inserted without difficulty . The patient tolerated the  procedure well. Complications None. ECG shows normal sinus rhythm. Good  quality two-dimensional was performed and interpreted.     Left Ventricle  Left ventricular function, chamber size,  wall motion, and wall thickness are  normal.The EF is 55-60%.     Right Ventricle  Global right ventricular function is normal. Mild right ventricular dilation  is present.     Atria  Both atria appear normal. The left atrial appendage is normal. It is free of  spontaneous echo contrast and thrombus. A small patent foramen ovale is  present. The patent foramen ovale was demonstrated by color Doppler and  agitated saline bubble study . The patent foramen ovale was demonstrated with  Valsalva's maneuver. No evidence of ASD.     Mitral Valve  The mitral valve is normal. Trace mitral insufficiency is present.     Aortic Valve  Aortic valve is normal in structure and function. The aortic valve is  tricuspid.  Tricuspid Valve  Mild to moderate tricuspid insufficiency is present. Right ventricular  systolic pressure is 45mmHg above the right atrial pressure. Mild pulmonary  hypertension is present. Mild prolapse of the posterior leaflet of the  tricupsid valve is noted.     Pulmonic Valve  The pulmonic valve is normal.     Vessels  The pulmonary artery is normal. The aorta root is normal. The thoracic aorta  is normal. All four pulmonary veins visualized with dampened velocity  waveforms.     Pericardium  No pericardial effusion is present.     Compared to Previous Study  This study was compared with the study from 2016. A small PFO has been  identified. .     ______________________________________________________________________________     Doppler Measurements & Calculations  TR max maria isabel: 336.7 cm/sec  TR max P.5 mmHg  ______________________________________________________________________________         Name: IDA PADRON  MRN: [id number]  : 1960  Study Date: 2016 01:31 PM  Age: 56 yrs  Gender: Female  Patient Location: [facility]  Reason For Study:     History: tricuspid regurgitation  Ordering Physician: JOSE ARELLANO  Referring Physician: JOSE ARELLANO  Performed By: Hugh Sharpe,  MD     BSA: 2.2 m2  Height: 63 in  Weight: 269 lb  ______________________________________________________________________________     Interpretation Summary  Left ventricular function, chamber size, wall motion, and wall thickness are  normal.The EF is 55-60%.  Global right ventricular function is normal. Mild right ventricular dilation  is present.  Mild to moderate TR with mild prolapse of the posterior leaflet of the  tricupsid valve.  A small patent foramen ovale is present. There is no ASD (secundum, sinus  venosus or unroofed coronary sinus). All 4 pulmonary veins drain into the  left atrium.  Mild pulmonary hypertension is present with RVSP 45mmHg above RAP.     ______________________________________________________________________________        Procedure  Transesophageal Echocardiogram with color and spectral Doppler performed. The  procedure was performed in the Echo Lab. Informed consent for Transesophegeal  echo obtained. EVE Probe #12 was used during the procedure. Patient was  sedated using Fentanyl 100 mcg. Patient was sedated using Versed 4 mg. The  Transducer was inserted without difficulty . The patient tolerated the  procedure well. Complications None. ECG shows normal sinus rhythm. Good  quality two-dimensional was performed and interpreted.     Left Ventricle  Left ventricular function, chamber size, wall motion, and wall thickness are  normal.The EF is 55-60%.     Right Ventricle  Global right ventricular function is normal. Mild right ventricular dilation  is present.     Atria  Both atria appear normal. The left atrial appendage is normal. It is free of  spontaneous echo contrast and thrombus. A small patent foramen ovale is  present. The patent foramen ovale was demonstrated by color Doppler and  agitated saline bubble study . The patent foramen ovale was demonstrated with  Valsalva's maneuver. No evidence of ASD.     Mitral Valve  The mitral valve is normal. Trace mitral insufficiency is  present.     Aortic Valve  Aortic valve is normal in structure and function. The aortic valve is  tricuspid.  Tricuspid Valve  Mild to moderate tricuspid insufficiency is present. Right ventricular  systolic pressure is 45mmHg above the right atrial pressure. Mild pulmonary  hypertension is present. Mild prolapse of the posterior leaflet of the  tricupsid valve is noted.     Pulmonic Valve  The pulmonic valve is normal.     Vessels  The pulmonary artery is normal. The aorta root is normal. The thoracic aorta  is normal. All four pulmonary veins visualized with dampened velocity  waveforms.     Pericardium  No pericardial effusion is present.     Compared to Previous Study  This study was compared with the study from 2016. A small PFO has been  identified. .     ______________________________________________________________________________     Doppler Measurements & Calculations  TR max maria isabel: 336.7 cm/sec  TR max P.5 mmHg  ______________________________________________________________________________       Procedures:  ECHO: 09:   Interpretation Summary   The Ejection Fraction is estimated at 55-60%. No regional wall motion   abnormalities are seen. The right ventricle is normal size. Global right   ventricular function is normal. Right ventricular systolic pressure is 27mmHg   above the right atrial pressure. No pericardial effusion is present. The   inferior vena cava is normal.   Stress test: 08-10-09:   1. Abnormal study with a high degree of certainty.   2. There is a predominantly fixed medium sized moderately decreased   intensity myocardial perfusion defect with minimal periinfarct   reversibility involving the apical anterior, mid anterior, and apical   region of the heart. There is corresponding hypokinesis. No other   adenosine induced fixed or reversible myocardial perfusion defect.   3. Left ventricular size is enlarged at rest. Transient ischemic   dilation of the left ventricle did not  occur.   4. The left ventricular ejection fraction is 56 %.   5. Summed Stress Score is calculated at 18, which is associated   with increased risk of future coronary events.   CCL: 08-26-09: ARELLANO:   Mrs. Live is a 49 year old female with known coronary artery disease s/p MI in 1996 found to have minimal disease on catheterization. Her cardiovascular risk factors include HTN and hyperlipidemia. She presented with a 2 week history of dyspnea with exertion and lower extremity edema. She underwent Adenosine MPI which revealed a fixed medium-sized defect with minimal periinfarct reversability and anterior hypokinesis. Her LVEF was preserved at 56%. CT angio of the coronaries revealed moderate stenosis in the pLAD and dRCA and mild to moderate stenosis of the pLCx.   Access: 6F long RFA, 6F RFV   Coronary Anatomy:   LMCA: normal   LAD: There is a long, discrete, ectatic 70-80% lesion in the ostial and proximal LAD. There is one D1 branch without any significant disease.   LCx: no significant disease. There is a large OM1 branch that has a 50-60% stenosis prior to the branch bifurcation.   RCA: The proximal and mid RCA have luminal irregularities without significance. The distal RCA has a complex bifurcation lesion prior to the take-off of the rPDA. There is disease in the ostial PL1 as well.   Conclusions:   1. 3-vessel coronary artery disease without left main lesion   Plan   Discussed the options in depth with the patient. Given the proximity of the LAD lesion to the LMCA and the complexity of the dRCA lesion, we recommended CABG for the patient. She will be admitted to the hospital.Discussed with Darleen Johnson MD.   Cardiac Surgery: 8/27/2009   Triple vessel coronary artery bypass grafting. Left internal mammary artery was placed to the left anterior descending coronary artery and separate reverse saphenous vein grafts were placed to the obtuse marginal and right posterior descending coronary arteries.     Name:  IDA PADRON  MRN: [id number]  : 1960  Study Date: 2018 11:48 AM  Age: 57 yrs  Gender: Female  Patient Location: [facility]  Reason For Study: Hx PFO, Elevated PA pressures  Ordering Physician: SANNA SALAZAR  Referring Physician: SANNA SALAZAR  Performed By: Tray Harvey RDCS     BSA: 2.2 m2  Height: 63 in  Weight: 257 lb  HR: 62  BP: 167/94 mmHg  _____________________________________________________________________________  __        Procedure  Bubble Echocardiogram with two-dimensional, color and spectral Doppler  performed. IV start location R Hand . Bateriostatic Saline used for Bubble  Study.  _____________________________________________________________________________  __        Interpretation Summary     Right ventricular function, chamber size, wall motion, and thickness are  normal.  PFO again documented with color doppler and Bubble study.  Mild to moderate tricuspid insufficiency is present.  Right ventricular systolic pressure is 61mmHg above the right atrial pressure.  No pericardial effusion is present.  _____________________________________________________________________________  __        Left Ventricle  Global and regional left ventricular function is normal with an EF of 55-60%.  Left ventricular wall thickness is normal. Left ventricular size is normal.  Left ventricular diastolic function is indeterminate. No regional wall motion  abnormalities are seen.     Right Ventricle  Right ventricular function, chamber size, wall motion, and thickness are  normal.     Atria  Moderate biatrial enlargement is present. PFO again documented with color  doppler and Bubble study.     Mitral Valve  Mild to moderate mitral insufficiency is present.        Aortic Valve  Aortic valve is normal in structure and function.     Tricuspid Valve  Mild to moderate tricuspid insufficiency is present. Right ventricular  systolic pressure is 61mmHg above the right atrial pressure.     Pulmonic  Valve  The pulmonic valve is normal. Trace to mild pulmonic insufficiency is present.     Vessels  The aorta root is normal. The pulmonary artery is normal. The inferior vena  cava is normal.     Pericardium  No pericardial effusion is present.     _____________________________________________________________________________  __  MMode/2D Measurements & Calculations  IVSd: 0.89 cm  LVIDd: 5.3 cm  LVIDs: 3.1 cm  LVPWd: 0.75 cm  FS: 41.8 %  LV mass(C)d: 154.8 grams  LV mass(C)dI: 72.0 grams/m2     Ao root diam: 3.0 cm  LA dimension: 5.3 cm  asc Aorta Diam: 3.6 cm  LA/Ao: 1.8  LVOT diam: 2.2 cm  LVOT area: 3.7 cm2  LA Volume (BP): 96.0 ml  LA Volume Index (BP): 44.7 ml/m2  RWT: 0.28        Doppler Measurements & Calculations  MV E max luis: 92.8 cm/sec  MV A max luis: 58.2 cm/sec  MV E/A: 1.6  MV dec time: 0.15 sec     Ao V2 max: 147.4 cm/sec  Ao max P.0 mmHg  TARUN(V,D): 2.6 cm2  LV V1 max P.3 mmHg  LV V1 max: 104.1 cm/sec  LV V1 VTI: 24.8 cm  MR ERO: 0.27 cm2  CO(LVOT): 5.6 l/min  CI(LVOT): 2.6 l/min/m2  SV(LVOT): 92.9 ml  SI(LVOT): 43.2 ml/m2  PA V2 max: 110.6 cm/sec  PA max P.9 mmHg  PA acc time: 0.08 sec  PI end-d luis: 154.4 cm/sec  PI max luis: 255.8 cm/sec  PI max P.2 mmHg  TR max luis: 385.4 cm/sec  TR max P.4 mmHg  Pulm Sys Luis: 63.2 cm/sec  Pulm Peng Luis: 97.7 cm/sec  Pulm S/D: 0.65  E/E' av.1  Lateral E/e': 8.9  Medial E/e': 15.4    Results (pressures in mmHg)  RA: 1/3  RV 32/3  PA 30/13/19;  PA Sat 65%  PCWP -/-/5;  PCW Sat --%  Kannan CO/CI 3.3/1.6 L/min; TD CO 4.1/2.0 L/min  PVR 4.2 bermudez; TPR 5.7 bermudez  Hb 12.3 g/dL  NIBP 167/81/116  SVR 2200 dynes*sec/cm^5     [place]:  SVC 62.7%  IVC 65.0%  Low RA 73.0%  Mid RA 68.0%  High RA 64.6%  RV 63%  PA 65%  PCW ---     Complications: None   Blood loss: Minimal blood loss     Conclusions:  1. Low right and left sided filling pressures.  2. Normal pulmonary artery pressures.  3. Low normal cardiac output.            The nuclear stress test is  equivocal.     There is a small area of mild ischemia in the distal apical segment(s) of the left ventricle. This appears to be in the left anterior descending distribution.     Left ventricular function is normal.     A prior study was conducted on 9/3/2019.  This study has changes noted when compared with the prior study. New questionable apical ischemia, small?  Please do not hesitate to contact me if you have any questions/concerns.     Sincerely,     Twin Moreno MD  CC:  Eliz Adame

## 2021-10-07 ENCOUNTER — THERAPY VISIT (OUTPATIENT)
Dept: PHYSICAL THERAPY | Facility: CLINIC | Age: 61
End: 2021-10-07
Attending: INTERNAL MEDICINE
Payer: COMMERCIAL

## 2021-10-07 DIAGNOSIS — G89.29 CHRONIC PAIN OF BOTH KNEES: ICD-10-CM

## 2021-10-07 DIAGNOSIS — M25.561 CHRONIC PAIN OF BOTH KNEES: ICD-10-CM

## 2021-10-07 DIAGNOSIS — R53.81 PHYSICAL DECONDITIONING: ICD-10-CM

## 2021-10-07 DIAGNOSIS — M25.562 CHRONIC PAIN OF BOTH KNEES: ICD-10-CM

## 2021-10-07 PROCEDURE — 97161 PT EVAL LOW COMPLEX 20 MIN: CPT | Mod: GP

## 2021-10-07 PROCEDURE — 97110 THERAPEUTIC EXERCISES: CPT | Mod: GP

## 2021-10-07 ASSESSMENT — ACTIVITIES OF DAILY LIVING (ADL)
KNEEL ON THE FRONT OF YOUR KNEE: ACTIVITY IS MINIMALLY DIFFICULT
PAIN: I DO NOT HAVE THE SYMPTOM
HOW_WOULD_YOU_RATE_THE_CURRENT_FUNCTION_OF_YOUR_KNEE_DURING_YOUR_USUAL_DAILY_ACTIVITIES_ON_A_SCALE_FROM_0_TO_100_WITH_100_BEING_YOUR_LEVEL_OF_KNEE_FUNCTION_PRIOR_TO_YOUR_INJURY_AND_0_BEING_THE_INABILITY_TO_PERFORM_ANY_OF_YOUR_USUAL_DAILY_ACTIVITIES?: 20
SQUAT: ACTIVITY IS SOMEWHAT DIFFICULT
WEAKNESS: I HAVE THE SYMPTOM BUT IT DOES NOT AFFECT MY ACTIVITY
LIMPING: I HAVE THE SYMPTOM BUT IT DOES NOT AFFECT MY ACTIVITY
GIVING WAY, BUCKLING OR SHIFTING OF KNEE: I DO NOT HAVE THE SYMPTOM
RAW_SCORE: 55
STIFFNESS: THE SYMPTOM AFFECTS MY ACTIVITY SLIGHTLY
SIT WITH YOUR KNEE BENT: ACTIVITY IS NOT DIFFICULT
AS_A_RESULT_OF_YOUR_KNEE_INJURY,_HOW_WOULD_YOU_RATE_YOUR_CURRENT_LEVEL_OF_DAILY_ACTIVITY?: NEARLY NORMAL
GO UP STAIRS: ACTIVITY IS FAIRLY DIFFICULT
RISE FROM A CHAIR: ACTIVITY IS MINIMALLY DIFFICULT
KNEE_ACTIVITY_OF_DAILY_LIVING_SCORE: 78.57
SWELLING: I HAVE THE SYMPTOM BUT IT DOES NOT AFFECT MY ACTIVITY
GO DOWN STAIRS: ACTIVITY IS MINIMALLY DIFFICULT
KNEE_ACTIVITY_OF_DAILY_LIVING_SUM: 55
HOW_WOULD_YOU_RATE_THE_OVERALL_FUNCTION_OF_YOUR_KNEE_DURING_YOUR_USUAL_DAILY_ACTIVITIES?: NEARLY NORMAL
STAND: ACTIVITY IS NOT DIFFICULT
WALK: ACTIVITY IS SOMEWHAT DIFFICULT

## 2021-10-07 NOTE — PROGRESS NOTES
Physical Therapy Initial Evaluation  Subjective:  What are you being seen for today? Weakness - Instability    When did this problem begin? Several Months ago    How did this problem occur? Heart issues - inactivity    Pain: No Pain    Please [place] your health: Fair    Please [place] all that apply to your past medical history: Heart Problems, High blood Pressure, Kidney disease, Numbness/Tingling, Overweight, Rheumatoid Arthritis, Sleep Disorder/Apnea, Calf Pain/Swelling/Warmth, Change in Skin Color, Chest Pain, Cold/Hot Extremity, Foot Drop    Medical Allergies: Sulfa Drug    Surgeries: Triple Bypass - Stints, Carpal Tunnel    Medicines currently taken: HBP, Cardiac, Heparin/Coumadin    Occupation: None    Objective:        Flexibility/Screens:           Lower extremity flexibility wnl: Unremarkable. Not formally assessed at this time.        Physical Exam        General Evaluation:  AROM:  Arom wnl general: AROM is WNL in the lower extremities. She is not limited by ROM, Strength or Pain.              Gross Strength:  Gross strength wnl general: She is not limited by ROM, Strength or Pain.            Lower Extremity:  Normal   Significant findings:  All 5/5 without limitations.    Lower Extremity Strength:  normal (She is not limited by ROM, Strength or Pain.)  Knee Extension:  5   Strong/pain free  Pain: -  Knee Flexion: 5    Pain: -  Ankle Plantarflexion:  Strong/pain free     Ankle Dorsiflexion: 5    Pain: -  Lower Extremity Flexibility:  Lower extremity flexibility wnl: Unremarkable. Not formally assessed at this time.                                    Functional Assessment:  Functional assessment wnl: 30 second sit to stand test: 11.  [place] Balance Assessment: 53/56.                                               ROS    Assessment/Plan:    Patient is a 61 year old female with both sides knee complaints.    Patient has the following significant findings with corresponding treatment plan.                 Diagnosis 1:  Generalized Weakness and Deconditioning  Decreased strength - therapeutic exercise and therapeutic activities  Impaired balance - neuro re-education, therapeutic activities and home program  Impaired gait - home program  Impaired muscle performance - neuro re-education and home program  Decreased function - therapeutic activities and home program    Therapy Evaluation Codes:   1) History comprised of:   Personal factors that impact the plan of care:      None.    Comorbidity factors that impact the plan of care are:      Heart problems and High blood pressure.     Medications impacting care: Cardiac, High blood pressure and Heparin/coumadin.  2) Examination of Body Systems comprised of:   Body structures and functions that impact the plan of care:      Generalized Deconditioning of the Lower Extremities.   Activity limitations that impact the plan of care are:      Squatting/kneeling, Stairs, Standing and Walking.  3) Clinical presentation characteristics are:   Stable/Uncomplicated.  4) Decision-Making    Low complexity using standardized patient assessment instrument and/or measureable assessment of functional outcome.  Cumulative Therapy Evaluation is: Low complexity.    Previous and current functional limitations:  (See Goal Flow Sheet for this information)    Short term and Long term goals: (See Goal Flow Sheet for this information)     Communication ability:  Patient appears to be able to clearly communicate and understand verbal and written communication and follow directions correctly.  Treatment Explanation - The following has been discussed with the patient:   RX ordered/plan of care  Anticipated outcomes  Possible risks and side effects  This patient would benefit from PT intervention to resume normal activities.   Rehab potential is good.    Frequency:  1 X week, once daily  Duration:  for Two months  Discharge Plan:  Achieve all LTG.  Independent in home treatment program.  Reach maximal  therapeutic benefit.    Please refer to the daily flowsheet for treatment today, total treatment time and time spent performing 1:1 timed codes.

## 2021-10-14 ENCOUNTER — THERAPY VISIT (OUTPATIENT)
Dept: PHYSICAL THERAPY | Facility: CLINIC | Age: 61
End: 2021-10-14
Payer: COMMERCIAL

## 2021-10-14 DIAGNOSIS — R53.81 PHYSICAL DECONDITIONING: ICD-10-CM

## 2021-10-14 PROCEDURE — 97112 NEUROMUSCULAR REEDUCATION: CPT | Mod: GP

## 2021-10-14 PROCEDURE — 97110 THERAPEUTIC EXERCISES: CPT | Mod: GP

## 2021-10-15 ENCOUNTER — ANTICOAGULATION THERAPY VISIT (OUTPATIENT)
Dept: ANTICOAGULATION | Facility: OTHER | Age: 61
End: 2021-10-15

## 2021-10-15 ENCOUNTER — LAB (OUTPATIENT)
Dept: LAB | Facility: CLINIC | Age: 61
End: 2021-10-15
Payer: COMMERCIAL

## 2021-10-15 DIAGNOSIS — I48.0 PAF (PAROXYSMAL ATRIAL FIBRILLATION) (H): ICD-10-CM

## 2021-10-15 DIAGNOSIS — I10 HTN, GOAL BELOW 140/90: ICD-10-CM

## 2021-10-15 DIAGNOSIS — Z79.01 LONG TERM CURRENT USE OF ANTICOAGULANT THERAPY: Primary | ICD-10-CM

## 2021-10-15 DIAGNOSIS — Z79.01 LONG TERM CURRENT USE OF ANTICOAGULANT THERAPY: ICD-10-CM

## 2021-10-15 DIAGNOSIS — I25.10 CORONARY ARTERY DISEASE INVOLVING NATIVE HEART WITHOUT ANGINA PECTORIS, UNSPECIFIED VESSEL OR LESION TYPE: ICD-10-CM

## 2021-10-15 LAB
ANION GAP SERPL CALCULATED.3IONS-SCNC: 3 MMOL/L (ref 3–14)
BUN SERPL-MCNC: 49 MG/DL (ref 7–30)
CALCIUM SERPL-MCNC: 9.2 MG/DL (ref 8.5–10.1)
CHLORIDE BLD-SCNC: 107 MMOL/L (ref 94–109)
CO2 SERPL-SCNC: 27 MMOL/L (ref 20–32)
CREAT SERPL-MCNC: 1.6 MG/DL (ref 0.52–1.04)
GFR SERPL CREATININE-BSD FRML MDRD: 35 ML/MIN/1.73M2
GLUCOSE BLD-MCNC: 104 MG/DL (ref 70–99)
INR BLD: 2.2 (ref 0.9–1.1)
POTASSIUM BLD-SCNC: 4.8 MMOL/L (ref 3.4–5.3)
SODIUM SERPL-SCNC: 137 MMOL/L (ref 133–144)

## 2021-10-15 PROCEDURE — 36415 COLL VENOUS BLD VENIPUNCTURE: CPT

## 2021-10-15 PROCEDURE — 85610 PROTHROMBIN TIME: CPT

## 2021-10-15 PROCEDURE — 80048 BASIC METABOLIC PNL TOTAL CA: CPT

## 2021-10-15 NOTE — PROGRESS NOTES
ANTICOAGULATION MANAGEMENT     Luz Marina Live 61 year old female is on warfarin with therapeutic INR result. (Goal INR 2.0-3.0)    Recent labs: (last 7 days)     10/15/21  1112   INR 2.2*       ASSESSMENT     Source(s): Chart Review       Warfarin doses taken: Reviewed in chart    Diet: LM    New illness, injury, or hospitalization: No    Medication/supplement changes: None noted    Signs or symptoms of bleeding or clotting: No    Previous INR: Therapeutic last 2(+) visits    Additional findings: None     PLAN     Recommended plan for no diet, medication or health factor changes affecting INR     Dosing Instructions: Continue your current warfarin dose with next INR in 5 weeks       Summary  As of 10/15/2021    Full warfarin instructions:  10 mg every Tue, Thu, Sat; 7.5 mg all other days   Next INR check:  11/19/2021             Detailed voice message left for Luz Marina with dosing instructions and follow up date.     Contact [phone]  to schedule and with any changes, questions or concerns.     Education provided: Please call back if any changes to your diet, medications or how you've been taking warfarin    Plan made per [facility] anticoagulation protocol    Deborah Alatorre, RN  Anticoagulation Clinic  10/15/2021    _______________________________________________________________________     Anticoagulation Episode Summary     Current INR goal:  2.0-3.0   TTR:  48.4 % (1 y)   Target end date:  Indefinite   Send INR reminders to:  [facility]    Indications    Long-term (current) use of anticoagulants [Z79.01] [Z79.01]  PAF (paroxysmal atrial fibrillation) (H) [I48.0]           Comments:  5 mg tabs, [facility]  PM dose         Anticoagulation Care Providers     Provider Role Specialty Phone number    Eliz Nguyen MD Referring Internal Medicine [phone]

## 2021-10-21 ENCOUNTER — THERAPY VISIT (OUTPATIENT)
Dept: PHYSICAL THERAPY | Facility: CLINIC | Age: 61
End: 2021-10-21
Payer: COMMERCIAL

## 2021-10-21 DIAGNOSIS — I25.10 CORONARY ARTERY DISEASE INVOLVING NATIVE HEART WITHOUT ANGINA PECTORIS, UNSPECIFIED VESSEL OR LESION TYPE: Primary | ICD-10-CM

## 2021-10-21 DIAGNOSIS — I51.89 DIASTOLIC DYSFUNCTION: ICD-10-CM

## 2021-10-21 DIAGNOSIS — R53.81 PHYSICAL DECONDITIONING: ICD-10-CM

## 2021-10-21 PROCEDURE — 97112 NEUROMUSCULAR REEDUCATION: CPT | Mod: GP

## 2021-10-21 PROCEDURE — 97110 THERAPEUTIC EXERCISES: CPT | Mod: GP

## 2021-10-21 PROCEDURE — 97530 THERAPEUTIC ACTIVITIES: CPT | Mod: GP

## 2021-10-28 ENCOUNTER — THERAPY VISIT (OUTPATIENT)
Dept: PHYSICAL THERAPY | Facility: CLINIC | Age: 61
End: 2021-10-28
Payer: COMMERCIAL

## 2021-10-28 DIAGNOSIS — R53.81 PHYSICAL DECONDITIONING: ICD-10-CM

## 2021-10-28 PROCEDURE — 97530 THERAPEUTIC ACTIVITIES: CPT | Mod: GP

## 2021-10-28 PROCEDURE — 97110 THERAPEUTIC EXERCISES: CPT | Mod: GP

## 2021-10-28 PROCEDURE — 97112 NEUROMUSCULAR REEDUCATION: CPT | Mod: GP

## 2021-11-02 ENCOUNTER — VIRTUAL VISIT (OUTPATIENT)
Dept: NEPHROLOGY | Facility: CLINIC | Age: 61
End: 2021-11-02
Payer: COMMERCIAL

## 2021-11-02 DIAGNOSIS — N25.81 SECONDARY RENAL HYPERPARATHYROIDISM (H): ICD-10-CM

## 2021-11-02 DIAGNOSIS — I10 HYPERTENSION GOAL BP (BLOOD PRESSURE) < 130/80: ICD-10-CM

## 2021-11-02 DIAGNOSIS — M32.14 SYSTEMIC LUPUS ERYTHEMATOSUS WITH GLOMERULAR DISEASE, UNSPECIFIED SLE TYPE (H): ICD-10-CM

## 2021-11-02 DIAGNOSIS — D64.9 ANEMIA, UNSPECIFIED TYPE: Primary | ICD-10-CM

## 2021-11-02 DIAGNOSIS — N18.4 CKD (CHRONIC KIDNEY DISEASE) STAGE 4, GFR 15-29 ML/MIN (H): ICD-10-CM

## 2021-11-02 PROCEDURE — 99214 OFFICE O/P EST MOD 30 MIN: CPT | Mod: 95 | Performed by: INTERNAL MEDICINE

## 2021-11-02 NOTE — PROGRESS NOTES
"11/02/21   CC: CKD    HPI: Luz Marina Live is a 61 year old female who presents for follow-up of CKD. Ms. Live' hx is significant for SLE for which she follows with Dr. Adame. Her SLE has included renal involvement in the past - 1990s and included treatment with cytoxan, prednisone and later imuran. She has had intermittent low grade proteinuria since. Additional hx includes CABG in August 2009, Afib, and hypertension (dx around 20 years ago). Additionally she has had a few RANDELL episodes and NSAID related injury.    Creatinine has been 0.95-1.35 for the past year; 1.13 at this time. UPCR was 0.67 g/g in Nov. Which is up from 0.3-0.5 g/g previously. She underwent 2 cardiac stent placement in October - on brilinta and coumadin.     10/13/20: recently with rising creatinine, however, with only mild proteinuria. I would expect hematuria in the setting of active lupus nephritis. She reports feeling fine - no specific concerns. I spent time reviewing the change in her kidney function over last year. I also explained the risks of kidney biopsy, especially as we would need to hold blood thinners.     11/02/21: video visit. Weight has been \"stable\". Weights have not changed more than 1 lb. No swelling in her legs. Breathing is better. She did notice improvement with the lasix. She is currently taking lasix 60 mg twice a week with 40 mg the other days of the week. She is taking lisinopril 30 mg daily. Doesn't check BP at home often. No lightheadedness/dizziness. She denies increased thirst. No other sxs of dehydration. No NSAID use.  She goes to therapy to strengthen her legs.     Allergies   Allergen Reactions     Seasonal Allergies      Sulfa Drugs Rash and GI Disturbance       aspirin 81 MG EC tablet, Take 81 mg by mouth daily  atorvastatin (LIPITOR) 40 MG tablet, TAKE 1 TABLET BY MOUTH EVERY DAY  furosemide (LASIX) 20 MG tablet, Take 2 tablets (40 mg) by mouth daily  hydroxychloroquine (PLAQUENIL) 200 MG tablet, Take 2 " tablets (400 mg) by mouth daily  lisinopril (ZESTRIL) 10 MG tablet, Take 3 tablets (30 mg) by mouth daily  loratadine (CLARITIN) 10 MG tablet, Take 10 mg by mouth daily  metoprolol tartrate (LOPRESSOR) 50 MG tablet, Take 2 tablets (100 mg) by mouth 2 times daily  metroNIDAZOLE (METROCREAM) 0.75 % external cream, Apply topically 2 times daily  MULTIPLE VITAMIN PO, Take 1 tablet by mouth daily   Omega-3 Fatty Acids (FISH OIL) 1200 MG capsule, Take 2 capsules by mouth 2 times daily   order for DME, [employer] 10-16 cmH20  warfarin ANTICOAGULANT (COUMADIN) 5 MG tablet, TAKE 1 TAB (5 MG) BY MOUTH ON FRI & 2 TABS (10 MG) ALL OTHER DAYS, OR AS DIRECTED    No current facility-administered medications on file prior to visit.      Exam:  There were no vitals taken for this visit.  GENERAL: Healthy, alert and no distress      Results:  No visits with results within 1 Day(s) from this visit.   Latest known visit with results is:   Lab on 10/15/2021   Component Date Value Ref Range Status     Sodium 10/15/2021 137  133 - 144 mmol/L Final     Potassium 10/15/2021 4.8  3.4 - 5.3 mmol/L Final     Chloride 10/15/2021 107  94 - 109 mmol/L Final     Carbon Dioxide (CO2) 10/15/2021 27  20 - 32 mmol/L Final     Anion Gap 10/15/2021 3  3 - 14 mmol/L Final     Urea Nitrogen 10/15/2021 49* 7 - 30 mg/dL Final     Creatinine 10/15/2021 1.60* 0.52 - 1.04 mg/dL Final     Calcium 10/15/2021 9.2  8.5 - 10.1 mg/dL Final     Glucose 10/15/2021 104* 70 - 99 mg/dL Final     GFR Estimate 10/15/2021 35* >60 mL/min/1.73m2 Final    As of July 11, 2021, eGFR is calculated by the CKD-EPI creatinine equation, without race adjustment. eGFR can be influenced by muscle mass, exercise, and diet. The reported eGFR is an estimation only and is only applicable if the renal function is stable.     INR 10/15/2021 2.2* 0.9 - 1.1 Final       Assessment/Plan:   1. CKD stage 3: risk factors for CKD include previous lupus nephritis, hypertension, as well as acute kidney  injuries.  She is on lisinopril 40 mg daily. Last year we were considering kidney biopsy given rise in creatinine. Brilinta was stopped and creatinine stabilized slightly. She has mild proteinuria but proteinuria has been present dating back to 2015 even. MOst recent UPCR was 0.29 g/g and without hematuria.     2. Hypertension: at goal of <130/80    3. Lupus: on plaquenil, will check complements and ds-DNA    4. Anemia:  Hemoglobin 11.6 - iron saturation due to be checked - will get this lab on Thursday.    5. Secondary hyperparathyroidism, renal: - due for vitamin D studies.     236-250 PM video visit via LAURA Clark DO

## 2021-11-02 NOTE — PROGRESS NOTES
Luz Marina Live is a 61 year old who is being evaluated via a billable video visit.      How would you like to obtain your AVS? MyChart  If the video visit is dropped, the invitation should be resent by: Text to cell phone: [phone]  Will anyone else be joining your video visit? No          Usama Harris, EMT  Clinic Support  [facility]    [phone]    Employed by [facility] [employer]

## 2021-11-02 NOTE — PATIENT INSTRUCTIONS
1. MOnitor blood pressure daily for the next week. Goal is 110-130/80.   2. Labs at a minimum of every 3 months  3. Follow-up in one year or sooner as needed.

## 2021-11-03 NOTE — NURSING NOTE
11/03/21 Message sent to Procedure  to contact pt and assist with scheduling Future appts recommended by Amber:    Instructions:  1. MOnitor blood pressure daily for the next week. Goal is 110-130/80.   2. Labs at a minimum of every 3 months  3. Follow-up in one year or sooner as needed.      Jessica Calhoun LPN

## 2021-11-04 ENCOUNTER — ANTICOAGULATION THERAPY VISIT (OUTPATIENT)
Dept: ANTICOAGULATION | Facility: OTHER | Age: 61
End: 2021-11-04

## 2021-11-04 ENCOUNTER — LAB (OUTPATIENT)
Dept: LAB | Facility: CLINIC | Age: 61
End: 2021-11-04
Payer: COMMERCIAL

## 2021-11-04 ENCOUNTER — THERAPY VISIT (OUTPATIENT)
Dept: PHYSICAL THERAPY | Facility: CLINIC | Age: 61
End: 2021-11-04
Payer: COMMERCIAL

## 2021-11-04 DIAGNOSIS — R53.81 PHYSICAL DECONDITIONING: ICD-10-CM

## 2021-11-04 DIAGNOSIS — N25.81 SECONDARY RENAL HYPERPARATHYROIDISM (H): ICD-10-CM

## 2021-11-04 DIAGNOSIS — I25.10 CORONARY ARTERY DISEASE INVOLVING NATIVE HEART WITHOUT ANGINA PECTORIS, UNSPECIFIED VESSEL OR LESION TYPE: ICD-10-CM

## 2021-11-04 DIAGNOSIS — I48.0 PAF (PAROXYSMAL ATRIAL FIBRILLATION) (H): ICD-10-CM

## 2021-11-04 DIAGNOSIS — Z79.01 LONG TERM CURRENT USE OF ANTICOAGULANT THERAPY: ICD-10-CM

## 2021-11-04 DIAGNOSIS — M32.14 SYSTEMIC LUPUS ERYTHEMATOSUS WITH GLOMERULAR DISEASE, UNSPECIFIED SLE TYPE (H): ICD-10-CM

## 2021-11-04 DIAGNOSIS — Z79.01 LONG TERM CURRENT USE OF ANTICOAGULANT THERAPY: Primary | ICD-10-CM

## 2021-11-04 DIAGNOSIS — I51.89 DIASTOLIC DYSFUNCTION: ICD-10-CM

## 2021-11-04 DIAGNOSIS — D64.9 ANEMIA, UNSPECIFIED TYPE: ICD-10-CM

## 2021-11-04 LAB
ANION GAP SERPL CALCULATED.3IONS-SCNC: 4 MMOL/L (ref 3–14)
BUN SERPL-MCNC: 59 MG/DL (ref 7–30)
CALCIUM SERPL-MCNC: 9.3 MG/DL (ref 8.5–10.1)
CHLORIDE BLD-SCNC: 107 MMOL/L (ref 94–109)
CO2 SERPL-SCNC: 25 MMOL/L (ref 20–32)
CREAT SERPL-MCNC: 1.91 MG/DL (ref 0.52–1.04)
FERRITIN SERPL-MCNC: 153 NG/ML (ref 8–252)
GFR SERPL CREATININE-BSD FRML MDRD: 28 ML/MIN/1.73M2
GLUCOSE BLD-MCNC: 90 MG/DL (ref 70–99)
INR BLD: 2.9 (ref 0.9–1.1)
IRON SATN MFR SERPL: 25 % (ref 15–46)
IRON SERPL-MCNC: 59 UG/DL (ref 35–180)
NT-PROBNP SERPL-MCNC: 1123 PG/ML (ref 0–125)
POTASSIUM BLD-SCNC: 4.8 MMOL/L (ref 3.4–5.3)
SODIUM SERPL-SCNC: 136 MMOL/L (ref 133–144)
TIBC SERPL-MCNC: 240 UG/DL (ref 240–430)

## 2021-11-04 PROCEDURE — 83880 ASSAY OF NATRIURETIC PEPTIDE: CPT

## 2021-11-04 PROCEDURE — 97530 THERAPEUTIC ACTIVITIES: CPT | Mod: GP

## 2021-11-04 PROCEDURE — 86225 DNA ANTIBODY NATIVE: CPT

## 2021-11-04 PROCEDURE — 82728 ASSAY OF FERRITIN: CPT

## 2021-11-04 PROCEDURE — 36415 COLL VENOUS BLD VENIPUNCTURE: CPT

## 2021-11-04 PROCEDURE — 97110 THERAPEUTIC EXERCISES: CPT | Mod: GP

## 2021-11-04 PROCEDURE — 82306 VITAMIN D 25 HYDROXY: CPT

## 2021-11-04 PROCEDURE — 36416 COLLJ CAPILLARY BLOOD SPEC: CPT

## 2021-11-04 PROCEDURE — 80048 BASIC METABOLIC PNL TOTAL CA: CPT

## 2021-11-04 PROCEDURE — 85610 PROTHROMBIN TIME: CPT

## 2021-11-04 PROCEDURE — 97112 NEUROMUSCULAR REEDUCATION: CPT | Mod: GP

## 2021-11-04 PROCEDURE — 83550 IRON BINDING TEST: CPT

## 2021-11-04 PROCEDURE — 86160 COMPLEMENT ANTIGEN: CPT

## 2021-11-04 NOTE — PROGRESS NOTES
ANTICOAGULATION MANAGEMENT     Luz Marina Live 61 year old female is on warfarin with therapeutic INR result. (Goal INR 2.0-3.0)    Recent labs: (last 7 days)     11/04/21  1444   INR 2.9*       ASSESSMENT     Source(s): Chart Review and Patient/Caregiver Call I left a detailed voicemail with the orders below. I have also requested a call back if there have been any missed doses, concerns, illness, fever, or if there have been any changes in medications, activity level, or diet        Warfarin doses taken: Warfarin taken as instructed    Diet: No new diet changes identified    New illness, injury, or hospitalization: No    Medication/supplement changes: None noted    Signs or symptoms of bleeding or clotting: No    Previous INR: Therapeutic last 2(+) visits    Additional findings: None     PLAN     Recommended plan for no diet, medication or health factor changes affecting INR     Dosing Instructions: Continue your current warfarin dose with next INR in 5 weeks       Summary  As of 11/4/2021    Full warfarin instructions:  10 mg every Tue, Thu, Sat; 7.5 mg all other days   Next INR check:  12/9/2021             Detailed voice message left for Luz Marina with dosing instructions and follow up date.     Contact [phone]  to schedule and with any changes, questions or concerns.     Education provided: Please call back if any changes to your diet, medications or how you've been taking warfarin    Plan made per [facility] anticoagulation protocol    Michael Quispe, RN  Anticoagulation Clinic  11/4/2021    _______________________________________________________________________     Anticoagulation Episode Summary     Current INR goal:  2.0-3.0   TTR:  49.7 % (1 y)   Target end date:  Indefinite   Send INR reminders to:  [facility]    Indications    Long-term (current) use of anticoagulants [Z79.01] [Z79.01]  PAF (paroxysmal atrial fibrillation) (H) [I48.0]           Comments:  5 mg tabs, [facility]  PM dose          Anticoagulation Care Providers     Provider Role Specialty Phone number    Eliz Nguyen MD Referring Internal Medicine [phone]

## 2021-11-05 DIAGNOSIS — I25.10 CORONARY ARTERY DISEASE INVOLVING NATIVE HEART WITHOUT ANGINA PECTORIS, UNSPECIFIED VESSEL OR LESION TYPE: Chronic | ICD-10-CM

## 2021-11-05 DIAGNOSIS — Z79.899 LONG-TERM USE OF PLAQUENIL: ICD-10-CM

## 2021-11-05 DIAGNOSIS — M32.14 SYSTEMIC LUPUS ERYTHEMATOSUS WITH GLOMERULAR DISEASE, UNSPECIFIED SLE TYPE (H): Chronic | ICD-10-CM

## 2021-11-05 DIAGNOSIS — I48.91 ATRIAL FIBRILLATION, UNSPECIFIED TYPE (H): ICD-10-CM

## 2021-11-05 DIAGNOSIS — Z79.60 LONG-TERM USE OF IMMUNOSUPPRESSANT MEDICATION: Chronic | ICD-10-CM

## 2021-11-05 DIAGNOSIS — I10 BENIGN ESSENTIAL HYPERTENSION: ICD-10-CM

## 2021-11-05 LAB
C3 SERPL-MCNC: 86 MG/DL (ref 81–157)
C4 SERPL-MCNC: 9 MG/DL (ref 13–39)
DSDNA AB SER-ACNC: 37 IU/ML

## 2021-11-06 ENCOUNTER — HEALTH MAINTENANCE LETTER (OUTPATIENT)
Age: 61
End: 2021-11-06

## 2021-11-09 LAB
DEPRECATED CALCIDIOL+CALCIFEROL SERPL-MC: <42 UG/L (ref 20–75)
VITAMIN D2 SERPL-MCNC: <5 UG/L
VITAMIN D3 SERPL-MCNC: 37 UG/L

## 2021-11-09 RX ORDER — HYDROXYCHLOROQUINE SULFATE 200 MG/1
400 TABLET, FILM COATED ORAL DAILY
Qty: 180 TABLET | Refills: 0 | Status: SHIPPED | OUTPATIENT
Start: 2021-11-09 | End: 2022-01-17

## 2021-11-09 RX ORDER — METOPROLOL TARTRATE 50 MG
100 TABLET ORAL 2 TIMES DAILY
Qty: 360 TABLET | Refills: 0 | Status: SHIPPED | OUTPATIENT
Start: 2021-11-09 | End: 2022-01-17

## 2021-11-09 RX ORDER — LISINOPRIL 10 MG/1
30 TABLET ORAL DAILY
Qty: 270 TABLET | Refills: 0 | Status: SHIPPED | OUTPATIENT
Start: 2021-11-09 | End: 2022-01-17

## 2021-11-09 NOTE — TELEPHONE ENCOUNTER
METOPROLOL TARTRATE 50 MG TAB    Last Written Prescription Date:  11/9/2020  Last Fill Quantity: 360,   # refills: 3  Last Office Visit :  10/6/2021  Future Office visit:  1/12/2022  360 Tabs sent to pharm to cover Pt until visit in January 2022.    LISINOPRIL 10 MG TABLET    Last Written Prescription Date:  8/13/2021  Last Fill Quantity: 90,   # refills: 3  Last Office Visit :  10/6/2021  Future Office visit:  1/12/2022  270 Tabs sent to pharm to cover Pt until visit in January 2022.    Yana Richards RN  Central Triage Red Flags/Med Refills

## 2021-11-09 NOTE — TELEPHONE ENCOUNTER
HYDROXYCHLOROQUINE 200 MG TAB  Plaquenil      Last Written Prescription Date:  10/16/2020  Last Fill Quantity: 180,   # refills: 3  Last Office Visit: 10/16/2020  Future Office visit: None  Last Eye Exam: 10/4/2021  180 Tabs sent to pharm 11/9/2021      Yana Richards RN  Central Triage Red Flags/Med Refills

## 2021-11-10 ENCOUNTER — MYC MEDICAL ADVICE (OUTPATIENT)
Dept: CARDIOLOGY | Facility: CLINIC | Age: 61
End: 2021-11-10
Payer: COMMERCIAL

## 2021-11-11 ENCOUNTER — THERAPY VISIT (OUTPATIENT)
Dept: PHYSICAL THERAPY | Facility: CLINIC | Age: 61
End: 2021-11-11
Payer: COMMERCIAL

## 2021-11-11 DIAGNOSIS — R53.81 PHYSICAL DECONDITIONING: ICD-10-CM

## 2021-11-11 PROCEDURE — 97530 THERAPEUTIC ACTIVITIES: CPT | Mod: GP

## 2021-11-11 PROCEDURE — 97110 THERAPEUTIC EXERCISES: CPT | Mod: GP

## 2021-11-11 PROCEDURE — 97112 NEUROMUSCULAR REEDUCATION: CPT | Mod: GP

## 2021-11-11 NOTE — TELEPHONE ENCOUNTER
"Lab results reviewed by Dr. Moreno.  Communicated with Luz Marina via [employer] plan of care changes. Luz Marina verbalizes understanding and agrees with plan of care. Kaylie Tran RN      Date: 11/11/2021  Time of Call: 10:49 AM  Diagnosis:  HF  VORB - Ordering provider: Dr. Moreno   Order: Decrease back down to lasix 40mg daily.   Order received by: Kaylie Tran RN   Communicated to Luz Marina via [employer]:\" Ok, thanks for the info.  Yes, let's increase your lasix back to 40mg daily.  Your creatinine was up a bit and let's back off the lasix because of that.  Please let us know if you begin to gain weight (continue to weigh yourself daily) and/or if you begin to experience more shortness of breath.\"        "

## 2021-11-11 NOTE — PROGRESS NOTES
Luz Marina is a 61 year old who is being evaluated via a billable video visit.      Are you in the [facility] for this visit? Yes    How would you like to obtain your AVS? MyChart  If the video visit is dropped, the invitation should be resent by: Text to cell phone: [phone]  Will anyone else be joining your video visit? No      Video Start Time: 2:00 PM  Video-Visit Details    Type of service:  Video Visit    Video End Time:2:20 PM    Originating Location (pt. Location): Home    Distant Location (provider location):  [facility]     Platform used for Video Visit: CloudCar                [facility] [place]   Outpatient Sleep Medicine Follow-up Visit  November 12, 2021    Name: Luz Marina Live MRN# [id number]   Age: 61 year old YOB: 1960     Date of Consultation: November 12, 2021  Consultation is requested by: No referring provider defined for this encounter.  Primary care provider: Eliz Nguyen           Assessment and Plan:   I Diagnoses:    Sleep Diagnoses:    Insomnia in the setting of delayed sleep-wake phase and circadian misalignment    Severe obstructive sleep apnea currently effectively treated with CPAP with average use 6.6 hours per night and AHI of less than 2     Comorbid conditions:    Chronic rhinitis     Coronary arrtery disease    Tricuspid insufficiency    Paroxysmal atrial fibrillation on anticoagulation    Systemic lupus erythematosis with renal disease stage 3 on plaquenil        Summary Recommendations: (Recent)    Treatment humidity on your CPAP device     Preset target bedtime of 12:30 AM    Return to clinic in 1 year or earlier if you have recurrent symptoms of snoring, sleep disruption or nonrestorative sleep            History of Present Illness:      Luz Marina Live is a 61 year old female with a history of severe sleep apnea and hypoxemia in the setting of atrial fibrillation, patent foramen ovale and mild pulmonary  "hypertension with tricuspid insufficiency.  She cites no difficulty with pain, rumination of restless legs at bedtime but has difficulty initiating sleep naturally as result of tendency for sleep phase delay.  She has difficulty getting to sleep and consistently wearing her CPAP on a regular basis at night with intermittent use typically lasting 3 to 5 hours.         PREVIOUS SLEEP STUDIES:  Study Date: 6/13/2018  MRN: [id number]  Referring Provider: Dr. Mroeno  Ordering Provider: Dr. Adrian     Indications for Polysomnography: The patient is a 57 y old Female who is 5' 3\" and weighs 257.0 lbs. Her BMI is 45.5, [place] sleepiness scale 3.0 and neck circumference is 40.0 cm. Relevant medical history includes coronary artery disease, paroxysmal atrial fibrillation, hypertension, SLE.  A diagnostic polysomnogram was performed to evaluate for possible sleep apnea, hypoventilation.  Polysomnogram Data: A full night polysomnogram recorded the standard physiologic parameters including EEG, EOG, EMG, ECG, nasal and oral airflow. Respiratory parameters of chest and abdominal movements were recorded with respiratory inductance plethysmography. Oxygen saturation was recorded by pulse oximetry. Hypopnea scoring rule used: 1B 4%.  Sleep Architecture:   The total recording time of the polysomnogram was 486.5 minutes. The total sleep time was 378.5 minutes. Sleep latency was at 50.5 minutes from lights off and 27.0 minutes from stopping use of smartphone with the use of a sleep aid (zolpidem). REM latency was 64.5 minutes. Arousal index was 17.9 arousals per hour. Sleep efficiency was decreased at 77.8%. Wake after sleep onset was 56.5 minutes. The patient spent 6.6% of total sleep time in Stage N1, 56.1% in Stage N2, 15.9% in Stage N3, and 21.4% in REM. Time in REM supine was 41.5 minutes.  Respiration:     Events ? The polysomnogram revealed a presence of 97 obstructive, 3 central, and 4 mixed apneas resulting in an apnea index " of 16.5 events per hour. There were 91 obstructive hypopneas and 0 central hypopneas resulting in an obstructive hypopnea index of 14.4 and central hypopnea index of 0 events per hour. The combined apnea/hypopnea index was 30.9 events per hour (central apnea/hypopnea index was 0.5 events per hour). The REM AHI was 76.3 events per hour. The supine AHI was 48.7 events per hour. The RERA index was 1.6 events per hour.  The RDI was 32.5 events per hour.    Snoring - was reported as moderate to loud.    Respiratory rate and pattern - was notable for normal respiratory rate and pattern.    Sustained Sleep Associated Hypoventilation - Transcutaneous carbon dioxide monitoring was used, however severe hypoventilation was not suggested with a maximum change from baseline of 35-38 mmHg to a peak of 46 mmHg and 0 minutes at or greater than 55 mmHg.    Sleep Associated Hypoxemia - (Greater than 5 minutes O2 sat at or below 88%) was present. Baseline oxygen saturation was 94.3%. Lowest oxygen saturation was 71.1%. Time spent less than or equal to 88% was 20.8 minutes. Time spent less than or equal to 89% was 25.7 minutes.  Movement Activity:     Periodic Limb Activity - There were 143 PLMs during the entire study. The PLM index was 22.7 movements per hour. The PLM Arousal Index was 0.3 per hour.    REM EMG Activity - Excessive transient/sustained muscle activity was not present.    Nocturnal Behavior - Abnormal sleep related behaviors were not noted    Bruxism - None apparent.  Cardiac Summary:   The average pulse rate was 57.9 bpm. The minimum pulse rate was 48.9 bpm while the maximum pulse rate was 71.1 bpm.  Arrhythmias were not noted.           Most Recent SCALES:    EPWORTH SLEEPINESS SCALE WITHIN 1 YEAR WITHIN 10 DAYS   Sitting and reading 0 0   Watching TV 0 0   Sitting, inactive in a public place (theatre or mtg.) 0  0    As a passenger in a car 0 0   Lying down to rest in the afternoon when circumstance permit 0 0    Sitting and talking to someone 0 0   Sitting quietly after lunch without alcohol 0 0   In a car, while stopped for a few minutes in traffic 0 0   TOTAL SCORE 0 0       INSOMNIA SEVERITY INDEX WITHIN 1 YEAR   Difficulty falling asleep 1   Difficult staying asleep 0   Problems waking up to early 0   How SATISFIED/DISSATISFIED are you with your CURRENT sleep pattern? 2   How NOTICEABLE to others do you think your sleep pattern is in terms of your quality of life? 0   How WORRIED/DISTRESSED are you about your current sleep pattern? 1   To what extent do you consider your sleep problem to INTERFERE with your daily fuctioning(e.g. daytime fatigue, mood, ability to function at work/daily chores, concentration, mood,etc.) CURRENTLY? 1   INSOMNIA SEVERITY INDEX TOTAL SCORE 5    --absence of insomnia (0-7); sub-threshold insomnia (8-14); moderate insomnia (15-21); and severe insomnia (22-28)--          REENA Total Score: 5      Objective:  CPAP Compliance Targets:   >70% days > 4 hours AHI < 5   30 days ending November 12, 2021  Mask type:  Nasal Pillows   ResMed   Auto-PAP 10.0 - 16.0 cmH2O 30 day usage data:    83% of days with > 4 hours of use. 4/30 days with no use.   Average use 398 minutes per day.   95%ile Leak 27.56 L/min.   CPAP 95% pressure 15.2 cm.   AHI 1.87 events per hour.   6                Medications:     Current Outpatient Medications   Medication Sig     aspirin 81 MG EC tablet Take 81 mg by mouth daily     atorvastatin (LIPITOR) 40 MG tablet TAKE 1 TABLET BY MOUTH EVERY DAY     furosemide (LASIX) 20 MG tablet Take 2 tablets (40 mg) by mouth daily     hydroxychloroquine (PLAQUENIL) 200 MG tablet Take 2 tablets (400 mg) by mouth daily (Office visit due before next refill. Please call [phone] to schedule) Thank you     lisinopril (ZESTRIL) 10 MG tablet Take 3 tablets (30 mg) by mouth daily (Please keep visit for 1/12/2021 for further refills) Thank you     loratadine (CLARITIN) 10 MG tablet Take 10 mg by  mouth daily     metoprolol tartrate (LOPRESSOR) 50 MG tablet Take 2 tablets (100 mg) by mouth 2 times daily (Please keep visit for 1/12/2021 for further refills) Thank you     metroNIDAZOLE (METROCREAM) 0.75 % external cream Apply topically 2 times daily     MULTIPLE VITAMIN PO Take 1 tablet by mouth daily      Omega-3 Fatty Acids (FISH OIL) 1200 MG capsule Take 2 capsules by mouth 2 times daily      order for DME Autopap 10-16 cmH20     warfarin ANTICOAGULANT (COUMADIN) 5 MG tablet TAKE 1 TAB (5 MG) BY MOUTH ON FRI & 2 TABS (10 MG) ALL OTHER DAYS, OR AS DIRECTED     No current facility-administered medications for this visit.        Allergies   Allergen Reactions     Seasonal Allergies      Sulfa Drugs Rash and GI Disturbance            Problem List:     Patient Active Problem List   Diagnosis     Hyperlipidemia LDL goal <100     CAD (coronary artery disease)     HTN, goal below 140/90     PAF (paroxysmal atrial fibrillation) (H)     CKD (chronic kidney disease) stage 3, GFR 30-59 ml/min (H)     Long-term (current) use of anticoagulants [Z79.01]     Moderate tricuspid insufficiency     Systemic lupus erythematosus with glomerular disease, unspecified SLE type (H)     Psychophysiological insomnia     Morbid obesity (H)     KEITH (obstructive sleep apnea)- severe (AHI 30)     Long-term use of Plaquenil     Long-term use of immunosuppressant medication     Stable angina (H)     Positive cardiac stress test     Status post coronary angiogram     Physical deconditioning            Past Medical History:     Does not need 02 supplement at night   Past Medical History:   Diagnosis Date     Hypertension      Hypovolemic shock (H) 2/28/2015     Lupus (H)      Sepsis (H) 8/22/2015             Past Surgical History:    No h/o  upper airway surgery  Past Surgical History:   Procedure Laterality Date     CARDIAC SURGERY  08/27/2009    triple vessel coronary artery bypass grafting on 8/27/09     CARPAL TUNNEL RELEASE RT/LT  1996     bilateral     CV CORONARY ANGIOGRAM  10/17/2019    Procedure: CV CORONARY ANGIOGRAM;  Surgeon: Mahendra Collins MD;  Location: [facility] CARDIAC CATH LAB     CV CORONARY ANGIOGRAM N/A 8/20/2021    Procedure: CV CORONARY ANGIOGRAM;  Surgeon: Derek Escoto MD;  Location: [facility] CARDIAC CATH LAB     CV PCI STENT DRUG ELUTING N/A 10/17/2019    Procedure: PCI Stent Drug Eluting;  Surgeon: Mahendra Collins MD;  Location: [facility] CARDIAC CATH LAB     CV RIGHT HEART CATH MEASUREMENTS RECORDED N/A 8/20/2021    Procedure: CV RIGHT HEART CATH;  Surgeon: Derek Escoto MD;  Location: [facility] CARDIAC CATH LAB              Physical Examination:   Objective:    Reported vitals:  There were no vitals taken for this visit.   GENERAL: Healthy, alert and no distress  EYES: Eyes grossly normal to inspection.  No discharge or erythema, or obvious scleral/conjunctival abnormalities.  RESP: No audible wheeze, cough, or visible cyanosis.  No visible retractions or increased work of breathing.    SKIN: Visible skin clear. No significant rash, abnormal pigmentation or lesions.  NEURO: Cranial nerves grossly intact.  Mentation and speech appropriate for age.  PSYCH: Mentation appears normal, affect normal/bright, judgement and insight intact, normal speech and appearance well-groomed.        Copy to: Eliz Nguyen MD 11/12/2021     Total time spent with patient on this date including review of records and documentation 40 minutes

## 2021-11-11 NOTE — PATIENT INSTRUCTIONS

## 2021-11-12 ENCOUNTER — VIRTUAL VISIT (OUTPATIENT)
Dept: SLEEP MEDICINE | Facility: CLINIC | Age: 61
End: 2021-11-12
Payer: COMMERCIAL

## 2021-11-12 DIAGNOSIS — G47.33 OSA (OBSTRUCTIVE SLEEP APNEA): ICD-10-CM

## 2021-11-12 DIAGNOSIS — E66.01 MORBID OBESITY (H): ICD-10-CM

## 2021-11-12 PROCEDURE — 99214 OFFICE O/P EST MOD 30 MIN: CPT | Mod: 95 | Performed by: INTERNAL MEDICINE

## 2021-11-18 ENCOUNTER — THERAPY VISIT (OUTPATIENT)
Dept: PHYSICAL THERAPY | Facility: CLINIC | Age: 61
End: 2021-11-18
Payer: COMMERCIAL

## 2021-11-18 DIAGNOSIS — R53.81 PHYSICAL DECONDITIONING: ICD-10-CM

## 2021-11-18 PROCEDURE — 97110 THERAPEUTIC EXERCISES: CPT | Mod: GP

## 2021-11-18 PROCEDURE — 97530 THERAPEUTIC ACTIVITIES: CPT | Mod: GP

## 2021-11-18 PROCEDURE — 97112 NEUROMUSCULAR REEDUCATION: CPT | Mod: GP

## 2021-11-26 DIAGNOSIS — Z79.60 LONG-TERM USE OF IMMUNOSUPPRESSANT MEDICATION: Chronic | ICD-10-CM

## 2021-11-26 DIAGNOSIS — I10 BENIGN ESSENTIAL HYPERTENSION: ICD-10-CM

## 2021-11-26 DIAGNOSIS — Z79.899 LONG-TERM USE OF PLAQUENIL: ICD-10-CM

## 2021-11-26 DIAGNOSIS — M32.14 SYSTEMIC LUPUS ERYTHEMATOSUS WITH GLOMERULAR DISEASE, UNSPECIFIED SLE TYPE (H): Chronic | ICD-10-CM

## 2021-11-26 DIAGNOSIS — I25.10 CORONARY ARTERY DISEASE INVOLVING NATIVE HEART WITHOUT ANGINA PECTORIS, UNSPECIFIED VESSEL OR LESION TYPE: Chronic | ICD-10-CM

## 2021-11-26 DIAGNOSIS — I48.91 ATRIAL FIBRILLATION, UNSPECIFIED TYPE (H): ICD-10-CM

## 2021-11-29 DIAGNOSIS — E78.00 PURE HYPERCHOLESTEROLEMIA: ICD-10-CM

## 2021-11-30 RX ORDER — METOPROLOL TARTRATE 50 MG
TABLET ORAL
Qty: 360 TABLET | Refills: 0 | OUTPATIENT
Start: 2021-11-30

## 2021-11-30 RX ORDER — HYDROXYCHLOROQUINE SULFATE 200 MG/1
TABLET, FILM COATED ORAL
Qty: 180 TABLET | Refills: 0 | OUTPATIENT
Start: 2021-11-30

## 2021-11-30 RX ORDER — LISINOPRIL 10 MG/1
TABLET ORAL
Qty: 270 TABLET | Refills: 0 | OUTPATIENT
Start: 2021-11-30

## 2021-12-01 RX ORDER — ATORVASTATIN CALCIUM 40 MG/1
40 TABLET, FILM COATED ORAL DAILY
Qty: 90 TABLET | Refills: 2 | Status: SHIPPED | OUTPATIENT
Start: 2021-12-01 | End: 2022-06-09

## 2021-12-02 ENCOUNTER — THERAPY VISIT (OUTPATIENT)
Dept: PHYSICAL THERAPY | Facility: CLINIC | Age: 61
End: 2021-12-02
Payer: COMMERCIAL

## 2021-12-02 DIAGNOSIS — R53.81 PHYSICAL DECONDITIONING: ICD-10-CM

## 2021-12-02 PROCEDURE — 97112 NEUROMUSCULAR REEDUCATION: CPT | Mod: GP

## 2021-12-02 PROCEDURE — 97110 THERAPEUTIC EXERCISES: CPT | Mod: GP

## 2021-12-16 ENCOUNTER — THERAPY VISIT (OUTPATIENT)
Dept: PHYSICAL THERAPY | Facility: CLINIC | Age: 61
End: 2021-12-16
Payer: COMMERCIAL

## 2021-12-16 DIAGNOSIS — R53.81 PHYSICAL DECONDITIONING: ICD-10-CM

## 2021-12-16 PROCEDURE — 97112 NEUROMUSCULAR REEDUCATION: CPT | Mod: GP

## 2021-12-16 PROCEDURE — 97110 THERAPEUTIC EXERCISES: CPT | Mod: GP

## 2021-12-16 NOTE — PROGRESS NOTES
Subjective:  HPI  Physical Exam                    Objective:  System    Physical Exam    General     ROS    Assessment/Plan:    DISCHARGE REPORT    Progress reporting period is from 10/7/2021 to 2021.       SUBJECTIVE  Subjective: Patient reports she is doing well. She reports that she is able to go walking around the stores without fatigue or limitation especially with a cart assisting her. She feels ready to continue her therapy independently at home with her home program.    Current Pain level: 0/10.     Initial Pain level: 0/10.   Changes in function:  Yes (See Goal flowsheet attached for changes in current functional level)  Adverse reaction to treatment or activity: None    OBJECTIVE  Changes noted in objective findings:  Yes  Objective: [place]/56, LEFS: 67/80, SLS: 9 seconds     ASSESSMENT/PLAN  Updated problem list and treatment plan: Diagnosis 1:  Deconditioning  Decreased ROM/flexibility - manual therapy, therapeutic exercise, therapeutic activity, neuromuscular reeducation and home program  Decreased strength - therapeutic exercise, therapeutic activities, neuromuscular reeducation and home program  STG/LTGs have been met or progress has been made towards goals:  Yes (See Goal flow sheet completed today.)  Assessment of Progress: The patient's condition has potential to improve.  Self Management Plans:  Patient has been instructed in a home treatment program.  Patient is independent in a home treatment program.  Patient  has been instructed in self management of symptoms.  Patient is independent in self management of symptoms.  I have re-evaluated this patient and find that the nature, scope, duration and intensity of the therapy is appropriate for the medical condition of the patient.  Luz Marina continues to require the following intervention to meet STG and LTG's:  PT intervention is no longer required to meet STG/LTG.    Recommendations:  This patient is ready to be discharged from therapy and  continue their home treatment program.    Please refer to the daily flowsheet for treatment today, total treatment time and time spent performing 1:1 timed codes.

## 2021-12-21 ENCOUNTER — TELEPHONE (OUTPATIENT)
Dept: ANTICOAGULATION | Facility: OTHER | Age: 61
End: 2021-12-21
Payer: COMMERCIAL

## 2021-12-21 NOTE — TELEPHONE ENCOUNTER
ANTICOAGULATION - Outreach 1    Luz Marina Live is overdue for INR check.      Left message for patient to call and schedule lab appointment as soon as possible. If returning call, please schedule.     Cait Hawthorne RN

## 2022-01-10 ENCOUNTER — PRE VISIT (OUTPATIENT)
Dept: CARDIOLOGY | Facility: CLINIC | Age: 62
End: 2022-01-10
Payer: COMMERCIAL

## 2022-01-10 DIAGNOSIS — I50.32 CHRONIC DIASTOLIC HEART FAILURE (H): Primary | ICD-10-CM

## 2022-01-10 DIAGNOSIS — I25.10 CAD (CORONARY ARTERY DISEASE): ICD-10-CM

## 2022-01-12 ENCOUNTER — LAB (OUTPATIENT)
Dept: LAB | Facility: CLINIC | Age: 62
End: 2022-01-12
Attending: INTERNAL MEDICINE
Payer: COMMERCIAL

## 2022-01-12 ENCOUNTER — ANTICOAGULATION THERAPY VISIT (OUTPATIENT)
Dept: ANTICOAGULATION | Facility: OTHER | Age: 62
End: 2022-01-12

## 2022-01-12 ENCOUNTER — OFFICE VISIT (OUTPATIENT)
Dept: CARDIOLOGY | Facility: CLINIC | Age: 62
End: 2022-01-12
Attending: INTERNAL MEDICINE
Payer: COMMERCIAL

## 2022-01-12 VITALS
OXYGEN SATURATION: 98 % | HEART RATE: 55 BPM | HEIGHT: 63 IN | SYSTOLIC BLOOD PRESSURE: 143 MMHG | DIASTOLIC BLOOD PRESSURE: 88 MMHG | WEIGHT: 263.1 LBS | BODY MASS INDEX: 46.62 KG/M2

## 2022-01-12 DIAGNOSIS — N18.31 STAGE 3A CHRONIC KIDNEY DISEASE (H): ICD-10-CM

## 2022-01-12 DIAGNOSIS — I48.0 PAF (PAROXYSMAL ATRIAL FIBRILLATION) (H): ICD-10-CM

## 2022-01-12 DIAGNOSIS — I10 HTN, GOAL BELOW 140/90: ICD-10-CM

## 2022-01-12 DIAGNOSIS — Z79.01 LONG TERM CURRENT USE OF ANTICOAGULANT THERAPY: Primary | ICD-10-CM

## 2022-01-12 DIAGNOSIS — I25.10 CORONARY ARTERY DISEASE INVOLVING NATIVE HEART WITHOUT ANGINA PECTORIS, UNSPECIFIED VESSEL OR LESION TYPE: ICD-10-CM

## 2022-01-12 DIAGNOSIS — I50.32 CHRONIC DIASTOLIC HEART FAILURE (H): ICD-10-CM

## 2022-01-12 DIAGNOSIS — I10 BENIGN ESSENTIAL HYPERTENSION: ICD-10-CM

## 2022-01-12 DIAGNOSIS — Z79.01 LONG TERM CURRENT USE OF ANTICOAGULANT THERAPY: ICD-10-CM

## 2022-01-12 DIAGNOSIS — I48.91 ATRIAL FIBRILLATION, UNSPECIFIED TYPE (H): ICD-10-CM

## 2022-01-12 DIAGNOSIS — I50.32 CHRONIC DIASTOLIC HEART FAILURE (H): Primary | ICD-10-CM

## 2022-01-12 DIAGNOSIS — I25.10 CAD (CORONARY ARTERY DISEASE): ICD-10-CM

## 2022-01-12 LAB
ALBUMIN SERPL-MCNC: 3.6 G/DL (ref 3.4–5)
ALBUMIN UR-MCNC: NEGATIVE MG/DL
ALP SERPL-CCNC: 116 U/L (ref 40–150)
ALT SERPL W P-5'-P-CCNC: 28 U/L (ref 0–50)
ANION GAP SERPL CALCULATED.3IONS-SCNC: 6 MMOL/L (ref 3–14)
APPEARANCE UR: CLEAR
AST SERPL W P-5'-P-CCNC: 27 U/L (ref 0–45)
BILIRUB SERPL-MCNC: 0.7 MG/DL (ref 0.2–1.3)
BILIRUB UR QL STRIP: NEGATIVE
BUN SERPL-MCNC: 56 MG/DL (ref 7–30)
CALCIUM SERPL-MCNC: 9.3 MG/DL (ref 8.5–10.1)
CHLORIDE BLD-SCNC: 110 MMOL/L (ref 94–109)
CO2 SERPL-SCNC: 24 MMOL/L (ref 20–32)
COLOR UR AUTO: YELLOW
CREAT SERPL-MCNC: 1.6 MG/DL (ref 0.52–1.04)
CREAT UR-MCNC: 43 MG/DL
ERYTHROCYTE [DISTWIDTH] IN BLOOD BY AUTOMATED COUNT: 13.3 % (ref 10–15)
GFR SERPL CREATININE-BSD FRML MDRD: 36 ML/MIN/1.73M2
GLUCOSE BLD-MCNC: 98 MG/DL (ref 70–99)
GLUCOSE UR STRIP-MCNC: NEGATIVE MG/DL
HCT VFR BLD AUTO: 38.3 % (ref 35–47)
HGB BLD-MCNC: 12.1 G/DL (ref 11.7–15.7)
HGB UR QL STRIP: NEGATIVE
INR BLD: 2.8 (ref 0.9–1.1)
KETONES UR STRIP-MCNC: NEGATIVE MG/DL
LEUKOCYTE ESTERASE UR QL STRIP: NEGATIVE
MCH RBC QN AUTO: 30.5 PG (ref 26.5–33)
MCHC RBC AUTO-ENTMCNC: 31.6 G/DL (ref 31.5–36.5)
MCV RBC AUTO: 97 FL (ref 78–100)
NITRATE UR QL: NEGATIVE
NT-PROBNP SERPL-MCNC: 1066 PG/ML (ref 0–125)
PH UR STRIP: 5 [PH] (ref 5–7)
PHOSPHATE SERPL-MCNC: 3.1 MG/DL (ref 2.5–4.5)
PLATELET # BLD AUTO: 173 10E3/UL (ref 150–450)
POTASSIUM BLD-SCNC: 5.1 MMOL/L (ref 3.4–5.3)
PROT SERPL-MCNC: 8.5 G/DL (ref 6.8–8.8)
PROT UR-MCNC: 0.1 G/L
PROT/CREAT 24H UR: 0.23 G/G CR (ref 0–0.2)
PTH-INTACT SERPL-MCNC: 125 PG/ML (ref 18–80)
RBC # BLD AUTO: 3.97 10E6/UL (ref 3.8–5.2)
SODIUM SERPL-SCNC: 140 MMOL/L (ref 133–144)
SP GR UR STRIP: 1.01 (ref 1–1.03)
UROBILINOGEN UR STRIP-MCNC: NORMAL MG/DL
WBC # BLD AUTO: 5.8 10E3/UL (ref 4–11)

## 2022-01-12 PROCEDURE — 84156 ASSAY OF PROTEIN URINE: CPT | Performed by: PATHOLOGY

## 2022-01-12 PROCEDURE — 36415 COLL VENOUS BLD VENIPUNCTURE: CPT | Performed by: PATHOLOGY

## 2022-01-12 PROCEDURE — 81003 URINALYSIS AUTO W/O SCOPE: CPT | Performed by: PATHOLOGY

## 2022-01-12 PROCEDURE — 99000 SPECIMEN HANDLING OFFICE-LAB: CPT | Performed by: PATHOLOGY

## 2022-01-12 PROCEDURE — 85610 PROTHROMBIN TIME: CPT | Performed by: PATHOLOGY

## 2022-01-12 PROCEDURE — 99207 PR NO DOCUMENTATION ON VISIT: CPT | Performed by: INTERNAL MEDICINE

## 2022-01-12 PROCEDURE — 83970 ASSAY OF PARATHORMONE: CPT | Mod: 90 | Performed by: PATHOLOGY

## 2022-01-12 PROCEDURE — 84100 ASSAY OF PHOSPHORUS: CPT | Performed by: PATHOLOGY

## 2022-01-12 PROCEDURE — G0463 HOSPITAL OUTPT CLINIC VISIT: HCPCS

## 2022-01-12 ASSESSMENT — MIFFLIN-ST. JEOR: SCORE: 1727.54

## 2022-01-12 ASSESSMENT — PAIN SCALES - GENERAL: PAINLEVEL: NO PAIN (0)

## 2022-01-12 NOTE — PATIENT INSTRUCTIONS
Medication Changes:  -None  (We will obtain approval for Jardiance in the event Dr. Moreno wants to start you on it)    Patient Instructions:  -Continue staying active and eat a heart healthy diet.  -Please keep a current list of medications with you at all times.    Follow up Appointment Information:  6 week f/u with Dr. Moreno with labs prior    Results:  Component      Latest Ref Rng & Units 1/12/2022   Sodium      133 - 144 mmol/L 140   Potassium      3.4 - 5.3 mmol/L 5.1   Chloride      94 - 109 mmol/L 110 (H)   Carbon Dioxide      20 - 32 mmol/L 24   Anion Gap      3 - 14 mmol/L 6   Urea Nitrogen      7 - 30 mg/dL 56 (H)   Creatinine      0.52 - 1.04 mg/dL 1.60 (H)   Calcium      8.5 - 10.1 mg/dL 9.3   Glucose      70 - 99 mg/dL 98   Alkaline Phosphatase      40 - 150 U/L 116   AST      0 - 45 U/L 27   ALT      0 - 50 U/L 28   Protein Total      6.8 - 8.8 g/dL 8.5   Albumin      3.4 - 5.0 g/dL 3.6   Bilirubin Total      0.2 - 1.3 mg/dL 0.7   GFR Estimate      >60 mL/min/1.73m2 36 (L)   Color Urine      Colorless, Straw, Light Yellow, Yellow Yellow   Appearance Urine      Clear Clear   Glucose Urine      Negative mg/dL Negative   Bilirubin Urine      Negative Negative   Ketones Urine      Negative mg/dL Negative   Specific Gravity Urine      1.003 - 1.035 1.011   Blood Urine      Negative Negative   pH Urine      5.0 - 7.0 5.0   Protein Albumin Urine      Negative mg/dL Negative   Urobilinogen mg/dL      Normal, 2.0 mg/dL Normal   Nitrite Urine      Negative Negative   Leukocyte Esterase Urine      Negative Negative   WBC      4.0 - 11.0 10e3/uL 5.8   RBC Count      3.80 - 5.20 10e6/uL 3.97   Hemoglobin      11.7 - 15.7 g/dL 12.1   Hematocrit      35.0 - 47.0 % 38.3   MCV      78 - 100 fL 97   MCH      26.5 - 33.0 pg 30.5   MCHC      31.5 - 36.5 g/dL 31.6   RDW      10.0 - 15.0 % 13.3   Platelet Count      150 - 450 10e3/uL 173   Protein Random Urine      g/L 0.10   Protein Total Urine g/gr Creatinine       0.00 - 0.20 g/g Cr 0.23 (H)   Creatinine Urine      mg/dL 43   INR Point of Care      0.9 - 1.1 2.8 (H)   N-Terminal Pro Bnp      0 - 125 pg/mL 1,066 (H)   Phosphorus      2.5 - 4.5 mg/dL 3.1       We are located on the third [facility] on the [facility].  Our address is     [address] [place] [address] on 3rd Floor   [place] [phone]    Ramses Tran RN  [facility]  Cardiology Care Coordinator-Heart Failure    Thank you for allowing us to be a part of your care here at the [facility]    If you have questions or concerns please contact us at:    Appointment scheduling or nurse questions: [phone]    On Call Cardiologist for after hours or on weekends: [phone]    option #4

## 2022-01-12 NOTE — LETTER
1/12/2022      RE: Luz Marina Live  [address]  [place] [address]       Dear Colleague,    Thank you for the opportunity to participate in the care of your patient, Luz Marina Live, at the [facility] [place] at [facility]. Please see a copy of my visit note below.        Fitz Chau M.D. Rheumatology  Audrey Clark M.D. Nephrology  Eliz Nguyen M.D. Internal Medicine    Plan:  1. Ideally would like SGLT 2 but cystatin GFR very low, defer to Dr. Clark  2. Low C4 and high DNAds  3, Depending on renal opinion, may need to discontinue lisinopril in favor of hydralazine and isosorbide  4. Weight loss  5. Continuing sleep evaluation     Current Outpatient Medications   Medication     aspirin 81 MG EC tablet     atorvastatin (LIPITOR) 40 MG tablet     furosemide (LASIX) 20 MG tablet     lisinopril (ZESTRIL) 10 MG tablet     loratadine (CLARITIN) 10 MG tablet     metoprolol tartrate (LOPRESSOR) 50 MG tablet     metroNIDAZOLE (METROCREAM) 0.75 % external cream     MULTIPLE VITAMIN PO     Omega-3 Fatty Acids (FISH OIL) 1200 MG capsule     order for DME     hydroxychloroquine (PLAQUENIL) 200 MG tablet     warfarin ANTICOAGULANT (COUMADIN) 5 MG tablet     No current facility-administered medications for this visit.       Left Anterior Descending   Ost LAD to Mid LAD lesion is 100% stenosed. The lesion is chronic total occlusion.   Second Diagonal Branch   The vessel is small. There is mild diffuse disease throughout the vessel.   Left Circumflex   Previously placed Ost Cx to Prox Cx stent (unknown type) is widely patent.   Prox Cx to Mid Cx lesion is 20% stenosed.   First Obtuse Marginal Branch   The vessel is moderate in size.   1st Mrg lesion is 30% stenosed with 100% stenosed side branch in Lat 1st Mrg. The lesion was previously treated using a stent of unknown type.   Lateral First Obtuse Marginal Branch   The vessel is small.   Second Obtuse  Marginal Branch   The vessel is moderate in size.   Third Obtuse Marginal Branch   The vessel is small.   Right Coronary Artery   Prox RCA lesion is 40% stenosed. The lesion is eccentric.   Mid RCA to Dist RCA lesion is 10% stenosed.   Dist RCA lesion is 100% stenosed. The lesion is chronic total occlusion.   Right Posterior Descending Artery   RPDA lesion is 20% stenosed.   LIMA Graft To Dist LAD   The graft is large. The graft is angiographically normal.   Graft To Lat 1st Mrg   The graft is large. The graft is angiographically normal.   Graft To RPDA   The graft is large. The graft is angiographically normal.       Intervention      No interventions have been documented.    Hemodynamics    RA: --/--/15  RV: 62/18/--  PA: 62/28/38  PCWP: --/--/28     PA Sat: 59%  Ao Sat: 99%    Kannan CO/CI: 5.41/2.49    PVR: 1.8  SVR: 1,214 dynes-sec/cm5 Right sided filling pressures are moderately elevated. Left sided filling pressures are moderately elevated. Moderately elevated pulmonary artery hypertension. Normal cardiac output level.     Pressures Phase: Baseline     Time Systolic (mmHg) Diastolic (mmHg) Mean (mmHg) A Wave (mmHg) V Wave (mmHg) EDP (mmHg) Max dp/dt (mmHg/sec) HR (bpm) Content (mL/dL) SAT (%)   RA Pressures  2:48 PM   15    18    17      54        RV Pressures  2:23 PM       291           2:48 PM 62        18     54        PA Pressures  2:51 PM 62    28    38        54        PCW Pressures  2:49 PM   28    29    30      54          Blood Flow Results Phase: Baseline     Time Results  Indexed Values (L/min/m2)   QP  2:23 PM 5.41 L/min    2.49      QS  2:23 PM 5.41 L/min    2.49        Blood Oximetry Phase: Baseline     Time Hb  SAT(%)  PO2  Content (mL/dL) PA Sat (%)   PA  2:23 PM  59 %      59      Art  2:23 PM  99 %     14.81         Cardiac Output Phase: Baseline     Time TDCO (L/min) TDCI (L/min/m2) Kannan C.O. (L/min) Kannan C.I. (L/min/m2) Kannan HR (bpm)   Cardiac Output Results  2:23 PM   5.41    2.49          Resistance Results Phase: Baseline     Time PVR  SVR  TPR  TVR  PVR/SVR  TPR/TVR    Resistance Results (Metric)  2:23 .86 dsc-5     561.88 dsc-5         Resistance Results (Wood)  2:23 PM 1.85 GASCA     7.03 GASCA           Stoke Volume Results Phase: Baseline     Time RVSW (gm*m) LVSW (gm*m) RVSW-I (gm*m/m2) LVSW-I (gm*m/m2)   Stroke Work Results  2:23 PM                 Results for IDA PADRON (MRN [id number]) as of 1/19/2022 12:34   Ref. Range 11/4/2021 14:44 1/12/2022 13:21 1/12/2022 13:31   Sodium Latest Ref Range: 133 - 144 mmol/L 136 140    Potassium Latest Ref Range: 3.4 - 5.3 mmol/L 4.8 5.1    Chloride Latest Ref Range: 94 - 109 mmol/L 107 110 (H)    Carbon Dioxide Latest Ref Range: 20 - 32 mmol/L 25 24    Urea Nitrogen Latest Ref Range: 7 - 30 mg/dL 59 (H) 56 (H)    Creatinine Latest Ref Range: 0.52 - 1.04 mg/dL 1.91 (H) 1.60 (H)    GFR Estimate Latest Ref Range: >60 mL/min/1.73m2 28 (L) 36 (L)    Calcium Latest Ref Range: 8.5 - 10.1 mg/dL 9.3 9.3    Anion Gap Latest Ref Range: 3 - 14 mmol/L 4 6    Phosphorus Latest Ref Range: 2.5 - 4.5 mg/dL  3.1    Albumin Latest Ref Range: 3.4 - 5.0 g/dL  3.6    Protein Total Latest Ref Range: 6.8 - 8.8 g/dL  8.5    Bilirubin Total Latest Ref Range: 0.2 - 1.3 mg/dL  0.7    Alkaline Phosphatase Latest Ref Range: 40 - 150 U/L  116    ALT Latest Ref Range: 0 - 50 U/L  28    AST Latest Ref Range: 0 - 45 U/L  27    25 OH Vit D total Latest Ref Range: 20 - 75 ug/L <42     25 OH Vit D2 Latest Units: ug/L <5     25 OH Vit D3 Latest Units: ug/L 37     Creatinine Urine Latest Units: mg/dL   43   Cystatin C Latest Ref Range: 0.5 - 1.2 mg/L  2.7 (H)    Ferritin Latest Ref Range: 8 - 252 ng/mL 153     Iron Latest Ref Range: 35 - 180 ug/dL 59     Iron Binding Cap Latest Ref Range: 240 - 430 ug/dL 240     Iron Saturation Index Latest Ref Range: 15 - 46 % 25     N-Terminal Pro Bnp Latest Ref Range: 0 - 125 pg/mL 1,123 (H) 1,066 (H)    Protein Random Urine Latest Units: g/L    0.10   Protein Total Urine g/gr Creatinine Latest Ref Range: 0.00 - 0.20 g/g Cr   0.23 (H)   Glucose Latest Ref Range: 70 - 99 mg/dL 90 98    WBC Latest Ref Range: 4.0 - 11.0 10e3/uL  5.8    Hemoglobin Latest Ref Range: 11.7 - 15.7 g/dL  12.1    Hematocrit Latest Ref Range: 35.0 - 47.0 %  38.3    Platelet Count Latest Ref Range: 150 - 450 10e3/uL  173    RBC Count Latest Ref Range: 3.80 - 5.20 10e6/uL  3.97    MCV Latest Ref Range: 78 - 100 fL  97    MCH Latest Ref Range: 26.5 - 33.0 pg  30.5    MCHC Latest Ref Range: 31.5 - 36.5 g/dL  31.6    RDW Latest Ref Range: 10.0 - 15.0 %  13.3    INR Point of Care Latest Ref Range: 0.9 - 1.1  2.9 (H) 2.8 (H)    Color Urine Latest Ref Range: Colorless, Straw, Light Yellow, Yellow    Yellow   Appearance Urine Latest Ref Range: Clear    Clear   Glucose Urine Latest Ref Range: Negative mg/dL   Negative   Bilirubin Urine Latest Ref Range: Negative    Negative   Ketones Urine Latest Ref Range: Negative mg/dL   Negative   Specific Gravity Urine Latest Ref Range: 1.003 - 1.035    1.011   pH Urine Latest Ref Range: 5.0 - 7.0    5.0   Protein Albumin Urine Latest Ref Range: Negative mg/dL   Negative   Urobilinogen mg/dL Latest Ref Range: Normal, 2.0 mg/dL   Normal   Nitrite Urine Latest Ref Range: Negative    Negative   Blood Urine Latest Ref Range: Negative    Negative   Leukocyte Esterase Urine Latest Ref Range: Negative    Negative   Complement C3 Latest Ref Range: 81 - 157 mg/dL 86     Complement C4 Latest Ref Range: 13 - 39 mg/dL 9 (L)     DNA-ds Latest Ref Range: <10.0 IU/mL 37.0 (H)     Parathyroid Hormone Intact Latest Ref Range: 18 - 80 pg/mL  125 (H)    eGFR by Cystatin C Latest Ref Range: >=60 mL/min/BSA  19 (L)      Wt Readings from Last 24 Encounters:   01/12/22 119.3 kg (263 lb 1.6 oz)   08/27/21 117.9 kg (260 lb)   08/20/21 119.3 kg (263 lb)   07/13/21 124.7 kg (275 lb)   07/20/20 113.9 kg (251 lb)   03/06/20 113.9 kg (251 lb 3.2 oz)   02/05/20 111.6 kg (246 lb)    01/14/20 113.9 kg (251 lb)   12/23/19 116.3 kg (256 lb 4.8 oz)   11/08/19 116.6 kg (257 lb)   11/08/19 116.6 kg (257 lb)   11/05/19 116.6 kg (257 lb)   10/16/19 119.4 kg (263 lb 4.8 oz)   10/11/19 120.7 kg (266 lb 1.6 oz)   09/04/19 122 kg (269 lb)   08/16/19 122 kg (269 lb)   08/13/19 122.5 kg (270 lb)   08/05/19 121.4 kg (267 lb 9.6 oz)   11/20/18 122.9 kg (271 lb)   10/29/18 122 kg (269 lb)   09/17/18 119.3 kg (263 lb)   09/04/18 118.3 kg (260 lb 14.4 oz)   06/25/18 118.4 kg (261 lb)   06/25/18 117.5 kg (259 lb)       Please do not hesitate to contact me if you have any questions/concerns.     Sincerely,     Twin Moreno MD

## 2022-01-12 NOTE — NURSING NOTE
Chief Complaint   Patient presents with     Follow Up     61yr old female with a h/o CAD s/p CAB, HTN, HLD, atrial fibrillation on warfarin, SLE, and PFO presenting for follow-up with labs.     Vitals were taken and medications were reconciled.   Bossman Sousa, EMT  1:42 PM

## 2022-01-12 NOTE — PROGRESS NOTES
ANTICOAGULATION MANAGEMENT     Luz Marina Live 61 year old female is on warfarin with therapeutic INR result. (Goal INR 2.0-3.0)    Recent labs: (last 7 days)     01/12/22  1321   INR 2.8*       ASSESSMENT     Source(s): Chart Review I left a detailed voicemail with the orders reflected in flowsheet. I have also requested a call back if there have been any missed doses, concerns, illness, fever, or if there have been any changes in medications, activity level, or diet        Previous INR: Therapeutic last 2(+) visits    Additional findings: VM left      PLAN     Recommended plan for no diet, medication or health factor changes affecting INR     Dosing Instructions: Continue your current warfarin dose with next INR in 6 weeks       Summary  As of 1/12/2022    Full warfarin instructions:  10 mg every Tue, Thu, Sat; 7.5 mg all other days   Next INR check:  2/23/2022             Detailed voice message left for Luz Marina with dosing instructions and follow up date.     Contact [phone]  to schedule and with any changes, questions or concerns.     Education provided: Contact [phone]  with any changes, questions or concerns.     Plan made per [facility] anticoagulation protocol    Cait Hawthorne, RN  Anticoagulation Clinic  1/12/2022    _______________________________________________________________________     Anticoagulation Episode Summary     Current INR goal:  2.0-3.0   TTR:  63.1 % (1 y)   Target end date:  Indefinite   Send INR reminders to:  [facility]    Indications    Long-term (current) use of anticoagulants [Z79.01] [Z79.01]  PAF (paroxysmal atrial fibrillation) (H) [I48.0]           Comments:  5 mg tabsZeb lab  PM dose         Anticoagulation Care Providers     Provider Role Specialty Phone number    Eliz Nguyen MD Referring Internal Medicine [phone]

## 2022-01-12 NOTE — NURSING NOTE
Called and requested Dr. Lavern Clark (Renal) call Dr. Moreno directly on his cell phone.  Shanae Watkins RN on 1/12/2022 at 2:17 PM

## 2022-01-13 DIAGNOSIS — I48.91 ATRIAL FIBRILLATION, UNSPECIFIED TYPE (H): ICD-10-CM

## 2022-01-13 RX ORDER — WARFARIN SODIUM 5 MG/1
TABLET ORAL
Qty: 145 TABLET | Refills: 1 | Status: SHIPPED | OUTPATIENT
Start: 2022-01-13 | End: 2022-05-27

## 2022-01-15 LAB
CYSTATIN C SERPL-MCNC: 2.7 MG/L
GFR/BSA.PRED SERPLBLD CYS-BASED-ARV: 19 ML/MIN/BSA

## 2022-01-17 NOTE — TELEPHONE ENCOUNTER
LISINOPRIL 10 MG TABLET      Last Written Prescription Date:  11-19-21  Last Fill Quantity: 270,   # refills: 0  Last Office Visit : 1-12-22  Future Office visit:  2-23-22    Creatinine   Date Value Ref Range Status   01/12/2022 1.60 (H) 0.52 - 1.04 mg/dL Final   07/08/2021 1.49 (H) 0.52 - 1.04 mg/dL Final     BP Readings from Last 3 Encounters:   01/12/22 (!) 143/88   09/16/21 (!) 151/83   08/20/21 135/69     Routing refill request to provider for review/approval because:  Last not unsigned  Lab- Cr, and BP  --clinic visit;    METOPROLOL TARTRATE 50 MG TAB      Last Written Prescription Date:  11-9-21  Last Fill Quantity: 360,   # refills: 0      Routing refill request to provider for review/approval because:  Last not unsigned  BP - Last clinic visit

## 2022-01-19 NOTE — PROGRESS NOTES
Fitz Chau M.D. Rheumatology  Audrey Clark M.D. Nephrology  Eliz Nguyen M.D. Internal Medicine    Plan:  1. Ideally would like SGLT 2 but cystatin GFR very low, defer to Dr. Clark  2. Low C4 and high DNAds  3, Depending on renal opinion, may need to discontinue lisinopril in favor of hydralazine and isosorbide  4. Weight loss  5. Continuing sleep evaluation     Current Outpatient Medications   Medication     aspirin 81 MG EC tablet     atorvastatin (LIPITOR) 40 MG tablet     furosemide (LASIX) 20 MG tablet     lisinopril (ZESTRIL) 10 MG tablet     loratadine (CLARITIN) 10 MG tablet     metoprolol tartrate (LOPRESSOR) 50 MG tablet     metroNIDAZOLE (METROCREAM) 0.75 % external cream     MULTIPLE VITAMIN PO     Omega-3 Fatty Acids (FISH OIL) 1200 MG capsule     order for DME     hydroxychloroquine (PLAQUENIL) 200 MG tablet     warfarin ANTICOAGULANT (COUMADIN) 5 MG tablet     No current facility-administered medications for this visit.       Left Anterior Descending   Ost LAD to Mid LAD lesion is 100% stenosed. The lesion is chronic total occlusion.   Second Diagonal Branch   The vessel is small. There is mild diffuse disease throughout the vessel.   Left Circumflex   Previously placed Ost Cx to Prox Cx stent (unknown type) is widely patent.   Prox Cx to Mid Cx lesion is 20% stenosed.   First Obtuse Marginal Branch   The vessel is moderate in size.   1st Mrg lesion is 30% stenosed with 100% stenosed side branch in Lat 1st Mrg. The lesion was previously treated using a stent of unknown type.   Lateral First Obtuse Marginal Branch   The vessel is small.   Second Obtuse Marginal Branch   The vessel is moderate in size.   Third Obtuse Marginal Branch   The vessel is small.   Right Coronary Artery   Prox RCA lesion is 40% stenosed. The lesion is eccentric.   Mid RCA to Dist RCA lesion is 10% stenosed.   Dist RCA lesion is 100% stenosed. The lesion is chronic total occlusion.   Right Posterior Descending  Artery   RPDA lesion is 20% stenosed.   LIMA Graft To Dist LAD   The graft is large. The graft is angiographically normal.   Graft To Lat 1st Mrg   The graft is large. The graft is angiographically normal.   Graft To RPDA   The graft is large. The graft is angiographically normal.       Intervention      No interventions have been documented.    Hemodynamics    RA: --/--/15  RV: 62/18/--  PA: 62/28/38  PCWP: --/--/28     PA Sat: 59%  Ao Sat: 99%    Kannan CO/CI: 5.41/2.49    PVR: 1.8  SVR: 1,214 dynes-sec/cm5 Right sided filling pressures are moderately elevated. Left sided filling pressures are moderately elevated. Moderately elevated pulmonary artery hypertension. Normal cardiac output level.     Pressures Phase: Baseline     Time Systolic (mmHg) Diastolic (mmHg) Mean (mmHg) A Wave (mmHg) V Wave (mmHg) EDP (mmHg) Max dp/dt (mmHg/sec) HR (bpm) Content (mL/dL) SAT (%)   RA Pressures  2:48 PM   15    18    17      54        RV Pressures  2:23 PM       291           2:48 PM 62        18     54        PA Pressures  2:51 PM 62    28    38        54        PCW Pressures  2:49 PM   28    29    30      54          Blood Flow Results Phase: Baseline     Time Results  Indexed Values (L/min/m2)   QP  2:23 PM 5.41 L/min    2.49      QS  2:23 PM 5.41 L/min    2.49        Blood Oximetry Phase: Baseline     Time Hb  SAT(%)  PO2  Content (mL/dL) PA Sat (%)   PA  2:23 PM  59 %      59      Art  2:23 PM  99 %     14.81         Cardiac Output Phase: Baseline     Time TDCO (L/min) TDCI (L/min/m2) Kannan C.O. (L/min) Kannan C.I. (L/min/m2) Kannan HR (bpm)   Cardiac Output Results  2:23 PM   5.41    2.49         Resistance Results Phase: Baseline     Time PVR  SVR  TPR  TVR  PVR/SVR  TPR/TVR    Resistance Results (Metric)  2:23 .86 dsc-5     561.88 dsc-5         Resistance Results (Wood)  2:23 PM 1.85 GASCA     7.03 GASCA           Stoke Volume Results Phase: Baseline     Time RVSW (gm*m) LVSW (gm*m) RVSW-I (gm*m/m2) LVSW-I (gm*m/m2)   Stroke  Work Results  2:23 PM                 Results for IDA PADRON (MRN [id number]) as of 1/19/2022 12:34   Ref. Range 11/4/2021 14:44 1/12/2022 13:21 1/12/2022 13:31   Sodium Latest Ref Range: 133 - 144 mmol/L 136 140    Potassium Latest Ref Range: 3.4 - 5.3 mmol/L 4.8 5.1    Chloride Latest Ref Range: 94 - 109 mmol/L 107 110 (H)    Carbon Dioxide Latest Ref Range: 20 - 32 mmol/L 25 24    Urea Nitrogen Latest Ref Range: 7 - 30 mg/dL 59 (H) 56 (H)    Creatinine Latest Ref Range: 0.52 - 1.04 mg/dL 1.91 (H) 1.60 (H)    GFR Estimate Latest Ref Range: >60 mL/min/1.73m2 28 (L) 36 (L)    Calcium Latest Ref Range: 8.5 - 10.1 mg/dL 9.3 9.3    Anion Gap Latest Ref Range: 3 - 14 mmol/L 4 6    Phosphorus Latest Ref Range: 2.5 - 4.5 mg/dL  3.1    Albumin Latest Ref Range: 3.4 - 5.0 g/dL  3.6    Protein Total Latest Ref Range: 6.8 - 8.8 g/dL  8.5    Bilirubin Total Latest Ref Range: 0.2 - 1.3 mg/dL  0.7    Alkaline Phosphatase Latest Ref Range: 40 - 150 U/L  116    ALT Latest Ref Range: 0 - 50 U/L  28    AST Latest Ref Range: 0 - 45 U/L  27    25 OH Vit D total Latest Ref Range: 20 - 75 ug/L <42     25 OH Vit D2 Latest Units: ug/L <5     25 OH Vit D3 Latest Units: ug/L 37     Creatinine Urine Latest Units: mg/dL   43   Cystatin C Latest Ref Range: 0.5 - 1.2 mg/L  2.7 (H)    Ferritin Latest Ref Range: 8 - 252 ng/mL 153     Iron Latest Ref Range: 35 - 180 ug/dL 59     Iron Binding Cap Latest Ref Range: 240 - 430 ug/dL 240     Iron Saturation Index Latest Ref Range: 15 - 46 % 25     N-Terminal Pro Bnp Latest Ref Range: 0 - 125 pg/mL 1,123 (H) 1,066 (H)    Protein Random Urine Latest Units: g/L   0.10   Protein Total Urine g/gr Creatinine Latest Ref Range: 0.00 - 0.20 g/g Cr   0.23 (H)   Glucose Latest Ref Range: 70 - 99 mg/dL 90 98    WBC Latest Ref Range: 4.0 - 11.0 10e3/uL  5.8    Hemoglobin Latest Ref Range: 11.7 - 15.7 g/dL  12.1    Hematocrit Latest Ref Range: 35.0 - 47.0 %  38.3    Platelet Count Latest Ref Range: 150 - 450  10e3/uL  173    RBC Count Latest Ref Range: 3.80 - 5.20 10e6/uL  3.97    MCV Latest Ref Range: 78 - 100 fL  97    MCH Latest Ref Range: 26.5 - 33.0 pg  30.5    MCHC Latest Ref Range: 31.5 - 36.5 g/dL  31.6    RDW Latest Ref Range: 10.0 - 15.0 %  13.3    INR Point of Care Latest Ref Range: 0.9 - 1.1  2.9 (H) 2.8 (H)    Color Urine Latest Ref Range: Colorless, Straw, Light Yellow, Yellow    Yellow   Appearance Urine Latest Ref Range: Clear    Clear   Glucose Urine Latest Ref Range: Negative mg/dL   Negative   Bilirubin Urine Latest Ref Range: Negative    Negative   Ketones Urine Latest Ref Range: Negative mg/dL   Negative   Specific Gravity Urine Latest Ref Range: 1.003 - 1.035    1.011   pH Urine Latest Ref Range: 5.0 - 7.0    5.0   Protein Albumin Urine Latest Ref Range: Negative mg/dL   Negative   Urobilinogen mg/dL Latest Ref Range: Normal, 2.0 mg/dL   Normal   Nitrite Urine Latest Ref Range: Negative    Negative   Blood Urine Latest Ref Range: Negative    Negative   Leukocyte Esterase Urine Latest Ref Range: Negative    Negative   Complement C3 Latest Ref Range: 81 - 157 mg/dL 86     Complement C4 Latest Ref Range: 13 - 39 mg/dL 9 (L)     DNA-ds Latest Ref Range: <10.0 IU/mL 37.0 (H)     Parathyroid Hormone Intact Latest Ref Range: 18 - 80 pg/mL  125 (H)    eGFR by Cystatin C Latest Ref Range: >=60 mL/min/BSA  19 (L)      Wt Readings from Last 24 Encounters:   01/12/22 119.3 kg (263 lb 1.6 oz)   08/27/21 117.9 kg (260 lb)   08/20/21 119.3 kg (263 lb)   07/13/21 124.7 kg (275 lb)   07/20/20 113.9 kg (251 lb)   03/06/20 113.9 kg (251 lb 3.2 oz)   02/05/20 111.6 kg (246 lb)   01/14/20 113.9 kg (251 lb)   12/23/19 116.3 kg (256 lb 4.8 oz)   11/08/19 116.6 kg (257 lb)   11/08/19 116.6 kg (257 lb)   11/05/19 116.6 kg (257 lb)   10/16/19 119.4 kg (263 lb 4.8 oz)   10/11/19 120.7 kg (266 lb 1.6 oz)   09/04/19 122 kg (269 lb)   08/16/19 122 kg (269 lb)   08/13/19 122.5 kg (270 lb)   08/05/19 121.4 kg (267 lb 9.6 oz)    11/20/18 122.9 kg (271 lb)   10/29/18 122 kg (269 lb)   09/17/18 119.3 kg (263 lb)   09/04/18 118.3 kg (260 lb 14.4 oz)   06/25/18 118.4 kg (261 lb)   06/25/18 117.5 kg (259 lb)

## 2022-01-21 RX ORDER — LISINOPRIL 10 MG/1
30 TABLET ORAL DAILY
Qty: 270 TABLET | Refills: 0 | Status: SHIPPED | OUTPATIENT
Start: 2022-01-21 | End: 2022-02-03 | Stop reason: ALTCHOICE

## 2022-01-21 RX ORDER — METOPROLOL TARTRATE 50 MG
100 TABLET ORAL 2 TIMES DAILY
Qty: 360 TABLET | Refills: 0 | Status: SHIPPED | OUTPATIENT
Start: 2022-01-21 | End: 2022-03-02

## 2022-01-31 NOTE — NURSING NOTE
BEHAVIORAL HEALTH NURSING DISCHARGE NOTE      The following personal items collected during your admission are returned to you:   Dental Appliance: Dental Appliances: None  Vision:    Hearing Aid:    Jewelry: Jewelry: None  Clothing: Clothing: Belt,Footwear,Jacket/Coat,Pants,Shirt,Socks  Other Valuables: Other Valuables: None  Valuables sent to safe: Personal Items Sent to Safe: None      PATIENT INSTRUCTIONS:      Regular diet         The discharge information has been reviewed with the patient. The patient verbalized understanding.       Patient armband removed and shredded DME order automatically send to [facility]. Return in about 1 year reminder to be sent via [employer].       GROVER Salmeron  [facility]

## 2022-02-02 ENCOUNTER — TELEPHONE (OUTPATIENT)
Dept: ANTICOAGULATION | Facility: OTHER | Age: 62
End: 2022-02-02
Payer: COMMERCIAL

## 2022-02-02 DIAGNOSIS — I48.91 ATRIAL FIBRILLATION, UNSPECIFIED TYPE (H): Primary | ICD-10-CM

## 2022-02-02 NOTE — TELEPHONE ENCOUNTER
ANTICOAGULATION MANAGEMENT      Luz Marina Live due for annual renewal of referral to anticoagulation monitoring. Order pended for your review and signature.      ANTICOAGULATION SUMMARY      Warfarin indication(s)     Atrial fibrillation    Heart valve present?  NO       Current goal range   INR: 2.0-3.0     Goal appropriate for indication? Yes, INR 2-3 appropriate for hx of DVT, PE, hypercoagulable state, Afib, LVAD, or bileaflet AVR without risk factors     Current duration of therapy Indefinite/long term therapy   Time in Therapeutic Range (TTR)  (Goal > 60%) 63.1%       Office visit with referring provider's group within last year yes on 7/13/21       Cait Hawthorne RN

## 2022-02-03 ENCOUNTER — CARE COORDINATION (OUTPATIENT)
Dept: CARDIOLOGY | Facility: CLINIC | Age: 62
End: 2022-02-03
Payer: COMMERCIAL

## 2022-02-03 DIAGNOSIS — I50.9 CHF (CONGESTIVE HEART FAILURE) (H): Primary | ICD-10-CM

## 2022-02-03 PROBLEM — I48.91 ATRIAL FIBRILLATION, UNSPECIFIED TYPE (H): Status: ACTIVE | Noted: 2022-02-03

## 2022-02-03 RX ORDER — SACUBITRIL AND VALSARTAN 49; 51 MG/1; MG/1
1 TABLET, FILM COATED ORAL 2 TIMES DAILY
Qty: 60 TABLET | Refills: 3 | Status: SHIPPED | OUTPATIENT
Start: 2022-02-03 | End: 2022-02-18 | Stop reason: ALTCHOICE

## 2022-02-03 NOTE — PROGRESS NOTES
Medication discussion between Dr. Moreno and Dr. Clark, agreed to switch Lisinopril to Entresto. Reviewed with pharmacy, co pay $3. Called and left voicemail. Detailed Linkfluence message sent to Luz Marina, waiting for response.     Date: 2/3/2022    Time of Call: 10:06 AM     Diagnosis:  Heart Failure     [ VORB ] Ordering provider: Dr. Moreno  Order: Stop Lisinopril, wait 2 days, then start Entresto 49-51mg twice daily. Recheck BMP in 7-10 days      Order received by: Fidel Carter, Student nurse

## 2022-02-14 ENCOUNTER — TELEPHONE (OUTPATIENT)
Dept: TRANSPLANT | Facility: CLINIC | Age: 62
End: 2022-02-14
Payer: COMMERCIAL

## 2022-02-14 ENCOUNTER — LAB (OUTPATIENT)
Dept: LAB | Facility: CLINIC | Age: 62
End: 2022-02-14
Payer: COMMERCIAL

## 2022-02-14 DIAGNOSIS — I50.9 CHF (CONGESTIVE HEART FAILURE) (H): ICD-10-CM

## 2022-02-14 DIAGNOSIS — I50.32 CHRONIC DIASTOLIC HEART FAILURE (H): ICD-10-CM

## 2022-02-14 DIAGNOSIS — I10 HTN, GOAL BELOW 140/90: ICD-10-CM

## 2022-02-14 DIAGNOSIS — I25.10 CORONARY ARTERY DISEASE INVOLVING NATIVE HEART WITHOUT ANGINA PECTORIS, UNSPECIFIED VESSEL OR LESION TYPE: ICD-10-CM

## 2022-02-14 LAB
ANION GAP SERPL CALCULATED.3IONS-SCNC: 4 MMOL/L (ref 3–14)
BUN SERPL-MCNC: 63 MG/DL (ref 7–30)
CALCIUM SERPL-MCNC: 9.1 MG/DL (ref 8.5–10.1)
CHLORIDE BLD-SCNC: 109 MMOL/L (ref 94–109)
CO2 SERPL-SCNC: 25 MMOL/L (ref 20–32)
CREAT SERPL-MCNC: 1.8 MG/DL (ref 0.52–1.04)
ERYTHROCYTE [DISTWIDTH] IN BLOOD BY AUTOMATED COUNT: 13.9 % (ref 10–15)
GFR SERPL CREATININE-BSD FRML MDRD: 32 ML/MIN/1.73M2
GLUCOSE BLD-MCNC: 89 MG/DL (ref 70–99)
HCT VFR BLD AUTO: 40 % (ref 35–47)
HGB BLD-MCNC: 12.9 G/DL (ref 11.7–15.7)
MCH RBC QN AUTO: 30.3 PG (ref 26.5–33)
MCHC RBC AUTO-ENTMCNC: 32.3 G/DL (ref 31.5–36.5)
MCV RBC AUTO: 94 FL (ref 78–100)
NT-PROBNP SERPL-MCNC: 858 PG/ML (ref 0–125)
PLATELET # BLD AUTO: 195 10E3/UL (ref 150–450)
POTASSIUM BLD-SCNC: 4.9 MMOL/L (ref 3.4–5.3)
RBC # BLD AUTO: 4.26 10E6/UL (ref 3.8–5.2)
SODIUM SERPL-SCNC: 138 MMOL/L (ref 133–144)
WBC # BLD AUTO: 6.4 10E3/UL (ref 4–11)

## 2022-02-14 PROCEDURE — 83880 ASSAY OF NATRIURETIC PEPTIDE: CPT

## 2022-02-14 PROCEDURE — 85027 COMPLETE CBC AUTOMATED: CPT

## 2022-02-14 PROCEDURE — 36415 COLL VENOUS BLD VENIPUNCTURE: CPT

## 2022-02-14 PROCEDURE — 80048 BASIC METABOLIC PNL TOTAL CA: CPT

## 2022-02-14 NOTE — TELEPHONE ENCOUNTER
Pt calls and states that she is having lab today. She would like to know if she needs to be fasting. Advised pt that the tests ordered she would not need to be fasting for, however, if they wanted a fasting glucose, for example, we could always schedule an earlier appointment for her to come back and do this. Pt also asks about a covid booster? Advised her she can ask on check in but there are no openings currently at the [facility] around her lab time. Pt verbalizes understanding and denies further questions.    Angeles Hatfield, ZEKEN, RN, PHN  Registered Nurse-Clinic Triage  [facility]  2/14/2022 at 11:44 AM

## 2022-02-18 ENCOUNTER — PATIENT OUTREACH (OUTPATIENT)
Dept: CARDIOLOGY | Facility: CLINIC | Age: 62
End: 2022-02-18
Payer: COMMERCIAL

## 2022-02-18 DIAGNOSIS — I50.9 CHF (CONGESTIVE HEART FAILURE) (H): ICD-10-CM

## 2022-02-18 RX ORDER — SACUBITRIL AND VALSARTAN 24; 26 MG/1; MG/1
1 TABLET, FILM COATED ORAL 2 TIMES DAILY
Qty: 60 TABLET | Refills: 3 | Status: SHIPPED | OUTPATIENT
Start: 2022-02-18 | End: 2022-10-28

## 2022-02-18 NOTE — TELEPHONE ENCOUNTER
Results reviewed after starting Entresto with Dr. Moreno.  Creatinine elevated beyond baseline.  Communicated plan of care changes with Luz Marina via [employer].     Date: 2/18/2022  Time of Call: 1:07 PM  Diagnosis:  HF  VORB - Ordering provider: Dr. Moreno  Order: decrease Entresto to 24/26mg BID.  Repeat BMP on 2/25/22.  Move Tiffanie appt back 4 weeks.   Order received by: Kaylie Tran RN   Follow-up/additional notes:

## 2022-02-25 ENCOUNTER — LAB (OUTPATIENT)
Dept: LAB | Facility: CLINIC | Age: 62
End: 2022-02-25
Payer: COMMERCIAL

## 2022-02-25 ENCOUNTER — DOCUMENTATION ONLY (OUTPATIENT)
Dept: LAB | Facility: CLINIC | Age: 62
End: 2022-02-25

## 2022-02-25 ENCOUNTER — ANTICOAGULATION THERAPY VISIT (OUTPATIENT)
Dept: ANTICOAGULATION | Facility: CLINIC | Age: 62
End: 2022-02-25

## 2022-02-25 DIAGNOSIS — Z79.01 LONG TERM CURRENT USE OF ANTICOAGULANT THERAPY: ICD-10-CM

## 2022-02-25 DIAGNOSIS — I48.0 PAF (PAROXYSMAL ATRIAL FIBRILLATION) (H): ICD-10-CM

## 2022-02-25 DIAGNOSIS — I48.91 ATRIAL FIBRILLATION, UNSPECIFIED TYPE (H): ICD-10-CM

## 2022-02-25 DIAGNOSIS — Z79.01 LONG TERM CURRENT USE OF ANTICOAGULANT THERAPY: Primary | ICD-10-CM

## 2022-02-25 DIAGNOSIS — I50.9 CHF (CONGESTIVE HEART FAILURE) (H): ICD-10-CM

## 2022-02-25 LAB
HOLD SPECIMEN: NORMAL
INR BLD: 2.2 (ref 0.9–1.1)

## 2022-02-25 PROCEDURE — 80048 BASIC METABOLIC PNL TOTAL CA: CPT

## 2022-02-25 PROCEDURE — 36415 COLL VENOUS BLD VENIPUNCTURE: CPT

## 2022-02-25 PROCEDURE — 85610 PROTHROMBIN TIME: CPT

## 2022-02-25 NOTE — PROGRESS NOTES
ANTICOAGULATION MANAGEMENT     Luz Marina Live 61 year old female is on warfarin with therapeutic INR result. (Goal INR 2.0-3.0)    Recent labs: (last 7 days)     02/25/22  1120   INR 2.2*       ASSESSMENT     Source(s): Chart Review I left a detailed voicemail with the orders reflected in flowsheet. I have also requested a call back if there have been any missed doses, concerns, illness, fever, or if there have been any changes in medications, activity level, or diet        Previous INR: Therapeutic last 2(+) visits    Additional findings: VM left      PLAN     Recommended plan for no diet, medication or health factor changes affecting INR     Dosing Instructions: Continue your current warfarin dose with next INR in 6 weeks       Summary  As of 2/25/2022    Full warfarin instructions:  10 mg every Tue, Thu, Sat; 7.5 mg all other days   Next INR check:  4/8/2022             Detailed voice message left for Luz Marina with dosing instructions and follow up date.     Contact [phone]  to schedule and with any changes, questions or concerns.     Education provided: Contact [phone]  with any changes, questions or concerns.     Plan made per [facility] anticoagulation protocol       Cait Hawthorne, RN  Anticoagulation Clinic  2/25/2022    _______________________________________________________________________     Anticoagulation Episode Summary     Current INR goal:  2.0-3.0   TTR:  75.2 % (1 y)   Target end date:  Indefinite   Send INR reminders to:  [employer] [place]    Indications    Long-term (current) use of anticoagulants [Z79.01] [Z79.01]  PAF (paroxysmal atrial fibrillation) (H) [I48.0]  Atrial fibrillation  unspecified type (H) [I48.91]           Comments:  5 mg tabs, [facility]  PM dose         Anticoagulation Care Providers     Provider Role Specialty Phone number    Eliz Nguyen MD Referring Internal Medicine [phone]

## 2022-02-25 NOTE — PROGRESS NOTES
Patient was in for a lab recheck. Please place orders needed for this.    Thank you,  [facility]

## 2022-02-26 DIAGNOSIS — M32.14 SYSTEMIC LUPUS ERYTHEMATOSUS WITH GLOMERULAR DISEASE, UNSPECIFIED SLE TYPE (H): Chronic | ICD-10-CM

## 2022-02-26 DIAGNOSIS — Z79.60 LONG-TERM USE OF IMMUNOSUPPRESSANT MEDICATION: Chronic | ICD-10-CM

## 2022-02-26 DIAGNOSIS — I25.10 CORONARY ARTERY DISEASE INVOLVING NATIVE HEART WITHOUT ANGINA PECTORIS, UNSPECIFIED VESSEL OR LESION TYPE: Chronic | ICD-10-CM

## 2022-02-26 DIAGNOSIS — I48.91 ATRIAL FIBRILLATION, UNSPECIFIED TYPE (H): ICD-10-CM

## 2022-02-26 DIAGNOSIS — Z79.899 LONG-TERM USE OF PLAQUENIL: ICD-10-CM

## 2022-03-01 NOTE — PROGRESS NOTES
"Patient had blood drawn 02/25/22.     Per notes in chart from 217/22,      \" Dr. Moreno reviewed your labs and your creatinine has elevated slightly, not very worried but slightly - so he would like to decrease your Entresto dose to 24/26mg twice daily (you can cut the 49/51 pill you have in half and take 1 half in the AM and 1 half in the PM). Then we need to check your labs again in 1 week\".     Please place order for this lab ASAP. We can still run a creatinine level, however, some tests on a chem 8 panel may not be stable at this point.    Thank you,  [facility]    "

## 2022-03-02 LAB
ANION GAP SERPL CALCULATED.3IONS-SCNC: 4 MMOL/L (ref 3–14)
BUN SERPL-MCNC: 46 MG/DL (ref 7–30)
CALCIUM SERPL-MCNC: 9.2 MG/DL (ref 8.5–10.1)
CHLORIDE BLD-SCNC: 113 MMOL/L (ref 94–109)
CO2 SERPL-SCNC: 25 MMOL/L (ref 20–32)
CREAT SERPL-MCNC: 1.43 MG/DL (ref 0.52–1.04)
GFR SERPL CREATININE-BSD FRML MDRD: 42 ML/MIN/1.73M2
GLUCOSE BLD-MCNC: 93 MG/DL (ref 70–99)
POTASSIUM BLD-SCNC: 5 MMOL/L (ref 3.4–5.3)
SODIUM SERPL-SCNC: 142 MMOL/L (ref 133–144)

## 2022-03-02 RX ORDER — METOPROLOL TARTRATE 50 MG
100 TABLET ORAL 2 TIMES DAILY
Qty: 360 TABLET | Refills: 1 | Status: SHIPPED | OUTPATIENT
Start: 2022-03-02 | End: 2022-03-23

## 2022-03-02 NOTE — TELEPHONE ENCOUNTER
HYDROXYCHLOROQUINE 200 MG TAB  Last Written Prescription Date:   1/17/2022  Last Fill Quantity: 60,   # refills: 0  Last Office Visit :  10/16/2020  Future Office visit:  None  Last eye exam:10/4/2021  Routing refill request to provider for review/approval because:  Second request  Over due office visit  Refer to clinic for review      Yana Richards RN  Central Triage Red Flags/Med Refills

## 2022-03-02 NOTE — TELEPHONE ENCOUNTER
METOPROLOL TARTRATE 50 MG TAB  Last Written Prescription Date:  1/21/2022  Last Fill Quantity: 360,   # refills: 0  Last Office Visit :  1/13/2022  Future Office visit:   3/23/2022  360 Tabs, 1 Refill sent to pharm 3/2/2022      Yana Richards RN  Central Triage Red Flags/Med Refills

## 2022-03-04 RX ORDER — HYDROXYCHLOROQUINE SULFATE 200 MG/1
400 TABLET, FILM COATED ORAL DAILY
Qty: 180 TABLET | Refills: 0 | Status: SHIPPED | OUTPATIENT
Start: 2022-03-04 | End: 2022-04-29

## 2022-03-22 ENCOUNTER — PRE VISIT (OUTPATIENT)
Dept: CARDIOLOGY | Facility: CLINIC | Age: 62
End: 2022-03-22
Payer: COMMERCIAL

## 2022-03-22 DIAGNOSIS — N18.30 STAGE 3 CHRONIC KIDNEY DISEASE, UNSPECIFIED WHETHER STAGE 3A OR 3B CKD (H): ICD-10-CM

## 2022-03-22 DIAGNOSIS — I25.10 CORONARY ARTERY DISEASE INVOLVING NATIVE HEART WITHOUT ANGINA PECTORIS, UNSPECIFIED VESSEL OR LESION TYPE: Primary | ICD-10-CM

## 2022-03-22 DIAGNOSIS — I50.9 CHF (CONGESTIVE HEART FAILURE) (H): ICD-10-CM

## 2022-03-23 ENCOUNTER — OFFICE VISIT (OUTPATIENT)
Dept: CARDIOLOGY | Facility: CLINIC | Age: 62
End: 2022-03-23
Attending: INTERNAL MEDICINE
Payer: COMMERCIAL

## 2022-03-23 ENCOUNTER — LAB (OUTPATIENT)
Dept: LAB | Facility: CLINIC | Age: 62
End: 2022-03-23
Attending: INTERNAL MEDICINE
Payer: COMMERCIAL

## 2022-03-23 VITALS
SYSTOLIC BLOOD PRESSURE: 136 MMHG | OXYGEN SATURATION: 99 % | WEIGHT: 271.1 LBS | HEIGHT: 63 IN | BODY MASS INDEX: 48.04 KG/M2 | DIASTOLIC BLOOD PRESSURE: 85 MMHG | HEART RATE: 53 BPM

## 2022-03-23 DIAGNOSIS — I50.32 CHRONIC DIASTOLIC HEART FAILURE (H): ICD-10-CM

## 2022-03-23 DIAGNOSIS — I10 HTN, GOAL BELOW 140/90: ICD-10-CM

## 2022-03-23 DIAGNOSIS — I25.10 CORONARY ARTERY DISEASE INVOLVING NATIVE HEART WITHOUT ANGINA PECTORIS, UNSPECIFIED VESSEL OR LESION TYPE: ICD-10-CM

## 2022-03-23 DIAGNOSIS — N18.30 STAGE 3 CHRONIC KIDNEY DISEASE, UNSPECIFIED WHETHER STAGE 3A OR 3B CKD (H): ICD-10-CM

## 2022-03-23 DIAGNOSIS — I50.9 CHF (CONGESTIVE HEART FAILURE) (H): ICD-10-CM

## 2022-03-23 DIAGNOSIS — I48.91 ATRIAL FIBRILLATION, UNSPECIFIED TYPE (H): ICD-10-CM

## 2022-03-23 LAB
ALBUMIN SERPL-MCNC: 3.6 G/DL (ref 3.4–5)
ALP SERPL-CCNC: 116 U/L (ref 40–150)
ALT SERPL W P-5'-P-CCNC: 32 U/L (ref 0–50)
ANION GAP SERPL CALCULATED.3IONS-SCNC: 7 MMOL/L (ref 3–14)
AST SERPL W P-5'-P-CCNC: 30 U/L (ref 0–45)
BILIRUB SERPL-MCNC: 0.8 MG/DL (ref 0.2–1.3)
BUN SERPL-MCNC: 44 MG/DL (ref 7–30)
CALCIUM SERPL-MCNC: 9.4 MG/DL (ref 8.5–10.1)
CHLORIDE BLD-SCNC: 106 MMOL/L (ref 94–109)
CO2 SERPL-SCNC: 27 MMOL/L (ref 20–32)
CREAT SERPL-MCNC: 1.48 MG/DL (ref 0.52–1.04)
ERYTHROCYTE [DISTWIDTH] IN BLOOD BY AUTOMATED COUNT: 13.7 % (ref 10–15)
GFR SERPL CREATININE-BSD FRML MDRD: 40 ML/MIN/1.73M2
GLUCOSE BLD-MCNC: 97 MG/DL (ref 70–99)
HCT VFR BLD AUTO: 39.1 % (ref 35–47)
HGB BLD-MCNC: 12.4 G/DL (ref 11.7–15.7)
MCH RBC QN AUTO: 30.1 PG (ref 26.5–33)
MCHC RBC AUTO-ENTMCNC: 31.7 G/DL (ref 31.5–36.5)
MCV RBC AUTO: 95 FL (ref 78–100)
NT-PROBNP SERPL-MCNC: 990 PG/ML (ref 0–125)
PLATELET # BLD AUTO: 171 10E3/UL (ref 150–450)
POTASSIUM BLD-SCNC: 5 MMOL/L (ref 3.4–5.3)
PROT SERPL-MCNC: 8.5 G/DL (ref 6.8–8.8)
RBC # BLD AUTO: 4.12 10E6/UL (ref 3.8–5.2)
SODIUM SERPL-SCNC: 140 MMOL/L (ref 133–144)
WBC # BLD AUTO: 6.3 10E3/UL (ref 4–11)

## 2022-03-23 PROCEDURE — G0463 HOSPITAL OUTPT CLINIC VISIT: HCPCS

## 2022-03-23 PROCEDURE — 85027 COMPLETE CBC AUTOMATED: CPT | Performed by: PATHOLOGY

## 2022-03-23 PROCEDURE — 36415 COLL VENOUS BLD VENIPUNCTURE: CPT | Performed by: PATHOLOGY

## 2022-03-23 PROCEDURE — 80053 COMPREHEN METABOLIC PANEL: CPT | Performed by: PATHOLOGY

## 2022-03-23 PROCEDURE — 83880 ASSAY OF NATRIURETIC PEPTIDE: CPT | Performed by: PATHOLOGY

## 2022-03-23 PROCEDURE — 99214 OFFICE O/P EST MOD 30 MIN: CPT | Performed by: INTERNAL MEDICINE

## 2022-03-23 RX ORDER — FERROUS GLUCONATE 324(38)MG
324 TABLET ORAL
COMMUNITY
End: 2022-11-28

## 2022-03-23 RX ORDER — METOPROLOL TARTRATE 50 MG
TABLET ORAL
Qty: 360 TABLET | Refills: 3 | Status: SHIPPED | OUTPATIENT
Start: 2022-03-23 | End: 2022-08-19

## 2022-03-23 ASSESSMENT — PAIN SCALES - GENERAL: PAINLEVEL: NO PAIN (0)

## 2022-03-23 NOTE — PROGRESS NOTES
Dictated        1. ASHD with CAB and subsequent stenting  2. HFpEF  3. Lupus  4. Elevated BMI  5. Atrial fibrillation/warfarin    Fitz Chau M.D. Rheumatology  Audrey Clark M.D. Nephrology  Eliz Nguyen M.D. Internal Medicine      Current Outpatient Medications   Medication     aspirin 81 MG EC tablet     atorvastatin (LIPITOR) 40 MG tablet     ferrous gluconate (FERGON) 324 (38 Fe) MG tablet     furosemide (LASIX) 20 MG tablet     hydroxychloroquine (PLAQUENIL) 200 MG tablet     loratadine (CLARITIN) 10 MG tablet     metoprolol tartrate (LOPRESSOR) 50 MG tablet     metroNIDAZOLE (METROCREAM) 0.75 % external cream     MULTIPLE VITAMIN PO     Omega-3 Fatty Acids (FISH OIL) 1200 MG capsule     order for DME     sacubitril-valsartan (ENTRESTO) 24-26 MG per tablet     warfarin ANTICOAGULANT (COUMADIN) 5 MG tablet     No current facility-administered medications for this visit.   Results for IDA PADRON (MRN [id number]) as of 3/23/2022 14:08   Ref. Range 2/1/2021 15:57 4/7/2021 13:31 5/13/2021 14:38 6/17/2021 12:34 7/8/2021 13:33 7/13/2021 12:13 8/5/2021 10:25 8/12/2021 13:43 8/13/2021 11:23 8/20/2021 10:03 8/25/2021 13:38 10/6/2021 13:15 10/15/2021 11:12 11/4/2021 14:44 1/12/2022 13:21 2/14/2022 14:03 2/25/2022 11:20 3/23/2022 13:17   Creatinine Latest Ref Range: 0.52 - 1.04 mg/dL 1.53 (H) 1.69 (H) 1.41 (H) 1.65 (H) 1.49 (H) 1.34 (H) 1.22 (H) 1.74 (H) 1.55 (H) 1.43 (H) 1.65 (H) 1.68 (H) 1.60 (H) 1.91 (H) 1.60 (H) 1.80 (H) 1.43 (H) 1.48 (H)     LResults for IDA PADRON (MRN [id number]) as of 3/23/2022 14:08   Ref. Range 3/23/2022 13:17   Sodium Latest Ref Range: 133 - 144 mmol/L 140   Potassium Latest Ref Range: 3.4 - 5.3 mmol/L 5.0   Chloride Latest Ref Range: 94 - 109 mmol/L 106   Carbon Dioxide Latest Ref Range: 20 - 32 mmol/L 27   Urea Nitrogen Latest Ref Range: 7 - 30 mg/dL 44 (H)   Creatinine Latest Ref Range: 0.52 - 1.04 mg/dL 1.48 (H)   GFR Estimate Latest Ref Range: >60 mL/min/1.73m2 40 (L)    Calcium Latest Ref Range: 8.5 - 10.1 mg/dL 9.4   Anion Gap Latest Ref Range: 3 - 14 mmol/L 7   Albumin Latest Ref Range: 3.4 - 5.0 g/dL 3.6   Protein Total Latest Ref Range: 6.8 - 8.8 g/dL 8.5   Bilirubin Total Latest Ref Range: 0.2 - 1.3 mg/dL 0.8   Alkaline Phosphatase Latest Ref Range: 40 - 150 U/L 116   ALT Latest Ref Range: 0 - 50 U/L 32   AST Latest Ref Range: 0 - 45 U/L 30   N-Terminal Pro Bnp Latest Ref Range: 0 - 125 pg/mL 990 (H)   Glucose Latest Ref Range: 70 - 99 mg/dL 97   WBC Latest Ref Range: 4.0 - 11.0 10e3/uL 6.3   Hemoglobin Latest Ref Range: 11.7 - 15.7 g/dL 12.4   Hematocrit Latest Ref Range: 35.0 - 47.0 % 39.1   Platelet Count Latest Ref Range: 150 - 450 10e3/uL 171   RBC Count Latest Ref Range: 3.80 - 5.20 10e6/uL 4.12   MCV Latest Ref Range: 78 - 100 fL 95   MCH Latest Ref Range: 26.5 - 33.0 pg 30.1   MCHC Latest Ref Range: 31.5 - 36.5 g/dL 31.7   RDW Latest Ref Range: 10.0 - 15.0 % 13.7   eft Anterior Descending   Ost LAD to Mid LAD lesion is 100% stenosed. The lesion is chronic total occlusion.   Second Diagonal Branch   The vessel is small. There is mild diffuse disease throughout the vessel.   Left Circumflex   Previously placed Ost Cx to Prox Cx stent (unknown type) is widely patent.   Prox Cx to Mid Cx lesion is 20% stenosed.   First Obtuse Marginal Branch   The vessel is moderate in size.   1st Mrg lesion is 30% stenosed with 100% stenosed side branch in Lat 1st Mrg. The lesion was previously treated using a stent of unknown type.   Lateral First Obtuse Marginal Branch   The vessel is small.   Second Obtuse Marginal Branch   The vessel is moderate in size.   Third Obtuse Marginal Branch   The vessel is small.   Right Coronary Artery   Prox RCA lesion is 40% stenosed. The lesion is eccentric.   Mid RCA to Dist RCA lesion is 10% stenosed.   Dist RCA lesion is 100% stenosed. The lesion is chronic total occlusion.   Right Posterior Descending Artery   RPDA lesion is 20% stenosed.   LIMA  Graft To Dist LAD   The graft is large. The graft is angiographically normal.   Graft To Lat 1st Mrg   The graft is large. The graft is angiographically normal.   Graft To RPDA   The graft is large. The graft is angiographically normal.       Intervention      No interventions have been documented.    Hemodynamics    RA: --/--/15  RV: 62/18/--  PA: 62/28/38  PCWP: --/--/28     PA Sat: 59%  Ao Sat: 99%    Kannan CO/CI: 5.41/2.49    PVR: 1.8  SVR: 1,214 dynes-sec/cm5 Right sided filling pressures are moderately elevated. Left sided filling pressures are moderately elevated. Moderately elevated pulmonary artery hypertension. Normal cardiac output level.     Pressures Phase: Baseline     Time Systolic (mmHg) Diastolic (mmHg) Mean (mmHg) A Wave (mmHg) V Wave (mmHg) EDP (mmHg) Max dp/dt (mmHg/sec) HR (bpm) Content (mL/dL) SAT (%)   RA Pressures  2:48 PM   15    18    17      54        RV Pressures  2:23 PM       291           2:48 PM 62        18     54        PA Pressures  2:51 PM 62    28    38        54        PCW Pressures  2:49 PM   28    29    30      54          Blood Flow Results Phase: Baseline     Time Results  Indexed Values (L/min/m2)   QP  2:23 PM 5.41 L/min    2.49      QS  2:23 PM 5.41 L/min    2.49        Blood Oximetry Phase: Baseline     Time Hb  SAT(%)  PO2  Content (mL/dL) PA Sat (%)   PA  2:23 PM  59 %      59      Art  2:23 PM  99 %     14.81         Cardiac Output Phase: Baseline     Time TDCO (L/min) TDCI (L/min/m2) Kannan C.O. (L/min) Kannan C.I. (L/min/m2) Kannan HR (bpm)   Cardiac Output Results  2:23 PM   5.41    2.49         Resistance Results Phase: Baseline     Time PVR  SVR  TPR  TVR  PVR/SVR  TPR/TVR    Resistance Results (Metric)  2:23 .86 dsc-5     561.88 dsc-5         Resistance Results (Wood)  2:23 PM 1.85 GASCA     7.03 GASCA           Stoke Volume Results Phase: Baseline     Time RVSW (gm*m) LVSW (gm*m) RVSW-I (gm*m/m2) LVSW-I (gm*m/m2)   Stroke Work Results  2:23 PM                 R  Wt  Readings from Last 24 Encounters:   03/23/22 123 kg (271 lb 1.6 oz)   01/12/22 119.3 kg (263 lb 1.6 oz)   08/27/21 117.9 kg (260 lb)   08/20/21 119.3 kg (263 lb)   07/13/21 124.7 kg (275 lb)   07/20/20 113.9 kg (251 lb)   03/06/20 113.9 kg (251 lb 3.2 oz)   02/05/20 111.6 kg (246 lb)   01/14/20 113.9 kg (251 lb)   12/23/19 116.3 kg (256 lb 4.8 oz)   11/08/19 116.6 kg (257 lb)   11/08/19 116.6 kg (257 lb)   11/05/19 116.6 kg (257 lb)   10/16/19 119.4 kg (263 lb 4.8 oz)   10/11/19 120.7 kg (266 lb 1.6 oz)   09/04/19 122 kg (269 lb)   08/16/19 122 kg (269 lb)   08/13/19 122.5 kg (270 lb)   08/05/19 121.4 kg (267 lb 9.6 oz)   11/20/18 122.9 kg (271 lb)   10/29/18 122 kg (269 lb)   09/17/18 119.3 kg (263 lb)   09/04/18 118.3 kg (260 lb 14.4 oz)   06/25/18 118.4 kg (261 lb)

## 2022-03-23 NOTE — LETTER
3/23/2022      RE: Luz Marina ELMORE Calos  [address]  [place] [address]       Dear Colleague,    Thank you for the opportunity to participate in the care of your patient, Luz Marina Padron, at the [facility]. Please see a copy of my visit note below.      1. ASHD with CAB and subsequent stenting  2. HFpEF  3. Lupus  4. Elevated BMI  5. Atrial fibrillation/warfarin    Fitz Chau M.D. Rheumatology  Audrey Clark M.D. Nephrology  Eliz Nugyen M.D. Internal Medicine      Current Outpatient Medications   Medication     aspirin 81 MG EC tablet     atorvastatin (LIPITOR) 40 MG tablet     ferrous gluconate (FERGON) 324 (38 Fe) MG tablet     furosemide (LASIX) 20 MG tablet     hydroxychloroquine (PLAQUENIL) 200 MG tablet     loratadine (CLARITIN) 10 MG tablet     metoprolol tartrate (LOPRESSOR) 50 MG tablet     metroNIDAZOLE (METROCREAM) 0.75 % external cream     MULTIPLE VITAMIN PO     Omega-3 Fatty Acids (FISH OIL) 1200 MG capsule     order for DME     sacubitril-valsartan (ENTRESTO) 24-26 MG per tablet     warfarin ANTICOAGULANT (COUMADIN) 5 MG tablet     No current facility-administered medications for this visit.   Results for LUZ MARINA PADRON (MRN [id number]) as of 3/23/2022 14:08   Ref. Range 2/1/2021 15:57 4/7/2021 13:31 5/13/2021 14:38 6/17/2021 12:34 7/8/2021 13:33 7/13/2021 12:13 8/5/2021 10:25 8/12/2021 13:43 8/13/2021 11:23 8/20/2021 10:03 8/25/2021 13:38 10/6/2021 13:15 10/15/2021 11:12 11/4/2021 14:44 1/12/2022 13:21 2/14/2022 14:03 2/25/2022 11:20 3/23/2022 13:17   Creatinine Latest Ref Range: 0.52 - 1.04 mg/dL 1.53 (H) 1.69 (H) 1.41 (H) 1.65 (H) 1.49 (H) 1.34 (H) 1.22 (H) 1.74 (H) 1.55 (H) 1.43 (H) 1.65 (H) 1.68 (H) 1.60 (H) 1.91 (H) 1.60 (H) 1.80 (H) 1.43 (H) 1.48 (H)     LResults for LUZ MARINA PADRON (MRN [id number]) as of 3/23/2022 14:08   Ref. Range 3/23/2022 13:17   Sodium Latest Ref Range: 133 - 144 mmol/L 140    Potassium Latest Ref Range: 3.4 - 5.3 mmol/L 5.0   Chloride Latest Ref Range: 94 - 109 mmol/L 106   Carbon Dioxide Latest Ref Range: 20 - 32 mmol/L 27   Urea Nitrogen Latest Ref Range: 7 - 30 mg/dL 44 (H)   Creatinine Latest Ref Range: 0.52 - 1.04 mg/dL 1.48 (H)   GFR Estimate Latest Ref Range: >60 mL/min/1.73m2 40 (L)   Calcium Latest Ref Range: 8.5 - 10.1 mg/dL 9.4   Anion Gap Latest Ref Range: 3 - 14 mmol/L 7   Albumin Latest Ref Range: 3.4 - 5.0 g/dL 3.6   Protein Total Latest Ref Range: 6.8 - 8.8 g/dL 8.5   Bilirubin Total Latest Ref Range: 0.2 - 1.3 mg/dL 0.8   Alkaline Phosphatase Latest Ref Range: 40 - 150 U/L 116   ALT Latest Ref Range: 0 - 50 U/L 32   AST Latest Ref Range: 0 - 45 U/L 30   N-Terminal Pro Bnp Latest Ref Range: 0 - 125 pg/mL 990 (H)   Glucose Latest Ref Range: 70 - 99 mg/dL 97   WBC Latest Ref Range: 4.0 - 11.0 10e3/uL 6.3   Hemoglobin Latest Ref Range: 11.7 - 15.7 g/dL 12.4   Hematocrit Latest Ref Range: 35.0 - 47.0 % 39.1   Platelet Count Latest Ref Range: 150 - 450 10e3/uL 171   RBC Count Latest Ref Range: 3.80 - 5.20 10e6/uL 4.12   MCV Latest Ref Range: 78 - 100 fL 95   MCH Latest Ref Range: 26.5 - 33.0 pg 30.1   MCHC Latest Ref Range: 31.5 - 36.5 g/dL 31.7   RDW Latest Ref Range: 10.0 - 15.0 % 13.7   eft Anterior Descending   Ost LAD to Mid LAD lesion is 100% stenosed. The lesion is chronic total occlusion.   Second Diagonal Branch   The vessel is small. There is mild diffuse disease throughout the vessel.   Left Circumflex   Previously placed Ost Cx to Prox Cx stent (unknown type) is widely patent.   Prox Cx to Mid Cx lesion is 20% stenosed.   First Obtuse Marginal Branch   The vessel is moderate in size.   1st Mrg lesion is 30% stenosed with 100% stenosed side branch in Lat 1st Mrg. The lesion was previously treated using a stent of unknown type.   Lateral First Obtuse Marginal Branch   The vessel is small.   Second Obtuse Marginal Branch   The vessel is moderate in size.   Third  Obtuse Marginal Branch   The vessel is small.   Right Coronary Artery   Prox RCA lesion is 40% stenosed. The lesion is eccentric.   Mid RCA to Dist RCA lesion is 10% stenosed.   Dist RCA lesion is 100% stenosed. The lesion is chronic total occlusion.   Right Posterior Descending Artery   RPDA lesion is 20% stenosed.   LIMA Graft To Dist LAD   The graft is large. The graft is angiographically normal.   Graft To Lat 1st Mrg   The graft is large. The graft is angiographically normal.   Graft To RPDA   The graft is large. The graft is angiographically normal.       Intervention      No interventions have been documented.    Hemodynamics    RA: --/--/15  RV: 62/18/--  PA: 62/28/38  PCWP: --/--/28     PA Sat: 59%  Ao Sat: 99%    Kannan CO/CI: 5.41/2.49    PVR: 1.8  SVR: 1,214 dynes-sec/cm5 Right sided filling pressures are moderately elevated. Left sided filling pressures are moderately elevated. Moderately elevated pulmonary artery hypertension. Normal cardiac output level.     Pressures Phase: Baseline     Time Systolic (mmHg) Diastolic (mmHg) Mean (mmHg) A Wave (mmHg) V Wave (mmHg) EDP (mmHg) Max dp/dt (mmHg/sec) HR (bpm) Content (mL/dL) SAT (%)   RA Pressures  2:48 PM   15    18    17      54        RV Pressures  2:23 PM       291           2:48 PM 62        18     54        PA Pressures  2:51 PM 62    28    38        54        PCW Pressures  2:49 PM   28    29    30      54          Blood Flow Results Phase: Baseline     Time Results  Indexed Values (L/min/m2)   QP  2:23 PM 5.41 L/min    2.49      QS  2:23 PM 5.41 L/min    2.49        Blood Oximetry Phase: Baseline     Time Hb  SAT(%)  PO2  Content (mL/dL) PA Sat (%)   PA  2:23 PM  59 %      59      Art  2:23 PM  99 %     14.81         Cardiac Output Phase: Baseline     Time TDCO (L/min) TDCI (L/min/m2) Kannan C.O. (L/min) Kannan C.I. (L/min/m2) Kannan HR (bpm)   Cardiac Output Results  2:23 PM   5.41    2.49         Resistance Results Phase: Baseline     Time PVR  SVR  TPR   TVR  PVR/SVR  TPR/TVR    Resistance Results (Metric)  2:23 .86 dsc-5     561.88 dsc-5         Resistance Results (Wood)  2:23 PM 1.85 GASCA     7.03 GASCA           Stoke Volume Results Phase: Baseline     Time RVSW (gm*m) LVSW (gm*m) RVSW-I (gm*m/m2) LVSW-I (gm*m/m2)   Stroke Work Results  2:23 PM                 R  Wt Readings from Last 24 Encounters:   22 123 kg (271 lb 1.6 oz)   22 119.3 kg (263 lb 1.6 oz)   21 117.9 kg (260 lb)   21 119.3 kg (263 lb)   21 124.7 kg (275 lb)   20 113.9 kg (251 lb)   20 113.9 kg (251 lb 3.2 oz)   20 111.6 kg (246 lb)   20 113.9 kg (251 lb)   19 116.3 kg (256 lb 4.8 oz)   19 116.6 kg (257 lb)   19 116.6 kg (257 lb)   19 116.6 kg (257 lb)   10/16/19 119.4 kg (263 lb 4.8 oz)   10/11/19 120.7 kg (266 lb 1.6 oz)   19 122 kg (269 lb)   19 122 kg (269 lb)   19 122.5 kg (270 lb)   19 121.4 kg (267 lb 9.6 oz)   18 122.9 kg (271 lb)   10/29/18 122 kg (269 lb)   18 119.3 kg (263 lb)   18 118.3 kg (260 lb 14.4 oz)   18 118.4 kg (261 lb)       Service Date: 2022    Eliz Nguyen MD   [facility] Internal [facility]   [address] [place] [address]    BHAVIN Yancey  [facility] Rheumatology  [address] [place] [address]    Lavern Clark MD   [facility] Nephrology  [address] [place] [address]    RE:      Luz Marina Live  MRN:  [id number]  :   1960    IMPRESSION:    1.  Heart failure with preserved ejection fraction.  2.  Elevated body mass index.  3.  Insulin resistance.  4.  Sleep-disordered breathing.  5.  Lupus.  6.  Chronic kidney disease.    Dear Doctors:      We saw Luz Marina Live today.  While she is doing reasonably well, the trend is not particularly encouraging.  Since her last visit, she has gained approximately 8 pounds.  She has moderate pulmonary artery hypertension.  She  has chronic renal insufficiency.  We had contemplated using an SGLT2; however, Lavern thought this was not a great idea.  We did switch her to Entresto, which has not particularly disturbed her in any manner.  However, a slight increase in her creatinine kept us from going up and maintaining the higher dosage.  She has no paroxysmal nocturnal dyspnea.  She has chronic lower extremity edema.  She has a history of paroxysmal atrial fibrillation, for which she is on Lopressor 100 b.i.d.  She continues to complain of exercise intolerance.    I walked her in the cespedes here.  Her resting oxygen saturation was 99% with a heart rate of 52 beats per minute.  With walking, her oxygen saturations dropped to approximately 90%, and she complained of shortness of breath.  Her heart rate failed to demonstrate an adequate chronotropic response in so far as that it only went from approximately 52-68-70.      PHYSICAL EXAMINATION:  Neck is supple.  Basilar rales.  S1 regular, S2 split.  Chronic lower extremity edema.  Increased body mass.    As noted, I ambulated her in the cespedes on metoprolol 100 b.i.d.  She had an inadequate chronotropic response in the context of heart failure with preserved ejection fraction.  I therefore reduced her Lopressor to 100 at night and 50 in the morning, hoping that she will improve functional capacity.    She has just joined [employer], so hopefully this will be some help.  Her pulmonary vascular resistance despite the mild elevated pulmonary artery pressures is within the normal limits at 1.8, suggesting this is a dominant post capillary pulmonary hypertension that should be responsive to volume management, blood pressure control and heart rate response.    Thank you very much for allowing us to see her.    Sincerely yours,    Twin Moreno MD        D: 2022   T: 2022   MT: barry    Name:     IDA PADRON  MRN:      [id number]        Account:      [id number]   :      1960            Service Date: 03/23/2022       Document: [id number]

## 2022-03-23 NOTE — PROGRESS NOTES
Service Date: 2022    Eliz Nguyen MD   [facility] Internal Medicine   [address] [place] [address]    BHAVIN Yancey [facility] Rheumatology  [address] [place] [address]    Lavern Clark MD   [facility] Nephrology  [address] [place] [address]    RE:      Luz Marina Live  MRN:  [id number]  :   1960    IMPRESSION:    1.  Heart failure with preserved ejection fraction.  2.  Elevated body mass index.  3.  Insulin resistance.  4.  Sleep-disordered breathing.  5.  Lupus.  6.  Chronic kidney disease.    Dear Doctors:      We saw Luz Marina Live today.  While she is doing reasonably well, the trend is not particularly encouraging.  Since her last visit, she has gained approximately 8 pounds.  She has moderate pulmonary artery hypertension.  She has chronic renal insufficiency.  We had contemplated using an SGLT2; however, Lavern thought this was not a great idea.  We did switch her to Entresto, which has not particularly disturbed her in any manner.  However, a slight increase in her creatinine kept us from going up and maintaining the higher dosage.  She has no paroxysmal nocturnal dyspnea.  She has chronic lower extremity edema.  She has a history of paroxysmal atrial fibrillation, for which she is on Lopressor 100 b.i.d.  She continues to complain of exercise intolerance.    I walked her in the cespedes here.  Her resting oxygen saturation was 99% with a heart rate of 52 beats per minute.  With walking, her oxygen saturations dropped to approximately 90%, and she complained of shortness of breath.  Her heart rate failed to demonstrate an adequate chronotropic response in so far as that it only went from approximately 52-68-70.      PHYSICAL EXAMINATION:  Neck is supple.  Basilar rales.  S1 regular, S2 split.  Chronic lower extremity edema.  Increased body mass.    As noted, I ambulated her in the cespedes on metoprolol 100 b.i.d.  She  had an inadequate chronotropic response in the context of heart failure with preserved ejection fraction.  I therefore reduced her Lopressor to 100 at night and 50 in the morning, hoping that she will improve functional capacity.    She has just joined [employer], so hopefully this will be some help.  Her pulmonary vascular resistance despite the mild elevated pulmonary artery pressures is within the normal limits at 1.8, suggesting this is a dominant post capillary pulmonary hypertension that should be responsive to volume management, blood pressure control and heart rate response.    Thank you very much for allowing us to see her.    Sincerely yours,    Twin Moreno MD        D: 2022   T: 2022   MT: barry    Name:     IDA PADRON  MRN:      [id number]        Account:      [id number]   :      1960           Service Date: 2022       Document: [id number]

## 2022-03-23 NOTE — NURSING NOTE
Chief Complaint   Patient presents with     Follow Up     Return HF; 61yr old female with a h/o CAD s/p CAB, HTN, HLD, atrial fibrillation on warfarin, SLE, and PFO presenting for follow-up with labs.     Vitals were taken and medications reconciled.    Chan Pham, EMT  1:49 PM

## 2022-03-23 NOTE — PATIENT INSTRUCTIONS
"You were seen today in the Cardiovascular Clinic at the [facility].      Cardiology Providers you saw during your visit:  Dr. Twin Moreno     Recommendations:   1. Create a walk schedule for 30 minutes every day. The first 2 weeks will be difficult and the it will start to become easier after that.  2. Well done starting Weight Watchers, continue the program and stick to it.  3. Your heart rate increases from 50's to 70's with activity.  Please decrease your metoprolol to 50mg in the AM and 100mg in the evening.   4.  Call or MyChart us in 10-14 days to let us know if you are feeling any better from the metoprolol decrease.     Follow-up with Dr. Moreno in 3 months with labs prior. Call us if you aren't doing well and feel you should be seen sooner.       Eat a heart healthy, low sodium diet.  Get 20 to 30 minutes of aerobic exercise 4 to 5 times per week as tolerated. (Examples of aerobic exercise include: walking, bicycling, swimming, running).     Thank you for your visit today!   Please MyChart message or call if you have any questions or concerns.      During Business Hours:  [phone], option # 1 ([facility])  Then option # 4 (medical questions)     After hours, weekends or holidays:   [phone], Option #4  Ask to speak to the On-Call Cardiologist. Inform them you are a heart failure patient at the [facility].      Kaylie Tran RN BSN CHFN  Cardiology Care Coordinator - C.O.R.E. [facility]  Questions and [phone]  First press #1 for the [employer] and then press #4 for \"Your Care Team\" to reach us Cardiology Nurses.                "

## 2022-04-15 ENCOUNTER — LAB (OUTPATIENT)
Dept: LAB | Facility: CLINIC | Age: 62
End: 2022-04-15
Payer: COMMERCIAL

## 2022-04-15 ENCOUNTER — ANTICOAGULATION THERAPY VISIT (OUTPATIENT)
Dept: ANTICOAGULATION | Facility: CLINIC | Age: 62
End: 2022-04-15

## 2022-04-15 DIAGNOSIS — I48.0 PAF (PAROXYSMAL ATRIAL FIBRILLATION) (H): ICD-10-CM

## 2022-04-15 DIAGNOSIS — N18.31 STAGE 3A CHRONIC KIDNEY DISEASE (H): ICD-10-CM

## 2022-04-15 DIAGNOSIS — Z79.01 LONG TERM CURRENT USE OF ANTICOAGULANT THERAPY: ICD-10-CM

## 2022-04-15 DIAGNOSIS — Z79.01 LONG TERM CURRENT USE OF ANTICOAGULANT THERAPY: Primary | ICD-10-CM

## 2022-04-15 DIAGNOSIS — I48.91 ATRIAL FIBRILLATION, UNSPECIFIED TYPE (H): ICD-10-CM

## 2022-04-15 LAB
ALBUMIN SERPL-MCNC: 3.3 G/DL (ref 3.4–5)
ALBUMIN UR-MCNC: NEGATIVE MG/DL
ANION GAP SERPL CALCULATED.3IONS-SCNC: 4 MMOL/L (ref 3–14)
APPEARANCE UR: CLEAR
BACTERIA #/AREA URNS HPF: ABNORMAL /HPF
BILIRUB UR QL STRIP: NEGATIVE
BUN SERPL-MCNC: 41 MG/DL (ref 7–30)
CALCIUM SERPL-MCNC: 9.8 MG/DL (ref 8.5–10.1)
CHLORIDE BLD-SCNC: 106 MMOL/L (ref 94–109)
CO2 SERPL-SCNC: 27 MMOL/L (ref 20–32)
COLOR UR AUTO: YELLOW
CREAT SERPL-MCNC: 1.36 MG/DL (ref 0.52–1.04)
CREAT UR-MCNC: 41 MG/DL
GFR SERPL CREATININE-BSD FRML MDRD: 44 ML/MIN/1.73M2
GLUCOSE BLD-MCNC: 92 MG/DL (ref 70–99)
GLUCOSE UR STRIP-MCNC: NEGATIVE MG/DL
HGB BLD-MCNC: 12.3 G/DL (ref 11.7–15.7)
HGB UR QL STRIP: ABNORMAL
HYALINE CASTS #/AREA URNS LPF: ABNORMAL /LPF
INR BLD: 2 (ref 0.9–1.1)
KETONES UR STRIP-MCNC: NEGATIVE MG/DL
LEUKOCYTE ESTERASE UR QL STRIP: NEGATIVE
NITRATE UR QL: NEGATIVE
PH UR STRIP: 6 [PH] (ref 5–7)
PHOSPHATE SERPL-MCNC: 3.3 MG/DL (ref 2.5–4.5)
POTASSIUM BLD-SCNC: 4.9 MMOL/L (ref 3.4–5.3)
PROT UR-MCNC: 0.07 G/L
PROT/CREAT 24H UR: 0.17 G/G CR (ref 0–0.2)
PTH-INTACT SERPL-MCNC: 86 PG/ML (ref 18–80)
RBC #/AREA URNS AUTO: ABNORMAL /HPF
SODIUM SERPL-SCNC: 137 MMOL/L (ref 133–144)
SP GR UR STRIP: 1.01 (ref 1–1.03)
SQUAMOUS #/AREA URNS AUTO: ABNORMAL /LPF
UROBILINOGEN UR STRIP-ACNC: 0.2 E.U./DL
WBC #/AREA URNS AUTO: ABNORMAL /HPF

## 2022-04-15 PROCEDURE — 85018 HEMOGLOBIN: CPT

## 2022-04-15 PROCEDURE — 36415 COLL VENOUS BLD VENIPUNCTURE: CPT

## 2022-04-15 PROCEDURE — 84156 ASSAY OF PROTEIN URINE: CPT

## 2022-04-15 PROCEDURE — 83970 ASSAY OF PARATHORMONE: CPT

## 2022-04-15 PROCEDURE — 81001 URINALYSIS AUTO W/SCOPE: CPT

## 2022-04-15 PROCEDURE — 85610 PROTHROMBIN TIME: CPT

## 2022-04-15 PROCEDURE — 80069 RENAL FUNCTION PANEL: CPT

## 2022-04-15 NOTE — PROGRESS NOTES
Luz Marina is a 61 year old who is being evaluated via a billable video visit.      How would you like to obtain your AVS? MyChart  If the video visit is dropped, the invitation should be resent by: Text to cell phone: [phone]  Will anyone else be joining your video visit? No      Video Start Time: 10:55 AM       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  +ROHAN, dsDNA, SSA, lupus inhibitor, and depressed complements. Modest proteinuria. Previous treatment with remote cytoxan and imuran. Current management with hydroxychloroquine 400 mg daily.     Her lupus is very quiet at present. No problems with her skin. AM stiffness in 20-30 minutes. Energy is good and no issues at work. No skin or mucosal problems, but she has thinning of her hair.  No very frequent raynaud's but her hands and feet are typically cold. Energy is intermediate.     Hydroxychloroquine 200 mg BIDis well tolerated and no eye problems at her October visit.     PMI:  Medical-rosacea, SLE  Active Ambulatory Problems     Diagnosis Date Noted     Hyperlipidemia LDL goal <100 12/05/2011     CAD (coronary artery disease) 12/05/2011     HTN, goal below 140/90 07/03/2013     PAF (paroxysmal atrial fibrillation) (H) 08/24/2015     CKD (chronic kidney disease) stage 3, GFR 30-59 ml/min (H) 09/14/2015     Long-term (current) use of anticoagulants [Z79.01] 04/05/2016     Moderate tricuspid insufficiency 12/22/2016     Systemic lupus erythematosus with glomerular disease, unspecified SLE type (H) 07/03/2017     Psychophysiological insomnia 06/11/2018     Morbid obesity (H) 06/11/2018     KEITH (obstructive sleep apnea)- severe (AHI 30) 06/18/2018     Long-term use of Plaquenil 09/04/2018     Long-term use of immunosuppressant medication 10/11/2019     Stable angina (H) 10/16/2019     Positive cardiac stress test 08/06/2021     Status post coronary angiogram 08/20/2021     Atrial fibrillation, unspecified type (H) 02/03/2022     Resolved Ambulatory Problems      Diagnosis Date Noted     SLE (systemic lupus erythematosus) (H) 11/01/2010     Hypovolemic shock (H) 02/28/2015     Sepsis (H) 08/22/2015     Secondary renal hyperparathyroidism (H) 01/27/2016     Physical deconditioning 10/07/2021     Past Medical History:   Diagnosis Date     Hypertension      Lupus (H)      Surgical-  Past Surgical History:   Procedure Laterality Date     CARDIAC SURGERY  08/27/2009    triple vessel coronary artery bypass grafting on 8/27/09     CARPAL TUNNEL RELEASE RT/LT  1996    bilateral     CV CORONARY ANGIOGRAM  10/17/2019    Procedure: CV CORONARY ANGIOGRAM;  Surgeon: Mahendra Collins MD;  [facility]     CV CORONARY ANGIOGRAM N/A 8/20/2021    Procedure: CV CORONARY ANGIOGRAM;  Surgeon: Derek Escoto MD;  [facility]: [facility]     CV PCI STENT DRUG ELUTING N/A 10/17/2019    Procedure: PCI Stent Drug Eluting;  Surgeon: Mahendra Collins MD;  Location: [facility]     CV RIGHT HEART CATH MEASUREMENTS RECORDED N/A 8/20/2021    Procedure: CV RIGHT HEART CATH;  Surgeon: Derek Escoto MD;  Location: [facility]     Injuries-none    SH:  Social History     Socioeconomic History     Marital status:      Spouse name: None     Number of children: 1 daughter     Years of education: None     Highest education level: None   Occupational History     None   Social Needs     Financial resource strain: None     Food insecurity:     Worry: None     Inability: None     Transportation needs:     Medical: None     Non-medical: None   Tobacco Use     Smoking status: Former Smoker     Smokeless tobacco: Never Used     Tobacco comment: 30 plus years ago.   Substance and Sexual Activity     Alcohol use: No     Drug use: No     Sexual activity: Yes     Partners: Male     Birth control/protection: Surgical     Comment: vasectomy   Lifestyle     Physical activity:     Days per week: None      Minutes per session: None     Stress: None   Relationships     Social connections:     Talks on phone: None     Gets together: None     Attends Tenriism service: None     Active member of club or organization: None     Attends meetings of clubs or organizations: None     Relationship status: None     Intimate partner violence:     Fear of current or ex partner: None     Emotionally abused: None     Physically abused: None     Forced sexual activity: None   Other Topics Concern     Parent/sibling w/ CABG, MI or angioplasty before 65F 55M? Not Asked   Social History Narrative     None     FH:  Family History   Problem Relation Age of Onset     Arthritis Mother         ra     Connective Tissue Disorder Mother         lupus     Congenital Anomalies Mother         wholein heart     Cerebrovascular Disease Mother         TIA's     Cerebrovascular Disease Father      Diabetes Father      Hypertension Father      Cardiovascular Father         stents     Genitourinary Problems Father         kidney failure     Kidney Disease Father         kidney injury related to acute illness around time of death (RANDELL likely)     Cataracts Father      Arthritis Paternal Grandmother      Diabetes Brother      Glaucoma No family hx of      Macular Degeneration No family hx of      ROS:  +occasional sinus infection and headache  Remainder of the 14 point ROS obtained and found negative.    Physical Exam:  Constitutional: WD-WN-WG cooperative; +obese  Eyes: nl EOM, sclera  ENT: nl external ears, nose, hearing, lips  Resp: nl effort  MS: All neck, shoulder, elbow, wrist, hand joints were examined and found normal. Normal tuck and prayer. Full ROM.  Skin: no alopecia, rash  Neuro: nl cranial nerves  Psych: nl judgement, affect    Laboratory:    Component      Latest Ref Rng & Units 4/15/2022   Color Urine      Colorless, Straw, Light Yellow, Yellow Yellow   Appearance Urine      Clear Clear   Glucose Urine      Negative mg/dL Negative    Bilirubin Urine      Negative Negative   Ketones Urine      Negative mg/dL Negative   Specific Gravity Urine      1.003 - 1.035 1.015   Blood Urine      Negative Trace (A)   pH Urine      5.0 - 7.0 6.0   Protein Albumin Urine      Negative mg/dL Negative   Urobilinogen Urine      0.2, 1.0 E.U./dL 0.2   Nitrite Urine      Negative Negative   Leukocyte Esterase Urine      Negative Negative   Sodium      133 - 144 mmol/L 137   Potassium      3.4 - 5.3 mmol/L 4.9   Chloride      94 - 109 mmol/L 106   Carbon Dioxide      20 - 32 mmol/L 27   Anion Gap      3 - 14 mmol/L 4   Urea Nitrogen      7 - 30 mg/dL 41 (H)   Creatinine      0.52 - 1.04 mg/dL 1.36 (H)   Calcium      8.5 - 10.1 mg/dL 9.8   Glucose      70 - 99 mg/dL 92   Albumin      3.4 - 5.0 g/dL 3.3 (L)   Phosphorus      2.5 - 4.5 mg/dL 3.3   GFR Estimate      >60 mL/min/1.73m2 44 (L)   Bacteria Urine      None Seen /HPF Few (A)   RBC Urine      0-2 /HPF /HPF 0-2   WBC Urine      0-5 /HPF /HPF 0-5   Squamous Epithelial /LPF Urine      None Seen /LPF Few (A)   Hyaline Casts      None Seen /LPF 0-2 (A)   Protein Random Urine      g/L 0.07   Protein Total Urine g/gr Creatinine      0.00 - 0.20 g/g Cr 0.17   Creatinine Urine      mg/dL 41   Hemoglobin      11.7 - 15.7 g/dL 12.3   Parathyroid Hormone Intact      18 - 80 pg/mL 86 (H)     Component      Latest Ref Rng & Units 3/23/2022   Sodium      133 - 144 mmol/L 140   Potassium      3.4 - 5.3 mmol/L 5.0   Chloride      94 - 109 mmol/L 106   Carbon Dioxide      20 - 32 mmol/L 27   Anion Gap      3 - 14 mmol/L 7   Urea Nitrogen      7 - 30 mg/dL 44 (H)   Creatinine      0.52 - 1.04 mg/dL 1.48 (H)   Calcium      8.5 - 10.1 mg/dL 9.4   Glucose      70 - 99 mg/dL 97   Alkaline Phosphatase      40 - 150 U/L 116   AST      0 - 45 U/L 30   ALT      0 - 50 U/L 32   Protein Total      6.8 - 8.8 g/dL 8.5   Albumin      3.4 - 5.0 g/dL 3.6   Bilirubin Total      0.2 - 1.3 mg/dL 0.8   GFR Estimate      >60 mL/min/1.73m2 40 (L)    WBC      4.0 - 11.0 10e3/uL 6.3   RBC Count      3.80 - 5.20 10e6/uL 4.12   Hemoglobin      11.7 - 15.7 g/dL 12.4   Hematocrit      35.0 - 47.0 % 39.1   MCV      78 - 100 fL 95   MCH      26.5 - 33.0 pg 30.1   MCHC      31.5 - 36.5 g/dL 31.7   RDW      10.0 - 15.0 % 13.7   Platelet Count      150 - 450 10e3/uL 171     Study Result    Narrative & Impression   HISTORY: Other osteoporosis without current pathological fracture     COMPARISON:   none     Age: 60.9  years.  Height: 63 inches  Weight: 262 pounds  Sex: Female     Image quality: Adequate     Lumbar spine T-score in region of L1-L2 = 4.3      HIPS:  Mean total hip T-score: 1     Left femoral neck T-score = -0.5  Right femoral neck T-score = -0.3      Radius 33% T-score = 1.1     FRAX:  10 year probability of major osteoporotic fracture: 14.6%  10 year probability of hip fracture: 0.3%  The 10 year probability of fracture may be lower than reported if the  patient has received treatment. FRAX data should be disregarded in  patient's taking bisphosphonates.     World Health Organization definition of osteoporosis and osteopenia  for  women:   Normal: T-score at or above -1.0  Low Bone Mass (Osteopenia): T-score between -1.0 and -2.5.   Osteoporosis: T-score at or below -2.5   T-scores are reported for postmenopausal women and men over 50 years  of age.                                                                      IMPRESSION:    Bone mineral density within normal limits.     KORIN BOB MD       Impression:    Systemic lupus erythematosis-with history of severe lupus nephritis. Today it is my impression that her nephritis is quiescent and kidney function remains stable. No other important signs of active synovitis or systemic inflammation. Good tolerance of the hydroxychloroquine and I will continue this therapy. Follow the inflammatory markers.    Bone health-with normal bone density by DEXA.    Long term management of immunosuppression-with  stable chronic kidney disease. Continue the toxicity screening. Continue the routine eye checks. We also agree that the benefits of continued immunosuppression outweigh the risks of COVID-19 infection. She is COVID vaccinated.    RTC in 6-12 months.    A total of 24 minutes was spent in face to face patient interaction and chart review on the day of service.      Video-Visit Details    Type of service:  Video Visit    Video End Time:11:18 AM    Originating Location (pt. Location): Home    Distant Location (provider location):  [facility]     Platform used for Video Visit: [employer]

## 2022-04-15 NOTE — PROGRESS NOTES
ANTICOAGULATION MANAGEMENT     Luz Marina Live 61 year old female is on warfarin with therapeutic INR result. (Goal INR 2.0-3.0)    Recent labs: (last 7 days)     04/15/22  1408   INR 2.0*       ASSESSMENT       Source(s): Chart Review       Warfarin doses taken: Reviewed in chart    Diet: No new diet changes identified    New illness, injury, or hospitalization: No    Medication/supplement changes: None noted    Signs or symptoms of bleeding or clotting: No    Previous INR: Therapeutic last 2(+) visits    Additional findings: None       PLAN     Recommended plan for no diet, medication or health factor changes affecting INR     Dosing Instructions: continue your current warfarin dose with next INR in 6 weeks       Summary  As of 4/15/2022    Full warfarin instructions:  10 mg every Tue, Thu, Sat; 7.5 mg all other days   Next INR check:  5/27/2022             Detailed voice message left for Luz Marina with dosing instructions and follow up date.     Contact [phone]  to schedule and with any changes, questions or concerns.     Education provided: Please call back if any changes to your diet, medications or how you've been taking warfarin    Plan made per [facility] anticoagulation protocol    Deborah Alatorre, RN  Anticoagulation Clinic  4/15/2022    _______________________________________________________________________     Anticoagulation Episode Summary     Current INR goal:  2.0-3.0   TTR:  77.0 % (1 y)   Target end date:  Indefinite   Send INR reminders to:  [facility]    Indications    Long-term (current) use of anticoagulants [Z79.01] [Z79.01]  PAF (paroxysmal atrial fibrillation) (H) [I48.0]  Atrial fibrillation  unspecified type (H) [I48.91]           Comments:  5 mg tabs, [facility]  PM dose         Anticoagulation Care Providers     Provider Role Specialty Phone number    Eliz Nguyen MD Referring Internal Medicine [phone]

## 2022-04-28 DIAGNOSIS — I25.10 CORONARY ARTERY DISEASE INVOLVING NATIVE HEART WITHOUT ANGINA PECTORIS, UNSPECIFIED VESSEL OR LESION TYPE: Chronic | ICD-10-CM

## 2022-04-28 DIAGNOSIS — Z79.899 LONG-TERM USE OF PLAQUENIL: ICD-10-CM

## 2022-04-28 DIAGNOSIS — M32.14 SYSTEMIC LUPUS ERYTHEMATOSUS WITH GLOMERULAR DISEASE, UNSPECIFIED SLE TYPE (H): Chronic | ICD-10-CM

## 2022-04-28 DIAGNOSIS — Z79.60 LONG-TERM USE OF IMMUNOSUPPRESSANT MEDICATION: Chronic | ICD-10-CM

## 2022-04-29 ENCOUNTER — VIRTUAL VISIT (OUTPATIENT)
Dept: RHEUMATOLOGY | Facility: CLINIC | Age: 62
End: 2022-04-29
Payer: COMMERCIAL

## 2022-04-29 DIAGNOSIS — I25.10 CORONARY ARTERY DISEASE INVOLVING NATIVE HEART WITHOUT ANGINA PECTORIS, UNSPECIFIED VESSEL OR LESION TYPE: Chronic | ICD-10-CM

## 2022-04-29 DIAGNOSIS — M32.14 SYSTEMIC LUPUS ERYTHEMATOSUS WITH GLOMERULAR DISEASE, UNSPECIFIED SLE TYPE (H): Chronic | ICD-10-CM

## 2022-04-29 DIAGNOSIS — Z79.899 LONG-TERM USE OF PLAQUENIL: ICD-10-CM

## 2022-04-29 DIAGNOSIS — I20.89 STABLE ANGINA (H): Primary | ICD-10-CM

## 2022-04-29 DIAGNOSIS — Z79.60 LONG-TERM USE OF IMMUNOSUPPRESSANT MEDICATION: Chronic | ICD-10-CM

## 2022-04-29 DIAGNOSIS — E66.01 MORBID OBESITY (H): Chronic | ICD-10-CM

## 2022-04-29 DIAGNOSIS — N18.30 STAGE 3 CHRONIC KIDNEY DISEASE, UNSPECIFIED WHETHER STAGE 3A OR 3B CKD (H): Chronic | ICD-10-CM

## 2022-04-29 PROCEDURE — 99214 OFFICE O/P EST MOD 30 MIN: CPT | Mod: 95 | Performed by: INTERNAL MEDICINE

## 2022-04-29 RX ORDER — HYDROXYCHLOROQUINE SULFATE 200 MG/1
200 TABLET, FILM COATED ORAL 2 TIMES DAILY
Qty: 180 TABLET | Refills: 3 | Status: SHIPPED | OUTPATIENT
Start: 2022-04-29 | End: 2022-09-09

## 2022-04-29 NOTE — PATIENT INSTRUCTIONS
Continue the hydroxychloroquine  Get lab testing every 4-6 months  Get an eye evaluation in October  Follow up with me in 6-12 months

## 2022-05-02 RX ORDER — HYDROXYCHLOROQUINE SULFATE 200 MG/1
TABLET, FILM COATED ORAL
Qty: 180 TABLET | Refills: 0 | OUTPATIENT
Start: 2022-05-02

## 2022-05-26 DIAGNOSIS — I48.91 ATRIAL FIBRILLATION, UNSPECIFIED TYPE (H): ICD-10-CM

## 2022-05-27 RX ORDER — WARFARIN SODIUM 5 MG/1
TABLET ORAL
Qty: 145 TABLET | Refills: 1 | Status: SHIPPED | OUTPATIENT
Start: 2022-05-27 | End: 2023-01-10

## 2022-06-03 ENCOUNTER — TELEPHONE (OUTPATIENT)
Dept: ANTICOAGULATION | Facility: CLINIC | Age: 62
End: 2022-06-03
Payer: COMMERCIAL

## 2022-06-03 NOTE — TELEPHONE ENCOUNTER
ANTICOAGULATION     Luz Marina Live is overdue for INR check.      Left message for patient to call and schedule lab appointment as soon as possible. If returning call, please schedule.     Michael Quispe RN

## 2022-06-09 DIAGNOSIS — E78.00 PURE HYPERCHOLESTEROLEMIA: ICD-10-CM

## 2022-06-09 RX ORDER — ATORVASTATIN CALCIUM 40 MG/1
TABLET, FILM COATED ORAL
Qty: 90 TABLET | Refills: 2 | Status: SHIPPED | OUTPATIENT
Start: 2022-06-09 | End: 2023-02-14

## 2022-06-13 ENCOUNTER — TELEPHONE (OUTPATIENT)
Dept: CARDIOLOGY | Facility: CLINIC | Age: 62
End: 2022-06-13

## 2022-06-13 ENCOUNTER — TELEPHONE (OUTPATIENT)
Dept: CARDIOLOGY | Facility: CLINIC | Age: 62
End: 2022-06-13
Payer: COMMERCIAL

## 2022-06-13 NOTE — TELEPHONE ENCOUNTER
[facility] Call Center    Phone Message    May a detailed message be left on voicemail: yes     Reason for Call: Need [facility] F/U lab orders for Aug appt., if that is still the plan.    Pt will have labs at her primary [facility] clinic the day before her [facility] F/U appt.    Thanks.    Action Taken: Cardiology    Travel Screening: Not Applicable

## 2022-06-30 ENCOUNTER — TELEPHONE (OUTPATIENT)
Dept: ANTICOAGULATION | Facility: CLINIC | Age: 62
End: 2022-06-30

## 2022-07-14 ENCOUNTER — ANTICOAGULATION THERAPY VISIT (OUTPATIENT)
Dept: ANTICOAGULATION | Facility: CLINIC | Age: 62
End: 2022-07-14

## 2022-07-14 ENCOUNTER — DOCUMENTATION ONLY (OUTPATIENT)
Dept: ANTICOAGULATION | Facility: CLINIC | Age: 62
End: 2022-07-14

## 2022-07-14 ENCOUNTER — LAB (OUTPATIENT)
Dept: LAB | Facility: CLINIC | Age: 62
End: 2022-07-14
Payer: COMMERCIAL

## 2022-07-14 DIAGNOSIS — Z79.01 LONG TERM CURRENT USE OF ANTICOAGULANT THERAPY: Primary | ICD-10-CM

## 2022-07-14 DIAGNOSIS — I48.91 ATRIAL FIBRILLATION, UNSPECIFIED TYPE (H): ICD-10-CM

## 2022-07-14 DIAGNOSIS — Z79.01 LONG TERM CURRENT USE OF ANTICOAGULANT THERAPY: ICD-10-CM

## 2022-07-14 DIAGNOSIS — I48.0 PAF (PAROXYSMAL ATRIAL FIBRILLATION) (H): ICD-10-CM

## 2022-07-14 LAB — INR BLD: 2.5 (ref 0.9–1.1)

## 2022-07-14 PROCEDURE — 36416 COLLJ CAPILLARY BLOOD SPEC: CPT

## 2022-07-14 PROCEDURE — 85610 PROTHROMBIN TIME: CPT

## 2022-07-14 NOTE — LETTER
[facility]   Anticoagulation Clinic  [address]  [place]  [address]      July 14, 2022      Luz Marina Live  [address]  [place] [address]      You are currently under the care of [facility] for your warfarin (Coumadin , Jantoven ) therapy.  We are contacting you because our records show you were due for an INR on 05/27/2022.    There are potentially serious risks when taking warfarin without careful monitoring and we want to make sure you are safely managed.  Routine lab monitoring is required for warfarin refills.     Please call [phone]  as soon as possible to schedule an appointment.  If there has been a change in your care or other concerns, please let us know so we can help and or update our records.     Sincerely,       [facility]

## 2022-07-14 NOTE — PROGRESS NOTES
ANTICOAGULATION     Luz Marina Live is overdue for INR check.      Reminder letter sent    Kailee Fenton RN

## 2022-07-14 NOTE — PROGRESS NOTES
ANTICOAGULATION MANAGEMENT     Luz Marina Live 62 year old female is on warfarin with therapeutic INR result. (Goal INR 2.0-3.0)    Recent labs: (last 7 days)     07/14/22  1503   INR 2.5*       ASSESSMENT       Source(s): Chart Review    Previous INR was Therapeutic last 2(+) visits    Medication, diet, health changes since last INR chart reviewed; none identified           PLAN     Recommended plan for no diet, medication or health factor changes affecting INR     Dosing Instructions: continue your current warfarin dose with next INR in 6 weeks       Summary  As of 7/14/2022    Full warfarin instructions:  10 mg every Tue, Thu, Sat; 7.5 mg all other days   Next INR check:  8/25/2022             Detailed voice message left for Luz Marina with dosing instructions and follow up date.   [email or web address] message also sent to patient.    Contact [phone]  to schedule and with any changes, questions or concerns.     Education provided: Please call back if any changes to your diet, medications or how you've been taking warfarin    Plan made per [facility] anticoagulation protocol    Kailee Fenton RN  Anticoagulation Clinic  7/14/2022    _______________________________________________________________________     Anticoagulation Episode Summary     Current INR goal:  2.0-3.0   TTR:  96.3 % (1 y)   Target end date:  Indefinite   Send INR reminders to:  [facility]    Indications    Long-term (current) use of anticoagulants [Z79.01] [Z79.01]  PAF (paroxysmal atrial fibrillation) (H) [I48.0]  Atrial fibrillation  unspecified type (H) [I48.91]           Comments:  5 mg tabs, [facility]  PM dose         Anticoagulation Care Providers     Provider Role Specialty Phone number    Eliz Nguyen MD Referring Internal Medicine [phone]

## 2022-08-13 ENCOUNTER — HEALTH MAINTENANCE LETTER (OUTPATIENT)
Age: 62
End: 2022-08-13

## 2022-08-17 ENCOUNTER — PRE VISIT (OUTPATIENT)
Dept: CARDIOLOGY | Facility: CLINIC | Age: 62
End: 2022-08-17

## 2022-08-17 DIAGNOSIS — I25.10 CORONARY ARTERY DISEASE INVOLVING NATIVE HEART WITHOUT ANGINA PECTORIS, UNSPECIFIED VESSEL OR LESION TYPE: Primary | ICD-10-CM

## 2022-08-17 DIAGNOSIS — R06.02 SOB (SHORTNESS OF BREATH): ICD-10-CM

## 2022-08-17 DIAGNOSIS — M32.14 SYSTEMIC LUPUS ERYTHEMATOSUS WITH GLOMERULAR DISEASE, UNSPECIFIED SLE TYPE (H): ICD-10-CM

## 2022-08-18 ENCOUNTER — LAB (OUTPATIENT)
Dept: LAB | Facility: CLINIC | Age: 62
End: 2022-08-18
Payer: COMMERCIAL

## 2022-08-18 DIAGNOSIS — I25.10 CORONARY ARTERY DISEASE INVOLVING NATIVE HEART WITHOUT ANGINA PECTORIS, UNSPECIFIED VESSEL OR LESION TYPE: ICD-10-CM

## 2022-08-18 DIAGNOSIS — M32.14 SYSTEMIC LUPUS ERYTHEMATOSUS WITH GLOMERULAR DISEASE, UNSPECIFIED SLE TYPE (H): ICD-10-CM

## 2022-08-18 DIAGNOSIS — R06.02 SOB (SHORTNESS OF BREATH): ICD-10-CM

## 2022-08-18 LAB
ALBUMIN SERPL-MCNC: 3.5 G/DL (ref 3.4–5)
ALP SERPL-CCNC: 126 U/L (ref 40–150)
ALT SERPL W P-5'-P-CCNC: 28 U/L (ref 0–50)
ANION GAP SERPL CALCULATED.3IONS-SCNC: 4 MMOL/L (ref 3–14)
AST SERPL W P-5'-P-CCNC: 25 U/L (ref 0–45)
BILIRUB SERPL-MCNC: 0.6 MG/DL (ref 0.2–1.3)
BUN SERPL-MCNC: 44 MG/DL (ref 7–30)
CALCIUM SERPL-MCNC: 9.4 MG/DL (ref 8.5–10.1)
CHLORIDE BLD-SCNC: 111 MMOL/L (ref 94–109)
CO2 SERPL-SCNC: 27 MMOL/L (ref 20–32)
CREAT SERPL-MCNC: 1.39 MG/DL (ref 0.52–1.04)
ERYTHROCYTE [DISTWIDTH] IN BLOOD BY AUTOMATED COUNT: 14.3 % (ref 10–15)
GFR SERPL CREATININE-BSD FRML MDRD: 43 ML/MIN/1.73M2
GLUCOSE BLD-MCNC: 98 MG/DL (ref 70–99)
HCT VFR BLD AUTO: 37 % (ref 35–47)
HGB BLD-MCNC: 11.8 G/DL (ref 11.7–15.7)
MCH RBC QN AUTO: 29.6 PG (ref 26.5–33)
MCHC RBC AUTO-ENTMCNC: 31.9 G/DL (ref 31.5–36.5)
MCV RBC AUTO: 93 FL (ref 78–100)
NT-PROBNP SERPL-MCNC: 1932 PG/ML (ref 0–900)
PLATELET # BLD AUTO: 164 10E3/UL (ref 150–450)
POTASSIUM BLD-SCNC: 5 MMOL/L (ref 3.4–5.3)
PROT SERPL-MCNC: 8.1 G/DL (ref 6.8–8.8)
RBC # BLD AUTO: 3.98 10E6/UL (ref 3.8–5.2)
SODIUM SERPL-SCNC: 142 MMOL/L (ref 133–144)
WBC # BLD AUTO: 6.5 10E3/UL (ref 4–11)

## 2022-08-18 PROCEDURE — 83880 ASSAY OF NATRIURETIC PEPTIDE: CPT

## 2022-08-18 PROCEDURE — 80053 COMPREHEN METABOLIC PANEL: CPT

## 2022-08-18 PROCEDURE — 36415 COLL VENOUS BLD VENIPUNCTURE: CPT

## 2022-08-18 PROCEDURE — 85027 COMPLETE CBC AUTOMATED: CPT

## 2022-08-19 ENCOUNTER — OFFICE VISIT (OUTPATIENT)
Dept: CARDIOLOGY | Facility: CLINIC | Age: 62
End: 2022-08-19
Attending: INTERNAL MEDICINE
Payer: COMMERCIAL

## 2022-08-19 VITALS
OXYGEN SATURATION: 97 % | BODY MASS INDEX: 47.56 KG/M2 | DIASTOLIC BLOOD PRESSURE: 86 MMHG | SYSTOLIC BLOOD PRESSURE: 145 MMHG | WEIGHT: 268.1 LBS | HEART RATE: 65 BPM

## 2022-08-19 DIAGNOSIS — I48.91 ATRIAL FIBRILLATION, UNSPECIFIED TYPE (H): ICD-10-CM

## 2022-08-19 DIAGNOSIS — I25.10 CORONARY ARTERY DISEASE INVOLVING NATIVE HEART WITHOUT ANGINA PECTORIS, UNSPECIFIED VESSEL OR LESION TYPE: ICD-10-CM

## 2022-08-19 DIAGNOSIS — I50.32 CHRONIC DIASTOLIC HEART FAILURE (H): ICD-10-CM

## 2022-08-19 DIAGNOSIS — I10 HTN, GOAL BELOW 140/90: ICD-10-CM

## 2022-08-19 PROCEDURE — G0463 HOSPITAL OUTPT CLINIC VISIT: HCPCS

## 2022-08-19 PROCEDURE — 99214 OFFICE O/P EST MOD 30 MIN: CPT | Performed by: INTERNAL MEDICINE

## 2022-08-19 RX ORDER — METOPROLOL TARTRATE 25 MG/1
TABLET, FILM COATED ORAL
Qty: 450 TABLET | Refills: 3 | Status: SHIPPED | OUTPATIENT
Start: 2022-08-19 | End: 2024-07-01 | Stop reason: ALTCHOICE

## 2022-08-19 ASSESSMENT — PAIN SCALES - GENERAL: PAINLEVEL: NO PAIN (0)

## 2022-08-19 NOTE — LETTER
8/19/2022      RE: Luz Marina Live  [address]  [place] [address]       adam Moreno MD

## 2022-08-19 NOTE — PATIENT INSTRUCTIONS
"You were seen today in the Cardiovascular Clinic at the [facility].      Cardiology Providers you saw during your visit:  Dr. Twin Moreno     Recommendations:   1. Decrease your metoprolol 50mg in the AM and 75mg in the PM.  We sent in 25mg tablets for you so you don't need to worry about cutting tablets.   2. Please follow-up with Dr. Moreno in November with labs prior.          Eat a heart healthy, low sodium diet.  Get 20 to 30 minutes of aerobic exercise 4 to 5 times per week as tolerated. (Examples of aerobic exercise include: walking, bicycling, swimming, running).     Thank you for your visit today!   Please MyChart message or call if you have any questions or concerns.      During Business Hours:  [phone], option # 1 ([facility] [place])       After hours, weekends or holidays:   [phone], Option #4  Ask to speak to the On-Call Cardiologist. Inform them you are a heart failure patient at the [facility].      Kaylie Tran RN BSN CHFN  Cardiology Care Coordinator - C.O.R.E. [facility]  Questions and [phone]  First press #1 for the [employer] and then press #4 for \"Your Care Team\" to reach us Cardiology Nurses.               "

## 2022-08-19 NOTE — LETTER
8/19/2022      RE: Luz Marina Live  [address]  [place] [address]       Dear Colleague,    Thank you for the opportunity to participate in the care of your patient, Luz Marina Live, at the [facility] [place] at [facility]. Please see a copy of my visit note below.    dictated      Please do not hesitate to contact me if you have any questions/concerns.     Sincerely,     Twin Moreno MD

## 2022-08-19 NOTE — NURSING NOTE
Chief Complaint   Patient presents with     Follow Up     8/19/22 - Reason for Visit: Return HF; 62yr old female with a h/o CAD s/p CAB, HTN, HLD, atrial fibrillation on warfarin, SLE, and PFO presenting for follow-up with labs.     Vitals were taken and medications reconciled.    Jam Almaraz, EMT  11:21 AM

## 2022-08-24 NOTE — PROGRESS NOTES
"Service Date: 2022        Eliz Nguyen MD   Physicians  [address],   [place] [address]    Reji hCau MD   Physicians  [address], [address]  [place] [address]    Lavern Clark MD  [facility]  [address],   [place] [address]    RE:      Luz Marina Live  MRN:  [id number]  :   1960    Dear Doctors:    IMPRESSION:     1.  Heart failure with preserved ejection fraction.  2.  Elevated body mass index.  3.  Insulin resistance.  4.  Sleep-disordered breathing.  5.  Lupus treated with hydroxychloroquine.  6.  History of lupus nephritis with chronic kidney disease.  7.  Atherosclerotic premature atherosclerotic cardiovascular disease with coronary artery bypass in .    I had the pleasure of seeing your patient, Luz Marina Live, for a followup visit.  Her weight is unchanged.  She was exercising regularly on a bicycle, however, fell off the wagon and has been doing it less frequently.  When I reduced her Lopressor to augment her heart rate, she felt that she had more exercise tolerance.    Her lupus is well controlled with some morning arthritis.  Skin is quiescent.  She has some chronic proteinuria.    The patient is sleeping adequately.  She has no active excessive daytime fatigue.    In the past, we have considered putting her on an SGLT2; however, Lavern was somewhat reluctant to do that.  As you know, SGLT2 inhibitors are the \"magic\" drugs of the present decade.  They have a marked effect on reduction of cardiovascular risk, they have a diuretic effect;  however, nobody knows why they have this effect.    I walked her in the [place] today. Her resting heart rate was 52 beats per minute.  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    PHYSICAL EXAMINATION:  Exam shows the lupus facial rash.  She is overweight.  S4, S1, S2 with regular " rate and rhythm, basilar rales.  Normal gait and station.    RECOMMENDATIONS:  1.  I decreased her Lopressor to 50 in the morning and 75 in the evening.  We will need to watch her blood pressure to make sure that this is not increased excessively.  Should that happen, then I would switch her to carvedilol, which has a small alpha blocking component to it.  2.  We talked about injectables for weight loss.  We talked about the adverse effects of long-term obesity.  She will consider whether one of the injectable drugs for weight loss is acceptable to her.  3.  She will get back on the bike on a regular basis.  4.  Lavern will consider whether she would allow her to go on an SGLT2.  I doubt that her creatinine clearance or estimated GFR is in the low 40 range.  I agree that when it is below 35, the drug is contraindicated.    Thank you very much for allowing me to see her.    Sincerely,    Twin Moreno MD        D: 2022   T: 2022   MT: PAKMT    Name:     IDA PADRON  MRN:      [id number]        Account:     [id number]   :      1960     Document: [id number]    cc:  MD Reji Burns MD Katti L. Woerner, MD

## 2022-09-09 ENCOUNTER — ANTICOAGULATION THERAPY VISIT (OUTPATIENT)
Dept: ANTICOAGULATION | Facility: CLINIC | Age: 62
End: 2022-09-09

## 2022-09-09 ENCOUNTER — OFFICE VISIT (OUTPATIENT)
Dept: RHEUMATOLOGY | Facility: CLINIC | Age: 62
End: 2022-09-09
Payer: COMMERCIAL

## 2022-09-09 VITALS
SYSTOLIC BLOOD PRESSURE: 131 MMHG | WEIGHT: 270 LBS | HEART RATE: 65 BPM | DIASTOLIC BLOOD PRESSURE: 82 MMHG | OXYGEN SATURATION: 96 % | BODY MASS INDEX: 47.9 KG/M2

## 2022-09-09 DIAGNOSIS — I48.0 PAF (PAROXYSMAL ATRIAL FIBRILLATION) (H): ICD-10-CM

## 2022-09-09 DIAGNOSIS — N18.30 STAGE 3 CHRONIC KIDNEY DISEASE, UNSPECIFIED WHETHER STAGE 3A OR 3B CKD (H): Chronic | ICD-10-CM

## 2022-09-09 DIAGNOSIS — E66.01 MORBID OBESITY (H): Chronic | ICD-10-CM

## 2022-09-09 DIAGNOSIS — Z79.899 LONG-TERM USE OF PLAQUENIL: ICD-10-CM

## 2022-09-09 DIAGNOSIS — I48.91 ATRIAL FIBRILLATION, UNSPECIFIED TYPE (H): ICD-10-CM

## 2022-09-09 DIAGNOSIS — Z79.01 LONG TERM CURRENT USE OF ANTICOAGULANT THERAPY: ICD-10-CM

## 2022-09-09 DIAGNOSIS — I20.89 STABLE ANGINA (H): ICD-10-CM

## 2022-09-09 DIAGNOSIS — I25.10 CORONARY ARTERY DISEASE INVOLVING NATIVE HEART WITHOUT ANGINA PECTORIS, UNSPECIFIED VESSEL OR LESION TYPE: Chronic | ICD-10-CM

## 2022-09-09 DIAGNOSIS — Z79.60 LONG-TERM USE OF IMMUNOSUPPRESSANT MEDICATION: Chronic | ICD-10-CM

## 2022-09-09 DIAGNOSIS — M32.14 SYSTEMIC LUPUS ERYTHEMATOSUS WITH GLOMERULAR DISEASE, UNSPECIFIED SLE TYPE (H): Chronic | ICD-10-CM

## 2022-09-09 DIAGNOSIS — Z79.01 LONG TERM CURRENT USE OF ANTICOAGULANT THERAPY: Primary | ICD-10-CM

## 2022-09-09 LAB — INR BLD: 2 (ref 0.9–1.1)

## 2022-09-09 PROCEDURE — 36416 COLLJ CAPILLARY BLOOD SPEC: CPT | Performed by: INTERNAL MEDICINE

## 2022-09-09 PROCEDURE — 99214 OFFICE O/P EST MOD 30 MIN: CPT | Performed by: INTERNAL MEDICINE

## 2022-09-09 PROCEDURE — 85610 PROTHROMBIN TIME: CPT | Performed by: INTERNAL MEDICINE

## 2022-09-09 RX ORDER — HYDROXYCHLOROQUINE SULFATE 200 MG/1
200 TABLET, FILM COATED ORAL 2 TIMES DAILY
Qty: 180 TABLET | Refills: 3 | Status: SHIPPED | OUTPATIENT
Start: 2022-09-09 | End: 2023-06-09

## 2022-09-09 ASSESSMENT — PAIN SCALES - GENERAL: PAINLEVEL: NO PAIN (0)

## 2022-09-09 NOTE — PROGRESS NOTES
ANTICOAGULATION MANAGEMENT     Luz Marina Live 62 year old female is on warfarin with therapeutic INR result. (Goal INR 2.0-3.0)    Recent labs: (last 7 days)     09/09/22  1152   INR 2.0*       ASSESSMENT       Source(s): Chart Review    Previous INR was Therapeutic last 2(+) visits    Medication, diet, health changes since last INR chart reviewed; none identified           PLAN     Recommended plan for no diet, medication or health factor changes affecting INR     Dosing Instructions: Continue your current warfarin dose with next INR in 6 weeks       Summary  As of 9/9/2022    Full warfarin instructions:  10 mg every Tue, Thu, Sat; 7.5 mg all other days   Next INR check:  10/21/2022             Detailed voice message left for Luz Marina with dosing instructions and follow up date.     Contact [phone]  to schedule and with any changes, questions or concerns.     Education provided: Please call back if any changes to your diet, medications or how you've been taking warfarin    Plan made per [facility] anticoagulation protocol    Michael Quispe, RN  Anticoagulation Clinic  9/9/2022    _______________________________________________________________________     Anticoagulation Episode Summary     Current INR goal:  2.0-3.0   TTR:  100.0 % (1 y)   Target end date:  Indefinite   Send INR reminders to:  [facility]    Indications    Long-term (current) use of anticoagulants [Z79.01] [Z79.01]  PAF (paroxysmal atrial fibrillation) (H) [I48.0]  Atrial fibrillation  unspecified type (H) [I48.91]           Comments:  5 mg tabs, Zeb lab  PM dose         Anticoagulation Care Providers     Provider Role Specialty Phone number    Eliz Nguyen MD Referring Internal Medicine [phone]

## 2022-09-09 NOTE — PATIENT INSTRUCTIONS
Continue the hydroxychloroquine  Get lab testing every 4-6 months  Get an eye evaluation in October  Increase your knee strengthening exercises  Follow up with me in 6-12 months

## 2022-09-09 NOTE — NURSING NOTE
"Chief Complaint   Patient presents with     RECHECK     Systemic lupus erythematosus with glomerular disease, unspecified SLE type         Plaquenil     Eye exam done 10/2021       Initial /82   Pulse 65   Wt 122.5 kg (270 lb)   SpO2 96%   BMI 47.90 kg/m   Estimated body mass index is 47.9 kg/m  as calculated from the following:    Height as of 3/23/22: 1.599 m (5' 2.95\").    Weight as of this encounter: 122.5 kg (270 lb)..    She requests these members of her care team be copied on today's visit information:     PCP: Eliz Nguyen    Do you need any medication refills at today's visit? NO     GROVER Brower   Email: [email or web address]  [facility] - Rheumatology  Phone: [phone]  Fax: [phone]      "

## 2022-09-09 NOTE — PROGRESS NOTES
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  +ROHAN, dsDNA, SSA, lupus inhibitor, and depressed complements. Modest proteinuria. Previous treatment with remote cytoxan and imuran. Current management with hydroxychloroquine 400 mg daily.     She can have some pain in her thumbs. No swelling or warmth, but they can appear red. Function remains good and only modest DIP deformities. Energy is lower and she needs more exercise. AM stiffness is 30 minutes. She can have a red nose and some nose ulcers with her CPAP. She has knee pain climbing steps. No use of analgesics. No recent PT. Minor dry mouth.    Hydroxychloroquine 200 mg BID is well tolerated with eye appointment due in October.     PMI:  Medical-rosacea, SLE, lupus nephritis  Active Ambulatory Problems     Diagnosis Date Noted     Hyperlipidemia LDL goal <100 12/05/2011     CAD (coronary artery disease) 12/05/2011     HTN, goal below 140/90 07/03/2013     PAF (paroxysmal atrial fibrillation) (H) 08/24/2015     CKD (chronic kidney disease) stage 3, GFR 30-59 ml/min (H) 09/14/2015     Long-term (current) use of anticoagulants [Z79.01] 04/05/2016     Moderate tricuspid insufficiency 12/22/2016     Systemic lupus erythematosus with glomerular disease, unspecified SLE type (H) 07/03/2017     Psychophysiological insomnia 06/11/2018     Morbid obesity (H) 06/11/2018     KEITH (obstructive sleep apnea)- severe (AHI 30) 06/18/2018     Long-term use of Plaquenil 09/04/2018     Long-term use of immunosuppressant medication 10/11/2019     Stable angina (H) 10/16/2019     Positive cardiac stress test 08/06/2021     Status post coronary angiogram 08/20/2021     Atrial fibrillation, unspecified type (H) 02/03/2022     Resolved Ambulatory Problems     Diagnosis Date Noted     SLE (systemic lupus erythematosus) (H) 11/01/2010     Hypovolemic shock (H) 02/28/2015     Sepsis (H) 08/22/2015     Secondary renal hyperparathyroidism (H) 01/27/2016     Physical deconditioning  10/07/2021     Past Medical History:   Diagnosis Date     Hypertension      Lupus (H)      Surgical-  Past Surgical History:   Procedure Laterality Date     CARDIAC SURGERY  08/27/2009    triple vessel coronary artery bypass grafting on 8/27/09     CARPAL TUNNEL RELEASE RT/LT  1996    bilateral     CV CORONARY ANGIOGRAM  10/17/2019    Procedure: CV CORONARY ANGIOGRAM;  Surgeon: Mahendra Collins MD;  Location: [facility]     CV CORONARY ANGIOGRAM N/A 8/20/2021    Procedure: CV CORONARY ANGIOGRAM;  Surgeon: Derek Escoto MD;  Location: [facility]     CV PCI STENT DRUG ELUTING N/A 10/17/2019    Procedure: PCI Stent Drug Eluting;  Surgeon: Mahendra Collins MD;  Location: [facility]     CV RIGHT HEART CATH MEASUREMENTS RECORDED N/A 8/20/2021    Procedure: CV RIGHT HEART CATH;  Surgeon: Derek Escoto MD;  Location: [facility]     Injuries-none    SH:  No employment.  Social History     Socioeconomic History     Marital status:      Spouse name: None     Number of children: 1 daughter     Years of education: None     Highest education level: None   Occupational History     None   Social Needs     Financial resource strain: None     Food insecurity:     Worry: None     Inability: None     Transportation needs:     Medical: None     Non-medical: None   Tobacco Use     Smoking status: Former Smoker     Smokeless tobacco: Never Used     Tobacco comment: 30 plus years ago.   Substance and Sexual Activity     Alcohol use: No     Drug use: No     Sexual activity: Yes     Partners: Male     Birth control/protection: Surgical     Comment: vasectomy   Lifestyle     Physical activity:     Days per week: None     Minutes per session: None     Stress: None   Relationships     Social connections:     Talks on phone: None     Gets together: None     Attends Yazdanism service: None     Active member of club or organization:  None     Attends meetings of clubs or organizations: None     Relationship status: None     Intimate partner violence:     Fear of current or ex partner: None     Emotionally abused: None     Physically abused: None     Forced sexual activity: None   Other Topics Concern     Parent/sibling w/ CABG, MI or angioplasty before 65F 55M? Not Asked   Social History Narrative     None     FH:  Family History   Problem Relation Age of Onset     Arthritis Mother         ra     Connective Tissue Disorder Mother         lupus     Congenital Anomalies Mother         wholein heart     Cerebrovascular Disease Mother         TIA's     Cerebrovascular Disease Father      Diabetes Father      Hypertension Father      Cardiovascular Father         stents     Genitourinary Problems Father         kidney failure     Kidney Disease Father         kidney injury related to acute illness around time of death (RANDELL likely)     Cataracts Father      Arthritis Paternal Grandmother      Diabetes Brother      Glaucoma No family hx of      Macular Degeneration No family hx of      ROS:  + Intermittent positional right arm numbness.   Remainder of the 14 point ROS obtained and found negative.    Physical Exam:  Constitutional: WD-WN-WG cooperative; +obese  Eyes: nl EOM, PERRLA, vision, conjunctiva, sclera  ENT: nl external ears, nose, hearing, lips, teeth, gums, throat; +dry mouth  Neck: no mass or thyroid enlargement  Resp: lungs clear to auscultation, nl to palpation, nl effort  CV: RRR, no murmurs, rubs or gallops, no edema  GI: no ABD mass or tenderness, no HSM  : not tested  Lymph: no cervical or epitrochlear nodes  MS: +bilateral R>L 2nd and 3rd DIP heberden's nodes; bilateral knee flexion to 100 degrees; All other TMJ, neck, shoulder, elbow, wrist, hand, spine, hip, knee, ankle, and foot joints were examined and otherwise found normal. Normal  strength tuck and prayer.  Skin: no nail pitting, alopecia, rash; +LE venous  congestion  Neuro: nl cranial nerves, strength, sensation  Psych: nl judgement, orientation, memory, affect    Laboratory:    Component      Latest Ref Rng & Units 8/18/2022   Sodium      133 - 144 mmol/L 142   Potassium      3.4 - 5.3 mmol/L 5.0   Chloride      94 - 109 mmol/L 111 (H)   Carbon Dioxide      20 - 32 mmol/L 27   Anion Gap      3 - 14 mmol/L 4   Urea Nitrogen      7 - 30 mg/dL 44 (H)   Creatinine      0.52 - 1.04 mg/dL 1.39 (H)   Calcium      8.5 - 10.1 mg/dL 9.4   Glucose      70 - 99 mg/dL 98   Alkaline Phosphatase      40 - 150 U/L 126   AST      0 - 45 U/L 25   ALT      0 - 50 U/L 28   Protein Total      6.8 - 8.8 g/dL 8.1   Albumin      3.4 - 5.0 g/dL 3.5   Bilirubin Total      0.2 - 1.3 mg/dL 0.6   GFR Estimate      >60 mL/min/1.73m2 43 (L)   WBC      4.0 - 11.0 10e3/uL 6.5   RBC Count      3.80 - 5.20 10e6/uL 3.98   Hemoglobin      11.7 - 15.7 g/dL 11.8   Hematocrit      35.0 - 47.0 % 37.0   MCV      78 - 100 fL 93   MCH      26.5 - 33.0 pg 29.6   MCHC      31.5 - 36.5 g/dL 31.9   RDW      10.0 - 15.0 % 14.3   Platelet Count      150 - 450 10e3/uL 164     Impression:    Systemic lupus erythematosis-with history of severe lupus nephritis. This remains quiescent and no signs of active disease. Good tolerance of the hydroxychloroquine and I will continue this therapy.     Lupus nephritis-with stable kidney function.    Bilateral knee pain-with risk for DJD. Get knee Xrays and increase knee strengthening exercises and weight loss. Get knee films.    Long term management of immunosuppression-with stable lab testing. Plan to repeat this every 4-6 months.  We also agree that the benefits of continued immunosuppression outweigh the risks of COVID-19 infection. She is COVID vaccinated.    RTC in 6-12 months.    A total of 38 minutes was spent in face to face patient interaction and chart review on the day of service.

## 2022-09-13 DIAGNOSIS — I50.32 CHRONIC DIASTOLIC CONGESTIVE HEART FAILURE (H): Primary | ICD-10-CM

## 2022-09-15 ENCOUNTER — TELEPHONE (OUTPATIENT)
Dept: ANTICOAGULATION | Facility: CLINIC | Age: 62
End: 2022-09-15

## 2022-09-15 DIAGNOSIS — I48.91 ATRIAL FIBRILLATION, UNSPECIFIED TYPE (H): ICD-10-CM

## 2022-09-15 DIAGNOSIS — Z79.01 LONG TERM CURRENT USE OF ANTICOAGULANT THERAPY: Primary | ICD-10-CM

## 2022-09-15 NOTE — TELEPHONE ENCOUNTER
ANTICOAGULATION CLINIC REFERRAL RENEWAL REQUEST       An annual renewal order is required for all patients referred to [facility].?  Please review and sign the pended referral order for Luz Marina Live.       ANTICOAGULATION SUMMARY      Warfarin indication(s)   Atrial Fibrillation    Mechanical heart valve present?  NO       Current goal range   INR: 2.0-3.0     Goal appropriate for indication? Goal INR 2-3, standard for indication(s) above     Time in Therapeutic Range (TTR)  (Goal > 60%) 100%       Office visit with referring provider's group within last year no on 7/13/21       Cait Hawthorne, RN  [facility]

## 2022-09-16 RX ORDER — FUROSEMIDE 20 MG
40 TABLET ORAL DAILY
Qty: 180 TABLET | Refills: 3 | Status: SHIPPED | OUTPATIENT
Start: 2022-09-16 | End: 2023-10-19

## 2022-10-07 ENCOUNTER — TELEPHONE (OUTPATIENT)
Dept: OPTOMETRY | Facility: CLINIC | Age: 62
End: 2022-10-07

## 2022-10-07 ENCOUNTER — OFFICE VISIT (OUTPATIENT)
Dept: OPTOMETRY | Facility: CLINIC | Age: 62
End: 2022-10-07
Payer: COMMERCIAL

## 2022-10-07 DIAGNOSIS — H10.45 CHRONIC ALLERGIC CONJUNCTIVITIS: ICD-10-CM

## 2022-10-07 DIAGNOSIS — Z79.899 LONG-TERM USE OF PLAQUENIL: Primary | ICD-10-CM

## 2022-10-07 DIAGNOSIS — H52.4 PRESBYOPIA: ICD-10-CM

## 2022-10-07 PROCEDURE — 92004 COMPRE OPH EXAM NEW PT 1/>: CPT | Performed by: OPTOMETRIST

## 2022-10-07 PROCEDURE — 92015 DETERMINE REFRACTIVE STATE: CPT | Performed by: OPTOMETRIST

## 2022-10-07 PROCEDURE — 92083 EXTENDED VISUAL FIELD XM: CPT | Performed by: OPTOMETRIST

## 2022-10-07 PROCEDURE — 92134 CPTRZ OPH DX IMG PST SGM RTA: CPT | Performed by: OPTOMETRIST

## 2022-10-07 ASSESSMENT — VISUAL ACUITY
CORRECTION_TYPE: GLASSES
METHOD: SNELLEN - LINEAR
OD_CC: 20/20
OD_CC+: -1
OS_CC: 20/20
OS_CC+: -2

## 2022-10-07 ASSESSMENT — REFRACTION_WEARINGRX
OS_ADD: +2.50
OS_SPHERE: +2.25
OD_CYLINDER: +1.75
OD_ADD: +2.50
OS_AXIS: 136
OD_SPHERE: +1.50
OS_CYLINDER: +1.25
SPECS_TYPE: PAL
OD_AXIS: 079

## 2022-10-07 ASSESSMENT — TONOMETRY
IOP_METHOD: APPLANATION
OD_IOP_MMHG: 16
OS_IOP_MMHG: 16

## 2022-10-07 ASSESSMENT — REFRACTION_MANIFEST
OS_CYLINDER: +1.25
OS_AXIS: 135
OD_SPHERE: +1.50
OD_AXIS: 085
OD_ADD: +2.50
OS_SPHERE: +2.25
OS_ADD: +2.50
OD_CYLINDER: +1.75

## 2022-10-07 ASSESSMENT — CUP TO DISC RATIO
OD_RATIO: 0.3
OS_RATIO: 0.3

## 2022-10-07 ASSESSMENT — CONF VISUAL FIELD
OD_NORMAL: 1
METHOD: COUNTING FINGERS
OS_NORMAL: 1

## 2022-10-07 NOTE — LETTER
10/7/2022         RE: Luz Marina Live  [address]  [place] [address]        Dear Colleague,    Thank you for referring your patient, Luz Marina Live, to the [facility]. Please see a copy of my visit note below.    Assessment/Plan  (Z79.899) Long-term use of Plaquenil  (primary encounter diagnosis)  Comment: Started ~20 years ago per patient. Patient takes 400mg daily. No evidence of toxicity today.   Plan: OCT Retina Spectralis OU (both eyes), HVF 10-2 OU        Return in 1 year for complete exam with repeat OCT and visual field. Return sooner with any vision changes.     (H10.45) Chronic allergic conjunctivitis  Plan: Recommend patient begin using Lastacaft each morning in both eyes. Consider supplementing with a lubricating drop such as Refresh 1-2 times daily.     (H52.4) Presbyopia  Plan: REFRACTION [[id number]]        Discussed findings with patient. New spectacle prescription dispensed to patient. Patient is welcome to return to clinic with prolonged adaptation difficulties.     Complete documentation of historical and exam elements from today's encounter can  be found in the full encounter summary report (not reduplicated in this progress  note). I personally obtained the chief complaint(s) and history of present illness. I  confirmed and edited as necessary the review of systems, past medical/surgical  history, family history, social history, and examination findings as documented by  others; and I examined the patient myself. I personally reviewed the relevant tests,  images, and reports as documented above. I formulated and edited as necessary the  assessment and plan and discussed the findings and management plan with the  patient and family.    Kirt Salazar OD       Again, thank you for allowing me to participate in the care of your patient.        Sincerely,        Kirt Salazar OD

## 2022-10-07 NOTE — PROGRESS NOTES
Assessment/Plan  (Z79.899) Long-term use of Plaquenil  (primary encounter diagnosis)  Comment: Started ~20 years ago per patient. Patient takes 400mg daily. No evidence of toxicity today.   Plan: OCT Retina Spectralis OU (both eyes), HVF 10-2 OU        Return in 1 year for complete exam with repeat OCT and visual field. Return sooner with any vision changes.     (H10.45) Chronic allergic conjunctivitis  Plan: Recommend patient begin using Lastacaft each morning in both eyes. Consider supplementing with a lubricating drop such as Refresh 1-2 times daily.     (H52.4) Presbyopia  Plan: REFRACTION [[id number]]        Discussed findings with patient. New spectacle prescription dispensed to patient. Patient is welcome to return to clinic with prolonged adaptation difficulties.     Complete documentation of historical and exam elements from today's encounter can  be found in the full encounter summary report (not reduplicated in this progress  note). I personally obtained the chief complaint(s) and history of present illness. I  confirmed and edited as necessary the review of systems, past medical/surgical  history, family history, social history, and examination findings as documented by  others; and I examined the patient myself. I personally reviewed the relevant tests,  images, and reports as documented above. I formulated and edited as necessary the  assessment and plan and discussed the findings and management plan with the  patient and family.    Kirt Salazar OD

## 2022-10-07 NOTE — NURSING NOTE
Chief Complaints and History of Present Illnesses   Patient presents with     Annual Eye Exam     Chief Complaint(s) and History of Present Illness(es)     Annual Eye Exam     Laterality: both eyes    Associated symptoms: tearing.  Negative for eye pain, flashes and floaters    Treatments tried: artificial tears              Comments     Here for annual plaquenil exam. Vision is about the same since last visit. She notes occasional mattering and tearing in both eyes - she is not sure if lubricating drops improves it. No eye pain.  Currently taking plaquenil 400mg daily.    Denys MCGEE 12:52 PM October 7, 2022

## 2022-10-07 NOTE — TELEPHONE ENCOUNTER
Left Voicemail (1st Attempt) for the patient to call back and schedule the following:    Appointment type: return   Provider: dr. Salazar   Return date: 10/7/2023   Specialty phone number: [phone]   Additonal Notes: Return in about 1 year (around 10/7/2023) for Comprehensive Exam, Visual Field, MUSA mcqueen Procedure   Orthopedics, Podiatry, Sports Medicine, Ent ,Eye , Audiology, Adult Endocrine & Diabetes, Nutrition & Medication Therapy Management Specialties   [facility]

## 2022-10-12 DIAGNOSIS — I50.9 CHF (CONGESTIVE HEART FAILURE) (H): ICD-10-CM

## 2022-10-15 NOTE — TELEPHONE ENCOUNTER
sacubitril-valsartan (ENTRESTO) 24-26 MG per tablet   Last Written Prescription Date:  2/18/22  Last Fill Quantity: 60,   # refills: 3  Last Office Visit : 8/19/22  Future Office visit:  11/16/22    Alycia Tran RN   8/19/2022  1:40 PM CDT Back to Top      Results reviewed and discussed in clinic with patient           Routing refill request to provider for review/approval because: gap in med rf. per med list order taking 0.5 tablet twice a day.what dose to rf?

## 2022-10-25 DIAGNOSIS — N18.4 CKD (CHRONIC KIDNEY DISEASE) STAGE 4, GFR 15-29 ML/MIN (H): Primary | ICD-10-CM

## 2022-10-28 ENCOUNTER — TELEPHONE (OUTPATIENT)
Dept: ANTICOAGULATION | Facility: CLINIC | Age: 62
End: 2022-10-28

## 2022-10-28 RX ORDER — SACUBITRIL AND VALSARTAN 24; 26 MG/1; MG/1
TABLET, FILM COATED ORAL
Qty: 60 TABLET | Refills: 3 | Status: SHIPPED | OUTPATIENT
Start: 2022-10-28 | End: 2023-08-25

## 2022-10-30 ENCOUNTER — HEALTH MAINTENANCE LETTER (OUTPATIENT)
Age: 62
End: 2022-10-30

## 2022-11-04 ENCOUNTER — TELEPHONE (OUTPATIENT)
Dept: ANTICOAGULATION | Facility: CLINIC | Age: 62
End: 2022-11-04

## 2022-11-28 ENCOUNTER — OFFICE VISIT (OUTPATIENT)
Dept: NEPHROLOGY | Facility: CLINIC | Age: 62
End: 2022-11-28
Payer: COMMERCIAL

## 2022-11-28 ENCOUNTER — LAB (OUTPATIENT)
Dept: LAB | Facility: CLINIC | Age: 62
End: 2022-11-28
Payer: COMMERCIAL

## 2022-11-28 VITALS
BODY MASS INDEX: 47.79 KG/M2 | HEART RATE: 69 BPM | OXYGEN SATURATION: 99 % | DIASTOLIC BLOOD PRESSURE: 86 MMHG | WEIGHT: 269.36 LBS | SYSTOLIC BLOOD PRESSURE: 143 MMHG

## 2022-11-28 DIAGNOSIS — N18.4 CKD (CHRONIC KIDNEY DISEASE) STAGE 4, GFR 15-29 ML/MIN (H): ICD-10-CM

## 2022-11-28 DIAGNOSIS — I10 HYPERTENSION GOAL BP (BLOOD PRESSURE) < 130/80: ICD-10-CM

## 2022-11-28 DIAGNOSIS — M32.14 SYSTEMIC LUPUS ERYTHEMATOSUS WITH GLOMERULAR DISEASE, UNSPECIFIED SLE TYPE (H): ICD-10-CM

## 2022-11-28 DIAGNOSIS — N18.31 STAGE 3A CHRONIC KIDNEY DISEASE (H): Chronic | ICD-10-CM

## 2022-11-28 DIAGNOSIS — N25.81 SECONDARY RENAL HYPERPARATHYROIDISM (H): Primary | ICD-10-CM

## 2022-11-28 LAB
ALBUMIN MFR UR ELPH: 14.9 MG/DL
ALBUMIN SERPL-MCNC: 3.5 G/DL (ref 3.4–5)
ANION GAP SERPL CALCULATED.3IONS-SCNC: 3 MMOL/L (ref 3–14)
BUN SERPL-MCNC: 33 MG/DL (ref 7–30)
CALCIUM SERPL-MCNC: 9.7 MG/DL (ref 8.5–10.1)
CHLORIDE BLD-SCNC: 111 MMOL/L (ref 94–109)
CO2 SERPL-SCNC: 27 MMOL/L (ref 20–32)
CREAT SERPL-MCNC: 1.33 MG/DL (ref 0.52–1.04)
CREAT UR-MCNC: 82.7 MG/DL
GFR SERPL CREATININE-BSD FRML MDRD: 45 ML/MIN/1.73M2
GLUCOSE BLD-MCNC: 91 MG/DL (ref 70–99)
HGB BLD-MCNC: 13 G/DL (ref 11.7–15.7)
IRON SATN MFR SERPL: 24 % (ref 15–46)
IRON SERPL-MCNC: 67 UG/DL (ref 35–180)
PHOSPHATE SERPL-MCNC: 3.1 MG/DL (ref 2.5–4.5)
POTASSIUM BLD-SCNC: 4.5 MMOL/L (ref 3.4–5.3)
PROT/CREAT 24H UR: 0.18 MG/MG CR (ref 0–0.2)
PTH-INTACT SERPL-MCNC: 67 PG/ML (ref 15–65)
SODIUM SERPL-SCNC: 141 MMOL/L (ref 133–144)
TIBC SERPL-MCNC: 275 UG/DL (ref 240–430)

## 2022-11-28 PROCEDURE — 99214 OFFICE O/P EST MOD 30 MIN: CPT | Performed by: INTERNAL MEDICINE

## 2022-11-28 PROCEDURE — 83970 ASSAY OF PARATHORMONE: CPT

## 2022-11-28 PROCEDURE — 82306 VITAMIN D 25 HYDROXY: CPT | Performed by: INTERNAL MEDICINE

## 2022-11-28 PROCEDURE — 83540 ASSAY OF IRON: CPT

## 2022-11-28 PROCEDURE — 36415 COLL VENOUS BLD VENIPUNCTURE: CPT

## 2022-11-28 PROCEDURE — 83550 IRON BINDING TEST: CPT

## 2022-11-28 PROCEDURE — 84156 ASSAY OF PROTEIN URINE: CPT

## 2022-11-28 PROCEDURE — 80069 RENAL FUNCTION PANEL: CPT

## 2022-11-28 PROCEDURE — 85018 HEMOGLOBIN: CPT

## 2022-11-28 ASSESSMENT — PAIN SCALES - GENERAL: PAINLEVEL: MILD PAIN (3)

## 2022-11-28 NOTE — Clinical Note
MARTHA Crodova - can we look into cost options for her with starting SGLT2 inhibitor? Thank KEREN torres

## 2022-11-28 NOTE — PROGRESS NOTES
"11/28/22   CC: CKD    HPI: Luz Marina Live is a 62 year old female who presents for follow-up of CKD. Ms. Live' hx is significant for SLE for which she follows with Dr. Adame. Her SLE has included renal involvement in the past - 1990s and included treatment with cytoxan, prednisone and later imuran. She has had intermittent low grade proteinuria since. Additional hx includes CABG in August 2009, Afib, and hypertension (dx around 20 years ago). Additionally she has had a few RANDELL episodes and NSAID related injury.    Creatinine has been 0.95-1.35 for the past year; 1.13 at this time. UPCR was 0.67 g/g in Nov. Which is up from 0.3-0.5 g/g previously. She underwent 2 cardiac stent placement in October - on brilinta and coumadin.     10/13/20: recently with rising creatinine, however, with only mild proteinuria. I would expect hematuria in the setting of active lupus nephritis. She reports feeling fine - no specific concerns. I spent time reviewing the change in her kidney function over last year. I also explained the risks of kidney biopsy, especially as we would need to hold blood thinners.     11/02/21: video visit. Weight has been \"stable\". Weights have not changed more than 1 lb. No swelling in her legs. Breathing is better. She did notice improvement with the lasix. She is currently taking lasix 60 mg twice a week with 40 mg the other days of the week. She is taking lisinopril 30 mg daily. Doesn't check BP at home often. No lightheadedness/dizziness. She denies increased thirst. No other sxs of dehydration. No NSAID use.  She goes to therapy to strengthen her legs.     11/28/22: in person visit. Creatinine has been 1.36-1.91 this past year; 1.33 currently. No proteinuria on previous check. Currently on lasix 40 mg daily, iron every other day, metoprolol 50 AM and 75 PM. BP at home is occasionally in the 140s - lowest 116 - typical readings are closer to 130s. Swelling is not better - no pitting edema currently. If " she is on her feet for long periods of time or stitting too [facility] she will notice more swelling. NO difficulty with UTIs, no yeast infections.     aspirin 81 MG EC tablet, Take 81 mg by mouth daily  atorvastatin (LIPITOR) 40 MG tablet, TAKE 1 TABLET BY MOUTH EVERY DAY  furosemide (LASIX) 20 MG tablet, Take 2 tablets (40 mg) by mouth daily  hydroxychloroquine (PLAQUENIL) 200 MG tablet, Take 1 tablet (200 mg) by mouth 2 times daily  loratadine (CLARITIN) 10 MG tablet, Take 10 mg by mouth daily  metoprolol tartrate (LOPRESSOR) 25 MG tablet, Take 50mg in the morning and 75mg in the evening.  metroNIDAZOLE (METROCREAM) 0.75 % external cream, Apply topically 2 times daily  MULTIPLE VITAMIN PO, Take 1 tablet by mouth daily   omega-3 fatty acids 1200 MG capsule, Take 2 capsules by mouth 2 times daily   order for DME, [employer] 10-16 cmH20  sacubitril-valsartan (ENTRESTO) 24-26 MG per tablet, Take half a tablet twice daily.  warfarin ANTICOAGULANT (COUMADIN) 5 MG tablet, TAKE 10 MG (2 TABS) TU, TH, SAT, AND 7.5 MG (1 1/2 TABS) ALL OTHER DAYS OR AS DIRECTED BY COUMADIN CLINIC    No current facility-administered medications on file prior to visit.      Exam:  BP (!) 143/86 (BP Location: Left arm, Patient Position: Sitting, Cuff Size: Adult Large)   Pulse 69   Wt 122.2 kg (269 lb 5.8 oz)   SpO2 99%   BMI 47.79 kg/m    GENERAL: Healthy, alert and no distress  Ext - no pitting edema    Results:  Office Visit on 11/28/2022   Component Date Value Ref Range Status     25 OH Vitamin D2 11/28/2022 <5  ug/L Final     25 OH Vitamin D3 11/28/2022 47  ug/L Final     25 OH Vit D Total 11/28/2022 <52  20 - 75 ug/L Final    Season, race, dietary intake, and treatment affect the concentration of 25-hydroxy-Vitamin D. Values may decrease during winter months and increase during summer months. Values 20-29 ug/L may indicate Vitamin D insufficiency and values <20 ug/L may indicate Vitamin D deficiency.   Lab on 11/28/2022   Component Date Value  Ref Range Status     Hemoglobin 11/28/2022 13.0  11.7 - 15.7 g/dL Final     Iron 11/28/2022 67  35 - 180 ug/dL Final     Iron Binding Capacity 11/28/2022 275  240 - 430 ug/dL Final     Iron Sat Index 11/28/2022 24  15 - 46 % Final     Parathyroid Hormone Intact 11/28/2022 67 (H)  15 - 65 pg/mL Final     Total Protein Urine mg/dL 11/28/2022 14.9  mg/dL Final    The reference ranges have not been established in urine protein. The results should be integrated into the clinical context for interpretation.     Total Protein UR MG/MG CR 11/28/2022 0.18  0.00 - 0.20 mg/mg Cr Final     Creatinine Urine mg/dL 11/28/2022 82.7  mg/dL Final    The reference ranges have not been established in urine creatinine. The results should be integrated into the clinical context for interpretation.     Sodium 11/28/2022 141  133 - 144 mmol/L Final     Potassium 11/28/2022 4.5  3.4 - 5.3 mmol/L Final     Chloride 11/28/2022 111 (H)  94 - 109 mmol/L Final     Carbon Dioxide (CO2) 11/28/2022 27  20 - 32 mmol/L Final     Anion Gap 11/28/2022 3  3 - 14 mmol/L Final     Urea Nitrogen 11/28/2022 33 (H)  7 - 30 mg/dL Final     Creatinine 11/28/2022 1.33 (H)  0.52 - 1.04 mg/dL Final     Calcium 11/28/2022 9.7  8.5 - 10.1 mg/dL Final     Glucose 11/28/2022 91  70 - 99 mg/dL Final     Albumin 11/28/2022 3.5  3.4 - 5.0 g/dL Final     Phosphorus 11/28/2022 3.1  2.5 - 4.5 mg/dL Final     GFR Estimate 11/28/2022 45 (L)  >60 mL/min/1.73m2 Final    Effective December 21, 2021 eGFRcr in adults is calculated using the 2021 CKD-EPI creatinine equation which includes age and gender (Guillermo urena al., NEJ, DOI: 10.1056/QOKZky4998993)         Assessment/Plan:   1. CKD stage 3: risk factors for CKD include previous lupus nephritis, hypertension, as well as acute kidney injuries.  She is on lisinopril 40 mg daily. Last year we were considering kidney biopsy given rise in creatinine. Brilinta was stopped and creatinine stabilized. She has mild proteinuria but  proteinuria has been present dating back to 2015 even. UPCR has now normalized which is great to see.     2. Hypertension/CHF: goal BP is less than 130/80 - her readings are typically very close to this. Previously, Dr. Moreno had interest in starting a SGLT2 inhibitor for her cardiac state. We held off at that time given her GFR was near to 30. Her GFR has now improved to closer to 48 so SGLT2 inhibitor could be considered. I will reach out to Dr. Moreno to see if he would like this added at this time.     3. Lupus: on plaquenil; no hmeaturia or proteinuria to suggest active lupus nephritis.     4. Anemia:  Hemoglobin 12 - given normal hemoglobin, ok to hold oral iron for now.     5. Secondary hyperparathyroidism, renal: PTH 67 - vitamin D 52.     Patient Instructions   1. Stop oral iron  2. Labs in 6 months  3. Follow-up in one year.      Lavern Clark, DO

## 2022-11-28 NOTE — NURSING NOTE
Luz Marina Live's goals for this visit include: NONE  She requests these members of her care team be copied on today's visit information: YES    PCP: Eliz Nguyen    Referring Provider:  No referring provider defined for this encounter.    BP (!) 143/86 (BP Location: Left arm, Patient Position: Sitting, Cuff Size: Adult Large)   Pulse 69   Wt 122.2 kg (269 lb 5.8 oz)   SpO2 99%   BMI 47.79 kg/m      Do you need any medication refills at today's visit? NONE    Ezequiel Juárez, EMT

## 2022-11-30 ENCOUNTER — ANTICOAGULATION THERAPY VISIT (OUTPATIENT)
Dept: ANTICOAGULATION | Facility: CLINIC | Age: 62
End: 2022-11-30

## 2022-11-30 ENCOUNTER — LAB (OUTPATIENT)
Dept: LAB | Facility: CLINIC | Age: 62
End: 2022-11-30
Payer: COMMERCIAL

## 2022-11-30 DIAGNOSIS — Z79.60 LONG-TERM USE OF IMMUNOSUPPRESSANT MEDICATION: ICD-10-CM

## 2022-11-30 DIAGNOSIS — Z79.01 LONG TERM CURRENT USE OF ANTICOAGULANT THERAPY: Primary | Chronic | ICD-10-CM

## 2022-11-30 DIAGNOSIS — I48.0 PAF (PAROXYSMAL ATRIAL FIBRILLATION) (H): Chronic | ICD-10-CM

## 2022-11-30 DIAGNOSIS — N18.30 STAGE 3 CHRONIC KIDNEY DISEASE, UNSPECIFIED WHETHER STAGE 3A OR 3B CKD (H): ICD-10-CM

## 2022-11-30 DIAGNOSIS — G47.33 OBSTRUCTIVE SLEEP APNEA (ADULT) (PEDIATRIC): Primary | ICD-10-CM

## 2022-11-30 DIAGNOSIS — Z79.01 LONG TERM CURRENT USE OF ANTICOAGULANT THERAPY: ICD-10-CM

## 2022-11-30 DIAGNOSIS — E66.01 MORBID OBESITY (H): ICD-10-CM

## 2022-11-30 DIAGNOSIS — I48.91 ATRIAL FIBRILLATION, UNSPECIFIED TYPE (H): ICD-10-CM

## 2022-11-30 DIAGNOSIS — M32.14 SYSTEMIC LUPUS ERYTHEMATOSUS WITH GLOMERULAR DISEASE, UNSPECIFIED SLE TYPE (H): ICD-10-CM

## 2022-11-30 DIAGNOSIS — I20.89 STABLE ANGINA (H): ICD-10-CM

## 2022-11-30 DIAGNOSIS — Z79.899 LONG-TERM USE OF PLAQUENIL: ICD-10-CM

## 2022-11-30 DIAGNOSIS — I25.10 CORONARY ARTERY DISEASE INVOLVING NATIVE HEART WITHOUT ANGINA PECTORIS, UNSPECIFIED VESSEL OR LESION TYPE: ICD-10-CM

## 2022-11-30 LAB
ALBUMIN SERPL-MCNC: 3.3 G/DL (ref 3.4–5)
ALBUMIN UR-MCNC: NEGATIVE MG/DL
ALT SERPL W P-5'-P-CCNC: 33 U/L (ref 0–50)
APPEARANCE UR: CLEAR
AST SERPL W P-5'-P-CCNC: 31 U/L (ref 0–45)
BACTERIA #/AREA URNS HPF: ABNORMAL /HPF
BASOPHILS # BLD AUTO: 0.1 10E3/UL (ref 0–0.2)
BASOPHILS NFR BLD AUTO: 1 %
BILIRUB UR QL STRIP: NEGATIVE
COLOR UR AUTO: YELLOW
CREAT SERPL-MCNC: 1.26 MG/DL (ref 0.52–1.04)
CRP SERPL-MCNC: 18.4 MG/L (ref 0–8)
DEPRECATED CALCIDIOL+CALCIFEROL SERPL-MC: <52 UG/L (ref 20–75)
EOSINOPHIL # BLD AUTO: 0.2 10E3/UL (ref 0–0.7)
EOSINOPHIL NFR BLD AUTO: 3 %
ERYTHROCYTE [DISTWIDTH] IN BLOOD BY AUTOMATED COUNT: 14 % (ref 10–15)
ERYTHROCYTE [SEDIMENTATION RATE] IN BLOOD BY WESTERGREN METHOD: 70 MM/HR (ref 0–30)
GFR SERPL CREATININE-BSD FRML MDRD: 48 ML/MIN/1.73M2
GLUCOSE UR STRIP-MCNC: NEGATIVE MG/DL
HCT VFR BLD AUTO: 36.5 % (ref 35–47)
HGB BLD-MCNC: 12 G/DL (ref 11.7–15.7)
HGB UR QL STRIP: ABNORMAL
HYALINE CASTS #/AREA URNS LPF: ABNORMAL /LPF
IMM GRANULOCYTES # BLD: 0 10E3/UL
IMM GRANULOCYTES NFR BLD: 0 %
INR BLD: 2.4 (ref 0.9–1.1)
KETONES UR STRIP-MCNC: NEGATIVE MG/DL
LEUKOCYTE ESTERASE UR QL STRIP: NEGATIVE
LYMPHOCYTES # BLD AUTO: 1.3 10E3/UL (ref 0.8–5.3)
LYMPHOCYTES NFR BLD AUTO: 20 %
MCH RBC QN AUTO: 30.1 PG (ref 26.5–33)
MCHC RBC AUTO-ENTMCNC: 32.9 G/DL (ref 31.5–36.5)
MCV RBC AUTO: 92 FL (ref 78–100)
MONOCYTES # BLD AUTO: 0.4 10E3/UL (ref 0–1.3)
MONOCYTES NFR BLD AUTO: 6 %
NEUTROPHILS # BLD AUTO: 4.6 10E3/UL (ref 1.6–8.3)
NEUTROPHILS NFR BLD AUTO: 70 %
NITRATE UR QL: NEGATIVE
PH UR STRIP: 6.5 [PH] (ref 5–7)
PLATELET # BLD AUTO: 203 10E3/UL (ref 150–450)
RBC # BLD AUTO: 3.99 10E6/UL (ref 3.8–5.2)
RBC #/AREA URNS AUTO: ABNORMAL /HPF
SP GR UR STRIP: 1.01 (ref 1–1.03)
SQUAMOUS #/AREA URNS AUTO: ABNORMAL /LPF
UROBILINOGEN UR STRIP-ACNC: 0.2 E.U./DL
VITAMIN D2 SERPL-MCNC: <5 UG/L
VITAMIN D3 SERPL-MCNC: 47 UG/L
WBC # BLD AUTO: 6.5 10E3/UL (ref 4–11)
WBC #/AREA URNS AUTO: ABNORMAL /HPF

## 2022-11-30 PROCEDURE — 86160 COMPLEMENT ANTIGEN: CPT | Mod: 59

## 2022-11-30 PROCEDURE — 85652 RBC SED RATE AUTOMATED: CPT

## 2022-11-30 PROCEDURE — 81001 URINALYSIS AUTO W/SCOPE: CPT

## 2022-11-30 PROCEDURE — 86225 DNA ANTIBODY NATIVE: CPT

## 2022-11-30 PROCEDURE — 86140 C-REACTIVE PROTEIN: CPT

## 2022-11-30 PROCEDURE — 36416 COLLJ CAPILLARY BLOOD SPEC: CPT

## 2022-11-30 PROCEDURE — 84460 ALANINE AMINO (ALT) (SGPT): CPT

## 2022-11-30 PROCEDURE — 87340 HEPATITIS B SURFACE AG IA: CPT

## 2022-11-30 PROCEDURE — 85610 PROTHROMBIN TIME: CPT

## 2022-11-30 PROCEDURE — 36415 COLL VENOUS BLD VENIPUNCTURE: CPT

## 2022-11-30 PROCEDURE — 86160 COMPLEMENT ANTIGEN: CPT

## 2022-11-30 PROCEDURE — 82565 ASSAY OF CREATININE: CPT

## 2022-11-30 PROCEDURE — 84450 TRANSFERASE (AST) (SGOT): CPT

## 2022-11-30 PROCEDURE — 85025 COMPLETE CBC W/AUTO DIFF WBC: CPT

## 2022-11-30 PROCEDURE — 82040 ASSAY OF SERUM ALBUMIN: CPT

## 2022-11-30 NOTE — PROGRESS NOTES
ANTICOAGULATION MANAGEMENT     Luz Marina Live 62 year old female is on warfarin with therapeutic INR result. (Goal INR 2.0-3.0)    Recent labs: (last 7 days)     11/30/22  1339   INR 2.4*       ASSESSMENT       Source(s): Chart Review    Previous INR was Therapeutic last 2(+) visits    Medication, diet, health changes since last INR chart reviewed; none identified      MoPoweredhart sent to pt (mailbox was full when I tried calling her)     PLAN     Recommended plan for no diet, medication or health factor changes affecting INR     Dosing Instructions: Continue your current warfarin dose with next INR in 6 weeks       Summary  As of 11/30/2022    Full warfarin instructions:  10 mg every Tue, Thu, Sat; 7.5 mg all other days; Starting 11/30/2022   Next INR check:  1/11/2023             Sent [employer] message with dosing and follow up instructions    Contact [phone]  to schedule and with any changes, questions or concerns.     Education provided:     Please call back if any changes to your diet, medications or how you've been taking warfarin    Plan made per [facility] anticoagulation protocol    Cait Hawthorne, RN  Anticoagulation Clinic  11/30/2022    _______________________________________________________________________     Anticoagulation Episode Summary     Current INR goal:  2.0-3.0   TTR:  100.0 % (1 y)   Target end date:  Indefinite   Send INR reminders to:  [facility]    Indications    Long-term (current) use of anticoagulants [Z79.01] [Z79.01]  PAF (paroxysmal atrial fibrillation) (H) [I48.0]  Atrial fibrillation  unspecified type (H) [I48.91]           Comments:  5 mg tabsZeb lab  PM dose         Anticoagulation Care Providers     Provider Role Specialty Phone number    Eliz Nguyen MD Referring Internal Medicine [phone]    Elodia Tang MD Referring Family Medicine [phone]

## 2022-12-01 LAB
C3 SERPL-MCNC: 100 MG/DL (ref 81–157)
C4 SERPL-MCNC: 14 MG/DL (ref 13–39)
DSDNA AB SER-ACNC: 21 IU/ML
HBV SURFACE AG SERPL QL IA: NONREACTIVE

## 2022-12-09 ENCOUNTER — TELEPHONE (OUTPATIENT)
Dept: NEPHROLOGY | Facility: CLINIC | Age: 62
End: 2022-12-09

## 2022-12-09 ENCOUNTER — TELEPHONE (OUTPATIENT)
Dept: CARDIOLOGY | Facility: CLINIC | Age: 62
End: 2022-12-09

## 2022-12-09 DIAGNOSIS — N18.30 CKD (CHRONIC KIDNEY DISEASE) STAGE 3, GFR 30-59 ML/MIN (H): Primary | Chronic | ICD-10-CM

## 2022-12-09 DIAGNOSIS — I10 HYPERTENSION GOAL BP (BLOOD PRESSURE) < 130/80: ICD-10-CM

## 2022-12-09 NOTE — TELEPHONE ENCOUNTER
Received message from Dr. Clark to review SGLT2 inhibitor coverage.   Routing to pharmacy liaison pool to help determine this.     Tasha Granados, RN, BSN  Nephrology Care Coordinator  [facility]

## 2022-12-12 RX ORDER — DAPAGLIFLOZIN 10 MG/1
10 TABLET, FILM COATED ORAL DAILY
Qty: 90 TABLET | Refills: 1 | Status: SHIPPED | OUTPATIENT
Start: 2022-12-12 | End: 2023-06-09

## 2022-12-12 NOTE — TELEPHONE ENCOUNTER
Spoke to patient re: SGLT2 coverage.     Jardiance $3.00   Invokana $3.00   Farxiga $3.00     Patient agreeable to starting. Ordered received from Dr. Clark for Farxiga 10 mg daily. Patient agreeable to lab work at 2 and 4 weeks after starting medication. We briefly discussed the potential medication side effects. She will contact clinic with any questions.     Tasha Granados RN, BSN  Nephrology Care Coordinator  [facility]

## 2022-12-16 ENCOUNTER — TELEPHONE (OUTPATIENT)
Dept: OBGYN | Facility: CLINIC | Age: 62
End: 2022-12-16

## 2022-12-16 NOTE — TELEPHONE ENCOUNTER
"Last Thursday, as patient was decorating her tree, she fell and hit her R knee on the floor.  It is bruised all the way around and MCFP down her calf.  It swelled up \"like a basketball.\"  Bruising is beginning to lighten, and she's wondering if she should be doing something more.     Took ibuprofen, which helps the pain.      Suggested icing, ibuprofen as tolerated.     Patient is not interested in clinic visit or imaging at this point, only wanting to know what she can do to manage the swelling.     Routing message to Dr. Nguyen as an FYI.   "

## 2022-12-19 ENCOUNTER — PRE VISIT (OUTPATIENT)
Dept: CARDIOLOGY | Facility: CLINIC | Age: 62
End: 2022-12-19

## 2022-12-19 DIAGNOSIS — I50.32 CHRONIC HEART FAILURE WITH PRESERVED EJECTION FRACTION (HFPEF) (H): Primary | ICD-10-CM

## 2022-12-20 ENCOUNTER — LAB (OUTPATIENT)
Dept: LAB | Facility: CLINIC | Age: 62
End: 2022-12-20
Payer: COMMERCIAL

## 2022-12-20 ENCOUNTER — DOCUMENTATION ONLY (OUTPATIENT)
Dept: ANTICOAGULATION | Facility: CLINIC | Age: 62
End: 2022-12-20

## 2022-12-20 ENCOUNTER — ANCILLARY PROCEDURE (OUTPATIENT)
Dept: GENERAL RADIOLOGY | Facility: CLINIC | Age: 62
End: 2022-12-20
Attending: STUDENT IN AN ORGANIZED HEALTH CARE EDUCATION/TRAINING PROGRAM
Payer: COMMERCIAL

## 2022-12-20 ENCOUNTER — ANCILLARY PROCEDURE (OUTPATIENT)
Dept: ULTRASOUND IMAGING | Facility: CLINIC | Age: 62
End: 2022-12-20
Attending: STUDENT IN AN ORGANIZED HEALTH CARE EDUCATION/TRAINING PROGRAM
Payer: COMMERCIAL

## 2022-12-20 ENCOUNTER — ALLIED HEALTH/NURSE VISIT (OUTPATIENT)
Dept: FAMILY MEDICINE | Facility: CLINIC | Age: 62
End: 2022-12-20
Payer: COMMERCIAL

## 2022-12-20 ENCOUNTER — OFFICE VISIT (OUTPATIENT)
Dept: PEDIATRICS | Facility: CLINIC | Age: 62
End: 2022-12-20
Payer: COMMERCIAL

## 2022-12-20 VITALS
SYSTOLIC BLOOD PRESSURE: 129 MMHG | TEMPERATURE: 97.5 F | RESPIRATION RATE: 22 BRPM | DIASTOLIC BLOOD PRESSURE: 76 MMHG | OXYGEN SATURATION: 100 % | HEART RATE: 58 BPM

## 2022-12-20 DIAGNOSIS — M79.89 RIGHT LEG SWELLING: ICD-10-CM

## 2022-12-20 DIAGNOSIS — L03.115 CELLULITIS OF RIGHT LOWER EXTREMITY: Primary | ICD-10-CM

## 2022-12-20 DIAGNOSIS — M25.469 TRAUMATIC EFFUSION OF KNEE JOINT: ICD-10-CM

## 2022-12-20 DIAGNOSIS — M25.561 RIGHT KNEE PAIN, UNSPECIFIED CHRONICITY: ICD-10-CM

## 2022-12-20 DIAGNOSIS — I50.32 CHRONIC HEART FAILURE WITH PRESERVED EJECTION FRACTION (HFPEF) (H): ICD-10-CM

## 2022-12-20 DIAGNOSIS — Z53.9 DIAGNOSIS FOR ++++ WALK IN CLINIC ++++: Primary | ICD-10-CM

## 2022-12-20 LAB
ALBUMIN SERPL-MCNC: 3.2 G/DL (ref 3.4–5)
ALP SERPL-CCNC: 109 U/L (ref 40–150)
ALT SERPL W P-5'-P-CCNC: 29 U/L (ref 0–50)
ANION GAP SERPL CALCULATED.3IONS-SCNC: 2 MMOL/L (ref 3–14)
AST SERPL W P-5'-P-CCNC: 26 U/L (ref 0–45)
BILIRUB SERPL-MCNC: 1 MG/DL (ref 0.2–1.3)
BUN SERPL-MCNC: 48 MG/DL (ref 7–30)
CALCIUM SERPL-MCNC: 9 MG/DL (ref 8.5–10.1)
CHLORIDE BLD-SCNC: 110 MMOL/L (ref 94–109)
CO2 SERPL-SCNC: 29 MMOL/L (ref 20–32)
CREAT SERPL-MCNC: 1.56 MG/DL (ref 0.52–1.04)
ERYTHROCYTE [DISTWIDTH] IN BLOOD BY AUTOMATED COUNT: 15 % (ref 10–15)
GFR SERPL CREATININE-BSD FRML MDRD: 37 ML/MIN/1.73M2
GLUCOSE BLD-MCNC: 91 MG/DL (ref 70–99)
HCT VFR BLD AUTO: 32.7 % (ref 35–47)
HGB BLD-MCNC: 10.3 G/DL (ref 11.7–15.7)
MCH RBC QN AUTO: 29.8 PG (ref 26.5–33)
MCHC RBC AUTO-ENTMCNC: 31.5 G/DL (ref 31.5–36.5)
MCV RBC AUTO: 95 FL (ref 78–100)
NT-PROBNP SERPL-MCNC: 2519 PG/ML (ref 0–900)
PLATELET # BLD AUTO: 197 10E3/UL (ref 150–450)
POTASSIUM BLD-SCNC: 4.8 MMOL/L (ref 3.4–5.3)
PROT SERPL-MCNC: 7.9 G/DL (ref 6.8–8.8)
RBC # BLD AUTO: 3.46 10E6/UL (ref 3.8–5.2)
SODIUM SERPL-SCNC: 141 MMOL/L (ref 133–144)
WBC # BLD AUTO: 6.7 10E3/UL (ref 4–11)

## 2022-12-20 PROCEDURE — 99215 OFFICE O/P EST HI 40 MIN: CPT | Performed by: STUDENT IN AN ORGANIZED HEALTH CARE EDUCATION/TRAINING PROGRAM

## 2022-12-20 PROCEDURE — 80053 COMPREHEN METABOLIC PANEL: CPT

## 2022-12-20 PROCEDURE — 73560 X-RAY EXAM OF KNEE 1 OR 2: CPT | Mod: RT | Performed by: RADIOLOGY

## 2022-12-20 PROCEDURE — 83880 ASSAY OF NATRIURETIC PEPTIDE: CPT

## 2022-12-20 PROCEDURE — 76882 US LMTD JT/FCL EVL NVASC XTR: CPT | Mod: RT | Performed by: RADIOLOGY

## 2022-12-20 PROCEDURE — 85027 COMPLETE CBC AUTOMATED: CPT

## 2022-12-20 PROCEDURE — 36415 COLL VENOUS BLD VENIPUNCTURE: CPT

## 2022-12-20 RX ORDER — CEPHALEXIN 500 MG/1
500 CAPSULE ORAL 3 TIMES DAILY
Qty: 21 CAPSULE | Refills: 0 | Status: CANCELLED | OUTPATIENT
Start: 2022-12-20 | End: 2022-12-27

## 2022-12-20 RX ORDER — DICLOXACILLIN SODIUM 500 MG
500 CAPSULE ORAL 4 TIMES DAILY
Qty: 28 CAPSULE | Refills: 0 | Status: SHIPPED | OUTPATIENT
Start: 2022-12-20 | End: 2022-12-27

## 2022-12-20 NOTE — PROGRESS NOTES
SITUATION:   Fell on Knee.     BACKGROUND:   Patient tripped on her rug on 12/8/22 and fell on right knee. The patient is concerned since there is a substantial amount of swelling. At first the leg was black and blue. Now it is turning purple. Denies chest pain or difficult breathing. Patient is able to walk on it and stand on it. However, it is hard to bend. The leg is warm to the touch compared to the left leg. There is minimal pain 2-3/10. Denies tingling/numbness.     Patient is taking warfarin.      HOME TREATMENTS:  ICE  Ibu  Elevation    HEALTH HISTORY:  CKD  Long-term anticoagulants.   PAF  Afib  Stable Angina  Long term use plaquenil    HX of post coronary angiogram.      PATIENT REQUEST:   To be seen by a provider for a possible x-ray.     NURSE RECOMMENDATION:   Possible ADS to rule out a blood clot, hematoma, or further damage.     PLAN:   Huddled with Shira Taylor. Who called ADS. Patient is on her way to ADS.         ZEKE WashingtonN, RN, PHN  [place]  December 20, 2022

## 2022-12-20 NOTE — PROGRESS NOTES
ANTICOAGULATION  MANAGEMENT     Interacting Medication Review    Interacting medication(s): Dicloxacillin with warfarin.    Duration: 7 days (12/20 to 12/27)    Indication: Knee injury    New medication?: Yes, Interaction may decrease INR and increase risk of clotting        PLAN     Continue your current warfarin dose with next INR in 7 days        Sent my chart message requesting an INR recheck in 1 week.    No adjustment to anticoagulation calendar was required    Plan made per [facility] anticoagulation protocol    Red RIVAS RN  Anticoagulation Clinic

## 2022-12-20 NOTE — PROGRESS NOTES
Assessment & Plan     Cellulitis of right lower extremity  Traumatic effusion of knee joint  Patient presents with swelling, ecchymosis and localized erythema of the anterior right knee that is warm to touch after fall that occurred 12 days ago. I ordered x-ray to rule out fracture or osteomyelitis and ultrasound to evaluate effusion and hematoma. No fracture or bony abnormality seen on x-ray. There is a complex effusion seen on ultrasound with blood mixed with the fluid in the joint. I consulted with orthopedics to help advise on best plan for treatment and essentially she needs to elevate the leg and provide gradient compression along with icing every 2 hours. Her leg was wrapped with ACE bandage with gradient compression and advised on elevating her leg above the level of her heart. I am also going to treat with dicloxacillin for suspected cellulitis of the anterior knee where there is localized warmth and erythema. Cephalosporins were flagged as interaction with furosemide increasing risk for nephrotoxic effects, she has sulfa allergy and doxycycline interaction with warfarin was high risk so I reviewed other possibilities and dicloxacillin is known to treat cellulitis that is non-purulent in immunocompetent patients with less risk for interaction with warfarin. I asked her to call her INR nurse to notify that she is on an antibiotic as she will need closer monitoring of INRs while taking the dicloxacillin. If the swelling is not improving with compression and elevation, advised to see ortho to consult. Also advised to seek medical attention if the area of erythema is spreading or she develops fevers or chills. Patient agrees with plan of care.   - dicloxacillin (DYNAPEN) 500 MG capsule; Take 1 capsule (500 mg) by mouth 4 times daily for 7 days  - Orthopedic  Referral; Future    Right leg swelling  - XR Knee Right 1/2 Views; Future  - US Lower Extremity Non Vascular Right  - XR Knee Right 1/2  Views      50 minutes spent on the date of the encounter doing chart review, review of test results, patient visit, documentation and discussion with other provider(s)        No follow-ups on file.    SERENA [facility]    Isela Raza is a 62 year old accompanied by her friend, presenting for the following health issues:  Musculoskeletal Problem (Right leg)      HPI Patient fell on December 8th on her right leg. Her right leg appears very swollen, bruised and hot to touch.  Patient is on 5 mg Coumadin, she takes 10 mg Thursday, Thursday, Saturday and 1/12 tablets on all other days.    Pain History:  When did you first notice your pain? - Acute Pain   Have you seen anyone else for your pain? No  Where in your body do you have pain? Musculoskeletal problem/pain  Onset/Duration: December 8th  Description  Location: leg - right  Joint Swelling: YES  Redness: YES  Pain: YES  Warmth: YES  Intensity:  moderate  Progression of Symptoms:  improving  Accompanying signs and symptoms:   Fevers: No  Numbness/tingling/weakness: YES  History  Trauma to the area: YES, fell almost 2 weeks ago  Recent illness:  No  Previous similar problem: No  Previous evaluation:  No  Precipitating or alleviating factors:  Aggravating factors include: standing, walking and climbing stairs  Therapies tried and outcome: rest/inactivity and Ibuprofen      Patient Active Problem List   Diagnosis     Hyperlipidemia LDL goal <100     CAD (coronary artery disease)     HTN, goal below 140/90     PAF (paroxysmal atrial fibrillation) (H)     CKD (chronic kidney disease) stage 3, GFR 30-59 ml/min (H)     Long-term (current) use of anticoagulants [Z79.01]     Moderate tricuspid insufficiency     Systemic lupus erythematosus with glomerular disease, unspecified SLE type (H)     Psychophysiological insomnia     Morbid obesity (H)     KEITH (obstructive sleep apnea)- severe (AHI 30)     Long-term use of  Plaquenil     Long-term use of immunosuppressant medication     Stable angina (H)     Positive cardiac stress test     Status post coronary angiogram     Atrial fibrillation, unspecified type (H)     Current Outpatient Medications   Medication     aspirin 81 MG EC tablet     atorvastatin (LIPITOR) 40 MG tablet     dapagliflozin (FARXIGA) 10 MG TABS tablet     dicloxacillin (DYNAPEN) 500 MG capsule     furosemide (LASIX) 20 MG tablet     hydroxychloroquine (PLAQUENIL) 200 MG tablet     loratadine (CLARITIN) 10 MG tablet     metoprolol tartrate (LOPRESSOR) 25 MG tablet     metroNIDAZOLE (METROCREAM) 0.75 % external cream     MULTIPLE VITAMIN PO     omega-3 fatty acids 1200 MG capsule     order for DME     sacubitril-valsartan (ENTRESTO) 24-26 MG per tablet     warfarin ANTICOAGULANT (COUMADIN) 5 MG tablet     No current facility-administered medications for this visit.         Review of Systems   Constitutional, HEENT, cardiovascular, pulmonary, GI, , musculoskeletal, neuro, skin, endocrine and psych systems are negative, except as otherwise noted.      Objective    /76 (BP Location: Right arm, Patient Position: Chair, Cuff Size: Adult Large)   Pulse 58   Temp 97.5  F (36.4  C) (Oral)   Resp 22   SpO2 100%   There is no height or weight on file to calculate BMI.  Physical Exam   GENERAL: healthy, alert and no distress  MS: significant concentric nonpitting edema of the right knee extending to foot  SKIN: ecchymosis of the right knee and lower extremity, localized erythema of the right anterior knee that is warm to touch  NEURO: Normal strength and tone, mentation intact and speech normal  PSYCH: mentation appears normal, affect normal/bright    Results for orders placed or performed in visit on 12/20/22 (from the past 24 hour(s))    Lower Extremity Non Vascular Right    Narrative    EXAMINATION: US LOWER EXTREMITY NON VASCULAR RIGHT, 12/20/2022 3:32 PM      COMPARISON: None.    HISTORY: Fell on right  knee 2 weeks ago, significant anterior  swelling, erythema, ecchymosis extending from knee to ankle, evaluate  for hematoma.    FINDINGS: Targeted sonographic imaging of the right lower extremity  demonstrates a moderate to large-sized complex right knee joint  effusion containing debris or possibly blood in the setting of trauma.  This measures 5.8 x 1.4 x 6.5 cm. Sonographic imaging over the calf  demonstrates subcutaneous edema with no hematoma or fluid collection.        Impression    IMPRESSION:  Moderate to large sized complex right knee joint effusion, containing  debris or possibly blood in the setting of trauma.    GABRIELLE LAUREANO MD         SYSTEM ID:  [id number]   XR Knee Right 1/2 Views    Narrative    EXAM: XR KNEE RIGHT 1/2 VIEWS  LOCATION: [facility]  DATE/TIME: 12/20/2022 3:45 PM    INDICATION: fell on right knee 2 weeks ago, significant swelling and erythema of anterior knee, r o fracture, osteomyelitis etc  COMPARISON: None.      Impression    IMPRESSION: Normal joint spaces and alignment. No fracture. Minimal effusion.     *Note: Due to a large number of results and/or encounters for the requested time period, some results have not been displayed. A complete set of results can be found in Results Review.     Results for orders placed or performed in visit on 12/20/22   US Lower Extremity Non Vascular Right    Impression    IMPRESSION:  Moderate to large sized complex right knee joint effusion, containing  debris or possibly blood in the setting of trauma.    GABRIELLE LAUREANO MD         SYSTEM ID:  [id number]   XR Knee Right 1/2 Views    Impression    IMPRESSION: Normal joint spaces and alignment. No fracture. Minimal effusion.                    Birth Control Pills Counseling: Birth Control Pill Counseling: I discussed with the patient the potential side effects of OCPs including but not limited to increased risk of stroke, heart attack, thrombophlebitis, deep venous thrombosis, hepatic adenomas, breast changes, GI upset, headaches, and depression.  The patient verbalized understanding of the proper use and possible adverse effects of OCPs. All of the patient's questions and concerns were addressed.

## 2022-12-27 ENCOUNTER — TELEPHONE (OUTPATIENT)
Dept: CARDIOLOGY | Facility: CLINIC | Age: 62
End: 2022-12-27

## 2022-12-27 NOTE — TELEPHONE ENCOUNTER
Patient returned call and left voicemail message with update blood pressure reading.      Last Blood Pressure: 129/76  Last Heart Rate: 58  Date: 12/20/22  Location: Other Specialty    Today's Blood Pressure: 108/51  Today's Heart Rate: 59  Location: Home BP    Patient reported blood pressure updated in Epic. Blood pressure falls within [place] Community Measures guidelines.  Patient will follow up as previously advised.        Adrian Boyd EMT December 27, 2022

## 2023-01-07 DIAGNOSIS — I48.91 ATRIAL FIBRILLATION, UNSPECIFIED TYPE (H): ICD-10-CM

## 2023-01-10 RX ORDER — WARFARIN SODIUM 5 MG/1
TABLET ORAL
Qty: 156 TABLET | Refills: 0 | Status: SHIPPED | OUTPATIENT
Start: 2023-01-10 | End: 2023-02-07

## 2023-01-10 NOTE — TELEPHONE ENCOUNTER
ANTICOAGULATION MANAGEMENT:  Medication Refill    Anticoagulation Summary  As of 11/30/2022    Warfarin maintenance plan:  10 mg (5 mg x 2) every Tue, Thu, Sat; 7.5 mg (5 mg x 1.5) all other days   Next INR check:  1/11/2023   Target end date:  Indefinite    Indications    Long-term (current) use of anticoagulants [Z79.01] [Z79.01]  PAF (paroxysmal atrial fibrillation) (H) [I48.0]  Atrial fibrillation  unspecified type (H) [I48.91]             Anticoagulation Care Providers     Provider Role Specialty Phone number    Eliz Nguyen MD Referring Internal Medicine [phone]    Elodia Tang MD Referring Family Medicine [phone]          Visit with referring provider/group within last year: Yes, however, last annual exam 7/13/21    [facility] referral signed within last year: Yes    Luz Marina meets all criteria for refill (current [facility] referral, office visit with referring provider/group in last year, lab monitoring up to date or not exceeding 2 weeks overdue). Rx instructions and quantity match patient's current dosing plan. 90 day supply with 0 refills granted (due for INR appt 1/11/23 - no appt scheduled) per [facility] protocol     Liliam Ramirez RN  Anticoagulation Clinic

## 2023-01-18 ENCOUNTER — MYC MEDICAL ADVICE (OUTPATIENT)
Dept: ANTICOAGULATION | Facility: CLINIC | Age: 63
End: 2023-01-18

## 2023-01-18 ENCOUNTER — LAB (OUTPATIENT)
Dept: LAB | Facility: CLINIC | Age: 63
End: 2023-01-18
Payer: COMMERCIAL

## 2023-01-18 ENCOUNTER — TELEPHONE (OUTPATIENT)
Dept: ANTICOAGULATION | Facility: CLINIC | Age: 63
End: 2023-01-18
Payer: COMMERCIAL

## 2023-01-18 ENCOUNTER — ANTICOAGULATION THERAPY VISIT (OUTPATIENT)
Dept: ANTICOAGULATION | Facility: CLINIC | Age: 63
End: 2023-01-18

## 2023-01-18 DIAGNOSIS — Z79.01 LONG TERM CURRENT USE OF ANTICOAGULANT THERAPY: Primary | Chronic | ICD-10-CM

## 2023-01-18 DIAGNOSIS — I48.91 ATRIAL FIBRILLATION, UNSPECIFIED TYPE (H): ICD-10-CM

## 2023-01-18 DIAGNOSIS — Z79.01 LONG TERM CURRENT USE OF ANTICOAGULANT THERAPY: ICD-10-CM

## 2023-01-18 DIAGNOSIS — I48.0 PAF (PAROXYSMAL ATRIAL FIBRILLATION) (H): Chronic | ICD-10-CM

## 2023-01-18 LAB — INR BLD: 1.9 (ref 0.9–1.1)

## 2023-01-18 PROCEDURE — 85610 PROTHROMBIN TIME: CPT

## 2023-01-18 PROCEDURE — 36416 COLLJ CAPILLARY BLOOD SPEC: CPT

## 2023-01-18 NOTE — TELEPHONE ENCOUNTER
ANTICOAGULATION     Luz Marina Live is overdue for INR check.      Was unable to reach patient and was unable to leave a voicemail. If patient calls, please schedule INR check as soon as possible. [employer] message sent    Michael Quispe RN

## 2023-01-18 NOTE — PROGRESS NOTES
ANTICOAGULATION MANAGEMENT     Luz Marina Live 62 year old female is on warfarin with subtherapeutic INR result. (Goal INR 2.0-3.0)    Recent labs: (last 7 days)     01/18/23  1438   INR 1.9*       ASSESSMENT       Source(s): Chart Review and Patient/Caregiver Call       Warfarin doses taken: Warfarin taken as instructed    Diet: No new diet changes identified    New illness, injury, or hospitalization: No    Medication/supplement changes: None noted    Signs or symptoms of bleeding or clotting: No    Previous INR: Therapeutic last 2(+) visits    Additional findings: None       PLAN     Recommended plan for no diet, medication or health factor changes affecting INR     Dosing Instructions: booster dose then continue your current warfarin dose with next INR in 2 weeks       Summary  As of 1/18/2023    Full warfarin instructions:  1/18: 10 mg; Otherwise 10 mg every Tue, Thu, Sat; 7.5 mg all other days   Next INR check:  2/1/2023             Sent [employer] message with dosing and follow up instructions    Contact [phone]  to schedule and with any changes, questions or concerns.     Education provided:     Please call back if any changes to your diet, medications or how you've been taking warfarin    Plan made per [facility] anticoagulation protocol    Deborah Alatorre, KATE  Anticoagulation Clinic  1/18/2023    _______________________________________________________________________     Anticoagulation Episode Summary     Current INR goal:  2.0-3.0   TTR:  97.3 % (1 y)   Target end date:  Indefinite   Send INR reminders to:  [facility]    Indications    Long-term (current) use of anticoagulants [Z79.01] [Z79.01]  PAF (paroxysmal atrial fibrillation) (H) [I48.0]  Atrial fibrillation  unspecified type (H) [I48.91]           Comments:           Anticoagulation Care Providers     Provider Role Specialty Phone number    Eliz Nguyen MD Referring Internal Medicine [phone]    Elodia Tang MD Referring Family  Medicine [phone]

## 2023-01-27 ENCOUNTER — ANTICOAGULATION THERAPY VISIT (OUTPATIENT)
Dept: ANTICOAGULATION | Facility: CLINIC | Age: 63
End: 2023-01-27

## 2023-01-27 ENCOUNTER — LAB (OUTPATIENT)
Dept: LAB | Facility: CLINIC | Age: 63
End: 2023-01-27
Payer: COMMERCIAL

## 2023-01-27 DIAGNOSIS — Z79.01 LONG TERM CURRENT USE OF ANTICOAGULANT THERAPY: ICD-10-CM

## 2023-01-27 DIAGNOSIS — I48.0 PAF (PAROXYSMAL ATRIAL FIBRILLATION) (H): Chronic | ICD-10-CM

## 2023-01-27 DIAGNOSIS — Z79.01 LONG TERM CURRENT USE OF ANTICOAGULANT THERAPY: Primary | Chronic | ICD-10-CM

## 2023-01-27 DIAGNOSIS — I48.91 ATRIAL FIBRILLATION, UNSPECIFIED TYPE (H): ICD-10-CM

## 2023-01-27 LAB — INR BLD: 2.3 (ref 0.9–1.1)

## 2023-01-27 PROCEDURE — 85610 PROTHROMBIN TIME: CPT

## 2023-01-27 PROCEDURE — 36415 COLL VENOUS BLD VENIPUNCTURE: CPT

## 2023-01-27 NOTE — PROGRESS NOTES
ANTICOAGULATION MANAGEMENT     Luz Marina Live 62 year old female is on warfarin with therapeutic INR result. (Goal INR 2.0-3.0)    Recent labs: (last 7 days)     01/27/23  1331   INR 2.3*       ASSESSMENT       Source(s): Chart Review    Previous INR was Subtherapeutic    Medication, diet, health changes since last INR chart reviewed; none identified           PLAN     Recommended plan for no diet, medication or health factor changes affecting INR     Dosing Instructions: Continue your current warfarin dose with next INR in 2 weeks       Summary  As of 1/27/2023    Full warfarin instructions:  10 mg every Tue, Thu, Sat; 7.5 mg all other days   Next INR check:  2/10/2023             Sent [employer] message with dosing and follow up instructions    Contact [phone]  to schedule and with any changes, questions or concerns.     Education provided:     Please call back if any changes to your diet, medications or how you've been taking warfarin    Plan made per [facility] anticoagulation protocol    Michael Quispe RN  Anticoagulation Clinic  1/27/2023    _______________________________________________________________________     Anticoagulation Episode Summary     Current INR goal:  2.0-3.0   TTR:  96.7 % (1 y)   Target end date:  Indefinite   Send INR reminders to:  [facility]    Indications    Long-term (current) use of anticoagulants [Z79.01] [Z79.01]  PAF (paroxysmal atrial fibrillation) (H) [I48.0]  Atrial fibrillation  unspecified type (H) [I48.91]           Comments:           Anticoagulation Care Providers     Provider Role Specialty Phone number    Eliz Nguyen MD Referring Internal Medicine [phone]    Elodia Tang MD Referring Family Medicine [phone]

## 2023-02-02 DIAGNOSIS — I48.91 ATRIAL FIBRILLATION, UNSPECIFIED TYPE (H): ICD-10-CM

## 2023-02-02 NOTE — TELEPHONE ENCOUNTER
Medication managed by [facility] clinic, routed to them for refill.     ROBERTH VIRAMONTES RN on 2/2/2023 at 11:57 AM

## 2023-02-07 ENCOUNTER — MYC REFILL (OUTPATIENT)
Dept: ANTICOAGULATION | Facility: CLINIC | Age: 63
End: 2023-02-07
Payer: COMMERCIAL

## 2023-02-07 DIAGNOSIS — I48.91 ATRIAL FIBRILLATION, UNSPECIFIED TYPE (H): ICD-10-CM

## 2023-02-08 RX ORDER — WARFARIN SODIUM 5 MG/1
TABLET ORAL
Qty: 30 TABLET | Refills: 0 | Status: SHIPPED | OUTPATIENT
Start: 2023-02-08 | End: 2023-04-11

## 2023-02-09 DIAGNOSIS — E78.00 PURE HYPERCHOLESTEROLEMIA: ICD-10-CM

## 2023-02-13 ENCOUNTER — ANTICOAGULATION THERAPY VISIT (OUTPATIENT)
Dept: ANTICOAGULATION | Facility: CLINIC | Age: 63
End: 2023-02-13

## 2023-02-13 ENCOUNTER — LAB (OUTPATIENT)
Dept: LAB | Facility: CLINIC | Age: 63
End: 2023-02-13
Payer: COMMERCIAL

## 2023-02-13 DIAGNOSIS — I48.0 PAF (PAROXYSMAL ATRIAL FIBRILLATION) (H): Chronic | ICD-10-CM

## 2023-02-13 DIAGNOSIS — Z79.01 LONG TERM CURRENT USE OF ANTICOAGULANT THERAPY: Primary | Chronic | ICD-10-CM

## 2023-02-13 DIAGNOSIS — I48.91 ATRIAL FIBRILLATION, UNSPECIFIED TYPE (H): ICD-10-CM

## 2023-02-13 DIAGNOSIS — Z79.01 LONG TERM CURRENT USE OF ANTICOAGULANT THERAPY: ICD-10-CM

## 2023-02-13 LAB — INR BLD: 2.4 (ref 0.9–1.1)

## 2023-02-13 PROCEDURE — 85610 PROTHROMBIN TIME: CPT

## 2023-02-13 PROCEDURE — 36415 COLL VENOUS BLD VENIPUNCTURE: CPT

## 2023-02-13 NOTE — PROGRESS NOTES
ANTICOAGULATION MANAGEMENT     uLz Marina Live 62 year old female is on warfarin with therapeutic INR result. (Goal INR 2.0-3.0)    Recent labs: (last 7 days)     02/13/23  1337   INR 2.4*       ASSESSMENT       Source(s): Chart Review    Previous INR was Therapeutic last visit; previously outside of goal range    Medication, diet, health changes since last INR chart reviewed; none identified           PLAN     Recommended plan for no diet, medication or health factor changes affecting INR     Dosing Instructions: Continue your current warfarin dose with next INR in 4 weeks       Summary  As of 2/13/2023    Full warfarin instructions:  10 mg every Tue, Thu, Sat; 7.5 mg all other days   Next INR check:  3/13/2023             Sent [employer] message with dosing and follow up instructions    Contact [phone]  to schedule and with any changes, questions or concerns.     Education provided:     Please call back if any changes to your diet, medications or how you've been taking warfarin    Plan made per [facility] anticoagulation protocol    Michael Quispe RN  Anticoagulation Clinic  2/13/2023    _______________________________________________________________________     Anticoagulation Episode Summary     Current INR goal:  2.0-3.0   TTR:  96.7 % (1 y)   Target end date:  Indefinite   Send INR reminders to:  [facility]    Indications    Long-term (current) use of anticoagulants [Z79.01] [Z79.01]  PAF (paroxysmal atrial fibrillation) (H) [I48.0]  Atrial fibrillation  unspecified type (H) [I48.91]           Comments:           Anticoagulation Care Providers     Provider Role Specialty Phone number    Eliz Nguyen MD Referring Internal Medicine [phone]    Elodia Tang MD Referring Family Medicine [phone]

## 2023-02-14 RX ORDER — ATORVASTATIN CALCIUM 40 MG/1
40 TABLET, FILM COATED ORAL DAILY
Qty: 30 TABLET | Refills: 0 | Status: SHIPPED | OUTPATIENT
Start: 2023-02-14 | End: 2023-06-13

## 2023-02-14 NOTE — TELEPHONE ENCOUNTER
atorvastatin (LIPITOR) 40 MG tablet  Last Written Prescription Date:  6/9/22  Last Fill Quantity: 90,   # refills: 2  Last Office Visit : 7/13/21  Future Office visit: none    Routing refill request to provider for review/approval because:  lv 7/21,  LDL past due

## 2023-02-28 RX ORDER — WARFARIN SODIUM 5 MG/1
TABLET ORAL
Qty: 156 TABLET | Refills: 1 | Status: SHIPPED | OUTPATIENT
Start: 2023-02-28

## 2023-02-28 NOTE — TELEPHONE ENCOUNTER
ANTICOAGULATION MANAGEMENT:  Medication Refill    Anticoagulation Summary  As of 2/13/2023    Warfarin maintenance plan:  10 mg (5 mg x 2) every Tue, Thu, Sat; 7.5 mg (5 mg x 1.5) all other days   Next INR check:  3/13/2023   Target end date:  Indefinite    Indications    Long-term (current) use of anticoagulants [Z79.01] [Z79.01]  PAF (paroxysmal atrial fibrillation) (H) [I48.0]  Atrial fibrillation  unspecified type (H) [I48.91]             Anticoagulation Care Providers     Provider Role Specialty Phone number    Eliz Nguyen MD Referring Internal Medicine [phone]    Elodia Tang MD Referring Family Medicine [phone]          Visit with referring provider/group within last year: Yes    [facility] referral signed within last year: Yes    Luz Marina meets all criteria for refill (current [facility] referral, office visit with referring provider/group in last year, lab monitoring up to date or not exceeding 2 weeks overdue). Rx instructions and quantity match patient's current dosing plan. Warfarin 90 day supply with 1 refill granted per [facility] protocol     Maine Cole RN  Anticoagulation Clinic

## 2023-03-06 ENCOUNTER — APPOINTMENT (RX ONLY)
Dept: URBAN - METROPOLITAN AREA CLINIC 94 | Facility: CLINIC | Age: 63
Setting detail: DERMATOLOGY
End: 2023-03-06

## 2023-03-06 DIAGNOSIS — Z41.9 ENCOUNTER FOR PROCEDURE FOR PURPOSES OTHER THAN REMEDYING HEALTH STATE, UNSPECIFIED: ICD-10-CM

## 2023-03-06 PROCEDURE — ? LASER COSMETIC

## 2023-03-06 ASSESSMENT — LOCATION DETAILED DESCRIPTION DERM: LOCATION DETAILED: RIGHT CENTRAL MALAR CHEEK

## 2023-03-06 ASSESSMENT — LOCATION ZONE DERM: LOCATION ZONE: FACE

## 2023-03-06 ASSESSMENT — LOCATION SIMPLE DESCRIPTION DERM: LOCATION SIMPLE: RIGHT CHEEK

## 2023-03-06 NOTE — PROCEDURE: LASER COSMETIC
Indication: anti aging
Price (Use Numbers Only, No Special Characters Or $): 96 Desi
Consent: Prior to the procedure consent obtained, risks reviewed including but not limited to crusting, scabbing, blistering, scarring, darker or lighter pigmentary change, incidental hair removal, bruising, and/or incomplete removal.
Eye Protection: Laser Aid
Pre-Procedure Care: Prior to the procedure the patient and all present had protective eyewear in place and a warning sign was placed on the door.
Topical Anesthesia?: 12% lidocaine, 12% tetracaine
Detail Level: Zone
Post-Care Instructions: I reviewed with the patient in detail post-care instructions. Patient should stay away from the sun and wear sun protection until treated areas are fully healed. Written instructions provided.
Anesthesia Volume In Cc: 0
Laser Type: core
Anesthesia Type: 1% lidocaine with epinephrine
End-Point And Post-Procedure Care: Immediately following the procedure, ice pack applied. Post care reviewed with patient.
Render Post-Care In The Note: No

## 2023-03-10 ENCOUNTER — APPOINTMENT (RX ONLY)
Dept: URBAN - METROPOLITAN AREA CLINIC 102 | Facility: CLINIC | Age: 63
Setting detail: DERMATOLOGY
End: 2023-03-10

## 2023-03-10 DIAGNOSIS — Z41.9 ENCOUNTER FOR PROCEDURE FOR PURPOSES OTHER THAN REMEDYING HEALTH STATE, UNSPECIFIED: ICD-10-CM

## 2023-03-10 PROCEDURE — ? ADDITIONAL NOTES

## 2023-03-10 ASSESSMENT — LOCATION ZONE DERM: LOCATION ZONE: FACE

## 2023-03-10 ASSESSMENT — LOCATION DETAILED DESCRIPTION DERM: LOCATION DETAILED: LEFT MEDIAL MALAR CHEEK

## 2023-03-10 ASSESSMENT — LOCATION SIMPLE DESCRIPTION DERM: LOCATION SIMPLE: LEFT CHEEK

## 2023-03-10 NOTE — HPI: COSMETIC FOLLOW UP
What Condition Are We Treating?: Face
What Procedure Did We Perform At The Last Visit?: CORE Laser treatment

## 2023-03-10 NOTE — PROCEDURE: ADDITIONAL NOTES
Render Risk Assessment In Note?: no
Detail Level: Zone
Additional Notes: Recommend vinegar soaks\\nGentle skin care\\nSun protection

## 2023-03-20 ENCOUNTER — TELEPHONE (OUTPATIENT)
Dept: ANTICOAGULATION | Facility: CLINIC | Age: 63
End: 2023-03-20
Payer: COMMERCIAL

## 2023-03-24 ENCOUNTER — ANTICOAGULATION THERAPY VISIT (OUTPATIENT)
Dept: ANTICOAGULATION | Facility: CLINIC | Age: 63
End: 2023-03-24

## 2023-03-24 ENCOUNTER — LAB (OUTPATIENT)
Dept: LAB | Facility: CLINIC | Age: 63
End: 2023-03-24
Payer: COMMERCIAL

## 2023-03-24 DIAGNOSIS — I20.89 STABLE ANGINA (H): ICD-10-CM

## 2023-03-24 DIAGNOSIS — Z79.60 LONG-TERM USE OF IMMUNOSUPPRESSANT MEDICATION: ICD-10-CM

## 2023-03-24 DIAGNOSIS — N18.30 STAGE 3 CHRONIC KIDNEY DISEASE, UNSPECIFIED WHETHER STAGE 3A OR 3B CKD (H): ICD-10-CM

## 2023-03-24 DIAGNOSIS — Z79.899 LONG-TERM USE OF PLAQUENIL: ICD-10-CM

## 2023-03-24 DIAGNOSIS — E66.01 MORBID OBESITY (H): ICD-10-CM

## 2023-03-24 DIAGNOSIS — M32.14 SYSTEMIC LUPUS ERYTHEMATOSUS WITH GLOMERULAR DISEASE, UNSPECIFIED SLE TYPE (H): ICD-10-CM

## 2023-03-24 DIAGNOSIS — I25.10 CORONARY ARTERY DISEASE INVOLVING NATIVE HEART WITHOUT ANGINA PECTORIS, UNSPECIFIED VESSEL OR LESION TYPE: ICD-10-CM

## 2023-03-24 DIAGNOSIS — I48.0 PAF (PAROXYSMAL ATRIAL FIBRILLATION) (H): Chronic | ICD-10-CM

## 2023-03-24 DIAGNOSIS — I48.91 ATRIAL FIBRILLATION, UNSPECIFIED TYPE (H): ICD-10-CM

## 2023-03-24 DIAGNOSIS — Z79.01 LONG TERM CURRENT USE OF ANTICOAGULANT THERAPY: ICD-10-CM

## 2023-03-24 DIAGNOSIS — N18.30 CKD (CHRONIC KIDNEY DISEASE) STAGE 3, GFR 30-59 ML/MIN (H): Chronic | ICD-10-CM

## 2023-03-24 DIAGNOSIS — Z79.01 LONG TERM CURRENT USE OF ANTICOAGULANT THERAPY: Primary | Chronic | ICD-10-CM

## 2023-03-24 LAB
ALBUMIN SERPL-MCNC: 3.3 G/DL (ref 3.4–5)
ALBUMIN UR-MCNC: NEGATIVE MG/DL
ALT SERPL W P-5'-P-CCNC: 31 U/L (ref 0–50)
ANION GAP SERPL CALCULATED.3IONS-SCNC: 2 MMOL/L (ref 3–14)
APPEARANCE UR: CLEAR
AST SERPL W P-5'-P-CCNC: 29 U/L (ref 0–45)
BACTERIA #/AREA URNS HPF: ABNORMAL /HPF
BASOPHILS # BLD AUTO: 0 10E3/UL (ref 0–0.2)
BASOPHILS NFR BLD AUTO: 1 %
BILIRUB UR QL STRIP: NEGATIVE
BUN SERPL-MCNC: 37 MG/DL (ref 7–30)
CALCIUM SERPL-MCNC: 9.2 MG/DL (ref 8.5–10.1)
CHLORIDE BLD-SCNC: 108 MMOL/L (ref 94–109)
CO2 SERPL-SCNC: 26 MMOL/L (ref 20–32)
COLOR UR AUTO: YELLOW
CREAT SERPL-MCNC: 1.19 MG/DL (ref 0.52–1.04)
CRP SERPL-MCNC: 16 MG/L (ref 0–8)
EOSINOPHIL # BLD AUTO: 0.2 10E3/UL (ref 0–0.7)
EOSINOPHIL NFR BLD AUTO: 3 %
ERYTHROCYTE [DISTWIDTH] IN BLOOD BY AUTOMATED COUNT: 15.2 % (ref 10–15)
ERYTHROCYTE [SEDIMENTATION RATE] IN BLOOD BY WESTERGREN METHOD: 55 MM/HR (ref 0–30)
GFR SERPL CREATININE-BSD FRML MDRD: 51 ML/MIN/1.73M2
GLUCOSE BLD-MCNC: 92 MG/DL (ref 70–99)
GLUCOSE UR STRIP-MCNC: 250 MG/DL
HCT VFR BLD AUTO: 38.4 % (ref 35–47)
HGB BLD-MCNC: 12.3 G/DL (ref 11.7–15.7)
HGB UR QL STRIP: ABNORMAL
IMM GRANULOCYTES # BLD: 0 10E3/UL
IMM GRANULOCYTES NFR BLD: 0 %
INR BLD: 2.4 (ref 0.9–1.1)
KETONES UR STRIP-MCNC: NEGATIVE MG/DL
LEUKOCYTE ESTERASE UR QL STRIP: NEGATIVE
LYMPHOCYTES # BLD AUTO: 0.7 10E3/UL (ref 0.8–5.3)
LYMPHOCYTES NFR BLD AUTO: 11 %
MCH RBC QN AUTO: 29 PG (ref 26.5–33)
MCHC RBC AUTO-ENTMCNC: 32 G/DL (ref 31.5–36.5)
MCV RBC AUTO: 91 FL (ref 78–100)
MONOCYTES # BLD AUTO: 0.3 10E3/UL (ref 0–1.3)
MONOCYTES NFR BLD AUTO: 5 %
NEUTROPHILS # BLD AUTO: 4.9 10E3/UL (ref 1.6–8.3)
NEUTROPHILS NFR BLD AUTO: 80 %
NITRATE UR QL: NEGATIVE
PH UR STRIP: 5.5 [PH] (ref 5–7)
PLATELET # BLD AUTO: 188 10E3/UL (ref 150–450)
POTASSIUM BLD-SCNC: 4.4 MMOL/L (ref 3.4–5.3)
RBC # BLD AUTO: 4.24 10E6/UL (ref 3.8–5.2)
RBC #/AREA URNS AUTO: ABNORMAL /HPF
SODIUM SERPL-SCNC: 136 MMOL/L (ref 133–144)
SP GR UR STRIP: 1.01 (ref 1–1.03)
SQUAMOUS #/AREA URNS AUTO: ABNORMAL /LPF
UROBILINOGEN UR STRIP-ACNC: 0.2 E.U./DL
WBC # BLD AUTO: 6.2 10E3/UL (ref 4–11)
WBC #/AREA URNS AUTO: ABNORMAL /HPF

## 2023-03-24 PROCEDURE — 86140 C-REACTIVE PROTEIN: CPT

## 2023-03-24 PROCEDURE — 36416 COLLJ CAPILLARY BLOOD SPEC: CPT

## 2023-03-24 PROCEDURE — 85610 PROTHROMBIN TIME: CPT

## 2023-03-24 PROCEDURE — 85652 RBC SED RATE AUTOMATED: CPT

## 2023-03-24 PROCEDURE — 36415 COLL VENOUS BLD VENIPUNCTURE: CPT

## 2023-03-24 PROCEDURE — 84450 TRANSFERASE (AST) (SGOT): CPT

## 2023-03-24 PROCEDURE — 87340 HEPATITIS B SURFACE AG IA: CPT

## 2023-03-24 PROCEDURE — 86160 COMPLEMENT ANTIGEN: CPT | Mod: 59

## 2023-03-24 PROCEDURE — 86225 DNA ANTIBODY NATIVE: CPT

## 2023-03-24 PROCEDURE — 81001 URINALYSIS AUTO W/SCOPE: CPT

## 2023-03-24 PROCEDURE — 85025 COMPLETE CBC W/AUTO DIFF WBC: CPT

## 2023-03-24 PROCEDURE — 86160 COMPLEMENT ANTIGEN: CPT

## 2023-03-24 PROCEDURE — 82040 ASSAY OF SERUM ALBUMIN: CPT

## 2023-03-24 PROCEDURE — 84460 ALANINE AMINO (ALT) (SGPT): CPT

## 2023-03-24 PROCEDURE — 80048 BASIC METABOLIC PNL TOTAL CA: CPT

## 2023-03-24 NOTE — PROGRESS NOTES
ANTICOAGULATION MANAGEMENT     Luz Marina Live 62 year old female is on warfarin with therapeutic INR result. (Goal INR 2.0-3.0)    Recent labs: (last 7 days)     03/24/23  1433   INR 2.4*       ASSESSMENT       Source(s): Chart Review    Previous INR was Therapeutic last 2(+) visits    Medication, diet, health changes since last INR chart reviewed; none identified             PLAN     Recommended plan for no diet, medication or health factor changes affecting INR     Dosing Instructions: Continue your current warfarin dose with next INR in 5 weeks       Summary  As of 3/24/2023    Full warfarin instructions:  10 mg every Tue, Thu, Sat; 7.5 mg all other days   Next INR check:  4/28/2023             Sent [employer] message with dosing and follow up instructions and LDVM.    Contact [phone]  to schedule and with any changes, questions or concerns.     Education provided:     Please call back if any changes to your diet, medications or how you've been taking warfarin    Contact [phone]  with any changes, questions or concerns.        Plan made per [facility] anticoagulation protocol    Red RIVAS RN  Anticoagulation Clinic  3/24/2023    _______________________________________________________________________     Anticoagulation Episode Summary     Current INR goal:  2.0-3.0   TTR:  96.7 % (1 y)   Target end date:  Indefinite   Send [facility] reminders to:  [facility]    Indications    Long-term (current) use of anticoagulants [Z79.01] [Z79.01]  PAF (paroxysmal atrial fibrillation) (H) [I48.0]  Atrial fibrillation  unspecified type (H) [I48.91]           Comments:           Anticoagulation Care Providers     Provider Role Specialty Phone number    Eliz Nguyen MD Referring Internal Medicine [phone]    Elodia Tang MD Referring Family Medicine [phone]

## 2023-03-25 LAB — HBV SURFACE AG SERPL QL IA: NONREACTIVE

## 2023-03-27 LAB
C3 SERPL-MCNC: 98 MG/DL (ref 81–157)
C4 SERPL-MCNC: 15 MG/DL (ref 13–39)
DSDNA AB SER-ACNC: 28 IU/ML

## 2023-04-07 ENCOUNTER — TELEPHONE (OUTPATIENT)
Dept: OPTOMETRY | Facility: CLINIC | Age: 63
End: 2023-04-07
Payer: COMMERCIAL

## 2023-04-07 DIAGNOSIS — I48.91 ATRIAL FIBRILLATION, UNSPECIFIED TYPE (H): ICD-10-CM

## 2023-04-07 NOTE — TELEPHONE ENCOUNTER
Left Voicemail (2nd Attempt) for the patient to call back and schedule the following:    Appointment type: Return  Provider: Dr. Salazar  Return date: 10/7/2023  Specialty phone number: [phone]  Additional appointment(s) needed:   Additonal Notes: Return in about 1 year (around 10/7/2023) for Comprehensive Exam, Visual Field, OCT    [facility] mcqueen Procedure   Dermatology, Surgery, Urology  [facility]

## 2023-04-11 RX ORDER — WARFARIN SODIUM 5 MG/1
TABLET ORAL
Qty: 156 TABLET | Refills: 1 | Status: SHIPPED | OUTPATIENT
Start: 2023-04-11 | End: 2023-08-25

## 2023-04-11 NOTE — TELEPHONE ENCOUNTER
ANTICOAGULATION MANAGEMENT:  Medication Refill    Anticoagulation Summary  As of 3/24/2023    Warfarin maintenance plan:  10 mg (5 mg x 2) every Tue, Thu, Sat; 7.5 mg (5 mg x 1.5) all other days   Next INR check:  4/28/2023   Target end date:  Indefinite    Indications    Long-term (current) use of anticoagulants [Z79.01] [Z79.01]  PAF (paroxysmal atrial fibrillation) (H) [I48.0]  Atrial fibrillation  unspecified type (H) [I48.91]             Anticoagulation Care Providers     Provider Role Specialty Phone number    Eliz Nguyen MD Referring Internal Medicine [phone]    Elodia Tang MD Referring Family Medicine [phone]          Visit with referring provider/group within last year: Yes    [facility] referral signed within last year: Yes    Luz Marina meets all criteria for refill (current [facility] referral, office visit with referring provider/group in last year, lab monitoring up to date or not exceeding 2 weeks overdue). Rx instructions and quantity match patient's current dosing plan. Warfarin 90 day supply with 1 refill granted per [facility] protocol     Maine Cole RN  Anticoagulation Clinic

## 2023-05-04 DIAGNOSIS — E78.00 PURE HYPERCHOLESTEROLEMIA: ICD-10-CM

## 2023-05-09 RX ORDER — ATORVASTATIN CALCIUM 40 MG/1
40 TABLET, FILM COATED ORAL DAILY
Qty: 30 TABLET | Refills: 0 | OUTPATIENT
Start: 2023-05-09

## 2023-05-09 NOTE — TELEPHONE ENCOUNTER
ATORVASTATIN 40 MG TABLET  Last Written Prescription Date:  2/14/23  Last Fill Quantity: 30,   # refills: 0  Last Office Visit : 7/13/21  Future Office visit:  None    Routing refill request to provider for review/approval because:  LDL and appt past due. :Last seen 7/13/2 P [facility] 1/ LDL same day.

## 2023-05-30 ENCOUNTER — LAB (OUTPATIENT)
Dept: LAB | Facility: CLINIC | Age: 63
End: 2023-05-30
Payer: COMMERCIAL

## 2023-05-30 ENCOUNTER — ANTICOAGULATION THERAPY VISIT (OUTPATIENT)
Dept: ANTICOAGULATION | Facility: CLINIC | Age: 63
End: 2023-05-30

## 2023-05-30 DIAGNOSIS — Z79.01 LONG TERM CURRENT USE OF ANTICOAGULANT THERAPY: ICD-10-CM

## 2023-05-30 DIAGNOSIS — I48.91 ATRIAL FIBRILLATION, UNSPECIFIED TYPE (H): ICD-10-CM

## 2023-05-30 DIAGNOSIS — Z79.899 LONG-TERM USE OF PLAQUENIL: ICD-10-CM

## 2023-05-30 DIAGNOSIS — Z79.01 LONG TERM CURRENT USE OF ANTICOAGULANT THERAPY: Primary | Chronic | ICD-10-CM

## 2023-05-30 DIAGNOSIS — M32.14 SYSTEMIC LUPUS ERYTHEMATOSUS WITH GLOMERULAR DISEASE, UNSPECIFIED SLE TYPE (H): ICD-10-CM

## 2023-05-30 DIAGNOSIS — I48.0 PAF (PAROXYSMAL ATRIAL FIBRILLATION) (H): Chronic | ICD-10-CM

## 2023-05-30 DIAGNOSIS — N18.30 STAGE 3 CHRONIC KIDNEY DISEASE, UNSPECIFIED WHETHER STAGE 3A OR 3B CKD (H): ICD-10-CM

## 2023-05-30 DIAGNOSIS — Z79.60 LONG-TERM USE OF IMMUNOSUPPRESSANT MEDICATION: ICD-10-CM

## 2023-05-30 DIAGNOSIS — I07.1 MODERATE TRICUSPID INSUFFICIENCY: ICD-10-CM

## 2023-05-30 DIAGNOSIS — I25.10 CORONARY ARTERY DISEASE INVOLVING NATIVE HEART WITHOUT ANGINA PECTORIS, UNSPECIFIED VESSEL OR LESION TYPE: ICD-10-CM

## 2023-05-30 DIAGNOSIS — E66.01 MORBID OBESITY (H): ICD-10-CM

## 2023-05-30 DIAGNOSIS — N18.30 CKD (CHRONIC KIDNEY DISEASE) STAGE 3, GFR 30-59 ML/MIN (H): Chronic | ICD-10-CM

## 2023-05-30 DIAGNOSIS — I20.89 STABLE ANGINA (H): ICD-10-CM

## 2023-05-30 LAB
ALBUMIN SERPL BCG-MCNC: 4 G/DL (ref 3.5–5.2)
ALBUMIN UR-MCNC: NEGATIVE MG/DL
ALT SERPL W P-5'-P-CCNC: 19 U/L (ref 10–35)
ANION GAP SERPL CALCULATED.3IONS-SCNC: 14 MMOL/L (ref 7–15)
APPEARANCE UR: CLEAR
AST SERPL W P-5'-P-CCNC: 34 U/L (ref 10–35)
BACTERIA #/AREA URNS HPF: ABNORMAL /HPF
BASOPHILS # BLD AUTO: 0 10E3/UL (ref 0–0.2)
BASOPHILS NFR BLD AUTO: 0 %
BILIRUB UR QL STRIP: NEGATIVE
BUN SERPL-MCNC: 39.3 MG/DL (ref 8–23)
CALCIUM SERPL-MCNC: 9.5 MG/DL (ref 8.8–10.2)
CHLORIDE SERPL-SCNC: 104 MMOL/L (ref 98–107)
COLOR UR AUTO: YELLOW
CREAT SERPL-MCNC: 1.35 MG/DL (ref 0.51–0.95)
CRP SERPL-MCNC: 36.8 MG/L
DEPRECATED HCO3 PLAS-SCNC: 21 MMOL/L (ref 22–29)
EOSINOPHIL # BLD AUTO: 0.1 10E3/UL (ref 0–0.7)
EOSINOPHIL NFR BLD AUTO: 1 %
ERYTHROCYTE [DISTWIDTH] IN BLOOD BY AUTOMATED COUNT: 15.4 % (ref 10–15)
ERYTHROCYTE [SEDIMENTATION RATE] IN BLOOD BY WESTERGREN METHOD: 55 MM/HR (ref 0–30)
GFR SERPL CREATININE-BSD FRML MDRD: 44 ML/MIN/1.73M2
GLUCOSE SERPL-MCNC: 98 MG/DL (ref 70–99)
GLUCOSE UR STRIP-MCNC: 100 MG/DL
HCT VFR BLD AUTO: 38.4 % (ref 35–47)
HGB BLD-MCNC: 12.2 G/DL (ref 11.7–15.7)
HGB UR QL STRIP: ABNORMAL
IMM GRANULOCYTES # BLD: 0 10E3/UL
IMM GRANULOCYTES NFR BLD: 0 %
INR BLD: 2.4 (ref 0.9–1.1)
KETONES UR STRIP-MCNC: NEGATIVE MG/DL
LEUKOCYTE ESTERASE UR QL STRIP: NEGATIVE
LYMPHOCYTES # BLD AUTO: 0.9 10E3/UL (ref 0.8–5.3)
LYMPHOCYTES NFR BLD AUTO: 15 %
MCH RBC QN AUTO: 28.3 PG (ref 26.5–33)
MCHC RBC AUTO-ENTMCNC: 31.8 G/DL (ref 31.5–36.5)
MCV RBC AUTO: 89 FL (ref 78–100)
MONOCYTES # BLD AUTO: 0.4 10E3/UL (ref 0–1.3)
MONOCYTES NFR BLD AUTO: 6 %
NEUTROPHILS # BLD AUTO: 4.5 10E3/UL (ref 1.6–8.3)
NEUTROPHILS NFR BLD AUTO: 77 %
NITRATE UR QL: NEGATIVE
PH UR STRIP: 6.5 [PH] (ref 5–7)
PLATELET # BLD AUTO: 176 10E3/UL (ref 150–450)
POTASSIUM SERPL-SCNC: 4.6 MMOL/L (ref 3.4–5.3)
RBC # BLD AUTO: 4.31 10E6/UL (ref 3.8–5.2)
RBC #/AREA URNS AUTO: ABNORMAL /HPF
SODIUM SERPL-SCNC: 139 MMOL/L (ref 136–145)
SP GR UR STRIP: 1.01 (ref 1–1.03)
SQUAMOUS #/AREA URNS AUTO: ABNORMAL /LPF
UROBILINOGEN UR STRIP-ACNC: 0.2 E.U./DL
WBC # BLD AUTO: 5.9 10E3/UL (ref 4–11)
WBC #/AREA URNS AUTO: ABNORMAL /HPF

## 2023-05-30 PROCEDURE — 82040 ASSAY OF SERUM ALBUMIN: CPT

## 2023-05-30 PROCEDURE — 86140 C-REACTIVE PROTEIN: CPT

## 2023-05-30 PROCEDURE — 81001 URINALYSIS AUTO W/SCOPE: CPT

## 2023-05-30 PROCEDURE — 36415 COLL VENOUS BLD VENIPUNCTURE: CPT

## 2023-05-30 PROCEDURE — 87340 HEPATITIS B SURFACE AG IA: CPT

## 2023-05-30 PROCEDURE — 83880 ASSAY OF NATRIURETIC PEPTIDE: CPT

## 2023-05-30 PROCEDURE — 86160 COMPLEMENT ANTIGEN: CPT

## 2023-05-30 PROCEDURE — 80048 BASIC METABOLIC PNL TOTAL CA: CPT

## 2023-05-30 PROCEDURE — 85025 COMPLETE CBC W/AUTO DIFF WBC: CPT

## 2023-05-30 PROCEDURE — 86160 COMPLEMENT ANTIGEN: CPT | Mod: 59

## 2023-05-30 PROCEDURE — 84460 ALANINE AMINO (ALT) (SGPT): CPT

## 2023-05-30 PROCEDURE — 86225 DNA ANTIBODY NATIVE: CPT

## 2023-05-30 PROCEDURE — 84450 TRANSFERASE (AST) (SGOT): CPT

## 2023-05-30 PROCEDURE — 85652 RBC SED RATE AUTOMATED: CPT

## 2023-05-30 PROCEDURE — 85610 PROTHROMBIN TIME: CPT

## 2023-05-30 ASSESSMENT — ENCOUNTER SYMPTOMS
MUSCLE CRAMPS: 1
JOINT SWELLING: 0
HOARSE VOICE: 1
NUMBNESS: 1
WHEEZING: 0
SPUTUM PRODUCTION: 0
MUSCLE WEAKNESS: 0
HYPOTENSION: 0
SINUS PAIN: 1
WEAKNESS: 0
SHORTNESS OF BREATH: 1
SYNCOPE: 0
DYSPNEA ON EXERTION: 1
STIFFNESS: 1
MEMORY LOSS: 0
BRUISES/BLEEDS EASILY: 1
COUGH: 1
SLEEP DISTURBANCES DUE TO BREATHING: 0
DIZZINESS: 1
HYPERTENSION: 1
PALPITATIONS: 0
NECK PAIN: 0
EYE WATERING: 1
SNORES LOUDLY: 0
COUGH DISTURBING SLEEP: 1
TASTE DISTURBANCE: 0
SINUS CONGESTION: 1
NAIL CHANGES: 1
MYALGIAS: 0
LIGHT-HEADEDNESS: 0
DOUBLE VISION: 0
EYE REDNESS: 1
NECK MASS: 0
EYE IRRITATION: 1
SORE THROAT: 1
ARTHRALGIAS: 0
HEADACHES: 1
SEIZURES: 0
POSTURAL DYSPNEA: 0
LEG PAIN: 1
LOSS OF CONSCIOUSNESS: 0
EYE PAIN: 0
SWOLLEN GLANDS: 0
SMELL DISTURBANCE: 0
TREMORS: 0
TINGLING: 1
DISTURBANCES IN COORDINATION: 0
ORTHOPNEA: 0
BACK PAIN: 0
PARALYSIS: 0
POOR WOUND HEALING: 0
SPEECH CHANGE: 0
SKIN CHANGES: 0
HEMOPTYSIS: 0
TROUBLE SWALLOWING: 0
EXERCISE INTOLERANCE: 1

## 2023-05-30 NOTE — PROGRESS NOTES
ANTICOAGULATION MANAGEMENT     Luz Marina Live 62 year old female is on warfarin with therapeutic INR result. (Goal INR 2.0-3.0)    Recent labs: (last 7 days)     05/30/23  1347   INR 2.4*       ASSESSMENT       Source(s): Chart Review       Warfarin doses taken: Reviewed in chart    Diet: No new diet changes identified    Medication/supplement changes: None noted    New illness, injury, or hospitalization: No    Signs or symptoms of bleeding or clotting: No    Previous result: Therapeutic last 2(+) visits    Additional findings: None         PLAN     Recommended plan for no diet, medication or health factor changes affecting INR     Dosing Instructions: Continue your current warfarin dose with next INR in 6 weeks       Summary  As of 5/30/2023    Full warfarin instructions:  10 mg every Tue, Thu, Sat; 7.5 mg all other days   Next INR check:  7/11/2023             Detailed voice message left for Luz Marina with dosing instructions and follow up date.     Contact [phone]  to schedule and with any changes, questions or concerns.     Education provided:     Please call back if any changes to your diet, medications or how you've been taking warfarin    Plan made per [facility] anticoagulation protocol    Deborah Alatorre, RN  Anticoagulation Clinic  5/30/2023    _______________________________________________________________________     Anticoagulation Episode Summary     Current INR goal:  2.0-3.0   TTR:  96.7 % (1 y)   Target end date:  Indefinite   Send INR reminders to:  [facility]    Indications    Long-term (current) use of anticoagulants [Z79.01] [Z79.01]  PAF (paroxysmal atrial fibrillation) (H) [I48.0]  Atrial fibrillation  unspecified type (H) [I48.91]           Comments:           Anticoagulation Care Providers     Provider Role Specialty Phone number    Eliz Nguyen MD Referring Internal Medicine [phone]    Elodia Tang MD Referring Family Medicine [phone]

## 2023-05-31 LAB
C3 SERPL-MCNC: 95 MG/DL (ref 81–157)
C4 SERPL-MCNC: 14 MG/DL (ref 13–39)
DSDNA AB SER-ACNC: 30 IU/ML
HBV SURFACE AG SERPL QL IA: NONREACTIVE

## 2023-06-02 ENCOUNTER — LAB (OUTPATIENT)
Dept: LAB | Facility: CLINIC | Age: 63
End: 2023-06-02
Payer: COMMERCIAL

## 2023-06-02 ENCOUNTER — OFFICE VISIT (OUTPATIENT)
Dept: INTERNAL MEDICINE | Facility: CLINIC | Age: 63
End: 2023-06-02
Payer: COMMERCIAL

## 2023-06-02 VITALS
DIASTOLIC BLOOD PRESSURE: 80 MMHG | BODY MASS INDEX: 46.6 KG/M2 | HEART RATE: 67 BPM | HEIGHT: 63 IN | WEIGHT: 263 LBS | OXYGEN SATURATION: 97 % | SYSTOLIC BLOOD PRESSURE: 128 MMHG

## 2023-06-02 DIAGNOSIS — Z86.0100 HISTORY OF COLONIC POLYPS: ICD-10-CM

## 2023-06-02 DIAGNOSIS — R21 RASH AND NONSPECIFIC SKIN ERUPTION: ICD-10-CM

## 2023-06-02 DIAGNOSIS — I07.1 MODERATE TRICUSPID INSUFFICIENCY: ICD-10-CM

## 2023-06-02 DIAGNOSIS — I10 HTN, GOAL BELOW 140/90: Chronic | ICD-10-CM

## 2023-06-02 DIAGNOSIS — I25.10 CORONARY ARTERY DISEASE INVOLVING NATIVE HEART WITHOUT ANGINA PECTORIS, UNSPECIFIED VESSEL OR LESION TYPE: Chronic | ICD-10-CM

## 2023-06-02 DIAGNOSIS — Z12.31 ENCOUNTER FOR SCREENING MAMMOGRAM FOR BREAST CANCER: ICD-10-CM

## 2023-06-02 DIAGNOSIS — J30.1 SEASONAL ALLERGIC RHINITIS DUE TO POLLEN: Primary | ICD-10-CM

## 2023-06-02 LAB
CHOLEST SERPL-MCNC: 94 MG/DL
CREAT UR-MCNC: 33.7 MG/DL
HDLC SERPL-MCNC: 42 MG/DL
LDLC SERPL CALC-MCNC: 39 MG/DL
MICROALBUMIN UR-MCNC: 25.9 MG/L
MICROALBUMIN/CREAT UR: 76.85 MG/G CR (ref 0–25)
NONHDLC SERPL-MCNC: 52 MG/DL
NT-PROBNP SERPL-MCNC: 1080 PG/ML (ref 0–900)
TRIGL SERPL-MCNC: 66 MG/DL
TSH SERPL DL<=0.005 MIU/L-ACNC: 4.08 UIU/ML (ref 0.3–4.2)

## 2023-06-02 PROCEDURE — 82570 ASSAY OF URINE CREATININE: CPT | Mod: 90 | Performed by: PATHOLOGY

## 2023-06-02 PROCEDURE — 99214 OFFICE O/P EST MOD 30 MIN: CPT | Performed by: INTERNAL MEDICINE

## 2023-06-02 PROCEDURE — 82043 UR ALBUMIN QUANTITATIVE: CPT | Mod: 90 | Performed by: PATHOLOGY

## 2023-06-02 PROCEDURE — 99000 SPECIMEN HANDLING OFFICE-LAB: CPT | Performed by: PATHOLOGY

## 2023-06-02 PROCEDURE — 36415 COLL VENOUS BLD VENIPUNCTURE: CPT | Performed by: PATHOLOGY

## 2023-06-02 PROCEDURE — 80061 LIPID PANEL: CPT | Performed by: PATHOLOGY

## 2023-06-02 PROCEDURE — 84443 ASSAY THYROID STIM HORMONE: CPT | Performed by: PATHOLOGY

## 2023-06-02 RX ORDER — AZELASTINE 1 MG/ML
2 SPRAY, METERED NASAL 2 TIMES DAILY
Qty: 30 ML | Refills: 4 | Status: SHIPPED | OUTPATIENT
Start: 2023-06-02 | End: 2024-06-27

## 2023-06-02 ASSESSMENT — ENCOUNTER SYMPTOMS
SORE THROAT: 1
ARTHRALGIAS: 0
JOINT SWELLING: 0
SINUS PAIN: 1
WEAKNESS: 0
EYE REDNESS: 1
LIGHT-HEADEDNESS: 0
MYALGIAS: 0
EYE PAIN: 0
NECK PAIN: 0
SEIZURES: 0
BACK PAIN: 0
BRUISES/BLEEDS EASILY: 1
DIZZINESS: 1
TROUBLE SWALLOWING: 0
WHEEZING: 0
SHORTNESS OF BREATH: 1
HEADACHES: 1
NUMBNESS: 1
PALPITATIONS: 0
TREMORS: 0
COUGH: 1

## 2023-06-02 NOTE — NURSING NOTE
Luz Marina Live is a 62 year old female patient that presents today in clinic for the following:    Chief Complaint   Patient presents with     Physical     Concern(s): chest pain when breathing deep; worse when laying down     The patient's allergies and medications were reviewed as noted. A set of vitals were recorded as noted without incident. The patient does not have any other questions for the provider.    Brielle Holder, ANGELITA at 11:39 AM on 6/2/2023

## 2023-06-02 NOTE — PROGRESS NOTES
Assessment & Plan     Seasonal allergic rhinitis due to pollen  Reviewed management of allergic rhinitis with patient.  Recommend adding Astelin spray to current regimen.  Patient was encouraged to restart Flonase as well.  Referral to an allergy clinic was placed.  - azelastine (ASTELIN) 0.1 % nasal spray; Spray 2 sprays into both nostrils 2 times daily  - Adult Allergy/Asthma Referral; Future    History of colonic polyps  Reviewed prior colonoscopy, advised on repeating colonoscopy.  Patient may discontinue warfarin for 3 days prior to procedure and then restart when deemed appropriate by gastroenterology.  - Colonoscopy Screening  Referral; Future    Encounter for screening mammogram for breast cancer  Reviewed breast cancer screening, recommend mammogram.  - Mammogram Screening Bilateral; Future    Coronary artery disease involving native heart without angina pectoris, unspecified vessel or lesion type  Patient chest pain does not appear to be ischemic in nature.  Recommend checking lipid panel given prior history of coronary artery disease.  We will continue with current regimen without changes.  Discussed COVID-vaccine, patient reports that she is not interested in additional vaccination doses.  - Lipid Panel; Future    HTN, goal below 140/90  Blood pressure is at goal.  Recommend to continue with current medical therapy without changes.  - TSH with free T4 reflex; Future  - Albumin Random Urine Quantitative with Creat Ratio; Future    Moderate tricuspid insufficiency  Advised on heart murmur.  Recommend repeating an echocardiogram to evaluate tricuspid valve.  - Echocardiogram Complete; Future  - N terminal pro BNP outpatient; Future    Rash and nonspecific skin eruption  Referral to dermatology was placed for additional evaluation of skin rash.  - Adult Dermatology Referral; Future      I spent a total of 30 minutes on the day of the visit.   Time spent by me doing chart review, history and exam,  "documentation and further activities per the note       BMI:   Estimated body mass index is 46.66 kg/m  as calculated from the following:    Height as of this encounter: 1.599 m (5' 2.95\").    Weight as of this encounter: 119.3 kg (263 lb).           No follow-ups on file.    Eliz Nguyen MD  [facility] INTERNAL MEDICINE JOSELITO Raza is a 62 year old, presenting for the following health issues:  Physical (Concern(s): chest pain when breathing deep; worse when laying down)    HPI     Patient is here for evaluation of chest pain. She reports that she has been having episodes of chest pain a long time ago. She has been evaluated for the pain in the past, no clear diagnosis was identified. She recently had recurrence of symptoms. They are intermittent in nature. Pain can last for a few days and come back after a month or two. She has experienced worsening of her seasonal allergies recently. She is taking Claritin, has tried Flonase in the past.   Patient reports that her shortness of breath has not significantly changed from baseline.  She is short of breath with taking a flight of stairs.  She denies worsening of lower extremity edema.  Patient states that she has noticed a skin rash recently her eyebrows do get flaky over the course of the day.  She has tried emollients, however that has not resolved the rash.  She has been seen by dermatology in the past, however has not had follow-up.  Patient also reports that she has been having extensive pruritus.  It is tolerable, however patient is wondering if it is related to one of her medications.    Review of Systems   HENT: Positive for nosebleeds, sinus pain and sore throat. Negative for ear discharge, ear pain, hearing loss, mouth sores, tinnitus and trouble swallowing.    Eyes: Positive for redness. Negative for pain.   Respiratory: Positive for cough and shortness of breath. Negative for wheezing.    Cardiovascular: Positive " "for chest pain. Negative for palpitations.   Musculoskeletal: Negative for arthralgias, back pain, joint swelling, myalgias and neck pain.   Skin: Negative for rash.   Neurological: Positive for dizziness, numbness and headaches. Negative for tremors, seizures, weakness and light-headedness.   Hematological: Bruises/bleeds easily.            Objective    /80 (BP Location: Right arm, Patient Position: Sitting, Cuff Size: Adult Large)   Pulse 67   Ht 1.599 m (5' 2.95\")   Wt 119.3 kg (263 lb)   SpO2 97%   BMI 46.66 kg/m    Body mass index is 46.66 kg/m .  Physical Exam   GENERAL: healthy, alert and no distress  EYES: Eyes grossly normal to inspection, PERRL and conjunctivae and sclerae normal  NECK: no adenopathy, no asymmetry, masses, or scars and thyroid normal to palpation  RESP: lungs clear to auscultation - no rales, rhonchi or wheezes  CV: regular rates and rhythm, grade 3/6 systolic murmur heard best over the LUSB, peripheral pulses strong and no peripheral edema  ABDOMEN: soft, nontender and bowel sounds normal  MS: no gross musculoskeletal defects noted, no edema                    "

## 2023-06-08 ENCOUNTER — NURSE TRIAGE (OUTPATIENT)
Dept: OPHTHALMOLOGY | Facility: CLINIC | Age: 63
End: 2023-06-08
Payer: COMMERCIAL

## 2023-06-08 DIAGNOSIS — I10 HYPERTENSION GOAL BP (BLOOD PRESSURE) < 130/80: ICD-10-CM

## 2023-06-08 DIAGNOSIS — N18.30 CKD (CHRONIC KIDNEY DISEASE) STAGE 3, GFR 30-59 ML/MIN (H): Chronic | ICD-10-CM

## 2023-06-08 DIAGNOSIS — E78.00 PURE HYPERCHOLESTEROLEMIA: ICD-10-CM

## 2023-06-08 DIAGNOSIS — I48.91 ATRIAL FIBRILLATION, UNSPECIFIED TYPE (H): ICD-10-CM

## 2023-06-08 NOTE — TELEPHONE ENCOUNTER
Nurse Triage SBAR    Is this a 2nd Level Triage? YES, LICENSED PRACTITIONER REVIEW IS REQUIRED    Situation: Patient placed Neomycin, polymyxin B, and hydrocortisone combination ear drop ear drops into RIGHT eye inadvertently last night.   -As drop went in noted in was quite painful  - she has been Rinsing eye using artificial tears([employer] lubricant eye drops:  Polyethylene Glycol 400 (0.4%), Propylene Glycol (0.3%). )  -Still very red and irritated and sore bilaterally although drop went into RIGHT only     No visual changes, no diplopia, no eye drainage or dany eye pain.  Patient is on Plaquenil and has visit with Dr Chau in person tomorrow.    10/4/21 Dr Joe is last eye exam noted in EPIC     Long term use of plaquenil      Assessment: Patient with inadvertent instillation of Neomycin-PolymixinB- HC OTIC gtts into RIGHT EYE last night( 6/7/23 PM.  Sore and red bilaterally OU, she is on Plaquenil long term and has Dr Chau Rheum in person appt tomorrow AM in person [place]. Dr Joe last exam [facility] OPHTH MG 10/4/21     Protocol Recommended Disposition:   See 24 hours  Recommendation: See ASAP for eye exam.If worsening pain or VA changes, ED tonight    Routed to provider team forscheduling    Does the patient meet one of the following criteria for ADS visit consideration? No    Reason for Disposition    Redness persists > 24 hours FIRST AID: Irrigate eye immediately afterwards    Additional Information    Negative: Acid or alkali was the chemical (Exceptions: mild household bleach or ammonia)    Negative: Unknown chemical and any eye symptoms (e.g., pain)    Negative: Harmful or possibly harmful chemical and unable to carry out irrigation (at home, work)    Negative: Shortness of breath    Negative: Blurred vision that persists >1 hour after irrigation (regardless of duration of flushing)    Negative: Eye pain that persists > 1 hour after irrigation (regardless of duration of flushing)    Negative:  Household bleach or ammonia    Negative: Laundry detergent POD    Negative: Known chemical and not on the HARMLESS list    Negative: Unknown chemical and has NO eye symptoms (e.g., pain)    Protocols used: EYE - CHEMICAL IN-A-OH

## 2023-06-08 NOTE — TELEPHONE ENCOUNTER
Caller reporting the following red-flag symptom(s): foreign body    Per the system red-flag symptom policy, patient was instructed to:  speak with a Registered Nurse    Action:  Patient warm transferred to a Registered Nurse

## 2023-06-09 ENCOUNTER — OFFICE VISIT (OUTPATIENT)
Dept: OPHTHALMOLOGY | Facility: CLINIC | Age: 63
End: 2023-06-09
Payer: COMMERCIAL

## 2023-06-09 ENCOUNTER — OFFICE VISIT (OUTPATIENT)
Dept: RHEUMATOLOGY | Facility: CLINIC | Age: 63
End: 2023-06-09
Payer: COMMERCIAL

## 2023-06-09 VITALS
HEART RATE: 64 BPM | OXYGEN SATURATION: 96 % | BODY MASS INDEX: 46.59 KG/M2 | WEIGHT: 262.6 LBS | SYSTOLIC BLOOD PRESSURE: 121 MMHG | DIASTOLIC BLOOD PRESSURE: 74 MMHG

## 2023-06-09 DIAGNOSIS — T26.91XA CHEMICAL BURN OF RIGHT EYE: Primary | ICD-10-CM

## 2023-06-09 DIAGNOSIS — M32.14 SYSTEMIC LUPUS ERYTHEMATOSUS WITH GLOMERULAR DISEASE, UNSPECIFIED SLE TYPE (H): Chronic | ICD-10-CM

## 2023-06-09 DIAGNOSIS — Z79.899 LONG-TERM USE OF PLAQUENIL: ICD-10-CM

## 2023-06-09 DIAGNOSIS — N18.30 STAGE 3 CHRONIC KIDNEY DISEASE, UNSPECIFIED WHETHER STAGE 3A OR 3B CKD (H): Chronic | ICD-10-CM

## 2023-06-09 DIAGNOSIS — E66.01 MORBID OBESITY (H): Chronic | ICD-10-CM

## 2023-06-09 DIAGNOSIS — I25.10 CORONARY ARTERY DISEASE INVOLVING NATIVE HEART WITHOUT ANGINA PECTORIS, UNSPECIFIED VESSEL OR LESION TYPE: Chronic | ICD-10-CM

## 2023-06-09 DIAGNOSIS — I20.89 STABLE ANGINA (H): ICD-10-CM

## 2023-06-09 DIAGNOSIS — Z79.60 LONG-TERM USE OF IMMUNOSUPPRESSANT MEDICATION: Chronic | ICD-10-CM

## 2023-06-09 PROCEDURE — 99214 OFFICE O/P EST MOD 30 MIN: CPT | Performed by: INTERNAL MEDICINE

## 2023-06-09 PROCEDURE — 92012 INTRM OPH EXAM EST PATIENT: CPT | Performed by: OPTOMETRIST

## 2023-06-09 RX ORDER — ERYTHROMYCIN 5 MG/G
0.5 OINTMENT OPHTHALMIC 3 TIMES DAILY
Qty: 3.5 G | Refills: 1 | Status: SHIPPED | OUTPATIENT
Start: 2023-06-09 | End: 2024-04-17

## 2023-06-09 RX ORDER — DAPAGLIFLOZIN 10 MG/1
10 TABLET, FILM COATED ORAL DAILY
Qty: 90 TABLET | Refills: 1 | Status: SHIPPED | OUTPATIENT
Start: 2023-06-09 | End: 2024-01-04

## 2023-06-09 RX ORDER — HYDROXYCHLOROQUINE SULFATE 200 MG/1
200 TABLET, FILM COATED ORAL 2 TIMES DAILY
Qty: 180 TABLET | Refills: 3 | Status: SHIPPED | OUTPATIENT
Start: 2023-06-09 | End: 2024-05-28

## 2023-06-09 ASSESSMENT — VISUAL ACUITY
CORRECTION_TYPE: GLASSES
OS_CC: 20/20
METHOD: SNELLEN - LINEAR
OD_CC: 20/20

## 2023-06-09 ASSESSMENT — PAIN SCALES - GENERAL: PAINLEVEL: NO PAIN (0)

## 2023-06-09 ASSESSMENT — TONOMETRY
OS_IOP_MMHG: 07
OD_IOP_MMHG: 09
IOP_METHOD: ICARE

## 2023-06-09 NOTE — NURSING NOTE
Chief Complaints and History of Present Illnesses   Patient presents with     Eye Problem Right Eye       Chief Complaint(s) and History of Present Illness(es)     Eye Problem Right Eye            Laterality: right eye          Comments    Patient here for evaluation of possible eye injury.   On Wednesday night patient accidentally put ear drops in her right eye. Her right eye immediately burned/stung, was red and watered a lot after that. Over night her left eye was watering and red also. Has been flushing her eyes out with AT drops.   Today they are feeling better.   No changes in vision.                  Polo Carrillo, Ophthalmic Assistant

## 2023-06-09 NOTE — PROGRESS NOTES
Assessment/Plan  (T26.91XA) Chemical burn of right eye  (primary encounter diagnosis)  Comment: Patient instilled ear drops into eye  Plan: erythromycin (ROMYCIN) 5 MG/GM ophthalmic ointment        Discussed findings with patient. Reassured patient that eye health looks ok today and that both redness and irritation will improve with time. Regular lubrication should help reduce symptoms. Erythromycin ointment prescribed to help with comfort- patient ok to use three times daily for next few days to help with redness. Return to clinic with worsening pain or any vision changes.       Complete documentation of historical and exam elements from today's encounter can  be found in the full encounter summary report (not reduplicated in this progress  note). I personally obtained the chief complaint(s) and history of present illness. I  confirmed and edited as necessary the review of systems, past medical/surgical  history, family history, social history, and examination findings as documented by  others; and I examined the patient myself. I personally reviewed the relevant tests,  images, and reports as documented above. I formulated and edited as necessary the  assessment and plan and discussed the findings and management plan with the  patient and family.    Kirt Salazar, OD

## 2023-06-09 NOTE — PATIENT INSTRUCTIONS
Continue the hydroxychloroquine  Get lab testing every 6 months  Get an eye evaluation in October  Follow up with me in 6-12 months

## 2023-06-09 NOTE — PROGRESS NOTES
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  +ROHAN, dsDNA, SSA, lupus inhibitor, and depressed complements. Modest proteinuria. Previous treatment with remote cytoxan and imuran. Current management with hydroxychloroquine 400 mg daily.     Energy is intermediate. AM stiffness is 15 minutes. She reports some DIP joint intermittent aching pain. No swelling, redness or warmth. She has some heberden's nodes and hammertoe deformities that she notes.     She reports some skin dryness at the eye brows with flaking. She can get some dryness and ulcer in the nose that she relates to CPAP. She is planning a dermatology evaluation. Stable raynaud's disease.     She reports KAYE and intermittent pleuritic chest pain that comes and goes. She has an echocardiogram planned.     Hydroxychloroquine 200 mg BID is well tolerated with normal eye evaluation last October.    PMI:  Medical-rosacea, SLE, lupus nephritis  Active Ambulatory Problems     Diagnosis Date Noted     Hyperlipidemia LDL goal <100 12/05/2011     CAD (coronary artery disease) 12/05/2011     HTN, goal below 140/90 07/03/2013     PAF (paroxysmal atrial fibrillation) (H) 08/24/2015     CKD (chronic kidney disease) stage 3, GFR 30-59 ml/min (H) 09/14/2015     Long-term (current) use of anticoagulants [Z79.01] 04/05/2016     Moderate tricuspid insufficiency 12/22/2016     Systemic lupus erythematosus with glomerular disease, unspecified SLE type (H) 07/03/2017     Psychophysiological insomnia 06/11/2018     Morbid obesity (H) 06/11/2018     KEITH (obstructive sleep apnea)- severe (AHI 30) 06/18/2018     Long-term use of Plaquenil 09/04/2018     Long-term use of immunosuppressant medication 10/11/2019     Stable angina (H) 10/16/2019     Positive cardiac stress test 08/06/2021     Status post coronary angiogram 08/20/2021     Atrial fibrillation, unspecified type (H) 02/03/2022     Resolved Ambulatory Problems     Diagnosis Date Noted     SLE (systemic lupus  erythematosus) (H) 11/01/2010     Hypovolemic shock (H) 02/28/2015     Sepsis (H) 08/22/2015     Secondary renal hyperparathyroidism (H) 01/27/2016     Physical deconditioning 10/07/2021     Past Medical History:   Diagnosis Date     Hypertension      Lupus (H)      Surgical-  Past Surgical History:   Procedure Laterality Date     CARDIAC SURGERY  08/27/2009    triple vessel coronary artery bypass grafting on 8/27/09     CARPAL TUNNEL RELEASE RT/LT  1996    bilateral     CV CORONARY ANGIOGRAM  10/17/2019    Procedure: CV CORONARY ANGIOGRAM;  Surgeon: Mahendra Collins MD;  Location: [facility]     CV CORONARY ANGIOGRAM N/A 8/20/2021    Procedure: CV CORONARY ANGIOGRAM;  Surgeon: Derek Escoto MD;  Location: [facility]     CV PCI STENT DRUG ELUTING N/A 10/17/2019    Procedure: PCI Stent Drug Eluting;  Surgeon: Mahendra Collins MD;  Location: [facility]     CV RIGHT HEART CATH MEASUREMENTS RECORDED N/A 8/20/2021    Procedure: CV RIGHT HEART CATH;  Surgeon: Derek Escoto MD;  Location: [facility]     Injuries-none    SH:  No employment.  Social History     Socioeconomic History     Marital status:      Spouse name: None     Number of children: 1 daughter     Years of education: None     Highest education level: None   Occupational History     None   Social Needs     Financial resource strain: None     Food insecurity:     Worry: None     Inability: None     Transportation needs:     Medical: None     Non-medical: None   Tobacco Use     Smoking status: Former Smoker     Smokeless tobacco: Never Used     Tobacco comment: 30 plus years ago.   Substance and Sexual Activity     Alcohol use: No     Drug use: No     Sexual activity: Yes     Partners: Male     Birth control/protection: Surgical     Comment: vasectomy   Lifestyle     Physical activity:     Days per week: None     Minutes per session: None     Stress:  None   Relationships     Social connections:     Talks on phone: None     Gets together: None     Attends [employer]ism service: None     Active member of club or organization: None     Attends meetings of clubs or organizations: None     Relationship status: None     Intimate partner violence:     Fear of current or ex partner: None     Emotionally abused: None     Physically abused: None     Forced sexual activity: None   Other Topics Concern     Parent/sibling w/ CABG, MI or angioplasty before 65F 55M? Not Asked   Social History Narrative     None     FH:  Family History   Problem Relation Age of Onset     Arthritis Mother         ra     Connective Tissue Disorder Mother         lupus     Congenital Anomalies Mother         wholein heart     Cerebrovascular Disease Mother         TIA's     Cerebrovascular Disease Father      Diabetes Father      Hypertension Father      Cardiovascular Father         stents     Genitourinary Problems Father         kidney failure     Kidney Disease Father         kidney injury related to acute illness around time of death (RANDELL likely)     Cataracts Father      Arthritis Paternal Grandmother      Diabetes Brother      Glaucoma No family hx of      Macular Degeneration No family hx of      ROS:  +frequent conjunctivitis  +occasional heartburn  +occasional headaches  +eye irritation after accidentally putting neomycin ear drops in the right eye; normal vision but red eye  Remainder of the 14 point ROS obtained and found negative.    Physical Exam:  Constitutional: WD-WN-WG cooperative; +obese  Eyes: nl EOM, PERRLA, vision, conjunctiva; +bilateral injected sclera  ENT: nl external ears, nose, hearing, lips, teeth, gums, throat; +dry mouth  Neck: no mass or thyroid enlargement  Resp: lungs clear to auscultation, nl to palpation, nl effort  CV: RRR, no murmurs, rubs or gallops, no edema  GI: no ABD mass or tenderness, no HSM  : not tested  Lymph: no cervical or epitrochlear nodes  MS:  +bilateral R>L 2nd and 3rd DIP heberden's nodes; bilateral knee flexion to 100 degrees; All other TMJ, neck, shoulder, elbow, wrist, hand, spine, hip, knee, ankle, and foot joints were examined and otherwise found normal. Normal  strength tuck and prayer.  Skin: no nail pitting, alopecia, rash; +LE venous congestion and bruising  Neuro: nl cranial nerves, strength, sensation  Psych: nl judgement, orientation, memory, affect    Laboratory:    Study Result    Narrative & Impression   3 views bilateral knee(s) radiographs 9/9/2022 11:42 AM     History: Stable angina (H); Morbid obesity (H); Systemic lupus  erythematosus with glomerular disease, unspecified SLE type (H); Stage  3 chronic kidney disease, unspecified whether stage 3a or 3b CKD (H);  Long-term use of immunosuppressant medication; Long-term use of  Plaquenil; Coronary artery disease involving native heart without  angina pectoris, unspecified vessel or lesion type     Comparison: None     Findings:     AP view of bilateral knees, lateral and patellofemoral views of the  right  knee were obtained.      Left: No acute osseous abnormality.  No joint effusion.     Mild medial compartment joint space loss.     No patellar tilt or lateral subluxation. Surgical clips project over  the medial soft tissues.     Right:  No acute osseous abnormality.  No joint effusion.     Mild medial compartment joint space loss.      No patellar tilt or lateral subluxation.  Soft tissue is unremarkable.                                                                      Impression:  1. No acute osseous abnormality.  2. Mild medial compartment joint space loss bilaterally.     Component      Latest Ref Rng 6/2/2023  1:07 PM   Creatinine Urine      mg/dL 33.7    Albumin Urine mg/L      mg/L 25.9    Albumin Urine mg/g Cr      0.00 - 25.00 mg/g Cr 76.85 (H)      Component      Latest Ref Rng 5/30/2023  1:47 PM   WBC      4.0 - 11.0 10e3/uL 5.9    RBC Count      3.80 - 5.20 10e6/uL  4.31    Hemoglobin      11.7 - 15.7 g/dL 12.2    Hematocrit      35.0 - 47.0 % 38.4    MCV      78 - 100 fL 89    MCH      26.5 - 33.0 pg 28.3    MCHC      31.5 - 36.5 g/dL 31.8    RDW      10.0 - 15.0 % 15.4 (H)    Platelet Count      150 - 450 10e3/uL 176    % Neutrophils      % 77    % Lymphocytes      % 15    % Monocytes      % 6    % Eosinophils      % 1    % Basophils      % 0    % Immature Granulocytes      % 0    Absolute Neutrophils      1.6 - 8.3 10e3/uL 4.5    Absolute Lymphocytes      0.8 - 5.3 10e3/uL 0.9    Absolute Monocytes      0.0 - 1.3 10e3/uL 0.4    Absolute Eosinophils      0.0 - 0.7 10e3/uL 0.1    Absolute Basophils      0.0 - 0.2 10e3/uL 0.0    Absolute Immature Granulocytes      <=0.4 10e3/uL 0.0    Color Urine      Colorless, Straw, Light Yellow, Yellow  Yellow    Appearance Urine      Clear  Clear    Glucose Urine      Negative mg/dL 100 !    Bilirubin Urine      Negative  Negative    Ketones Urine      Negative mg/dL Negative    Specific Gravity Urine      1.003 - 1.035  1.015    Blood Urine      Negative  Trace !    pH Urine      5.0 - 7.0  6.5    Protein Albumin Urine      Negative mg/dL Negative    Urobilinogen Urine      0.2, 1.0 E.U./dL 0.2    Nitrite Urine      Negative  Negative    Leukocyte Esterase Urine      Negative  Negative    Sodium      136 - 145 mmol/L 139    Potassium      3.4 - 5.3 mmol/L 4.6    Chloride      98 - 107 mmol/L 104    Carbon Dioxide (CO2)      22 - 29 mmol/L 21 (L)    Anion Gap      7 - 15 mmol/L 14    Urea Nitrogen      8.0 - 23.0 mg/dL 39.3 (H)    Creatinine      0.51 - 0.95 mg/dL 1.35 (H)    Calcium      8.8 - 10.2 mg/dL 9.5    Glucose      70 - 99 mg/dL 98    GFR Estimate      >60 mL/min/1.73m2 44 (L)    Bacteria Urine      None Seen /HPF Few !    RBC Urine      0-2 /HPF /HPF 0-2    WBC Urine      0-5 /HPF /HPF 0-5    Squamous Epithelial /LPF Urine      None Seen /LPF Few !    INR Point of Care      0.9 - 1.1  2.4 (H)    AST      10 - 35 U/L 34    ALT       10 - 35 U/L 19    Albumin      3.5 - 5.2 g/dL 4.0    Hep B Surface Agn      Nonreactive  Nonreactive    DNA-ds      <10.0 IU/mL 30.0 (H)    Complement C4      13 - 39 mg/dL 14    Complement C3      81 - 157 mg/dL 95    CRP Inflammation      <5.00 mg/L 36.80 (H)    Sed Rate      0 - 30 mm/hr 55 (H)    N-Terminal Pro Bnp      0 - 900 pg/mL 1,080 (H)       Legend:  (H) High  ! Abnormal  (L) Low      Impression:    Systemic lupus erythematosis-with history of severe lupus nephritis. Today this remains very stable, and I appreciate no signs of active SLE or systemic inflammation. Good tolerance of the hydroxychloroquine and we will continue the agent. Follow the kidney function testing and serologies.     Bilateral knee osteoarthritis-with resolution of knee pain problem.    Long term management of immunosuppression-with stable eye evaluation and toxicity screening. Plan to repeat testing every 6 months. We also agree that the benefits of continued immunosuppression outweigh the risks of COVID-19 infection. She is COVID vaccinated.    RTC in 6-12 months.    A total of 24 minutes was spent in face to face patient interaction and chart review on the day of service.

## 2023-06-09 NOTE — NURSING NOTE
No chief complaint on file.    She requests these members of her care team be copied on today's visit information: pcp    PCP: Eliz Nguyen    Referring Provider:  No referring provider defined for this encounter.    Vitals:    06/09/23 0938   BP: 121/74   BP Location: Left arm   Patient Position: Sitting   Cuff Size: Adult Large   Pulse: 64   SpO2: 96%   Weight: 119.1 kg (262 lb 9.6 oz)       Body mass index is 46.59 kg/m .    Medications were reconciled.    Does patient need any medication refills at today's visit? yes      Paris Miranda CMA

## 2023-06-13 RX ORDER — ATORVASTATIN CALCIUM 40 MG/1
40 TABLET, FILM COATED ORAL DAILY
Qty: 90 TABLET | Refills: 3 | Status: SHIPPED | OUTPATIENT
Start: 2023-06-13 | End: 2024-05-01

## 2023-06-13 NOTE — TELEPHONE ENCOUNTER
[phone]    Spoke to pt at 1648    Pt was seen at  eye clinic 6-9-2023 by Dr. Salazar and pt doing ok per pt-- no new eye problems    Sonu Luevano RN 4:30 PM 06/13/23    --          [phone] home/cell    Left message with direct number at 0910    Sonu Luevano RN 9:10 AM 06/13/23

## 2023-06-15 RX ORDER — METOPROLOL TARTRATE 50 MG
TABLET ORAL
Qty: 450 TABLET | Refills: 0 | Status: SHIPPED | OUTPATIENT
Start: 2023-06-15 | End: 2023-10-19

## 2023-06-15 NOTE — TELEPHONE ENCOUNTER
Pt has Follow-up appt with Dr. Moreno in July with labs. 90 day refill given     Sindy Estrada RN

## 2023-06-16 ENCOUNTER — ANCILLARY PROCEDURE (OUTPATIENT)
Dept: CARDIOLOGY | Facility: CLINIC | Age: 63
End: 2023-06-16
Attending: INTERNAL MEDICINE
Payer: COMMERCIAL

## 2023-06-16 DIAGNOSIS — I07.1 MODERATE TRICUSPID INSUFFICIENCY: ICD-10-CM

## 2023-06-16 LAB — LVEF ECHO: NORMAL

## 2023-06-16 PROCEDURE — 93306 TTE W/DOPPLER COMPLETE: CPT | Performed by: INTERNAL MEDICINE

## 2023-06-22 ENCOUNTER — APPOINTMENT (RX ONLY)
Dept: URBAN - METROPOLITAN AREA CLINIC 94 | Facility: CLINIC | Age: 63
Setting detail: DERMATOLOGY
End: 2023-06-22

## 2023-07-14 DIAGNOSIS — I48.91 ATRIAL FIBRILLATION, UNSPECIFIED TYPE (H): ICD-10-CM

## 2023-07-19 ENCOUNTER — PRE VISIT (OUTPATIENT)
Dept: CARDIOLOGY | Facility: CLINIC | Age: 63
End: 2023-07-19
Payer: COMMERCIAL

## 2023-07-19 DIAGNOSIS — I25.10 CAD (CORONARY ARTERY DISEASE): Chronic | ICD-10-CM

## 2023-07-19 DIAGNOSIS — I50.32 CHRONIC HEART FAILURE WITH PRESERVED EJECTION FRACTION (H): Primary | ICD-10-CM

## 2023-07-19 RX ORDER — METOPROLOL TARTRATE 50 MG
TABLET ORAL
Qty: 450 TABLET | Refills: 0 | OUTPATIENT
Start: 2023-07-19

## 2023-07-20 ENCOUNTER — TELEPHONE (OUTPATIENT)
Dept: CARDIOLOGY | Facility: CLINIC | Age: 63
End: 2023-07-20
Payer: COMMERCIAL

## 2023-07-20 NOTE — TELEPHONE ENCOUNTER
[facility] Call Center    Phone Message    May a detailed message be left on voicemail: yes     Reason for Call: Other: Lyly would like to speak with the care team to discusss some orders that were placed that she states are incorrect, please reach out to further discuss.     Action Taken: Other: Cardiology    Travel Screening: Not Applicable     Thank you!  [facility]

## 2023-07-25 ENCOUNTER — ANTICOAGULATION THERAPY VISIT (OUTPATIENT)
Dept: ANTICOAGULATION | Facility: CLINIC | Age: 63
End: 2023-07-25

## 2023-07-25 ENCOUNTER — LAB (OUTPATIENT)
Dept: LAB | Facility: CLINIC | Age: 63
End: 2023-07-25
Payer: COMMERCIAL

## 2023-07-25 DIAGNOSIS — Z79.01 LONG TERM CURRENT USE OF ANTICOAGULANT THERAPY: ICD-10-CM

## 2023-07-25 DIAGNOSIS — I48.0 PAF (PAROXYSMAL ATRIAL FIBRILLATION) (H): Chronic | ICD-10-CM

## 2023-07-25 DIAGNOSIS — Z79.01 LONG TERM CURRENT USE OF ANTICOAGULANT THERAPY: Primary | Chronic | ICD-10-CM

## 2023-07-25 DIAGNOSIS — I25.10 CAD (CORONARY ARTERY DISEASE): Chronic | ICD-10-CM

## 2023-07-25 DIAGNOSIS — I48.91 ATRIAL FIBRILLATION, UNSPECIFIED TYPE (H): ICD-10-CM

## 2023-07-25 DIAGNOSIS — I50.32 CHRONIC HEART FAILURE WITH PRESERVED EJECTION FRACTION (H): ICD-10-CM

## 2023-07-25 LAB
ALBUMIN SERPL BCG-MCNC: 3.8 G/DL (ref 3.5–5.2)
ALP SERPL-CCNC: 107 U/L (ref 35–104)
ALT SERPL W P-5'-P-CCNC: 31 U/L (ref 0–50)
ANION GAP SERPL CALCULATED.3IONS-SCNC: 12 MMOL/L (ref 7–15)
AST SERPL W P-5'-P-CCNC: 41 U/L (ref 0–45)
BILIRUB SERPL-MCNC: 1 MG/DL
BUN SERPL-MCNC: 34 MG/DL (ref 8–23)
CALCIUM SERPL-MCNC: 9.7 MG/DL (ref 8.8–10.2)
CHLORIDE SERPL-SCNC: 104 MMOL/L (ref 98–107)
CREAT SERPL-MCNC: 1.21 MG/DL (ref 0.51–0.95)
DEPRECATED HCO3 PLAS-SCNC: 21 MMOL/L (ref 22–29)
ERYTHROCYTE [DISTWIDTH] IN BLOOD BY AUTOMATED COUNT: 15.7 % (ref 10–15)
GFR SERPL CREATININE-BSD FRML MDRD: 50 ML/MIN/1.73M2
GLUCOSE SERPL-MCNC: 132 MG/DL (ref 70–99)
HCT VFR BLD AUTO: 37.3 % (ref 35–47)
HGB BLD-MCNC: 11.8 G/DL (ref 11.7–15.7)
INR BLD: 6 (ref 0.9–1.1)
MCH RBC QN AUTO: 28.3 PG (ref 26.5–33)
MCHC RBC AUTO-ENTMCNC: 31.6 G/DL (ref 31.5–36.5)
MCV RBC AUTO: 89 FL (ref 78–100)
NT-PROBNP SERPL-MCNC: 2289 PG/ML (ref 0–900)
PLATELET # BLD AUTO: 267 10E3/UL (ref 150–450)
POTASSIUM SERPL-SCNC: 4.6 MMOL/L (ref 3.4–5.3)
PROT SERPL-MCNC: 8.1 G/DL (ref 6.4–8.3)
RBC # BLD AUTO: 4.17 10E6/UL (ref 3.8–5.2)
SODIUM SERPL-SCNC: 137 MMOL/L (ref 136–145)
WBC # BLD AUTO: 6.2 10E3/UL (ref 4–11)

## 2023-07-25 PROCEDURE — 36415 COLL VENOUS BLD VENIPUNCTURE: CPT

## 2023-07-25 PROCEDURE — 36416 COLLJ CAPILLARY BLOOD SPEC: CPT

## 2023-07-25 PROCEDURE — 85027 COMPLETE CBC AUTOMATED: CPT

## 2023-07-25 PROCEDURE — 85610 PROTHROMBIN TIME: CPT

## 2023-07-25 PROCEDURE — 83880 ASSAY OF NATRIURETIC PEPTIDE: CPT

## 2023-07-25 PROCEDURE — 80053 COMPREHEN METABOLIC PANEL: CPT
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
## 2023-07-26 ENCOUNTER — OFFICE VISIT (OUTPATIENT)
Dept: CARDIOLOGY | Facility: CLINIC | Age: 63
End: 2023-07-26
Attending: INTERNAL MEDICINE
Payer: COMMERCIAL

## 2023-07-26 VITALS
SYSTOLIC BLOOD PRESSURE: 117 MMHG | OXYGEN SATURATION: 96 % | WEIGHT: 240 LBS | HEART RATE: 65 BPM | DIASTOLIC BLOOD PRESSURE: 74 MMHG | BODY MASS INDEX: 44.16 KG/M2 | HEIGHT: 62 IN

## 2023-07-26 DIAGNOSIS — I50.32 CHRONIC HEART FAILURE WITH PRESERVED EJECTION FRACTION (H): Primary | ICD-10-CM

## 2023-07-26 PROCEDURE — G0463 HOSPITAL OUTPT CLINIC VISIT: HCPCS | Performed by: INTERNAL MEDICINE

## 2023-07-26 PROCEDURE — 99214 OFFICE O/P EST MOD 30 MIN: CPT | Performed by: INTERNAL MEDICINE

## 2023-07-26 RX ORDER — LIDOCAINE 40 MG/G
CREAM TOPICAL
Status: CANCELLED | OUTPATIENT
Start: 2023-07-26

## 2023-07-26 ASSESSMENT — PAIN SCALES - GENERAL: PAINLEVEL: NO PAIN (0)

## 2023-07-26 NOTE — PROGRESS NOTES
The patient returns for follow-up of heart failure.  There is no interim history of chest pain, tightness, paroxysmal nocturnal dyspnea, orthopnea, peripheral edema, palpitation, pre-syncope, syncope,.  Exercise tolerance is stable.  The patient is attempting to exercise regularly and following a sodium restricted, calorically appropriate diet.  Medications are reviewed and the patient is taking medications as prescribed.  The patient is generally sleeping well.         Constitutional: alert, oriented, normal gait and station, normal mentation.  Oral: moist mucous membrans  Lymph: without pathologic adenopathy  Chest: clear to ausculation and percussion  Cor: No evidence of left or right ventricular activity.  Rhythm is regular.  S1 normal, S2 split physiologically. Murmurs are not present  Abdomen: without tenderness, rebound, guarding, masses, ascites  Extremities: Edema not present  Neuro: no focal defects, normal mentation  Skin: without open lesions  Psych: oriented, verbal, mental status in tact      Plan  1. RHC for elevated estimated PA pressure and functional class 3 exertional limitation  2. Weight loss  3. Consider nuclear stress frances          Wt Readings from Last 24 Encounters:   07/26/23 108.9 kg (240 lb)   06/09/23 119.1 kg (262 lb 9.6 oz)   06/02/23 119.3 kg (263 lb)   11/28/22 122.2 kg (269 lb 5.8 oz)   09/09/22 122.5 kg (270 lb)   08/19/22 121.6 kg (268 lb 1.6 oz)   03/23/22 123 kg (271 lb 1.6 oz)   01/12/22 119.3 kg (263 lb 1.6 oz)   08/27/21 117.9 kg (260 lb)   08/20/21 119.3 kg (263 lb)   07/13/21 124.7 kg (275 lb)   07/20/20 113.9 kg (251 lb)   03/06/20 113.9 kg (251 lb 3.2 oz)   02/05/20 111.6 kg (246 lb)   01/14/20 113.9 kg (251 lb)   12/23/19 116.3 kg (256 lb 4.8 oz)   11/08/19 116.6 kg (257 lb)   11/08/19 116.6 kg (257 lb)   11/05/19 116.6 kg (257 lb)   10/16/19 119.4 kg (263 lb 4.8 oz)   10/11/19 120.7 kg (266 lb 1.6 oz)   09/04/19 122 kg (269 lb)   08/16/19 122 kg (269 lb)   08/13/19  122.5 kg (270 lb)       Current Outpatient Medications   Medication    aspirin 81 MG EC tablet    atorvastatin (LIPITOR) 40 MG tablet    dapagliflozin (FARXIGA) 10 MG TABS tablet    erythromycin (ROMYCIN) 5 MG/GM ophthalmic ointment    furosemide (LASIX) 20 MG tablet    hydroxychloroquine (PLAQUENIL) 200 MG tablet    loratadine (CLARITIN) 10 MG tablet    metoprolol tartrate (LOPRESSOR) 25 MG tablet    metroNIDAZOLE (METROCREAM) 0.75 % external cream    MULTIPLE VITAMIN PO    omega-3 fatty acids 1200 MG capsule    order for DME    sacubitril-valsartan (ENTRESTO) 24-26 MG per tablet    warfarin ANTICOAGULANT (COUMADIN) 5 MG tablet    warfarin ANTICOAGULANT (COUMADIN) 5 MG tablet    azelastine (ASTELIN) 0.1 % nasal spray    metoprolol tartrate (LOPRESSOR) 50 MG tablet     No current facility-administered medications for this visit.        Latest Reference Range & Units 23 13:27   Sodium 136 - 145 mmol/L 137   Potassium 3.4 - 5.3 mmol/L 4.6   Chloride 98 - 107 mmol/L 104   Carbon Dioxide (CO2) 22 - 29 mmol/L 21 (L)   Urea Nitrogen 8.0 - 23.0 mg/dL 34.0 (H)   Creatinine 0.51 - 0.95 mg/dL 1.21 (H)   GFR Estimate >60 mL/min/1.73m2 50 (L)   Calcium 8.8 - 10.2 mg/dL 9.7   Anion Gap 7 - 15 mmol/L 12   Albumin 3.5 - 5.2 g/dL 3.8   Protein Total 6.4 - 8.3 g/dL 8.1   Alkaline Phosphatase 35 - 104 U/L 107 (H)   ALT 0 - 50 U/L 31   AST 0 - 45 U/L 41   Bilirubin Total <=1.2 mg/dL 1.0   Glucose 70 - 99 mg/dL 132 (H)   N-Terminal Pro Bnp 0 - 900 pg/mL 2,289 (H)   WBC 4.0 - 11.0 10e3/uL 6.2   Hemoglobin 11.7 - 15.7 g/dL 11.8   Hematocrit 35.0 - 47.0 % 37.3   Platelet Count 150 - 450 10e3/uL 267   RBC Count 3.80 - 5.20 10e6/uL 4.17   MCV 78 - 100 fL 89   MCH 26.5 - 33.0 pg 28.3   MCHC 31.5 - 36.5 g/dL 31.6   RDW 10.0 - 15.0 % 15.7 (H)   INR Point of Care 0.9 - 1.1  6.0 (HH)       Name: IDA PADRON  MRN: [id number]  : 1960  Study Date: 2023 03:12 PM  Age: 62 yrs  Gender: Female  Patient [facility]: [facility]  Reason  For Study: Moderate tricuspid insufficiency  Ordering Physician: SHELLEY KENNEY  Referring Physician: SHELLEY KENNEY  Performed By: Francy Bradley     BSA: 2.2 m2  Height: 63 in  Weight: 262 lb  BP: 121/74 mmHg  ______________________________________________________________________________  Procedure  Echocardiogram with two-dimensional, color and spectral Doppler performed.  ______________________________________________________________________________  Interpretation Summary  Left ventricular function is normal.The ejection fraction is 55-60%.  Paradoxical septal motion consistent with right ventricular pressure overload  is present.  Global right ventricular function is normal.  Moderate tricuspid insufficiency is present. Pulmonary hypertension is  present. The right ventricular systolic pressure is approximated at 50.8 mmHg  plus the right atrial pressure.  Estimated mean right atrial pressure is normal.  No pericardial effusion is present.  ______________________________________________________________________________  Left Ventricle  Left ventricular size is normal. Left ventricular function is normal.The  ejection fraction is 55-60%. Paradoxical septal motion consistent with right  ventricular pressure overload is present.     Right Ventricle  Global right ventricular function is normal. Mild right ventricular dilation  is present.     Atria  The left atrium appears normal. Mild right atrial enlargement is present.     Mitral Valve  Mild mitral insufficiency is present.     Tricuspid Valve  Moderate tricuspid insufficiency is present. The right ventricular systolic  pressure is approximated at 50.8 mmHg plus the right atrial pressure.  Pulmonary hypertension is present.     Pulmonic Valve  Mild pulmonic insufficiency is present.     Diagnostic  Dominance: Right  Left Anterior Descending   Ost LAD to Mid LAD lesion is 100% stenosed. The lesion is chronic total occlusion.      Second Diagonal  Branch   The vessel is small. There is mild diffuse disease throughout the vessel.      Left Circumflex   Previously placed Ost Cx to Prox Cx stent (unknown type) is widely patent.   Prox Cx to Mid Cx lesion is 20% stenosed.      First Obtuse Marginal Branch   The vessel is moderate in size.   1st Mrg lesion is 30% stenosed with 100% stenosed side branch in Lat 1st Mrg. The lesion was previously treated using a stent of unknown type.      Lateral First Obtuse Marginal Branch   The vessel is small.      Second Obtuse Marginal Branch   The vessel is moderate in size.      Third Obtuse Marginal Branch   The vessel is small.      Right Coronary Artery   Prox RCA lesion is 40% stenosed. The lesion is eccentric.   Mid RCA to Dist RCA lesion is 10% stenosed.   Dist RCA lesion is 100% stenosed. The lesion is chronic total occlusion.      Right Posterior Descending Artery   RPDA lesion is 20% stenosed.      LIMA Graft To Dist LAD   The graft is large. The graft is angiographically normal.      Graft To Lat 1st Mrg   The graft is large. The graft is angiographically normal.      Graft To RPDA   The graft is large. The graft is angiographically normal.         Intervention     No interventions have been documented.     Hemodynamics    RA: --/--/15  RV: 62/18/--  PA: 62/28/38  PCWP: --/--/28     PA Sat: 59%  Ao Sat: 99%    Kannan CO/CI: 5.41/2.49    PVR: 1.8  SVR: 1,214 dynes-sec/cm5 Right sided filling pressures are moderately elevated. Left sided filling pressures are moderately elevated. Moderately elevated pulmonary artery hypertension. Normal cardiac output level.     Pressures Phase: Baseline     Time Systolic (mmHg) Diastolic (mmHg) Mean (mmHg) A Wave (mmHg) V Wave (mmHg) EDP (mmHg) Max dp/dt (mmHg/sec) HR (bpm) Content (mL/dL) SAT (%)   RA Pressures  2:48 PM   15    18    17      54        RV Pressures  2:23 PM       291           2:48 PM 62        18     54        PA Pressures  2:51 PM 62    28    38        54        PCW  Pressures  2:49 PM   28    29    30      54          Blood Flow Results Phase: Baseline     Time Results  Indexed Values (L/min/m2)   QP  2:23 PM 5.41 L/min    2.49      QS  2:23 PM 5.41 L/min    2.49        Blood Oximetry Phase: Baseline     Time Hb  SAT(%)  PO2  Content (mL/dL) PA Sat (%)   PA  2:23 PM  59 %      59      Art  2:23 PM  99 %     14.81         Cardiac Output Phase: Baseline     Time       Vessels  The inferior vena cava was normal in size with preserved respiratory  variability. Estimated mean right atrial pressure is normal.     Pericardium  No pericardial effusion is present.     Compared to Previous Study  This study was compared with the study from 10.17.19 . No significant changes  noted.     ______________________________________________________________________________  MMode/2D Measurements & Calculations  IVSd: 0.95 cm  LVIDd: 4.4 cm  LVIDs: 2.7 cm  LVPWd: 0.99 cm  FS: 36.9 %     LV mass(C)d: 139.9 grams  LV mass(C)dI: 64.5 grams/m2  Ao root diam: 2.9 cm  asc Aorta Diam: 3.6 cm  LVOT diam: 1.9 cm  LVOT area: 2.9 cm2  LA Volume (BP): 73.0 ml  LA Volume Index (BP): 33.6 ml/m2  RWT: 0.46  TAPSE: 2.1 cm     Doppler Measurements & Calculations  MV E max luis: 100.0 cm/sec  MV A max luis: 48.0 cm/sec  MV E/A: 2.1  MV dec slope: 548.4 cm/sec2  MV dec time: 0.18 sec  Ao V2 max: 148.6 cm/sec  Ao max P.0 mmHg  Ao V2 mean: 104.3 cm/sec  Ao mean P.0 mmHg  Ao V2 VTI: 34.2 cm  TARUN(I,D): 2.0 cm2  TARUN(V,D): 2.0 cm2  LV V1 max P.1 mmHg  LV V1 max: 101.0 cm/sec  LV V1 VTI: 23.6 cm  SV(LVOT): 69.2 ml  SI(LVOT): 31.9 ml/m2  TR max luis: 306.0 cm/sec  TR max P.8 mmHg  AV Luis Ratio (DI): 0.68  TARUN Index (cm2/m2): 0.93  E/E' avg: 10.6  Lateral E/e': 8.5  Medial E/e': 12.6  RV S Luis: 11.0 cm/sec

## 2023-07-26 NOTE — PATIENT INSTRUCTIONS
You were seen today in the Cardiovascular Clinic at the [facility].      Cardiology Providers you saw during your visit:  Dr. Twin Moreno     Recommendations:   Please have a Right Heart Cath scheduled after Labor Day.  Please have a virtual visit with Dr. Moreno scheduled after the cath.     Pre-procedure instructions - Right heart catheterization  Patient Education    Your arrival time is TBD.  Location is [address] Waiting Room  Please plan on being at the hospital all day.  At any time, emergencies and/or urgent cases may come up which could delay the start of your procedure.    Pre-procedure instructions - Right heart catheterization  No solid food for 8 hours prior  Nothing to drink 2 hours prior to arrival time  You can take your morning medications (except diabetic and blood thinners) with sips of water  We recommend you arrange for a ride to drop you off and pick you up, in the instance, you are unable to drive home, however you should be able to function as you normally would after the procedure    Diabetic Medication Instructions  Typical instructions for insulin diabetic medication holding are below. However, please reach out to your Primary Care Provider or Endocrinologist for specific instructions  DO NOT take any oral diabetic medication, short-acting diabetes medications/insulin, humalog or regular insulin the morning of your test  Take   dose of long-acting insulin (Lantus, Levemir) the day of your test  Remember to  bring your glucometer and insulin with you to take after your test if needed  DO NOT take injectable GLP-1 agonists semaglutide (Ozempic, Wegovy), dulaglutide (Trulicity), exenatide ER (Bydureon), tirzepatide (Mounjaro), or oral semaglutide (Rybelsus) for 7 days prior your procedure  Hold once daily injectable GLP-1 agonists exenatide (Byetta), liraglutide (Saxenda, Victoza) the  "day before and day of your procedure                Anticoagulation Medication Instructions   warfarin (COUMADIN) - Hold 2 Days prior to procedure          Eat a heart healthy, low sodium diet.  Get 20 to 30 minutes of aerobic exercise 4 to 5 times per week as tolerated. (Examples of aerobic exercise include: walking, bicycling, swimming, running).     Thank you for your visit today!   Please MyChart message or call if you have any questions or concerns.      During Business Hours:  [phone], option # 1 ([place])       After hours, weekends or holidays:   [phone], Option #4  Ask to speak to the On-Call Cardiologist. Inform them you are a heart failure patient at the [place].      Kaylie Tran RN BSN CHFN  Cardiology Care Coordinator - [facility]  Questions and [phone]  First press #1 for the University and then press #4 for \"Your Care Team\" to reach us Cardiology Nurses.                 "

## 2023-07-26 NOTE — PROGRESS NOTES
ANTICOAGULATION MANAGEMENT     Luz Marina Live 63 year old female is on warfarin with supratherapeutic INR result. (Goal INR 2.0-3.0)    Recent labs: (last 7 days)     07/25/23  1327   INR 6.0*       ASSESSMENT     Source(s): Chart Review and Patient/Caregiver Call     Warfarin doses taken: Warfarin taken as instructed  Diet: No new diet changes identified  Medication/supplement changes: None noted  New illness, injury, or hospitalization: Yes: Patient was ill 7/10-7/16. She had sinus issues, cough, chest pain with deep breathing, and diarrhea.  Signs or symptoms of bleeding or clotting: Yes: Increased bruising. Blood when blowing her nose.  Previous result: Therapeutic last 2(+) visits  Additional findings: Patient has a cardiology appointment 7/26/23. Patient needs to have a heart cath to be scheduled after Labor Day. Per cards she is to hold warfarin x 2 days.       PLAN     Recommended plan for temporary change(s) affecting INR     Dosing Instructions: Patient was instructed to hold the dose for today with next INR in 1 day       Summary  As of 7/25/2023      Full warfarin instructions:  7/25: Hold; 7/26: Hold; Otherwise 10 mg every Tue, Thu, Sat; 7.5 mg all other days   Next INR check:  7/27/2023               Sent [employer] message with dosing and follow up instructions    Contact [phone]  to schedule and with any changes, questions or concerns.     Education provided:   Please call back if any changes to your diet, medications or how you've been taking warfarin  Symptom monitoring: monitoring for bleeding signs and symptoms and when to seek medical attention/emergency care  Contact [phone]  with any changes, questions or concerns.     Plan made per [facility] anticoagulation protocol    Red RIVAS RN  Anticoagulation Clinic  7/26/2023    _______________________________________________________________________     Anticoagulation Episode Summary       Current INR goal:  2.0-3.0   TTR:  83.9 % (1 y)   Target end  date:  Indefinite   Send INR reminders to:  DEBBIE ISIDRO    Indications    Long-term (current) use of anticoagulants [Z79.01] [Z79.01]  PAF (paroxysmal atrial fibrillation) (H) [I48.0]  Atrial fibrillation  unspecified type (H) [I48.91]             Comments:               Anticoagulation Care Providers       Provider Role Specialty Phone number    Eliz Nguyen MD Referring Internal Medicine [phone]    Elodia Tang MD Referring Family Medicine [phone]

## 2023-07-26 NOTE — NURSING NOTE
Chief Complaint   Patient presents with    Follow Up     Return HF; 63yr old female with a h/o HFpEF, CAD s/p CAB, HTN, HLD, atrial fibrillation on warfarin, SLE, and PFO presenting for follow-up with labs.       Vitals were taken, medications reconciled.    Shanae Anthony CMA  12:35 PM

## 2023-07-26 NOTE — LETTER
7/26/2023      RE: Luz Marina Live  [address]  [place] [address]       Dear Colleague,    Thank you for the opportunity to participate in the care of your patient, Luz Marina Live, at the [facility] [place] [facility]. Please see a copy of my visit note below.        The patient returns for follow-up of heart failure.  There is no interim history of chest pain, tightness, paroxysmal nocturnal dyspnea, orthopnea, peripheral edema, palpitation, pre-syncope, syncope,.  Exercise tolerance is stable.  The patient is attempting to exercise regularly and following a sodium restricted, calorically appropriate diet.  Medications are reviewed and the patient is taking medications as prescribed.  The patient is generally sleeping well.         Constitutional: alert, oriented, normal gait and station, normal mentation.  Oral: moist mucous membrans  Lymph: without pathologic adenopathy  Chest: clear to ausculation and percussion  Cor: No evidence of left or right ventricular activity.  Rhythm is regular.  S1 normal, S2 split physiologically. Murmurs are not present  Abdomen: without tenderness, rebound, guarding, masses, ascites  Extremities: Edema not present  Neuro: no focal defects, normal mentation  Skin: without open lesions  Psych: oriented, verbal, mental status in tact      Plan  1. RHC for elevated estimated PA pressure and functional class 3 exertional limitation  2. Weight loss  3. Consider nuclear stress frances          Wt Readings from Last 24 Encounters:   07/26/23 108.9 kg (240 lb)   06/09/23 119.1 kg (262 lb 9.6 oz)   06/02/23 119.3 kg (263 lb)   11/28/22 122.2 kg (269 lb 5.8 oz)   09/09/22 122.5 kg (270 lb)   08/19/22 121.6 kg (268 lb 1.6 oz)   03/23/22 123 kg (271 lb 1.6 oz)   01/12/22 119.3 kg (263 lb 1.6 oz)   08/27/21 117.9 kg (260 lb)   08/20/21 119.3 kg (263 lb)   07/13/21 124.7 kg (275 lb)   07/20/20 113.9 kg (251 lb)   03/06/20  113.9 kg (251 lb 3.2 oz)   02/05/20 111.6 kg (246 lb)   01/14/20 113.9 kg (251 lb)   12/23/19 116.3 kg (256 lb 4.8 oz)   11/08/19 116.6 kg (257 lb)   11/08/19 116.6 kg (257 lb)   11/05/19 116.6 kg (257 lb)   10/16/19 119.4 kg (263 lb 4.8 oz)   10/11/19 120.7 kg (266 lb 1.6 oz)   09/04/19 122 kg (269 lb)   08/16/19 122 kg (269 lb)   08/13/19 122.5 kg (270 lb)       Current Outpatient Medications   Medication    aspirin 81 MG EC tablet    atorvastatin (LIPITOR) 40 MG tablet    dapagliflozin (FARXIGA) 10 MG TABS tablet    erythromycin (ROMYCIN) 5 MG/GM ophthalmic ointment    furosemide (LASIX) 20 MG tablet    hydroxychloroquine (PLAQUENIL) 200 MG tablet    loratadine (CLARITIN) 10 MG tablet    metoprolol tartrate (LOPRESSOR) 25 MG tablet    metroNIDAZOLE (METROCREAM) 0.75 % external cream    MULTIPLE VITAMIN PO    omega-3 fatty acids 1200 MG capsule    order for DME    sacubitril-valsartan (ENTRESTO) 24-26 MG per tablet    warfarin ANTICOAGULANT (COUMADIN) 5 MG tablet    warfarin ANTICOAGULANT (COUMADIN) 5 MG tablet    azelastine (ASTELIN) 0.1 % nasal spray    metoprolol tartrate (LOPRESSOR) 50 MG tablet     No current facility-administered medications for this visit.        Latest Reference Range & Units 07/25/23 13:27   Sodium 136 - 145 mmol/L 137   Potassium 3.4 - 5.3 mmol/L 4.6   Chloride 98 - 107 mmol/L 104   Carbon Dioxide (CO2) 22 - 29 mmol/L 21 (L)   Urea Nitrogen 8.0 - 23.0 mg/dL 34.0 (H)   Creatinine 0.51 - 0.95 mg/dL 1.21 (H)   GFR Estimate >60 mL/min/1.73m2 50 (L)   Calcium 8.8 - 10.2 mg/dL 9.7   Anion Gap 7 - 15 mmol/L 12   Albumin 3.5 - 5.2 g/dL 3.8   Protein Total 6.4 - 8.3 g/dL 8.1   Alkaline Phosphatase 35 - 104 U/L 107 (H)   ALT 0 - 50 U/L 31   AST 0 - 45 U/L 41   Bilirubin Total <=1.2 mg/dL 1.0   Glucose 70 - 99 mg/dL 132 (H)   N-Terminal Pro Bnp 0 - 900 pg/mL 2,289 (H)   WBC 4.0 - 11.0 10e3/uL 6.2   Hemoglobin 11.7 - 15.7 g/dL 11.8   Hematocrit 35.0 - 47.0 % 37.3   Platelet Count 150 - 450 10e3/uL  267   RBC Count 3.80 - 5.20 10e6/uL 4.17   MCV 78 - 100 fL 89   MCH 26.5 - 33.0 pg 28.3   MCHC 31.5 - 36.5 g/dL 31.6   RDW 10.0 - 15.0 % 15.7 (H)   INR Point of Care 0.9 - 1.1  6.0 (HH)       Name: IDA PADRON  MRN: [id number]  : 1960  Study Date: 2023 03:12 PM  Age: 62 yrs  Gender: Female  Patient Location: [facility]  Reason For Study: Moderate tricuspid insufficiency  Ordering Physician: SHELLEY KENNEY  Referring Physician: SHELLEY KENNEY  Performed By: Francy Bradley     BSA: 2.2 m2  Height: 63 in  Weight: 262 lb  BP: 121/74 mmHg  ______________________________________________________________________________  Procedure  Echocardiogram with two-dimensional, color and spectral Doppler performed.  ______________________________________________________________________________  Interpretation Summary  Left ventricular function is normal.The ejection fraction is 55-60%.  Paradoxical septal motion consistent with right ventricular pressure overload  is present.  Global right ventricular function is normal.  Moderate tricuspid insufficiency is present. Pulmonary hypertension is  present. The right ventricular systolic pressure is approximated at 50.8 mmHg  plus the right atrial pressure.  Estimated mean right atrial pressure is normal.  No pericardial effusion is present.  ______________________________________________________________________________  Left Ventricle  Left ventricular size is normal. Left ventricular function is normal.The  ejection fraction is 55-60%. Paradoxical septal motion consistent with right  ventricular pressure overload is present.     Right Ventricle  Global right ventricular function is normal. Mild right ventricular dilation  is present.     Atria  The left atrium appears normal. Mild right atrial enlargement is present.     Mitral Valve  Mild mitral insufficiency is present.     Tricuspid Valve  Moderate tricuspid insufficiency is present. The right  ventricular systolic  pressure is approximated at 50.8 mmHg plus the right atrial pressure.  Pulmonary hypertension is present.     Pulmonic Valve  Mild pulmonic insufficiency is present.     Diagnostic  Dominance: Right  Left Anterior Descending   Ost LAD to Mid LAD lesion is 100% stenosed. The lesion is chronic total occlusion.      Second Diagonal Branch   The vessel is small. There is mild diffuse disease throughout the vessel.      Left Circumflex   Previously placed Ost Cx to Prox Cx stent (unknown type) is widely patent.   Prox Cx to Mid Cx lesion is 20% stenosed.      First Obtuse Marginal Branch   The vessel is moderate in size.   1st Mrg lesion is 30% stenosed with 100% stenosed side branch in Lat 1st Mrg. The lesion was previously treated using a stent of unknown type.      Lateral First Obtuse Marginal Branch   The vessel is small.      Second Obtuse Marginal Branch   The vessel is moderate in size.      Third Obtuse Marginal Branch   The vessel is small.      Right Coronary Artery   Prox RCA lesion is 40% stenosed. The lesion is eccentric.   Mid RCA to Dist RCA lesion is 10% stenosed.   Dist RCA lesion is 100% stenosed. The lesion is chronic total occlusion.      Right Posterior Descending Artery   RPDA lesion is 20% stenosed.      LIMA Graft To Dist LAD   The graft is large. The graft is angiographically normal.      Graft To Lat 1st Mrg   The graft is large. The graft is angiographically normal.      Graft To RPDA   The graft is large. The graft is angiographically normal.         Intervention     No interventions have been documented.     Hemodynamics    RA: --/--/15  RV: 62/18/--  PA: 62/28/38  PCWP: --/--/28     PA Sat: 59%  Ao Sat: 99%    Kannan CO/CI: 5.41/2.49    PVR: 1.8  SVR: 1,214 dynes-sec/cm5 Right sided filling pressures are moderately elevated. Left sided filling pressures are moderately elevated. Moderately elevated pulmonary artery hypertension. Normal cardiac output level.     Pressures  Phase: Baseline     Time Systolic (mmHg) Diastolic (mmHg) Mean (mmHg) A Wave (mmHg) V Wave (mmHg) EDP (mmHg) Max dp/dt (mmHg/sec) HR (bpm) Content (mL/dL) SAT (%)   RA Pressures  2:48 PM   15    18    17      54        RV Pressures  2:23 PM       291           2:48 PM 62        18     54        PA Pressures  2:51 PM 62    28    38        54        PCW Pressures  2:49 PM   28    29    30      54          Blood Flow Results Phase: Baseline     Time Results  Indexed Values (L/min/m2)   QP  2:23 PM 5.41 L/min    2.49      QS  2:23 PM 5.41 L/min    2.49        Blood Oximetry Phase: Baseline     Time Hb  SAT(%)  PO2  Content (mL/dL) PA Sat (%)   PA  2:23 PM  59 %      59      Art  2:23 PM  99 %     14.81         Cardiac Output Phase: Baseline     Time       Vessels  The inferior vena cava was normal in size with preserved respiratory  variability. Estimated mean right atrial pressure is normal.     Pericardium  No pericardial effusion is present.     Compared to Previous Study  This study was compared with the study from 10.17.19 . No significant changes  noted.     ______________________________________________________________________________  MMode/2D Measurements & Calculations  IVSd: 0.95 cm  LVIDd: 4.4 cm  LVIDs: 2.7 cm  LVPWd: 0.99 cm  FS: 36.9 %     LV mass(C)d: 139.9 grams  LV mass(C)dI: 64.5 grams/m2  Ao root diam: 2.9 cm  asc Aorta Diam: 3.6 cm  LVOT diam: 1.9 cm  LVOT area: 2.9 cm2  LA Volume (BP): 73.0 ml  LA Volume Index (BP): 33.6 ml/m2  RWT: 0.46  TAPSE: 2.1 cm     Doppler Measurements & Calculations  MV E max maria isabel: 100.0 cm/sec  MV A max maria isabel: 48.0 cm/sec  MV E/A: 2.1  MV dec slope: 548.4 cm/sec2  MV dec time: 0.18 sec  Ao V2 max: 148.6 cm/sec  Ao max P.0 mmHg  Ao V2 mean: 104.3 cm/sec  Ao mean P.0 mmHg  Ao V2 VTI: 34.2 cm  TARUN(I,D): 2.0 cm2  TARUN(V,D): 2.0 cm2  LV V1 max P.1 mmHg  LV V1 max: 101.0 cm/sec  LV V1 VTI: 23.6 cm  SV(LVOT): 69.2 ml  SI(LVOT): 31.9 ml/m2  TR max maria isabel: 306.0 cm/sec  TR  max P.8 mmHg  AV Luis Ratio (DI): 0.68  TARUN Index (cm2/m2): 0.93  E/E' avg: 10.6  Lateral E/e': 8.5  Medial E/e': 12.6  RV S Luis: 11.0 cm/sec      Please do not hesitate to contact me if you have any questions/concerns.     Sincerely,     Twin Moreno MD

## 2023-07-27 ENCOUNTER — LAB (OUTPATIENT)
Dept: LAB | Facility: CLINIC | Age: 63
End: 2023-07-27
Payer: COMMERCIAL

## 2023-07-27 ENCOUNTER — ANTICOAGULATION THERAPY VISIT (OUTPATIENT)
Dept: ANTICOAGULATION | Facility: CLINIC | Age: 63
End: 2023-07-27

## 2023-07-27 DIAGNOSIS — I48.0 PAF (PAROXYSMAL ATRIAL FIBRILLATION) (H): Chronic | ICD-10-CM

## 2023-07-27 DIAGNOSIS — Z79.01 LONG TERM CURRENT USE OF ANTICOAGULANT THERAPY: Primary | Chronic | ICD-10-CM

## 2023-07-27 DIAGNOSIS — I48.91 ATRIAL FIBRILLATION, UNSPECIFIED TYPE (H): ICD-10-CM

## 2023-07-27 DIAGNOSIS — Z79.01 LONG TERM CURRENT USE OF ANTICOAGULANT THERAPY: ICD-10-CM

## 2023-07-27 LAB — INR BLD: 2.8 (ref 0.9–1.1)

## 2023-07-27 PROCEDURE — 36416 COLLJ CAPILLARY BLOOD SPEC: CPT

## 2023-07-27 PROCEDURE — 85610 PROTHROMBIN TIME: CPT

## 2023-07-27 NOTE — PROGRESS NOTES
ANTICOAGULATION MANAGEMENT     Luz Marina Live 63 year old female is on warfarin with therapeutic INR result. (Goal INR 2.0-3.0)    Recent labs: (last 7 days)     07/27/23  1335   INR 2.8*       ASSESSMENT     Source(s): Chart Review  Previous INR was Supratherapeutic  Medication, diet, health changes since last INR chart reviewed; none identified         PLAN     Recommended plan for no diet, medication or health factor changes affecting INR     Dosing Instructions: Continue your current warfarin dose with next INR in 1 week       Summary  As of 7/27/2023      Full warfarin instructions:  10 mg every Tue, Thu, Sat; 7.5 mg all other days   Next INR check:  8/3/2023               Detailed voice message left for Luz Marina with dosing instructions and follow up date.   Sent [employer] message with dosing and follow up instructions    Contact [phone]  to schedule and with any changes, questions or concerns.     Education provided:   Please call back if any changes to your diet, medications or how you've been taking warfarin    Plan made per [facility] anticoagulation protocol    Michael Quispe RN  Anticoagulation Clinic  7/27/2023    _______________________________________________________________________     Anticoagulation Episode Summary       Current INR goal:  2.0-3.0   TTR:  83.4 % (1 y)   Target end date:  Indefinite   Send INR reminders to:  [facility]    Indications    Long-term (current) use of anticoagulants [Z79.01] [Z79.01]  PAF (paroxysmal atrial fibrillation) (H) [I48.0]  Atrial fibrillation  unspecified type (H) [I48.91]             Comments:               Anticoagulation Care Providers       Provider Role Specialty Phone number    Eliz Nguyen MD Referring Internal Medicine [phone]    Elodia Tang MD Referring Family Medicine [phone]

## 2023-08-04 ENCOUNTER — LAB (OUTPATIENT)
Dept: LAB | Facility: CLINIC | Age: 63
End: 2023-08-04
Payer: COMMERCIAL

## 2023-08-04 ENCOUNTER — MYC MEDICAL ADVICE (OUTPATIENT)
Dept: ANTICOAGULATION | Facility: CLINIC | Age: 63
End: 2023-08-04

## 2023-08-04 ENCOUNTER — DOCUMENTATION ONLY (OUTPATIENT)
Dept: ANTICOAGULATION | Facility: CLINIC | Age: 63
End: 2023-08-04

## 2023-08-04 ENCOUNTER — ANTICOAGULATION THERAPY VISIT (OUTPATIENT)
Dept: ANTICOAGULATION | Facility: CLINIC | Age: 63
End: 2023-08-04

## 2023-08-04 DIAGNOSIS — Z79.01 LONG TERM CURRENT USE OF ANTICOAGULANT THERAPY: Primary | Chronic | ICD-10-CM

## 2023-08-04 DIAGNOSIS — I48.91 ATRIAL FIBRILLATION, UNSPECIFIED TYPE (H): ICD-10-CM

## 2023-08-04 DIAGNOSIS — I48.0 PAF (PAROXYSMAL ATRIAL FIBRILLATION) (H): Chronic | ICD-10-CM

## 2023-08-04 DIAGNOSIS — Z79.01 LONG TERM CURRENT USE OF ANTICOAGULANT THERAPY: Primary | ICD-10-CM

## 2023-08-04 DIAGNOSIS — Z79.01 LONG TERM CURRENT USE OF ANTICOAGULANT THERAPY: ICD-10-CM

## 2023-08-04 LAB — INR BLD: 3.3 (ref 0.9–1.1)

## 2023-08-04 PROCEDURE — 36416 COLLJ CAPILLARY BLOOD SPEC: CPT

## 2023-08-04 PROCEDURE — 85610 PROTHROMBIN TIME: CPT

## 2023-08-04 NOTE — PROGRESS NOTES
ANTICOAGULATION CLINIC REFERRAL RENEWAL REQUEST       An annual renewal order is required for all patients referred to [facility].?  Please review and sign the pended referral order for Luz Marina Live.       ANTICOAGULATION SUMMARY      Warfarin indication(s)   Atrial Fibrillation    Mechanical heart valve present?  NO       Current goal range   INR: 2.0-3.0     Goal appropriate for indication? Goal INR 2-3, standard for indication(s) above     Time in Therapeutic Range (TTR)  (Goal > 60%) 83.4%       Office visit with referring provider's group within last year yes on 6/2/23         [facility]

## 2023-08-04 NOTE — PROGRESS NOTES
ANTICOAGULATION MANAGEMENT     Luz Marina Live 63 year old female is on warfarin with supratherapeutic INR result. (Goal INR 2.0-3.0)    Recent labs: (last 7 days)     08/04/23  1308   INR 3.3*       ASSESSMENT     Warfarin Lab Questionnaire    Warfarin Doses Last 7 Days      8/4/2023    12:39 PM   Dose in Tablet or Mg   TAB or MG? milligram (mg)     Pt Rptd Dose SUNDAY MONDAY TUESDAY WED THURS FRIDAY SATURDAY 8/4/2023  12:39 PM 7.5 7.5 10 7.5 10 7.5 10         8/4/2023   Warfarin Lab Questionnaire   Missed doses within past 14 days? No   Changes in diet or alcohol within past 14 days? No   Medication changes since last result? No   Injuries or illness since last result? No   New shortness of breath, severe headaches or sudden changes in vision since last result? No   Abnormal bleeding since last result? No   Upcoming surgery, procedure? No     Previous result: Therapeutic last visit; previously outside of goal range  Additional findings: None       PLAN     Recommended plan for no diet, medication or health factor changes affecting INR     Dosing Instructions: decrease your warfarin dose (4.2% change) with next INR in 2 weeks       Summary  As of 8/4/2023      Full warfarin instructions:  10 mg every Tue, Sat; 7.5 mg all other days   Next INR check:  8/18/2023               Sent [employer] message with dosing and follow up instructions    Contact [phone]  to schedule and with any changes, questions or concerns.     Education provided:   Please call back if any changes to your diet, medications or how you've been taking warfarin  Symptom monitoring: monitoring for bleeding signs and symptoms and when to seek medical attention/emergency care  Contact [phone]  with any changes, questions or concerns.     Plan made per [facility] anticoagulation protocol    Red RIVAS RN  Anticoagulation Clinic  8/4/2023    _______________________________________________________________________     Anticoagulation Episode Summary        Current INR goal:  2.0-3.0   TTR:  82.1 % (1 y)   Target end date:  Indefinite   Send INR reminders to:  DEBBIE GREG SANNA    Indications    Long-term (current) use of anticoagulants [Z79.01] [Z79.01]  PAF (paroxysmal atrial fibrillation) (H) [I48.0]  Atrial fibrillation  unspecified type (H) [I48.91]             Comments:               Anticoagulation Care Providers       Provider Role Specialty Phone number    Eliz Nguyen MD Referring Internal Medicine [phone]    Elodia Tang MD Referring Family Medicine [phone]

## 2023-08-04 NOTE — PROGRESS NOTES
ANTICOAGULATION MANAGEMENT     Luz Marina Live 63 year old female is on warfarin with supratherapeutic INR result. (Goal INR 2.0-3.0)    Recent labs: (last 7 days)     08/04/23  1308   INR 3.3*       ASSESSMENT     Warfarin Lab Questionnaire    Warfarin Doses Last 7 Days      8/4/2023    12:39 PM   Dose in Tablet or Mg   TAB or MG? milligram (mg)     Pt Rptd Dose SUNDAY MONDAY TUESDAY WED THURS FRIDAY SATURDAY 8/4/2023  12:39 PM 7.5 7.5 10 7.5 10 7.5 10         8/4/2023   Warfarin Lab Questionnaire   Missed doses within past 14 days? No   Changes in diet or alcohol within past 14 days? No   Medication changes since last result? No   Injuries or illness since last result? No   New shortness of breath, severe headaches or sudden changes in vision since last result? No   Abnormal bleeding since last result? No   Upcoming surgery, procedure? No     Previous result: Therapeutic last visit; previously outside of goal range  Additional findings: None       PLAN     Unable to reach Luz Marina today.    Left message to continue current dose of warfarin 7.5 mg tonight. Request call back for assessment.    Follow up required to confirm warfarin dose taken and assess for changes    Red RIVAS RN  Anticoagulation Clinic  8/4/2023

## 2023-08-09 ENCOUNTER — TELEPHONE (OUTPATIENT)
Dept: CARDIOLOGY | Facility: CLINIC | Age: 63
End: 2023-08-09
Payer: COMMERCIAL

## 2023-08-09 NOTE — TELEPHONE ENCOUNTER
Cath Lab Case Request/Order    Location: [address] [address] [facility] Waiting Room    Procedure: Right Heart Cath (RHC)    Procedure Date: 09/13/2023    Patient Arrival Time: 9:30    Procedure Time: 2nd case to follow    Ordering Provider: Dr. Renetta Mclean    Performing Cardiologist: Dr. Renetta Mclean    Inpatient Bed Needed: No    Post-  Procedure KRISTIAN appointment scheduled (1 - 2 weeks): YES     Date: 9/20/23     Provider: jhonny     Communicated Patient Instructions:     NPO, nothing to eat 8 hours and drink 2 hours before arrival time: Yes     , need to arrange a ride home - unable to drive post- procedure: Yes     Adult at home, need a responsible adult to stay with patient 24 hours post- procedure: YES    Appointment was scheduled: Over the phone    Patient expressed understanding of above instructions and denied further questions at this time.    Johanna Paez

## 2023-08-21 ENCOUNTER — ANTICOAGULATION THERAPY VISIT (OUTPATIENT)
Dept: ANTICOAGULATION | Facility: CLINIC | Age: 63
End: 2023-08-21

## 2023-08-21 ENCOUNTER — LAB (OUTPATIENT)
Dept: LAB | Facility: CLINIC | Age: 63
End: 2023-08-21
Payer: COMMERCIAL

## 2023-08-21 DIAGNOSIS — I48.91 ATRIAL FIBRILLATION, UNSPECIFIED TYPE (H): ICD-10-CM

## 2023-08-21 DIAGNOSIS — Z79.01 LONG TERM CURRENT USE OF ANTICOAGULANT THERAPY: ICD-10-CM

## 2023-08-21 DIAGNOSIS — Z79.01 LONG TERM CURRENT USE OF ANTICOAGULANT THERAPY: Primary | Chronic | ICD-10-CM

## 2023-08-21 DIAGNOSIS — I48.0 PAF (PAROXYSMAL ATRIAL FIBRILLATION) (H): Chronic | ICD-10-CM

## 2023-08-21 LAB — INR BLD: 3.4 (ref 0.9–1.1)

## 2023-08-21 PROCEDURE — 85610 PROTHROMBIN TIME: CPT

## 2023-08-21 PROCEDURE — 36416 COLLJ CAPILLARY BLOOD SPEC: CPT

## 2023-08-21 NOTE — PROGRESS NOTES
ANTICOAGULATION MANAGEMENT     Luz Marina Live 63 year old female is on warfarin with supratherapeutic INR result. (Goal INR 2.0-3.0)    Recent labs: (last 7 days)     08/21/23  1343   INR 3.4*       ASSESSMENT     Warfarin Lab Questionnaire    Warfarin Doses Last 7 Days      8/21/2023     1:41 PM   Dose in Tablet or Mg   TAB or MG? milligram (mg)     Pt Rptd Dose SUNDAY MONDAY TUESDAY WED THURS FRIDAY SATURDAY 8/21/2023   1:41 PM 7.5 7.5 10 7.5 7.5 7.5 10         8/21/2023   Warfarin Lab Questionnaire   Missed doses within past 14 days? No   Changes in diet or alcohol within past 14 days? No   Medication changes since last result? No   Injuries or illness since last result? No   New shortness of breath, severe headaches or sudden changes in vision since last result? No   Abnormal bleeding since last result? No   Upcoming surgery, procedure? Yes   Please explain, date scheduled? september 12 right heart cath   Best number to call with results? [phone]     Previous result: Therapeutic last visit; previously outside of goal range  Additional findings: Upcoming surgery/procedure Heart cath 9/12/23       PLAN     Recommended plan for no diet, medication or health factor changes affecting INR     Dosing Instructions: decrease your warfarin dose (4.3% change) with next INR in 1 week       Summary  As of 8/21/2023      Full warfarin instructions:  10 mg every Sat; 7.5 mg all other days   Next INR check:  8/28/2023               Sent [employer] message with dosing and follow up instructions    Contact [phone]  to schedule and with any changes, questions or concerns.     Education provided:   Please call back if any changes to your diet, medications or how you've been taking warfarin    Plan made per [facility] anticoagulation protocol    Michael Quispe, RN  Anticoagulation Clinic  8/21/2023    _______________________________________________________________________     Anticoagulation Episode Summary       Current INR goal:   2.0-3.0   TTR:  77.5 % (1 y)   Target end date:  Indefinite   Send INR reminders to:  DEBBIE ISIDRO    Indications    Long-term (current) use of anticoagulants [Z79.01] [Z79.01]  PAF (paroxysmal atrial fibrillation) (H) [I48.0]  Atrial fibrillation  unspecified type (H) [I48.91]             Comments:               Anticoagulation Care Providers       Provider Role Specialty Phone number    Eliz Nguyen MD Referring Internal Medicine [phone]    Elodia Tang MD Referring Family Medicine [phone]

## 2023-08-25 ENCOUNTER — TELEPHONE (OUTPATIENT)
Dept: CARDIOLOGY | Facility: CLINIC | Age: 63
End: 2023-08-25
Payer: COMMERCIAL

## 2023-08-25 DIAGNOSIS — I50.9 CHF (CONGESTIVE HEART FAILURE) (H): ICD-10-CM

## 2023-08-25 RX ORDER — SACUBITRIL AND VALSARTAN 24; 26 MG/1; MG/1
TABLET, FILM COATED ORAL
Qty: 45 TABLET | Refills: 3 | Status: SHIPPED | OUTPATIENT
Start: 2023-08-25 | End: 2024-03-26

## 2023-08-25 NOTE — TELEPHONE ENCOUNTER
[facility] Call Center    Phone Message    May a detailed message be left on voicemail: yes     Reason for Call: Medication Refill Request    Has the patient contacted the pharmacy for the refill? Yes   Name of medication being requested: sacubitril-valsartan (ENTRESTO) 24-26 MG per tablet   Provider who prescribed the medication: Dr. Moreno  Pharmacy:      [facility]Y #[id number] - [place], [place] - [address]    Date medication is needed: 08/25/2023      Pt is out of town and out of medication.     Action Taken: Message routed to:  [facility] Cardiology    Travel Screening: Not Applicable    Thank you!  [facility]

## 2023-08-30 ENCOUNTER — MYC MEDICAL ADVICE (OUTPATIENT)
Dept: ANTICOAGULATION | Facility: CLINIC | Age: 63
End: 2023-08-30

## 2023-08-30 ENCOUNTER — LAB (OUTPATIENT)
Dept: LAB | Facility: CLINIC | Age: 63
End: 2023-08-30
Payer: COMMERCIAL

## 2023-08-30 ENCOUNTER — ANTICOAGULATION THERAPY VISIT (OUTPATIENT)
Dept: ANTICOAGULATION | Facility: CLINIC | Age: 63
End: 2023-08-30

## 2023-08-30 DIAGNOSIS — Z79.01 LONG TERM CURRENT USE OF ANTICOAGULANT THERAPY: ICD-10-CM

## 2023-08-30 LAB — INR BLD: 2 (ref 0.9–1.1)

## 2023-08-30 PROCEDURE — 85610 PROTHROMBIN TIME: CPT

## 2023-08-30 PROCEDURE — 36416 COLLJ CAPILLARY BLOOD SPEC: CPT

## 2023-08-30 NOTE — PROGRESS NOTES
ANTICOAGULATION MANAGEMENT     Luz Marina Live 63 year old female is on warfarin with therapeutic INR result. (Goal INR 2.0-3.0)    Recent labs: (last 7 days)     08/30/23  1327   INR 2.0*       ASSESSMENT     Source(s): Chart Review  Previous INR was Supratherapeutic  Pt has a procedure scheduled for 9/13/23. According to 7/26/23 cardiology note, pt was advised to hold her warfarin 2 days prior to the procedure.  Pt already has a lab scheduled the day of the procedure on 9/13/23. Pt advised to check an INR at that time as well.       PLAN     Recommended plan for temporary change(s) affecting INR     Dosing Instructions:  Hold warfarin on 9/11/23 and 9/12/23 take 10 mg on Saturdays and 7.5 mg all other days  with next INR in 2 weeks       Summary  As of 8/30/2023      Full warfarin instructions:  9/11: Hold; 9/12: Hold; Otherwise 10 mg every Sat; 7.5 mg all other days   Next INR check:  9/13/2023               Detailed voice message left for Luz Marina with dosing instructions and follow up date.   Sent [employer] message with dosing and follow up instructions    Lab visit scheduled    Education provided:   Please call back if any changes to your diet, medications or how you've been taking warfarin  Contact [phone]  with any changes, questions or concerns.     Plan made per [facility] anticoagulation protocol    Ekaterina Kang RN  Anticoagulation Clinic  8/30/2023    _______________________________________________________________________     Anticoagulation Episode Summary       Current INR goal:  2.0-3.0   TTR:  76.8 % (1 y)   Target end date:  Indefinite   Send INR reminders to:  [facility]    Indications    Long-term (current) use of anticoagulants [Z79.01] [Z79.01]  PAF (paroxysmal atrial fibrillation) (H) [I48.0]  Atrial fibrillation  unspecified type (H) [I48.91]             Comments:               Anticoagulation Care Providers       Provider Role Specialty Phone number    Eliz Nguyen MD Referring  Internal Medicine [phone]    Elodia Tang MD Referring Family Medicine [phone]

## 2023-09-03 ENCOUNTER — HEALTH MAINTENANCE LETTER (OUTPATIENT)
Age: 63
End: 2023-09-03

## 2023-09-09 ENCOUNTER — MYC MEDICAL ADVICE (OUTPATIENT)
Dept: CARDIOLOGY | Facility: CLINIC | Age: 63
End: 2023-09-09
Payer: COMMERCIAL

## 2023-09-09 NOTE — TELEPHONE ENCOUNTER
Communication sent in [email or web address]. Kaylie Tran RN     Pre-procedure instructions - Right heart catheterization  Patient Education     Your arrival time is 11am on 9/13/23.  Location is [address] Waiting Room  Please plan on being at the hospital all day.  At any time, emergencies and/or urgent cases may come up which could delay the start of your procedure.     Pre-procedure instructions - Right heart catheterization  No solid food for 8 hours prior  Nothing to drink 2 hours prior to arrival time  You can take your morning medications (except diabetic and blood thinners) with sips of water  We recommend you arrange for a ride to drop you off and pick you up, in the instance, you are unable to drive home, however you should be able to function as you normally would after the procedure     Diabetic Medication Instructions  Typical instructions for insulin diabetic medication holding are below. However, please reach out to your Primary Care Provider or Endocrinologist for specific instructions  DO NOT take any oral diabetic medication, short-acting diabetes medications/insulin, humalog or regular insulin the morning of your test  Take   dose of long-acting insulin (Lantus, Levemir) the day of your test  Remember to  bring your glucometer and insulin with you to take after your test if needed  DO NOT take injectable GLP-1 agonists semaglutide (Ozempic, Wegovy), dulaglutide (Trulicity), exenatide ER (Bydureon), tirzepatide (Mounjaro), or oral semaglutide (Rybelsus) for 7 days prior your procedure  Hold once daily injectable GLP-1 agonists exenatide (Byetta), liraglutide (Saxenda, Victoza) the day before and day of your procedure                 Anticoagulation Medication Instructions   warfarin (COUMADIN) - Hold 2 Days prior to procedure

## 2023-09-11 NOTE — PROGRESS NOTES
ANTICOAGULATION FOLLOW-UP CLINIC VISIT    Patient Name:  Luz Marina Live  Date:  2020  Contact Type:  Telephone    SUBJECTIVE:  Patient Findings     Positives:   Missed doses (missed dose last night   )    Comments:   Pt states that she will call back and make an appt - she is aware that she is due in about 2 weeks  Cait Hawthorne RN          Clinical Outcomes     Comments:   Pt states that she will call back and make an appt - she is aware that she is due in about 2 weeks  Cait Hawthorne RN             OBJECTIVE    Recent labs: (last 7 days)     20  1408   INR 1.65*       ASSESSMENT / PLAN  INR assessment SUB    Recheck INR In: 2 WEEKS    INR Location Outside lab      Anticoagulation Summary  As of 2020    INR goal:   2.0-3.0   TTR:   53.8 % (10.3 mo)   INR used for dosin.65! (2020)   Warfarin maintenance plan:   5 mg (5 mg x 1) every Mon, Wed, Fri; 7.5 mg (5 mg x 1.5) all other days   Full warfarin instructions:   : 10 mg; Otherwise 5 mg every Mon, Wed, Fri; 7.5 mg all other days   Weekly warfarin total:   45 mg   Plan last modified:   Cait Hawthorne RN (2020)   Next INR check:   10/6/2020   Target end date:   Indefinite    Indications    Long-term (current) use of anticoagulants [Z79.01] [Z79.01]  PAF (paroxysmal atrial fibrillation) (H) [I48.0]             Anticoagulation Episode Summary     INR check location:       Preferred lab:       Send INR reminders to:   [facility] [place]    Comments:   5 mg tabs, [facility] (needs staff message) PM dose      Anticoagulation Care Providers     Provider Role Specialty Phone number    Eliz Nguyen MD Referring Internal Medicine [phone]            See the Encounter Report to view Anticoagulation Flowsheet and Dosing Calendar (Go to Encounters tab in chart review, and find the Anticoagulation Therapy Visit)    Dosage adjustment made based on physician directed care plan.      Cait Hawthorne RN                  2. The status of comorbities. (See ED/admit documents)

## 2023-09-13 ENCOUNTER — HOSPITAL ENCOUNTER (OUTPATIENT)
Facility: CLINIC | Age: 63
Discharge: HOME | End: 2023-09-13
Payer: COMMERCIAL

## 2023-09-13 ENCOUNTER — APPOINTMENT (OUTPATIENT)
Dept: LAB | Facility: CLINIC | Age: 63
End: 2023-09-13
Payer: COMMERCIAL

## 2023-09-13 ENCOUNTER — APPOINTMENT (OUTPATIENT)
Dept: MEDSURG UNIT | Facility: CLINIC | Age: 63
End: 2023-09-13
Payer: COMMERCIAL

## 2023-09-13 VITALS
OXYGEN SATURATION: 97 % | TEMPERATURE: 98 F | SYSTOLIC BLOOD PRESSURE: 125 MMHG | HEART RATE: 60 BPM | BODY MASS INDEX: 46.83 KG/M2 | DIASTOLIC BLOOD PRESSURE: 62 MMHG | WEIGHT: 252.87 LBS | RESPIRATION RATE: 18 BRPM

## 2023-09-13 DIAGNOSIS — I50.32 CHRONIC HEART FAILURE WITH PRESERVED EJECTION FRACTION (H): ICD-10-CM

## 2023-09-13 LAB
ANION GAP SERPL CALCULATED.3IONS-SCNC: 10 MMOL/L (ref 7–15)
BASOPHILS # BLD AUTO: 0.1 10E3/UL (ref 0–0.2)
BASOPHILS NFR BLD AUTO: 1 %
BUN SERPL-MCNC: 44.6 MG/DL (ref 8–23)
CALCIUM SERPL-MCNC: 9.9 MG/DL (ref 8.8–10.2)
CHLORIDE SERPL-SCNC: 106 MMOL/L (ref 98–107)
CREAT SERPL-MCNC: 1.46 MG/DL (ref 0.51–0.95)
DEPRECATED HCO3 PLAS-SCNC: 25 MMOL/L (ref 22–29)
EGFRCR SERPLBLD CKD-EPI 2021: 40 ML/MIN/1.73M2
EOSINOPHIL # BLD AUTO: 0.1 10E3/UL (ref 0–0.7)
EOSINOPHIL NFR BLD AUTO: 2 %
ERYTHROCYTE [DISTWIDTH] IN BLOOD BY AUTOMATED COUNT: 15.9 % (ref 10–15)
GLUCOSE SERPL-MCNC: 104 MG/DL (ref 70–99)
HCG INTACT+B SERPL-ACNC: 2 MIU/ML
HCT VFR BLD AUTO: 40.3 % (ref 35–47)
HGB BLD-MCNC: 12.7 G/DL (ref 11.7–15.7)
HGB BLD-MCNC: 12.9 G/DL (ref 11.7–15.7)
IMM GRANULOCYTES # BLD: 0.1 10E3/UL
IMM GRANULOCYTES NFR BLD: 1 %
INR PPP: 1.62 (ref 0.85–1.15)
LYMPHOCYTES # BLD AUTO: 1.2 10E3/UL (ref 0.8–5.3)
LYMPHOCYTES NFR BLD AUTO: 17 %
MCH RBC QN AUTO: 29 PG (ref 26.5–33)
MCHC RBC AUTO-ENTMCNC: 32 G/DL (ref 31.5–36.5)
MCV RBC AUTO: 91 FL (ref 78–100)
MONOCYTES # BLD AUTO: 0.4 10E3/UL (ref 0–1.3)
MONOCYTES NFR BLD AUTO: 6 %
NEUTROPHILS # BLD AUTO: 5.1 10E3/UL (ref 1.6–8.3)
NEUTROPHILS NFR BLD AUTO: 73 %
NRBC # BLD AUTO: 0 10E3/UL
NRBC BLD AUTO-RTO: 0 /100
OXYHGB MFR BLDV: 66 % (ref 92–100)
PLATELET # BLD AUTO: 204 10E3/UL (ref 150–450)
POTASSIUM SERPL-SCNC: 5 MMOL/L (ref 3.4–5.3)
RBC # BLD AUTO: 4.45 10E6/UL (ref 3.8–5.2)
SODIUM SERPL-SCNC: 141 MMOL/L (ref 136–145)
WBC # BLD AUTO: 6.9 10E3/UL (ref 4–11)

## 2023-09-13 PROCEDURE — 85610 PROTHROMBIN TIME: CPT | Performed by: INTERNAL MEDICINE

## 2023-09-13 PROCEDURE — 250N000009 HC RX 250: Performed by: INTERNAL MEDICINE

## 2023-09-13 PROCEDURE — 36415 COLL VENOUS BLD VENIPUNCTURE: CPT | Performed by: INTERNAL MEDICINE

## 2023-09-13 PROCEDURE — 999N000142 HC STATISTIC PROCEDURE PREP ONLY

## 2023-09-13 PROCEDURE — 82810 BLOOD GASES O2 SAT ONLY: CPT

## 2023-09-13 PROCEDURE — 84702 CHORIONIC GONADOTROPIN TEST: CPT | Performed by: INTERNAL MEDICINE

## 2023-09-13 PROCEDURE — 999N000132 HC STATISTIC PP CARE STAGE 1

## 2023-09-13 PROCEDURE — C1751 CATH, INF, PER/CENT/MIDLINE: HCPCS | Performed by: INTERNAL MEDICINE

## 2023-09-13 PROCEDURE — 93451 RIGHT HEART CATH: CPT | Performed by: INTERNAL MEDICINE

## 2023-09-13 PROCEDURE — 93451 RIGHT HEART CATH: CPT | Mod: 26 | Performed by: INTERNAL MEDICINE

## 2023-09-13 PROCEDURE — 272N000001 HC OR GENERAL SUPPLY STERILE: Performed by: INTERNAL MEDICINE

## 2023-09-13 PROCEDURE — 85004 AUTOMATED DIFF WBC COUNT: CPT | Performed by: INTERNAL MEDICINE

## 2023-09-13 PROCEDURE — 80048 BASIC METABOLIC PNL TOTAL CA: CPT | Performed by: INTERNAL MEDICINE

## 2023-09-13 PROCEDURE — 85018 HEMOGLOBIN: CPT | Mod: 91

## 2023-09-13 RX ORDER — LIDOCAINE 40 MG/G
CREAM TOPICAL
Status: COMPLETED | OUTPATIENT
Start: 2023-09-13 | End: 2023-09-13

## 2023-09-13 RX ADMIN — LIDOCAINE: 40 CREAM TOPICAL at 11:44

## 2023-09-13 ASSESSMENT — ACTIVITIES OF DAILY LIVING (ADL)
ADLS_ACUITY_SCORE: 37

## 2023-09-13 NOTE — PROGRESS NOTES
Patient arrived to room via litter with cath lab RN s/p right heart cath . VSS. Denies pain. Pt alert and oriented x4. Right internal jugular site CDI.

## 2023-09-13 NOTE — PROGRESS NOTES
PIV removed. Discharge paperwork reviewed with patient, pt stated understanding. Kevin (spouse) will transport patient home

## 2023-09-13 NOTE — PROGRESS NOTES
Pt arrived to 2A from home for right heart cath. VSS. Denies pain. Waiting on consent and lab result. Need updated H&P . Allergies reviewed with pt. Appropriately NPO.  Prep completed. Kevin (spouse) at bedside; will be transporting patient to home. [phone]

## 2023-09-13 NOTE — Clinical Note
dry, intact, no bleeding and no hematoma. 7fr sheath pulled from RIJ. Manual pressure applied and hemostasis attained. Band aid in place.

## 2023-09-13 NOTE — DISCHARGE INSTRUCTIONS
[facility]                        Interventional Cardiology  Discharge Instructions   Post Right Heart Cath and/or Heart Biopsy      AFTER YOU GO HOME:  DO drink plenty of fluids  DO resume your regular diet and medications unless otherwise instructed by your Primary Physician  Do Not scrub the procedure site vigorously  No lotion or powder to the puncture site for 3 days    CALL YOUR PRIMARY PHYSICIAN IF: You may resume all normal activity.  Monitor neck site for bleeding, swelling, or voice changes. If you notice bleeding or swelling immediately apply pressure to the site and call number below to speak with Cardiology Fellow.  If you experience any changes in your breathing you should call your doctor immediately or come to the closest Emergency Department.  Do not drive yourself.    ADDITIONAL INSTRUCTIONS: Medications: You are to resume all home medications including anticoagulation therapy unless otherwise advised by your primary cardiologist or nurse coordinator.    Follow Up: Per your primary cardiology team    If you have any questions or concerns regarding your procedure site please call [phone] at anytime and ask for Cardiology Fellow on call.  They are available 24 hours a day.  You may also contact the Cardiology Clinic after hours number at [phone].                                                       Telephone Numbers [phone] Monday-Friday 8:00 am to 4:30 pm    [phone] After 4:30 pm Monday-Friday, Weekends & Holidays  Ask for Interventional Cardiologist on call. Someone is on call 24 hours/day   [facility] toll free number [phone] Monday-Friday 8:00 am to 4:30 pm   [facility] Emergency Dept [phone]

## 2023-09-14 ASSESSMENT — ACTIVITIES OF DAILY LIVING (ADL)
ADLS_ACUITY_SCORE: 37

## 2023-09-15 ASSESSMENT — ACTIVITIES OF DAILY LIVING (ADL)
ADLS_ACUITY_SCORE: 37

## 2023-09-18 ENCOUNTER — TELEPHONE (OUTPATIENT)
Dept: ANTICOAGULATION | Facility: CLINIC | Age: 63
End: 2023-09-18
Payer: COMMERCIAL

## 2023-09-20 ENCOUNTER — TELEPHONE (OUTPATIENT)
Dept: CARDIOLOGY | Facility: CLINIC | Age: 63
End: 2023-09-20
Payer: COMMERCIAL

## 2023-09-20 ENCOUNTER — MYC MEDICAL ADVICE (OUTPATIENT)
Dept: CARDIOLOGY | Facility: CLINIC | Age: 63
End: 2023-09-20
Payer: COMMERCIAL

## 2023-09-20 ENCOUNTER — VIRTUAL VISIT (OUTPATIENT)
Dept: CARDIOLOGY | Facility: CLINIC | Age: 63
End: 2023-09-20
Attending: INTERNAL MEDICINE
Payer: COMMERCIAL

## 2023-09-20 VITALS — HEIGHT: 62 IN | WEIGHT: 250 LBS | BODY MASS INDEX: 46.01 KG/M2

## 2023-09-20 DIAGNOSIS — E78.5 HYPERLIPIDEMIA LDL GOAL <100: Chronic | ICD-10-CM

## 2023-09-20 DIAGNOSIS — I25.10 CORONARY ARTERY DISEASE INVOLVING NATIVE CORONARY ARTERY OF NATIVE HEART WITHOUT ANGINA PECTORIS: ICD-10-CM

## 2023-09-20 DIAGNOSIS — I48.91 ATRIAL FIBRILLATION, UNSPECIFIED TYPE (H): ICD-10-CM

## 2023-09-20 DIAGNOSIS — M32.14 SYSTEMIC LUPUS ERYTHEMATOSUS WITH GLOMERULAR DISEASE, UNSPECIFIED SLE TYPE (H): Primary | Chronic | ICD-10-CM

## 2023-09-20 DIAGNOSIS — I50.32 CHRONIC DIASTOLIC HEART FAILURE (H): ICD-10-CM

## 2023-09-20 PROCEDURE — 99443 PR PHYSICIAN TELEPHONE EVALUATION 21-30 MIN: CPT | Mod: 95 | Performed by: INTERNAL MEDICINE

## 2023-09-20 ASSESSMENT — PAIN SCALES - GENERAL: PAINLEVEL: NO PAIN (0)

## 2023-09-20 NOTE — PROGRESS NOTES
Virtual Visit Details    39 minute telephone call    I personally talked with patient regarding her catheterization (right) which really look quite good save for mean arterial pressure of 112.  Patient continues to have chest pain and shortness of breath unexplained by pulmonary hypertension.  In view of known CAB I have suggested that she have angiography and possible stenting  I thoroughly discussed the risks and benefits of the contemplated catherization including bleeding, vessel rupture, death, arrhythmia, embolism, stroke, renal failure perforation of pulmonary artery, aortic,lung or heart.  I discussed how the procedure would be performed and alternative diagnostic and therapeutic management strategies.  All questions were answered.         iagnostic  Dominance: Right  Left Anterior Descending   Ost LAD to Mid LAD lesion is 100% stenosed. The lesion is chronic total occlusion.      Second Diagonal Branch   The vessel is small. There is mild diffuse disease throughout the vessel.      Left Circumflex   Ost Cx to Prox Cx lesion is 60% stenosed.   Prox Cx to Mid Cx lesion is 20% stenosed.      First Obtuse Marginal Branch   The vessel is moderate in size.   1st Mrg lesion is 80% stenosed with 100% stenosed side branch in Lat 1st Mrg.      Lateral First Obtuse Marginal Branch   The vessel is small.      Second Obtuse Marginal Branch   The vessel is moderate in size.      Third Obtuse Marginal Branch   The vessel is small.      Right Coronary Artery   Prox RCA lesion is 20% stenosed. The lesion is eccentric.   Mid RCA to Dist RCA lesion is 10% stenosed.   Dist RCA lesion is 100% stenosed. The lesion is chronic total occlusion.      Right Posterior Descending Artery   RPDA lesion is 20% stenosed.      LIMA Graft To Dist LAD   The graft is large. The graft is angiographically normal.      Graft To Lat 1st Mrg   The graft is large. The graft is angiographically normal.      Graft To RPDA   The graft is large. The  graft is angiographically normal.             Pre Procedure Diagnosis    heart failure assessment       Conclusion         Right sided filling pressures are normal.    Left sided filling pressures are normal.    Mild elevated pulmonary hypertension.    Normal cardiac output level.         Hemodynamics    RA: 6/7/5 mmHg  RV: 43/--/5 mmHg  PA: 41/20/27 mmHg  CWP: 15/15/13 mmHg  CO/CI (Kannan): 5.9/2.8 L/min/m2  CO/CI (TD): 4.5/2.14 L/min/m2    PA sat: 66.4%  MAP: 117 mmHg   PVR: 2.37 GASCA  Right sided filling pressures are normal. Left sided filling pressures are normal. Mild elevated pulmonary hypertension. Normal cardiac output level.     Pressures Phase: Baseline     Time Systolic (mmHg) Diastolic (mmHg) Mean (mmHg) A Wave (mmHg) V Wave (mmHg) EDP (mmHg) Max dp/dt (mmHg/sec) HR (bpm) Content (mL/dL) SAT (%)   RA Pressures 12:45 PM   5    6    7      59        RV Pressures 12:25 PM       873          12:46 PM 43        5     59        PA Pressures 12:47 PM 41    20    27        58        PCW Pressures 12:46 PM   13    15    15      58          Blood Flow Results Phase: Baseline     Time Results Indexed Values (L/min/m2)   QP 12:25 PM 5.91 L/min    2.81      QS 12:25 PM 5.91 L/min    2.81        Blood Oximetry Phase: Baseline     Time Hb SAT(%) PO2 Content (mL/dL) PA Sat (%)   PA 12:25 PM  66.4 %      66.4      Art 12:25 PM  100 %     17.27         Cardiac Output Phase: Baseline     Time TDCO (L/min) TDCI (L/min/m2) Kannan C.O. (L/min) Kannan C.I. (L/min/m2) Kannan HR (bpm)   Cardiac Output Results 12:25 PM 4.5    2.14    5.91    2.81        12:50 PM 4.5            Resistance Results Phase: Baseline     Time PVR SVR TPR TVR PVR/SVR TPR/TVR   Resistance Results (Metric) 12:25 .51 dsc-5     365.48 dsc-5         Resistance Results (Wood) 12:25 PM 2.37 GASCA     4.57 GASCA           Stoke Volume Results Phase: Baseline     Time RVSW (gm*m) LVSW (gm*m) RVSW-I (gm*m/m2) LVSW-I (gm*m/m2)   Stroke Work Results 12:25 PM 30.48     14.49          Hemodynamic Waveforms -- Encounter Level:    Hemodynamic Waveforms: None found at the encounter level.

## 2023-09-20 NOTE — LETTER
9/20/2023      RE: Luz Marina Live  [address]  [place] [address]       Dear Colleague,    Thank you for the opportunity to participate in the care of your patient, Luz Marina Live, at the [facility] [place] [facility]. Please see a copy of my visit note below.    Virtual Visit Details    39 minute telephone call    I personally talked with patient regarding her catheterization (right) which really look quite good save for mean arterial pressure of 112.  Patient continues to have chest pain and shortness of breath unexplained by pulmonary hypertension.  In view of known CAB I have suggested that she have angiography and possible stenting  I thoroughly discussed the risks and benefits of the contemplated catherization including bleeding, vessel rupture, death, arrhythmia, embolism, stroke, renal failure perforation of pulmonary artery, aortic,lung or heart.  I discussed how the procedure would be performed and alternative diagnostic and therapeutic management strategies.  All questions were answered.         iagnostic  Dominance: Right  Left Anterior Descending   Ost LAD to Mid LAD lesion is 100% stenosed. The lesion is chronic total occlusion.      Second Diagonal Branch   The vessel is small. There is mild diffuse disease throughout the vessel.      Left Circumflex   Ost Cx to Prox Cx lesion is 60% stenosed.   Prox Cx to Mid Cx lesion is 20% stenosed.      First Obtuse Marginal Branch   The vessel is moderate in size.   1st Mrg lesion is 80% stenosed with 100% stenosed side branch in Lat 1st Mrg.      Lateral First Obtuse Marginal Branch   The vessel is small.      Second Obtuse Marginal Branch   The vessel is moderate in size.      Third Obtuse Marginal Branch   The vessel is small.      Right Coronary Artery   Prox RCA lesion is 20% stenosed. The lesion is eccentric.   Mid RCA to Dist RCA lesion is 10% stenosed.   Dist RCA lesion is  100% stenosed. The lesion is chronic total occlusion.      Right Posterior Descending Artery   RPDA lesion is 20% stenosed.      LIMA Graft To Dist LAD   The graft is large. The graft is angiographically normal.      Graft To Lat 1st Mrg   The graft is large. The graft is angiographically normal.      Graft To RPDA   The graft is large. The graft is angiographically normal.             Pre Procedure Diagnosis    heart failure assessment       Conclusion         Right sided filling pressures are normal.    Left sided filling pressures are normal.    Mild elevated pulmonary hypertension.    Normal cardiac output level.         Hemodynamics    RA: 6/7/5 mmHg  RV: 43/--/5 mmHg  PA: 41/20/27 mmHg  CWP: 15/15/13 mmHg  CO/CI (Kannan): 5.9/2.8 L/min/m2  CO/CI (TD): 4.5/2.14 L/min/m2    PA sat: 66.4%  MAP: 117 mmHg   PVR: 2.37 GASCA  Right sided filling pressures are normal. Left sided filling pressures are normal. Mild elevated pulmonary hypertension. Normal cardiac output level.     Pressures Phase: Baseline     Time Systolic (mmHg) Diastolic (mmHg) Mean (mmHg) A Wave (mmHg) V Wave (mmHg) EDP (mmHg) Max dp/dt (mmHg/sec) HR (bpm) Content (mL/dL) SAT (%)   RA Pressures 12:45 PM   5    6    7      59        RV Pressures 12:25 PM       873          12:46 PM 43        5     59        PA Pressures 12:47 PM 41    20    27        58        PCW Pressures 12:46 PM   13    15    15      58          Blood Flow Results Phase: Baseline     Time Results Indexed Values (L/min/m2)   QP 12:25 PM 5.91 L/min    2.81      QS 12:25 PM 5.91 L/min    2.81        Blood Oximetry Phase: Baseline     Time Hb SAT(%) PO2 Content (mL/dL) PA Sat (%)   PA 12:25 PM  66.4 %      66.4      Art 12:25 PM  100 %     17.27         Cardiac Output Phase: Baseline     Time TDCO (L/min) TDCI (L/min/m2) Kannan C.O. (L/min) Kannan C.I. (L/min/m2) Kannan HR (bpm)   Cardiac Output Results 12:25 PM 4.5    2.14    5.91    2.81        12:50 PM 4.5            Resistance Results Phase:  Baseline     Time PVR SVR TPR TVR PVR/SVR TPR/TVR   Resistance Results (Metric) 12:25 .51 dsc-5     365.48 dsc-5         Resistance Results (Wood) 12:25 PM 2.37 GASCA     4.57 GASCA           Stoke Volume Results Phase: Baseline     Time RVSW (gm*m) LVSW (gm*m) RVSW-I (gm*m/m2) LVSW-I (gm*m/m2)   Stroke Work Results 12:25 PM 30.48     14.49         Hemodynamic Waveforms -- Encounter Level:    Hemodynamic Waveforms: None found at the encounter level.      Please do not hesitate to contact me if you have any questions/concerns.     Sincerely,     Twin Moreno MD

## 2023-09-20 NOTE — NURSING NOTE
Patient confirms medications and allergies are accurate via patients echeck in completion, and or denies any changes since last reviewed/verified.       Maryan Mehta, Virtual Facilitator  Is the patient currently in the state of [place]? YES    Visit mode:TELEPHONE    If the visit is dropped, the patient can be reconnected by: TELEPHONE VISIT: Phone number:   Telephone Information:   Mobile [phone]       Will anyone else be joining the visit? NO  (If patient encounters technical issues they should call [phone]    How would you like to obtain your AVS? MyChart    Are changes needed to the allergy or medication list? No    Reason for visit: RECHECK    Maryan Mehta VVF

## 2023-09-20 NOTE — TELEPHONE ENCOUNTER
[facility] Call Center    Phone Message    May a detailed message be left on voicemail: yes     Reason for Call: Other: Pt states she never got her telephone call so she does not want to be charged, she only received a call from a nurse then no other call, pt would like a call to discuss.     Action Taken: Other: Cardiology    Travel Screening: Not Applicable    Thank you!  [facility]

## 2023-09-20 NOTE — PATIENT INSTRUCTIONS
You were seen today in the Cardiovascular Clinic at the [facility].      Cardiology Providers you saw during your visit:  Dr. Twin Moreno     Recommendations:   Please have a Coronary Angiogram completed sometime in the 2nd week of October.   Please follow-up with Dr. Moreno virtually after the Cath.      Pre-procedure instructions - Coronary Angiogram  Patient Education    Your arrival time is TBD.  Location is [address] Waiting Room  Please plan on being at the hospital all day.  At any time, emergencies and/or urgent cases may come up which could delay the start of your procedure.    Pre-procedure instructions - Coronary Angiogram  Shower in the evening before or the morning of the procedure  No solid food for 8 hours prior and nothing to drink 2 hours prior to arrival time  You can take your morning medications (except for diabetic and blood thinners) with sips of water.  Take 325 mg of Asprin 24 hours prior to the procedure and the morning of procedure.   You will need to arrange a ride to drop you off and pick you up, as you will be unable to drive home.  Prior to discharge you may be required to lay flat for approximately 2-4 hours in the recovery unit to ensure proper clotting of the artery.              Diabetic Medication Instructions  Hold oral diabetic medication in morning of your procedure and for 48 hours after IV contrast is given  Typical instructions for insulin diabetic medication holding are below. However, please reach out to your Primary Care Provider or Endocrinologist for specific instructions  DO NOT take any oral diabetic medication, short-acting diabetes medications/insulin, humalog or regular insulin the morning of your test  Take   dose of long-acting insulin (Lantus, Levemir) the day of your test  Remember to bring your glucometer and insulin with you to take after your test if  "needed  DO NOT take injectable GLP-1 agonists semaglutide (Ozempic, Wegovy), dulaglutide (Trulicity), exenatide ER (Bydureon), tirzepatide (Mounjaro), or oral semaglutide (Rybelsus) for 7 days prior your procedure  Hold once daily injectable GLP-1 agonists exenatide (Byetta), liraglutide (Saxenda, Victoza) the day before and day of your procedure                  Anticoagulation Medication Instructions   warfarin (COUMADIN) - Hold 4 Days prior to procedure          Eat a heart healthy, low sodium diet.  Get 20 to 30 minutes of aerobic exercise 4 to 5 times per week as tolerated. (Examples of aerobic exercise include: walking, bicycling, swimming, running).     Thank you for your visit today!   Please MyChart message or call if you have any questions or concerns.      During Business Hours:  [phone], option # 1 ([facility])       After hours, weekends or holidays:   [phone], Option #4  Ask to speak to the On-Call Cardiologist. Inform them you are a heart failure patient at the [facility].      Kaylie Tran RN BSN CHFN  Cardiology Care Coordinator - C.O.R.E. [facility]  Questions and [phone]  First press #1 for the Heart Buddy and then press #4 for \"Your Care Team\" to reach us Cardiology Nurses.                "

## 2023-09-21 RX ORDER — ASPIRIN 325 MG
325 TABLET ORAL ONCE
Status: CANCELLED | OUTPATIENT
Start: 2023-09-21 | End: 2023-09-21

## 2023-09-21 RX ORDER — SODIUM CHLORIDE 9 MG/ML
INJECTION, SOLUTION INTRAVENOUS CONTINUOUS
Status: CANCELLED | OUTPATIENT
Start: 2023-09-21

## 2023-09-21 RX ORDER — ASPIRIN 81 MG/1
243 TABLET, CHEWABLE ORAL ONCE
Status: CANCELLED | OUTPATIENT
Start: 2023-09-21

## 2023-09-21 RX ORDER — LIDOCAINE 40 MG/G
CREAM TOPICAL
Status: CANCELLED | OUTPATIENT
Start: 2023-09-21

## 2023-09-21 NOTE — CONFIDENTIAL NOTE
Dr. Moreno did connect with Luz Marina calderon 5pm 9/20 and they did complete the visit. Kaylie Tran RN

## 2023-09-26 ENCOUNTER — TELEPHONE (OUTPATIENT)
Dept: ANTICOAGULATION | Facility: CLINIC | Age: 63
End: 2023-09-26
Payer: COMMERCIAL

## 2023-09-26 NOTE — TELEPHONE ENCOUNTER
ANTICOAGULATION     Luz Marina Live is overdue for INR check.      Left message for patient to call and schedule lab appointment as soon as possible. If returning call, please schedule.     Lolita Gama RN

## 2023-10-03 ENCOUNTER — TELEPHONE (OUTPATIENT)
Dept: ANTICOAGULATION | Facility: CLINIC | Age: 63
End: 2023-10-03
Payer: COMMERCIAL

## 2023-10-03 ENCOUNTER — MYC MEDICAL ADVICE (OUTPATIENT)
Dept: ANTICOAGULATION | Facility: CLINIC | Age: 63
End: 2023-10-03
Payer: COMMERCIAL

## 2023-10-03 DIAGNOSIS — Z79.01 LONG TERM CURRENT USE OF ANTICOAGULANT THERAPY: Primary | Chronic | ICD-10-CM

## 2023-10-03 DIAGNOSIS — I48.91 ATRIAL FIBRILLATION, UNSPECIFIED TYPE (H): ICD-10-CM

## 2023-10-03 DIAGNOSIS — I48.0 PAF (PAROXYSMAL ATRIAL FIBRILLATION) (H): Chronic | ICD-10-CM

## 2023-10-03 NOTE — TELEPHONE ENCOUNTER
11:39 AM    Patient is having a coronary angiogram on 10/9/23. Per my chart message on 9/20/23, patient is to hold Warfarin 4 days prior to the procedure. Please advise if patient may need to bridge.     Red Blackman RN, BSN, N  Anticoagulation Clinic   [phone]

## 2023-10-03 NOTE — TELEPHONE ENCOUNTER
2:49 PM    MCM sent to patient with the procedure instructions. She is overdue for an INR and states she will go in tomorrow to have the INR checked.    Red Blackman RN, BSN, PHN  Anticoagulation Clinic   [phone]

## 2023-10-03 NOTE — TELEPHONE ENCOUNTER
"TITUS-PROCEDURAL ANTICOAGULATION  MANAGEMENT    ASSESSMENT     Warfarin interruption plan for angiogram on 10/09/2023.    Indication for Anticoagulation: Atrial Fibrillation    [id number] = 3 (Hypertension, Vascular- PCI, and Female)      Titus-Procedure Risk stratification for thromboembolism: low (2017 ACC periprocedure pathway for NVAF Expert Consensus)    NVAF: 2017 ACC periprocedure pathway for NVAF advises NO bridge for low risk stratification ([id number] score <=4 and no prior hx of stroke, TIA or systemic embolism)     RECOMMENDATION     Pre-Procedure:  Hold warfarin for 4 days, until after procedure starting: 10/05/2023   No Bridge    Post-Procedure:  Resume warfarin dose if okay with provider doing procedure on night of procedure, 10/09/2023 PM: 7.5mg  Recheck INR ~ 7 days after resuming warfarin     Plan NOT routed to referring provider for approval, cardiology has advised plan per guidelines  ?   Radha Mcduffie [facility]    SUBJECTIVE/OBJECTIVE     Luz Marina Live, a 63 year old female    Goal INR Range: 2.0-3.0     Patient bridged in past: Yes: 2015 while inpatient    Wt Readings from Last 3 Encounters:   09/20/23 113.4 kg (250 lb)   09/13/23 114.7 kg (252 lb 13.9 oz)   07/26/23 108.9 kg (240 lb)      Ideal body weight: 50.1 kg (110 lb 7.2 oz)  Adjusted ideal body weight: 75.4 kg (166 lb 4.3 oz)     Estimated body mass index is 45.73 kg/m  as calculated from the following:    Height as of 9/20/23: 1.575 m (5' 2\").    Weight as of 9/20/23: 113.4 kg (250 lb).    Lab Results   Component Value Date    INR 1.62 (H) 09/13/2023    INR 2.0 (H) 08/30/2023    INR 3.4 (H) 08/21/2023     Lab Results   Component Value Date    HGB 12.7 09/13/2023    HCT 40.3 09/13/2023     09/13/2023     Lab Results   Component Value Date    CR 1.46 (H) 09/13/2023    CR 1.21 (H) 07/25/2023    CR 1.35 (H) 05/30/2023     Estimated Creatinine Clearance: 46.9 mL/min (A) (based on SCr of 1.46 mg/dL (H)).    "

## 2023-10-04 ENCOUNTER — TELEPHONE (OUTPATIENT)
Dept: CARDIOLOGY | Facility: CLINIC | Age: 63
End: 2023-10-04
Payer: COMMERCIAL

## 2023-10-04 ENCOUNTER — ANTICOAGULATION THERAPY VISIT (OUTPATIENT)
Dept: ANTICOAGULATION | Facility: CLINIC | Age: 63
End: 2023-10-04

## 2023-10-04 ENCOUNTER — LAB (OUTPATIENT)
Dept: LAB | Facility: CLINIC | Age: 63
End: 2023-10-04
Payer: COMMERCIAL

## 2023-10-04 DIAGNOSIS — M32.14 SYSTEMIC LUPUS ERYTHEMATOSUS WITH GLOMERULAR DISEASE, UNSPECIFIED SLE TYPE (H): ICD-10-CM

## 2023-10-04 DIAGNOSIS — E66.01 MORBID OBESITY (H): ICD-10-CM

## 2023-10-04 DIAGNOSIS — I25.10 CORONARY ARTERY DISEASE INVOLVING NATIVE HEART WITHOUT ANGINA PECTORIS, UNSPECIFIED VESSEL OR LESION TYPE: ICD-10-CM

## 2023-10-04 DIAGNOSIS — I48.0 PAF (PAROXYSMAL ATRIAL FIBRILLATION) (H): Chronic | ICD-10-CM

## 2023-10-04 DIAGNOSIS — Z79.899 LONG-TERM USE OF PLAQUENIL: ICD-10-CM

## 2023-10-04 DIAGNOSIS — Z79.01 LONG TERM CURRENT USE OF ANTICOAGULANT THERAPY: Primary | Chronic | ICD-10-CM

## 2023-10-04 DIAGNOSIS — Z79.01 LONG TERM CURRENT USE OF ANTICOAGULANT THERAPY: ICD-10-CM

## 2023-10-04 DIAGNOSIS — I48.91 ATRIAL FIBRILLATION, UNSPECIFIED TYPE (H): ICD-10-CM

## 2023-10-04 DIAGNOSIS — N18.30 STAGE 3 CHRONIC KIDNEY DISEASE, UNSPECIFIED WHETHER STAGE 3A OR 3B CKD (H): ICD-10-CM

## 2023-10-04 DIAGNOSIS — Z79.60 LONG-TERM USE OF IMMUNOSUPPRESSANT MEDICATION: ICD-10-CM

## 2023-10-04 DIAGNOSIS — I20.89 STABLE ANGINA (H): ICD-10-CM

## 2023-10-04 LAB
ALBUMIN UR-MCNC: NEGATIVE MG/DL
APPEARANCE UR: CLEAR
BACTERIA #/AREA URNS HPF: ABNORMAL /HPF
BASO+EOS+MONOS # BLD AUTO: NORMAL 10*3/UL
BASO+EOS+MONOS NFR BLD AUTO: NORMAL %
BASOPHILS # BLD AUTO: 0.1 10E3/UL (ref 0–0.2)
BASOPHILS NFR BLD AUTO: 1 %
BILIRUB UR QL STRIP: NEGATIVE
COLOR UR AUTO: YELLOW
EOSINOPHIL # BLD AUTO: 0.2 10E3/UL (ref 0–0.7)
EOSINOPHIL NFR BLD AUTO: 2 %
ERYTHROCYTE [DISTWIDTH] IN BLOOD BY AUTOMATED COUNT: 14.9 % (ref 10–15)
ERYTHROCYTE [SEDIMENTATION RATE] IN BLOOD BY WESTERGREN METHOD: 70 MM/HR (ref 0–30)
GLUCOSE UR STRIP-MCNC: 100 MG/DL
HCT VFR BLD AUTO: 38.9 % (ref 35–47)
HGB BLD-MCNC: 12.4 G/DL (ref 11.7–15.7)
HGB UR QL STRIP: ABNORMAL
IMM GRANULOCYTES # BLD: 0 10E3/UL
IMM GRANULOCYTES NFR BLD: 1 %
INR BLD: 2.1 (ref 0.9–1.1)
KETONES UR STRIP-MCNC: NEGATIVE MG/DL
LEUKOCYTE ESTERASE UR QL STRIP: NEGATIVE
LYMPHOCYTES # BLD AUTO: 0.9 10E3/UL (ref 0.8–5.3)
LYMPHOCYTES NFR BLD AUTO: 14 %
MCH RBC QN AUTO: 29.2 PG (ref 26.5–33)
MCHC RBC AUTO-ENTMCNC: 31.9 G/DL (ref 31.5–36.5)
MCV RBC AUTO: 92 FL (ref 78–100)
MONOCYTES # BLD AUTO: 0.5 10E3/UL (ref 0–1.3)
MONOCYTES NFR BLD AUTO: 8 %
NEUTROPHILS # BLD AUTO: 4.8 10E3/UL (ref 1.6–8.3)
NEUTROPHILS NFR BLD AUTO: 75 %
NITRATE UR QL: NEGATIVE
PH UR STRIP: 7 [PH] (ref 5–7)
PLATELET # BLD AUTO: 211 10E3/UL (ref 150–450)
RBC # BLD AUTO: 4.24 10E6/UL (ref 3.8–5.2)
RBC #/AREA URNS AUTO: ABNORMAL /HPF
SP GR UR STRIP: 1.02 (ref 1–1.03)
SQUAMOUS #/AREA URNS AUTO: ABNORMAL /LPF
UROBILINOGEN UR STRIP-ACNC: 0.2 E.U./DL
WBC # BLD AUTO: 6.4 10E3/UL (ref 4–11)
WBC #/AREA URNS AUTO: ABNORMAL /HPF

## 2023-10-04 PROCEDURE — 84450 TRANSFERASE (AST) (SGOT): CPT

## 2023-10-04 PROCEDURE — 86160 COMPLEMENT ANTIGEN: CPT

## 2023-10-04 PROCEDURE — 86160 COMPLEMENT ANTIGEN: CPT | Mod: 59

## 2023-10-04 PROCEDURE — 81001 URINALYSIS AUTO W/SCOPE: CPT

## 2023-10-04 PROCEDURE — 82565 ASSAY OF CREATININE: CPT

## 2023-10-04 PROCEDURE — 85025 COMPLETE CBC W/AUTO DIFF WBC: CPT

## 2023-10-04 PROCEDURE — 84460 ALANINE AMINO (ALT) (SGPT): CPT

## 2023-10-04 PROCEDURE — 84156 ASSAY OF PROTEIN URINE: CPT

## 2023-10-04 PROCEDURE — 86225 DNA ANTIBODY NATIVE: CPT

## 2023-10-04 PROCEDURE — 86140 C-REACTIVE PROTEIN: CPT

## 2023-10-04 PROCEDURE — 36415 COLL VENOUS BLD VENIPUNCTURE: CPT

## 2023-10-04 PROCEDURE — 36416 COLLJ CAPILLARY BLOOD SPEC: CPT

## 2023-10-04 PROCEDURE — 82040 ASSAY OF SERUM ALBUMIN: CPT

## 2023-10-04 PROCEDURE — 85652 RBC SED RATE AUTOMATED: CPT

## 2023-10-04 PROCEDURE — 85610 PROTHROMBIN TIME: CPT

## 2023-10-04 NOTE — TELEPHONE ENCOUNTER
CORS Prep Instructions sent to Luz Marina via [employer]. Kaylie Tran RN       Pre-procedure instructions - Coronary Angiogram  Patient Education     Your arrival time is 1130am on Monday 10/9/23.  Location is [address] Waiting Room  Please plan on being at the hospital all day.  At any time, emergencies and/or urgent cases may come up which could delay the start of your procedure.     Pre-procedure instructions - Coronary Angiogram  Shower in the evening before or the morning of the procedure  No solid food for 8 hours prior and nothing to drink 2 hours prior to arrival time  You can take your morning medications (except for diabetic and blood thinners) with sips of water.  Take 325 mg of Asprin 24 hours prior to the procedure and the morning of procedure.   You will need to arrange a ride to drop you off and pick you up, as you will be unable to drive home.  Prior to discharge you may be required to lay flat for approximately 2-4 hours in the recovery unit to ensure proper clotting of the artery.               Diabetic Medication Instructions  Hold oral diabetic medication in morning of your procedure and for 48 hours after IV contrast is given  Typical instructions for insulin diabetic medication holding are below. However, please reach out to your Primary Care Provider or Endocrinologist for specific instructions  DO NOT take any oral diabetic medication, short-acting diabetes medications/insulin, humalog or regular insulin the morning of your test  Take   dose of long-acting insulin (Lantus, Levemir) the day of your test  Remember to bring your glucometer and insulin with you to take after your test if needed  DO NOT take injectable GLP-1 agonists semaglutide (Ozempic, Wegovy), dulaglutide (Trulicity), exenatide ER (Bydureon), tirzepatide (Mounjaro), or oral semaglutide (Rybelsus) for 7 days prior your procedure  Hold  once daily injectable GLP-1 agonists exenatide (Byetta), liraglutide (Saxenda, Victoza) the day before and day of your procedure                 Anticoagulation Medication Instructions   warfarin (COUMADIN) - Hold 4 Days prior to procedure

## 2023-10-04 NOTE — TELEPHONE ENCOUNTER
----- Message from Kaylie Tran RN sent at 9/29/2023  2:29 PM CDT -----  Regarding: Prep instructions    ----- Message -----  From: Evelin Mccracken  Sent: 9/21/2023   9:12 AM CDT  To: Kaylie Tran RN    Cath Lab Case Request/Order    Location: [address] Waiting Room    Procedure: Coronary Angio w/possible PCI    Procedure Date: 10-9-23    Patient Arrival Time: 11:30 am    Procedure Time: 3rd case to follow    Ordering Provider: Dr. Twin Moreno    Performing Cardiologist: Dr. Derek Escoto    Inpatient Bed Needed: No    Post-  Procedure KRISTIAN appointment scheduled (1 - 2 weeks): NO     Date:      Provider:     Communicated Patient Instructions:     NPO, nothing to eat 8 hours and drink 2 hours before arrival time: No     , need to arrange a ride home - unable to drive post- procedure: No     Adult at home, need a responsible adult to stay with patient 24 hours post- procedure: NO    Appointment was scheduled: Over the phone    Patient expressed understanding of above instructions and denied further questions at this time.    Evelin Mccracken

## 2023-10-04 NOTE — PROGRESS NOTES
ANTICOAGULATION MANAGEMENT     Luz Marina Live 63 year old female is on warfarin with therapeutic INR result. (Goal INR 2.0-3.0)    Recent labs: (last 7 days)     10/04/23  1327   INR 2.1*       ASSESSMENT     Warfarin Lab Questionnaire    Warfarin Doses Last 7 Days      10/4/2023    11:31 AM   Dose in Tablet or Mg   TAB or MG? milligram (mg)     Pt Rptd Dose HARLAN MONDAY TUESDAY WED THURS FRIDAY SATURDAY   10/4/2023  11:31 AM 7.5 7.5 7.5 7.5 7.5 7.5 10         10/4/2023   Warfarin Lab Questionnaire   Missed doses within past 14 days? No   Changes in diet or alcohol within past 14 days? No   Medication changes since last result? No   Injuries or illness since last result? No   New shortness of breath, severe headaches or sudden changes in vision since last result? No   Abnormal bleeding since last result? No   Upcoming surgery, procedure? Yes   Please explain, date scheduled? October 9, 2023  angiogram     Previous result: Subtherapeutic  Additional findings: Upcoming surgery/procedure 10/9 angiogram- 4 day hold, no bridge       PLAN     Recommended plan for no diet, medication or health factor changes affecting INR     Dosing Instructions: hold 4 doses then continue your current warfarin dose with next INR in 2 weeks       Summary  As of 10/4/2023      Full warfarin instructions:  10/5: Hold; 10/6: Hold; 10/7: Hold; 10/8: Hold; Otherwise 10 mg every Sat; 7.5 mg all other days   Next INR check:  10/16/2023               Detailed voice message left for Luz Marina with dosing instructions and follow up date.     Contact [phone]  to schedule and with any changes, questions or concerns.     Education provided:   Please call back if any changes to your diet, medications or how you've been taking warfarin    Plan made per ACC anticoagulation protocol    Deborah Alatorre RN  Anticoagulation Clinic  10/4/2023    _______________________________________________________________________     Anticoagulation Episode Summary        Current INR goal:  2.0-3.0   TTR:  68.4 % (1 y)   Target end date:  Indefinite   Send INR reminders to:  DEBBIE GREG SANNA    Indications    Long-term (current) use of anticoagulants [Z79.01] [Z79.01]  PAF (paroxysmal atrial fibrillation) (H) [I48.0]  Atrial fibrillation  unspecified type (H) [I48.91]             Comments:               Anticoagulation Care Providers       Provider Role Specialty Phone number    Eliz Nguyen MD Referring Internal Medicine [phone]    Elodia Tang MD Referring Family Medicine [phone]

## 2023-10-05 LAB
ALBUMIN MFR UR ELPH: 11.3 MG/DL
ALBUMIN SERPL BCG-MCNC: 3.6 G/DL (ref 3.5–5.2)
ALT SERPL W P-5'-P-CCNC: 20 U/L (ref 0–50)
AST SERPL W P-5'-P-CCNC: 29 U/L (ref 0–45)
C3 SERPL-MCNC: 125 MG/DL (ref 81–157)
C4 SERPL-MCNC: 19 MG/DL (ref 13–39)
CREAT SERPL-MCNC: 1.47 MG/DL (ref 0.51–0.95)
CREAT UR-MCNC: 71.6 MG/DL
CRP SERPL-MCNC: 66.2 MG/L
DSDNA AB SER-ACNC: 23 IU/ML
EGFRCR SERPLBLD CKD-EPI 2021: 40 ML/MIN/1.73M2
PROT/CREAT 24H UR: 0.16 MG/MG CR (ref 0–0.2)

## 2023-10-06 ENCOUNTER — ANCILLARY PROCEDURE (OUTPATIENT)
Dept: MAMMOGRAPHY | Facility: CLINIC | Age: 63
End: 2023-10-06
Attending: INTERNAL MEDICINE
Payer: COMMERCIAL

## 2023-10-06 ENCOUNTER — OFFICE VISIT (OUTPATIENT)
Dept: INTERNAL MEDICINE | Facility: CLINIC | Age: 63
End: 2023-10-06
Payer: COMMERCIAL

## 2023-10-06 VITALS
DIASTOLIC BLOOD PRESSURE: 62 MMHG | BODY MASS INDEX: 46.35 KG/M2 | WEIGHT: 251.9 LBS | HEART RATE: 55 BPM | SYSTOLIC BLOOD PRESSURE: 101 MMHG | OXYGEN SATURATION: 99 % | HEIGHT: 62 IN

## 2023-10-06 DIAGNOSIS — M79.671 PAIN IN BOTH FEET: ICD-10-CM

## 2023-10-06 DIAGNOSIS — Z00.00 PREVENTATIVE HEALTH CARE: Primary | ICD-10-CM

## 2023-10-06 DIAGNOSIS — M79.672 PAIN IN BOTH FEET: ICD-10-CM

## 2023-10-06 DIAGNOSIS — N95.0 POSTMENOPAUSAL BLEEDING: ICD-10-CM

## 2023-10-06 DIAGNOSIS — Z12.31 ENCOUNTER FOR SCREENING MAMMOGRAM FOR BREAST CANCER: ICD-10-CM

## 2023-10-06 PROCEDURE — 99396 PREV VISIT EST AGE 40-64: CPT | Mod: 25 | Performed by: INTERNAL MEDICINE

## 2023-10-06 PROCEDURE — 99000 SPECIMEN HANDLING OFFICE-LAB: CPT | Performed by: PATHOLOGY

## 2023-10-06 PROCEDURE — 87624 HPV HI-RISK TYP POOLED RSLT: CPT | Performed by: INTERNAL MEDICINE

## 2023-10-06 PROCEDURE — 90471 IMMUNIZATION ADMIN: CPT | Performed by: INTERNAL MEDICINE

## 2023-10-06 PROCEDURE — 77067 SCR MAMMO BI INCL CAD: CPT | Performed by: RADIOLOGY

## 2023-10-06 PROCEDURE — G0145 SCR C/V CYTO,THINLAYER,RESCR: HCPCS | Performed by: INTERNAL MEDICINE

## 2023-10-06 PROCEDURE — 90686 IIV4 VACC NO PRSV 0.5 ML IM: CPT | Performed by: INTERNAL MEDICINE

## 2023-10-06 NOTE — PROGRESS NOTES
SUBJECTIVE:   CC: Luz Marina Live is an 63 year old woman who presents for preventive health visit.       Patient has been advised of split billing requirements and indicates understanding: Yes  Healthy Habits:  Do you get at least three servings of calcium containing foods daily (dairy, green leafy vegetables, etc.)? yes  Amount of exercise or daily activities, outside of work: not consistent  Problems taking medications regularly No  Medication side effects: No  Have you had an eye exam in the past two years? yes  Do you see a dentist twice per year? yes  Do you have sleep apnea, excessive snoring or daytime drowsiness?yes        Today's PHQ-2 Score:       9/20/2023    12:39 PM 9/9/2022    10:50 AM   PHQ-2 ( 1999 [employer])   Q1: Little interest or pleasure in doing things 0 0   Q2: Feeling down, depressed or hopeless 0 0   PHQ-2 Score 0 0   PHQ-2 Total Score (12-17 Years)- Positive if 3 or more points; Administer PHQ-A if positive  0       Abuse: Current or Past(Physical, Sexual or Emotional)- No  Do you feel safe in your environment? Yes        Social History     Tobacco Use    Smoking status: Former    Smokeless tobacco: Never    Tobacco comments:     30 plus years ago.   Substance Use Topics    Alcohol use: No     If you drink alcohol do you typically have >3 drinks per day or >7 drinks per week? No                     Reviewed orders with patient.  Reviewed health maintenance and updated orders accordingly - Yes  Labs reviewed in EPIC    FHS-7:        No data to display                Mammogram Screening: Recommended mammography every 1-2 years with patient discussion and risk factor consideration  Pertinent mammograms are reviewed under the imaging tab.    Pertinent mammograms are reviewed under the imaging tab.  History of abnormal Pap smear: NO - age 30-65 PAP every 5 years with negative HPV co-testing recommended      Latest Ref Rng & Units 10/29/2018    11:57 AM 10/29/2018    11:00 AM 6/20/2012     1:24 AM    PAP / HPV   PAP (Historical)  NIL   NIL    HPV 16 DNA NEG^Negative  Negative     HPV 18 DNA NEG^Negative  Negative     Other HR HPV NEG^Negative  Negative       Reviewed and updated as needed this visit by clinical staff    Allergies  Meds              Reviewed and updated as needed this visit by Provider                 Past Medical History:   Diagnosis Date    Hypertension     Hypovolemic shock (H) 2/28/2015    Lupus (H)     Sepsis (H) 8/22/2015      Past Surgical History:   Procedure Laterality Date    CARDIAC SURGERY  08/27/2009    triple vessel coronary artery bypass grafting on 8/27/09    CARPAL TUNNEL RELEASE RT/LT  1996    bilateral    CV CORONARY ANGIOGRAM  10/17/2019    Procedure: CV CORONARY ANGIOGRAM;  Surgeon: Mahendra Collins MD;  Location:  HEART CARDIAC CATH LAB    CV CORONARY ANGIOGRAM N/A 8/20/2021    Procedure: CV CORONARY ANGIOGRAM;  Surgeon: Derek Escoto MD;  Location:  HEART CARDIAC CATH LAB    CV PCI STENT DRUG ELUTING N/A 10/17/2019    Procedure: PCI Stent Drug Eluting;  Surgeon: Mahendra Collins MD;  Location:  HEART CARDIAC CATH LAB    CV RIGHT HEART CATH MEASUREMENTS RECORDED N/A 8/20/2021    Procedure: CV RIGHT HEART CATH;  Surgeon: Derek Escoto MD;  Location:  HEART CARDIAC CATH LAB    CV RIGHT HEART CATH MEASUREMENTS RECORDED N/A 9/13/2023    Procedure: Heart Cath Right Heart Cath;  Surgeon: Con Burkett MD;  Location:  HEART CARDIAC CATH LAB       ROS:  CONSTITUTIONAL: NEGATIVE for fever, chills, change in weight  INTEGUMENTARY/SKIN: NEGATIVE for worrisome rashes, moles or lesions  EYES: NEGATIVE for vision changes or irritation  ENT: NEGATIVE for ear, mouth and throat problems  RESP: NEGATIVE for significant cough or SOB  BREAST: NEGATIVE for masses, tenderness or discharge  CV: NEGATIVE for chest pain, palpitations or peripheral edema  GI: NEGATIVE for nausea, abdominal pain, heartburn, or change in bowel habits    "menopausal female: two episodes of vaginal spotting  MUSCULOSKELETAL: NEGATIVE for significant arthralgias or myalgia  NEURO: NEGATIVE for weakness, dizziness or paresthesias  PSYCHIATRIC: NEGATIVE for changes in mood or affect     OBJECTIVE:   /62 (BP Location: Left arm, Patient Position: Sitting, Cuff Size: Adult Large)   Pulse 55   Ht 1.565 m (5' 1.61\")   Wt 114.3 kg (251 lb 14.4 oz)   SpO2 99%   BMI 46.65 kg/m    EXAM:  GENERAL: healthy, alert and no distress  EYES: Eyes grossly normal to inspection, PERRL and conjunctivae and sclerae normal  NECK: no adenopathy, no asymmetry, masses, or scars and thyroid normal to palpation  RESP: normal effort, no wheezing  ABDOMEN: soft, nontender and bowel sounds normal   (female): normal female external genitalia, normal urethral meatus , vaginal mucosa pink, moist, well rugated, and normal cervix  MS: no gross musculoskeletal defects noted, no edema    Diagnostic Test Results:  Labs reviewed in Epic    ASSESSMENT/PLAN:       ICD-10-CM    1. Preventative health care  Z00.00 Obtaining, preparing and conveyance of cervical or vaginal smear to laboratory.     Pap thin layer screen with HPV - recommended age 30 - 65 years      2. Pain in both feet  M79.671 Orthopedic  Referral    M79.672       3. Postmenopausal bleeding  N95.0 Ob/Gyn Referral     US Pelvis          Patient has been advised of split billing requirements and indicates understanding: Yes  COUNSELING:   Reviewed preventive health counseling, as reflected in patient instructions       Regular exercise       Healthy diet/nutrition    Estimated body mass index is 46.65 kg/m  as calculated from the following:    Height as of this encounter: 1.565 m (5' 1.61\").    Weight as of this encounter: 114.3 kg (251 lb 14.4 oz).        She reports that she has quit smoking. She has never used smokeless tobacco.      Counseling Resources:  ATP IV Guidelines  Pooled Cohorts Equation Calculator  Breast Cancer " Risk Calculator  BRCA-Related Cancer Risk Assessment: FHS-7 Tool  FRAX Risk Assessment  ICSI Preventive Guidelines  Dietary Guidelines for Americans, 2010  [employer]'s MyPlate  ASA Prophylaxis  Lung CA Screening    Eliz Nguyen MD  [facility] [place]

## 2023-10-06 NOTE — PATIENT INSTRUCTIONS
Patient Education   Personalized Prevention Plan  You are due for the preventive services outlined below.  Your care team is available to assist you in scheduling these services.  If you have already completed any of these items, please share that information with your care team to update in your medical record.  Health Maintenance Due   Topic Date Due     Heart Failure Action Plan  Never done     Yearly Preventive Visit  07/13/2022     Colorectal Cancer Screening  11/28/2022     Mammogram  06/22/2023     Flu Vaccine (1) 09/01/2023     COVID-19 Vaccine (3 - 2023-24 season) 09/01/2023     HPV Screening  10/29/2023     PAP Smear  10/29/2023

## 2023-10-06 NOTE — NURSING NOTE
"Luz Marina Live is a 63 year old female patient that presents today in clinic for the following:    Chief Complaint   Patient presents with    Follow Up     Pt here for preventative visit and would like to discuss \"walking on rocks\" sensation in feet and numbness in left leg.     The patient's allergies and medications were reviewed as noted. A set of vitals were recorded as noted without incident: /62 (BP Location: Left arm, Patient Position: Sitting, Cuff Size: Adult Large)   Pulse 55   Ht 1.565 m (5' 1.61\")   Wt 114.3 kg (251 lb 14.4 oz)   SpO2 99%   BMI 46.65 kg/m  . The patient does not have any other questions for the provider.    Marci Rodríguez, EMT at 11:39 AM on 10/6/2023  "

## 2023-10-09 ENCOUNTER — APPOINTMENT (OUTPATIENT)
Dept: LAB | Facility: CLINIC | Age: 63
End: 2023-10-09
Attending: INTERNAL MEDICINE
Payer: COMMERCIAL

## 2023-10-09 ENCOUNTER — HOSPITAL ENCOUNTER (OUTPATIENT)
Facility: CLINIC | Age: 63
Discharge: HOME | End: 2023-10-09
Attending: INTERNAL MEDICINE | Admitting: STUDENT IN AN ORGANIZED HEALTH CARE EDUCATION/TRAINING PROGRAM
Payer: COMMERCIAL

## 2023-10-09 ENCOUNTER — APPOINTMENT (OUTPATIENT)
Dept: MEDSURG UNIT | Facility: CLINIC | Age: 63
End: 2023-10-09
Attending: INTERNAL MEDICINE
Payer: COMMERCIAL

## 2023-10-09 ENCOUNTER — APPOINTMENT (RX ONLY)
Dept: URBAN - METROPOLITAN AREA CLINIC 94 | Facility: CLINIC | Age: 63
Setting detail: DERMATOLOGY
End: 2023-10-09

## 2023-10-09 VITALS
HEIGHT: 62 IN | HEART RATE: 62 BPM | TEMPERATURE: 98 F | BODY MASS INDEX: 46.33 KG/M2 | OXYGEN SATURATION: 98 % | DIASTOLIC BLOOD PRESSURE: 61 MMHG | SYSTOLIC BLOOD PRESSURE: 106 MMHG | RESPIRATION RATE: 21 BRPM | WEIGHT: 251.77 LBS

## 2023-10-09 DIAGNOSIS — L81.4 OTHER MELANIN HYPERPIGMENTATION: ICD-10-CM

## 2023-10-09 DIAGNOSIS — E78.5 HYPERLIPIDEMIA LDL GOAL <100: ICD-10-CM

## 2023-10-09 DIAGNOSIS — I25.10 CORONARY ARTERY DISEASE INVOLVING NATIVE HEART WITHOUT ANGINA PECTORIS, UNSPECIFIED VESSEL OR LESION TYPE: Primary | Chronic | ICD-10-CM

## 2023-10-09 DIAGNOSIS — I50.32 CHRONIC DIASTOLIC HEART FAILURE (H): ICD-10-CM

## 2023-10-09 DIAGNOSIS — L82.1 OTHER SEBORRHEIC KERATOSIS: ICD-10-CM

## 2023-10-09 DIAGNOSIS — I25.10 CORONARY ARTERY DISEASE INVOLVING NATIVE CORONARY ARTERY OF NATIVE HEART WITHOUT ANGINA PECTORIS: ICD-10-CM

## 2023-10-09 DIAGNOSIS — D22 MELANOCYTIC NEVI: ICD-10-CM

## 2023-10-09 DIAGNOSIS — M32.14 SYSTEMIC LUPUS ERYTHEMATOSUS WITH GLOMERULAR DISEASE, UNSPECIFIED SLE TYPE (H): ICD-10-CM

## 2023-10-09 PROBLEM — D22.61 MELANOCYTIC NEVI OF RIGHT UPPER LIMB, INCLUDING SHOULDER: Status: ACTIVE | Noted: 2023-10-09

## 2023-10-09 PROBLEM — D22.72 MELANOCYTIC NEVI OF LEFT LOWER LIMB, INCLUDING HIP: Status: ACTIVE | Noted: 2023-10-09

## 2023-10-09 PROBLEM — D22.5 MELANOCYTIC NEVI OF TRUNK: Status: ACTIVE | Noted: 2023-10-09

## 2023-10-09 PROBLEM — D22.62 MELANOCYTIC NEVI OF LEFT UPPER LIMB, INCLUDING SHOULDER: Status: ACTIVE | Noted: 2023-10-09

## 2023-10-09 PROBLEM — D22.71 MELANOCYTIC NEVI OF RIGHT LOWER LIMB, INCLUDING HIP: Status: ACTIVE | Noted: 2023-10-09

## 2023-10-09 LAB
ANION GAP SERPL CALCULATED.3IONS-SCNC: 10 MMOL/L (ref 7–15)
APTT PPP: 29 SECONDS (ref 22–38)
BUN SERPL-MCNC: 37.4 MG/DL (ref 8–23)
CALCIUM SERPL-MCNC: 10.1 MG/DL (ref 8.8–10.2)
CHLORIDE SERPL-SCNC: 105 MMOL/L (ref 98–107)
CREAT SERPL-MCNC: 1.4 MG/DL (ref 0.51–0.95)
DEPRECATED HCO3 PLAS-SCNC: 24 MMOL/L (ref 22–29)
EGFRCR SERPLBLD CKD-EPI 2021: 42 ML/MIN/1.73M2
ERYTHROCYTE [DISTWIDTH] IN BLOOD BY AUTOMATED COUNT: 14.8 % (ref 10–15)
GLUCOSE SERPL-MCNC: 109 MG/DL (ref 70–99)
HCG INTACT+B SERPL-ACNC: 2 MIU/ML
HCT VFR BLD AUTO: 42.9 % (ref 35–47)
HGB BLD-MCNC: 13.8 G/DL (ref 11.7–15.7)
INR PPP: 1.22 (ref 0.85–1.15)
MCH RBC QN AUTO: 29.6 PG (ref 26.5–33)
MCHC RBC AUTO-ENTMCNC: 32.2 G/DL (ref 31.5–36.5)
MCV RBC AUTO: 92 FL (ref 78–100)
PLATELET # BLD AUTO: 232 10E3/UL (ref 150–450)
POTASSIUM SERPL-SCNC: 5.2 MMOL/L (ref 3.4–5.3)
RBC # BLD AUTO: 4.66 10E6/UL (ref 3.8–5.2)
SODIUM SERPL-SCNC: 139 MMOL/L (ref 135–145)
WBC # BLD AUTO: 6.3 10E3/UL (ref 4–11)

## 2023-10-09 PROCEDURE — 272N000001 HC OR GENERAL SUPPLY STERILE: Performed by: INTERNAL MEDICINE

## 2023-10-09 PROCEDURE — C1894 INTRO/SHEATH, NON-LASER: HCPCS | Performed by: INTERNAL MEDICINE

## 2023-10-09 PROCEDURE — 93455 CORONARY ART/GRFT ANGIO S&I: CPT | Performed by: INTERNAL MEDICINE

## 2023-10-09 PROCEDURE — 99152 MOD SED SAME PHYS/QHP 5/>YRS: CPT | Mod: GC | Performed by: INTERNAL MEDICINE

## 2023-10-09 PROCEDURE — ? COUNSELING

## 2023-10-09 PROCEDURE — 96360 HYDRATION IV INFUSION INIT: CPT

## 2023-10-09 PROCEDURE — 999N000054 HC STATISTIC EKG NON-CHARGEABLE

## 2023-10-09 PROCEDURE — 93455 CORONARY ART/GRFT ANGIO S&I: CPT | Mod: 26 | Performed by: INTERNAL MEDICINE

## 2023-10-09 PROCEDURE — 84132 ASSAY OF SERUM POTASSIUM: CPT | Performed by: INTERNAL MEDICINE

## 2023-10-09 PROCEDURE — 250N000011 HC RX IP 250 OP 636: Performed by: INTERNAL MEDICINE

## 2023-10-09 PROCEDURE — 93005 ELECTROCARDIOGRAM TRACING: CPT

## 2023-10-09 PROCEDURE — 36415 COLL VENOUS BLD VENIPUNCTURE: CPT | Performed by: INTERNAL MEDICINE

## 2023-10-09 PROCEDURE — 84702 CHORIONIC GONADOTROPIN TEST: CPT | Performed by: INTERNAL MEDICINE

## 2023-10-09 PROCEDURE — 258N000003 HC RX IP 258 OP 636: Performed by: INTERNAL MEDICINE

## 2023-10-09 PROCEDURE — 99152 MOD SED SAME PHYS/QHP 5/>YRS: CPT | Performed by: INTERNAL MEDICINE

## 2023-10-09 PROCEDURE — 250N000011 HC RX IP 250 OP 636: Mod: JZ | Performed by: INTERNAL MEDICINE

## 2023-10-09 PROCEDURE — 85610 PROTHROMBIN TIME: CPT | Performed by: INTERNAL MEDICINE

## 2023-10-09 PROCEDURE — 999N000134 HC STATISTIC PP CARE STAGE 3

## 2023-10-09 PROCEDURE — 250N000009 HC RX 250: Performed by: INTERNAL MEDICINE

## 2023-10-09 PROCEDURE — 99153 MOD SED SAME PHYS/QHP EA: CPT | Performed by: INTERNAL MEDICINE

## 2023-10-09 PROCEDURE — 999N000248 HC STATISTIC IV INSERT WITH US BY RN

## 2023-10-09 PROCEDURE — 85014 HEMATOCRIT: CPT | Performed by: INTERNAL MEDICINE

## 2023-10-09 PROCEDURE — 999N000142 HC STATISTIC PROCEDURE PREP ONLY

## 2023-10-09 PROCEDURE — 99213 OFFICE O/P EST LOW 20 MIN: CPT

## 2023-10-09 PROCEDURE — 258N000003 HC RX IP 258 OP 636: Performed by: STUDENT IN AN ORGANIZED HEALTH CARE EDUCATION/TRAINING PROGRAM

## 2023-10-09 PROCEDURE — 85730 THROMBOPLASTIN TIME PARTIAL: CPT | Performed by: INTERNAL MEDICINE

## 2023-10-09 PROCEDURE — 82374 ASSAY BLOOD CARBON DIOXIDE: CPT | Performed by: INTERNAL MEDICINE

## 2023-10-09 RX ORDER — OXYCODONE HYDROCHLORIDE 10 MG/1
10 TABLET ORAL EVERY 4 HOURS PRN
Status: DISCONTINUED | OUTPATIENT
Start: 2023-10-09 | End: 2023-10-09 | Stop reason: HOSPADM

## 2023-10-09 RX ORDER — NALOXONE HYDROCHLORIDE 0.4 MG/ML
0.4 INJECTION, SOLUTION INTRAMUSCULAR; INTRAVENOUS; SUBCUTANEOUS
Status: DISCONTINUED | OUTPATIENT
Start: 2023-10-09 | End: 2023-10-09 | Stop reason: HOSPADM

## 2023-10-09 RX ORDER — OXYCODONE HYDROCHLORIDE 5 MG/1
5 TABLET ORAL EVERY 4 HOURS PRN
Status: DISCONTINUED | OUTPATIENT
Start: 2023-10-09 | End: 2023-10-09 | Stop reason: HOSPADM

## 2023-10-09 RX ORDER — SODIUM CHLORIDE 9 MG/ML
INJECTION, SOLUTION INTRAVENOUS CONTINUOUS
Status: DISCONTINUED | OUTPATIENT
Start: 2023-10-09 | End: 2023-10-09 | Stop reason: HOSPADM

## 2023-10-09 RX ORDER — ASPIRIN 81 MG/1
243 TABLET, CHEWABLE ORAL ONCE
Status: DISCONTINUED | OUTPATIENT
Start: 2023-10-09 | End: 2023-10-09 | Stop reason: HOSPADM

## 2023-10-09 RX ORDER — NALOXONE HYDROCHLORIDE 0.4 MG/ML
0.2 INJECTION, SOLUTION INTRAMUSCULAR; INTRAVENOUS; SUBCUTANEOUS
Status: DISCONTINUED | OUTPATIENT
Start: 2023-10-09 | End: 2023-10-09 | Stop reason: HOSPADM

## 2023-10-09 RX ORDER — FENTANYL CITRATE 50 UG/ML
INJECTION, SOLUTION INTRAMUSCULAR; INTRAVENOUS
Status: DISCONTINUED | OUTPATIENT
Start: 2023-10-09 | End: 2023-10-09 | Stop reason: HOSPADM

## 2023-10-09 RX ORDER — LIDOCAINE 40 MG/G
CREAM TOPICAL
Status: DISCONTINUED | OUTPATIENT
Start: 2023-10-09 | End: 2023-10-09 | Stop reason: HOSPADM

## 2023-10-09 RX ORDER — IOPAMIDOL 755 MG/ML
INJECTION, SOLUTION INTRAVASCULAR
Status: DISCONTINUED | OUTPATIENT
Start: 2023-10-09 | End: 2023-10-09 | Stop reason: HOSPADM

## 2023-10-09 RX ORDER — ASPIRIN 325 MG
325 TABLET ORAL ONCE
Status: DISCONTINUED | OUTPATIENT
Start: 2023-10-09 | End: 2023-10-09 | Stop reason: HOSPADM

## 2023-10-09 RX ORDER — FENTANYL CITRATE 50 UG/ML
25 INJECTION, SOLUTION INTRAMUSCULAR; INTRAVENOUS
Status: DISCONTINUED | OUTPATIENT
Start: 2023-10-09 | End: 2023-10-09 | Stop reason: HOSPADM

## 2023-10-09 RX ORDER — FLUMAZENIL 0.1 MG/ML
0.2 INJECTION, SOLUTION INTRAVENOUS
Status: DISCONTINUED | OUTPATIENT
Start: 2023-10-09 | End: 2023-10-09 | Stop reason: HOSPADM

## 2023-10-09 RX ORDER — ATROPINE SULFATE 0.1 MG/ML
0.5 INJECTION INTRAVENOUS
Status: DISCONTINUED | OUTPATIENT
Start: 2023-10-09 | End: 2023-10-09 | Stop reason: HOSPADM

## 2023-10-09 RX ADMIN — SODIUM CHLORIDE: 9 INJECTION, SOLUTION INTRAVENOUS at 13:05

## 2023-10-09 RX ADMIN — SODIUM CHLORIDE 500 ML: 9 INJECTION, SOLUTION INTRAVENOUS at 17:58

## 2023-10-09 ASSESSMENT — LOCATION ZONE DERM
LOCATION ZONE: TRUNK
LOCATION ZONE: FACE
LOCATION ZONE: ARM
LOCATION ZONE: LEG

## 2023-10-09 ASSESSMENT — LOCATION SIMPLE DESCRIPTION DERM
LOCATION SIMPLE: RIGHT UPPER ARM
LOCATION SIMPLE: LEFT THIGH
LOCATION SIMPLE: CHEST
LOCATION SIMPLE: LEFT UPPER ARM
LOCATION SIMPLE: RIGHT LOWER BACK
LOCATION SIMPLE: RIGHT FOREARM
LOCATION SIMPLE: LEFT UPPER BACK
LOCATION SIMPLE: RIGHT BACK
LOCATION SIMPLE: RIGHT CHEEK
LOCATION SIMPLE: RIGHT PRETIBIAL REGION
LOCATION SIMPLE: LEFT FOREARM
LOCATION SIMPLE: RIGHT THIGH
LOCATION SIMPLE: LEFT PRETIBIAL REGION
LOCATION SIMPLE: ABDOMEN

## 2023-10-09 ASSESSMENT — LOCATION DETAILED DESCRIPTION DERM
LOCATION DETAILED: LEFT DISTAL PRETIBIAL REGION
LOCATION DETAILED: LEFT VENTRAL PROXIMAL FOREARM
LOCATION DETAILED: RIGHT SUPERIOR LATERAL UPPER BACK
LOCATION DETAILED: RIGHT ANTERIOR DISTAL UPPER ARM
LOCATION DETAILED: RIGHT VENTRAL PROXIMAL FOREARM
LOCATION DETAILED: RIGHT INFERIOR MEDIAL MIDBACK
LOCATION DETAILED: RIGHT SUPERIOR MEDIAL LOWER BACK
LOCATION DETAILED: RIGHT ANTERIOR DISTAL THIGH
LOCATION DETAILED: LEFT SUPERIOR LATERAL UPPER BACK
LOCATION DETAILED: RIGHT CENTRAL MALAR CHEEK
LOCATION DETAILED: EPIGASTRIC SKIN
LOCATION DETAILED: RIGHT DISTAL PRETIBIAL REGION
LOCATION DETAILED: RIGHT SUPERIOR MEDIAL MIDBACK
LOCATION DETAILED: LEFT ANTERIOR DISTAL THIGH
LOCATION DETAILED: LOWER STERNUM
LOCATION DETAILED: LEFT ANTERIOR DISTAL UPPER ARM

## 2023-10-09 ASSESSMENT — ACTIVITIES OF DAILY LIVING (ADL)
ADLS_ACUITY_SCORE: 37

## 2023-10-09 NOTE — PROGRESS NOTES
Patient returned to 2A s/p CORS. VSS, on telemetry monitor. Right femoral artery groin site CDI, no bleeding or hematoma. Left wrist has bandaid from attempted insertion site during procedure, CDI. Bedrest x 2hrs until 1820.  Activity restrictions gone over with patient. Eating and drinking without issue. Kevin in room with patient. Will discharge once meeting criteria.

## 2023-10-09 NOTE — Clinical Note
MEDICATION: Focalin XR 15 mg    LAST SEEN: 12/19/19    LAST REFILL: 12/16/19    OK TO REFILL: Yes, PDMP reviewed      Potential access sites were evaluated for patency using ultrasound.   The left distal radial artery was selected. Access was obtained under with Sonosite guidance using a micropuncture 21 gauge needle with direct visualization of needle entry.

## 2023-10-09 NOTE — PROGRESS NOTES
D/I/A:  Patient is tolerating liquids and foods, ambulating, urinating, Right femoral artery puncture site is stable.  Blood pressure stable after fluid bolus, 113/57 (MAP) 83. Education completed and outlined in AVS or handout: medications reviewed with patient.  Questions answered prior to discharge.  Belongings returned to patient at discharge.

## 2023-10-09 NOTE — DISCHARGE INSTRUCTIONS
Going Home after an Angiogram  ______________________________________________      After you go home:  Have an adult stay with you for 24 hours.  Drink plenty of fluids.  You may eat your normal diet, unless your doctor tells you otherwise.  For 24 hours:  Relax and take it easy.  Do NOT smoke.  Do NOT make any important or legal decisions.  Do NOT drive or operate machines at home or at work.  Do NOT drink alcohol.  Remove the Band-Aid after 24 hours. If there is minor oozing, apply another Band-aid and remove it after 12 hours.  For 2 days, do NOT  do any heavy exercise.  Do NOT take a bath, or use a hot tub or pool for at least 3 days. You may shower.    Care of groin site  It is normal to have a small bruise or lump at the site.  Do not scrub the site.  For the first 2 days: Do not stoop or squat. When you cough, sneeze or move your bowels, hold your hand over the puncture site and press gently.  Do not lift more than 10 pounds for at least 3 to 5 days.  Do not use lotion or powder near the puncture site for 3 days.    If you start bleeding from the site in your groin, lie down flat and press firmly  on the site. Call your doctor as soon as you can.    Medicines  If you have started taking Plavix or Effient, do not stop taking it until you talk to your heart doctor (cardiologist).  If you are on metformin (Glucophage), do not restart it until you have blood tests (within 2 to 3 days after discharge). When your doctor tells you it is safe, you may restart the metformin.  If you have stopped any other medicines, check with your nurse or provider about when to restart them.    Call 911 right away if you have bleeding that is heavy or does not stop.    Call your doctor if:  You have a large or growing hard lump around the site.  The site is red, swollen, hot or tender.  Blood or fluid is draining from the site.  You have chills or a fever greater than 101 F (38 C).  Your leg or arm feels numb or cool.  You have  hives, a rash or unusual itching.      [facility] [place]:  [phone] (7 days a week)

## 2023-10-09 NOTE — PROGRESS NOTES
Patient prepped for CORS. VSS. PIV in left forearm. Fluids infusing. Groin prepped. Pedal pulses dopplered. Patient took 324mg of aspirin this am and yesterday per MD instructions, also on coumadin, last dose on 10/4 INR 1.22 today. Waiting for consent to be signed.  Kevin with patient [phone].      FYI patient states with last stent placed she was put on Brilinta and experienced SOB and MD eventually discontinued Brilinta possibility contributing SOB to medication.

## 2023-10-09 NOTE — Clinical Note
dry, intact, no bleeding and no hematoma. 4fr RFA sheath removed, manual pressure applied, hemostasis achieved bandage placed.

## 2023-10-09 NOTE — PROGRESS NOTES
Having low BP's , asymptomatic 77/44 MAP 55 HR 69, 82/56 MAP 63 HR 72. Right groin site intact. On call cardiology pager [phone] paged at [phone] informing of patient's status.     Dr. Wagner returned call and ordered 500ml bonus normal saline. Bolus started. Will monitor blood pressures and if not improved discuss with Dr. Wagner.

## 2023-10-10 ENCOUNTER — DOCUMENTATION ONLY (OUTPATIENT)
Dept: ANTICOAGULATION | Facility: CLINIC | Age: 63
End: 2023-10-10
Payer: COMMERCIAL

## 2023-10-10 LAB
ATRIAL RATE - MUSE: 54 BPM
BKR LAB AP GYN ADEQUACY: NORMAL
BKR LAB AP GYN INTERPRETATION: NORMAL
BKR LAB AP HPV REFLEX: NORMAL
BKR LAB AP PREVIOUS ABNORMAL: NORMAL
DIASTOLIC BLOOD PRESSURE - MUSE: NORMAL MMHG
INTERPRETATION ECG - MUSE: NORMAL
P AXIS - MUSE: 18 DEGREES
PATH REPORT.COMMENTS IMP SPEC: NORMAL
PATH REPORT.COMMENTS IMP SPEC: NORMAL
PATH REPORT.RELEVANT HX SPEC: NORMAL
PR INTERVAL - MUSE: 198 MS
QRS DURATION - MUSE: 150 MS
QT - MUSE: 484 MS
QTC - MUSE: 458 MS
R AXIS - MUSE: 8 DEGREES
SYSTOLIC BLOOD PRESSURE - MUSE: NORMAL MMHG
T AXIS - MUSE: 170 DEGREES
VENTRICULAR RATE- MUSE: 54 BPM

## 2023-10-10 NOTE — PROGRESS NOTES
D/I/A:  Patient is tolerating liquids and foods, ambulating, urinating, puncture sites are stable ( no bleeding and no hematoma) and patient has a .  A+O x4 and making needs known.  CCL access sites C/D/I; no bleeding or hematoma; CMS intact.  VSSA; BP stable after 500 ml bolus 106/61.   Sinus rhythm  on monitor.  IV access removed. Audrey SHEETS RN completed education, reviewed discharge instruction and medications with patient.  Questions answered prior to discharge.  Belongings returned to patient at discharge.    P: Discharged to self care.  Patient to follow up with appts as per discharge instruction.

## 2023-10-10 NOTE — PROGRESS NOTES
ANTICOAGULATION  MANAGEMENT: Discharge Review    Luz [facility] chart reviewed for anticoagulation continuity of care    Outpatient surgery/procedure on 10/10/13914 for Angiogram.    Discharge disposition: Home    Results:    Recent labs: (last 7 days)     10/04/23  1327 10/09/23  1138   INR 2.1* 1.22*     Anticoagulation inpatient management:     not applicable     Anticoagulation discharge instructions:     Warfarin dosing: home regimen continued   Bridging: No   INR goal change: No      Medication changes affecting anticoagulation: No    Additional factors affecting anticoagulation 4 day hold   PLAN     No adjustment to anticoagulation plan needed    Patient not contacted next inr 10 /16/2023    No adjustment to Anticoagulation Calendar was required    Lolita Gama RN

## 2023-10-11 DIAGNOSIS — I48.91 ATRIAL FIBRILLATION, UNSPECIFIED TYPE (H): ICD-10-CM

## 2023-10-11 DIAGNOSIS — I50.32 CHRONIC DIASTOLIC CONGESTIVE HEART FAILURE (H): ICD-10-CM

## 2023-10-12 ENCOUNTER — NURSE TRIAGE (OUTPATIENT)
Dept: NURSING | Facility: CLINIC | Age: 63
End: 2023-10-12
Payer: COMMERCIAL

## 2023-10-12 NOTE — TELEPHONE ENCOUNTER
Scheduled for a test tomorrow.  Forgot about it until now.  US of uterus.  She stated her cervix will be dilated for this procedure.  Asking if an anesthetic will be used.  Stated she is an anxious person.    Luz Marina will reschedule this appointment if an anesthetic isn't being planned to be used at tomorrow's procedure.     She then has a follow-up appt with ob/gyn provider on 10/25/23. She will change this appt then also if she reschedules tomorrow's appointment.    Please call Luz Marina today to give her time to reschedule both appointments. [phone]    Luz Marina said it is okay to leave a detailed message if she doesn't answer.  Will route this message high priority because of timing of appt.    This writer unsure how to message the provider doing tomorrow's procedure so am routing to pcp.      Tiana CLANCY RN [facility]

## 2023-10-12 NOTE — TELEPHONE ENCOUNTER
Spoke to patient. Patient report Dr Nguyen discussed an US order which patient will be getting her cervix dilated.  If so, a new order will need to be placed.  Re-checking with Dr. Nguyen on this.        Sajan Wilburn CMA ([facility]) at 10:33 AM on 10/12/2023

## 2023-10-13 LAB
HUMAN PAPILLOMA VIRUS 16 DNA: NEGATIVE
HUMAN PAPILLOMA VIRUS 18 DNA: NEGATIVE
HUMAN PAPILLOMA VIRUS FINAL DIAGNOSIS: NORMAL
HUMAN PAPILLOMA VIRUS OTHER HR: NEGATIVE

## 2023-10-16 ENCOUNTER — TELEPHONE (OUTPATIENT)
Dept: CARDIOLOGY | Facility: CLINIC | Age: 63
End: 2023-10-16
Payer: COMMERCIAL

## 2023-10-16 NOTE — TELEPHONE ENCOUNTER
Called patient and left VM of clarification.        Sajan Wilburn CMA ([facility]) at 8:28 AM on 10/16/2023

## 2023-10-16 NOTE — TELEPHONE ENCOUNTER
Patient needs a pelvic ultrasound with transvaginal views. No cervix dilation is needed for that exam. She will also need to see OB/GYN for postmenopausal bleeding and may need an endometrial biopsy at that visit.   Thanks!  Eliz Nguyen MD

## 2023-10-17 NOTE — PROGRESS NOTES
Telephone visit x 30 minutes  Recommendations: (preliminary)  Please increase your lasix to 60mg daily.  Labs recheck on Thursday 10/26 at the [facility].  Our schedulers will connect with you on time.   Please follow-up with Dr. Moreno in 1 months with labs.Sooner if not doing well or questions    I further reviewed her chart.:    Patient has relative bradycardia and exercise shortness of breath and would consider six minute walk to examine heart rate response to exercise and reassess role and dosing of metoprolol.    Follow up laboratories should include BNP as previously elevated    Patient has active lupus and we should check CXR for evidence of inflammation, fluid overload (though recent PCP only mildly elevated at 13    Will try small increase in furosemide as noted above to see if this makes any symptomatic difference but would carefully follow renal function and BNP  Impression:  1 SLE  2. Vaginal bleeding  3. ASHD with remote CABG  4. Exertional shortness of breath  5. Dependent edema   6. Elevated weight  7. Paroxysmal atrial fibrillation (warfarin)     Latest Reference Range & Units 07/25/23 13:27 09/13/23 11:06 10/04/23 13:28 10/09/23 11:38   Sodium 135 - 145 mmol/L 137 141  139   Potassium 3.4 - 5.3 mmol/L 4.6 5.0  5.2   Chloride 98 - 107 mmol/L 104 106  105   Carbon Dioxide (CO2) 22 - 29 mmol/L 21 (L) 25  24   Urea Nitrogen 8.0 - 23.0 mg/dL 34.0 (H) 44.6 (H)  37.4 (H)   Creatinine 0.51 - 0.95 mg/dL 1.21 (H) 1.46 (H) 1.47 (H) 1.40 (H)   GFR Estimate >60 mL/min/1.73m2 50 (L) 40 (L) 40 (L) 42 (L)   Calcium 8.8 - 10.2 mg/dL 9.7 9.9  10.1   Anion Gap 7 - 15 mmol/L 12 10  10   Albumin 3.5 - 5.2 g/dL 3.8  3.6    Protein Total 6.4 - 8.3 g/dL 8.1      Alkaline Phosphatase 35 - 104 U/L 107 (H)      ALT 0 - 50 U/L 31  20    AST 0 - 45 U/L 41  29    Bilirubin Total <=1.2 mg/dL 1.0      CRP Inflammation <5.00 mg/L   66.20 (H)    Creatinine Urine mg/dL   71.6    Glucose 70 - 99 mg/dL 132 (H) 104 (H)   "109 (H)   hCG Quantitative <5 mIU/mL  2  2   N-Terminal Pro Bnp 0 - 900 pg/mL 2,289 (H)           06/02/23 11:33 06/09/23 09:38 07/26/23 12:27 09/13/23 09/20/23 12:39 10/06/23 11:32 10/09/23 10/18/23 15:02   /80 121/74 117/74 125/62  101/62 106/61    Temp    98  F (36.7  C)   98  F (36.7  C)    Pulse 67 64 65 60  55 62    Resp    18   21    SpO2 97 % 96 % 96 % 97 %  99 % 98 %    Height 1.599 m (5' 2.95\")  1.565 m (5' 1.61\") [1]  1.575 m (5' 2\") 1.565 m (5' 1.61\") [2] 1.565 m (5' 1.61\")    BMI (Calculated) 46.66  44.45  45.73 46.65 46.63    Weight (lbs) 263 lb 262 lb 9.6 oz 240 lb [1] 252 lb 13.9 oz 250 lb 251 lb 14.4 oz 251 lb 12.3 oz 250 lb   Note: Showing the most recent values for these dates. There are additional values that can be seen in Synopsis.  [1] w shoes  [2] with shoes     Latest Reference Range & Units 10/09/23 11:38   Sodium 135 - 145 mmol/L 139   Potassium 3.4 - 5.3 mmol/L 5.2   Chloride 98 - 107 mmol/L 105   Carbon Dioxide (CO2) 22 - 29 mmol/L 24   Urea Nitrogen 8.0 - 23.0 mg/dL 37.4 (H)   Creatinine 0.51 - 0.95 mg/dL 1.40 (H)   GFR Estimate >60 mL/min/1.73m2 42 (L)   Calcium 8.8 - 10.2 mg/dL 10.1   Anion Gap 7 - 15 mmol/L 10     Current Outpatient Medications   Medication    aspirin 81 MG EC tablet    atorvastatin (LIPITOR) 40 MG tablet    azelastine (ASTELIN) 0.1 % nasal spray    dapagliflozin (FARXIGA) 10 MG TABS tablet    erythromycin (ROMYCIN) 5 MG/GM ophthalmic ointment    furosemide (LASIX) 20 MG tablet    hydroxychloroquine (PLAQUENIL) 200 MG tablet    loratadine (CLARITIN) 10 MG tablet    metoprolol tartrate (LOPRESSOR) 25 MG tablet    metoprolol tartrate (LOPRESSOR) 50 MG tablet    metroNIDAZOLE (METROCREAM) 0.75 % external cream    MULTIPLE VITAMIN PO    omega-3 fatty acids 1200 MG capsule    order for DME    sacubitril-valsartan (ENTRESTO) 24-26 MG per tablet    warfarin ANTICOAGULANT (COUMADIN) 5 MG tablet    warfarin ANTICOAGULANT (COUMADIN) 5 MG tablet     No current " facility-administered medications for this visit.      Wt Readings from Last 24 Encounters:   10/18/23 113.4 kg (250 lb)   10/09/23 114.2 kg (251 lb 12.3 oz)   10/06/23 114.3 kg (251 lb 14.4 oz)   09/20/23 113.4 kg (250 lb)   09/13/23 114.7 kg (252 lb 13.9 oz)   07/26/23 108.9 kg (240 lb)   06/09/23 119.1 kg (262 lb 9.6 oz)   06/02/23 119.3 kg (263 lb)   11/28/22 122.2 kg (269 lb 5.8 oz)   09/09/22 122.5 kg (270 lb)   08/19/22 121.6 kg (268 lb 1.6 oz)   03/23/22 123 kg (271 lb 1.6 oz)   01/12/22 119.3 kg (263 lb 1.6 oz)   08/27/21 117.9 kg (260 lb)   08/20/21 119.3 kg (263 lb)   07/13/21 124.7 kg (275 lb)   07/20/20 113.9 kg (251 lb)   03/06/20 113.9 kg (251 lb 3.2 oz)   02/05/20 111.6 kg (246 lb)   01/14/20 113.9 kg (251 lb)   12/23/19 116.3 kg (256 lb 4.8 oz)   11/08/19 116.6 kg (257 lb)   11/08/19 116.6 kg (257 lb)   11/05/19 116.6 kg (257 lb)       oronary Findings  RA: 6/7/5 mmHg  RV: 43/--/5 mmHg  PA: 41/20/27 mmHg  CWP: 15/15/13 mmHg  CO/CI (Kannan): 5.9/2.8 L/min/m2  CO/CI (TD): 4.5/2.14 L/min/m2    PA sat: 66.4%  MAP: 117 mmHg   PVR: 2.37 GASCA  Right sided filling pressures are normal. Left sided filling pressures are normal. Mild elevated pulmonary hypertension. Normal cardiac output level.     Diagnostic  Dominance: Right  Left Anterior Descending   Ost LAD to Mid LAD lesion is 100% stenosed. The lesion is chronic total occlusion.      Second Diagonal Branch   The vessel is small. There is mild diffuse disease throughout the vessel.      Left Circumflex   Previously placed Ost Cx to Prox Cx stent of unknown type is widely patent.   Prox Cx to Mid Cx lesion is 20% stenosed.      First Obtuse Marginal Branch   The vessel is moderate in size.   1st Mrg lesion is 30% stenosed with 100% stenosed side branch in Lat 1st Mrg. The lesion was previously treated using a stent of unknown type.      Lateral First Obtuse Marginal Branch   The vessel is small.      Second Obtuse Marginal Branch   The vessel is moderate in  size.      Third Obtuse Marginal Branch   The vessel is small.      Right Coronary Artery   Prox RCA lesion is 40% stenosed. The lesion is eccentric.   Mid RCA to Dist RCA lesion is 10% stenosed.   Dist RCA lesion is 100% stenosed. The lesion is chronic total occlusion.      Right Posterior Descending Artery   RPDA lesion is 20% stenosed.      LIMA Graft To Dist LAD   The graft is large. The graft is angiographically normal.      Graft To Lat 1st Mrg   The graft is large. The graft is angiographically normal.      Graft To RPDA   The graft is large. The graft is angiographically normal.   Prox Graft lesion is 40% stenosed.         Intervention     No interventions have been documented.     Pressures Phase: Baseline       Time Systolic (mmHg) Diastolic (mmHg) Mean (mmHg) A Wave (mmHg) V Wave (mmHg) EDP (mmHg) Max dp/dt (mmHg/sec) HR (bpm) Content (mL/dL) SAT (%)   AO Pressures  3:58     58    76                     Dominance: Right  Left Anterior Descending   Ost LAD to Mid LAD lesion is 100% stenosed. The lesion is chronic total occlusion.      Second Diagonal Branch   The vessel is small. There is mild diffuse disease throughout the vessel.      Left Circumflex   Ost Cx to Prox Cx lesion is 60% stenosed.   Prox Cx to Mid Cx lesion is 20% stenosed.      First Obtuse Marginal Branch   The vessel is moderate in size.   1st Mrg lesion is 80% stenosed with 100% stenosed side branch in Lat 1st Mrg.      Lateral First Obtuse Marginal Branch   The vessel is small.      Second Obtuse Marginal Branch   The vessel is moderate in size.      Third Obtuse Marginal Branch   The vessel is small.      Right Coronary Artery   Prox RCA lesion is 20% stenosed. The lesion is eccentric.   Mid RCA to Dist RCA lesion is 10% stenosed.   Dist RCA lesion is 100% stenosed. The lesion is chronic total occlusion.      Right Posterior Descending Artery   RPDA lesion is 20% stenosed.      LIMA Graft To Dist LAD   The graft is large. The  graft is angiographically normal.      Graft To Lat 1st Mrg   The graft is large. The graft is angiographically normal.      Graft To RPDA   The graft is large. The graft is angiographically normal.             Pre Procedure Diagnosis    heart failure assessment       Conclusion         Right sided filling pressures are normal.    Left sided filling pressures are normal.    Mild elevated pulmonary hypertension.    Normal cardiac output level.         Hemodynamics    RA: 6/7/5 mmHg  RV: 43/--/5 mmHg  PA: 41/20/27 mmHg  CWP: 15/15/13 mmHg  CO/CI (Kannan): 5.9/2.8 L/min/m2  CO/CI (TD): 4.5/2.14 L/min/m2    PA sat: 66.4%  MAP: 117 mmHg   PVR: 2.37 GASCA  Right sided filling pressures are normal. Left sided filling pressures are normal. Mild elevated pulmonary hypertension. Normal cardiac output level.     Pressures Phase: Baseline     Time Systolic (mmHg) Diastolic (mmHg) Mean (mmHg) A Wave (mmHg) V Wave (mmHg) EDP (mmHg) Max dp/dt (mmHg/sec) HR (bpm) Content (mL/dL) SAT (%)   RA Pressures 12:45 PM   5    6    7      59        RV Pressures 12:25 PM       873          12:46 PM 43        5     59        PA Pressures 12:47 PM 41    20    27        58        PCW Pressures 12:46 PM   13    15    15      58          Blood Flow Results Phase: Baseline     Time Results Indexed Values (L/min/m2)   QP 12:25 PM 5.91 L/min    2.81      QS 12:25 PM 5.91 L/min    2.81        Blood Oximetry Phase: Baseline     Time Hb SAT(%) PO2 Content (mL/dL) PA Sat (%)   PA 12:25 PM  66.4 %      66.4      Art 12:25 PM  100 %     17.27         Cardiac Output Phase: Baseline     Time TDCO (L/min) TDCI (L/min/m2) Kannan C.O. (L/min) Kannan C.I. (L/min/m2) Kannan HR (bpm)   Cardiac Output Results 12:25 PM 4.5    2.14    5.91    2.81        12:50 PM 4.5            Resistance Results Phase: Baseline     Time PVR SVR TPR TVR PVR/SVR TPR/TVR   Resistance Results (Metric) 12:25 .51 dsc-5     365.48 dsc-5         Resistance Results (Wood) 12:25 PM 2.37 GASCA      4.57 GASCA           Stoke Volume Results Phase: Baseline     Time RVSW (gm*m) LVSW (gm*m) RVSW-I (gm*m/m2) LVSW-I (gm*m/m2)   Stroke Work Results 12:25 PM 30.48     14.49         Hemodynamic Waveforms -- Encounter Level:    Hemodynamic Waveforms: None found at the encounter level.       Result Notes          Component  Ref Range & Units 10/9/23 12:47 PM 8/20/21 10:25 AM    Systolic Blood Pressure  mmHg      Diastolic Blood Pressure  mmHg      Ventricular Rate  BPM 54 57    Atrial Rate  BPM 54 57    NE Interval  ms 198 196    QRS Duration  ms 150 144    QT  ms 484 446    QTc  ms 458 434    P Axis  degrees 18 46    R AXIS  degrees 8 -3    T Axis  degrees 170 84    Interpretation ECG Sinus bradycardia  Right bundle branch block  Minimal voltage criteria for LVH, may be normal variant ( R in aVL )  T wave abnormality, consider inferolateral ischemia  Abnormal ECG  When compared with ECG of 20-AUG-2021 10:25,  Inverted T waves have replaced nonspecific T wave abnormality in Inferior leads  Confirmed by MD SOUMYA, KENNEDI ([id number]) on 10/10/2023 9:49:27 AM

## 2023-10-18 ENCOUNTER — VIRTUAL VISIT (OUTPATIENT)
Dept: CARDIOLOGY | Facility: CLINIC | Age: 63
End: 2023-10-18
Attending: STUDENT IN AN ORGANIZED HEALTH CARE EDUCATION/TRAINING PROGRAM
Payer: COMMERCIAL

## 2023-10-18 VITALS — WEIGHT: 250 LBS | BODY MASS INDEX: 46.3 KG/M2

## 2023-10-18 DIAGNOSIS — I25.10 CORONARY ARTERY DISEASE INVOLVING NATIVE HEART WITHOUT ANGINA PECTORIS, UNSPECIFIED VESSEL OR LESION TYPE: Chronic | ICD-10-CM

## 2023-10-18 DIAGNOSIS — R06.02 SOB (SHORTNESS OF BREATH): ICD-10-CM

## 2023-10-18 DIAGNOSIS — I50.30 HEART FAILURE WITH PRESERVED EJECTION FRACTION, UNSPECIFIED HF CHRONICITY (H): Primary | ICD-10-CM

## 2023-10-18 PROCEDURE — 99443 PR PHYSICIAN TELEPHONE EVALUATION 21-30 MIN: CPT | Mod: 95 | Performed by: INTERNAL MEDICINE

## 2023-10-18 ASSESSMENT — PAIN SCALES - GENERAL: PAINLEVEL: NO PAIN (0)

## 2023-10-18 NOTE — LETTER
10/18/2023      RE: Luz Marina Live  [address]  [place] [address]       Dear Colleague,    Thank you for the opportunity to participate in the care of your patient, Luz Marina Live, at the [facility] [place] [facility]. Please see a copy of my visit note below.    Telephone visit x 30 minutes  Recommendations: (preliminary)  Please increase your lasix to 60mg daily.  Labs recheck on Thursday 10/26 at the [facility].  Our schedulers will connect with you on time.   Please follow-up with Dr. Moreno in 1 months with labs.Sooner if not doing well or questions    I further reviewed her chart.:    Patient has relative bradycardia and exercise shortness of breath and would consider six minute walk to examine heart rate response to exercise and reassess role and dosing of metoprolol.    Follow up laboratories should include BNP as previously elevated    Patient has active lupus and we should check CXR for evidence of inflammation, fluid overload (though recent PCP only mildly elevated at 13    Will try small increase in furosemide as noted above to see if this makes any symptomatic difference but would carefully follow renal function and BNP  Impression:  1 SLE  2. Vaginal bleeding  3. ASHD with remote CABG  4. Exertional shortness of breath  5. Dependent edema   6. Elevated weight  7. Paroxysmal atrial fibrillation (warfarin)     Latest Reference Range & Units 07/25/23 13:27 09/13/23 11:06 10/04/23 13:28 10/09/23 11:38   Sodium 135 - 145 mmol/L 137 141  139   Potassium 3.4 - 5.3 mmol/L 4.6 5.0  5.2   Chloride 98 - 107 mmol/L 104 106  105   Carbon Dioxide (CO2) 22 - 29 mmol/L 21 (L) 25  24   Urea Nitrogen 8.0 - 23.0 mg/dL 34.0 (H) 44.6 (H)  37.4 (H)   Creatinine 0.51 - 0.95 mg/dL 1.21 (H) 1.46 (H) 1.47 (H) 1.40 (H)   GFR Estimate >60 mL/min/1.73m2 50 (L) 40 (L) 40 (L) 42 (L)   Calcium 8.8 - 10.2 mg/dL 9.7 9.9  10.1   Anion Gap 7 -  "15 mmol/L 12 10  10   Albumin 3.5 - 5.2 g/dL 3.8  3.6    Protein Total 6.4 - 8.3 g/dL 8.1      Alkaline Phosphatase 35 - 104 U/L 107 (H)      ALT 0 - 50 U/L 31  20    AST 0 - 45 U/L 41  29    Bilirubin Total <=1.2 mg/dL 1.0      CRP Inflammation <5.00 mg/L   66.20 (H)    Creatinine Urine mg/dL   71.6    Glucose 70 - 99 mg/dL 132 (H) 104 (H)  109 (H)   hCG Quantitative <5 mIU/mL  2  2   N-Terminal Pro Bnp 0 - 900 pg/mL 2,289 (H)           06/02/23 11:33 06/09/23 09:38 07/26/23 12:27 09/13/23 09/20/23 12:39 10/06/23 11:32 10/09/23 10/18/23 15:02   /80 121/74 117/74 125/62  101/62 106/61    Temp    98  F (36.7  C)   98  F (36.7  C)    Pulse 67 64 65 60  55 62    Resp    18   21    SpO2 97 % 96 % 96 % 97 %  99 % 98 %    Height 1.599 m (5' 2.95\")  1.565 m (5' 1.61\") [1]  1.575 m (5' 2\") 1.565 m (5' 1.61\") [2] 1.565 m (5' 1.61\")    BMI (Calculated) 46.66  44.45  45.73 46.65 46.63    Weight (lbs) 263 lb 262 lb 9.6 oz 240 lb [1] 252 lb 13.9 oz 250 lb 251 lb 14.4 oz 251 lb 12.3 oz 250 lb   Note: Showing the most recent values for these dates. There are additional values that can be seen in Synopsis.  [1] w shoes  [2] with shoes     Latest Reference Range & Units 10/09/23 11:38   Sodium 135 - 145 mmol/L 139   Potassium 3.4 - 5.3 mmol/L 5.2   Chloride 98 - 107 mmol/L 105   Carbon Dioxide (CO2) 22 - 29 mmol/L 24   Urea Nitrogen 8.0 - 23.0 mg/dL 37.4 (H)   Creatinine 0.51 - 0.95 mg/dL 1.40 (H)   GFR Estimate >60 mL/min/1.73m2 42 (L)   Calcium 8.8 - 10.2 mg/dL 10.1   Anion Gap 7 - 15 mmol/L 10     Current Outpatient Medications   Medication    aspirin 81 MG EC tablet    atorvastatin (LIPITOR) 40 MG tablet    azelastine (ASTELIN) 0.1 % nasal spray    dapagliflozin (FARXIGA) 10 MG TABS tablet    erythromycin (ROMYCIN) 5 MG/GM ophthalmic ointment    furosemide (LASIX) 20 MG tablet    hydroxychloroquine (PLAQUENIL) 200 MG tablet    loratadine (CLARITIN) 10 MG tablet    metoprolol tartrate (LOPRESSOR) 25 MG tablet    metoprolol " tartrate (LOPRESSOR) 50 MG tablet    metroNIDAZOLE (METROCREAM) 0.75 % external cream    MULTIPLE VITAMIN PO    omega-3 fatty acids 1200 MG capsule    order for DME    sacubitril-valsartan (ENTRESTO) 24-26 MG per tablet    warfarin ANTICOAGULANT (COUMADIN) 5 MG tablet    warfarin ANTICOAGULANT (COUMADIN) 5 MG tablet     No current facility-administered medications for this visit.      Wt Readings from Last 24 Encounters:   10/18/23 113.4 kg (250 lb)   10/09/23 114.2 kg (251 lb 12.3 oz)   10/06/23 114.3 kg (251 lb 14.4 oz)   09/20/23 113.4 kg (250 lb)   09/13/23 114.7 kg (252 lb 13.9 oz)   07/26/23 108.9 kg (240 lb)   06/09/23 119.1 kg (262 lb 9.6 oz)   06/02/23 119.3 kg (263 lb)   11/28/22 122.2 kg (269 lb 5.8 oz)   09/09/22 122.5 kg (270 lb)   08/19/22 121.6 kg (268 lb 1.6 oz)   03/23/22 123 kg (271 lb 1.6 oz)   01/12/22 119.3 kg (263 lb 1.6 oz)   08/27/21 117.9 kg (260 lb)   08/20/21 119.3 kg (263 lb)   07/13/21 124.7 kg (275 lb)   07/20/20 113.9 kg (251 lb)   03/06/20 113.9 kg (251 lb 3.2 oz)   02/05/20 111.6 kg (246 lb)   01/14/20 113.9 kg (251 lb)   12/23/19 116.3 kg (256 lb 4.8 oz)   11/08/19 116.6 kg (257 lb)   11/08/19 116.6 kg (257 lb)   11/05/19 116.6 kg (257 lb)       oronary Findings  RA: 6/7/5 mmHg  RV: 43/--/5 mmHg  PA: 41/20/27 mmHg  CWP: 15/15/13 mmHg  CO/CI (Kannan): 5.9/2.8 L/min/m2  CO/CI (TD): 4.5/2.14 L/min/m2    PA sat: 66.4%  MAP: 117 mmHg   PVR: 2.37 GASCA  Right sided filling pressures are normal. Left sided filling pressures are normal. Mild elevated pulmonary hypertension. Normal cardiac output level.     Diagnostic  Dominance: Right  Left Anterior Descending   Ost LAD to Mid LAD lesion is 100% stenosed. The lesion is chronic total occlusion.      Second Diagonal Branch   The vessel is small. There is mild diffuse disease throughout the vessel.      Left Circumflex   Previously placed Ost Cx to Prox Cx stent of unknown type is widely patent.   Prox Cx to Mid Cx lesion is 20% stenosed.       First Obtuse Marginal Branch   The vessel is moderate in size.   1st Mrg lesion is 30% stenosed with 100% stenosed side branch in Lat 1st Mrg. The lesion was previously treated using a stent of unknown type.      Lateral First Obtuse Marginal Branch   The vessel is small.      Second Obtuse Marginal Branch   The vessel is moderate in size.      Third Obtuse Marginal Branch   The vessel is small.      Right Coronary Artery   Prox RCA lesion is 40% stenosed. The lesion is eccentric.   Mid RCA to Dist RCA lesion is 10% stenosed.   Dist RCA lesion is 100% stenosed. The lesion is chronic total occlusion.      Right Posterior Descending Artery   RPDA lesion is 20% stenosed.      LIMA Graft To Dist LAD   The graft is large. The graft is angiographically normal.      Graft To Lat 1st Mrg   The graft is large. The graft is angiographically normal.      Graft To RPDA   The graft is large. The graft is angiographically normal.   Prox Graft lesion is 40% stenosed.         Intervention     No interventions have been documented.     Pressures Phase: Baseline       Time Systolic (mmHg) Diastolic (mmHg) Mean (mmHg) A Wave (mmHg) V Wave (mmHg) EDP (mmHg) Max dp/dt (mmHg/sec) HR (bpm) Content (mL/dL) SAT (%)   AO Pressures  3:58     58    76                     Dominance: Right  Left Anterior Descending   Ost LAD to Mid LAD lesion is 100% stenosed. The lesion is chronic total occlusion.      Second Diagonal Branch   The vessel is small. There is mild diffuse disease throughout the vessel.      Left Circumflex   Ost Cx to Prox Cx lesion is 60% stenosed.   Prox Cx to Mid Cx lesion is 20% stenosed.      First Obtuse Marginal Branch   The vessel is moderate in size.   1st Mrg lesion is 80% stenosed with 100% stenosed side branch in Lat 1st Mrg.      Lateral First Obtuse Marginal Branch   The vessel is small.      Second Obtuse Marginal Branch   The vessel is moderate in size.      Third Obtuse Marginal Branch   The vessel is  small.      Right Coronary Artery   Prox RCA lesion is 20% stenosed. The lesion is eccentric.   Mid RCA to Dist RCA lesion is 10% stenosed.   Dist RCA lesion is 100% stenosed. The lesion is chronic total occlusion.      Right Posterior Descending Artery   RPDA lesion is 20% stenosed.      LIMA Graft To Dist LAD   The graft is large. The graft is angiographically normal.      Graft To Lat 1st Mrg   The graft is large. The graft is angiographically normal.      Graft To RPDA   The graft is large. The graft is angiographically normal.             Pre Procedure Diagnosis    heart failure assessment       Conclusion         Right sided filling pressures are normal.    Left sided filling pressures are normal.    Mild elevated pulmonary hypertension.    Normal cardiac output level.         Hemodynamics    RA: 6/7/5 mmHg  RV: 43/--/5 mmHg  PA: 41/20/27 mmHg  CWP: 15/15/13 mmHg  CO/CI (Kannan): 5.9/2.8 L/min/m2  CO/CI (TD): 4.5/2.14 L/min/m2    PA sat: 66.4%  MAP: 117 mmHg   PVR: 2.37 GASCA  Right sided filling pressures are normal. Left sided filling pressures are normal. Mild elevated pulmonary hypertension. Normal cardiac output level.     Pressures Phase: Baseline     Time Systolic (mmHg) Diastolic (mmHg) Mean (mmHg) A Wave (mmHg) V Wave (mmHg) EDP (mmHg) Max dp/dt (mmHg/sec) HR (bpm) Content (mL/dL) SAT (%)   RA Pressures 12:45 PM   5    6    7      59        RV Pressures 12:25 PM       873          12:46 PM 43        5     59        PA Pressures 12:47 PM 41    20    27        58        PCW Pressures 12:46 PM   13    15    15      58          Blood Flow Results Phase: Baseline     Time Results Indexed Values (L/min/m2)   QP 12:25 PM 5.91 L/min    2.81      QS 12:25 PM 5.91 L/min    2.81        Blood Oximetry Phase: Baseline     Time Hb SAT(%) PO2 Content (mL/dL) PA Sat (%)   PA 12:25 PM  66.4 %      66.4      Art 12:25 PM  100 %     17.27         Cardiac Output Phase: Baseline     Time TDCO (L/min) TDCI (L/min/m2) Kannan C.O.  (L/min) Kannan C.I. (L/min/m2) Kannan HR (bpm)   Cardiac Output Results 12:25 PM 4.5    2.14    5.91    2.81        12:50 PM 4.5            Resistance Results Phase: Baseline     Time PVR SVR TPR TVR PVR/SVR TPR/TVR   Resistance Results (Metric) 12:25 .51 dsc-5     365.48 dsc-5         Resistance Results (Wood) 12:25 PM 2.37 GASCA     4.57 GASCA           Stoke Volume Results Phase: Baseline     Time RVSW (gm*m) LVSW (gm*m) RVSW-I (gm*m/m2) LVSW-I (gm*m/m2)   Stroke Work Results 12:25 PM 30.48     14.49         Hemodynamic Waveforms -- Encounter Level:    Hemodynamic Waveforms: None found at the encounter level.       Result Notes          Component  Ref Range & Units 10/9/23 12:47 PM 8/20/21 10:25 AM    Systolic Blood Pressure  mmHg      Diastolic Blood Pressure  mmHg      Ventricular Rate  BPM 54 57    Atrial Rate  BPM 54 57    MO Interval  ms 198 196    QRS Duration  ms 150 144    QT  ms 484 446    QTc  ms 458 434    P Axis  degrees 18 46    R AXIS  degrees 8 -3    T Axis  degrees 170 84    Interpretation ECG Sinus bradycardia  Right bundle branch block  Minimal voltage criteria for LVH, may be normal variant ( R in aVL )  T wave abnormality, consider inferolateral ischemia  Abnormal ECG  When compared with ECG of 20-AUG-2021 10:25,  Inverted T waves have replaced nonspecific T wave abnormality in Inferior leads  Confirmed by MD SOUMYA, KENNEDI ([id number]) on 10/10/2023 9:49:27 AM       Please do not hesitate to contact me if you have any questions/concerns.     Sincerely,     Twin Moreno MD

## 2023-10-18 NOTE — NURSING NOTE
No other vitals to report today      Is the patient currently in the state of [place]? YES    Visit mode:VIDEO    If the visit is dropped, the patient can be reconnected by: VIDEO VISIT: Text to cell phone:   Telephone Information:   Mobile [phone]       Will anyone else be joining the visit? NO  (If patient encounters technical issues they should call [phone])    How would you like to obtain your AVS? MyChart    Are changes needed to the allergy or medication list? Pt declined allergy review and Pt declined med review    Patient declined individual allergy and medication review by support staff because patient denies any changes since echeck-in completion and states all information entered during echeck-in remains accurate.    Reason for visit: NATHALIA Pruitt MA VVF

## 2023-10-18 NOTE — PATIENT INSTRUCTIONS
"You were seen today in the Cardiovascular Clinic at the [facility].      Cardiology Providers you saw during your visit:  Dr. Twin Moreno     Recommendations:   Please increase your lasix to 60mg daily.  Labs recheck on Thursday 10/26 at the [facility].  Our schedulers will connect with you on time.   Please follow-up with Dr. Moreno in 1 months with labs.        Eat a heart healthy, low sodium diet.  Get 20 to 30 minutes of aerobic exercise 4 to 5 times per week as tolerated. (Examples of aerobic exercise include: walking, bicycling, swimming, running).     Thank you for your visit today!   Please MyChart message or call if you have any questions or concerns.      During Business Hours:  [phone], option # 1 ([facility])       After hours, weekends or holidays:   [phone], Option #4  Ask to speak to the On-Call Cardiologist. Inform them you are a heart failure patient at the [facility].      Kaylie Tran RN BSN CHFN  Cardiology Care Coordinator - C.O.R.E. [facility]  Questions and [phone]  First press #1 for the [employer] and then press #4 for \"Your Care Team\" to reach us Cardiology Nurses.                "

## 2023-10-19 ENCOUNTER — ANTICOAGULATION THERAPY VISIT (OUTPATIENT)
Dept: ANTICOAGULATION | Facility: CLINIC | Age: 63
End: 2023-10-19

## 2023-10-19 ENCOUNTER — LAB (OUTPATIENT)
Dept: LAB | Facility: CLINIC | Age: 63
End: 2023-10-19
Payer: COMMERCIAL

## 2023-10-19 DIAGNOSIS — Z79.01 LONG TERM CURRENT USE OF ANTICOAGULANT THERAPY: Primary | Chronic | ICD-10-CM

## 2023-10-19 DIAGNOSIS — Z79.01 LONG TERM CURRENT USE OF ANTICOAGULANT THERAPY: ICD-10-CM

## 2023-10-19 DIAGNOSIS — I48.0 PAF (PAROXYSMAL ATRIAL FIBRILLATION) (H): Chronic | ICD-10-CM

## 2023-10-19 DIAGNOSIS — I48.91 ATRIAL FIBRILLATION, UNSPECIFIED TYPE (H): ICD-10-CM

## 2023-10-19 LAB — INR BLD: 1.8 (ref 0.9–1.1)

## 2023-10-19 PROCEDURE — 36416 COLLJ CAPILLARY BLOOD SPEC: CPT

## 2023-10-19 PROCEDURE — 85610 PROTHROMBIN TIME: CPT

## 2023-10-19 RX ORDER — METOPROLOL TARTRATE 50 MG
TABLET ORAL
Qty: 450 TABLET | Refills: 3 | Status: SHIPPED | OUTPATIENT
Start: 2023-10-19 | End: 2024-07-01

## 2023-10-19 RX ORDER — FUROSEMIDE 20 MG
40 TABLET ORAL DAILY
Qty: 180 TABLET | Refills: 3 | Status: SHIPPED | OUTPATIENT
Start: 2023-10-19 | End: 2023-11-29

## 2023-10-19 NOTE — PROGRESS NOTES
ANTICOAGULATION MANAGEMENT     Luz Marina Live 63 year old female is on warfarin with subtherapeutic INR result. (Goal INR 2.0-3.0)    Recent labs: (last 7 days)     10/19/23  1156   INR 1.8*       ASSESSMENT     Source(s): Chart Review and Patient/Caregiver Call     Warfarin doses taken: Held for angio  recently which may be affecting INR  Diet: No new diet changes identified  Medication/supplement changes: None noted  New illness, injury, or hospitalization: No  Signs or symptoms of bleeding or clotting: No  Previous result: Subtherapeutic  Additional findings: None       PLAN     Recommended plan for temporary change(s) affecting INR     Dosing Instructions: booster dose then continue your current warfarin dose with next INR in 1-2 weeks       Summary  As of 10/19/2023      Full warfarin instructions:  10/19: 10 mg; Otherwise 10 mg every Sat; 7.5 mg all other days   Next INR check:  11/2/2023               Telephone call with Luz Marina who verbalizes understanding and agrees to plan and who agrees to plan and repeated back plan correctly    Patient offered & declined to schedule next visit    Education provided:   Please call back if any changes to your diet, medications or how you've been taking warfarin  Goal range and lab monitoring: goal range and significance of current result and Importance of therapeutic range    Plan made per [facility] anticoagulation protocol    Flor Maier RN  Anticoagulation Clinic  10/19/2023    _______________________________________________________________________     Anticoagulation Episode Summary       Current INR goal:  2.0-3.0   TTR:  64.4% (1 y)   Target end date:  Indefinite   Send INR reminders to:  [facility]    Indications    Long-term (current) use of anticoagulants [Z79.01] [Z79.01]  PAF (paroxysmal atrial fibrillation) (H) [I48.0]  Atrial fibrillation  unspecified type (H) [I48.91]             Comments:               Anticoagulation Care Providers       Provider  Role Specialty Phone number    Eliz Nguyen MD Referring Internal Medicine [phone]    Elodia Tang MD Referring Family Medicine [phone]

## 2023-10-20 ENCOUNTER — TELEPHONE (OUTPATIENT)
Dept: CARDIOLOGY | Facility: CLINIC | Age: 63
End: 2023-10-20

## 2023-10-20 NOTE — TELEPHONE ENCOUNTER
----- Message from Kaylie Tran RN sent at 10/19/2023  1:06 PM CDT -----  Regarding: imaging and follow-up  Luz Marina Hitchcock saw Tiffanie yesterday.     Would you please call her and schedule labs at the [facility] on 10/26.     Then also follow-up in 3-4 weeks with Tiffanie with labs, 6 min walk and non-contrast Chest CT.     Thanks!   Ramses

## 2023-10-20 NOTE — TELEPHONE ENCOUNTER
Mercedes and myc to schedule labs at the [facility] on 10/26 also follow-up in 3-4 weeks with Tiffanie with labs, 6 min walk and non-contrast Chest CT.

## 2023-10-23 ENCOUNTER — TELEPHONE (OUTPATIENT)
Dept: CARDIOLOGY | Facility: CLINIC | Age: 63
End: 2023-10-23
Payer: COMMERCIAL

## 2023-10-23 NOTE — TELEPHONE ENCOUNTER
Left Voicemail (1st Attempt) and Sent Mychart (1st Attempt) for the patient to call back and schedule the following:    Appointment type: Return Heart Failure  Provider: Dr. Moreno  Return date: Mid November 2023 (3-4 wks from last appt)  Specialty phone number: [phone] opt 1  Additional appointment(s) needed: Lab blood draw, CT without contrast prior, 6min walk prior  Additonal Notes: Lab at [facility] next avail; other blood draw at [facility] just prior to Return with Tiffanie Long on 10/23/2023 at 9:57 AM

## 2023-10-24 DIAGNOSIS — N18.31 STAGE 3A CHRONIC KIDNEY DISEASE (H): Primary | ICD-10-CM

## 2023-10-25 ENCOUNTER — OFFICE VISIT (OUTPATIENT)
Dept: PODIATRY | Facility: CLINIC | Age: 63
End: 2023-10-25
Payer: COMMERCIAL

## 2023-10-25 VITALS — HEART RATE: 70 BPM | BODY MASS INDEX: 46.3 KG/M2 | WEIGHT: 250 LBS | OXYGEN SATURATION: 99 %

## 2023-10-25 DIAGNOSIS — R21 RASH OF BOTH FEET: ICD-10-CM

## 2023-10-25 DIAGNOSIS — M20.42 HAMMER TOES OF BOTH FEET: Primary | ICD-10-CM

## 2023-10-25 DIAGNOSIS — M32.14 SYSTEMIC LUPUS ERYTHEMATOSUS WITH GLOMERULAR DISEASE, UNSPECIFIED SLE TYPE (H): Chronic | ICD-10-CM

## 2023-10-25 DIAGNOSIS — M21.6X2 PRONATION OF BOTH FEET: ICD-10-CM

## 2023-10-25 DIAGNOSIS — M77.8 CAPSULITIS OF FOOT: ICD-10-CM

## 2023-10-25 DIAGNOSIS — M20.41 HAMMER TOES OF BOTH FEET: Primary | ICD-10-CM

## 2023-10-25 DIAGNOSIS — M21.6X1 PRONATION OF BOTH FEET: ICD-10-CM

## 2023-10-25 PROCEDURE — 99244 OFF/OP CNSLTJ NEW/EST MOD 40: CPT | Performed by: PODIATRIST

## 2023-10-25 NOTE — PROGRESS NOTES
S:  Patient seen today in consult from Dr. Nguyen with numerous complaints.  Recently developed a rash on both feet.  Points to the dorsal distal foot.  Denies any pain with this.  Denies any itching.  She wears crocs around the house all the time.  She is wondering if the straps hitting her foot.  Is also had bilateral hammertoes of her second toes for at least a year.  No pain in the toe per se.  Recently started having pain in her plantar forefoot.  Points to subsecond metatarsal head.  Describes as burning pain.  Aggravated by activity and relieved by rest.  Patient has lupus.  She has a history of sepsis.  Her father had history of arthritis.  She quit smoking over 30 years ago.       ROS:  A 10-point review of systems was performed and is positive for that noted in the HPI and as seen below.  All other areas are negative    Past Medical History:   Diagnosis Date    Hypertension     Hypovolemic shock (H) 2/28/2015    Lupus (H)     Sepsis (H) 8/22/2015       Past Surgical History:   Procedure Laterality Date    CARDIAC SURGERY  08/27/2009    triple vessel coronary artery bypass grafting on 8/27/09    CARPAL TUNNEL RELEASE RT/LT  1996    bilateral    CV ANGIOGRAM CORONARY GRAFT N/A 10/9/2023    Procedure: Coronary Angiogram Graft;  Surgeon: Mahendra Collins MD;  Location: [facility] CARDIAC CATH LAB    CV CORONARY ANGIOGRAM  10/17/2019    Procedure: CV CORONARY ANGIOGRAM;  Surgeon: Mahendra Collins MD;  Location: [facility] CARDIAC CATH LAB    CV CORONARY ANGIOGRAM N/A 8/20/2021    Procedure: CV CORONARY ANGIOGRAM;  Surgeon: Derek Escoto MD;  Location: [facility] CARDIAC CATH LAB    CV CORONARY ANGIOGRAM N/A 10/9/2023    Procedure: Coronary Angiogram with checking bypass grafts;  Surgeon: Mahendra Collins MD;  Location: [facility] CARDIAC CATH LAB    CV PCI STENT DRUG ELUTING N/A 10/17/2019    Procedure: PCI Stent Drug Eluting;  Surgeon: Mahendra Collins  MD;  Location:  HEART CARDIAC CATH LAB    CV RIGHT HEART CATH MEASUREMENTS RECORDED N/A 8/20/2021    Procedure: CV RIGHT HEART CATH;  Surgeon: Derek Escoto MD;  [facility]    CV RIGHT HEART CATH MEASUREMENTS RECORDED N/A 9/13/2023    Procedure: Heart Cath Right Heart Cath;  Surgeon: Con Burkett MD;  [facility]: [facility]ARD[facility]       Family History   Problem Relation Age of Onset    Arthritis Mother         ra    Connective Tissue Disorder Mother         lupus    Congenital Anomalies Mother         wholein heart    Cerebrovascular Disease Mother         TIA's    Cerebrovascular Disease Father     Diabetes Father     Hypertension Father     Cardiovascular Father         stents    Genitourinary Problems Father         kidney failure    Kidney Disease Father         kidney injury related to acute illness around time of death (RANDELL likely)    Cataracts Father     Arthritis Paternal Grandmother     Diabetes Brother     Glaucoma No family hx of     Macular Degeneration No family hx of        Social History     Tobacco Use    Smoking status: Former    Smokeless tobacco: Never    Tobacco comments:     30 plus years ago.   Substance Use Topics    Alcohol use: No         Current Outpatient Medications:     aspirin 81 MG EC tablet, Take 81 mg by mouth daily, Disp: , Rfl:     atorvastatin (LIPITOR) 40 MG tablet, Take 1 tablet (40 mg) by mouth daily, Disp: 90 tablet, Rfl: 3    azelastine (ASTELIN) 0.1 % nasal spray, Spray 2 sprays into both nostrils 2 times daily, Disp: 30 mL, Rfl: 4    dapagliflozin (FARXIGA) 10 MG TABS tablet, Take 1 tablet (10 mg) by mouth daily, Disp: 90 tablet, Rfl: 1    erythromycin (ROMYCIN) 5 MG/GM ophthalmic ointment, Place 0.5 inches into both eyes 3 times daily, Disp: 3.5 g, Rfl: 1    furosemide (LASIX) 20 MG tablet, TAKE 2 TABLETS BY MOUTH EVERY DAY, Disp: 180 tablet, Rfl: 3    hydroxychloroquine (PLAQUENIL) 200 MG tablet, Take 1 tablet (200 mg) by mouth 2  times daily, Disp: 180 tablet, Rfl: 3    loratadine (CLARITIN) 10 MG tablet, Take 10 mg by mouth daily, Disp: , Rfl:     metoprolol tartrate (LOPRESSOR) 25 MG tablet, Take 50mg in the morning and 75mg in the evening., Disp: 450 tablet, Rfl: 3    metoprolol tartrate (LOPRESSOR) 50 MG tablet, TAKE 50MG IN THE MORNING AND 75MG IN THE EVENING., Disp: 450 tablet, Rfl: 3    metroNIDAZOLE (METROCREAM) 0.75 % external cream, Apply topically 2 times daily, Disp: 45 g, Rfl: 11    MULTIPLE VITAMIN PO, Take 1 tablet by mouth daily , Disp: , Rfl:     omega-3 fatty acids 1200 MG capsule, Take 2 capsules by mouth 2 times daily , Disp: , Rfl:     order for DME, Autopap 10-16 cmH20, Disp: 1 Device, Rfl: 0    sacubitril-valsartan (ENTRESTO) 24-26 MG per tablet, TAKE 1/2 TABLET BY MOUTH TWICE DAILY, Disp: 45 tablet, Rfl: 3    warfarin ANTICOAGULANT (COUMADIN) 5 MG tablet, Take 10 mg every Sat; 7.5 mg all other days or as directed by the INR clinic, Disp: 135 tablet, Rfl: 1    warfarin ANTICOAGULANT (COUMADIN) 5 MG tablet, TAKE 10 MG (2 TABS) TUE, THUR, & SAT + 7.5 MG (1 1/2 TABS) ALL OTHER DAYS OR AS DIRECTED BY COUMADIN CLINIC. DUE FOR INR CHECK 01/11/23 (Patient not taking: Reported on 10/9/2023), Disp: 156 tablet, Rfl: 1       Allergies   Allergen Reactions    Seasonal Allergies     Sulfa Antibiotics Rash and GI Disturbance        O:      Vitals: Pulse 70   Wt 113.4 kg (250 lb)   SpO2 99%   BMI 46.30 kg/m      Constitutional/ general:  Pt is in no apparent distress, appears well-nourished.  Cooperative with history and physical exam.     Psych:  The patient answered questions appropriately.  Normal affect.  Seems to have reasonable expectations, in terms of treatment.     Eyes:  Visual scanning/ tracking without deficit.     Ears:  Response to auditory stimuli is normal.  negative hearing aid devices.  Auricles in proper alignment.     Lymphatic:  Popliteal lymph nodes not enlarged.     Lungs:  Non labored breathing, non labored  speech. No cough.  No audible wheezing. Even, quiet breathing.      Vascular:  Pedal pulses are palpable bilaterally for both the DP and PT arteries.  CFT < 3 sec.  positive edema.  positive varicosities.  Scant pedal hair growth noted.     Neuro:  Alert and oriented x 3. Coordinated gait.  Light touch sensation is intact to the L4, L5, S1 distributions. No obvious deficits.  No evidence of neurological-based weakness, spasticity, or contracture in the lower extremities.  Achilles reflexes equal and symmetrical.       Derm:    Somewhat thin.  Petechiae noted along the diaphyseal area of all metatarsals.  No pain with palpation.  Slight edema.    Musculoskeletal:    Lower extremity muscle strength is normal.  Patient is ambulatory without an assistive device or brace.  Pronated arch with weightbearing.  Normal ROM all forefoot and rearfoot joints bilateral.  right and left second hammertoe deformity noted.  Can reduce this deformity.  negative deformity at MTPJ.  No masses or ulcers.  Pain under second metatarsal heads bilaterally.  Negative Lachman's test.  Negative Deanna's click.  No masses.  Fat pad atrophy noted bilaterally.      A:    Rash dorsum of both feet secondary to crocs  Bilateral second hammertoes  Bilateral subsecond capsulitis  Bilateral pronation    P: Explained to patient how soft slip on shoes can cause too much pressure on dorsum of foot resulting in petechiae.  She will stop wearing these.  Discussed cause of second hammertoes.  Discussed how this is related to subsecond capsulitis.  Also discussed how fat pad atrophy along with pronation and less pressure under the first metatarsal head can cause subsecond capsulitis.  Recommended stiff shoes at all times both inside and outside to offload second metatarsal head.  Ice twice daily.  We will reduce this with a Budin splint.  Discussed she should reduce her hammertoes manually.  Budin splint will also be helpful.  Instructed how to bjorn tape with  Coban.  We discussed surgery to correct hammertoes.  Discussed she would have pain in toe for 6 weeks.  She is at a higher risk for complications because of her connective tissue disorder.  Decision was made not to pursue surgery today and start with conservative treatments first.  RTC as needed.  Thank you for allowing me participate in the care of this patient.        Larry Rothman DPM, FACFAS

## 2023-10-25 NOTE — PATIENT INSTRUCTIONS
We wish you continued good healing. If you have any questions or concerns, please do not hesitate to contact us at  [phone]    [facility] [employer] (secure e-mail communication and access to your chart) to send a message or to make an appointment.    Please remember to call and schedule a follow up appointment if one was recommended at your earliest convenience.     PODIATRY CLINIC HOURS  TELEPHONE NUMBER    Dr. Larry ROPERPHAWA FACFAS        Clinics:  [facility]   [place]  Tuesday 1PM-6PM  [place]  Wednesday 745AM-330PM  [place]  Monday 2nd,4th  830AM-4PM  Thursday/Friday 745AM-230PM     [place] APPOINTMENTS  [phone]    [place] APPOINTMENTS  [phone]          If you need a medication refill, please contact us you may need lab work and/or a follow up visit prior to your refill (i.e. Antifungal medications).  If MRI needed please call Imaging at [phone]   HOW DO I GET MY KNEE SCOOTER? Knee scooters can be picked up at ANY Medical Supply stores with your knee scooter Prescription.  OR  Bring your signed prescription to an [facility] showroom.   Set up an appointment for your custom Orthotics. Call any Orthotics locations call [phone]

## 2023-10-25 NOTE — LETTER
10/25/2023         RE: Luz Marina Live  [address]  [place] [address]        Dear Colleague,    Thank you for referring your patient, Luz Marina Live, to the [facility]. Please see a copy of my visit note below.    S:  Patient seen today in consult from Dr. Nguyen with numerous complaints.  Recently developed a rash on both feet.  Points to the dorsal distal foot.  Denies any pain with this.  Denies any itching.  She wears crocs around the house all the time.  She is wondering if the straps hitting her foot.  Is also had bilateral hammertoes of her second toes for at least a year.  No pain in the toe per se.  Recently started having pain in her plantar forefoot.  Points to subsecond metatarsal head.  Describes as burning pain.  Aggravated by activity and relieved by rest.  Patient has lupus.  She has a history of sepsis.  Her father had history of arthritis.  She quit smoking over 30 years ago.       ROS:  A 10-point review of systems was performed and is positive for that noted in the HPI and as seen below.  All other areas are negative    Past Medical History:   Diagnosis Date     Hypertension      Hypovolemic shock (H) 2/28/2015     Lupus (H)      Sepsis (H) 8/22/2015       Past Surgical History:   Procedure Laterality Date     CARDIAC SURGERY  08/27/2009    triple vessel coronary artery bypass grafting on 8/27/09     CARPAL TUNNEL RELEASE RT/LT  1996    bilateral     CV ANGIOGRAM CORONARY GRAFT N/A 10/9/2023    Procedure: Coronary Angiogram Graft;  Surgeon: Mahendra Collins MD;  Location: [facility] CARDIAC CATH LAB     CV CORONARY ANGIOGRAM  10/17/2019    Procedure: CV CORONARY ANGIOGRAM;  Surgeon: Mahendra Collins MD;  Location: [facility] CARDIAC CATH LAB     CV CORONARY ANGIOGRAM N/A 8/20/2021    Procedure: CV CORONARY ANGIOGRAM;  Surgeon: Derek Escoto MD;  Location: [facility] CARDIAC CATH LAB     CV CORONARY ANGIOGRAM N/A 10/9/2023     Procedure: Coronary Angiogram with checking bypass grafts;  Surgeon: Mahendra Collins MD;  [facility]: [facility]     CV PCI STENT DRUG ELUTING N/A 10/17/2019    Procedure: PCI Stent Drug Eluting;  Surgeon: Mahendra Collins MD;  [facility]     CV RIGHT HEART CATH MEASUREMENTS RECORDED N/A 8/20/2021    Procedure: CV RIGHT HEART CATH;  Surgeon: Derek Escoto MD;  [facility]     CV RIGHT HEART CATH MEASUREMENTS RECORDED N/A 9/13/2023    Procedure: Heart Cath Right Heart Cath;  Surgeon: Con Burkett MD;  [facility]       Family History   Problem Relation Age of Onset     Arthritis Mother         ra     Connective Tissue Disorder Mother         lupus     Congenital Anomalies Mother         wholein heart     Cerebrovascular Disease Mother         TIA's     Cerebrovascular Disease Father      Diabetes Father      Hypertension Father      Cardiovascular Father         stents     Genitourinary Problems Father         kidney failure     Kidney Disease Father         kidney injury related to acute illness around time of death (RANDELL likely)     Cataracts Father      Arthritis Paternal Grandmother      Diabetes Brother      Glaucoma No family hx of      Macular Degeneration No family hx of        Social History     Tobacco Use     Smoking status: Former     Smokeless tobacco: Never     Tobacco comments:     30 plus years ago.   Substance Use Topics     Alcohol use: No         Current Outpatient Medications:      aspirin 81 MG EC tablet, Take 81 mg by mouth daily, Disp: , Rfl:      atorvastatin (LIPITOR) 40 MG tablet, Take 1 tablet (40 mg) by mouth daily, Disp: 90 tablet, Rfl: 3     azelastine (ASTELIN) 0.1 % nasal spray, Spray 2 sprays into both nostrils 2 times daily, Disp: 30 mL, Rfl: 4     dapagliflozin (FARXIGA) 10 MG TABS tablet, Take 1 tablet (10 mg) by mouth daily, Disp: 90 tablet, Rfl: 1      erythromycin (ROMYCIN) 5 MG/GM ophthalmic ointment, Place 0.5 inches into both eyes 3 times daily, Disp: 3.5 g, Rfl: 1     furosemide (LASIX) 20 MG tablet, TAKE 2 TABLETS BY MOUTH EVERY DAY, Disp: 180 tablet, Rfl: 3     hydroxychloroquine (PLAQUENIL) 200 MG tablet, Take 1 tablet (200 mg) by mouth 2 times daily, Disp: 180 tablet, Rfl: 3     loratadine (CLARITIN) 10 MG tablet, Take 10 mg by mouth daily, Disp: , Rfl:      metoprolol tartrate (LOPRESSOR) 25 MG tablet, Take 50mg in the morning and 75mg in the evening., Disp: 450 tablet, Rfl: 3     metoprolol tartrate (LOPRESSOR) 50 MG tablet, TAKE 50MG IN THE MORNING AND 75MG IN THE EVENING., Disp: 450 tablet, Rfl: 3     metroNIDAZOLE (METROCREAM) 0.75 % external cream, Apply topically 2 times daily, Disp: 45 g, Rfl: 11     MULTIPLE VITAMIN PO, Take 1 tablet by mouth daily , Disp: , Rfl:      omega-3 fatty acids 1200 MG capsule, Take 2 capsules by mouth 2 times daily , Disp: , Rfl:      order for DME, Autopap 10-16 cmH20, Disp: 1 Device, Rfl: 0     sacubitril-valsartan (ENTRESTO) 24-26 MG per tablet, TAKE 1/2 TABLET BY MOUTH TWICE DAILY, Disp: 45 tablet, Rfl: 3     warfarin ANTICOAGULANT (COUMADIN) 5 MG tablet, Take 10 mg every Sat; 7.5 mg all other days or as directed by the INR clinic, Disp: 135 tablet, Rfl: 1     warfarin ANTICOAGULANT (COUMADIN) 5 MG tablet, TAKE 10 MG (2 TABS) TUE, THUR, & SAT + 7.5 MG (1 1/2 TABS) ALL OTHER DAYS OR AS DIRECTED BY COUMADIN CLINIC. DUE FOR INR CHECK 01/11/23 (Patient not taking: Reported on 10/9/2023), Disp: 156 tablet, Rfl: 1       Allergies   Allergen Reactions     Seasonal Allergies      Sulfa Antibiotics Rash and GI Disturbance        O:      Vitals: Pulse 70   Wt 113.4 kg (250 lb)   SpO2 99%   BMI 46.30 kg/m      Constitutional/ general:  Pt is in no apparent distress, appears well-nourished.  Cooperative with history and physical exam.     Psych:  The patient answered questions appropriately.  Normal affect.  Seems to have  reasonable expectations, in terms of treatment.     Eyes:  Visual scanning/ tracking without deficit.     Ears:  Response to auditory stimuli is normal.  negative hearing aid devices.  Auricles in proper alignment.     Lymphatic:  Popliteal lymph nodes not enlarged.     Lungs:  Non labored breathing, non labored speech. No cough.  No audible wheezing. Even, quiet breathing.      Vascular:  Pedal pulses are palpable bilaterally for both the DP and PT arteries.  CFT < 3 sec.  positive edema.  positive varicosities.  Scant pedal hair growth noted.     Neuro:  Alert and oriented x 3. Coordinated gait.  Light touch sensation is intact to the L4, L5, S1 distributions. No obvious deficits.  No evidence of neurological-based weakness, spasticity, or contracture in the lower extremities.  Achilles reflexes equal and symmetrical.       Derm:    Somewhat thin.  Petechiae noted along the diaphyseal area of all metatarsals.  No pain with palpation.  Slight edema.    Musculoskeletal:    Lower extremity muscle strength is normal.  Patient is ambulatory without an assistive device or brace.  Pronated arch with weightbearing.  Normal ROM all forefoot and rearfoot joints bilateral.  right and left second hammertoe deformity noted.  Can reduce this deformity.  negative deformity at MTPJ.  No masses or ulcers.  Pain under second metatarsal heads bilaterally.  Negative Lachman's test.  Negative Deanna's click.  No masses.  Fat pad atrophy noted bilaterally.      A:    Rash dorsum of both feet secondary to crocs  Bilateral second hammertoes  Bilateral subsecond capsulitis  Bilateral pronation    P: Explained to patient how soft slip on shoes can cause too much pressure on dorsum of foot resulting in petechiae.  She will stop wearing these.  Discussed cause of second hammertoes.  Discussed how this is related to subsecond capsulitis.  Also discussed how fat pad atrophy along with pronation and less pressure under the first metatarsal head  can cause subsecond capsulitis.  Recommended stiff shoes at all times both inside and outside to offload second metatarsal head.  Ice twice daily.  We will reduce this with a Budin splint.  Discussed she should reduce her hammertoes manually.  Budin splint will also be helpful.  Instructed how to buddy tape with Coban.  We discussed surgery to correct hammertoes.  Discussed she would have pain in toe for 6 weeks.  She is at a higher risk for complications because of her connective tissue disorder.  Decision was made not to pursue surgery today and start with conservative treatments first.  RTC as needed.  Thank you for allowing me participate in the care of this patient.        Larry Rothman DPM, FACFAS            Again, thank you for allowing me to participate in the care of your patient.        Sincerely,        Larry Rothman DPM

## 2023-10-26 ENCOUNTER — VIRTUAL VISIT (OUTPATIENT)
Dept: SLEEP MEDICINE | Facility: CLINIC | Age: 63
End: 2023-10-26
Payer: COMMERCIAL

## 2023-10-26 VITALS — WEIGHT: 250 LBS | HEIGHT: 63 IN | BODY MASS INDEX: 44.3 KG/M2

## 2023-10-26 DIAGNOSIS — G47.33 OSA (OBSTRUCTIVE SLEEP APNEA): Primary | Chronic | ICD-10-CM

## 2023-10-26 PROCEDURE — 99213 OFFICE O/P EST LOW 20 MIN: CPT | Mod: VID

## 2023-10-26 ASSESSMENT — SLEEP AND FATIGUE QUESTIONNAIRES
HOW LIKELY ARE YOU TO NOD OFF OR FALL ASLEEP WHILE WATCHING TV: WOULD NEVER DOZE
HOW LIKELY ARE YOU TO NOD OFF OR FALL ASLEEP WHILE SITTING QUIETLY AFTER LUNCH WITHOUT ALCOHOL: WOULD NEVER DOZE
HOW LIKELY ARE YOU TO NOD OFF OR FALL ASLEEP WHILE SITTING INACTIVE IN A PUBLIC PLACE: WOULD NEVER DOZE
HOW LIKELY ARE YOU TO NOD OFF OR FALL ASLEEP WHILE LYING DOWN TO REST IN THE AFTERNOON WHEN CIRCUMSTANCES PERMIT: HIGH CHANCE OF DOZING
HOW LIKELY ARE YOU TO NOD OFF OR FALL ASLEEP WHEN YOU ARE A PASSENGER IN A CAR FOR AN HOUR WITHOUT A BREAK: WOULD NEVER DOZE
HOW LIKELY ARE YOU TO NOD OFF OR FALL ASLEEP WHILE SITTING AND READING: WOULD NEVER DOZE
HOW LIKELY ARE YOU TO NOD OFF OR FALL ASLEEP IN A CAR, WHILE STOPPED FOR A FEW MINUTES IN TRAFFIC: WOULD NEVER DOZE
HOW LIKELY ARE YOU TO NOD OFF OR FALL ASLEEP WHILE SITTING AND TALKING TO SOMEONE: WOULD NEVER DOZE

## 2023-10-26 ASSESSMENT — PAIN SCALES - GENERAL: PAINLEVEL: NO PAIN (0)

## 2023-10-26 NOTE — PROGRESS NOTES
Virtual Visit Details    Type of service: Video Visit   Video Start Time: 3:51 PM  Video End Time: 4:00 PM  Originating Location (pt. Location): Home    Distant Location (provider location): Off-site  Platform used for Video Visit: [facility]    Sleep Apnea - Follow-up Visit:    Impression/Plan:  (G47.33) KEITH (obstructive sleep apnea)- severe (AHI 30) (primary encounter diagnosis)  Comment: Luz Marina presents for follow-up of her severe sleep apnea, managed with CPAP. Luz Marina has been using her CPAP more consistently now. When she was seen by Dr. Nguyễn in 2021, she had intermittent use typically lasting 3-5 hours. Usage is still intermittent; however, on nights she does use her CPAP she gets almost 8 hours of use. She will sometimes get a little pressure sore in her right nare and have to take a few days off. She travels frequently as well and usually takes her machine with but sometimes forgets it at home which is reflected on her download from the last month. She is tolerating her CPAP pressures. Her leak is acceptable, and her residual AHI is normal at 0.6 events/hr. She is falling asleep quickly between 12-12:30 AM and waking between 8:30-9 AM. She usually sleeps through the night but will sometimes wake up once to use the bathroom.       Plan: Comprehensive DME  Continue auto-PAP 10-16 cm H2O. A prescription was written for new supplies. We reviewed recommendations for cleaning and replacing supplies. Encouraged her to continue using Ayr saline nasal gel, which has been working for her nasal irritation. Also recommended CPAP Chap or getting a nasal mask to use if she does develop a sore.     Luz Marina Live will follow up in about 1 year(s).     Total time spent reviewing medical records, history and physical examination, review of previous testing and interpretation as well as documentation on this date: 14 minutes     CC: Eliz Nguyen MD    History of Present Illness:  Chief Complaint   Patient presents with     NATHALIA     Luz Marina presents for follow-up of her severe sleep apnea, managed with CPAP.     She was initially seen by Dr. Nguyễn 4/2017 with loud snoring and interrupted breathing on a nightly basis, morbid obesity. Relevant medical history includes coronary artery disease, paroxysmal atrial fibrillation, hypertension, and SLE with renal disease stage 3 on plaquenil.     She finally had a sleep study.   Study Date: 6/13/2018 (257.0 lbs)   - The combined apnea/hypopnea index was 30.9 events per hour (central apnea/hypopnea index was 0.5 events per hour). The REM AHI was 76.3 events per hour. The supine AHI was 48.7 events per hour. RDI was 32.5 events per hour.  -Severe hypoventilation was not suggested with a maximum change from baseline of 35-38 mmHg to a peak of 46 mmHg and 0 minutes at or greater than 55 mmHg.  -Lowest oxygen saturation was 71.1%. Time spent less than or equal to 88% was 20.8 minutes. Time spent less than or equal to 89% was 25.7 minutes.  -The PLM index was 22.7 movements per hour.    Do you use a CPAP Machine at home: Yes  Overall, on a scale of 0-10 how would you rate your CPAP (0 poor, 10 great):      What type of mask do you use: Nasal pillows  Is your mask comfortable: She will sometimes get a little pressure sore in her right nare and have to take a few days off. She travels frequently at well and usually takes her machine with but sometimes she forgets it at home.   If not, why:      Is your mask leaking: No  If yes, where do you feel it:    How many night per week does the mask leak (0-7):      Do you notice snoring with mask on: No  Do you notice gasping arousals with mask on: No  Are you having significant oral or nasal dryness: On occasion she will get a dry mouth  Is the pressure setting comfortable: Yes   If not, why:      What is your typical bedtime: 12-12:30 AM   How long does it take you to go to sleep on PAP therapy: pretty quickly  What time do you typically get out of bed for  the day: 8:30-9 AM  How many hours on average per night are you using PAP therapy: 7 hours 45 minutes  How many hours are you sleeping per night: around 8 hours  Do you feel well rested in the morning: Yes   Wakes 0-1 times per night. Rare occasions she will get up once to use the bathroom.     ResMed AirSense 10  Auto-PAP 10.0 - 16.0 cmH2O 30 day usage data: 09/23/2023-10/22/2023    50% of days with > 4 hours of use. 15/30 days with no use.   Average use 7 hours 49 minutes per day.   95%ile Leak 21.0 L/min.   CPAP 95% pressure 13.9 cm.   AHI 0.64 events per hour.     EPWORTH SLEEPINESS SCALE         10/26/2023     3:35 PM    [place] Sleepiness Scale ( WANDER Ge  [id number]<br>ESS - [place]/English - Final version - 21 Nov 07 - [facility] [place].)   Sitting and reading Would never doze   Watching TV Would never doze   Sitting, inactive in a public place (e.g. a theatre or a meeting) Would never doze   As a passenger in a car for an hour without a break Would never doze   Lying down to rest in the afternoon when circumstances permit High chance of dozing   Sitting and talking to someone Would never doze   Sitting quietly after a lunch without alcohol Would never doze   In a car, while stopped for a few minutes in traffic Would never doze   [place] Score (MC) 3   [place] Score (Sleep) 3       INSOMNIA SEVERITY INDEX (REENA)          10/26/2023     3:34 PM   Insomnia Severity Index (REENA)   Difficulty falling asleep 1   Difficulty staying asleep 0   Problems waking up too early 0   How SATISFIED/DISSATISFIED are you with your CURRENT sleep pattern? 1   How NOTICEABLE to others do you think your sleep problem is in terms of impairing the quality of your life? 0   How WORRIED/DISTRESSED are you about your current sleep problem? 0   To what extent do you consider your sleep problem to INTERFERE with your daily functioning (e.g. daytime fatigue, mood, ability to function at work/daily chores, concentration, memory, mood,  etc.) CURRENTLY? 0   REENA Total Score 2       Guidelines for Scoring/Interpretation:  Total score categories:  0-7 = No clinically significant insomnia   8-14 = Subthreshold insomnia   15-21 = Clinical insomnia (moderate severity)  22-28 = Clinical insomnia (severe)  Used via courtesy of www.[place]health.[place].gov with permission from Latrell Olmedo PhD., [facility]      Past medical/surgical history, family history, social history, medications and allergies were reviewed.        Problem List:  Patient Active Problem List    Diagnosis Date Noted    Chronic diastolic heart failure (H) 10/09/2023     Priority: Medium    Coronary artery disease involving native coronary artery of native heart without angina pectoris 10/09/2023     Priority: Medium    Atrial fibrillation, unspecified type (H) 02/03/2022     Priority: Medium    Status post coronary angiogram 08/20/2021     Priority: Medium    Positive cardiac stress test 08/06/2021     Priority: Medium     Added automatically from request for surgery [id number]      Stable angina 10/16/2019     Priority: Medium    Long-term use of immunosuppressant medication 10/11/2019     Priority: Medium    Long-term use of Plaquenil 09/04/2018     Priority: Medium    KEITH (obstructive sleep apnea)- severe (AHI 30) 06/18/2018     Priority: Medium     6/13/2018 [place] [facility] Sleep Study (257.0 lbs) - AHI 30.9, RDI 32.5, Supine AHI 48.7, REM AHI 76.3, Low O2 71.1%, Time Spent ?88% 20.8 minutes / Time Spent ?89% 25.7 minutes. TCCo2 within upper normal limits.       Psychophysiological insomnia 06/11/2018     Priority: Medium    Morbid obesity (H) 06/11/2018     Priority: Medium    Systemic lupus erythematosus with glomerular disease, unspecified SLE type (H) 07/03/2017     Priority: Medium     longstanding SLE. She did have renal involvement in the 1990's requiring Cytoxan, Prednisone and Imuran      Moderate tricuspid insufficiency 12/22/2016     Priority: Medium    Long-term  "(current) use of anticoagulants [Z79.01] 04/05/2016     Priority: Medium    CKD (chronic kidney disease) stage 3, GFR 30-59 ml/min (H) 09/14/2015     Priority: Medium    PAF (paroxysmal atrial fibrillation) (H) 08/24/2015     Priority: Medium     08/2015, the patient had a severe infection of her face and during that hospitalization had an episode of atrial fibrillation, she was discharged on amiodarone which since then has been discontinued.        HTN, goal below 140/90 07/03/2013     Priority: Medium    Hyperlipidemia LDL goal <100 12/05/2011     Priority: Medium    CAD (coronary artery disease) 12/05/2011     Priority: Medium     S/p CABGx3 2009          Ht 1.6 m (5' 3\")   Wt 113.4 kg (250 lb)   BMI 44.29 kg/m      Bailey Cruz, APRN CNP 10/26/2023  "

## 2023-10-26 NOTE — NURSING NOTE
Is the patient currently in the state of [place]? YES    Visit mode:VIDEO    If the visit is dropped, the patient can be reconnected by: VIDEO VISIT: Text to cell phone:   Telephone Information:   Mobile [phone]       Will anyone else be joining the visit? NO  (If patient encounters technical issues they should call [phone]    How would you like to obtain your AVS? MyChart    Are changes needed to the allergy or medication list? No    Reason for visit: RECHECK    Fercho HDEZ

## 2023-10-27 ENCOUNTER — OFFICE VISIT (OUTPATIENT)
Dept: OPHTHALMOLOGY | Facility: CLINIC | Age: 63
End: 2023-10-27
Payer: COMMERCIAL

## 2023-10-27 ENCOUNTER — LAB (OUTPATIENT)
Dept: LAB | Facility: CLINIC | Age: 63
End: 2023-10-27
Payer: COMMERCIAL

## 2023-10-27 ENCOUNTER — ANCILLARY PROCEDURE (OUTPATIENT)
Dept: CT IMAGING | Facility: CLINIC | Age: 63
End: 2023-10-27
Attending: INTERNAL MEDICINE
Payer: COMMERCIAL

## 2023-10-27 DIAGNOSIS — I25.10 CORONARY ARTERY DISEASE INVOLVING NATIVE HEART WITHOUT ANGINA PECTORIS, UNSPECIFIED VESSEL OR LESION TYPE: Chronic | ICD-10-CM

## 2023-10-27 DIAGNOSIS — R06.02 SOB (SHORTNESS OF BREATH): ICD-10-CM

## 2023-10-27 DIAGNOSIS — Z79.899 LONG-TERM USE OF PLAQUENIL: Primary | ICD-10-CM

## 2023-10-27 DIAGNOSIS — I50.30 (HFPEF) HEART FAILURE WITH PRESERVED EJECTION FRACTION (H): ICD-10-CM

## 2023-10-27 DIAGNOSIS — H52.4 PRESBYOPIA: ICD-10-CM

## 2023-10-27 DIAGNOSIS — H25.13 SENILE NUCLEAR SCLEROSIS, BILATERAL: ICD-10-CM

## 2023-10-27 LAB
ANION GAP SERPL CALCULATED.3IONS-SCNC: 10 MMOL/L (ref 7–15)
BUN SERPL-MCNC: 41 MG/DL (ref 8–23)
CALCIUM SERPL-MCNC: 9.6 MG/DL (ref 8.8–10.2)
CHLORIDE SERPL-SCNC: 103 MMOL/L (ref 98–107)
CREAT SERPL-MCNC: 1.49 MG/DL (ref 0.51–0.95)
DEPRECATED HCO3 PLAS-SCNC: 27 MMOL/L (ref 22–29)
EGFRCR SERPLBLD CKD-EPI 2021: 39 ML/MIN/1.73M2
GLUCOSE SERPL-MCNC: 75 MG/DL (ref 70–99)
POTASSIUM SERPL-SCNC: 4.7 MMOL/L (ref 3.4–5.3)
SODIUM SERPL-SCNC: 140 MMOL/L (ref 135–145)

## 2023-10-27 PROCEDURE — 92015 DETERMINE REFRACTIVE STATE: CPT | Performed by: OPTOMETRIST

## 2023-10-27 PROCEDURE — 92014 COMPRE OPH EXAM EST PT 1/>: CPT | Performed by: OPTOMETRIST

## 2023-10-27 PROCEDURE — 92083 EXTENDED VISUAL FIELD XM: CPT | Performed by: OPTOMETRIST

## 2023-10-27 PROCEDURE — 36415 COLL VENOUS BLD VENIPUNCTURE: CPT

## 2023-10-27 PROCEDURE — 92134 CPTRZ OPH DX IMG PST SGM RTA: CPT | Performed by: OPTOMETRIST

## 2023-10-27 PROCEDURE — 71250 CT THORAX DX C-: CPT | Performed by: RADIOLOGY

## 2023-10-27 PROCEDURE — 80048 BASIC METABOLIC PNL TOTAL CA: CPT

## 2023-10-27 ASSESSMENT — CONF VISUAL FIELD
OD_SUPERIOR_NASAL_RESTRICTION: 0
METHOD: COUNTING FINGERS
OS_INFERIOR_TEMPORAL_RESTRICTION: 0
OD_NORMAL: 1
OD_SUPERIOR_TEMPORAL_RESTRICTION: 0
OS_SUPERIOR_TEMPORAL_RESTRICTION: 0
OD_INFERIOR_NASAL_RESTRICTION: 0
OS_SUPERIOR_NASAL_RESTRICTION: 0
OS_INFERIOR_NASAL_RESTRICTION: 0
OS_NORMAL: 1
OD_INFERIOR_TEMPORAL_RESTRICTION: 0

## 2023-10-27 ASSESSMENT — CUP TO DISC RATIO
OS_RATIO: 0.3
OD_RATIO: 0.3

## 2023-10-27 ASSESSMENT — REFRACTION_MANIFEST
OD_ADD: +2.50
OD_AXIS: 090
OD_CYLINDER: +1.75
OS_CYLINDER: +1.25
OS_AXIS: 130
OS_ADD: +2.50
OD_SPHERE: +1.75
OS_SPHERE: +2.25

## 2023-10-27 ASSESSMENT — VISUAL ACUITY
METHOD: SNELLEN - LINEAR
CORRECTION_TYPE: GLASSES
OS_CC: 20/20
OD_CC: 20/20
OD_CC+: -3

## 2023-10-27 ASSESSMENT — REFRACTION_WEARINGRX
OS_CYLINDER: +1.25
OS_AXIS: 136
OS_ADD: +2.50
OS_SPHERE: +2.25
OD_AXIS: 079
OD_SPHERE: +1.50
SPECS_TYPE: PAL
OD_CYLINDER: +1.75
OD_ADD: +2.50

## 2023-10-27 ASSESSMENT — TONOMETRY
OD_IOP_MMHG: 18
IOP_METHOD: TONOPEN
OS_IOP_MMHG: 17

## 2023-10-27 NOTE — NURSING NOTE
Chief Complaints and History of Present Illnesses   Patient presents with    Follow Tx Of High Risk Med     Chief Complaint(s) and History of Present Illness(es)       Follow Tx Of High Risk Med              Laterality: both eyes    Course: stable    Associated symptoms: Negative for eye pain, flashes and floaters    Treatments tried: artificial tears              Comments    Here for annual plaquenil exam. VA doing fine and stable. No eye pain. No flashes or floaters. Uses drops as instructed.     Plaquenil 400mg daily    Denys Solomon COT 1:04 PM October 27, 2023

## 2023-10-27 NOTE — PROGRESS NOTES
Assessment/Plan  (Z79.899) Long-term use of Plaquenil  (primary encounter diagnosis)  Comment: 20+ years at 400mg daily  Plan: Reassured patient that no evidence of Plaquenil toxicity is evident on exam today. Continue monitoring annually. Return to clinic if vision changes are noted.     (H25.13) Senile nuclear sclerosis, bilateral  Plan: Monitor.     (H52.4) Presbyopia  Plan: Discussed findings with patient. New spectacle prescription dispensed to patient. Patient is welcome to return to clinic with prolonged adaptation difficulties.     Complete documentation of historical and exam elements from today's encounter can  be found in the full encounter summary report (not reduplicated in this progress  note). I personally obtained the chief complaint(s) and history of present illness. I  confirmed and edited as necessary the review of systems, past medical/surgical  history, family history, social history, and examination findings as documented by  others; and I examined the patient myself. I personally reviewed the relevant tests,  images, and reports as documented above. I formulated and edited as necessary the  assessment and plan and discussed the findings and management plan with the  patient and family.    Kirt Salazar OD

## 2023-11-03 ENCOUNTER — TELEPHONE (OUTPATIENT)
Dept: OBGYN | Facility: CLINIC | Age: 63
End: 2023-11-03
Payer: COMMERCIAL

## 2023-11-03 ENCOUNTER — MYC MEDICAL ADVICE (OUTPATIENT)
Dept: OBGYN | Facility: CLINIC | Age: 63
End: 2023-11-03
Payer: COMMERCIAL

## 2023-11-03 DIAGNOSIS — N95.0 POSTMENOPAUSAL BLEEDING: Primary | ICD-10-CM

## 2023-11-03 NOTE — TELEPHONE ENCOUNTER
Pt called with questions regarding her upcoming ultrasound. She was told that they may need to dilate her cervix. Explained to patient that the imaging ordered was a transabdominal ultrasound, a noninvasive procedure. Any further imaging/biopsy would be more invasive and include going through the cervix.     Asked Dr. Oneill about preference between transvaginal US vs transabdominal US, she stated that the former was better for visualization of the uterus/ovaries. Called pt back to discuss changing type to US to a transvaginal one w/ no answer, sent eReceipts message.

## 2023-11-08 ENCOUNTER — OFFICE VISIT (OUTPATIENT)
Dept: NURSING | Facility: CLINIC | Age: 63
End: 2023-11-08
Attending: INTERNAL MEDICINE
Payer: COMMERCIAL

## 2023-11-08 DIAGNOSIS — I25.10 CORONARY ARTERY DISEASE INVOLVING NATIVE HEART WITHOUT ANGINA PECTORIS, UNSPECIFIED VESSEL OR LESION TYPE: Chronic | ICD-10-CM

## 2023-11-08 DIAGNOSIS — R06.02 SOB (SHORTNESS OF BREATH): ICD-10-CM

## 2023-11-08 DIAGNOSIS — I50.30 HEART FAILURE WITH PRESERVED EJECTION FRACTION, UNSPECIFIED HF CHRONICITY (H): ICD-10-CM

## 2023-11-08 LAB
6 MIN WALK (FT): 1200 FT
6 MIN WALK (M): 366 M

## 2023-11-08 PROCEDURE — 94618 PULMONARY STRESS TESTING: CPT | Performed by: INTERNAL MEDICINE

## 2023-11-11 LAB — FIO2-PRE: 21 %

## 2023-11-17 ENCOUNTER — LAB (OUTPATIENT)
Dept: LAB | Facility: CLINIC | Age: 63
End: 2023-11-17
Payer: COMMERCIAL

## 2023-11-17 ENCOUNTER — ANTICOAGULATION THERAPY VISIT (OUTPATIENT)
Dept: ANTICOAGULATION | Facility: CLINIC | Age: 63
End: 2023-11-17

## 2023-11-17 ENCOUNTER — MYC MEDICAL ADVICE (OUTPATIENT)
Dept: ANTICOAGULATION | Facility: CLINIC | Age: 63
End: 2023-11-17

## 2023-11-17 DIAGNOSIS — I50.30 HEART FAILURE WITH PRESERVED EJECTION FRACTION, UNSPECIFIED HF CHRONICITY (H): ICD-10-CM

## 2023-11-17 DIAGNOSIS — I48.0 PAF (PAROXYSMAL ATRIAL FIBRILLATION) (H): Chronic | ICD-10-CM

## 2023-11-17 DIAGNOSIS — Z79.01 LONG TERM CURRENT USE OF ANTICOAGULANT THERAPY: Primary | Chronic | ICD-10-CM

## 2023-11-17 DIAGNOSIS — I48.91 ATRIAL FIBRILLATION, UNSPECIFIED TYPE (H): ICD-10-CM

## 2023-11-17 DIAGNOSIS — N18.31 STAGE 3A CHRONIC KIDNEY DISEASE (H): ICD-10-CM

## 2023-11-17 DIAGNOSIS — Z79.01 LONG TERM CURRENT USE OF ANTICOAGULANT THERAPY: ICD-10-CM

## 2023-11-17 DIAGNOSIS — I25.10 CORONARY ARTERY DISEASE INVOLVING NATIVE HEART WITHOUT ANGINA PECTORIS, UNSPECIFIED VESSEL OR LESION TYPE: Chronic | ICD-10-CM

## 2023-11-17 LAB
ALBUMIN MFR UR ELPH: 13.7 MG/DL
ALBUMIN SERPL BCG-MCNC: 4 G/DL (ref 3.5–5.2)
ALP SERPL-CCNC: 117 U/L (ref 40–150)
ALT SERPL W P-5'-P-CCNC: 24 U/L (ref 0–50)
ANION GAP SERPL CALCULATED.3IONS-SCNC: 9 MMOL/L (ref 7–15)
AST SERPL W P-5'-P-CCNC: 39 U/L (ref 0–45)
BILIRUB SERPL-MCNC: 0.8 MG/DL
BUN SERPL-MCNC: 39.6 MG/DL (ref 8–23)
CALCIUM SERPL-MCNC: 9.3 MG/DL (ref 8.8–10.2)
CHLORIDE SERPL-SCNC: 105 MMOL/L (ref 98–107)
CREAT SERPL-MCNC: 1.66 MG/DL (ref 0.51–0.95)
CREAT UR-MCNC: 103 MG/DL
DEPRECATED HCO3 PLAS-SCNC: 25 MMOL/L (ref 22–29)
EGFRCR SERPLBLD CKD-EPI 2021: 34 ML/MIN/1.73M2
ERYTHROCYTE [DISTWIDTH] IN BLOOD BY AUTOMATED COUNT: 15.6 % (ref 10–15)
GLUCOSE SERPL-MCNC: 101 MG/DL (ref 70–99)
HCT VFR BLD AUTO: 40.6 % (ref 35–47)
HGB BLD-MCNC: 13 G/DL (ref 11.7–15.7)
INR BLD: 1.7 (ref 0.9–1.1)
MCH RBC QN AUTO: 30 PG (ref 26.5–33)
MCHC RBC AUTO-ENTMCNC: 32 G/DL (ref 31.5–36.5)
MCV RBC AUTO: 94 FL (ref 78–100)
NT-PROBNP SERPL-MCNC: 1648 PG/ML (ref 0–900)
PHOSPHATE SERPL-MCNC: 3.6 MG/DL (ref 2.5–4.5)
PLATELET # BLD AUTO: 165 10E3/UL (ref 150–450)
POTASSIUM SERPL-SCNC: 4.7 MMOL/L (ref 3.4–5.3)
PROT SERPL-MCNC: 7.8 G/DL (ref 6.4–8.3)
PROT/CREAT 24H UR: 0.13 MG/MG CR (ref 0–0.2)
PTH-INTACT SERPL-MCNC: 91 PG/ML (ref 15–65)
RBC # BLD AUTO: 4.33 10E6/UL (ref 3.8–5.2)
SODIUM SERPL-SCNC: 139 MMOL/L (ref 135–145)
WBC # BLD AUTO: 5.8 10E3/UL (ref 4–11)

## 2023-11-17 PROCEDURE — 85610 PROTHROMBIN TIME: CPT

## 2023-11-17 PROCEDURE — 84100 ASSAY OF PHOSPHORUS: CPT

## 2023-11-17 PROCEDURE — 83880 ASSAY OF NATRIURETIC PEPTIDE: CPT

## 2023-11-17 PROCEDURE — 83970 ASSAY OF PARATHORMONE: CPT

## 2023-11-17 PROCEDURE — 84156 ASSAY OF PROTEIN URINE: CPT

## 2023-11-17 PROCEDURE — 80053 COMPREHEN METABOLIC PANEL: CPT

## 2023-11-17 PROCEDURE — 36416 COLLJ CAPILLARY BLOOD SPEC: CPT

## 2023-11-17 PROCEDURE — 36415 COLL VENOUS BLD VENIPUNCTURE: CPT

## 2023-11-17 PROCEDURE — 85027 COMPLETE CBC AUTOMATED: CPT

## 2023-11-17 NOTE — PROGRESS NOTES
ANTICOAGULATION MANAGEMENT     Luz Marina Live 63 year old female is on warfarin with subtherapeutic INR result. (Goal INR 2.0-3.0)    Recent labs: (last 7 days)     11/17/23  1134   INR 1.7*       ASSESSMENT     Warfarin Lab Questionnaire    Warfarin Doses Last 7 Days      11/17/2023    11:26 AM   Dose in Tablet or Mg   TAB or MG? milligram (mg)     Pt Rptd Dose SUNDAY MONDAY TUESDAY WED THURS FRIDAY SATURDAY 11/17/2023  11:26 AM 7 7 7 7 7 7 10         11/17/2023   Warfarin Lab Questionnaire   Missed doses within past 14 days? No   Changes in diet or alcohol within past 14 days? No   Medication changes since last result? No   Injuries or illness since last result? No   New shortness of breath, severe headaches or sudden changes in vision since last result? No   Abnormal bleeding since last result? No   Upcoming surgery, procedure? No   Best number to call with results? [phone]     Previous result: Subtherapeutic  Additional findings: None       PLAN     Recommended plan for no diet, medication or health factor changes affecting INR     Dosing Instructions: Increase your warfarin dose (4.5% change) with next INR in 2 weeks       Summary  As of 11/17/2023      Full warfarin instructions:  10 mg every Tue, Sat; 7.5 mg all other days   Next INR check:  11/30/2023               Sent [employer] message with dosing and follow up instructions    Contact [phone]  to schedule and with any changes, questions or concerns.     Education provided:   Please call back if any changes to your diet, medications or how you've been taking warfarin  Goal range and lab monitoring: goal range and significance of current result, Importance of therapeutic range, and Importance of following up at instructed interval    Plan made per [facility] anticoagulation protocol    Michael Quispe, RN  Anticoagulation Clinic  11/17/2023    _______________________________________________________________________     Anticoagulation Episode Summary        Current INR goal:  2.0-3.0   TTR:  56.5% (1 y)   Target end date:  Indefinite   Send INR reminders to:  DEBBIE GREG SANNA    Indications    Long-term (current) use of anticoagulants [Z79.01] [Z79.01]  PAF (paroxysmal atrial fibrillation) (H) [I48.0]  Atrial fibrillation  unspecified type (H) [I48.91]             Comments:               Anticoagulation Care Providers       Provider Role Specialty Phone number    Eliz Nguyen MD Referring Internal Medicine [phone]    Elodia Tang MD Referring Family Medicine [phone]

## 2023-11-21 ASSESSMENT — ANXIETY QUESTIONNAIRES
6. BECOMING EASILY ANNOYED OR IRRITABLE: NOT AT ALL
1. FEELING NERVOUS, ANXIOUS, OR ON EDGE: NOT AT ALL
GAD7 TOTAL SCORE: 0
3. WORRYING TOO MUCH ABOUT DIFFERENT THINGS: NOT AT ALL
5. BEING SO RESTLESS THAT IT IS HARD TO SIT STILL: NOT AT ALL
4. TROUBLE RELAXING: NOT AT ALL
GAD7 TOTAL SCORE: 0
7. FEELING AFRAID AS IF SOMETHING AWFUL MIGHT HAPPEN: NOT AT ALL
2. NOT BEING ABLE TO STOP OR CONTROL WORRYING: NOT AT ALL

## 2023-11-24 ENCOUNTER — MYC REFILL (OUTPATIENT)
Dept: CARDIOLOGY | Facility: CLINIC | Age: 63
End: 2023-11-24
Payer: COMMERCIAL

## 2023-11-24 ENCOUNTER — PRE VISIT (OUTPATIENT)
Dept: CARDIOLOGY | Facility: CLINIC | Age: 63
End: 2023-11-24
Payer: COMMERCIAL

## 2023-11-24 DIAGNOSIS — I50.32 CHRONIC DIASTOLIC CONGESTIVE HEART FAILURE (H): ICD-10-CM

## 2023-11-24 DIAGNOSIS — I50.32 CHRONIC DIASTOLIC HEART FAILURE (H): Primary | ICD-10-CM

## 2023-11-27 ENCOUNTER — TELEPHONE (OUTPATIENT)
Dept: CARDIOLOGY | Facility: CLINIC | Age: 63
End: 2023-11-27
Payer: COMMERCIAL

## 2023-11-27 RX ORDER — FUROSEMIDE 20 MG
60 TABLET ORAL DAILY
Qty: 180 TABLET | Refills: 2 | Status: CANCELLED | OUTPATIENT
Start: 2023-11-27

## 2023-11-27 NOTE — TELEPHONE ENCOUNTER
furosemide (LASIX) 20 MG tablet       Last Written Prescription Date:  10/19/23  Last Fill Quantity: 180,   # refills: 3  Last Office Visit : 10/18/23  Future Office visit:  11/29/23    Routing refill request to provider for review/approval because:  Script is ordered as furosemide (LASIX) 20 MG tablet -TAKE 2 TABLETS BY MOUTH EVERY DAY but pt is taking 3 tablets by mouth every day  Can this be changed...

## 2023-11-28 ENCOUNTER — VIRTUAL VISIT (OUTPATIENT)
Dept: NEPHROLOGY | Facility: CLINIC | Age: 63
End: 2023-11-28
Payer: COMMERCIAL

## 2023-11-28 ENCOUNTER — ANCILLARY PROCEDURE (OUTPATIENT)
Dept: ULTRASOUND IMAGING | Facility: CLINIC | Age: 63
End: 2023-11-28
Attending: INTERNAL MEDICINE
Payer: COMMERCIAL

## 2023-11-28 ENCOUNTER — ANTICOAGULATION THERAPY VISIT (OUTPATIENT)
Dept: ANTICOAGULATION | Facility: CLINIC | Age: 63
End: 2023-11-28

## 2023-11-28 ENCOUNTER — OFFICE VISIT (OUTPATIENT)
Dept: OBGYN | Facility: CLINIC | Age: 63
End: 2023-11-28
Attending: INTERNAL MEDICINE
Payer: COMMERCIAL

## 2023-11-28 ENCOUNTER — LAB (OUTPATIENT)
Dept: LAB | Facility: CLINIC | Age: 63
End: 2023-11-28
Payer: COMMERCIAL

## 2023-11-28 VITALS — WEIGHT: 250 LBS | BODY MASS INDEX: 45.73 KG/M2

## 2023-11-28 VITALS — HEIGHT: 62 IN | WEIGHT: 250 LBS | BODY MASS INDEX: 46.01 KG/M2

## 2023-11-28 DIAGNOSIS — N95.0 POSTMENOPAUSAL BLEEDING: ICD-10-CM

## 2023-11-28 DIAGNOSIS — I50.32 CHRONIC DIASTOLIC HEART FAILURE (H): ICD-10-CM

## 2023-11-28 DIAGNOSIS — Z79.01 LONG TERM CURRENT USE OF ANTICOAGULANT THERAPY: Primary | Chronic | ICD-10-CM

## 2023-11-28 DIAGNOSIS — Z79.01 LONG TERM CURRENT USE OF ANTICOAGULANT THERAPY: ICD-10-CM

## 2023-11-28 DIAGNOSIS — N18.31 STAGE 3A CHRONIC KIDNEY DISEASE (H): Primary | ICD-10-CM

## 2023-11-28 DIAGNOSIS — I10 HYPERTENSION GOAL BP (BLOOD PRESSURE) < 130/80: ICD-10-CM

## 2023-11-28 DIAGNOSIS — I48.0 PAF (PAROXYSMAL ATRIAL FIBRILLATION) (H): Chronic | ICD-10-CM

## 2023-11-28 DIAGNOSIS — N25.81 SECONDARY RENAL HYPERPARATHYROIDISM (H): ICD-10-CM

## 2023-11-28 DIAGNOSIS — N95.0 POST-MENOPAUSAL BLEEDING: Primary | ICD-10-CM

## 2023-11-28 DIAGNOSIS — I48.91 ATRIAL FIBRILLATION, UNSPECIFIED TYPE (H): ICD-10-CM

## 2023-11-28 DIAGNOSIS — M32.14 SYSTEMIC LUPUS ERYTHEMATOSUS WITH GLOMERULAR DISEASE, UNSPECIFIED SLE TYPE (H): ICD-10-CM

## 2023-11-28 LAB
ALBUMIN SERPL BCG-MCNC: 4.1 G/DL (ref 3.5–5.2)
ALP SERPL-CCNC: 121 U/L (ref 40–150)
ALT SERPL W P-5'-P-CCNC: 19 U/L (ref 0–50)
ANION GAP SERPL CALCULATED.3IONS-SCNC: 10 MMOL/L (ref 7–15)
AST SERPL W P-5'-P-CCNC: 35 U/L (ref 0–45)
BILIRUB SERPL-MCNC: 0.6 MG/DL
BUN SERPL-MCNC: 55.3 MG/DL (ref 8–23)
CALCIUM SERPL-MCNC: 9.5 MG/DL (ref 8.8–10.2)
CHLORIDE SERPL-SCNC: 105 MMOL/L (ref 98–107)
CREAT SERPL-MCNC: 1.5 MG/DL (ref 0.51–0.95)
DEPRECATED HCO3 PLAS-SCNC: 25 MMOL/L (ref 22–29)
EGFRCR SERPLBLD CKD-EPI 2021: 39 ML/MIN/1.73M2
GLUCOSE SERPL-MCNC: 111 MG/DL (ref 70–99)
INR BLD: 2.1 (ref 0.9–1.1)
POTASSIUM SERPL-SCNC: 4.5 MMOL/L (ref 3.4–5.3)
PROT SERPL-MCNC: 8.3 G/DL (ref 6.4–8.3)
SODIUM SERPL-SCNC: 140 MMOL/L (ref 135–145)

## 2023-11-28 PROCEDURE — 58100 BIOPSY OF UTERUS LINING: CPT

## 2023-11-28 PROCEDURE — 85610 PROTHROMBIN TIME: CPT

## 2023-11-28 PROCEDURE — 80053 COMPREHEN METABOLIC PANEL: CPT

## 2023-11-28 PROCEDURE — 88305 TISSUE EXAM BY PATHOLOGIST: CPT | Mod: TC

## 2023-11-28 PROCEDURE — 58100 BIOPSY OF UTERUS LINING: CPT | Mod: GC | Performed by: OBSTETRICS & GYNECOLOGY

## 2023-11-28 PROCEDURE — 88305 TISSUE EXAM BY PATHOLOGIST: CPT | Mod: 26 | Performed by: PATHOLOGY

## 2023-11-28 PROCEDURE — 36415 COLL VENOUS BLD VENIPUNCTURE: CPT

## 2023-11-28 PROCEDURE — 76830 TRANSVAGINAL US NON-OB: CPT

## 2023-11-28 PROCEDURE — 99214 OFFICE O/P EST MOD 30 MIN: CPT | Mod: VID | Performed by: INTERNAL MEDICINE

## 2023-11-28 PROCEDURE — 76830 TRANSVAGINAL US NON-OB: CPT | Mod: 26 | Performed by: OBSTETRICS & GYNECOLOGY

## 2023-11-28 ASSESSMENT — PAIN SCALES - GENERAL
PAINLEVEL: NO PAIN (0)
PAINLEVEL: NO PAIN (0)

## 2023-11-28 NOTE — NURSING NOTE
Here today for post menopausal bleeding as ultrasound today.   Bleeding happened two times  once a year ago  then a few months ago.  Bleeding last a couple days each time

## 2023-11-28 NOTE — PROGRESS NOTES
"Endometrial Biopsy    Luz Marina Live is a 63 year old who presented with postmenopausal bleeding and US obtained today with thickened endometrial stipe measuring 7 mm.     Time Out - \"Pause for the Cause\"  Just before the procedure begins, through verbal and active participation of team members, verify:                      Initials   Patient Name    Patient     Procedure to be performed                                                                                                                                       Indication: Postmenopausal bleeding  Faculty:  I was present with the resident throughout the entire procedure.    Pregnancy test:  Postmenopausal    Consent: Risks, benefits of treatment, and no treatment were discussed.  Patient's questions were elicited and answered. , Written consent signed and scanned into medical record., and Patient received and verbalized understanding of discharge instructions    Using a small  Carmen speculum, the cervix was visualized. The cervix was prepped with Betadine.  A single tooth tenaculum was applied to the anterior lip of the cervix. The endometrial pipelle was advanced through the cervix without difficulty and a sample collected. One additional pass was made.  The tenaculum was removed from the cervix and the tenaculum site made hemostatic with pressure.    EBL: 2 mL    Complications:  None apparent  Pathology: EMB sample was sent to pathology.  Tolerance of Procedure:  Patient did tolerate the procedure well.     Patient was instructed to call if she experiences any heavy bleeding, severe cramping, or abnormal vaginal discharge.  May take ibuprofen 400-800 mg PO TID PRN or naproxen 500 mg PO BID for cramping.    Will notify patient of results.    Leo Rodriguez MD  Ob/Gyn Resident, PGY-3  2023 4:06 PM     The Patient was seen in [facility] by LEO RODRIGUEZ.  I reviewed the history & exam. Assessment and plan were jointly " made.  I was present for above procedure.   Kaylie Osman MD

## 2023-11-28 NOTE — PATIENT INSTRUCTIONS
Labs in 1-2 weeks  Lower lasix to 40 mg daily  Write down home daily BP readings and track weight at home daily first thing in the AM - please send updates in 1-2 weeks when you get your labs.   Plan labs in 6 months  Follow-up in one year.

## 2023-11-28 NOTE — Clinical Note
HI Dr. Nguyen and Dr. Moreno, I saw Ms. Live today - creatinine bumped a bit so I am just lowering lasix a hair. ON her CT of the chest that was done last month they comment on pulmonary nodules - looking back she had nodules but the radiologist does not call out specifically how to follow this or if there is any change. Dr. Nguyen - is this something you have been following? Just wanted to asusre there is a follow-up plan/monitoring plan. Thanks, [facility]

## 2023-11-28 NOTE — PROGRESS NOTES
ANTICOAGULATION MANAGEMENT     Luz Marina Live 63 year old female is on warfarin with therapeutic INR result. (Goal INR 2.0-3.0)    Recent labs: (last 7 days)     11/28/23  1155   INR 2.1*       ASSESSMENT     Source(s): Chart Review     Warfarin doses taken: Reviewed in chart  Diet: No new diet changes identified  Medication/supplement changes: None noted  New illness, injury, or hospitalization: No  Signs or symptoms of bleeding or clotting: No  Previous result: Subtherapeutic  Additional findings: None       PLAN     Recommended plan for no diet, medication or health factor changes affecting INR     Dosing Instructions: Continue your current warfarin dose with next INR in 3 weeks       Summary  As of 11/28/2023      Full warfarin instructions:  10 mg every Tue, Sat; 7.5 mg all other days   Next INR check:  12/19/2023               Detailed voice message left for Luz Marina with dosing instructions and follow up date.     Contact [phone]  to schedule and with any changes, questions or concerns.     Education provided:   Please call back if any changes to your diet, medications or how you've been taking warfarin    Plan made per [facility] anticoagulation protocol    Deborah Alatorre, KATE  Anticoagulation Clinic  11/28/2023    _______________________________________________________________________     Anticoagulation Episode Summary       Current INR goal:  2.0-3.0   TTR:  54.3% (1 y)   Target end date:  Indefinite   Send INR reminders to:  [facility]    Indications    Long-term (current) use of anticoagulants [Z79.01] [Z79.01]  PAF (paroxysmal atrial fibrillation) (H) [I48.0]  Atrial fibrillation  unspecified type (H) [I48.91]             Comments:               Anticoagulation Care Providers       Provider Role Specialty Phone number    Eliz Nguyen MD Referring Internal Medicine [phone]    Elodia Tang MD Referring Family Medicine [phone]

## 2023-11-28 NOTE — PROGRESS NOTES
"Luz Marina Live is a 63 year old female who is being evaluated via a billable video visit.    Virtual Visit Details    Type of service:  Video Visit     Originating Location (pt. Location): Home    Distant Location (provider location):  Off-site  Platform used for Video Visit: Cirilo    11/28/23   CC: CKD    HPI: Luz Marina Live is a 63 year old female who presents for follow-up of CKD. Ms. Live' hx is significant for SLE for which she follows with Dr. Adame. Her SLE has included renal involvement in the past - 1990s and included treatment with cytoxan, prednisone and later imuran. She has had intermittent low grade proteinuria since. Additional hx includes CABG in August 2009, Afib, and hypertension (dx around 20 years ago). Additionally she has had a few RANDELL episodes and NSAID related injury.    Creatinine has been 0.95-1.35 for the past year; 1.13 at this time. UPCR was 0.67 g/g in Nov. Which is up from 0.3-0.5 g/g previously. She underwent 2 cardiac stent placement in October - on brilinta and coumadin.     10/13/20: recently with rising creatinine, however, with only mild proteinuria. I would expect hematuria in the setting of active lupus nephritis. She reports feeling fine - no specific concerns. I spent time reviewing the change in her kidney function over last year. I also explained the risks of kidney biopsy, especially as we would need to hold blood thinners.     11/02/21: video visit. Weight has been \"stable\". Weights have not changed more than 1 lb. No swelling in her legs. Breathing is better. She did notice improvement with the lasix. She is currently taking lasix 60 mg twice a week with 40 mg the other days of the week. She is taking lisinopril 30 mg daily. Doesn't check BP at home often. No lightheadedness/dizziness. She denies increased thirst. No other sxs of dehydration. No NSAID use.  She goes to therapy to strengthen her legs.     11/28/22: in person visit. Creatinine has been 1.36-1.91 this past " year; 1.33 currently. No proteinuria on previous check. Currently on lasix 40 mg daily, iron every other day, metoprolol 50 AM and 75 PM. BP at home is occasionally in the 140s - lowest 116 - typical readings are closer to 130s. Swelling is not better - no pitting edema currently. If she is on her feet for long periods of time or stitting too [facility] she will notice more swelling. NO difficulty with UTIs, no yeast infections.     11/28/23: video visit. She reports feeling better than last month - SOB comes and goes - SOB with exertion. She is on lasix 60 mg daily. No significant change with the higher dose of lasix. No significant swelling in her legs - maybe some? She weighs herself weekly - no change. No change after starting lasix - maybe a lb. Nothing substantial. No home BP readings. No NsAIDs. NO concerning lupus syptoms but hard to know - she is breaking out more on her face but otherwise no significant sxs. She has had stress around her with the passing of a friend.     aspirin 81 MG EC tablet, Take 81 mg by mouth daily  atorvastatin (LIPITOR) 40 MG tablet, Take 1 tablet (40 mg) by mouth daily  azelastine (ASTELIN) 0.1 % nasal spray, Spray 2 sprays into both nostrils 2 times daily  dapagliflozin (FARXIGA) 10 MG TABS tablet, Take 1 tablet (10 mg) by mouth daily  erythromycin (ROMYCIN) 5 MG/GM ophthalmic ointment, Place 0.5 inches into both eyes 3 times daily (Patient not taking: Reported on 11/29/2023)  hydroxychloroquine (PLAQUENIL) 200 MG tablet, Take 1 tablet (200 mg) by mouth 2 times daily  loratadine (CLARITIN) 10 MG tablet, Take 10 mg by mouth daily  metoprolol tartrate (LOPRESSOR) 25 MG tablet, Take 50mg in the morning and 75mg in the evening.  metoprolol tartrate (LOPRESSOR) 50 MG tablet, TAKE 50MG IN THE MORNING AND 75MG IN THE EVENING.  metroNIDAZOLE (METROCREAM) 0.75 % external cream, Apply topically 2 times daily  MULTIPLE VITAMIN PO, Take 1 tablet by mouth daily   omega-3 fatty acids 1200 MG  "capsule, Take 2 capsules by mouth 2 times daily   order for DME, [employer] 10-16 cmH20  sacubitril-valsartan (ENTRESTO) 24-26 MG per tablet, TAKE 1/2 TABLET BY MOUTH TWICE DAILY  warfarin ANTICOAGULANT (COUMADIN) 5 MG tablet, Take 10 mg every Sat; 7.5 mg all other days or as directed by the INR clinic  warfarin ANTICOAGULANT (COUMADIN) 5 MG tablet, TAKE 10 MG (2 TABS) TUE, THUR, & SAT + 7.5 MG (1 1/2 TABS) ALL OTHER DAYS OR AS DIRECTED BY COUMADIN CLINIC. DUE FOR INR CHECK 01/11/23    No current facility-administered medications on file prior to visit.      Exam:  Ht 1.575 m (5' 2\")   Wt 113.4 kg (250 lb)   BMI 45.73 kg/m    GENERAL: Healthy, alert and no distress    Results:  No visits with results within 1 Day(s) from this visit.   Latest known visit with results is:   Lab on 11/17/2023   Component Date Value Ref Range Status    INR 11/17/2023 1.7 (H)  0.9 - 1.1 Final    WBC Count 11/17/2023 5.8  4.0 - 11.0 10e3/uL Final    RBC Count 11/17/2023 4.33  3.80 - 5.20 10e6/uL Final    Hemoglobin 11/17/2023 13.0  11.7 - 15.7 g/dL Final    Hematocrit 11/17/2023 40.6  35.0 - 47.0 % Final    MCV 11/17/2023 94  78 - 100 fL Final    MCH 11/17/2023 30.0  26.5 - 33.0 pg Final    MCHC 11/17/2023 32.0  31.5 - 36.5 g/dL Final    RDW 11/17/2023 15.6 (H)  10.0 - 15.0 % Final    Platelet Count 11/17/2023 165  150 - 450 10e3/uL Final    Sodium 11/17/2023 139  135 - 145 mmol/L Final    Reference intervals for this test were updated on 09/26/2023 to more accurately reflect our healthy population. There may be differences in the flagging of prior results with similar values performed with this method. Interpretation of those prior results can be made in the context of the updated reference intervals.     Potassium 11/17/2023 4.7  3.4 - 5.3 mmol/L Final    Carbon Dioxide (CO2) 11/17/2023 25  22 - 29 mmol/L Final    Anion Gap 11/17/2023 9  7 - 15 mmol/L Final    Urea Nitrogen 11/17/2023 39.6 (H)  8.0 - 23.0 mg/dL Final    Creatinine " 11/17/2023 1.66 (H)  0.51 - 0.95 mg/dL Final    GFR Estimate 11/17/2023 34 (L)  >60 mL/min/1.73m2 Final    Calcium 11/17/2023 9.3  8.8 - 10.2 mg/dL Final    Chloride 11/17/2023 105  98 - 107 mmol/L Final    Glucose 11/17/2023 101 (H)  70 - 99 mg/dL Final    Alkaline Phosphatase 11/17/2023 117  40 - 150 U/L Final    Reference intervals for this test were updated on 11/14/2023 to more accurately reflect our healthy population. There may be differences in the flagging of prior results with similar values performed with this method. Interpretation of those prior results can be made in the context of the updated reference intervals.    AST 11/17/2023 39  0 - 45 U/L Final    Reference intervals for this test were updated on 6/12/2023 to more accurately reflect our healthy population. There may be differences in the flagging of prior results with similar values performed with this method. Interpretation of those prior results can be made in the context of the updated reference intervals.    ALT 11/17/2023 24  0 - 50 U/L Final    Reference intervals for this test were updated on 6/12/2023 to more accurately reflect our healthy population. There may be differences in the flagging of prior results with similar values performed with this method. Interpretation of those prior results can be made in the context of the updated reference intervals.      Protein Total 11/17/2023 7.8  6.4 - 8.3 g/dL Final    Albumin 11/17/2023 4.0  3.5 - 5.2 g/dL Final    Bilirubin Total 11/17/2023 0.8  <=1.2 mg/dL Final    N Terminal Pro BNP Outpatient 11/17/2023 1,648 (H)  0 - 900 pg/mL Final    Reference range shown and results flagged as abnormal are for the outpatient, non acute settings. Establishing a baseline value for each individual patient is useful for follow-up.    Suggested inpatient cut points for confirming diagnosis of CHF in an acute setting are:  >450 pg/mL (age 18 to less than 50)  >900 pg/mL (age 50 to less than 75)  >1800 pg/mL  (75 yrs and older)    An inpatient or emergency department NT-proPBNP <300 pg/mL effectively rules out acute CHF, with 99% negative predictive value.        Parathyroid Hormone Intact 11/17/2023 91 (H)  15 - 65 pg/mL Final    Total Protein Urine mg/dL 11/17/2023 13.7    mg/dL Final    The reference ranges have not been established in urine protein. The results should be integrated into the clinical context for interpretation.    Total Protein Urine mg/mg Creat 11/17/2023 0.13  0.00 - 0.20 mg/mg Cr Final    Creatinine Urine mg/dL 11/17/2023 103.0  mg/dL Final    The reference ranges have not been established in urine creatinine. The results should be integrated into the clinical context for interpretation.    Phosphorus 11/17/2023 3.6  2.5 - 4.5 mg/dL Final      Assessment/Plan:   1. CKD stage 3: risk factors for CKD include previous lupus nephritis, hypertension, as well as acute kidney injuries.  She is on lisinopril 40 mg daily. Last year we were considering kidney biopsy given rise in creatinine. Brilinta was stopped and creatinine stabilized. She has mild proteinuria but proteinuria has been present dating back to 2015 even. UPCR has now normalized which is great to see.     2. Hypertension/CHF: goal BP is less than 130/80 - lowering lasix to 40 mg daily given slight rise in creatinine - asked her to monitor for water retention as want to avoid CHF Exacerbation as well.     3. Lupus: on plaquenil; no hematuria or proteinuria to suggest active lupus nephritis.     4. Secondary hyperparathyroidism, renal: PTH 91 - vitamin D 52.     Patient Instructions   Labs in 1-2 weeks  Lower lasix to 40 mg daily  Write down home daily BP readings and track weight at home daily first thing in the AM - please send updates in 1-2 weeks when you get your labs.   Plan labs in 6 months  Follow-up in one year.      [id number] AM video visit via [employer] - offsite  Lavern Clark,

## 2023-11-28 NOTE — NURSING NOTE
Is the patient currently in the state of [place]? YES    Visit mode:VIDEO    If the visit is dropped, the patient can be reconnected by: VIDEO VISIT: Text to cell phone:   Telephone Information:   Mobile [phone]       Will anyone else be joining the visit? NO  (If patient encounters technical issues they should call [phone]    How would you like to obtain your AVS? MyChart    Are changes needed to the allergy or medication list? No    Reason for visit: Video Visit (Recheck)    Tess HDEZ

## 2023-11-28 NOTE — LETTER
"2023       RE: Luz Marina Live  [address]  [place] [address]     Dear Colleague,    Thank you for referring your patient, Luz Marina Live, to the [facility] [place] [facility]. Please see a copy of my visit note below.    Endometrial Biopsy    Luz Marina Live is a 63 year old who presented with postmenopausal bleeding and US obtained today with thickened endometrial stipe measuring 7 mm.     Time Out - \"Pause for the Cause\"  Just before the procedure begins, through verbal and active participation of team members, verify:                      Initials   Patient Name BH   Patient  BH   Procedure to be performed BH                                                                                                                                      Indication: Postmenopausal bleeding  Faculty:  I was present with the resident throughout the entire procedure.    Pregnancy test:  Postmenopausal    Consent: Risks, benefits of treatment, and no treatment were discussed.  Patient's questions were elicited and answered. , Written consent signed and scanned into medical record., and Patient received and verbalized understanding of discharge instructions    Using a small  Carmen speculum, the cervix was visualized. The cervix was prepped with Betadine.  A single tooth tenaculum was applied to the anterior lip of the cervix. The endometrial pipelle was advanced through the cervix without difficulty and a sample collected. One additional pass was made.  The tenaculum was removed from the cervix and the tenaculum site made hemostatic with pressure.    EBL: 2 mL    Complications:  None apparent  Pathology: EMB sample was sent to pathology.  Tolerance of Procedure:  Patient did tolerate the procedure well.     Patient was instructed to call if she experiences any heavy bleeding, severe cramping, or abnormal vaginal discharge.  May take " ibuprofen 400-800 mg PO TID PRN or naproxen 500 mg PO BID for cramping.    Will notify patient of results.    Rosemary Rodriguez MD  Ob/Gyn Resident, PGY-3  11/28/2023 4:06 PM

## 2023-11-29 ENCOUNTER — TELEPHONE (OUTPATIENT)
Dept: NEPHROLOGY | Facility: CLINIC | Age: 63
End: 2023-11-29
Payer: COMMERCIAL

## 2023-11-29 ENCOUNTER — OFFICE VISIT (OUTPATIENT)
Dept: CARDIOLOGY | Facility: CLINIC | Age: 63
End: 2023-11-29
Attending: INTERNAL MEDICINE
Payer: COMMERCIAL

## 2023-11-29 VITALS
BODY MASS INDEX: 46.1 KG/M2 | HEIGHT: 62 IN | OXYGEN SATURATION: 99 % | WEIGHT: 250.5 LBS | HEART RATE: 59 BPM | DIASTOLIC BLOOD PRESSURE: 72 MMHG | SYSTOLIC BLOOD PRESSURE: 109 MMHG

## 2023-11-29 DIAGNOSIS — I50.32 CHRONIC DIASTOLIC CONGESTIVE HEART FAILURE (H): ICD-10-CM

## 2023-11-29 DIAGNOSIS — I50.30 HEART FAILURE WITH PRESERVED EJECTION FRACTION, UNSPECIFIED HF CHRONICITY (H): ICD-10-CM

## 2023-11-29 DIAGNOSIS — I25.10 CORONARY ARTERY DISEASE INVOLVING NATIVE HEART WITHOUT ANGINA PECTORIS, UNSPECIFIED VESSEL OR LESION TYPE: Chronic | ICD-10-CM

## 2023-11-29 PROCEDURE — 99443 PR PHYSICIAN TELEPHONE EVALUATION 21-30 MIN: CPT | Mod: 95 | Performed by: INTERNAL MEDICINE

## 2023-11-29 PROCEDURE — G0463 HOSPITAL OUTPT CLINIC VISIT: HCPCS | Performed by: INTERNAL MEDICINE

## 2023-11-29 RX ORDER — KETOCONAZOLE 20 MG/ML
SHAMPOO TOPICAL
COMMUNITY
Start: 2023-11-13

## 2023-11-29 RX ORDER — HYDROCORTISONE 25 MG/G
OINTMENT TOPICAL
COMMUNITY
Start: 2023-10-12

## 2023-11-29 RX ORDER — FUROSEMIDE 20 MG
40 TABLET ORAL DAILY
Qty: 180 TABLET | Refills: 3 | Status: SHIPPED | OUTPATIENT
Start: 2023-11-29 | End: 2024-08-06

## 2023-11-29 ASSESSMENT — PAIN SCALES - GENERAL: PAINLEVEL: NO PAIN (0)

## 2023-11-29 NOTE — NURSING NOTE
Chief Complaint   Patient presents with    Follow Up     Return heart failure - 63yr old female with a h/o HFpEF, CAD s/p CAB, HTN, HLD, atrial fibrillation on warfarin, SLE, and PFO presenting for follow-up        Vitals were taken, medications reconciled.    Shanae Anthony CMA  12:13 PM

## 2023-11-29 NOTE — CONFIDENTIAL NOTE
Left Voicemail (1st Attempt) for the patient to call back and schedule the following:    Appointment type: return nephrology  Provider:   Return date: 11/28/2024  Specialty phone number: [phone]  Additional appointment(s) needed: labs prior to return visit  Additonal Notes: follow up in one year with labs (around 11/28/2024)       [facility]   Gastroenterology, Infectious Diseases, Nephrology, Pulmonology and Rheumatology Specialties  [facility]

## 2023-11-29 NOTE — PROGRESS NOTES
Telephone visit x 30 minutes  Recommendations: (preliminary)  Please increase your lasix to 60mg daily.  Labs recheck on Thursday 10/26 at the [facility].  Our schedulers will connect with you on time.   Please follow-up with Dr. Moreno in 1 months with labs.Sooner if not doing well or questions    I further reviewed her chart.:    Patient has relative bradycardia and exercise shortness of breath and would consider six minute walk to examine heart rate response to exercise and reassess role and dosing of metoprolol.    Follow up laboratories should include BNP as previously elevated    Patient has active lupus and we should check CXR for evidence of inflammation, fluid overload (though recent PCP only mildly elevated at 13    Will try small increase in furosemide as noted above to see if this makes any symptomatic difference but would carefully follow renal function and BNP  Impression:  1 SLE  2. Vaginal bleeding  3. ASHD with remote CABG  4. Exertional shortness of breath  5. Dependent edema   6. Elevated weight  7. Paroxysmal atrial fibrillation (warfarin)     Latest Reference Range & Units 07/25/23 13:27 09/13/23 11:06 10/04/23 13:28 10/09/23 11:38   Sodium 135 - 145 mmol/L 137 141  139   Potassium 3.4 - 5.3 mmol/L 4.6 5.0  5.2   Chloride 98 - 107 mmol/L 104 106  105   Carbon Dioxide (CO2) 22 - 29 mmol/L 21 (L) 25  24   Urea Nitrogen 8.0 - 23.0 mg/dL 34.0 (H) 44.6 (H)  37.4 (H)   Creatinine 0.51 - 0.95 mg/dL 1.21 (H) 1.46 (H) 1.47 (H) 1.40 (H)   GFR Estimate >60 mL/min/1.73m2 50 (L) 40 (L) 40 (L) 42 (L)   Calcium 8.8 - 10.2 mg/dL 9.7 9.9  10.1   Anion Gap 7 - 15 mmol/L 12 10  10   Albumin 3.5 - 5.2 g/dL 3.8  3.6    Protein Total 6.4 - 8.3 g/dL 8.1      Alkaline Phosphatase 35 - 104 U/L 107 (H)      ALT 0 - 50 U/L 31  20    AST 0 - 45 U/L 41  29    Bilirubin Total <=1.2 mg/dL 1.0      CRP Inflammation <5.00 mg/L   66.20 (H)    Creatinine Urine mg/dL   71.6    Glucose 70 - 99 mg/dL 132 (H) 104 (H)   "109 (H)   hCG Quantitative <5 mIU/mL  2  2   N-Terminal Pro Bnp 0 - 900 pg/mL 2,289 (H)           06/02/23 11:33 06/09/23 09:38 07/26/23 12:27 09/13/23 09/20/23 12:39 10/06/23 11:32 10/09/23 10/18/23 15:02   /80 121/74 117/74 125/62  101/62 106/61    Temp    98  F (36.7  C)   98  F (36.7  C)    Pulse 67 64 65 60  55 62    Resp    18   21    SpO2 97 % 96 % 96 % 97 %  99 % 98 %    Height 1.599 m (5' 2.95\")  1.565 m (5' 1.61\") [1]  1.575 m (5' 2\") 1.565 m (5' 1.61\") [2] 1.565 m (5' 1.61\")    BMI (Calculated) 46.66  44.45  45.73 46.65 46.63    Weight (lbs) 263 lb 262 lb 9.6 oz 240 lb [1] 252 lb 13.9 oz 250 lb 251 lb 14.4 oz 251 lb 12.3 oz 250 lb   Note: Showing the most recent values for these dates. There are additional values that can be seen in Synopsis.  [1] w shoes  [2] with shoes     Latest Reference Range & Units 10/09/23 11:38   Sodium 135 - 145 mmol/L 139   Potassium 3.4 - 5.3 mmol/L 5.2   Chloride 98 - 107 mmol/L 105   Carbon Dioxide (CO2) 22 - 29 mmol/L 24   Urea Nitrogen 8.0 - 23.0 mg/dL 37.4 (H)   Creatinine 0.51 - 0.95 mg/dL 1.40 (H)   GFR Estimate >60 mL/min/1.73m2 42 (L)   Calcium 8.8 - 10.2 mg/dL 10.1   Anion Gap 7 - 15 mmol/L 10     Current Outpatient Medications   Medication    aspirin 81 MG EC tablet    atorvastatin (LIPITOR) 40 MG tablet    azelastine (ASTELIN) 0.1 % nasal spray    dapagliflozin (FARXIGA) 10 MG TABS tablet    furosemide (LASIX) 20 MG tablet    hydrocortisone 2.5 % ointment    hydroxychloroquine (PLAQUENIL) 200 MG tablet    ketoconazole (NIZORAL) 2 % external shampoo    loratadine (CLARITIN) 10 MG tablet    metoprolol tartrate (LOPRESSOR) 25 MG tablet    metoprolol tartrate (LOPRESSOR) 50 MG tablet    metroNIDAZOLE (METROCREAM) 0.75 % external cream    MULTIPLE VITAMIN PO    omega-3 fatty acids 1200 MG capsule    order for DME    sacubitril-valsartan (ENTRESTO) 24-26 MG per tablet    warfarin ANTICOAGULANT (COUMADIN) 5 MG tablet    warfarin ANTICOAGULANT (COUMADIN) 5 MG tablet "    erythromycin (ROMYCIN) 5 MG/GM ophthalmic ointment     No current facility-administered medications for this visit.      Wt Readings from Last 24 Encounters:   11/29/23 113.6 kg (250 lb 8 oz)   11/28/23 113.4 kg (250 lb)   11/28/23 113.4 kg (250 lb)   10/26/23 113.4 kg (250 lb)   10/25/23 113.4 kg (250 lb)   10/18/23 113.4 kg (250 lb)   10/09/23 114.2 kg (251 lb 12.3 oz)   10/06/23 114.3 kg (251 lb 14.4 oz)   09/20/23 113.4 kg (250 lb)   09/13/23 114.7 kg (252 lb 13.9 oz)   07/26/23 108.9 kg (240 lb)   06/09/23 119.1 kg (262 lb 9.6 oz)   06/02/23 119.3 kg (263 lb)   11/28/22 122.2 kg (269 lb 5.8 oz)   09/09/22 122.5 kg (270 lb)   08/19/22 121.6 kg (268 lb 1.6 oz)   03/23/22 123 kg (271 lb 1.6 oz)   01/12/22 119.3 kg (263 lb 1.6 oz)   08/27/21 117.9 kg (260 lb)   08/20/21 119.3 kg (263 lb)   07/13/21 124.7 kg (275 lb)   07/20/20 113.9 kg (251 lb)   03/06/20 113.9 kg (251 lb 3.2 oz)   02/05/20 111.6 kg (246 lb)       oronary Findings  RA: 6/7/5 mmHg  RV: 43/--/5 mmHg  PA: 41/20/27 mmHg  CWP: 15/15/13 mmHg  CO/CI (Kannan): 5.9/2.8 L/min/m2  CO/CI (TD): 4.5/2.14 L/min/m2    PA sat: 66.4%  MAP: 117 mmHg   PVR: 2.37 GASCA  Right sided filling pressures are normal. Left sided filling pressures are normal. Mild elevated pulmonary hypertension. Normal cardiac output level.     Diagnostic  Dominance: Right  Left Anterior Descending   Ost LAD to Mid LAD lesion is 100% stenosed. The lesion is chronic total occlusion.      Second Diagonal Branch   The vessel is small. There is mild diffuse disease throughout the vessel.      Left Circumflex   Previously placed Ost Cx to Prox Cx stent of unknown type is widely patent.   Prox Cx to Mid Cx lesion is 20% stenosed.      First Obtuse Marginal Branch   The vessel is moderate in size.   1st Mrg lesion is 30% stenosed with 100% stenosed side branch in Lat 1st Mrg. The lesion was previously treated using a stent of unknown type.      Lateral First Obtuse Marginal Branch   The vessel is  small.      Second Obtuse Marginal Branch   The vessel is moderate in size.      Third Obtuse Marginal Branch   The vessel is small.      Right Coronary Artery   Prox RCA lesion is 40% stenosed. The lesion is eccentric.   Mid RCA to Dist RCA lesion is 10% stenosed.   Dist RCA lesion is 100% stenosed. The lesion is chronic total occlusion.      Right Posterior Descending Artery   RPDA lesion is 20% stenosed.      LIMA Graft To Dist LAD   The graft is large. The graft is angiographically normal.      Graft To Lat 1st Mrg   The graft is large. The graft is angiographically normal.      Graft To RPDA   The graft is large. The graft is angiographically normal.   Prox Graft lesion is 40% stenosed.         Intervention     No interventions have been documented.     Pressures Phase: Baseline       Time Systolic (mmHg) Diastolic (mmHg) Mean (mmHg) A Wave (mmHg) V Wave (mmHg) EDP (mmHg) Max dp/dt (mmHg/sec) HR (bpm) Content (mL/dL) SAT (%)   AO Pressures  3:58     58    76                     Dominance: Right  Left Anterior Descending   Ost LAD to Mid LAD lesion is 100% stenosed. The lesion is chronic total occlusion.      Second Diagonal Branch   The vessel is small. There is mild diffuse disease throughout the vessel.      Left Circumflex   Ost Cx to Prox Cx lesion is 60% stenosed.   Prox Cx to Mid Cx lesion is 20% stenosed.      First Obtuse Marginal Branch   The vessel is moderate in size.   1st Mrg lesion is 80% stenosed with 100% stenosed side branch in Lat 1st Mrg.      Lateral First Obtuse Marginal Branch   The vessel is small.      Second Obtuse Marginal Branch   The vessel is moderate in size.      Third Obtuse Marginal Branch   The vessel is small.      Right Coronary Artery   Prox RCA lesion is 20% stenosed. The lesion is eccentric.   Mid RCA to Dist RCA lesion is 10% stenosed.   Dist RCA lesion is 100% stenosed. The lesion is chronic total occlusion.      Right Posterior Descending Artery   RPDA lesion is  20% stenosed.      LIMA Graft To Dist LAD   The graft is large. The graft is angiographically normal.      Graft To Lat 1st Mrg   The graft is large. The graft is angiographically normal.      Graft To RPDA   The graft is large. The graft is angiographically normal.             Pre Procedure Diagnosis    heart failure assessment       Conclusion         Right sided filling pressures are normal.    Left sided filling pressures are normal.    Mild elevated pulmonary hypertension.    Normal cardiac output level.         Hemodynamics    RA: 6/7/5 mmHg  RV: 43/--/5 mmHg  PA: 41/20/27 mmHg  CWP: 15/15/13 mmHg  CO/CI (Kannan): 5.9/2.8 L/min/m2  CO/CI (TD): 4.5/2.14 L/min/m2    PA sat: 66.4%  MAP: 117 mmHg   PVR: 2.37 GASCA  Right sided filling pressures are normal. Left sided filling pressures are normal. Mild elevated pulmonary hypertension. Normal cardiac output level.     Pressures Phase: Baseline     Time Systolic (mmHg) Diastolic (mmHg) Mean (mmHg) A Wave (mmHg) V Wave (mmHg) EDP (mmHg) Max dp/dt (mmHg/sec) HR (bpm) Content (mL/dL) SAT (%)   RA Pressures 12:45 PM   5    6    7      59        RV Pressures 12:25 PM       873          12:46 PM 43        5     59        PA Pressures 12:47 PM 41    20    27        58        PCW Pressures 12:46 PM   13    15    15      58          Blood Flow Results Phase: Baseline     Time Results Indexed Values (L/min/m2)   QP 12:25 PM 5.91 L/min    2.81      QS 12:25 PM 5.91 L/min    2.81        Blood Oximetry Phase: Baseline     Time Hb SAT(%) PO2 Content (mL/dL) PA Sat (%)   PA 12:25 PM  66.4 %      66.4      Art 12:25 PM  100 %     17.27         Cardiac Output Phase: Baseline     Time TDCO (L/min) TDCI (L/min/m2) Kannan C.O. (L/min) Kannan C.I. (L/min/m2) Kannan HR (bpm)   Cardiac Output Results 12:25 PM 4.5    2.14    5.91    2.81        12:50 PM 4.5            Resistance Results Phase: Baseline     Time PVR SVR TPR TVR PVR/SVR TPR/TVR   Resistance Results (Metric) 12:25 .51 dsc-5      365.48 dsc-5         Resistance Results (Wood) 12:25 PM 2.37 GASCA     4.57 GASCA           Stoke Volume Results Phase: Baseline     Time RVSW (gm*m) LVSW (gm*m) RVSW-I (gm*m/m2) LVSW-I (gm*m/m2)   Stroke Work Results 12:25 PM 30.48     14.49         Hemodynamic Waveforms -- Encounter Level:    Hemodynamic Waveforms: None found at the encounter level.       Result Notes          Component  Ref Range & Units 10/9/23 12:47 PM 8/20/21 10:25 AM    Systolic Blood Pressure  mmHg      Diastolic Blood Pressure  mmHg      Ventricular Rate  BPM 54 57    Atrial Rate  BPM 54 57    SC Interval  ms 198 196    QRS Duration  ms 150 144    QT  ms 484 446    QTc  ms 458 434    P Axis  degrees 18 46    R AXIS  degrees 8 -3    T Axis  degrees 170 84    Interpretation ECG Sinus bradycardia  Right bundle branch block  Minimal voltage criteria for LVH, may be normal variant ( R in aVL )  T wave abnormality, consider inferolateral ischemia  Abnormal ECG  When compared with ECG of 20-AUG-2021 10:25,  Inverted T waves have replaced nonspecific T wave abnormality in Inferior leads  Confirmed by MD SOUMYA, KENNEDI ([id number]) on 10/10/2023 9:49:27 AM

## 2023-11-29 NOTE — LETTER
11/29/2023      RE: Luz Marina Live  [address]  [place] [address]       Dear Colleague,    Thank you for the opportunity to participate in the care of your patient, Luz Marina Live, at the [facility] [place] [facility]. Please see a copy of my visit note below.    Telephone visit x 30 minutes  Recommendations: (preliminary)  Please increase your lasix to 60mg daily.  Labs recheck on Thursday 10/26 at the [facility].  Our schedulers will connect with you on time.   Please follow-up with Dr. Moreno in 1 months with labs.Sooner if not doing well or questions    I further reviewed her chart.:    Patient has relative bradycardia and exercise shortness of breath and would consider six minute walk to examine heart rate response to exercise and reassess role and dosing of metoprolol.    Follow up laboratories should include BNP as previously elevated    Patient has active lupus and we should check CXR for evidence of inflammation, fluid overload (though recent PCP only mildly elevated at 13    Will try small increase in furosemide as noted above to see if this makes any symptomatic difference but would carefully follow renal function and BNP  Impression:  1 SLE  2. Vaginal bleeding  3. ASHD with remote CABG  4. Exertional shortness of breath  5. Dependent edema   6. Elevated weight  7. Paroxysmal atrial fibrillation (warfarin)     Latest Reference Range & Units 07/25/23 13:27 09/13/23 11:06 10/04/23 13:28 10/09/23 11:38   Sodium 135 - 145 mmol/L 137 141  139   Potassium 3.4 - 5.3 mmol/L 4.6 5.0  5.2   Chloride 98 - 107 mmol/L 104 106  105   Carbon Dioxide (CO2) 22 - 29 mmol/L 21 (L) 25  24   Urea Nitrogen 8.0 - 23.0 mg/dL 34.0 (H) 44.6 (H)  37.4 (H)   Creatinine 0.51 - 0.95 mg/dL 1.21 (H) 1.46 (H) 1.47 (H) 1.40 (H)   GFR Estimate >60 mL/min/1.73m2 50 (L) 40 (L) 40 (L) 42 (L)   Calcium 8.8 - 10.2 mg/dL 9.7 9.9  10.1   Anion Gap 7 -  "15 mmol/L 12 10  10   Albumin 3.5 - 5.2 g/dL 3.8  3.6    Protein Total 6.4 - 8.3 g/dL 8.1      Alkaline Phosphatase 35 - 104 U/L 107 (H)      ALT 0 - 50 U/L 31  20    AST 0 - 45 U/L 41  29    Bilirubin Total <=1.2 mg/dL 1.0      CRP Inflammation <5.00 mg/L   66.20 (H)    Creatinine Urine mg/dL   71.6    Glucose 70 - 99 mg/dL 132 (H) 104 (H)  109 (H)   hCG Quantitative <5 mIU/mL  2  2   N-Terminal Pro Bnp 0 - 900 pg/mL 2,289 (H)           06/02/23 11:33 06/09/23 09:38 07/26/23 12:27 09/13/23 09/20/23 12:39 10/06/23 11:32 10/09/23 10/18/23 15:02   /80 121/74 117/74 125/62  101/62 106/61    Temp    98  F (36.7  C)   98  F (36.7  C)    Pulse 67 64 65 60  55 62    Resp    18   21    SpO2 97 % 96 % 96 % 97 %  99 % 98 %    Height 1.599 m (5' 2.95\")  1.565 m (5' 1.61\") [1]  1.575 m (5' 2\") 1.565 m (5' 1.61\") [2] 1.565 m (5' 1.61\")    BMI (Calculated) 46.66  44.45  45.73 46.65 46.63    Weight (lbs) 263 lb 262 lb 9.6 oz 240 lb [1] 252 lb 13.9 oz 250 lb 251 lb 14.4 oz 251 lb 12.3 oz 250 lb   Note: Showing the most recent values for these dates. There are additional values that can be seen in Synopsis.  [1] w shoes  [2] with shoes     Latest Reference Range & Units 10/09/23 11:38   Sodium 135 - 145 mmol/L 139   Potassium 3.4 - 5.3 mmol/L 5.2   Chloride 98 - 107 mmol/L 105   Carbon Dioxide (CO2) 22 - 29 mmol/L 24   Urea Nitrogen 8.0 - 23.0 mg/dL 37.4 (H)   Creatinine 0.51 - 0.95 mg/dL 1.40 (H)   GFR Estimate >60 mL/min/1.73m2 42 (L)   Calcium 8.8 - 10.2 mg/dL 10.1   Anion Gap 7 - 15 mmol/L 10     Current Outpatient Medications   Medication    aspirin 81 MG EC tablet    atorvastatin (LIPITOR) 40 MG tablet    azelastine (ASTELIN) 0.1 % nasal spray    dapagliflozin (FARXIGA) 10 MG TABS tablet    furosemide (LASIX) 20 MG tablet    hydrocortisone 2.5 % ointment    hydroxychloroquine (PLAQUENIL) 200 MG tablet    ketoconazole (NIZORAL) 2 % external shampoo    loratadine (CLARITIN) 10 MG tablet    metoprolol tartrate (LOPRESSOR) " 25 MG tablet    metoprolol tartrate (LOPRESSOR) 50 MG tablet    metroNIDAZOLE (METROCREAM) 0.75 % external cream    MULTIPLE VITAMIN PO    omega-3 fatty acids 1200 MG capsule    order for DME    sacubitril-valsartan (ENTRESTO) 24-26 MG per tablet    warfarin ANTICOAGULANT (COUMADIN) 5 MG tablet    warfarin ANTICOAGULANT (COUMADIN) 5 MG tablet    erythromycin (ROMYCIN) 5 MG/GM ophthalmic ointment     No current facility-administered medications for this visit.      Wt Readings from Last 24 Encounters:   11/29/23 113.6 kg (250 lb 8 oz)   11/28/23 113.4 kg (250 lb)   11/28/23 113.4 kg (250 lb)   10/26/23 113.4 kg (250 lb)   10/25/23 113.4 kg (250 lb)   10/18/23 113.4 kg (250 lb)   10/09/23 114.2 kg (251 lb 12.3 oz)   10/06/23 114.3 kg (251 lb 14.4 oz)   09/20/23 113.4 kg (250 lb)   09/13/23 114.7 kg (252 lb 13.9 oz)   07/26/23 108.9 kg (240 lb)   06/09/23 119.1 kg (262 lb 9.6 oz)   06/02/23 119.3 kg (263 lb)   11/28/22 122.2 kg (269 lb 5.8 oz)   09/09/22 122.5 kg (270 lb)   08/19/22 121.6 kg (268 lb 1.6 oz)   03/23/22 123 kg (271 lb 1.6 oz)   01/12/22 119.3 kg (263 lb 1.6 oz)   08/27/21 117.9 kg (260 lb)   08/20/21 119.3 kg (263 lb)   07/13/21 124.7 kg (275 lb)   07/20/20 113.9 kg (251 lb)   03/06/20 113.9 kg (251 lb 3.2 oz)   02/05/20 111.6 kg (246 lb)       oronary Findings  RA: 6/7/5 mmHg  RV: 43/--/5 mmHg  PA: 41/20/27 mmHg  CWP: 15/15/13 mmHg  CO/CI (Kannan): 5.9/2.8 L/min/m2  CO/CI (TD): 4.5/2.14 L/min/m2    PA sat: 66.4%  MAP: 117 mmHg   PVR: 2.37 GASCA  Right sided filling pressures are normal. Left sided filling pressures are normal. Mild elevated pulmonary hypertension. Normal cardiac output level.     Diagnostic  Dominance: Right  Left Anterior Descending   Ost LAD to Mid LAD lesion is 100% stenosed. The lesion is chronic total occlusion.      Second Diagonal Branch   The vessel is small. There is mild diffuse disease throughout the vessel.      Left Circumflex   Previously placed Ost Cx to Prox Cx stent of  unknown type is widely patent.   Prox Cx to Mid Cx lesion is 20% stenosed.      First Obtuse Marginal Branch   The vessel is moderate in size.   1st Mrg lesion is 30% stenosed with 100% stenosed side branch in Lat 1st Mrg. The lesion was previously treated using a stent of unknown type.      Lateral First Obtuse Marginal Branch   The vessel is small.      Second Obtuse Marginal Branch   The vessel is moderate in size.      Third Obtuse Marginal Branch   The vessel is small.      Right Coronary Artery   Prox RCA lesion is 40% stenosed. The lesion is eccentric.   Mid RCA to Dist RCA lesion is 10% stenosed.   Dist RCA lesion is 100% stenosed. The lesion is chronic total occlusion.      Right Posterior Descending Artery   RPDA lesion is 20% stenosed.      LIMA Graft To Dist LAD   The graft is large. The graft is angiographically normal.      Graft To Lat 1st Mrg   The graft is large. The graft is angiographically normal.      Graft To RPDA   The graft is large. The graft is angiographically normal.   Prox Graft lesion is 40% stenosed.         Intervention     No interventions have been documented.     Pressures Phase: Baseline       Time Systolic (mmHg) Diastolic (mmHg) Mean (mmHg) A Wave (mmHg) V Wave (mmHg) EDP (mmHg) Max dp/dt (mmHg/sec) HR (bpm) Content (mL/dL) SAT (%)   AO Pressures  3:58     58    76                     Dominance: Right  Left Anterior Descending   Ost LAD to Mid LAD lesion is 100% stenosed. The lesion is chronic total occlusion.      Second Diagonal Branch   The vessel is small. There is mild diffuse disease throughout the vessel.      Left Circumflex   Ost Cx to Prox Cx lesion is 60% stenosed.   Prox Cx to Mid Cx lesion is 20% stenosed.      First Obtuse Marginal Branch   The vessel is moderate in size.   1st Mrg lesion is 80% stenosed with 100% stenosed side branch in Lat 1st Mrg.      Lateral First Obtuse Marginal Branch   The vessel is small.      Second Obtuse Marginal Branch   The  vessel is moderate in size.      Third Obtuse Marginal Branch   The vessel is small.      Right Coronary Artery   Prox RCA lesion is 20% stenosed. The lesion is eccentric.   Mid RCA to Dist RCA lesion is 10% stenosed.   Dist RCA lesion is 100% stenosed. The lesion is chronic total occlusion.      Right Posterior Descending Artery   RPDA lesion is 20% stenosed.      LIMA Graft To Dist LAD   The graft is large. The graft is angiographically normal.      Graft To Lat 1st Mrg   The graft is large. The graft is angiographically normal.      Graft To RPDA   The graft is large. The graft is angiographically normal.             Pre Procedure Diagnosis    heart failure assessment       Conclusion         Right sided filling pressures are normal.    Left sided filling pressures are normal.    Mild elevated pulmonary hypertension.    Normal cardiac output level.         Hemodynamics    RA: 6/7/5 mmHg  RV: 43/--/5 mmHg  PA: 41/20/27 mmHg  CWP: 15/15/13 mmHg  CO/CI (Kannan): 5.9/2.8 L/min/m2  CO/CI (TD): 4.5/2.14 L/min/m2    PA sat: 66.4%  MAP: 117 mmHg   PVR: 2.37 GASCA  Right sided filling pressures are normal. Left sided filling pressures are normal. Mild elevated pulmonary hypertension. Normal cardiac output level.     Pressures Phase: Baseline     Time Systolic (mmHg) Diastolic (mmHg) Mean (mmHg) A Wave (mmHg) V Wave (mmHg) EDP (mmHg) Max dp/dt (mmHg/sec) HR (bpm) Content (mL/dL) SAT (%)   RA Pressures 12:45 PM   5    6    7      59        RV Pressures 12:25 PM       873          12:46 PM 43        5     59        PA Pressures 12:47 PM 41    20    27        58        PCW Pressures 12:46 PM   13    15    15      58          Blood Flow Results Phase: Baseline     Time Results Indexed Values (L/min/m2)   QP 12:25 PM 5.91 L/min    2.81      QS 12:25 PM 5.91 L/min    2.81        Blood Oximetry Phase: Baseline     Time Hb SAT(%) PO2 Content (mL/dL) PA Sat (%)   PA 12:25 PM  66.4 %      66.4      Art 12:25 PM  100 %     17.27          Cardiac Output Phase: Baseline     Time TDCO (L/min) TDCI (L/min/m2) Kannan C.O. (L/min) Kannan C.I. (L/min/m2) Kannan HR (bpm)   Cardiac Output Results 12:25 PM 4.5    2.14    5.91    2.81        12:50 PM 4.5            Resistance Results Phase: Baseline     Time PVR SVR TPR TVR PVR/SVR TPR/TVR   Resistance Results (Metric) 12:25 .51 dsc-5     365.48 dsc-5         Resistance Results (Wood) 12:25 PM 2.37 GASCA     4.57 GASCA           Stoke Volume Results Phase: Baseline     Time RVSW (gm*m) LVSW (gm*m) RVSW-I (gm*m/m2) LVSW-I (gm*m/m2)   Stroke Work Results 12:25 PM 30.48     14.49         Hemodynamic Waveforms -- Encounter Level:    Hemodynamic Waveforms: None found at the encounter level.       Result Notes          Component  Ref Range & Units 10/9/23 12:47 PM 8/20/21 10:25 AM    Systolic Blood Pressure  mmHg      Diastolic Blood Pressure  mmHg      Ventricular Rate  BPM 54 57    Atrial Rate  BPM 54 57    IA Interval  ms 198 196    QRS Duration  ms 150 144    QT  ms 484 446    QTc  ms 458 434    P Axis  degrees 18 46    R AXIS  degrees 8 -3    T Axis  degrees 170 84    Interpretation ECG Sinus bradycardia  Right bundle branch block  Minimal voltage criteria for LVH, may be normal variant ( R in aVL )  T wave abnormality, consider inferolateral ischemia  Abnormal ECG  When compared with ECG of 20-AUG-2021 10:25,  Inverted T waves have replaced nonspecific T wave abnormality in Inferior leads  Confirmed by MD SOUMYA, KENNEDI ([id number]) on 10/10/2023 9:49:27 AM       Please do not hesitate to contact me if you have any questions/concerns.     Sincerely,     Twin Moreno MD

## 2023-11-29 NOTE — PATIENT INSTRUCTIONS
"You were seen today in the Cardiovascular Clinic at the [facility].      Cardiology Providers you saw during your visit:  Dr. Twin Moreno     Recommendations:    Continue to current plan of care.   Please follow-up with Dr. Moreno in April with labs prior.       Eat a heart healthy, low sodium diet.  Get 20 to 30 minutes of aerobic exercise 4 to 5 times per week as tolerated. (Examples of aerobic exercise include: walking, bicycling, swimming, running).     Thank you for your visit today!   Please MyChart message or call if you have any questions or concerns.      During Business Hours:  [phone], option # 1 ([place])       After hours, weekends or holidays:   [phone], Option #4  Ask to speak to the On-Call Cardiologist. Inform them you are a heart failure patient at the [place].      Kaylie Tran RN BSN CHFN  Cardiology Care Coordinator - C.O.[facility]  Questions and [phone]  First press #1 for the [place] and then press #4 for \"Your Care Team\" to reach us Cardiology Nurses.                "

## 2023-12-10 LAB
PATH REPORT.COMMENTS IMP SPEC: NORMAL
PATH REPORT.FINAL DX SPEC: NORMAL
PATH REPORT.GROSS SPEC: NORMAL
PATH REPORT.MICROSCOPIC SPEC OTHER STN: NORMAL
PATH REPORT.RELEVANT HX SPEC: NORMAL
PHOTO IMAGE: NORMAL

## 2023-12-18 ENCOUNTER — TELEPHONE (OUTPATIENT)
Dept: CARDIOLOGY | Facility: CLINIC | Age: 63
End: 2023-12-18
Payer: COMMERCIAL

## 2023-12-18 NOTE — TELEPHONE ENCOUNTER
30 days supply test claims requested for the drugs below:  Jardiance 10mg every day   Farxiga 10mg every day   Entresto 24-26mg bid       Trying to locate insurance for this patient, current [facility] coverage is termed and [facility] website does not show her active in the medicaid system.

## 2023-12-27 ENCOUNTER — MYC MEDICAL ADVICE (OUTPATIENT)
Dept: ANTICOAGULATION | Facility: CLINIC | Age: 63
End: 2023-12-27
Payer: COMMERCIAL

## 2023-12-29 ENCOUNTER — TELEPHONE (OUTPATIENT)
Dept: CARDIOLOGY | Facility: CLINIC | Age: 63
End: 2023-12-29

## 2023-12-29 ENCOUNTER — LAB (OUTPATIENT)
Dept: LAB | Facility: CLINIC | Age: 63
End: 2023-12-29
Payer: COMMERCIAL

## 2023-12-29 ENCOUNTER — ANTICOAGULATION THERAPY VISIT (OUTPATIENT)
Dept: ANTICOAGULATION | Facility: CLINIC | Age: 63
End: 2023-12-29

## 2023-12-29 ENCOUNTER — MYC MEDICAL ADVICE (OUTPATIENT)
Dept: CARDIOLOGY | Facility: CLINIC | Age: 63
End: 2023-12-29

## 2023-12-29 DIAGNOSIS — I48.0 PAF (PAROXYSMAL ATRIAL FIBRILLATION) (H): Chronic | ICD-10-CM

## 2023-12-29 DIAGNOSIS — I10 HYPERTENSION GOAL BP (BLOOD PRESSURE) < 130/80: ICD-10-CM

## 2023-12-29 DIAGNOSIS — Z79.01 LONG TERM CURRENT USE OF ANTICOAGULANT THERAPY: Primary | Chronic | ICD-10-CM

## 2023-12-29 DIAGNOSIS — I48.91 ATRIAL FIBRILLATION, UNSPECIFIED TYPE (H): ICD-10-CM

## 2023-12-29 DIAGNOSIS — Z79.01 LONG TERM CURRENT USE OF ANTICOAGULANT THERAPY: ICD-10-CM

## 2023-12-29 DIAGNOSIS — N18.30 CKD (CHRONIC KIDNEY DISEASE) STAGE 3, GFR 30-59 ML/MIN (H): Chronic | ICD-10-CM

## 2023-12-29 LAB — INR BLD: 2.5 (ref 0.9–1.1)

## 2023-12-29 PROCEDURE — 85610 PROTHROMBIN TIME: CPT

## 2023-12-29 PROCEDURE — 36416 COLLJ CAPILLARY BLOOD SPEC: CPT

## 2023-12-29 NOTE — PROGRESS NOTES
ANTICOAGULATION MANAGEMENT     Luz Marina Live 63 year old female is on warfarin with therapeutic INR result. (Goal INR 2.0-3.0)    Recent labs: (last 7 days)     12/29/23  1430   INR 2.5*       ASSESSMENT     Warfarin Lab Questionnaire    Warfarin Doses Last 7 Days      12/29/2023     2:28 PM   Dose in Tablet or Mg   TAB or MG? milligram (mg)     Pt Rptd Dose SUNDAY MONDAY TUESDAY WED THURS FRIDAY SATURDAY 12/29/2023   2:28 PM 7.5 7.5 10 7.5 7.5 7.5 10         12/29/2023   Warfarin Lab Questionnaire   Missed doses within past 14 days? No   Changes in diet or alcohol within past 14 days? No   Medication changes since last result? No   Injuries or illness since last result? No   New shortness of breath, severe headaches or sudden changes in vision since last result? No   Abnormal bleeding since last result? No   Upcoming surgery, procedure? No     Previous result: Therapeutic last 2(+) visits  Additional findings: None       PLAN     Recommended plan for no diet, medication or health factor changes affecting INR     Dosing Instructions: Continue your current warfarin dose with next INR in 4 weeks       Summary  As of 12/29/2023      Full warfarin instructions:  10 mg every Tue, Sat; 7.5 mg all other days   Next INR check:  1/26/2024               Detailed voice message left for Luz Marina with dosing instructions and follow up date.     Contact [phone]  to schedule and with any changes, questions or concerns.     Education provided:   Contact [phone]  with any changes, questions or concerns.     Plan made per [facility] anticoagulation protocol    Maine Cole RN  Anticoagulation Clinic  12/29/2023    _______________________________________________________________________     Anticoagulation Episode Summary       Current INR goal:  2.0-3.0   TTR:  54.3% (1 y)   Target end date:  Indefinite   Send INR reminders to:  [facility]    Indications    Long-term (current) use of anticoagulants [Z79.01]  [Z79.01]  PAF (paroxysmal atrial fibrillation) (H) [I48.0]  Atrial fibrillation  unspecified type (H) [I48.91]             Comments:               Anticoagulation Care Providers       Provider Role Specialty Phone number    Eliz Nguyen MD Referring Internal Medicine [phone]    Elodia Tang MD Referring Family Medicine [phone]

## 2023-12-29 NOTE — TELEPHONE ENCOUNTER
30 days supply test claims requested for the drugs below:  Farxiga 10mg daily   Jardiance 10mg daily   Entresto 24-26mg bid     Jardiance   -$25.00 copay      Farxiga   -$25.00 copay        Entresto   -$125.00 copay

## 2024-01-04 DIAGNOSIS — I10 HYPERTENSION GOAL BP (BLOOD PRESSURE) < 130/80: ICD-10-CM

## 2024-01-04 DIAGNOSIS — N18.30 CKD (CHRONIC KIDNEY DISEASE) STAGE 3, GFR 30-59 ML/MIN (H): Chronic | ICD-10-CM

## 2024-01-04 RX ORDER — DAPAGLIFLOZIN 10 MG/1
10 TABLET, FILM COATED ORAL DAILY
Qty: 90 TABLET | Refills: 3 | Status: SHIPPED | OUTPATIENT
Start: 2024-01-04 | End: 2024-01-04

## 2024-01-04 RX ORDER — DAPAGLIFLOZIN 10 MG/1
10 TABLET, FILM COATED ORAL DAILY
Qty: 90 TABLET | Refills: 1 | Status: SHIPPED | OUTPATIENT
Start: 2024-01-04 | End: 2024-05-29

## 2024-01-05 ENCOUNTER — TELEPHONE (OUTPATIENT)
Dept: CARDIOLOGY | Facility: CLINIC | Age: 64
End: 2024-01-05
Payer: COMMERCIAL

## 2024-01-05 NOTE — TELEPHONE ENCOUNTER
[facility] Call Center    Phone Message    May a detailed message be left on voicemail: yes     Reason for Call: Medication Question or concern regarding medication   Prescription Clarification  Name of Medication: dapagliflozin (FARXIGA) 10 MG TABS tablet   Prescribing Provider: Tiffanie    Pharmacy:    What on the order needs clarification? Patient would like a call back regarding medication. Patient stats the dosage timing has changed. It was 1/2 tablet twice a day. And now it is one tablet once a day. Patient would like a call back to discuss.       Action Taken: Other: cardiology     Travel Screening: Not Applicable    Thank you!  [facility]

## 2024-01-12 ENCOUNTER — PATIENT OUTREACH (OUTPATIENT)
Dept: GASTROENTEROLOGY | Facility: CLINIC | Age: 64
End: 2024-01-12
Payer: COMMERCIAL

## 2024-01-26 ENCOUNTER — MYC MEDICAL ADVICE (OUTPATIENT)
Dept: ANTICOAGULATION | Facility: CLINIC | Age: 64
End: 2024-01-26

## 2024-01-26 ENCOUNTER — LAB (OUTPATIENT)
Dept: LAB | Facility: CLINIC | Age: 64
End: 2024-01-26
Payer: COMMERCIAL

## 2024-01-26 ENCOUNTER — ANTICOAGULATION THERAPY VISIT (OUTPATIENT)
Dept: ANTICOAGULATION | Facility: CLINIC | Age: 64
End: 2024-01-26

## 2024-01-26 DIAGNOSIS — Z79.01 LONG TERM CURRENT USE OF ANTICOAGULANT THERAPY: ICD-10-CM

## 2024-01-26 DIAGNOSIS — Z79.01 LONG TERM CURRENT USE OF ANTICOAGULANT THERAPY: Primary | Chronic | ICD-10-CM

## 2024-01-26 DIAGNOSIS — I48.91 ATRIAL FIBRILLATION, UNSPECIFIED TYPE (H): ICD-10-CM

## 2024-01-26 DIAGNOSIS — I48.0 PAF (PAROXYSMAL ATRIAL FIBRILLATION) (H): Chronic | ICD-10-CM

## 2024-01-26 LAB — INR BLD: 1.9 (ref 0.9–1.1)

## 2024-01-26 PROCEDURE — 85610 PROTHROMBIN TIME: CPT

## 2024-01-26 PROCEDURE — 36416 COLLJ CAPILLARY BLOOD SPEC: CPT

## 2024-01-26 NOTE — PROGRESS NOTES
ANTICOAGULATION MANAGEMENT     Luz Marina Live 63 year old female is on warfarin with subtherapeutic INR result. (Goal INR 2.0-3.0)    Recent labs: (last 7 days)     01/26/24  1311   INR 1.9*       ASSESSMENT     Source(s): Chart Review  Previous INR was Therapeutic last 2(+) visits  Medication, diet, health changes since last INR chart reviewed; none identified         PLAN     Unable to reach Luz Marina today.     Left message to resume Regular dosing and follow up in 2 weeks.   My chart message was followed up with.  Lolita Gama, RN  Anticoagulation Clinic  1/26/2024

## 2024-02-16 ENCOUNTER — MYC MEDICAL ADVICE (OUTPATIENT)
Dept: ANTICOAGULATION | Facility: CLINIC | Age: 64
End: 2024-02-16
Payer: COMMERCIAL

## 2024-02-16 NOTE — TELEPHONE ENCOUNTER
ANTICOAGULATION     Luz Marina Live is overdue for an INR check.     My chart message sent     Maine Cole RN

## 2024-02-19 ENCOUNTER — LAB (OUTPATIENT)
Dept: LAB | Facility: CLINIC | Age: 64
End: 2024-02-19
Payer: COMMERCIAL

## 2024-02-19 ENCOUNTER — ANTICOAGULATION THERAPY VISIT (OUTPATIENT)
Dept: ANTICOAGULATION | Facility: CLINIC | Age: 64
End: 2024-02-19

## 2024-02-19 DIAGNOSIS — I48.0 PAF (PAROXYSMAL ATRIAL FIBRILLATION) (H): Chronic | ICD-10-CM

## 2024-02-19 DIAGNOSIS — Z79.01 LONG TERM CURRENT USE OF ANTICOAGULANT THERAPY: Primary | Chronic | ICD-10-CM

## 2024-02-19 DIAGNOSIS — I48.91 ATRIAL FIBRILLATION, UNSPECIFIED TYPE (H): ICD-10-CM

## 2024-02-19 DIAGNOSIS — Z79.01 LONG TERM CURRENT USE OF ANTICOAGULANT THERAPY: ICD-10-CM

## 2024-02-19 LAB — INR BLD: 2.1 (ref 0.9–1.1)

## 2024-02-19 PROCEDURE — 36416 COLLJ CAPILLARY BLOOD SPEC: CPT

## 2024-02-19 PROCEDURE — 85610 PROTHROMBIN TIME: CPT

## 2024-02-19 NOTE — PROGRESS NOTES
ANTICOAGULATION MANAGEMENT     Luz Marina Live 63 year old female is on warfarin with therapeutic INR result. (Goal INR 2.0-3.0)    Recent labs: (last 7 days)     02/19/24  1202   INR 2.1*       ASSESSMENT     Warfarin Lab Questionnaire    Warfarin Doses Last 7 Days      2/19/2024    11:55 AM   Dose in Tablet or Mg   TAB or MG? milligram (mg)     Pt Rptd Dose SUNDAY MONDAY TUESDAY WED THURS FRIDAY SATURDAY 2/19/2024  11:55 AM 7.8 7.5 10 7.5 7.5 7.5 10         2/19/2024   Warfarin Lab Questionnaire   Missed doses within past 14 days? No   Changes in diet or alcohol within past 14 days? No   Medication changes since last result? No   Injuries or illness since last result? No   New shortness of breath, severe headaches or sudden changes in vision since last result? No   Abnormal bleeding since last result? No   Upcoming surgery, procedure? No     Previous result: Subtherapeutic  Additional findings: None       PLAN     Recommended plan for no diet, medication or health factor changes affecting INR     Dosing Instructions: Continue your current warfarin dose with next INR in 4 weeks       Summary  As of 2/19/2024      Full warfarin instructions:  10 mg every Tue, Sat; 7.5 mg all other days   Next INR check:  3/18/2024               Detailed voice message left for Luz Marina with dosing instructions and follow up date.   Sent [employer] message with dosing and follow up instructions    Contact [phone]  to schedule and with any changes, questions or concerns.     Education provided:   Contact [phone]  with any changes, questions or concerns.     Plan made per [facility] anticoagulation protocol    Maine Cole, RN  Anticoagulation Clinic  2/19/2024    _______________________________________________________________________     Anticoagulation Episode Summary       Current INR goal:  2.0-3.0   TTR:  53.0% (1 y)   Target end date:  Indefinite   Send INR reminders to:  [facility] [place]    Indications    Long-term (current)  use of anticoagulants [Z79.01] [Z79.01]  PAF (paroxysmal atrial fibrillation) (H) [I48.0]  Atrial fibrillation  unspecified type (H) [I48.91]             Comments:               Anticoagulation Care Providers       Provider Role Specialty Phone number    Eliz Nguyen MD Referring Internal Medicine [phone]    Elodia Tang MD Referring Family Medicine [phone]

## 2024-03-21 DIAGNOSIS — I50.9 CHF (CONGESTIVE HEART FAILURE) (H): ICD-10-CM

## 2024-03-26 RX ORDER — SACUBITRIL AND VALSARTAN 24; 26 MG/1; MG/1
TABLET, FILM COATED ORAL
Qty: 90 TABLET | Refills: 1 | Status: SHIPPED | OUTPATIENT
Start: 2024-03-26 | End: 2024-07-01

## 2024-03-29 DIAGNOSIS — L71.9 ROSACEA: ICD-10-CM

## 2024-04-05 ENCOUNTER — OFFICE VISIT (OUTPATIENT)
Dept: INTERNAL MEDICINE | Facility: CLINIC | Age: 64
End: 2024-04-05
Payer: COMMERCIAL

## 2024-04-05 VITALS
HEART RATE: 52 BPM | SYSTOLIC BLOOD PRESSURE: 120 MMHG | HEIGHT: 62 IN | BODY MASS INDEX: 46.38 KG/M2 | OXYGEN SATURATION: 99 % | DIASTOLIC BLOOD PRESSURE: 70 MMHG | WEIGHT: 252 LBS

## 2024-04-05 DIAGNOSIS — I10 HTN, GOAL BELOW 140/90: ICD-10-CM

## 2024-04-05 DIAGNOSIS — Z86.0100 HISTORY OF COLONIC POLYPS: Primary | ICD-10-CM

## 2024-04-05 DIAGNOSIS — R20.0 LEG NUMBNESS: ICD-10-CM

## 2024-04-05 PROCEDURE — 99214 OFFICE O/P EST MOD 30 MIN: CPT | Performed by: INTERNAL MEDICINE

## 2024-04-05 ASSESSMENT — PAIN SCALES - GENERAL: PAINLEVEL: NO PAIN (0)

## 2024-04-05 NOTE — PROGRESS NOTES
"  Assessment & Plan     History of colonic polyps  Reviewed prior colonoscopy report with patient.  Recommend obtaining screening colonoscopy.  Order was placed today.  - REVIEW OF HEALTH MAINTENANCE PROTOCOL ORDERS  - Colonoscopy Screening  Referral; Future    HTN, goal below 140/90  Blood pressure is within acceptable range today.  No medication changes are currently needed.  - Lipid Profile; Future    Leg numbness  Discussed possible causes.  Given the intermittent nature of numbness, recommend keeping track of symptoms and positions where numbness can occur.  Referral to sports medicine for further recommendation was recommended.  - Orthopedic  Referral      I spent a total of 30 minutes on the day of the visit.   Time spent by me doing chart review, history and exam, documentation and further activities per the note      BMI  Estimated body mass index is 46.5 kg/m  as calculated from the following:    Height as of this encounter: 1.568 m (5' 1.73\").    Weight as of this encounter: 114.3 kg (252 lb).         No follow-ups on file.    Isela Raza is a 63 year old, presenting for the following health issues:  Follow Up        4/5/2024    11:15 AM   Additional Questions   Roomed by MVO, EMT     History of Present Illness       Reason for visit:  Lupus    She eats 4 or more servings of fruits and vegetables daily.She consumes 1 sweetened beverage(s) daily.She exercises with enough effort to increase her heart rate 9 or less minutes per day.  She exercises with enough effort to increase her heart rate 3 or less days per week.   She is taking medications regularly.     Patient is here for follow up on several issues. She reports that she has been feeling well. She was seen by OB/GYN for evaluation of menopausal bleeding and had an endometrial biopsy.   Patient states that she has noticed intermittent numbness and tingling in her legs.  I does occur mostly when she is standing up for prolonged " "period of time.  Her symptoms are not consistent and may affect either left or right leg.  She has not noticed any relationship with any other positions.          Objective    /70 (BP Location: Right arm, Patient Position: Sitting, Cuff Size: Adult Regular)   Pulse 52   Ht 1.568 m (5' 1.73\")   Wt 114.3 kg (252 lb)   SpO2 99%   BMI 46.50 kg/m    Body mass index is 46.5 kg/m .  Physical Exam   GENERAL: alert and no distress  EYES: Eyes grossly normal to inspection, PERRL and conjunctivae and sclerae normal  RESP: lungs clear to auscultation - no rales, rhonchi or wheezes  CV: regular rates and rhythm and no peripheral edema  ABDOMEN: soft, nontender and bowel sounds normal  MS: no gross musculoskeletal defects noted, no edema  SKIN: no suspicious lesions or rashes  PSYCH: mentation appears normal, affect normal/bright            Signed Electronically by: Eliz Nguyen MD    "

## 2024-04-17 ENCOUNTER — ANTICOAGULATION THERAPY VISIT (OUTPATIENT)
Dept: ANTICOAGULATION | Facility: CLINIC | Age: 64
End: 2024-04-17

## 2024-04-17 ENCOUNTER — LAB (OUTPATIENT)
Dept: LAB | Facility: CLINIC | Age: 64
End: 2024-04-17
Attending: INTERNAL MEDICINE
Payer: COMMERCIAL

## 2024-04-17 ENCOUNTER — OFFICE VISIT (OUTPATIENT)
Dept: CARDIOLOGY | Facility: CLINIC | Age: 64
End: 2024-04-17
Attending: INTERNAL MEDICINE
Payer: COMMERCIAL

## 2024-04-17 VITALS
WEIGHT: 254 LBS | OXYGEN SATURATION: 98 % | SYSTOLIC BLOOD PRESSURE: 132 MMHG | BODY MASS INDEX: 46.86 KG/M2 | DIASTOLIC BLOOD PRESSURE: 82 MMHG | HEART RATE: 56 BPM

## 2024-04-17 DIAGNOSIS — I50.30 HEART FAILURE WITH PRESERVED EJECTION FRACTION, UNSPECIFIED HF CHRONICITY (H): ICD-10-CM

## 2024-04-17 DIAGNOSIS — I48.0 PAF (PAROXYSMAL ATRIAL FIBRILLATION) (H): Chronic | ICD-10-CM

## 2024-04-17 DIAGNOSIS — I48.91 ATRIAL FIBRILLATION, UNSPECIFIED TYPE (H): ICD-10-CM

## 2024-04-17 DIAGNOSIS — R06.02 SHORTNESS OF BREATH: Primary | ICD-10-CM

## 2024-04-17 DIAGNOSIS — I25.10 CORONARY ARTERY DISEASE INVOLVING NATIVE HEART WITHOUT ANGINA PECTORIS, UNSPECIFIED VESSEL OR LESION TYPE: ICD-10-CM

## 2024-04-17 DIAGNOSIS — Z79.899 LONG-TERM USE OF PLAQUENIL: ICD-10-CM

## 2024-04-17 DIAGNOSIS — M32.14 SYSTEMIC LUPUS ERYTHEMATOSUS WITH GLOMERULAR DISEASE, UNSPECIFIED SLE TYPE (H): ICD-10-CM

## 2024-04-17 DIAGNOSIS — I25.10 CORONARY ARTERY DISEASE INVOLVING NATIVE HEART WITHOUT ANGINA PECTORIS, UNSPECIFIED VESSEL OR LESION TYPE: Chronic | ICD-10-CM

## 2024-04-17 DIAGNOSIS — I20.89 STABLE ANGINA (H): ICD-10-CM

## 2024-04-17 DIAGNOSIS — I10 HTN, GOAL BELOW 140/90: ICD-10-CM

## 2024-04-17 DIAGNOSIS — N18.30 STAGE 3 CHRONIC KIDNEY DISEASE, UNSPECIFIED WHETHER STAGE 3A OR 3B CKD (H): ICD-10-CM

## 2024-04-17 DIAGNOSIS — Z79.01 LONG TERM CURRENT USE OF ANTICOAGULANT THERAPY: Primary | Chronic | ICD-10-CM

## 2024-04-17 DIAGNOSIS — Z79.01 LONG TERM CURRENT USE OF ANTICOAGULANT THERAPY: ICD-10-CM

## 2024-04-17 DIAGNOSIS — E66.01 MORBID OBESITY (H): ICD-10-CM

## 2024-04-17 DIAGNOSIS — Z79.60 LONG-TERM USE OF IMMUNOSUPPRESSANT MEDICATION: ICD-10-CM

## 2024-04-17 LAB
ALBUMIN MFR UR ELPH: 8.4 MG/DL
ALBUMIN SERPL BCG-MCNC: 4.1 G/DL (ref 3.5–5.2)
ALBUMIN UR-MCNC: NEGATIVE MG/DL
ALP SERPL-CCNC: 122 U/L (ref 40–150)
ALT SERPL W P-5'-P-CCNC: 20 U/L (ref 0–50)
ANION GAP SERPL CALCULATED.3IONS-SCNC: 9 MMOL/L (ref 7–15)
APPEARANCE UR: CLEAR
AST SERPL W P-5'-P-CCNC: 33 U/L (ref 0–45)
BASOPHILS # BLD AUTO: 0 10E3/UL (ref 0–0.2)
BASOPHILS NFR BLD AUTO: 1 %
BILIRUB SERPL-MCNC: 1 MG/DL
BILIRUB UR QL STRIP: NEGATIVE
BUN SERPL-MCNC: 34.1 MG/DL (ref 8–23)
CALCIUM SERPL-MCNC: 9.7 MG/DL (ref 8.8–10.2)
CHLORIDE SERPL-SCNC: 103 MMOL/L (ref 98–107)
CHOLEST SERPL-MCNC: 102 MG/DL
COLOR UR AUTO: ABNORMAL
CREAT SERPL-MCNC: 1.45 MG/DL (ref 0.51–0.95)
CREAT UR-MCNC: 25.1 MG/DL
CRP SERPL-MCNC: 15.4 MG/L
DEPRECATED HCO3 PLAS-SCNC: 28 MMOL/L (ref 22–29)
EGFRCR SERPLBLD CKD-EPI 2021: 40 ML/MIN/1.73M2
EOSINOPHIL # BLD AUTO: 0.1 10E3/UL (ref 0–0.7)
EOSINOPHIL NFR BLD AUTO: 2 %
ERYTHROCYTE [DISTWIDTH] IN BLOOD BY AUTOMATED COUNT: 15.1 % (ref 10–15)
ERYTHROCYTE [SEDIMENTATION RATE] IN BLOOD BY WESTERGREN METHOD: 56 MM/HR (ref 0–30)
FASTING STATUS PATIENT QL REPORTED: ABNORMAL
GLUCOSE SERPL-MCNC: 101 MG/DL (ref 70–99)
GLUCOSE UR STRIP-MCNC: 200 MG/DL
HCT VFR BLD AUTO: 39.9 % (ref 35–47)
HDLC SERPL-MCNC: 41 MG/DL
HGB BLD-MCNC: 13 G/DL (ref 11.7–15.7)
HGB UR QL STRIP: NEGATIVE
IMM GRANULOCYTES # BLD: 0.1 10E3/UL
IMM GRANULOCYTES NFR BLD: 1 %
INR BLD: 2.2 (ref 0.9–1.1)
KETONES UR STRIP-MCNC: NEGATIVE MG/DL
LDLC SERPL CALC-MCNC: 44 MG/DL
LEUKOCYTE ESTERASE UR QL STRIP: NEGATIVE
LYMPHOCYTES # BLD AUTO: 0.8 10E3/UL (ref 0.8–5.3)
LYMPHOCYTES NFR BLD AUTO: 14 %
MCH RBC QN AUTO: 30.2 PG (ref 26.5–33)
MCHC RBC AUTO-ENTMCNC: 32.6 G/DL (ref 31.5–36.5)
MCV RBC AUTO: 93 FL (ref 78–100)
MONOCYTES # BLD AUTO: 0.3 10E3/UL (ref 0–1.3)
MONOCYTES NFR BLD AUTO: 6 %
NEUTROPHILS # BLD AUTO: 4.7 10E3/UL (ref 1.6–8.3)
NEUTROPHILS NFR BLD AUTO: 76 %
NITRATE UR QL: NEGATIVE
NONHDLC SERPL-MCNC: 61 MG/DL
NRBC # BLD AUTO: 0 10E3/UL
NRBC BLD AUTO-RTO: 0 /100
NT-PROBNP SERPL-MCNC: 1265 PG/ML (ref 0–900)
PH UR STRIP: 7 [PH] (ref 5–7)
PLATELET # BLD AUTO: 176 10E3/UL (ref 150–450)
POTASSIUM SERPL-SCNC: 4.7 MMOL/L (ref 3.4–5.3)
PROT SERPL-MCNC: 8.7 G/DL (ref 6.4–8.3)
PROT/CREAT 24H UR: 0.33 MG/MG CR (ref 0–0.2)
RBC # BLD AUTO: 4.3 10E6/UL (ref 3.8–5.2)
RBC URINE: 1 /HPF
SODIUM SERPL-SCNC: 140 MMOL/L (ref 135–145)
SP GR UR STRIP: 1 (ref 1–1.03)
SQUAMOUS EPITHELIAL: <1 /HPF
TRIGL SERPL-MCNC: 83 MG/DL
UROBILINOGEN UR STRIP-MCNC: NORMAL MG/DL
WBC # BLD AUTO: 6 10E3/UL (ref 4–11)
WBC URINE: <1 /HPF

## 2024-04-17 PROCEDURE — 80061 LIPID PANEL: CPT | Performed by: PATHOLOGY

## 2024-04-17 PROCEDURE — 99000 SPECIMEN HANDLING OFFICE-LAB: CPT | Performed by: PATHOLOGY

## 2024-04-17 PROCEDURE — 86160 COMPLEMENT ANTIGEN: CPT | Performed by: INTERNAL MEDICINE

## 2024-04-17 PROCEDURE — 86140 C-REACTIVE PROTEIN: CPT | Performed by: PATHOLOGY

## 2024-04-17 PROCEDURE — 86225 DNA ANTIBODY NATIVE: CPT | Performed by: INTERNAL MEDICINE

## 2024-04-17 PROCEDURE — G0463 HOSPITAL OUTPT CLINIC VISIT: HCPCS | Performed by: INTERNAL MEDICINE

## 2024-04-17 PROCEDURE — 99215 OFFICE O/P EST HI 40 MIN: CPT | Performed by: INTERNAL MEDICINE

## 2024-04-17 PROCEDURE — 81001 URINALYSIS AUTO W/SCOPE: CPT | Performed by: PATHOLOGY

## 2024-04-17 PROCEDURE — 85610 PROTHROMBIN TIME: CPT | Performed by: PATHOLOGY

## 2024-04-17 PROCEDURE — 84156 ASSAY OF PROTEIN URINE: CPT | Performed by: PATHOLOGY

## 2024-04-17 PROCEDURE — 83880 ASSAY OF NATRIURETIC PEPTIDE: CPT | Performed by: PATHOLOGY

## 2024-04-17 PROCEDURE — 85652 RBC SED RATE AUTOMATED: CPT | Performed by: PATHOLOGY

## 2024-04-17 PROCEDURE — 80053 COMPREHEN METABOLIC PANEL: CPT | Performed by: PATHOLOGY

## 2024-04-17 PROCEDURE — 36415 COLL VENOUS BLD VENIPUNCTURE: CPT | Performed by: PATHOLOGY

## 2024-04-17 PROCEDURE — 85025 COMPLETE CBC W/AUTO DIFF WBC: CPT | Performed by: PATHOLOGY

## 2024-04-17 ASSESSMENT — PAIN SCALES - GENERAL: PAINLEVEL: NO PAIN (0)

## 2024-04-17 NOTE — PROGRESS NOTES
ANTICOAGULATION MANAGEMENT     Luz Marina Live 63 year old female is on warfarin with therapeutic INR result. (Goal INR 2.0-3.0)    Recent labs: (last 7 days)     04/17/24  1251   INR 2.2*       ASSESSMENT     Source(s): Chart Review  Previous INR was Therapeutic last visit; previously outside of goal range  Medication, diet, health changes since last INR chart reviewed; none identified         PLAN     Recommended plan for no diet, medication or health factor changes affecting INR     Dosing Instructions: Continue your current warfarin dose with next INR in 4 weeks       Summary  As of 4/17/2024      Full warfarin instructions:  10 mg every Tue, Sat; 7.5 mg all other days   Next INR check:  5/15/2024               Detailed voice message left for Luz Marina with dosing instructions and follow up date.     Contact [phone]  to schedule and with any changes, questions or concerns.     Education provided:   Please call back if any changes to your diet, medications or how you've been taking warfarin    Plan made per [facility] anticoagulation protocol    Deborah Alatorre RN  Anticoagulation Clinic  4/17/2024    _______________________________________________________________________     Anticoagulation Episode Summary       Current INR goal:  2.0-3.0   TTR:  53.0% (1 y)   Target end date:  Indefinite   Send INR reminders to:  [facility]    Indications    Long-term (current) use of anticoagulants [Z79.01] [Z79.01]  PAF (paroxysmal atrial fibrillation) (H) [I48.0]  Atrial fibrillation  unspecified type (H) [I48.91]             Comments:               Anticoagulation Care Providers       Provider Role Specialty Phone number    Eliz Nguyen MD Referring Internal Medicine [phone]    Elodia Tang MD Referring Family Medicine [phone]

## 2024-04-17 NOTE — PROGRESS NOTES
Expand All Collapse All     SLE  2. Vaginal bleeding  3. ASHD with remote CABG  4. Exertional shortness of breath  5. Dependent edema   6. Elevated weight  7. Paroxysmal atrial fibrillation (warfarin)  8. KEITH    Plan:  Decrease lopressor to 50 BID  Cardiac rehabilitation at [facility]  Weight loss    I personally walked her in [place]es and rhythm was regular and increased from baseline 55 to 74 with exertion    The patient returns for follow-up of heart failure.  There is no interim history of chest pain, tightness, paroxysmal nocturnal dyspnea, orthopnea, peripheral edema, palpitation, pre-syncope, syncope, dExercise tolerance is stable.  The patient is attempting to exercise regularly and following a sodium restricted, calorically appropriate diet.  Medications are reviewed and the patient is taking medications as prescribed.  The patient is generally sleeping well.           Wt Readings from Last 24 Encounters:   04/17/24 115.2 kg (254 lb)   04/05/24 114.3 kg (252 lb)   11/29/23 113.6 kg (250 lb 8 oz)   11/28/23 113.4 kg (250 lb)   11/28/23 113.4 kg (250 lb)   10/26/23 113.4 kg (250 lb)   10/25/23 113.4 kg (250 lb)   10/18/23 113.4 kg (250 lb)   10/09/23 114.2 kg (251 lb 12.3 oz)   10/06/23 114.3 kg (251 lb 14.4 oz)   09/20/23 113.4 kg (250 lb)   09/13/23 114.7 kg (252 lb 13.9 oz)   07/26/23 108.9 kg (240 lb)   06/09/23 119.1 kg (262 lb 9.6 oz)   06/02/23 119.3 kg (263 lb)   11/28/22 122.2 kg (269 lb 5.8 oz)   09/09/22 122.5 kg (270 lb)   08/19/22 121.6 kg (268 lb 1.6 oz)   03/23/22 123 kg (271 lb 1.6 oz)   01/12/22 119.3 kg (263 lb 1.6 oz)   08/27/21 117.9 kg (260 lb)   08/20/21 119.3 kg (263 lb)   07/13/21 124.7 kg (275 lb)   07/20/20 113.9 kg (251 lb)        Current Outpatient Medications   Medication Sig Dispense Refill    aspirin 81 MG EC tablet Take 81 mg by mouth daily      atorvastatin (LIPITOR) 40 MG tablet Take 1 tablet (40 mg) by mouth daily 90 tablet 3    azelastine (ASTELIN) 0.1 % nasal  spray Spray 2 sprays into both nostrils 2 times daily 30 mL 4    dapagliflozin (FARXIGA) 10 MG TABS tablet Take 1 tablet (10 mg) by mouth daily 90 tablet 1    furosemide (LASIX) 20 MG tablet Take 2 tablets (40 mg) by mouth daily 180 tablet 3    hydrocortisone 2.5 % ointment PLEASE SEE ATTACHED FOR DETAILED DIRECTIONS      hydroxychloroquine (PLAQUENIL) 200 MG tablet Take 1 tablet (200 mg) by mouth 2 times daily 180 tablet 3    ketoconazole (NIZORAL) 2 % external shampoo WASH AFFECTED AREAS ON THE FACE ONCE DAILY. LATHER AND LET SIT FOR 3-5 MINUTES BEFORE RINSING.      loratadine (CLARITIN) 10 MG tablet Take 10 mg by mouth daily      metoprolol tartrate (LOPRESSOR) 25 MG tablet Take 50mg in the morning and 75mg in the evening. 450 tablet 3    metoprolol tartrate (LOPRESSOR) 50 MG tablet TAKE 50MG IN THE MORNING AND 75MG IN THE EVENING. 450 tablet 3    metroNIDAZOLE (METROCREAM) 0.75 % external cream Apply topically 2 times daily 45 g 4    MULTIPLE VITAMIN PO Take 1 tablet by mouth daily       omega-3 fatty acids 1200 MG capsule Take 2 capsules by mouth 2 times daily       order for DME Autopap 10-16 cmH20 1 Device 0    sacubitril-valsartan (ENTRESTO) 24-26 MG per tablet TAKE 1/2 TABLET BY MOUTH TWICE A DAY 90 tablet 1    warfarin ANTICOAGULANT (COUMADIN) 5 MG tablet Take 10 mg every Sat; 7.5 mg all other days or as directed by the INR clinic 135 tablet 1    warfarin ANTICOAGULANT (COUMADIN) 5 MG tablet TAKE 10 MG (2 TABS) TUE, THUR, & SAT + 7.5 MG (1 1/2 TABS) ALL OTHER DAYS OR AS DIRECTED BY COUMADIN CLINIC. DUE FOR INR CHECK 01/11/23 156 tablet 1    erythromycin (ROMYCIN) 5 MG/GM ophthalmic ointment Place 0.5 inches into both eyes 3 times daily (Patient not taking: Reported on 11/29/2023) 3.5 g 1     No current facility-administered medications for this visit.       Latest Reference Range & Units 04/17/24 12:51   WBC 4.0 - 11.0 10e3/uL 6.0   Hemoglobin 11.7 - 15.7 g/dL 13.0   Hematocrit 35.0 - 47.0 % 39.9    Platelet Count 150 - 450 10e3/uL 176   RBC Count 3.80 - 5.20 10e6/uL 4.30   MCV 78 - 100 fL 93   MCH 26.5 - 33.0 pg 30.2   MCHC 31.5 - 36.5 g/dL 32.6   RDW 10.0 - 15.0 % 15.1 (H)   % Neutrophils % 76   % Lymphocytes % 14   % Monocytes % 6   % Eosinophils % 2   % Basophils % 1   Absolute Basophils 0.0 - 0.2 10e3/uL 0.0   Absolute Eosinophils 0.0 - 0.7 10e3/uL 0.1   Absolute Immature Granulocytes <=0.4 10e3/uL 0.1   Absolute Lymphocytes 0.8 - 5.3 10e3/uL 0.8   Absolute Monocytes 0.0 - 1.3 10e3/uL 0.3   % Immature Granulocytes % 1   Absolute Neutrophils 1.6 - 8.3 10e3/uL 4.7   Absolute NRBCs 10e3/uL 0.0   NRBCs per 100 WBC <1 /100 0   (      Primary Surgeon: Mahendra Collins MD   Procedure: Coronary Angiogram with checking bypass grafts    Coronary Angiogram Graft        Indications    Systemic lupus erythematosus with glomerular disease, unspecified SLE type (H) [M32.14 (ICD-10-CM)]   Hyperlipidemia LDL goal <100 [E78.5 (ICD-10-CM)]   Chronic diastolic heart failure (H) [I50.32 (ICD-10-CM)]   Coronary artery disease involving native coronary artery of native heart without angina pectoris [I25.10 (ICD-10-CM)]     Comments/Patient Narrative    63yr old female with a h/o HFpEF, CAD s/p CAB, HTN, HLD, atrial fibrillation on warfarin, SLE, and PFO who presents with symptoms of dyspnea on exertion for coronary angiogram and possible PCI.     Pre Procedure Diagnosis    stable known CADheart failure    Post Procedure Diagnosis    Dyspnea on exertion  Stable severe coronary artery disease, s/p CABG  LIMA_LAD  SVG_OM1  SVG_rPDA      Conclusion      Right hear catheterization     RA: 6/7/5 mmHg  RV: 43/--/5 mmHg  PA: 41/20/27 mmHg  CWP: 15/15/13 mmHg  CO/CI (Kannan): 5.9/2.8 L/min/m2  CO/CI (TD): 4.5/2.14 L/min/m2               Severe three vessel coronary artery disease s/p CABG with LIMA-LAD, SVG-OM1, SVG-rPDA and otherwise mild non obstructive CAD elsewhere unchanged from prior study in 2021.            Plan      Follow bedrest per protocol   Continued medical management and lifestyle modifications for cardiovascular risk factor optimizations.   Follow up with Dr. Moreno.   Discharge today per protocol        Post antiplatelet therapy of   give 81 mg qd .     Coronary Findings    Diagnostic  Dominance: Right  Left Anterior Descending   Ost LAD to Mid LAD lesion is 100% stenosed. The lesion is chronic total occlusion.      Second Diagonal Branch   The vessel is small. There is mild diffuse disease throughout the vessel.      Left Circumflex   Previously placed Ost Cx to Prox Cx stent of unknown type is widely patent.   Prox Cx to Mid Cx lesion is 20% stenosed.      First Obtuse Marginal Branch   The vessel is moderate in size.   1st Mrg lesion is 30% stenosed with 100% stenosed side branch in Lat 1st Mrg. The lesion was previously treated using a stent of unknown type.      Lateral First Obtuse Marginal Branch   The vessel is small.      Second Obtuse Marginal Branch   The vessel is moderate in size.      Third Obtuse Marginal Branch   The vessel is small.      Right Coronary Artery   Prox RCA lesion is 40% stenosed. The lesion is eccentric.   Mid RCA to Dist RCA lesion is 10% stenosed.   Dist RCA lesion is 100% stenosed. The lesion is chronic total occlusion.      Right Posterior Descending Artery   RPDA lesion is 20% stenosed.      LIMA Graft To Dist LAD   The graft is large. The graft is angiographically normal.      Graft To Lat 1st Mrg   The graft is large. The graft is angiographically normal.      Graft To RPDA   The graft is large. The graft is angiographically normal.   Prox Graft lesion is 40% stenosed.         Intervention     No interventions have been documented.     Pressures Phase: Baseline     Time Systolic (mmHg) Diastolic (mmHg) Mean (mmHg) A Wave (mmHg) V Wave (mmHg) EDP (mmHg) Max dp/dt (mmHg/sec) HR (bpm) Content (mL/dL) SAT (%)   AO Pressures  3:58     58    76        54            CC: Eliz  SHANTHI Aguilera.,    CC: [facility] Medical Records

## 2024-04-17 NOTE — PROGRESS NOTES
"Opt[facility] V[facility] Metrics    Blood Pressure   {BP < 140/90:4210802::\"BP < 140/90 Yes\"}    On Aspirin  {On Aspirin Yes/No:3945260::\"Yes\"}    On Statin  {On Statin Yes/No:3930808::\"Yes\"}    Tobacco use  {Tobacco use Yes/No:0125014::\"No\"}    "

## 2024-04-17 NOTE — LETTER
4/17/2024      RE: Luz Marina Live  [address]  [place] [address]       Dear Colleague,    Thank you for the opportunity to participate in the care of your patient, Luz Marina Live, at the [facility]ART [facility] [place] at [facility]. Please see a copy of my visit note below.       Expand All Collapse All     SLE  2. Vaginal bleeding  3. ASHD with remote CABG  4. Exertional shortness of breath  5. Dependent edema   6. Elevated weight  7. Paroxysmal atrial fibrillation (warfarin)  8. KEITH    Plan:  Decrease lopressor to 50 BID  Cardiac rehabilitation at [facility]  Weight loss    I personally walked her in cespedes and rhythm was regular and increased from baseline 55 to 74 with exertion    The patient returns for follow-up of heart failure.  There is no interim history of chest pain, tightness, paroxysmal nocturnal dyspnea, orthopnea, peripheral edema, palpitation, pre-syncope, syncope, dExercise tolerance is stable.  The patient is attempting to exercise regularly and following a sodium restricted, calorically appropriate diet.  Medications are reviewed and the patient is taking medications as prescribed.  The patient is generally sleeping well.           Wt Readings from Last 24 Encounters:   04/17/24 115.2 kg (254 lb)   04/05/24 114.3 kg (252 lb)   11/29/23 113.6 kg (250 lb 8 oz)   11/28/23 113.4 kg (250 lb)   11/28/23 113.4 kg (250 lb)   10/26/23 113.4 kg (250 lb)   10/25/23 113.4 kg (250 lb)   10/18/23 113.4 kg (250 lb)   10/09/23 114.2 kg (251 lb 12.3 oz)   10/06/23 114.3 kg (251 lb 14.4 oz)   09/20/23 113.4 kg (250 lb)   09/13/23 114.7 kg (252 lb 13.9 oz)   07/26/23 108.9 kg (240 lb)   06/09/23 119.1 kg (262 lb 9.6 oz)   06/02/23 119.3 kg (263 lb)   11/28/22 122.2 kg (269 lb 5.8 oz)   09/09/22 122.5 kg (270 lb)   08/19/22 121.6 kg (268 lb 1.6 oz)   03/23/22 123 kg (271 lb 1.6 oz)   01/12/22 119.3 kg (263 lb 1.6 oz)   08/27/21 117.9 kg (260  lb)   08/20/21 119.3 kg (263 lb)   07/13/21 124.7 kg (275 lb)   07/20/20 113.9 kg (251 lb)        Current Outpatient Medications   Medication Sig Dispense Refill     aspirin 81 MG EC tablet Take 81 mg by mouth daily       atorvastatin (LIPITOR) 40 MG tablet Take 1 tablet (40 mg) by mouth daily 90 tablet 3     azelastine (ASTELIN) 0.1 % nasal spray Spray 2 sprays into both nostrils 2 times daily 30 mL 4     dapagliflozin (FARXIGA) 10 MG TABS tablet Take 1 tablet (10 mg) by mouth daily 90 tablet 1     furosemide (LASIX) 20 MG tablet Take 2 tablets (40 mg) by mouth daily 180 tablet 3     hydrocortisone 2.5 % ointment PLEASE SEE ATTACHED FOR DETAILED DIRECTIONS       hydroxychloroquine (PLAQUENIL) 200 MG tablet Take 1 tablet (200 mg) by mouth 2 times daily 180 tablet 3     ketoconazole (NIZORAL) 2 % external shampoo WASH AFFECTED AREAS ON THE FACE ONCE DAILY. LATHER AND LET SIT FOR 3-5 MINUTES BEFORE RINSING.       loratadine (CLARITIN) 10 MG tablet Take 10 mg by mouth daily       metoprolol tartrate (LOPRESSOR) 25 MG tablet Take 50mg in the morning and 75mg in the evening. 450 tablet 3     metoprolol tartrate (LOPRESSOR) 50 MG tablet TAKE 50MG IN THE MORNING AND 75MG IN THE EVENING. 450 tablet 3     metroNIDAZOLE (METROCREAM) 0.75 % external cream Apply topically 2 times daily 45 g 4     MULTIPLE VITAMIN PO Take 1 tablet by mouth daily        omega-3 fatty acids 1200 MG capsule Take 2 capsules by mouth 2 times daily        order for DME Autopap 10-16 cmH20 1 Device 0     sacubitril-valsartan (ENTRESTO) 24-26 MG per tablet TAKE 1/2 TABLET BY MOUTH TWICE A DAY 90 tablet 1     warfarin ANTICOAGULANT (COUMADIN) 5 MG tablet Take 10 mg every Sat; 7.5 mg all other days or as directed by the INR clinic 135 tablet 1     warfarin ANTICOAGULANT (COUMADIN) 5 MG tablet TAKE 10 MG (2 TABS) TUE, THUR, & SAT + 7.5 MG (1 1/2 TABS) ALL OTHER DAYS OR AS DIRECTED BY COUMADIN CLINIC. DUE FOR INR CHECK 01/11/23 156 tablet 1     erythromycin  (ROMYCIN) 5 MG/GM ophthalmic ointment Place 0.5 inches into both eyes 3 times daily (Patient not taking: Reported on 11/29/2023) 3.5 g 1     No current facility-administered medications for this visit.       Latest Reference Range & Units 04/17/24 12:51   WBC 4.0 - 11.0 10e3/uL 6.0   Hemoglobin 11.7 - 15.7 g/dL 13.0   Hematocrit 35.0 - 47.0 % 39.9   Platelet Count 150 - 450 10e3/uL 176   RBC Count 3.80 - 5.20 10e6/uL 4.30   MCV 78 - 100 fL 93   MCH 26.5 - 33.0 pg 30.2   MCHC 31.5 - 36.5 g/dL 32.6   RDW 10.0 - 15.0 % 15.1 (H)   % Neutrophils % 76   % Lymphocytes % 14   % Monocytes % 6   % Eosinophils % 2   % Basophils % 1   Absolute Basophils 0.0 - 0.2 10e3/uL 0.0   Absolute Eosinophils 0.0 - 0.7 10e3/uL 0.1   Absolute Immature Granulocytes <=0.4 10e3/uL 0.1   Absolute Lymphocytes 0.8 - 5.3 10e3/uL 0.8   Absolute Monocytes 0.0 - 1.3 10e3/uL 0.3   % Immature Granulocytes % 1   Absolute Neutrophils 1.6 - 8.3 10e3/uL 4.7   Absolute NRBCs 10e3/uL 0.0   NRBCs per 100 WBC <1 /100 0   (      Primary Surgeon: Mahendra Collins MD   Procedure: Coronary Angiogram with checking bypass grafts    Coronary Angiogram Graft        Indications    Systemic lupus erythematosus with glomerular disease, unspecified SLE type (H) [M32.14 (ICD-10-CM)]   Hyperlipidemia LDL goal <100 [E78.5 (ICD-10-CM)]   Chronic diastolic heart failure (H) [I50.32 (ICD-10-CM)]   Coronary artery disease involving native coronary artery of native heart without angina pectoris [I25.10 (ICD-10-CM)]     Comments/Patient Narrative    63yr old female with a h/o HFpEF, CAD s/p CAB, HTN, HLD, atrial fibrillation on warfarin, SLE, and PFO who presents with symptoms of dyspnea on exertion for coronary angiogram and possible PCI.     Pre Procedure Diagnosis    stable known CADheart failure    Post Procedure Diagnosis    Dyspnea on exertion  Stable severe coronary artery disease, s/p CABG  LIMA_LAD  SVG_OM1  SVG_rPDA      Conclusion      Right hear  catheterization     RA: 6/7/5 mmHg  RV: 43/--/5 mmHg  PA: 41/20/27 mmHg  CWP: 15/15/13 mmHg  CO/CI (Kannan): 5.9/2.8 L/min/m2  CO/CI (TD): 4.5/2.14 L/min/m2               Severe three vessel coronary artery disease s/p CABG with LIMA-LAD, SVG-OM1, SVG-rPDA and otherwise mild non obstructive CAD elsewhere unchanged from prior study in 2021.            Plan      Follow bedrest per protocol    Continued medical management and lifestyle modifications for cardiovascular risk factor optimizations.    Follow up with Dr. Moreno.    Discharge today per protocol        Post antiplatelet therapy of   give 81 mg qd .     Coronary Findings    Diagnostic  Dominance: Right  Left Anterior Descending   Ost LAD to Mid LAD lesion is 100% stenosed. The lesion is chronic total occlusion.      Second Diagonal Branch   The vessel is small. There is mild diffuse disease throughout the vessel.      Left Circumflex   Previously placed Ost Cx to Prox Cx stent of unknown type is widely patent.   Prox Cx to Mid Cx lesion is 20% stenosed.      First Obtuse Marginal Branch   The vessel is moderate in size.   1st Mrg lesion is 30% stenosed with 100% stenosed side branch in Lat 1st Mrg. The lesion was previously treated using a stent of unknown type.      Lateral First Obtuse Marginal Branch   The vessel is small.      Second Obtuse Marginal Branch   The vessel is moderate in size.      Third Obtuse Marginal Branch   The vessel is small.      Right Coronary Artery   Prox RCA lesion is 40% stenosed. The lesion is eccentric.   Mid RCA to Dist RCA lesion is 10% stenosed.   Dist RCA lesion is 100% stenosed. The lesion is chronic total occlusion.      Right Posterior Descending Artery   RPDA lesion is 20% stenosed.      LIMA Graft To Dist LAD   The graft is large. The graft is angiographically normal.      Graft To Lat 1st Mrg   The graft is large. The graft is angiographically normal.      Graft To RPDA   The graft is large. The graft is  angiographically normal.   Prox Graft lesion is 40% stenosed.         Intervention     No interventions have been documented.     Pressures Phase: Baseline     Time Systolic (mmHg) Diastolic (mmHg) Mean (mmHg) A Wave (mmHg) V Wave (mmHg) EDP (mmHg) Max dp/dt (mmHg/sec) HR (bpm) Content (mL/dL) SAT (%)   AO Pressures  3:58     58    76        54            CC: Eliz Aguilera M.D.,    CC: [facility] Medical Records                   Please do not hesitate to contact me if you have any questions/concerns.     Sincerely,     Twin Moreno MD

## 2024-04-17 NOTE — NURSING NOTE
Chief Complaint   Patient presents with    Follow Up     Return HF; 63yr old female with a h/o HFpEF, CAD s/p CAB, HTN, HLD, atrial fibrillation on warfarin, SLE, and PFO presenting for follow-up with labs prior.       Vitals were taken, medications reconciled.    Fazal Lozano, Facilitator   1:15 PM

## 2024-04-17 NOTE — PATIENT INSTRUCTIONS
"You were seen today in the Cardiovascular Clinic at the [facility].      Cardiology Providers you saw during your visit:  Dr. Twin Moreno     Recommendations:   Cardiac Rehab - [facility]. We will fax referral there.  Please follow-up with Dr. Moreno in 4-6 months with labs prior.         Eat a heart healthy, low sodium diet.  Get 20 to 30 minutes of aerobic exercise 4 to 5 times per week as tolerated. (Examples of aerobic exercise include: walking, bicycling, swimming, running).     Thank you for your visit today!   Please MyChart message or call if you have any questions or concerns.      During Business Hours:  [phone], option # 1 ([place])       After hours, weekends or holidays:   [phone], Option #4  Ask to speak to the On-Call Cardiologist. Inform them you are a heart failure patient at the [place].      Kaylie Tran RN BSN CHFN  Cardiology Care Coordinator - [facility]  Questions and [phone]  First press #1 for the University and then press #4 for \"Your Care Team\" to reach us Cardiology Nurses.                "

## 2024-04-18 LAB
C3 SERPL-MCNC: 105 MG/DL (ref 81–157)
C4 SERPL-MCNC: 16 MG/DL (ref 13–39)
DSDNA AB SER-ACNC: 21 IU/ML

## 2024-04-25 DIAGNOSIS — I10 HYPERTENSION GOAL BP (BLOOD PRESSURE) < 130/80: ICD-10-CM

## 2024-04-25 DIAGNOSIS — N18.30 CKD (CHRONIC KIDNEY DISEASE) STAGE 3, GFR 30-59 ML/MIN (H): Chronic | ICD-10-CM

## 2024-04-25 DIAGNOSIS — E78.00 PURE HYPERCHOLESTEROLEMIA: ICD-10-CM

## 2024-04-26 DIAGNOSIS — I48.91 ATRIAL FIBRILLATION, UNSPECIFIED TYPE (H): ICD-10-CM

## 2024-04-26 RX ORDER — WARFARIN SODIUM 5 MG/1
TABLET ORAL
Qty: 140 TABLET | Refills: 1 | Status: SHIPPED | OUTPATIENT
Start: 2024-04-26 | End: 2024-08-29

## 2024-04-26 NOTE — TELEPHONE ENCOUNTER
ANTICOAGULATION MANAGEMENT:  Medication Refill    Anticoagulation Summary  As of 4/17/2024      Warfarin maintenance plan:  10 mg (5 mg x 2) every Tue, Sat; 7.5 mg (5 mg x 1.5) all other days   Next INR check:  5/15/2024   Target end date:  Indefinite    Indications    Long-term (current) use of anticoagulants [Z79.01] [Z79.01]  PAF (paroxysmal atrial fibrillation) (H) [I48.0]  Atrial fibrillation  unspecified type (H) [I48.91]                 Anticoagulation Care Providers       Provider Role Specialty Phone number    Eliz Nguyen MD Referring Internal Medicine [phone]    Elodia Tang MD Referring Family Medicine [phone]            Refill Criteria    Visit with referring provider/group: Meets criteria: office visit within referring provider group in the last 1 year on 4/5/24    [facility] referral last signed: 08/04/2023; within last year: Yes    Lab monitoring not exceeding 2 weeks overdue: No    Luz Marina meets all criteria for refill. Rx instructions and quantity supplied updated to match patient's current dosing plan. Warfarin 90 day supply with 1 refill granted per [facility] protocol     Red RIVAS RN  Anticoagulation Clinic

## 2024-05-01 RX ORDER — DAPAGLIFLOZIN 10 MG/1
10 TABLET, FILM COATED ORAL DAILY
Qty: 90 TABLET | Refills: 1 | OUTPATIENT
Start: 2024-05-01

## 2024-05-01 RX ORDER — ATORVASTATIN CALCIUM 40 MG/1
40 TABLET, FILM COATED ORAL DAILY
Qty: 90 TABLET | Refills: 3 | Status: SHIPPED | OUTPATIENT
Start: 2024-05-01

## 2024-05-21 DIAGNOSIS — E66.01 MORBID OBESITY (H): Chronic | ICD-10-CM

## 2024-05-21 DIAGNOSIS — I10 HYPERTENSION GOAL BP (BLOOD PRESSURE) < 130/80: ICD-10-CM

## 2024-05-21 DIAGNOSIS — N18.30 CKD (CHRONIC KIDNEY DISEASE) STAGE 3, GFR 30-59 ML/MIN (H): Chronic | ICD-10-CM

## 2024-05-21 DIAGNOSIS — I20.89 STABLE ANGINA (H): ICD-10-CM

## 2024-05-21 DIAGNOSIS — Z79.899 LONG-TERM USE OF PLAQUENIL: ICD-10-CM

## 2024-05-21 DIAGNOSIS — M32.14 SYSTEMIC LUPUS ERYTHEMATOSUS WITH GLOMERULAR DISEASE, UNSPECIFIED SLE TYPE (H): Chronic | ICD-10-CM

## 2024-05-21 DIAGNOSIS — I25.10 CORONARY ARTERY DISEASE INVOLVING NATIVE HEART WITHOUT ANGINA PECTORIS, UNSPECIFIED VESSEL OR LESION TYPE: Chronic | ICD-10-CM

## 2024-05-21 DIAGNOSIS — Z79.60 LONG-TERM USE OF IMMUNOSUPPRESSANT MEDICATION: Chronic | ICD-10-CM

## 2024-05-21 DIAGNOSIS — N18.30 STAGE 3 CHRONIC KIDNEY DISEASE, UNSPECIFIED WHETHER STAGE 3A OR 3B CKD (H): Chronic | ICD-10-CM

## 2024-05-22 ENCOUNTER — TELEPHONE (OUTPATIENT)
Dept: OPHTHALMOLOGY | Facility: CLINIC | Age: 64
End: 2024-05-22
Payer: COMMERCIAL

## 2024-05-22 ENCOUNTER — MYC MEDICAL ADVICE (OUTPATIENT)
Dept: ANTICOAGULATION | Facility: CLINIC | Age: 64
End: 2024-05-22
Payer: COMMERCIAL

## 2024-05-22 NOTE — TELEPHONE ENCOUNTER
[facility] Call Center    Phone Message    May a detailed message be left on voicemail: yes     Reason for Call: Other: Pt is requesting an updated glasses rx with 's signature. States she's currently at [facility] but they will not accept the prescriptions without his signature.      Please fax updated Rx to Juarez Hanson [place]  Fax:[phone]    Action Taken: Message routed to:  [facility] eye    Travel Screening: Not Applicable

## 2024-05-23 NOTE — TELEPHONE ENCOUNTER
LVM for pt that Dr. Salazar will be in [place] tomorrow and we will send the signed prescription at that time.  Tanya HARDY 8:05 AM May 23, 2024

## 2024-05-24 NOTE — TELEPHONE ENCOUNTER
Faxed the signed glasses RX to [facility] [phone].  Tanya HARDY 8:29 AM May 24, 2024     Patient called today asking if he can walk in to do another GC/Chlamydia test. He states that the symptoms that he had back in September are not as bad as then but he still feels that he has something. For that reason, he is asking if he can repeat the test or get another test done to test for other STDs. Is this possible? Or would you like patient to come back for a follow up?

## 2024-05-28 RX ORDER — HYDROXYCHLOROQUINE SULFATE 200 MG/1
200 TABLET, FILM COATED ORAL 2 TIMES DAILY
Qty: 180 TABLET | Refills: 3 | Status: SHIPPED | OUTPATIENT
Start: 2024-05-28 | End: 2024-09-06

## 2024-05-29 ENCOUNTER — TELEPHONE (OUTPATIENT)
Dept: NEPHROLOGY | Facility: CLINIC | Age: 64
End: 2024-05-29
Payer: COMMERCIAL

## 2024-05-29 RX ORDER — DAPAGLIFLOZIN 10 MG/1
10 TABLET, FILM COATED ORAL DAILY
Qty: 90 TABLET | Refills: 3 | Status: SHIPPED | OUTPATIENT
Start: 2024-05-29

## 2024-05-29 NOTE — CONFIDENTIAL NOTE
Left Voicemail (2nd Attempt) for the patient to call back and schedule the following:     Appointment type: return nephrology  Provider:   Return date: 11/28/2024  Specialty phone number: [phone]  Additional appointment(s) needed: labs prior to return visit  Additonal Notes: follow up in one year with labs (around 11/28/2024)         [facility]   Gastroenterology, Infectious Diseases, Nephrology, Pulmonology and Rheumatology Specialties  [facility]

## 2024-05-29 NOTE — TELEPHONE ENCOUNTER
FARXIGA 10 MG TABLET      Last Written Prescription Date:  1/4/24  Last Fill Quantity: 90,   # refills: 1  Last Office Visit : 4/17/24  Future Office visit:  10/16/24    Routing refill request to provider for review/approval because:  Drug not on the [facility], [facility] or [facility] refill protocol

## 2024-06-04 ENCOUNTER — ANTICOAGULATION THERAPY VISIT (OUTPATIENT)
Dept: ANTICOAGULATION | Facility: CLINIC | Age: 64
End: 2024-06-04

## 2024-06-04 ENCOUNTER — LAB (OUTPATIENT)
Dept: LAB | Facility: CLINIC | Age: 64
End: 2024-06-04
Payer: COMMERCIAL

## 2024-06-04 DIAGNOSIS — Z79.01 LONG TERM CURRENT USE OF ANTICOAGULANT THERAPY: Primary | Chronic | ICD-10-CM

## 2024-06-04 DIAGNOSIS — I48.91 ATRIAL FIBRILLATION, UNSPECIFIED TYPE (H): ICD-10-CM

## 2024-06-04 DIAGNOSIS — Z79.01 LONG TERM CURRENT USE OF ANTICOAGULANT THERAPY: ICD-10-CM

## 2024-06-04 DIAGNOSIS — I48.0 PAF (PAROXYSMAL ATRIAL FIBRILLATION) (H): Chronic | ICD-10-CM

## 2024-06-04 LAB — INR BLD: 2.3 (ref 0.9–1.1)

## 2024-06-04 PROCEDURE — 36416 COLLJ CAPILLARY BLOOD SPEC: CPT

## 2024-06-04 PROCEDURE — 85610 PROTHROMBIN TIME: CPT

## 2024-06-04 NOTE — PROGRESS NOTES
ANTICOAGULATION MANAGEMENT     Luz Marina Live 63 year old female is on warfarin with therapeutic INR result. (Goal INR 2.0-3.0)    Recent labs: (last 7 days)     06/04/24  1440   INR 2.3*       ASSESSMENT     Warfarin Lab Questionnaire    Warfarin Doses Last 7 Days      6/4/2024     2:38 PM   Dose in Tablet or Mg   TAB or MG? milligram (mg)     Pt Rptd Dose SUNDAY MONDAY TUESDAY WED THURS FRIDAY SATURDAY 6/4/2024   2:38 PM 7 7 10 7 7 7 10         6/4/2024   Warfarin Lab Questionnaire   Missed doses within past 14 days? No   Changes in diet or alcohol within past 14 days? No   Medication changes since last result? No   Injuries or illness since last result? No   New shortness of breath, severe headaches or sudden changes in vision since last result? No   Abnormal bleeding since last result? No   Upcoming surgery, procedure? No   Best number to call with results? [phone]     Previous result: Therapeutic last 2(+) visits  Additional findings: None       PLAN     Recommended plan for no diet, medication or health factor changes affecting INR     Dosing Instructions: Continue your current warfarin dose with next INR in 6 weeks       Summary  As of 6/4/2024      Full warfarin instructions:  10 mg every Tue, Sat; 7.5 mg all other days   Next INR check:  7/16/2024               Detailed voice message left for Luz Marina with dosing instructions and follow up date.     Contact [phone]  to schedule and with any changes, questions or concerns.     Education provided:   Please call back if any changes to your diet, medications or how you've been taking warfarin    Plan made per [facility] anticoagulation protocol    Deborah Alatorre, RN  Anticoagulation Clinic  6/4/2024    _______________________________________________________________________     Anticoagulation Episode Summary       Current INR goal:  2.0-3.0   TTR:  53.0% (1 y)   Target end date:  Indefinite   Send INR reminders to:  [facility]    Indications    Long-term  (current) use of anticoagulants [Z79.01] [Z79.01]  PAF (paroxysmal atrial fibrillation) (H) [I48.0]  Atrial fibrillation  unspecified type (H) [I48.91]             Comments:               Anticoagulation Care Providers       Provider Role Specialty Phone number    Eliz Nguyen MD Referring Internal Medicine [phone]    Elodia Tang MD Referring Family Medicine [phone]

## 2024-06-06 ENCOUNTER — TELEPHONE (OUTPATIENT)
Dept: INTERNAL MEDICINE | Facility: CLINIC | Age: 64
End: 2024-06-06
Payer: COMMERCIAL

## 2024-06-18 DIAGNOSIS — J30.1 SEASONAL ALLERGIC RHINITIS DUE TO POLLEN: ICD-10-CM

## 2024-06-20 DIAGNOSIS — I48.91 ATRIAL FIBRILLATION, UNSPECIFIED TYPE (H): ICD-10-CM

## 2024-06-22 DIAGNOSIS — T26.91XA CHEMICAL BURN OF RIGHT EYE: ICD-10-CM

## 2024-06-23 RX ORDER — ERYTHROMYCIN 5 MG/G
0.5 OINTMENT OPHTHALMIC 3 TIMES DAILY
Qty: 3.5 G | Refills: 1 | OUTPATIENT
Start: 2024-06-23

## 2024-06-23 NOTE — TELEPHONE ENCOUNTER
erythromycin (ROMYCIN)  found on inactive med list 4/11/18,  4/12/18.lm on pt's vm, needs appt for evaluation.  Refused.

## 2024-06-24 ENCOUNTER — TELEPHONE (OUTPATIENT)
Dept: INTERNAL MEDICINE | Facility: CLINIC | Age: 64
End: 2024-06-24
Payer: COMMERCIAL

## 2024-06-24 NOTE — TELEPHONE ENCOUNTER
Health Call Center    Phone Message    May a detailed message be left on voicemail: yes     Reason for Call: Other: patient requesting a call back, concerns that she has bronchitis. Please call her at  [phone] to discuss concerns. Thank you     Action Taken: Message routed to:  [facility]      Travel Screening: Not Applicable     Date of Service:

## 2024-06-25 NOTE — TELEPHONE ENCOUNTER
Left Voicemail (1st Attempt) for the patient to call back and schedule the following:    Appointment type: RTN, [facility]  Provider: ANY [facility]  Return date: next available   Specialty phone number: [phone]  Additional appointment(s) needed: -  Additonal Notes: informed pt will need clinic visit for exam. Should schedule with any provider next available OR seek same day care such as UC.

## 2024-06-26 ENCOUNTER — MYC REFILL (OUTPATIENT)
Dept: CARDIOLOGY | Facility: CLINIC | Age: 64
End: 2024-06-26
Payer: COMMERCIAL

## 2024-06-26 ENCOUNTER — MYC REFILL (OUTPATIENT)
Dept: INTERNAL MEDICINE | Facility: CLINIC | Age: 64
End: 2024-06-26
Payer: COMMERCIAL

## 2024-06-26 DIAGNOSIS — L71.9 ROSACEA: ICD-10-CM

## 2024-06-26 DIAGNOSIS — I48.91 ATRIAL FIBRILLATION, UNSPECIFIED TYPE (H): ICD-10-CM

## 2024-06-26 RX ORDER — METOPROLOL TARTRATE 50 MG
TABLET ORAL
Qty: 450 TABLET | Refills: 3 | Status: CANCELLED | OUTPATIENT
Start: 2024-06-26

## 2024-06-27 RX ORDER — AZELASTINE HYDROCHLORIDE 137 UG/1
2 SPRAY, METERED NASAL 2 TIMES DAILY
Qty: 30 ML | Refills: 4 | Status: SHIPPED | OUTPATIENT
Start: 2024-06-27

## 2024-06-27 NOTE — TELEPHONE ENCOUNTER
azelastine (ASTELIN) 0.1 % nasal spray 30 mL 4 6/2/2023 -- --   Sig - Route: Spray 2 sprays into both nostrils 2 times daily - Both Nostrils     ----------------------  Last Office Visit : 4/5/24  Future Office visit:  0  ----------------------

## 2024-06-28 DIAGNOSIS — I50.9 CHF (CONGESTIVE HEART FAILURE) (H): ICD-10-CM

## 2024-06-30 NOTE — TELEPHONE ENCOUNTER
metoprolol tartrate (LOPRESSOR) 50 MG tablet: addressed in 6-20-24 rf encounter- resent as high priority        metoprolol tartrate (LOPRESSOR) 50 MG tablet  Patient comment: Please update the instructions to say 50mg twice a day

## 2024-06-30 NOTE — TELEPHONE ENCOUNTER
Rx available:        metroNIDAZOLE (METROCREAM) 0.75 % external cream 45 g 4 4/5/2024 -- No   Sig - Route: Apply topically 2 times daily - Topical

## 2024-07-01 RX ORDER — METOPROLOL TARTRATE 50 MG
50 TABLET ORAL 2 TIMES DAILY
Qty: 180 TABLET | Refills: 3 | Status: SHIPPED | OUTPATIENT
Start: 2024-07-01

## 2024-07-01 RX ORDER — SACUBITRIL AND VALSARTAN 24; 26 MG/1; MG/1
TABLET, FILM COATED ORAL
Qty: 90 TABLET | Refills: 1 | Status: SHIPPED | OUTPATIENT
Start: 2024-07-01 | End: 2024-08-06

## 2024-07-01 RX ORDER — METOPROLOL TARTRATE 50 MG
TABLET ORAL
Qty: 450 TABLET | Refills: 3 | OUTPATIENT
Start: 2024-07-01

## 2024-07-03 ENCOUNTER — TELEPHONE (OUTPATIENT)
Dept: GASTROENTEROLOGY | Facility: CLINIC | Age: 64
End: 2024-07-03
Payer: COMMERCIAL

## 2024-07-03 ENCOUNTER — MYC MEDICAL ADVICE (OUTPATIENT)
Dept: GASTROENTEROLOGY | Facility: CLINIC | Age: 64
End: 2024-07-03
Payer: COMMERCIAL

## 2024-07-03 NOTE — TELEPHONE ENCOUNTER
"Endoscopy Scheduling Screen    Have you had a positive Covid test in the last 14 days?  No    What is your communication preference for Instructions and/or Bowel Prep?   MyChart    What insurance is in the chart?  Other:  [facility]    Ordering/Referring Provider: SHELLEY KENNEY    (If ordering provider performs procedure, schedule with ordering provider unless otherwise instructed. )    BMI: Estimated body mass index is 46.86 kg/m  as calculated from the following:    Height as of 4/5/24: 1.568 m (5' 1.73\").    Weight as of 4/17/24: 115.2 kg (254 lb).     Sedation Ordered  moderate sedation.   If patient BMI > 50 do not schedule in [facility].    If patient BMI > 45 do not schedule at [facility].    Are you taking methadone or Suboxone?  No    Have you had difficulties, pain, or discomfort during past endoscopy procedures?  No    Are you taking any prescription medications for pain 3 or more times per week?   NO, No RN review required.    Do you have a history of malignant hyperthermia?  No    (Females) Are you currently pregnant?   No     Have you been diagnosed or told you have pulmonary hypertension?   Yes MAC required in hospital setting only. PAC evaluation required if scheduled at [facility].    Do you have an LVAD?  No    Have you been told you have moderate to severe sleep apnea?  Yes (RN Review required for scheduling unless scheduling in Hospital.)    Have you been told you have COPD, asthma, or any other lung disease?  No    Do you have any heart conditions?  Yes     In the past year, have you had any hospitalizations for heart related issues including cardiomyopathy, heart attack, or stent placement?  No    Do you have any implantable devices in your body (pacemaker, ICD)?  No    Do you take nitroglycerine?  No    Have you ever had or are you waiting for an organ transplant?  No. Continue scheduling, no site restrictions.    Have you had a stroke or transient ischemic attack (TIA aka \"mini stroke\" in the last 6 " "months?   No    Have you been diagnosed with or been told you have cirrhosis of the liver?   No    Are you currently on dialysis?   No    Do you need assistance transferring?   No    BMI: Estimated body mass index is 46.86 kg/m  as calculated from the following:    Height as of 4/5/24: 1.568 m (5' 1.73\").    Weight as of 4/17/24: 115.2 kg (254 lb).     Is patients BMI > 40 and scheduling location [facility]?  Yes (If MAC sedation is ordered, schedule PAC eval)    Do you take an injectable medication for weight loss or diabetes (excluding insulin)?  No    Do you take the medication Naltrexone?  No    Do you take blood thinners?  Yes     Are you taking Effient/Prasugrel?  No, you must contact your prescribing provider for direction on holding or bridging with a different medication.       Prep   Are you currently on dialysis or do you have chronic kidney disease?  Yes (Golytely Prep)    Do you have a diagnosis of diabetes?  No    Do you have a diagnosis of cystic fibrosis (CF)?  No    On a regular basis do you go 3 -5 days between bowel movements?  No    BMI > 40?  Yes (Extended Prep)    Preferred Pharmacy:    [id number] - [place], [place] - [address]  [address] [place] [address]  Phone: [phone] Fax: [phone]      Final Scheduling Details     Procedure scheduled  Colonoscopy    Surgeon:  SAL    Date of procedure:  1/9/25     Pre-OP / PAC:   Yes - Patient informed of pre-op requirement.    Location  [facility] - Per exclusion criteria.    Sedation   MAC/Deep Sedation - Per exclusion criteria.      Patient Reminders:   You will receive a call from a Nurse to review instructions and health history.  This assessment must be completed prior to your procedure.  Failure to complete the Nurse assessment may result in the procedure being cancelled.      On the day of your procedure, please designate an adult(s) who can drive you home stay with you for the next 24 hours. The medicines used in the " exam will make you sleepy. You will not be able to drive.      You cannot take public transportation, ride share services, or non-medical taxi service without a responsible caregiver.  Medical transport services are allowed with the requirement that a responsible caregiver will receive you at your destination.  We require that drivers and caregivers are confirmed prior to your procedure.

## 2024-07-19 ENCOUNTER — MYC MEDICAL ADVICE (OUTPATIENT)
Dept: INTERNAL MEDICINE | Facility: CLINIC | Age: 64
End: 2024-07-19
Payer: COMMERCIAL

## 2024-07-19 DIAGNOSIS — L71.9 ROSACEA: ICD-10-CM

## 2024-07-19 NOTE — TELEPHONE ENCOUNTER
metroNIDAZOLE (METROCREAM) 0.75 % external cream   45 g 4 4/5/2024          Last Office Visit : 4-5-2024  Future Office visit:  none      RN called and spoke with staff [place], who states that they do not show this refill on file.    Topical Acne Medications Protocol

## 2024-07-19 NOTE — Clinical Note
Cesar Murcia,  I know you previously had interest in starting evin on a SGLT2 inhibitor but her GFR was less than 30. She has recoverd to 48 now so I am going to go ahead and look at cost options for her with this class. Let me know if you have any concerns with this plan.  Thanks,  Lavern  DISCHARGE ISSUE - LACK OF APPROPRIATE OUTPATIENT SERVICES DISCHARGE ISSUE - LACK OF APPROPRIATE OUTPATIENT SERVICES DISCHARGE ISSUE - LACK OF APPROPRIATE OUTPATIENT SERVICES

## 2024-07-24 ENCOUNTER — TELEPHONE (OUTPATIENT)
Dept: ANTICOAGULATION | Facility: CLINIC | Age: 64
End: 2024-07-24
Payer: COMMERCIAL

## 2024-07-24 NOTE — TELEPHONE ENCOUNTER
ANTICOAGULATION     Luz Marina Live is overdue for an INR check.     Left message for patient to call and schedule lab appointment as soon as possible. If returning call, please schedule.     Bindu Turpin RN

## 2024-07-31 NOTE — TELEPHONE ENCOUNTER
ANTICOAGULATION     Luz Marina Live is overdue for an INR check.     Left message for patient to call and schedule lab appointment as soon as possible. If returning call, please schedule.     Lolita Gama RN

## 2024-08-05 DIAGNOSIS — I50.32 CHRONIC DIASTOLIC CONGESTIVE HEART FAILURE (H): ICD-10-CM

## 2024-08-05 DIAGNOSIS — I50.9 CHF (CONGESTIVE HEART FAILURE) (H): ICD-10-CM

## 2024-08-06 RX ORDER — FUROSEMIDE 20 MG
40 TABLET ORAL DAILY
Qty: 180 TABLET | Refills: 3 | Status: SHIPPED | OUTPATIENT
Start: 2024-08-06

## 2024-08-06 RX ORDER — SACUBITRIL AND VALSARTAN 24; 26 MG/1; MG/1
TABLET, FILM COATED ORAL
Qty: 90 TABLET | Refills: 3 | Status: SHIPPED | OUTPATIENT
Start: 2024-08-06

## 2024-08-08 ENCOUNTER — TELEPHONE (OUTPATIENT)
Dept: ANTICOAGULATION | Facility: CLINIC | Age: 64
End: 2024-08-08
Payer: COMMERCIAL

## 2024-08-08 NOTE — TELEPHONE ENCOUNTER
ANTICOAGULATION     Luz Marina Live is overdue for an INR check.     Reminder letter sent    Michael Quispe RN

## 2024-08-15 ENCOUNTER — APPOINTMENT (RX ONLY)
Dept: URBAN - METROPOLITAN AREA CLINIC 93 | Facility: CLINIC | Age: 64
Setting detail: DERMATOLOGY
End: 2024-08-15

## 2024-08-15 ENCOUNTER — DOCUMENTATION ONLY (OUTPATIENT)
Dept: ANTICOAGULATION | Facility: CLINIC | Age: 64
End: 2024-08-15
Payer: COMMERCIAL

## 2024-08-15 DIAGNOSIS — L08.9 LOCAL INFECTION OF THE SKIN AND SUBCUTANEOUS TISSUE, UNSPECIFIED: ICD-10-CM | Status: INADEQUATELY CONTROLLED

## 2024-08-15 DIAGNOSIS — L21.8 OTHER SEBORRHEIC DERMATITIS: ICD-10-CM

## 2024-08-15 PROCEDURE — ? COUNSELING

## 2024-08-15 PROCEDURE — 99214 OFFICE O/P EST MOD 30 MIN: CPT

## 2024-08-15 PROCEDURE — ? PRESCRIPTION

## 2024-08-15 PROCEDURE — ? PRESCRIPTION MEDICATION MANAGEMENT

## 2024-08-15 RX ORDER — DOXYCYCLINE HYCLATE 100 MG/1
CAPSULE, GELATIN COATED ORAL BID
Qty: 28 | Refills: 0 | Status: CANCELLED | COMMUNITY
Start: 2024-08-15

## 2024-08-15 RX ORDER — MUPIROCIN 20 MG/G
OINTMENT TOPICAL BID
Qty: 22 | Refills: 2 | Status: ERX | COMMUNITY
Start: 2024-08-15

## 2024-08-15 RX ORDER — MOMETASONE FUROATE 1 MG/G
CREAM TOPICAL
Qty: 45 | Refills: 4 | Status: ERX | COMMUNITY
Start: 2024-08-15

## 2024-08-15 RX ORDER — DOXYCYCLINE HYCLATE 20 MG
TABLET ORAL
Qty: 60 | Refills: 0 | Status: ERX | COMMUNITY
Start: 2024-08-15

## 2024-08-15 RX ORDER — ROFLUMILAST 3 MG/G
CREAM TOPICAL
Qty: 60 | Refills: 0 | Status: ERX | COMMUNITY
Start: 2024-08-15

## 2024-08-15 RX ADMIN — Medication: at 00:00

## 2024-08-15 RX ADMIN — ROFLUMILAST: 3 CREAM TOPICAL at 00:00

## 2024-08-15 RX ADMIN — MUPIROCIN: 20 OINTMENT TOPICAL at 00:00

## 2024-08-15 RX ADMIN — DOXYCYCLINE HYCLATE: 100 CAPSULE, GELATIN COATED ORAL at 00:00

## 2024-08-15 RX ADMIN — MOMETASONE FUROATE: 1 CREAM TOPICAL at 00:00

## 2024-08-15 ASSESSMENT — LOCATION SIMPLE DESCRIPTION DERM
LOCATION SIMPLE: LEFT EAR
LOCATION SIMPLE: LEFT NOSE
LOCATION SIMPLE: NOSE
LOCATION SIMPLE: LEFT LIP
LOCATION SIMPLE: RIGHT EAR
LOCATION SIMPLE: RIGHT CHEEK

## 2024-08-15 ASSESSMENT — LOCATION DETAILED DESCRIPTION DERM
LOCATION DETAILED: NASAL INFRATIP
LOCATION DETAILED: RIGHT POSTERIOR EAR
LOCATION DETAILED: LEFT POSTERIOR EAR
LOCATION DETAILED: RIGHT INFERIOR MEDIAL MALAR CHEEK
LOCATION DETAILED: LEFT UPPER CUTANEOUS LIP
LOCATION DETAILED: LEFT NASAL ALA
LOCATION DETAILED: LEFT NARIS

## 2024-08-15 ASSESSMENT — LOCATION ZONE DERM
LOCATION ZONE: FACE
LOCATION ZONE: EAR
LOCATION ZONE: NOSE
LOCATION ZONE: LIP

## 2024-08-15 ASSESSMENT — SEVERITY ASSESSMENT: HOW SEVERE IS THIS PATIENT'S CONDITION?: SEVERE

## 2024-08-15 NOTE — PROCEDURE: PRESCRIPTION MEDICATION MANAGEMENT
Render In Strict Bullet Format?: No
Initiate Treatment: Zoryve 0.3 % topical cream \\nQuantity: 60.0 g  Days Supply: 30\\nSig: Apply to AA twice a day ( non Steroid)\\n\\nmometasone 0.1 % topical cream \\nQuantity: 45.0 g  Days Supply: 30\\nSig: Apply to AA twice a day. 2 weeks on 2 weeks off.
Detail Level: Simple
Initiate Treatment: mupirocin 2 % topical ointment BID\\nQuantity: 22.0 g  Days Supply: 30\\nSig: Apply twice a day to affected area\\n\\n\\ndoxycycline hyclate 20 mg tablet \\nQuantity: 60.0 Tablet\\nSig: take 1 po bid

## 2024-08-15 NOTE — PROGRESS NOTES
ANTICOAGULATION     Luz Marina Live is overdue for an INR check.     Reminder letter sent    Red Blackman RN, BSN, PHN  Anticoagulation Clinic   [phone]

## 2024-08-15 NOTE — LETTER
[facility]  [address]  [facility] [address]  Phone: [phone]  Fax: [phone]     August 15, 2024        Luz Marina Live  [id number]  [place] [address]            Dear Luz Marina,    You are currently under the care of [facility] for your warfarin (Coumadin , Jantoven ) therapy.  We are contacting you because our records show you were due for an INR on 7/16/24.    There are potentially serious risks when taking warfarin without careful monitoring and we want to make sure you are safely managed.  Routine lab monitoring is required for warfarin refills.     Please call [phone] as soon as possible to schedule a lab appointment. If it is difficult for you to get to lab, please call us to discuss options.  If there has been a change in your care or other concerns, please let us know so we can help and/or update our records.         Sincerely,       [facility]

## 2024-08-19 ENCOUNTER — DOCUMENTATION ONLY (OUTPATIENT)
Dept: ANTICOAGULATION | Facility: CLINIC | Age: 64
End: 2024-08-19

## 2024-08-19 ENCOUNTER — LAB (OUTPATIENT)
Dept: LAB | Facility: CLINIC | Age: 64
End: 2024-08-19
Payer: COMMERCIAL

## 2024-08-19 ENCOUNTER — MYC MEDICAL ADVICE (OUTPATIENT)
Dept: ANTICOAGULATION | Facility: CLINIC | Age: 64
End: 2024-08-19

## 2024-08-19 ENCOUNTER — ANTICOAGULATION THERAPY VISIT (OUTPATIENT)
Dept: ANTICOAGULATION | Facility: CLINIC | Age: 64
End: 2024-08-19

## 2024-08-19 DIAGNOSIS — Z79.01 LONG TERM CURRENT USE OF ANTICOAGULANT THERAPY: Primary | Chronic | ICD-10-CM

## 2024-08-19 DIAGNOSIS — I48.0 PAF (PAROXYSMAL ATRIAL FIBRILLATION) (H): Primary | Chronic | ICD-10-CM

## 2024-08-19 DIAGNOSIS — I48.0 PAF (PAROXYSMAL ATRIAL FIBRILLATION) (H): Chronic | ICD-10-CM

## 2024-08-19 DIAGNOSIS — I48.91 ATRIAL FIBRILLATION, UNSPECIFIED TYPE (H): ICD-10-CM

## 2024-08-19 LAB — INR PPP: 1.67 (ref 0.85–1.15)

## 2024-08-19 PROCEDURE — 85610 PROTHROMBIN TIME: CPT

## 2024-08-19 PROCEDURE — 36415 COLL VENOUS BLD VENIPUNCTURE: CPT

## 2024-08-19 NOTE — PROGRESS NOTES
ANTICOAGULATION CLINIC REFERRAL RENEWAL REQUEST       An annual renewal order is required for all patients referred to [facility].?  Please review and sign the pended referral order for Luz Marina Live.       ANTICOAGULATION SUMMARY      Warfarin indication(s)   Atrial Fibrillation    Mechanical heart valve present?  NO       Current goal range   INR: 2.0-3.0     Goal appropriate for indication? Goal INR 2-3, standard for indication(s) above     Time in Therapeutic Range (TTR)  (Goal > 60%) 53.0%       Office visit with referring provider's group within last year yes on 4/5/24         [facility]

## 2024-08-19 NOTE — PROGRESS NOTES
ANTICOAGULATION MANAGEMENT     Luz Marina Live 64 year old female is on warfarin with subtherapeutic INR result. (Goal INR 2.0-3.0)    Recent labs: (last 7 days)     08/19/24  1344   INR 1.67*       ASSESSMENT     Warfarin Lab Questionnaire    Warfarin Doses Last 7 Days      8/19/2024     1:23 PM   Dose in Tablet or Mg   TAB or MG? milligram (mg)     Pt Rptd Dose SUNDAY MONDAY TUESDAY WED THURS FRIDAY SATURDAY 8/19/2024   1:23 PM 7.5 7.5 10 7.5 7.5 7.5 10         8/19/2024   Warfarin Lab Questionnaire   Missed doses within past 14 days? No   Changes in diet or alcohol within past 14 days? No   Medication changes since last result? No   Injuries or illness since last result? No   New shortness of breath, severe headaches or sudden changes in vision since last result? No   Abnormal bleeding since last result? No   Upcoming surgery, procedure? No        Previous result: Therapeutic last 2(+) visits  Additional findings: None       PLAN     Recommended plan for no diet, medication or health factor changes affecting INR     Dosing Instructions: booster dose then continue your current warfarin dose with next INR in 10 days       Summary  As of 8/19/2024      Full warfarin instructions:  8/19: 10 mg; Otherwise 10 mg every Tue, Sat; 7.5 mg all other days   Next INR check:  8/29/2024               Sent [employer] message with dosing and follow up instructions    Contact [phone] to schedule and with any changes, questions or concerns.     Education provided: Please call back if any changes to your diet, medications or how you've been taking warfarin    Plan made per [facility] anticoagulation protocol    Michael Quispe, RN  Anticoagulation Clinic  8/19/2024    _______________________________________________________________________     Anticoagulation Episode Summary       Current INR goal:  2.0-3.0   TTR:  60.9% (1 y)   Target end date:  Indefinite   Send INR reminders to:  [facility] [place]    Indications    Long-term (current)  use of anticoagulants [Z79.01] [Z79.01]  PAF (paroxysmal atrial fibrillation) (H) [I48.0]  Atrial fibrillation  unspecified type (H) [I48.91]             Comments:               Anticoagulation Care Providers       Provider Role Specialty Phone number    Eliz Nguyen MD Referring Internal Medicine [phone]    Elodia Tang MD Referring Family Medicine [phone]

## 2024-08-19 NOTE — PROGRESS NOTES
ANTICOAGULATION MANAGEMENT     Luz Marina Live 64 year old female is on warfarin with subtherapeutic INR result. (Goal INR 2.0-3.0)    Recent labs: (last 7 days)     08/19/24  1344   INR 1.67*       ASSESSMENT     Warfarin Lab Questionnaire    Warfarin Doses Last 7 Days      8/19/2024     1:23 PM   Dose in Tablet or Mg   TAB or MG? milligram (mg)     Pt Rptd Dose SUNDAY MONDAY TUESDAY WED THURS FRIDAY SATURDAY 8/19/2024   1:23 PM 7.5 7.5 10 7.5 7.5 7.5 10         8/19/2024   Warfarin Lab Questionnaire   Missed doses within past 14 days? No   Changes in diet or alcohol within past 14 days? No   Medication changes since last result? No   Injuries or illness since last result? No   New shortness of breath, severe headaches or sudden changes in vision since last result? No   Abnormal bleeding since last result? No   Upcoming surgery, procedure? No        Previous result: Therapeutic last 2(+) visits  Additional findings: None       PLAN     Unable to reach Luz Marina today.    Left message to take a booster dose of warfarin,  10 mg tonight. Request call back for assessment.    Follow up required to confirm warfarin dose taken and assess for changes and discuss out of range result     Michael Quispe RN  Anticoagulation Clinic  8/19/2024

## 2024-08-29 DIAGNOSIS — I48.91 ATRIAL FIBRILLATION, UNSPECIFIED TYPE (H): ICD-10-CM

## 2024-08-29 RX ORDER — WARFARIN SODIUM 5 MG/1
TABLET ORAL
Qty: 140 TABLET | Refills: 1 | Status: SHIPPED | OUTPATIENT
Start: 2024-08-29

## 2024-08-29 NOTE — TELEPHONE ENCOUNTER
ANTICOAGULATION MANAGEMENT:  Medication Refill    Anticoagulation Summary  As of 8/19/2024      Warfarin maintenance plan:  10 mg (5 mg x 2) every Tue, Sat; 7.5 mg (5 mg x 1.5) all other days   Next INR check:  8/29/2024   Target end date:  Indefinite    Indications    Long-term (current) use of anticoagulants [Z79.01] [Z79.01]  PAF (paroxysmal atrial fibrillation) (H) [I48.0]  Atrial fibrillation  unspecified type (H) [I48.91]                 Anticoagulation Care Providers       Provider Role Specialty Phone number    Eliz Nguyen MD Referring Internal Medicine [phone]    Elodia Tang MD Referring Family Medicine [phone]            Refill Criteria    Visit with referring provider/group: Meets criteria: visit within referring provider group in the last 15 months on 4/5/24    [facility] referral last signed: 08/22/2024; within last year: Yes    Lab monitoring not exceeding 2 weeks overdue: Yes    Luz Marina meets all criteria for refill. Rx instructions and quantity match patient's current dosing plan. Warfarin 90 day supply with 1 refill granted per [facility] protocol     Maine Cole RN  Anticoagulation Clinic

## 2024-08-30 ENCOUNTER — ANTICOAGULATION THERAPY VISIT (OUTPATIENT)
Dept: ANTICOAGULATION | Facility: CLINIC | Age: 64
End: 2024-08-30

## 2024-08-30 ENCOUNTER — LAB (OUTPATIENT)
Dept: LAB | Facility: CLINIC | Age: 64
End: 2024-08-30
Payer: COMMERCIAL

## 2024-08-30 DIAGNOSIS — I48.91 ATRIAL FIBRILLATION, UNSPECIFIED TYPE (H): ICD-10-CM

## 2024-08-30 DIAGNOSIS — I48.0 PAF (PAROXYSMAL ATRIAL FIBRILLATION) (H): Chronic | ICD-10-CM

## 2024-08-30 DIAGNOSIS — Z79.01 LONG TERM CURRENT USE OF ANTICOAGULANT THERAPY: Primary | Chronic | ICD-10-CM

## 2024-08-30 LAB — INR BLD: 1.9 (ref 0.9–1.1)

## 2024-08-30 PROCEDURE — 85610 PROTHROMBIN TIME: CPT

## 2024-08-30 PROCEDURE — 36416 COLLJ CAPILLARY BLOOD SPEC: CPT

## 2024-08-30 NOTE — PROGRESS NOTES
ANTICOAGULATION MANAGEMENT     Luz Marina Live 64 year old female is on warfarin with subtherapeutic INR result. (Goal INR 2.0-3.0)    Recent labs: (last 7 days)     08/30/24  1404   INR 1.9*       ASSESSMENT     Source(s): Chart Review  Previous INR was Subtherapeutic  Medication, diet, health changes since last INR chart reviewed; none identified         PLAN     Recommended plan for no diet, medication or health factor changes affecting INR     Dosing Instructions: Increase your warfarin dose (8.7% change) with next INR in 1-2 weeks       Summary  As of 8/30/2024      Full warfarin instructions:  7.5 mg every Sun, Tue, Thu; 10 mg all other days   Next INR check:  9/6/2024               Sent [employer] message with dosing and follow up instructions  VM left to call back or check [place]    Contact [phone] to schedule and with any changes, questions or concerns.     Education provided: Please call back if any changes to your diet, medications or how you've been taking warfarin  Contact [phone] with any changes, questions or concerns.     Plan made per [facility] anticoagulation protocol    Chrystal Barroso RN  Anticoagulation Clinic  8/30/2024    _______________________________________________________________________     Anticoagulation Episode Summary       Current INR goal:  2.0-3.0   TTR:  59.2% (1 y)   Target end date:  Indefinite   [facility] [place]    Indications    Long-term (current) use of anticoagulants [Z79.01] [Z79.01]  PAF (paroxysmal atrial fibrillation) (H) [I48.0]  Atrial fibrillation  unspecified type (H) [I48.91]             Comments:               Anticoagulation Care Providers       Provider Role Specialty Phone number    Eliz Nguyen MD Referring Internal Medicine [phone]    Elodia Tang MD Referring Family Medicine [phone]

## 2024-09-01 NOTE — PROGRESS NOTES
ANTICOAGULATION FOLLOW-UP CLINIC VISIT    Patient Name:  Luz Marina Live  Date:  2019  Contact Type:  Telephone    SUBJECTIVE:  Patient Findings     Positives:   Change in medications (Pt was placed on Brilinta after her stent placement), Hospital admission (Stents placed during 10/16 hospital admission)             OBJECTIVE    INR Point of Care   Date Value Ref Range Status   2019 4.6 (H) 0.86 - 1.14 Final     Comment:     This test is intended for monitoring Coumadin therapy.  Results are not   accurate in patients with prolonged INR due to factor deficiency.         ASSESSMENT / PLAN  INR assessment SUPRA    Recheck INR In: 4 DAYS    INR Location Outside lab      Anticoagulation Summary  As of 2019    INR goal:   2.0-3.0   TTR:   56.2 % (3.4 y)   INR used for dosin.6! (2019)   Warfarin maintenance plan:   7.5 mg (5 mg x 1.5) every day   Full warfarin instructions:   : Hold; : 5 mg; Otherwise 7.5 mg every day   Weekly warfarin total:   52.5 mg   Plan last modified:   Michael Quispe RN (2019)   Next INR check:   2019   Target end date:       Indications    Long-term (current) use of anticoagulants [Z79.01] [Z79.01]  PAF (paroxysmal atrial fibrillation) (H) [I48.0]             Anticoagulation Episode Summary     INR check location:       Preferred lab:       Send INR reminders to:   [facility] [place]    Comments:   5 mg tabs, [facility] (needs staff message) PM dose      Anticoagulation Care Providers     Provider Role Specialty Phone number    Eliz Nguyen MD [facility] Internal Medicine [phone]            See the Encounter Report to view Anticoagulation Flowsheet and Dosing Calendar (Go to Encounters tab in chart review, and find the Anticoagulation Therapy Visit)    Dosage adjustment made based on physician directed care plan.    Michael Quispe RN                  [facility] goals of care office patient   [facility]

## 2024-09-06 ENCOUNTER — OFFICE VISIT (OUTPATIENT)
Dept: RHEUMATOLOGY | Facility: CLINIC | Age: 64
End: 2024-09-06
Payer: COMMERCIAL

## 2024-09-06 VITALS
HEART RATE: 100 BPM | WEIGHT: 259 LBS | BODY MASS INDEX: 47.79 KG/M2 | OXYGEN SATURATION: 99 % | DIASTOLIC BLOOD PRESSURE: 84 MMHG | SYSTOLIC BLOOD PRESSURE: 148 MMHG

## 2024-09-06 DIAGNOSIS — N18.30 STAGE 3 CHRONIC KIDNEY DISEASE, UNSPECIFIED WHETHER STAGE 3A OR 3B CKD (H): Chronic | ICD-10-CM

## 2024-09-06 DIAGNOSIS — M32.14 SYSTEMIC LUPUS ERYTHEMATOSUS WITH GLOMERULAR DISEASE, UNSPECIFIED SLE TYPE (H): Primary | ICD-10-CM

## 2024-09-06 DIAGNOSIS — E66.01 MORBID OBESITY (H): Chronic | ICD-10-CM

## 2024-09-06 DIAGNOSIS — Z79.899 LONG-TERM USE OF PLAQUENIL: ICD-10-CM

## 2024-09-06 DIAGNOSIS — I20.89 STABLE ANGINA (H): ICD-10-CM

## 2024-09-06 DIAGNOSIS — Z79.60 LONG-TERM USE OF IMMUNOSUPPRESSANT MEDICATION: ICD-10-CM

## 2024-09-06 DIAGNOSIS — I25.10 CORONARY ARTERY DISEASE INVOLVING NATIVE HEART WITHOUT ANGINA PECTORIS, UNSPECIFIED VESSEL OR LESION TYPE: Chronic | ICD-10-CM

## 2024-09-06 PROCEDURE — G2211 COMPLEX E/M VISIT ADD ON: HCPCS | Performed by: INTERNAL MEDICINE

## 2024-09-06 PROCEDURE — 99214 OFFICE O/P EST MOD 30 MIN: CPT | Performed by: INTERNAL MEDICINE

## 2024-09-06 RX ORDER — HYDROXYCHLOROQUINE SULFATE 200 MG/1
200 TABLET, FILM COATED ORAL 2 TIMES DAILY
Qty: 180 TABLET | Refills: 3 | Status: SHIPPED | OUTPATIENT
Start: 2024-09-06

## 2024-09-06 ASSESSMENT — PAIN SCALES - GENERAL: PAINLEVEL: NO PAIN (0)

## 2024-09-06 NOTE — PATIENT INSTRUCTIONS
Recommend follow up with Dr. Clark in the kidney medicine clinic  Follow up with me every 6-12 months  Lab testing every 4-6 months  Eye evaluation in November  Continue the hydroxychloroquine

## 2024-09-06 NOTE — PROGRESS NOTES
"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  +ROHAN, dsDNA, SSA, lupus inhibitor, and depressed complements. Modest proteinuria. Previous treatment with remote cytoxan and imuran. Current management with hydroxychloroquine 400 mg daily.     She has dry mouth and history of tooth loss due to premature \"wearing down\". No dysphagia and she denies eye dryness. She has frequent sinus infection and conjunctivitis.     Her breathing is pretty good and no chest pains or palpitations. She can get winded but admits to deconditioning.     Her joints are generally pretty good, with only some heberden's. No other swelling, redness, or warmth. No other deformity or joint function. No rash or ulcers. She can get some sore in her nose with her CPAP.     Hydroxychloroquine 200 mg BID is well tolerated with normal eye evaluation last year and next due in November.     PMI:  Medical-rosacea, SLE, lupus nephritis    Active Ambulatory Problems     Diagnosis Date Noted    Hyperlipidemia LDL goal <100 12/05/2011    CAD (coronary artery disease) 12/05/2011    HTN, goal below 140/90 07/03/2013    PAF (paroxysmal atrial fibrillation) (H) 08/24/2015    CKD (chronic kidney disease) stage 3, GFR 30-59 ml/min (H) 09/14/2015    Long-term (current) use of anticoagulants [Z79.01] 04/05/2016    Moderate tricuspid insufficiency 12/22/2016    Systemic lupus erythematosus with glomerular disease, unspecified SLE type (H) 07/03/2017    Psychophysiological insomnia 06/11/2018    Morbid obesity (H) 06/11/2018    KEITH (obstructive sleep apnea)- severe (AHI 30) 06/18/2018    Long-term use of Plaquenil 09/04/2018    Long-term use of immunosuppressant medication 10/11/2019    Stable angina (H24) 10/16/2019    Positive cardiac stress test 08/06/2021    Status post coronary angiogram 08/20/2021    Atrial fibrillation, unspecified type (H) 02/03/2022    Chronic diastolic heart failure (H) 10/09/2023    Coronary artery disease involving native coronary artery " of native heart without angina pectoris 10/09/2023     Resolved Ambulatory Problems     Diagnosis Date Noted    SLE (systemic lupus erythematosus) (H) 11/01/2010    Hypovolemic shock (H) 02/28/2015    Sepsis (H) 08/22/2015    Secondary renal hyperparathyroidism (H24) 01/27/2016    Physical deconditioning 10/07/2021     Past Medical History:   Diagnosis Date    Hypertension     Lupus (H)      Surgical-  Past Surgical History:   Procedure Laterality Date    CARDIAC SURGERY  08/27/2009    triple vessel coronary artery bypass grafting on 8/27/09    CARPAL TUNNEL RELEASE RT/LT  1996    bilateral    CV ANGIOGRAM CORONARY GRAFT N/A 10/9/2023    Procedure: Coronary Angiogram Graft;  Surgeon: Mahendra Collins MD;  Location: [facility]    CV CORONARY ANGIOGRAM  10/17/2019    Procedure: CV CORONARY ANGIOGRAM;  Surgeon: Mahendra Collins MD;  Location: [facility]    CV CORONARY ANGIOGRAM N/A 8/20/2021    Procedure: CV CORONARY ANGIOGRAM;  Surgeon: Derek Escoto MD;  Location: [facility]    CV CORONARY ANGIOGRAM N/A 10/9/2023    Procedure: Coronary Angiogram with checking bypass grafts;  Surgeon: Mahendra Collins MD;  Location: [facility]    CV PCI STENT DRUG ELUTING N/A 10/17/2019    Procedure: PCI Stent Drug Eluting;  Surgeon: Mahendra Collins MD;  Location: [facility]    CV RIGHT HEART CATH MEASUREMENTS RECORDED N/A 8/20/2021    Procedure: CV RIGHT HEART CATH;  Surgeon: Derek Escoto MD;  Location: [facility]    CV RIGHT HEART CATH MEASUREMENTS RECORDED N/A 9/13/2023    Procedure: Heart Cath Right Heart Cath;  Surgeon: Con Burkett MD;  Location: [facility]     Injuries-none    SH:  No employment.  Social History     Socioeconomic History    Marital status:      Spouse name: None    Number of children: 1 daughter    Years of education: None     Highest education level: None   Occupational History    None   Social Needs    Financial resource strain: None    Food insecurity:     Worry: None     Inability: None    Transportation needs:     Medical: None     Non-medical: None   Tobacco Use    Smoking status: Former Smoker    Smokeless tobacco: Never Used    Tobacco comment: 30 plus years ago.   Substance and Sexual Activity    Alcohol use: No    Drug use: No    Sexual activity: Yes     Partners: Male     Birth control/protection: Surgical     Comment: vasectomy   Lifestyle    Physical activity:     Days per week: None     Minutes per session: None    Stress: None   Relationships    Social connections:     Talks on phone: None     Gets together: None     Attends [employer] service: None     Active member of club or organization: None     Attends meetings of clubs or organizations: None     Relationship status: None    Intimate partner violence:     Fear of current or ex partner: None     Emotionally abused: None     Physically abused: None     Forced sexual activity: None   Other Topics Concern    Parent/sibling w/ CABG, MI or angioplasty before 65F 55M? Not Asked   Social History Narrative    None     FH:  Mother with rash and rheumatoid arthritis  Family History   Problem Relation Age of Onset    Arthritis Mother         ra    Connective Tissue Disorder Mother         lupus    Congenital Anomalies Mother         wholein heart    Cerebrovascular Disease Mother         TIA's    Cerebrovascular Disease Father     Diabetes Father     Hypertension Father     Cardiovascular Father         stents    Genitourinary Problems Father         kidney failure    Kidney Disease Father         kidney injury related to acute illness around time of death (RANDELL likely)    Cataracts Father     Arthritis Paternal Grandmother     Diabetes Brother     Glaucoma No family hx of     Macular Degeneration No family hx of      ROS:  +prone to sinus infection and eye infection  Remainder of  the 14 point ROS obtained and found negative.    Physical Exam:  Constitutional: WD-WN-WG cooperative; +obese  Eyes: nl EOM, PERRLA, vision, conjunctiva  ENT: nl external ears, nose, hearing, lips, teeth, gums, throat; +dry mouth  Neck: no mass or thyroid enlargement  Resp: lungs clear to auscultation, nl to palpation, nl effort  CV: RRR, no murmurs, rubs or gallops, no edema  GI: no ABD mass or tenderness, no HSM  : not tested  Lymph: no cervical or epitrochlear nodes  MS: +bilateral R>L 2nd and 3rd DIP heberden's nodes; bilateral knee flexion to 100 degrees; All other TMJ, neck, shoulder, elbow, wrist, hand, spine, hip, knee, ankle, and foot joints were examined and otherwise found normal. Normal  strength tuck and prayer.  Skin: no nail pitting, alopecia, rash; +LE venous congestion and bruising, hyperpigmentation LE > UE.  Neuro: nl cranial nerves, strength, sensation  Psych: nl judgement, orientation, memory, affect    Laboratory:      Component      Latest Ref Rng 4/17/2024  12:51 PM 4/17/2024  12:54 PM   WBC      4.0 - 11.0 10e3/uL 6.0     RBC Count      3.80 - 5.20 10e6/uL 4.30     Hemoglobin      11.7 - 15.7 g/dL 13.0     Hematocrit      35.0 - 47.0 % 39.9     MCV      78 - 100 fL 93     MCH      26.5 - 33.0 pg 30.2     MCHC      31.5 - 36.5 g/dL 32.6     RDW      10.0 - 15.0 % 15.1 (H)     Platelet Count      150 - 450 10e3/uL 176     % Neutrophils      % 76     % Lymphocytes      % 14     % Monocytes      % 6     % Eosinophils      % 2     % Basophils      % 1     % Immature Granulocytes      % 1     NRBCs per 100 WBC      <1 /100 0     Absolute Neutrophils      1.6 - 8.3 10e3/uL 4.7     Absolute Lymphocytes      0.8 - 5.3 10e3/uL 0.8     Absolute Monocytes      0.0 - 1.3 10e3/uL 0.3     Absolute Eosinophils      0.0 - 0.7 10e3/uL 0.1     Absolute Basophils      0.0 - 0.2 10e3/uL 0.0     Absolute Immature Granulocytes      <=0.4 10e3/uL 0.1     Absolute NRBCs      10e3/uL 0.0     Sodium      135 -  145 mmol/L 140     Potassium      3.4 - 5.3 mmol/L 4.7     Carbon Dioxide (CO2)      22 - 29 mmol/L 28     Anion Gap      7 - 15 mmol/L 9     Urea Nitrogen      8.0 - 23.0 mg/dL 34.1 (H)     Creatinine      0.51 - 0.95 mg/dL 1.45 (H)     GFR Estimate      >60 mL/min/1.73m2 40 (L)     Calcium      8.8 - 10.2 mg/dL 9.7     Chloride      98 - 107 mmol/L 103     Glucose      70 - 99 mg/dL 101 (H)     Alkaline Phosphatase      40 - 150 U/L 122     AST      0 - 45 U/L 33     ALT      0 - 50 U/L 20     Protein Total      6.4 - 8.3 g/dL 8.7 (H)     Albumin      3.5 - 5.2 g/dL 4.1     Bilirubin Total      <=1.2 mg/dL 1.0     Color Urine      Colorless, Straw, Light Yellow, Yellow   Light Yellow    Appearance Urine      Clear   Clear    Glucose Urine      Negative mg/dL  200 !    Bilirubin Urine      Negative   Negative    Ketones Urine      Negative mg/dL  Negative    Specific Gravity Urine      1.003 - 1.035   1.003    Blood Urine      Negative   Negative    pH Urine      5.0 - 7.0   7.0    Protein Albumin Urine      Negative mg/dL  Negative    Urobilinogen mg/dL      Normal, 2.0 mg/dL  Normal    Nitrite Urine      Negative   Negative    Leukocyte Esterase Urine      Negative   Negative    RBC Urine      <=2 /HPF  1    WBC Urine      <=5 /HPF  <1    Squamous Epithelial /HPF Urine      <=1 /HPF  <1    Total Protein Urine mg/dL        mg/dL  8.4    Total Protein Urine mg/mg Creat      0.00 - 0.20 mg/mg Cr  0.33 (H)    Creatinine Urine      mg/dL  25.1    CRP Inflammation      <5.00 mg/L 15.40 (H)     Complement C4      13 - 39 mg/dL 16     Complement C3      81 - 157 mg/dL 105     DNA-ds      <10.0 IU/mL 21.0 (H)     Sed Rate      0 - 30 mm/hr 56 (H)          Impression:    Systemic lupus erythematosis-with history of severe lupus nephritis. Today this remains very stable, and I appreciate no signs of active SLE or systemic inflammation. Good tolerance of the hydroxychloroquine and we will continue the agent, but monitor for  increasing skin pigmentation with consideration of a dose adjustment. Follow the kidney function testing, inflammatory markers, and serologies. The patient will also return nephrology.    Long term management of immunosuppression-with stable eye evaluations and toxicity screening over the past year. She has skin hyperpigementation that may be contributed to by hydroxychloroquine. Repeat the eye exam yearly. Plan to repeat lab testing every 4-6 months.    RTC in 6-12 months.    A total of 28 minutes was spent in face to face patient interaction and chart review on the day of service.    The longitudinal plan of care for the diagnosis(es)/condition(s) as documented were addressed during this visit. Due to the added complexity in care, I will continue to support Luz Marina in the subsequent management and with ongoing continuity of care.

## 2024-09-09 ENCOUNTER — MYC MEDICAL ADVICE (OUTPATIENT)
Dept: ANTICOAGULATION | Facility: CLINIC | Age: 64
End: 2024-09-09
Payer: COMMERCIAL

## 2024-09-10 ENCOUNTER — TELEPHONE (OUTPATIENT)
Dept: MULTI SPECIALTY CLINIC | Facility: CLINIC | Age: 64
End: 2024-09-10
Payer: COMMERCIAL

## 2024-09-10 NOTE — TELEPHONE ENCOUNTER
[facility] Call Center    Phone Message    May a detailed message be left on voicemail: yes     Reason for Call: Other: Please place lab orders in per  Lavern Clark. Thanks.    Action Taken: Other: [facility]    Travel Screening: Not Applicable     Date of Service:

## 2024-09-11 NOTE — TELEPHONE ENCOUNTER
Called and spoke with pt.  Discussed Chart reviewed with Tasha RN Nephrology Care Coordinator.  Pts 3/03/25 appt with Dr. Clark should be moved to November 2023.    Pt verbalized understanding and agreed with plan.  Appt scheduled for 11/26/24 and lab appt prior on 11/21/24.  (Future lab orders will be placed closer to scheduled appt).   Cancelled 3/03/24 appt.    Pt had no further questions.    Jessica Calhoun LPN

## 2024-09-12 ENCOUNTER — ANTICOAGULATION THERAPY VISIT (OUTPATIENT)
Dept: ANTICOAGULATION | Facility: CLINIC | Age: 64
End: 2024-09-12

## 2024-09-12 ENCOUNTER — LAB (OUTPATIENT)
Dept: LAB | Facility: CLINIC | Age: 64
End: 2024-09-12
Payer: COMMERCIAL

## 2024-09-12 DIAGNOSIS — I48.0 PAF (PAROXYSMAL ATRIAL FIBRILLATION) (H): Chronic | ICD-10-CM

## 2024-09-12 DIAGNOSIS — Z79.01 LONG TERM CURRENT USE OF ANTICOAGULANT THERAPY: Primary | Chronic | ICD-10-CM

## 2024-09-12 DIAGNOSIS — I48.91 ATRIAL FIBRILLATION, UNSPECIFIED TYPE (H): ICD-10-CM

## 2024-09-12 LAB — INR BLD: 1.8 (ref 0.9–1.1)

## 2024-09-12 PROCEDURE — 36416 COLLJ CAPILLARY BLOOD SPEC: CPT

## 2024-09-12 PROCEDURE — 85610 PROTHROMBIN TIME: CPT

## 2024-09-12 NOTE — PROGRESS NOTES
ANTICOAGULATION MANAGEMENT     Luz Marina Live 64 year old female is on warfarin with subtherapeutic INR result. (Goal INR 2.0-3.0)    Recent labs: (last 7 days)     09/12/24  1606   INR 1.8*       ASSESSMENT     Source(s): Chart Review  Previous INR was Subtherapeutic  Medication, diet, health changes since last INR chart reviewed; none identified         PLAN     Unable to reach pt today.    Left message to continue current dose of warfarin 7.5 mg tonight. Request call back for assessment.  [employer] message sent as well.  My chart message sent she missed dose last week will recommend booster dose today my chart message sent.  Follow up required to confirm warfarin dose taken and assess for changes    Deborah Alatorre RN  9/12/2024  Anticoagulation Clinic  [facility] for routing messages: p [facility] [place]  [facility] patient phone line: [phone]

## 2024-09-26 ENCOUNTER — APPOINTMENT (RX ONLY)
Dept: URBAN - METROPOLITAN AREA CLINIC 93 | Facility: CLINIC | Age: 64
Setting detail: DERMATOLOGY
End: 2024-09-26

## 2024-09-26 DIAGNOSIS — L21.8 OTHER SEBORRHEIC DERMATITIS: ICD-10-CM | Status: IMPROVED

## 2024-09-26 DIAGNOSIS — L81.4 OTHER MELANIN HYPERPIGMENTATION: ICD-10-CM

## 2024-09-26 DIAGNOSIS — L57.8 OTHER SKIN CHANGES DUE TO CHRONIC EXPOSURE TO NONIONIZING RADIATION: ICD-10-CM

## 2024-09-26 DIAGNOSIS — Z41.9 ENCOUNTER FOR PROCEDURE FOR PURPOSES OTHER THAN REMEDYING HEALTH STATE, UNSPECIFIED: ICD-10-CM

## 2024-09-26 PROCEDURE — 99214 OFFICE O/P EST MOD 30 MIN: CPT

## 2024-09-26 PROCEDURE — ? ADDITIONAL NOTES

## 2024-09-26 PROCEDURE — ? COUNSELING

## 2024-09-26 PROCEDURE — ? PRESCRIPTION MEDICATION MANAGEMENT

## 2024-09-26 PROCEDURE — ? PRESCRIPTION

## 2024-09-26 RX ORDER — TRETIONIN 0.5 MG/G
CREAM TOPICAL QHS
Qty: 45 | Refills: 1 | Status: ERX | COMMUNITY
Start: 2024-09-26

## 2024-09-26 RX ADMIN — TRETIONIN: 0.5 CREAM TOPICAL at 00:00

## 2024-09-26 ASSESSMENT — LOCATION DETAILED DESCRIPTION DERM
LOCATION DETAILED: RIGHT POSTERIOR EAR
LOCATION DETAILED: LEFT POSTERIOR EAR
LOCATION DETAILED: LEFT UPPER CUTANEOUS LIP
LOCATION DETAILED: RIGHT CENTRAL MALAR CHEEK
LOCATION DETAILED: LEFT CENTRAL MALAR CHEEK
LOCATION DETAILED: LEFT NASAL ALA
LOCATION DETAILED: RIGHT INFERIOR MEDIAL MALAR CHEEK

## 2024-09-26 ASSESSMENT — LOCATION SIMPLE DESCRIPTION DERM
LOCATION SIMPLE: LEFT NOSE
LOCATION SIMPLE: LEFT CHEEK
LOCATION SIMPLE: RIGHT CHEEK
LOCATION SIMPLE: LEFT EAR
LOCATION SIMPLE: LEFT LIP
LOCATION SIMPLE: RIGHT EAR

## 2024-09-26 ASSESSMENT — LOCATION ZONE DERM
LOCATION ZONE: LIP
LOCATION ZONE: EAR
LOCATION ZONE: NOSE
LOCATION ZONE: FACE

## 2024-09-26 NOTE — PROCEDURE: ADDITIONAL NOTES
Detail Level: Simple
Additional Notes: Sunscreen daily
Render Risk Assessment In Note?: yes
Additional Notes: Can use lancome product at night after tretinoin, sunscreen in the morning, vitamin c serum qam\\n\\nThe importance of sun protection discussed, as patient is not using sunscreen.

## 2024-09-26 NOTE — PROCEDURE: PRESCRIPTION MEDICATION MANAGEMENT
Render In Strict Bullet Format?: No
Discontinue Regimen: Mupirocin ointment
Continue Regimen: Zoryve 0.3 % topical cream \\nQuantity: 60.0 g  Days Supply: 30\\nSig: Apply to AA twice a day ( non Steroid)\\n\\nComplete doxycycline then stop
Modify Regimen: Use as needed :mometasone 0.1 % topical cream \\nQuantity: 45.0 g  Days Supply: 30\\nSig: Apply to AA twice a day. 2 weeks on 2 weeks off.
Detail Level: Simple

## 2024-09-27 ENCOUNTER — MYC MEDICAL ADVICE (OUTPATIENT)
Dept: ANTICOAGULATION | Facility: CLINIC | Age: 64
End: 2024-09-27
Payer: COMMERCIAL

## 2024-09-30 ENCOUNTER — ANTICOAGULATION THERAPY VISIT (OUTPATIENT)
Dept: ANTICOAGULATION | Facility: CLINIC | Age: 64
End: 2024-09-30

## 2024-09-30 ENCOUNTER — LAB (OUTPATIENT)
Dept: LAB | Facility: CLINIC | Age: 64
End: 2024-09-30
Payer: COMMERCIAL

## 2024-09-30 DIAGNOSIS — Z79.01 LONG TERM CURRENT USE OF ANTICOAGULANT THERAPY: Primary | Chronic | ICD-10-CM

## 2024-09-30 DIAGNOSIS — I48.0 PAF (PAROXYSMAL ATRIAL FIBRILLATION) (H): Chronic | ICD-10-CM

## 2024-09-30 DIAGNOSIS — I48.91 ATRIAL FIBRILLATION, UNSPECIFIED TYPE (H): ICD-10-CM

## 2024-09-30 LAB — INR BLD: 1.9 (ref 0.9–1.1)

## 2024-09-30 PROCEDURE — 36416 COLLJ CAPILLARY BLOOD SPEC: CPT

## 2024-09-30 PROCEDURE — 85610 PROTHROMBIN TIME: CPT

## 2024-09-30 NOTE — PROGRESS NOTES
ANTICOAGULATION MANAGEMENT     Luz Marina Live 64 year old female is on warfarin with subtherapeutic INR result. (Goal INR 2.0-3.0)    Recent labs: (last 7 days)     09/30/24  1319   INR 1.9*       ASSESSMENT     Warfarin Lab Questionnaire    Warfarin Doses Last 7 Days      9/30/2024     1:22 PM   Dose in Tablet or Mg   TAB or MG? milligram (mg)     Pt Rptd Dose SUNDAY MONDAY TUESDAY WED THURS FRIDAY SATURDAY 9/30/2024   1:22 PM 7.5 10 7.5 10 7.5 10 10         9/30/2024   Warfarin Lab Questionnaire   Missed doses within past 14 days? No   Changes in diet or alcohol within past 14 days? No   Medication changes since last result? No   Injuries or illness since last result? No   New shortness of breath, severe headaches or sudden changes in vision since last result? No   Abnormal bleeding since last result? No   Upcoming surgery, procedure? No        Previous result: Subtherapeutic  Additional findings: None       PLAN     Recommended plan for no diet, medication or health factor changes affecting INR     Dosing Instructions: Increase your warfarin dose (8% change) with next INR in 1-2 weeks       Summary  As of 9/30/2024      Full warfarin instructions:  7.5 mg every Sun; 10 mg all other days   Next INR check:  10/14/2024               Sent [employer] message with dosing and follow up instructions    Contact [phone] to schedule and with any changes, questions or concerns.     Education provided: None required    Plan made per [facility] anticoagulation protocol    Deborah Alatorre RN  9/30/2024  Anticoagulation Clinic  [facility] for routing messages: p ANTICOAG [place]  [facility] patient phone line: [phone]        _______________________________________________________________________     Anticoagulation Episode Summary       Current INR goal:  2.0-3.0   TTR:  59.0% (1 y)   Target end date:  Indefinite   Send INR reminders to:  DEBBIE ISIDRO    Indications    Long-term (current) use of anticoagulants [Z79.01]  [FreeTextEntry1] : 82 Y/O female PMH: AF, HLD, HTN,  CAD S/P CC 2015 tipple vessel disease ( RCA LAD LCX ) NL LV FX Anxiety here today in routine cardiac follow up\par \par Claims she is feeling well offers no complaints  \par \par \par Echo 8/2021 Moderate LVH, mild MR/AR, Moderate-severe AS no significant change in comparison to prior Echo\par Non ischemic Nuclear stress test 2019\par  [Z79.01]  PAF (paroxysmal atrial fibrillation) (H) [I48.0]  Atrial fibrillation  unspecified type (H) [I48.91]             Comments:               Anticoagulation Care Providers       Provider Role Specialty Phone number    Eliz Nguyen MD Referring Internal Medicine [phone]    Elodia Tang MD Referring Family Medicine [phone]

## 2024-10-16 ENCOUNTER — OFFICE VISIT (OUTPATIENT)
Dept: CARDIOLOGY | Facility: CLINIC | Age: 64
End: 2024-10-16
Attending: INTERNAL MEDICINE
Payer: COMMERCIAL

## 2024-10-16 ENCOUNTER — LAB (OUTPATIENT)
Dept: LAB | Facility: CLINIC | Age: 64
End: 2024-10-16
Attending: INTERNAL MEDICINE
Payer: COMMERCIAL

## 2024-10-16 ENCOUNTER — ANTICOAGULATION THERAPY VISIT (OUTPATIENT)
Dept: ANTICOAGULATION | Facility: CLINIC | Age: 64
End: 2024-10-16

## 2024-10-16 VITALS
DIASTOLIC BLOOD PRESSURE: 79 MMHG | OXYGEN SATURATION: 98 % | SYSTOLIC BLOOD PRESSURE: 133 MMHG | WEIGHT: 254 LBS | BODY MASS INDEX: 46.86 KG/M2 | HEART RATE: 63 BPM

## 2024-10-16 DIAGNOSIS — Z79.60 LONG-TERM USE OF IMMUNOSUPPRESSANT MEDICATION: ICD-10-CM

## 2024-10-16 DIAGNOSIS — M32.14 SYSTEMIC LUPUS ERYTHEMATOSUS WITH GLOMERULAR DISEASE, UNSPECIFIED SLE TYPE (H): ICD-10-CM

## 2024-10-16 DIAGNOSIS — I48.0 PAF (PAROXYSMAL ATRIAL FIBRILLATION) (H): Chronic | ICD-10-CM

## 2024-10-16 DIAGNOSIS — Z79.899 LONG-TERM USE OF PLAQUENIL: ICD-10-CM

## 2024-10-16 DIAGNOSIS — E66.01 MORBID OBESITY (H): Chronic | ICD-10-CM

## 2024-10-16 DIAGNOSIS — I50.30 HEART FAILURE WITH PRESERVED EJECTION FRACTION, UNSPECIFIED HF CHRONICITY (H): ICD-10-CM

## 2024-10-16 DIAGNOSIS — I25.10 CORONARY ARTERY DISEASE INVOLVING NATIVE HEART WITHOUT ANGINA PECTORIS, UNSPECIFIED VESSEL OR LESION TYPE: Chronic | ICD-10-CM

## 2024-10-16 DIAGNOSIS — N18.30 STAGE 3 CHRONIC KIDNEY DISEASE, UNSPECIFIED WHETHER STAGE 3A OR 3B CKD (H): Chronic | ICD-10-CM

## 2024-10-16 DIAGNOSIS — Z79.01 LONG TERM CURRENT USE OF ANTICOAGULANT THERAPY: Primary | Chronic | ICD-10-CM

## 2024-10-16 DIAGNOSIS — I20.89 STABLE ANGINA (H): ICD-10-CM

## 2024-10-16 DIAGNOSIS — I48.91 ATRIAL FIBRILLATION, UNSPECIFIED TYPE (H): ICD-10-CM

## 2024-10-16 LAB
ALBUMIN MFR UR ELPH: 9.3 MG/DL
ALBUMIN SERPL BCG-MCNC: 4.2 G/DL (ref 3.5–5.2)
ALBUMIN UR-MCNC: NEGATIVE MG/DL
ALP SERPL-CCNC: 123 U/L (ref 40–150)
ALT SERPL W P-5'-P-CCNC: 21 U/L (ref 0–50)
ANION GAP SERPL CALCULATED.3IONS-SCNC: 10 MMOL/L (ref 7–15)
APPEARANCE UR: CLEAR
AST SERPL W P-5'-P-CCNC: 35 U/L (ref 0–45)
BASOPHILS # BLD AUTO: 0 10E3/UL (ref 0–0.2)
BASOPHILS NFR BLD AUTO: 1 %
BILIRUB SERPL-MCNC: 0.9 MG/DL
BILIRUB UR QL STRIP: NEGATIVE
BUN SERPL-MCNC: 40.1 MG/DL (ref 8–23)
CALCIUM SERPL-MCNC: 9.7 MG/DL (ref 8.8–10.4)
CHLORIDE SERPL-SCNC: 106 MMOL/L (ref 98–107)
COLOR UR AUTO: ABNORMAL
CREAT SERPL-MCNC: 1.4 MG/DL (ref 0.51–0.95)
CREAT UR-MCNC: 41 MG/DL
CRP SERPL-MCNC: 19 MG/L
EGFRCR SERPLBLD CKD-EPI 2021: 42 ML/MIN/1.73M2
EOSINOPHIL # BLD AUTO: 0.1 10E3/UL (ref 0–0.7)
EOSINOPHIL NFR BLD AUTO: 2 %
ERYTHROCYTE [DISTWIDTH] IN BLOOD BY AUTOMATED COUNT: 14.9 % (ref 10–15)
ERYTHROCYTE [SEDIMENTATION RATE] IN BLOOD BY WESTERGREN METHOD: 43 MM/HR (ref 0–30)
GLUCOSE SERPL-MCNC: 93 MG/DL (ref 70–99)
GLUCOSE UR STRIP-MCNC: 300 MG/DL
HCO3 SERPL-SCNC: 25 MMOL/L (ref 22–29)
HCT VFR BLD AUTO: 40.4 % (ref 35–47)
HGB BLD-MCNC: 13 G/DL (ref 11.7–15.7)
HGB UR QL STRIP: NEGATIVE
HYALINE CASTS: 1 /LPF
IMM GRANULOCYTES # BLD: 0 10E3/UL
IMM GRANULOCYTES NFR BLD: 0 %
INR BLD: 2.9 (ref 0.9–1.1)
KETONES UR STRIP-MCNC: NEGATIVE MG/DL
LEUKOCYTE ESTERASE UR QL STRIP: NEGATIVE
LYMPHOCYTES # BLD AUTO: 0.9 10E3/UL (ref 0.8–5.3)
LYMPHOCYTES NFR BLD AUTO: 14 %
MCH RBC QN AUTO: 30 PG (ref 26.5–33)
MCHC RBC AUTO-ENTMCNC: 32.2 G/DL (ref 31.5–36.5)
MCV RBC AUTO: 93 FL (ref 78–100)
MONOCYTES # BLD AUTO: 0.4 10E3/UL (ref 0–1.3)
MONOCYTES NFR BLD AUTO: 6 %
NEUTROPHILS # BLD AUTO: 4.7 10E3/UL (ref 1.6–8.3)
NEUTROPHILS NFR BLD AUTO: 77 %
NITRATE UR QL: NEGATIVE
NRBC # BLD AUTO: 0 10E3/UL
NRBC BLD AUTO-RTO: 0 /100
NT-PROBNP SERPL-MCNC: 2159 PG/ML (ref 0–900)
PH UR STRIP: 6 [PH] (ref 5–7)
PLATELET # BLD AUTO: 170 10E3/UL (ref 150–450)
POTASSIUM SERPL-SCNC: 4.4 MMOL/L (ref 3.4–5.3)
PROT SERPL-MCNC: 8.5 G/DL (ref 6.4–8.3)
PROT/CREAT 24H UR: 0.23 MG/MG CR (ref 0–0.2)
RBC # BLD AUTO: 4.33 10E6/UL (ref 3.8–5.2)
RBC URINE: 1 /HPF
RHEUMATOID FACT SERPL-ACNC: <10 IU/ML
SODIUM SERPL-SCNC: 141 MMOL/L (ref 135–145)
SP GR UR STRIP: 1.01 (ref 1–1.03)
UROBILINOGEN UR STRIP-MCNC: NORMAL MG/DL
WBC # BLD AUTO: 6.1 10E3/UL (ref 4–11)
WBC URINE: <1 /HPF

## 2024-10-16 PROCEDURE — 80053 COMPREHEN METABOLIC PANEL: CPT | Performed by: PATHOLOGY

## 2024-10-16 PROCEDURE — 82595 ASSAY OF CRYOGLOBULIN: CPT | Performed by: INTERNAL MEDICINE

## 2024-10-16 PROCEDURE — 82784 ASSAY IGA/IGD/IGG/IGM EACH: CPT | Performed by: INTERNAL MEDICINE

## 2024-10-16 PROCEDURE — 36415 COLL VENOUS BLD VENIPUNCTURE: CPT | Performed by: PATHOLOGY

## 2024-10-16 PROCEDURE — 86160 COMPLEMENT ANTIGEN: CPT | Performed by: INTERNAL MEDICINE

## 2024-10-16 PROCEDURE — 85652 RBC SED RATE AUTOMATED: CPT | Performed by: PATHOLOGY

## 2024-10-16 PROCEDURE — 86225 DNA ANTIBODY NATIVE: CPT | Performed by: INTERNAL MEDICINE

## 2024-10-16 PROCEDURE — G0463 HOSPITAL OUTPT CLINIC VISIT: HCPCS | Performed by: INTERNAL MEDICINE

## 2024-10-16 PROCEDURE — 85025 COMPLETE CBC W/AUTO DIFF WBC: CPT | Performed by: PATHOLOGY

## 2024-10-16 PROCEDURE — 99000 SPECIMEN HANDLING OFFICE-LAB: CPT | Performed by: PATHOLOGY

## 2024-10-16 PROCEDURE — 99213 OFFICE O/P EST LOW 20 MIN: CPT | Performed by: INTERNAL MEDICINE

## 2024-10-16 PROCEDURE — 83880 ASSAY OF NATRIURETIC PEPTIDE: CPT | Performed by: PATHOLOGY

## 2024-10-16 PROCEDURE — 84156 ASSAY OF PROTEIN URINE: CPT | Performed by: PATHOLOGY

## 2024-10-16 PROCEDURE — 86334 IMMUNOFIX E-PHORESIS SERUM: CPT | Mod: 26 | Performed by: PATHOLOGY

## 2024-10-16 PROCEDURE — 86431 RHEUMATOID FACTOR QUANT: CPT | Performed by: INTERNAL MEDICINE

## 2024-10-16 PROCEDURE — 86334 IMMUNOFIX E-PHORESIS SERUM: CPT | Performed by: PATHOLOGY

## 2024-10-16 PROCEDURE — 86140 C-REACTIVE PROTEIN: CPT | Performed by: PATHOLOGY

## 2024-10-16 PROCEDURE — 81001 URINALYSIS AUTO W/SCOPE: CPT | Performed by: PATHOLOGY

## 2024-10-16 PROCEDURE — 85610 PROTHROMBIN TIME: CPT | Performed by: PATHOLOGY

## 2024-10-16 ASSESSMENT — PAIN SCALES - GENERAL
PAINLEVEL: NO PAIN (0)
PAINLEVEL: NO PAIN (0)

## 2024-10-16 NOTE — PATIENT INSTRUCTIONS
"You were seen today in the Cardiovascular Clinic at the [facility].      Cardiology Providers you saw during your visit:  Dr. Twin Moreno     Recommendations:   No medication changes today.  Please follow-up Phone visit with Dr. Moreno in January.        Eat a heart healthy, low sodium diet.  Get 20 to 30 minutes of aerobic exercise 4 to 5 times per week as tolerated. (Examples of aerobic exercise include: walking, bicycling, swimming, running).     Thank you for your visit today!   Please MyChart message or call if you have any questions or concerns.      During Business Hours:  [phone], option # 1 ([place])       After hours, weekends or holidays:   [phone], Option #4  Ask to speak to the On-Call Cardiologist. Inform them you are a heart failure patient at the [facility].      Kaylie Tran RN BSN CHFN  Cardiology Care Coordinator - C.O.R.[facility]  Questions and [phone]  First press #1 for the [employer] and then press #4 for \"Your Care Team\" to reach us Cardiology Nurses.                "

## 2024-10-16 NOTE — NURSING NOTE
Chief Complaint   Patient presents with    Follow Up     10/16/24 - Reason for Visit: Return HF; 63yr old female with a h/o HFpEF, CAD s/p CAB, HTN, HLD, atrial fibrillation on warfarin, SLE, and PFO presenting for follow-up with labs prior.       Vitals were taken and medications reconciled.    Martin Shah, EMT  1:10 PM

## 2024-10-16 NOTE — PROGRESS NOTES
ANTICOAGULATION MANAGEMENT     Luz Marina Live 64 year old female is on warfarin with therapeutic INR result. (Goal INR 2.0-3.0)    Recent labs: (last 7 days)     10/16/24  1254   INR 2.9*       ASSESSMENT     Source(s): Chart Reviewed     Medication/supplement changes:  Yes.   Weekly warfarin dose was increased by 8% on 9/30/24.  New illness, injury, or hospitalization: No   10/16/24 follow-up [facility] with Dr. Moreno d/t HF.  No new med changes.  Previous result: Subtherapeutic last 4 INR results.  Additional findings:  scheduled for colonoscopy,on 1/9/2025.   - send to pharmacist to review warfarin hold / potential bridge in 6 wks prior to procedure.       PLAN     Recommended plan for no diet, medication or health factor changes affecting INR     Dosing Instructions: Continue your current warfarin dose with next INR in 2 weeks       Summary  As of 10/16/2024      Full warfarin instructions:  7.5 mg every Sun; 10 mg all other days   Next INR check:  10/30/2024               Detailed voice message left for Luz Marina with dosing instructions and follow up date.    - also sent [employer] message with warfarin dose instructions    Contact [phone] to schedule and with any changes, questions or concerns.     Education provided: Taking warfarin: Importance of taking warfarin as instructed  Goal range and lab monitoring: goal range and significance of current result    Plan made per [facility] anticoagulation protocol    Cynthia Nazario RN  10/16/2024  Anticoagulation Clinic  [facility] for routing messages: p ANTICOAG [place]  [facility] patient phone line: [phone]        _______________________________________________________________________     Anticoagulation Episode Summary       Current INR goal:  2.0-3.0   TTR:  61.8% (1 y)   Target end date:  Indefinite   Send INR reminders to:  DEBBIE ISIDRO    Indications    Long-term (current) use of anticoagulants [Z79.01] [Z79.01]  PAF (paroxysmal atrial  fibrillation) (H) [I48.0]  Atrial fibrillation  unspecified type (H) [I48.91]             Comments:               Anticoagulation Care Providers       Provider Role Specialty Phone number    Eliz Nguyen MD Referring Internal Medicine [phone]    Elodia Tang MD Referring Family Medicine [phone]

## 2024-10-16 NOTE — PROGRESS NOTES
Expand All Collapse All     SLE  2. Vaginal bleeding  3. ASHD with remote CABG  4. Exertional shortness of breath  5. Dependent edema   6. Elevated weight  7. Paroxysmal atrial fibrillation (warfarin)  8. KEITH      The patient returns for follow-up of heart failure.  There is no interim history of chest pain, tightness, paroxysmal nocturnal dyspnea, orthopnea, peripheral edema, palpitation, pre-syncope, syncope, dExercise tolerance is stable.  The patient is attempting to exercise regularly and following a sodium restricted, calorically appropriate diet.  Medications are reviewed and the patient is taking medications as prescribed.  The patient is generally sleeping well.            Latest Reference Range & Units 10/16/24 12:54   Sodium 135 - 145 mmol/L 141   Potassium 3.4 - 5.3 mmol/L 4.4   Chloride 98 - 107 mmol/L 106   Carbon Dioxide (CO2) 22 - 29 mmol/L 25   Urea Nitrogen 8.0 - 23.0 mg/dL 40.1 (H)   Creatinine 0.51 - 0.95 mg/dL 1.40 (H)   GFR Estimate >60 mL/min/1.73m2 42 (L)   Calcium 8.8 - 10.4 mg/dL 9.7   Anion Gap 7 - 15 mmol/L 10   Albumin 3.5 - 5.2 g/dL 4.2   Protein Total 6.4 - 8.3 g/dL 8.5 (H)   Alkaline Phosphatase 40 - 150 U/L 123   ALT 0 - 50 U/L 21   AST 0 - 45 U/L 35   Bilirubin Total <=1.2 mg/dL 0.9   CRP Inflammation <5.00 mg/L 19.00 (H)   Glucose 70 - 99 mg/dL 93   WBC 4.0 - 11.0 10e3/uL 6.1   Hemoglobin 11.7 - 15.7 g/dL 13.0   Hematocrit 35.0 - 47.0 % 40.4   Platelet Count 150 - 450 10e3/uL 170   RBC Count 3.80 - 5.20 10e6/uL 4.33   MCV 78 - 100 fL 93   MCH 26.5 - 33.0 pg 30.0   MCHC 31.5 - 36.5 g/dL 32.2   RDW 10.0 - 15.0 % 14.9   % Neutrophils % 77   % Lymphocytes % 14   % Monocytes % 6   % Eosinophils % 2   % Basophils % 1   Absolute Basophils 0.0 - 0.2 10e3/uL 0.0   Absolute Eosinophils 0.0 - 0.7 10e3/uL 0.1   Absolute Immature Granulocytes <=0.4 10e3/uL 0.0   Absolute Lymphocytes 0.8 - 5.3 10e3/uL 0.9   Absolute Monocytes 0.0 - 1.3 10e3/uL 0.4   % Immature Granulocytes % 0   Absolute  Neutrophils 1.6 - 8.3 10e3/uL 4.7   Absolute NRBCs 10e3/uL 0.0   NRBCs per 100 WBC <1 /100 0   Sed Rate 0 - 30 mm/hr 43 (H)       Wt Readings from Last 24 Encounters:   10/16/24 115.2 kg (254 lb)   09/06/24 117.5 kg (259 lb)   04/17/24 115.2 kg (254 lb)   04/05/24 114.3 kg (252 lb)   11/29/23 113.6 kg (250 lb 8 oz)   11/28/23 113.4 kg (250 lb)   11/28/23 113.4 kg (250 lb)   10/26/23 113.4 kg (250 lb)   10/25/23 113.4 kg (250 lb)   10/18/23 113.4 kg (250 lb)   10/09/23 114.2 kg (251 lb 12.3 oz)   10/06/23 114.3 kg (251 lb 14.4 oz)   09/20/23 113.4 kg (250 lb)   09/13/23 114.7 kg (252 lb 13.9 oz)   07/26/23 108.9 kg (240 lb)   06/09/23 119.1 kg (262 lb 9.6 oz)   06/02/23 119.3 kg (263 lb)   11/28/22 122.2 kg (269 lb 5.8 oz)   09/09/22 122.5 kg (270 lb)   08/19/22 121.6 kg (268 lb 1.6 oz)   03/23/22 123 kg (271 lb 1.6 oz)   01/12/22 119.3 kg (263 lb 1.6 oz)   08/27/21 117.9 kg (260 lb)   08/20/21 119.3 kg (263 lb)               Primary Surgeon: Mahendra Collins MD   Procedure: Coronary Angiogram with checking bypass grafts    Coronary Angiogram Graft        Indications    Systemic lupus erythematosus with glomerular disease, unspecified SLE type (H) [M32.14 (ICD-10-CM)]   Hyperlipidemia LDL goal <100 [E78.5 (ICD-10-CM)]   Chronic diastolic heart failure (H) [I50.32 (ICD-10-CM)]   Coronary artery disease involving native coronary artery of native heart without angina pectoris [I25.10 (ICD-10-CM)]     Comments/Patient Narrative    63yr old female with a h/o HFpEF, CAD s/p CAB, HTN, HLD, atrial fibrillation on warfarin, SLE, and PFO who presents with symptoms of dyspnea on exertion for coronary angiogram and possible PCI.     Pre Procedure Diagnosis    stable known CADheart failure    Post Procedure Diagnosis    Dyspnea on exertion  Stable severe coronary artery disease, s/p CABG  LIMA_LAD  SVG_OM1  SVG_rPDA      Conclusion      Right hear catheterization     RA: 6/7/5 mmHg  RV: 43/--/5 mmHg  PA: 41/20/27  mmHg  CWP: 15/15/13 mmHg  CO/CI (Kannan): 5.9/2.8 L/min/m2  CO/CI (TD): 4.5/2.14 L/min/m2         Name: IDA PADRON  MRN: [id number]  : 1960  Study Date: 2023 03:12 PM  Age: 62 yrs  Gender: Female  Patient Location: [facility]  Reason For Study: Moderate tricuspid insufficiency  Ordering Physician: SHELLEY KENNEY  Referring Physician: SHELLEY KENNEY  Performed By: Francy Bradley     BSA: 2.2 m2  Height: 63 in  Weight: 262 lb  BP: 121/74 mmHg  ______________________________________________________________________________  Procedure  Echocardiogram with two-dimensional, color and spectral Doppler performed.  ______________________________________________________________________________  Interpretation Summary  Left ventricular function is normal.The ejection fraction is 55-60%.  Paradoxical septal motion consistent with right ventricular pressure overload  is present.  Global right ventricular function is normal.  Moderate tricuspid insufficiency is present. Pulmonary hypertension is  present. The right ventricular systolic pressure is approximated at 50.8 mmHg  plus the right atrial pressure.  Estimated mean right atrial pressure is normal.  No pericardial effusion is present.  ______________________________________________________________________________  Left Ventricle  Left ventricular size is normal. Left ventricular function is normal.The  ejection fraction is 55-60%. Paradoxical septal motion consistent with right  ventricular pressure overload is present.     Right Ventricle  Global right ventricular function is normal. Mild right ventricular dilation  is present.     Atria  The left atrium appears normal. Mild right atrial enlargement is present.     Mitral Valve  Mild mitral insufficiency is present.     Tricuspid Valve  Moderate tricuspid insufficiency is present. The right ventricular systolic  pressure is approximated at 50.8 mmHg plus the right atrial pressure.  Pulmonary  hypertension is present.     Pulmonic Valve  Mild pulmonic insufficiency is present.     Vessels  The inferior vena cava was normal in size with preserved respiratory  variability. Estimated mean right atrial pressure is normal.     Pericardium  No pericardial effusion is present.     Compared to Previous Study  This study was compared with the study from 10.17.19 . No significant changes  noted.     ______________________________________________________________________________  MMode/2D Measurements & Calculations  IVSd: 0.95 cm  LVIDd: 4.4 cm  LVIDs: 2.7 cm  LVPWd: 0.99 cm  FS: 36.9 %     LV mass(C)d: 139.9 grams  LV mass(C)dI: 64.5 grams/m2  Ao root diam: 2.9 cm  asc Aorta Diam: 3.6 cm  LVOT diam: 1.9 cm  LVOT area: 2.9 cm2  LA Volume (BP): 73.0 ml  LA Volume Index (BP): 33.6 ml/m2  RWT: 0.46  TAPSE: 2.1 cm     Doppler Measurements & Calculations  MV E max luis: 100.0 cm/sec  MV A max luis: 48.0 cm/sec  MV E/A: 2.1  MV dec slope: 548.4 cm/sec2  MV dec time: 0.18 sec  Ao V2 max: 148.6 cm/sec  Ao max P.0 mmHg  Ao V2 mean: 104.3 cm/sec  Ao mean P.0 mmHg  Ao V2 VTI: 34.2 cm  TARUN(I,D): 2.0 cm2  TARUN(V,D): 2.0 cm2  LV V1 max P.1 mmHg  LV V1 max: 101.0 cm/sec  LV V1 VTI: 23.6 cm  SV(LVOT): 69.2 ml  SI(LVOT): 31.9 ml/m2  TR max luis: 306.0 cm/sec  TR max P.8 mmHg  AV Luis Ratio (DI): 0.68  TARUN Index (cm2/m2): 0.93  E/E' avg: 10.6  Lateral E/e': 8.5  Medial E/e': 12.6  RV S Luis: 11.0 cm/sec      Severe three vessel coronary artery disease s/p CABG with LIMA-LAD, SVG-OM1, SVG-rPDA and otherwise mild non obstructive CAD elsewhere unchanged from prior study in .                Coronary Findings    Diagnostic  Dominance: Right  Left Anterior Descending   Ost LAD to Mid LAD lesion is 100% stenosed. The lesion is chronic total occlusion.      Second Diagonal Branch   The vessel is small. There is mild diffuse disease throughout the vessel.      Left Circumflex   Previously placed Ost Cx to Prox Cx stent of  unknown type is widely patent.   Prox Cx to Mid Cx lesion is 20% stenosed.      First Obtuse Marginal Branch   The vessel is moderate in size.   1st Mrg lesion is 30% stenosed with 100% stenosed side branch in Lat 1st Mrg. The lesion was previously treated using a stent of unknown type.      Lateral First Obtuse Marginal Branch   The vessel is small.      Second Obtuse Marginal Branch   The vessel is moderate in size.      Third Obtuse Marginal Branch   The vessel is small.      Right Coronary Artery   Prox RCA lesion is 40% stenosed. The lesion is eccentric.   Mid RCA to Dist RCA lesion is 10% stenosed.   Dist RCA lesion is 100% stenosed. The lesion is chronic total occlusion.      Right Posterior Descending Artery   RPDA lesion is 20% stenosed.      LIMA Graft To Dist LAD   The graft is large. The graft is angiographically normal.      Graft To Lat 1st Mrg   The graft is large. The graft is angiographically normal.      Graft To RPDA   The graft is large. The graft is angiographically normal.   Prox Graft lesion is 40% stenosed.         Intervention     No interventions have been documented.     Pressures Phase: Baseline     Time Systolic (mmHg) Diastolic (mmHg) Mean (mmHg) A Wave (mmHg) V Wave (mmHg) EDP (mmHg) Max dp/dt (mmHg/sec) HR (bpm) Content (mL/dL) SAT (%)   AO Pressures  3:58     58    76        54            CC: Eliz Aguilera M.D.,    CC: [facility] Medical Records

## 2024-10-16 NOTE — LETTER
10/16/2024      RE: Luz Marina Live  [address]  [place] [address]       Dear Colleague,    Thank you for the opportunity to participate in the care of your patient, Luz Marina Live, at the [facility] [place]. Please see a copy of my visit note below.       Expand All Collapse All     SLE  2. Vaginal bleeding  3. ASHD with remote CABG  4. Exertional shortness of breath  5. Dependent edema   6. Elevated weight  7. Paroxysmal atrial fibrillation (warfarin)  8. KEITH      The patient returns for follow-up of heart failure.  There is no interim history of chest pain, tightness, paroxysmal nocturnal dyspnea, orthopnea, peripheral edema, palpitation, pre-syncope, syncope, dExercise tolerance is stable.  The patient is attempting to exercise regularly and following a sodium restricted, calorically appropriate diet.  Medications are reviewed and the patient is taking medications as prescribed.  The patient is generally sleeping well.            Latest Reference Range & Units 10/16/24 12:54   Sodium 135 - 145 mmol/L 141   Potassium 3.4 - 5.3 mmol/L 4.4   Chloride 98 - 107 mmol/L 106   Carbon Dioxide (CO2) 22 - 29 mmol/L 25   Urea Nitrogen 8.0 - 23.0 mg/dL 40.1 (H)   Creatinine 0.51 - 0.95 mg/dL 1.40 (H)   GFR Estimate >60 mL/min/1.73m2 42 (L)   Calcium 8.8 - 10.4 mg/dL 9.7   Anion Gap 7 - 15 mmol/L 10   Albumin 3.5 - 5.2 g/dL 4.2   Protein Total 6.4 - 8.3 g/dL 8.5 (H)   Alkaline Phosphatase 40 - 150 U/L 123   ALT 0 - 50 U/L 21   AST 0 - 45 U/L 35   Bilirubin Total <=1.2 mg/dL 0.9   CRP Inflammation <5.00 mg/L 19.00 (H)   Glucose 70 - 99 mg/dL 93   WBC 4.0 - 11.0 10e3/uL 6.1   Hemoglobin 11.7 - 15.7 g/dL 13.0   Hematocrit 35.0 - 47.0 % 40.4   Platelet Count 150 - 450 10e3/uL 170   RBC Count 3.80 - 5.20 10e6/uL 4.33   MCV 78 - 100 fL 93   MCH 26.5 - 33.0 pg 30.0   MCHC 31.5 - 36.5 g/dL 32.2   RDW 10.0 - 15.0 % 14.9   % Neutrophils % 77   %  Lymphocytes % 14   % Monocytes % 6   % Eosinophils % 2   % Basophils % 1   Absolute Basophils 0.0 - 0.2 10e3/uL 0.0   Absolute Eosinophils 0.0 - 0.7 10e3/uL 0.1   Absolute Immature Granulocytes <=0.4 10e3/uL 0.0   Absolute Lymphocytes 0.8 - 5.3 10e3/uL 0.9   Absolute Monocytes 0.0 - 1.3 10e3/uL 0.4   % Immature Granulocytes % 0   Absolute Neutrophils 1.6 - 8.3 10e3/uL 4.7   Absolute NRBCs 10e3/uL 0.0   NRBCs per 100 WBC <1 /100 0   Sed Rate 0 - 30 mm/hr 43 (H)       Wt Readings from Last 24 Encounters:   10/16/24 115.2 kg (254 lb)   09/06/24 117.5 kg (259 lb)   04/17/24 115.2 kg (254 lb)   04/05/24 114.3 kg (252 lb)   11/29/23 113.6 kg (250 lb 8 oz)   11/28/23 113.4 kg (250 lb)   11/28/23 113.4 kg (250 lb)   10/26/23 113.4 kg (250 lb)   10/25/23 113.4 kg (250 lb)   10/18/23 113.4 kg (250 lb)   10/09/23 114.2 kg (251 lb 12.3 oz)   10/06/23 114.3 kg (251 lb 14.4 oz)   09/20/23 113.4 kg (250 lb)   09/13/23 114.7 kg (252 lb 13.9 oz)   07/26/23 108.9 kg (240 lb)   06/09/23 119.1 kg (262 lb 9.6 oz)   06/02/23 119.3 kg (263 lb)   11/28/22 122.2 kg (269 lb 5.8 oz)   09/09/22 122.5 kg (270 lb)   08/19/22 121.6 kg (268 lb 1.6 oz)   03/23/22 123 kg (271 lb 1.6 oz)   01/12/22 119.3 kg (263 lb 1.6 oz)   08/27/21 117.9 kg (260 lb)   08/20/21 119.3 kg (263 lb)               Primary Surgeon: Mahendra Collins MD   Procedure: Coronary Angiogram with checking bypass grafts    Coronary Angiogram Graft        Indications    Systemic lupus erythematosus with glomerular disease, unspecified SLE type (H) [M32.14 (ICD-10-CM)]   Hyperlipidemia LDL goal <100 [E78.5 (ICD-10-CM)]   Chronic diastolic heart failure (H) [I50.32 (ICD-10-CM)]   Coronary artery disease involving native coronary artery of native heart without angina pectoris [I25.10 (ICD-10-CM)]     Comments/Patient Narrative    63yr old female with a h/o HFpEF, CAD s/p CAB, HTN, HLD, atrial fibrillation on warfarin, SLE, and PFO who presents with symptoms of dyspnea on  exertion for coronary angiogram and possible PCI.     Pre Procedure Diagnosis    stable known CADheart failure    Post Procedure Diagnosis    Dyspnea on exertion  Stable severe coronary artery disease, s/p CABG  LIMA_LAD  SVG_OM1  SVG_rPDA      Conclusion      Right hear catheterization     RA: 6/7/5 mmHg  RV: 43/--/5 mmHg  PA: 41/20/27 mmHg  CWP: 15/15/13 mmHg  CO/CI (Kannan): 5.9/2.8 L/min/m2  CO/CI (TD): 4.5/2.14 L/min/m2         Name: IDA PADRON  MRN: [id number]  : 1960  Study Date: 2023 03:12 PM  Age: 62 yrs  Gender: Female  Patient Location: [facility]  Reason For Study: Moderate tricuspid insufficiency  Ordering Physician: SHELLEY KENNEY  Referring Physician: SHELLEY KENNEY  Performed By: Francy Bradley     BSA: 2.2 m2  Height: 63 in  Weight: 262 lb  BP: 121/74 mmHg  ______________________________________________________________________________  Procedure  Echocardiogram with two-dimensional, color and spectral Doppler performed.  ______________________________________________________________________________  Interpretation Summary  Left ventricular function is normal.The ejection fraction is 55-60%.  Paradoxical septal motion consistent with right ventricular pressure overload  is present.  Global right ventricular function is normal.  Moderate tricuspid insufficiency is present. Pulmonary hypertension is  present. The right ventricular systolic pressure is approximated at 50.8 mmHg  plus the right atrial pressure.  Estimated mean right atrial pressure is normal.  No pericardial effusion is present.  ______________________________________________________________________________  Left Ventricle  Left ventricular size is normal. Left ventricular function is normal.The  ejection fraction is 55-60%. Paradoxical septal motion consistent with right  ventricular pressure overload is present.     Right Ventricle  Global right ventricular function is normal. Mild right ventricular  dilation  is present.     Atria  The left atrium appears normal. Mild right atrial enlargement is present.     Mitral Valve  Mild mitral insufficiency is present.     Tricuspid Valve  Moderate tricuspid insufficiency is present. The right ventricular systolic  pressure is approximated at 50.8 mmHg plus the right atrial pressure.  Pulmonary hypertension is present.     Pulmonic Valve  Mild pulmonic insufficiency is present.     Vessels  The inferior vena cava was normal in size with preserved respiratory  variability. Estimated mean right atrial pressure is normal.     Pericardium  No pericardial effusion is present.     Compared to Previous Study  This study was compared with the study from 10.17.19 . No significant changes  noted.     ______________________________________________________________________________  MMode/2D Measurements & Calculations  IVSd: 0.95 cm  LVIDd: 4.4 cm  LVIDs: 2.7 cm  LVPWd: 0.99 cm  FS: 36.9 %     LV mass(C)d: 139.9 grams  LV mass(C)dI: 64.5 grams/m2  Ao root diam: 2.9 cm  asc Aorta Diam: 3.6 cm  LVOT diam: 1.9 cm  LVOT area: 2.9 cm2  LA Volume (BP): 73.0 ml  LA Volume Index (BP): 33.6 ml/m2  RWT: 0.46  TAPSE: 2.1 cm     Doppler Measurements & Calculations  MV E max luis: 100.0 cm/sec  MV A max luis: 48.0 cm/sec  MV E/A: 2.1  MV dec slope: 548.4 cm/sec2  MV dec time: 0.18 sec  Ao V2 max: 148.6 cm/sec  Ao max P.0 mmHg  Ao V2 mean: 104.3 cm/sec  Ao mean P.0 mmHg  Ao V2 VTI: 34.2 cm  TARUN(I,D): 2.0 cm2  TARUN(V,D): 2.0 cm2  LV V1 max P.1 mmHg  LV V1 max: 101.0 cm/sec  LV V1 VTI: 23.6 cm  SV(LVOT): 69.2 ml  SI(LVOT): 31.9 ml/m2  TR max luis: 306.0 cm/sec  TR max P.8 mmHg  AV Luis Ratio (DI): 0.68  TARUN Index (cm2/m2): 0.93  E/E' avg: 10.6  Lateral E/e': 8.5  Medial E/e': 12.6  RV S Luis: 11.0 cm/sec      Severe three vessel coronary artery disease s/p CABG with LIMA-LAD, SVG-OM1, SVG-rPDA and otherwise mild non obstructive CAD elsewhere unchanged from prior study in .                 Coronary Findings    Diagnostic  Dominance: Right  Left Anterior Descending   Ost LAD to Mid LAD lesion is 100% stenosed. The lesion is chronic total occlusion.      Second Diagonal Branch   The vessel is small. There is mild diffuse disease throughout the vessel.      Left Circumflex   Previously placed Ost Cx to Prox Cx stent of unknown type is widely patent.   Prox Cx to Mid Cx lesion is 20% stenosed.      First Obtuse Marginal Branch   The vessel is moderate in size.   1st Mrg lesion is 30% stenosed with 100% stenosed side branch in Lat 1st Mrg. The lesion was previously treated using a stent of unknown type.      Lateral First Obtuse Marginal Branch   The vessel is small.      Second Obtuse Marginal Branch   The vessel is moderate in size.      Third Obtuse Marginal Branch   The vessel is small.      Right Coronary Artery   Prox RCA lesion is 40% stenosed. The lesion is eccentric.   Mid RCA to Dist RCA lesion is 10% stenosed.   Dist RCA lesion is 100% stenosed. The lesion is chronic total occlusion.      Right Posterior Descending Artery   RPDA lesion is 20% stenosed.      LIMA Graft To Dist LAD   The graft is large. The graft is angiographically normal.      Graft To Lat 1st Mrg   The graft is large. The graft is angiographically normal.      Graft To RPDA   The graft is large. The graft is angiographically normal.   Prox Graft lesion is 40% stenosed.         Intervention     No interventions have been documented.     Pressures Phase: Baseline     Time Systolic (mmHg) Diastolic (mmHg) Mean (mmHg) A Wave (mmHg) V Wave (mmHg) EDP (mmHg) Max dp/dt (mmHg/sec) HR (bpm) Content (mL/dL) SAT (%)   AO Pressures  3:58     58    76        54            CC: Eliz Aguilera M.D.,    CC: [facility] Medical Records                       Please do not hesitate to contact me if you have any questions/concerns.     Sincerely,     Twin Moreno MD

## 2024-10-17 LAB
C3 SERPL-MCNC: 110 MG/DL (ref 81–157)
C4 SERPL-MCNC: 25 MG/DL (ref 13–39)
IGA SERPL-MCNC: 213 MG/DL (ref 84–499)
IGG SERPL-MCNC: 2250 MG/DL (ref 610–1616)
IGM SERPL-MCNC: 95 MG/DL (ref 35–242)
LOCATION OF TASK: NORMAL
PROT PATTERN SERPL IFE-IMP: NORMAL

## 2024-10-17 NOTE — NURSING NOTE
Chief Complaint   Patient presents with     Physical     Annual exam   Rosario Coffey LPN   PRINCIPAL DISCHARGE DIAGNOSIS  Diagnosis: CAD in native artery  Assessment and Plan of Treatment: You have a diagnosis of coronary artery disease and underwent a cardiac catheterization with a balloon to your circumflex  coronary artery. You have been started on Aspirin 81mg daily,, Brilinta 90mg twice a day,  Continue taking all medications as prescribed.   The procedure was done through the wrist. Please avoid any heavy lifting (no more than 5 lbs), strenuous activity for 2 weeks. No driving for 24 hours. You may shower 24 hours following the procedure but avoid baths/swimming for 1 week. If you develop any swelling, bleeding, hardening of the skin (hematoma formation), acute pain, numbness/tingling in your arm , or have any questions/concerns regarding your procedure, please call [facility] [phone]  NEVER miss a dose of Aspirin and  Brilinta to ensure your stent does not close. DO NOT STOP THESE MEDICATIONS FOR ANY REASON UNLESS OTHERWISE INDICATED BY YOUR CARDIOLOGIST BECAUSE THIS WILL PUT YOU AT RISK OF YOUR STENT CLOSING AND HAVING A HEART ATTACK OR SUDDEN SEVERE LEG PAIN DUE TO BLOCKAGE OF BLOOD FLOW TO LEG.  You have been given a Stent Card to carry with you in your wallet.  Make Photocopies or take a picture of card so you have a backup copy.  This card has important information for any possible future Radiology/MRI studies.    Please make a follow up appointment with your cardiologist within 1-2 weeks of your discharge. All of your prescriptions have been sent electronically to your pharmacy.      SECONDARY DISCHARGE DIAGNOSES  Diagnosis: Encounter for cardiac rehabilitation  Assessment and Plan of Treatment: We have provided you with a prescription for cardiac rehab which is medically supervised exercise program for your heart and has been shown to improve the quantity and quality of life of people with heart disease like yours. You should attend cardiac rehab 3 times per week for 12 weeks. We have provided you with a list of nearby facilities. Please call your insurance carrier to determine which of these facilities are covered under your plan. Please bring this prescription with you to your follow up appointment with your cardiologist who can then further assist you to enroll into a cardiac rehab program.

## 2024-10-18 ENCOUNTER — OFFICE VISIT (OUTPATIENT)
Dept: OPHTHALMOLOGY | Facility: CLINIC | Age: 64
End: 2024-10-18
Payer: COMMERCIAL

## 2024-10-18 DIAGNOSIS — H52.4 PRESBYOPIA: ICD-10-CM

## 2024-10-18 DIAGNOSIS — H25.13 SENILE NUCLEAR SCLEROSIS, BILATERAL: ICD-10-CM

## 2024-10-18 DIAGNOSIS — Z79.899 LONG-TERM USE OF PLAQUENIL: Primary | ICD-10-CM

## 2024-10-18 LAB — DSDNA AB SER-ACNC: 14 IU/ML

## 2024-10-18 PROCEDURE — 92083 EXTENDED VISUAL FIELD XM: CPT | Performed by: OPTOMETRIST

## 2024-10-18 PROCEDURE — 92134 CPTRZ OPH DX IMG PST SGM RTA: CPT | Performed by: OPTOMETRIST

## 2024-10-18 PROCEDURE — 99207 FUNDUS PHOTOS OU (BOTH EYES): CPT | Performed by: OPTOMETRIST

## 2024-10-18 PROCEDURE — 92014 COMPRE OPH EXAM EST PT 1/>: CPT | Performed by: OPTOMETRIST

## 2024-10-18 PROCEDURE — 92015 DETERMINE REFRACTIVE STATE: CPT | Performed by: OPTOMETRIST

## 2024-10-18 ASSESSMENT — REFRACTION_MANIFEST
OS_AXIS: 133
OD_ADD: +2.50
OS_SPHERE: ++2.25
OS_CYLINDER: +1.25
OS_ADD: +2.50
OD_AXIS: 085
OD_CYLINDER: +1.50
OD_SPHERE: +2.25

## 2024-10-18 ASSESSMENT — TONOMETRY
IOP_METHOD: TONOPEN
OD_IOP_MMHG: 12
OS_IOP_MMHG: 15

## 2024-10-18 ASSESSMENT — VISUAL ACUITY
OS_CC+: +3
OD_CC+: -2
OD_CC: 20/20
OS_CC: 20/25
METHOD: SNELLEN - LINEAR

## 2024-10-18 ASSESSMENT — REFRACTION_WEARINGRX
OD_AXIS: 093
OD_SPHERE: +2.00
OS_SPHERE: +2.25
OS_AXIS: 125
OS_ADD: +2.50
OD_CYLINDER: +1.75
OD_ADD: +2.50
OS_CYLINDER: +1.25

## 2024-10-18 ASSESSMENT — CONF VISUAL FIELD
OS_SUPERIOR_NASAL_RESTRICTION: 0
OS_INFERIOR_TEMPORAL_RESTRICTION: 0
OS_INFERIOR_NASAL_RESTRICTION: 0
OS_SUPERIOR_TEMPORAL_RESTRICTION: 0
METHOD: COUNTING FINGERS
OD_INFERIOR_NASAL_RESTRICTION: 0
OD_NORMAL: 1
OD_INFERIOR_TEMPORAL_RESTRICTION: 0
OS_NORMAL: 1
OD_SUPERIOR_NASAL_RESTRICTION: 0
OD_SUPERIOR_TEMPORAL_RESTRICTION: 0

## 2024-10-18 NOTE — PROGRESS NOTES
Assessment/Plan  (Z79.899) Long-term use of Plaquenil  (primary encounter diagnosis)  Comment: 20+ years at 400mg daily. No evidence of retinal toxicity today.   Plan: OCT Retina Spectralis OU (both eyes), HVF 10-2         OU, Fundus Photos OU (both eyes)        Return to clinic in 1 year for repeat complete exam with retinal imaging. Return to clinic if vision changes are noted.     (H25.13) Senile nuclear sclerosis, bilateral  Plan: Monitor.     (H52.4) Presbyopia  Plan: REFRACTION [[id number]]        Discussed findings with patient. New spectacle prescription dispensed to patient. Patient is welcome to return to clinic with prolonged adaptation difficulties. Ok to continue with current glasses.     Complete documentation of historical and exam elements from today's encounter can  be found in the full encounter summary report (not reduplicated in this progress  note). I personally obtained the chief complaint(s) and history of present illness. I  confirmed and edited as necessary the review of systems, past medical/surgical  history, family history, social history, and examination findings as documented by  others; and I examined the patient myself. I personally reviewed the relevant tests,  images, and reports as documented above. I formulated and edited as necessary the  assessment and plan and discussed the findings and management plan with the  patient and family.    Kirt Salazar OD

## 2024-10-18 NOTE — NURSING NOTE
Chief Complaints and History of Present Illnesses   Patient presents with    Annual Eye Exam    Follow Tx Of High Risk Med       Chief Complaint(s) and History of Present Illness(es)       Annual Eye Exam              Laterality: both eyes              Follow Tx Of High Risk Med              Laterality: both eyes              Comments    Annual eye health exam with follow up of high risk medication plaquenil. Patient has not noticed any changes in her vision since her last visit. She denies flashing lights and floaters. She mentions her eyes are dry and mattery at times. Patient mentions that she gets frequent conjunctivitis when she has a cold. No symptoms of it today.   Ocular Medications: Refresh both eyes twice per day   Plaquenil 200mg twice per day                    Kaylie Mims, COA

## 2024-10-21 ENCOUNTER — TELEPHONE (OUTPATIENT)
Dept: OPHTHALMOLOGY | Facility: CLINIC | Age: 64
End: 2024-10-21
Payer: COMMERCIAL

## 2024-10-21 NOTE — TELEPHONE ENCOUNTER
Left Voicemail (1st Attempt) for the patient to call back and schedule the following:    Appointment type: return  Provider: dr. glass  Return date: 10/18/2025  Specialty phone number: [phone]   Additonal Notes: Return in about 1 year (around 10/18/2025) for Comprehensive Exam, Visual Field, OCT, Dilation     [facility]   Orthopedics, Podiatry, Sports Medicine, Ent ,Eye , Audiology, Adult Endocrine & Diabetes, Nutrition & Medication Therapy Management Specialties   [facility]

## 2024-10-22 LAB — CRYOGLOB SER QL: ABNORMAL

## 2024-11-08 DIAGNOSIS — N18.31 STAGE 3A CHRONIC KIDNEY DISEASE (H): Primary | ICD-10-CM

## 2024-11-15 ENCOUNTER — TELEPHONE (OUTPATIENT)
Dept: ANTICOAGULATION | Facility: CLINIC | Age: 64
End: 2024-11-15
Payer: COMMERCIAL

## 2024-11-15 ENCOUNTER — NURSE TRIAGE (OUTPATIENT)
Dept: NURSING | Facility: CLINIC | Age: 64
End: 2024-11-15
Payer: COMMERCIAL

## 2024-11-15 ENCOUNTER — MYC MEDICAL ADVICE (OUTPATIENT)
Dept: ANTICOAGULATION | Facility: CLINIC | Age: 64
End: 2024-11-15
Payer: COMMERCIAL

## 2024-11-15 NOTE — TELEPHONE ENCOUNTER
[facility] receive a triage note regarding a nose bleed. [facility] was not contacted regarding this prior to this triage note. I have attempted to contact Luz Marina back with no answer. We do not typically hold warfarin for one nose bleed. Would need to discuss with pt. I do see that the RN's also sent her PCP a message so will see what she advises at this time while we await pt call back.    Michael Quispe RN, BSN  [facility] Anticoagulation Team

## 2024-11-15 NOTE — TELEPHONE ENCOUNTER
Per Dr Nguyen, patient needs to hold pressure on her nose for 10 min x2 if continuing to bleed patient needs to go to the ED.     Spoke to patient, she was currently at the ED, patient did hold pressure x2 and also tried using ice.  Blood continued out her nose and started to come out her eye.      Rach Coyle RN on 11/15/2024 at 4:35 PM

## 2024-11-15 NOTE — TELEPHONE ENCOUNTER
Guvera message sent as well.     Michael Quispe, RN, BSN  [facility] Anticoagulation Team

## 2024-11-15 NOTE — TELEPHONE ENCOUNTER
"64 year old calls with a nose bleed  She states she has been bleeding for 20 minutes and once she takes pressure off her nose it starts up again   She is currently taking coumadin and is due to take 10 mg at dinner She has tried to reach the INR clinic to see if she should hold it but noone is available     Advised her to apply pressure for 15 minutes and do not remove   apply a cool compress or ice   Once bleeding stops apply nasal gel to moisten   Careful irrigation as not to loosen clots   Hold off on CpAP until bleeding is under control      Tried reaching back line to INR clinic   no answer    Forwarding to [facility] to help pt manage coumadin over the weekend     Luz Marina is aware to go to ER if bleeding does not stop       Reason for Disposition   Taking Coumadin (warfarin) or other strong blood thinner, or known bleeding disorder (e.g., thrombocytopenia)    Additional Information   Negative: Fainted (passed out), or too weak to stand following large blood loss   Negative: Sounds like a life-threatening emergency to the triager   Negative: Nosebleed followed nose injury   Negative: Bleeding present > 30 minutes and using correct method of direct pressure   Negative: Bleeding now and second call after being instructed in correct technique of direct pressure   Negative: Lightheadedness or dizziness   Negative: Pale skin (pallor) of new-onset or worsening   Negative: Has nasal packing (inserted by health care provider to control bleeding) and now has new rash   Negative: Has nasal packing and now has bleeding around the packing  (Exception: Few drops or ooze.)   Negative: Patient sounds very sick or weak to the triager   Negative: Large amount of blood has been lost (e.g., one cup)    Answer Assessment - Initial Assessment Questions  1. AMOUNT OF BLEEDING: \"How bad is the bleeding?\" \"How much blood was lost?\" \"Has the bleeding stopped?\"    - MILD: needed a couple tissues    - MODERATE: needed many tissues    - SEVERE: " "large blood clots, soaked many tissues, lasted more than 30 minutes       moderate  2. ONSET: \"When did the nosebleed start?\"       20 minutes ago  3. FREQUENCY: \"How many nosebleeds have you had in the last 24 hours?\"       This is the 1st one but had had blood when rinsing nose  4. RECURRENT SYMPTOMS: \"Have there been other recent nosebleeds?\" If Yes, ask: \"How long did it take you to stop the bleeding?\" \"What worked best?\"       Just recently noted blood when irrigating   5. CAUSE: \"What do you think caused this nosebleed?\"      Dryness and on warfarin  6. LOCAL FACTORS: \"Do you have any cold symptoms?\", \"Have you been rubbing or picking at your nose?\"      no  7. SYSTEMIC FACTORS: \"Do you have high blood pressure or any bleeding problems?\"      no  8. BLOOD THINNERS: \"Do you take any blood thinners?\" (e.g., aspirin, clopidogrel / Plavix, coumadin, heparin). Notes: Other strong blood thinners include: Arixtra (fondaparinux), Eliquis (apixaban), Pradaxa (dabigatran), and Xarelto (rivaroxaban).      Yes    9. OTHER SYMPTOMS: \"Do you have any other symptoms?\" (e.g., lightheadedness)      no  10. PREGNANCY: \"Is there any chance you are pregnant?\" \"When was your last menstrual period?\"        no    Protocols used: Nosebleed-A-OH    "

## 2024-11-15 NOTE — TELEPHONE ENCOUNTER
ACC RN notes this in the triage note:    Per Dr Nguyen, patient needs to hold pressure on her nose for 10 min x2 if continuing to bleed patient needs to go to the ED.      Spoke to patient, she was currently at the ED, patient did hold pressure x2 and also tried using ice.  Blood continued out her nose and started to come out her eye.       Rach Coyle RN on 11/15/2024 at 4:35 PM        Will follow up on Monday after ACC receives her discharge notes from the ED.     Michael Quispe RN, BSN  [facility] Anticoagulation Team

## 2024-11-18 ENCOUNTER — LAB (OUTPATIENT)
Dept: LAB | Facility: CLINIC | Age: 64
End: 2024-11-18
Payer: COMMERCIAL

## 2024-11-18 ENCOUNTER — ANTICOAGULATION THERAPY VISIT (OUTPATIENT)
Dept: ANTICOAGULATION | Facility: CLINIC | Age: 64
End: 2024-11-18

## 2024-11-18 ENCOUNTER — TELEPHONE (OUTPATIENT)
Dept: INTERNAL MEDICINE | Facility: CLINIC | Age: 64
End: 2024-11-18

## 2024-11-18 ENCOUNTER — OFFICE VISIT (OUTPATIENT)
Dept: FAMILY MEDICINE | Facility: CLINIC | Age: 64
End: 2024-11-18
Payer: COMMERCIAL

## 2024-11-18 VITALS
RESPIRATION RATE: 22 BRPM | WEIGHT: 251 LBS | TEMPERATURE: 97.8 F | BODY MASS INDEX: 44.47 KG/M2 | DIASTOLIC BLOOD PRESSURE: 76 MMHG | OXYGEN SATURATION: 96 % | SYSTOLIC BLOOD PRESSURE: 128 MMHG | HEIGHT: 63 IN | HEART RATE: 72 BPM

## 2024-11-18 DIAGNOSIS — I48.0 PAF (PAROXYSMAL ATRIAL FIBRILLATION) (H): Chronic | ICD-10-CM

## 2024-11-18 DIAGNOSIS — N18.30 CKD (CHRONIC KIDNEY DISEASE) STAGE 3, GFR 30-59 ML/MIN (H): Primary | ICD-10-CM

## 2024-11-18 DIAGNOSIS — N18.31 STAGE 3A CHRONIC KIDNEY DISEASE (H): ICD-10-CM

## 2024-11-18 DIAGNOSIS — Z79.01 LONG TERM CURRENT USE OF ANTICOAGULANT THERAPY: Chronic | ICD-10-CM

## 2024-11-18 DIAGNOSIS — Z79.01 LONG TERM CURRENT USE OF ANTICOAGULANT THERAPY: Primary | Chronic | ICD-10-CM

## 2024-11-18 DIAGNOSIS — I48.91 ATRIAL FIBRILLATION, UNSPECIFIED TYPE (H): ICD-10-CM

## 2024-11-18 DIAGNOSIS — R04.0 EPISTAXIS: Primary | ICD-10-CM

## 2024-11-18 LAB
ALBUMIN MFR UR ELPH: <6 MG/DL
ALBUMIN SERPL BCG-MCNC: 3.9 G/DL (ref 3.5–5.2)
ANION GAP SERPL CALCULATED.3IONS-SCNC: 13 MMOL/L (ref 7–15)
BUN SERPL-MCNC: 42.6 MG/DL (ref 8–23)
CALCIUM SERPL-MCNC: 9.5 MG/DL (ref 8.8–10.4)
CHLORIDE SERPL-SCNC: 103 MMOL/L (ref 98–107)
CREAT SERPL-MCNC: 1.36 MG/DL (ref 0.51–0.95)
CREAT UR-MCNC: 19.3 MG/DL
CREAT UR-MCNC: 19.3 MG/DL
EGFRCR SERPLBLD CKD-EPI 2021: 43 ML/MIN/1.73M2
ERYTHROCYTE [DISTWIDTH] IN BLOOD BY AUTOMATED COUNT: 14.3 % (ref 10–15)
GLUCOSE SERPL-MCNC: 108 MG/DL (ref 70–99)
HCO3 SERPL-SCNC: 22 MMOL/L (ref 22–29)
HCT VFR BLD AUTO: 39 % (ref 35–47)
HGB BLD-MCNC: 12.8 G/DL (ref 11.7–15.7)
HOLD SPECIMEN: NORMAL
INR BLD: 1.7 (ref 0.9–1.1)
MCH RBC QN AUTO: 29.8 PG (ref 26.5–33)
MCHC RBC AUTO-ENTMCNC: 32.8 G/DL (ref 31.5–36.5)
MCV RBC AUTO: 91 FL (ref 78–100)
MICROALBUMIN UR-MCNC: <12 MG/L
MICROALBUMIN/CREAT UR: NORMAL MG/G{CREAT}
PHOSPHATE SERPL-MCNC: 2.8 MG/DL (ref 2.5–4.5)
PLATELET # BLD AUTO: 192 10E3/UL (ref 150–450)
POTASSIUM SERPL-SCNC: 4.5 MMOL/L (ref 3.4–5.3)
PROT/CREAT 24H UR: NORMAL MG/G{CREAT}
PTH-INTACT SERPL-MCNC: 123 PG/ML (ref 15–65)
RBC # BLD AUTO: 4.3 10E6/UL (ref 3.8–5.2)
SODIUM SERPL-SCNC: 138 MMOL/L (ref 135–145)
WBC # BLD AUTO: 8.5 10E3/UL (ref 4–11)

## 2024-11-18 PROCEDURE — 83970 ASSAY OF PARATHORMONE: CPT

## 2024-11-18 PROCEDURE — 84156 ASSAY OF PROTEIN URINE: CPT

## 2024-11-18 PROCEDURE — 85610 PROTHROMBIN TIME: CPT

## 2024-11-18 PROCEDURE — 85027 COMPLETE CBC AUTOMATED: CPT

## 2024-11-18 PROCEDURE — 99213 OFFICE O/P EST LOW 20 MIN: CPT

## 2024-11-18 PROCEDURE — 82570 ASSAY OF URINE CREATININE: CPT

## 2024-11-18 PROCEDURE — 36415 COLL VENOUS BLD VENIPUNCTURE: CPT

## 2024-11-18 PROCEDURE — 82043 UR ALBUMIN QUANTITATIVE: CPT

## 2024-11-18 PROCEDURE — 80069 RENAL FUNCTION PANEL: CPT

## 2024-11-18 ASSESSMENT — PAIN SCALES - GENERAL: PAINLEVEL_OUTOF10: NO PAIN (0)

## 2024-11-18 NOTE — PROGRESS NOTES
Assessment & Plan     Epistaxis  Patient is a 64 year-old female with hypertension, CHF, CAD, CKD, and atrial fibrillation presenting for ED follow-up after being seen at [facility] for acute right epistaxis. Rhino Rocket placed in ED with recommendation to keep in place for 7 days. Rhino Rocket has migrated out of nose by nearly 1 inch. Removed today without recurrent epistaxis. Advised to avoid vigorous activity, bending over, blowing nose, or use of nasal sprays. Represent to the ED if recurrent episode occurs or bleeding uncontrolled at home despite applied pressure. Patient understands and is agreeable to plan as discussed in clinic.    PAF (paroxysmal atrial fibrillation) (H)  Anticoagulated on Coumadin.     Long-term (current) use of anticoagulants [Z79.01]  Anticoagulated. Has been holding Coumadin since Thursday 11/15/24 due to epistaxis. Advised to restart based on anticoagulation clinic recommendations.       Isela Raza is a 64 year old, presenting for the following health issues:  NOSE BLEED       11/18/2024     3:20 PM   Additional Questions   Roomed by Jaymie LONG   Accompanied by Self     HPI     Hospital Follow-up Visit:    Hospital/Nursing Home/IP Rehab Facility:  [facility]  Date of Admission: 11/15/2024  Date of Discharge: 11/15/2024  Reason(s) for Admission: Nose/eyes bleed  Was the patient in the ICU or did the patient experience delirium during hospitalization?  No  Do you have any other stressors you would like to discuss with your provider? No    Problems taking medications regularly:  None  Medication changes since discharge: None  Problems adhering to non-medication therapy:  None    Summary of hospitalization:  CareEverywhere information obtained and reviewed  Diagnostic Tests/Treatments reviewed.  Follow up needed: none  Other Healthcare Providers Involved in Patient s Care:         None  Update since discharge: improved.       Plan of care communicated with  "patient           Was seen at [facility] ED on 11/15/24 for acute epistaxis, was unable to get bleeding controlled at home. Rhino Rocket placed in ED. ENT consulted who suggested holding coumadin and keeping Rhino Rocket in place for 7 days. Denies bleeding around Rhino Rocket or from left nostril. Has noticed that packing has slowing migrated outwards. Attempted to instill additional fluid into lumen without improvement in migration.     Has been holding coumadin since ED encounter.         Objective    /76   Pulse 72   Temp 97.8  F (36.6  C) (Temporal)   Resp 22   Ht 1.593 m (5' 2.72\")   Wt 113.9 kg (251 lb)   SpO2 96%   BMI 44.86 kg/m    Body mass index is 44.86 kg/m .  Physical Exam  Vitals reviewed.   Constitutional:       General: She is not in acute distress.     Appearance: Normal appearance. She is not ill-appearing.   HENT:      Head: Normocephalic and atraumatic.      Nose: No laceration, congestion or rhinorrhea.      Left Nostril: No epistaxis (Rhino Rocket removed. Only 10 ml of fluid in place at time of removal. No evidence of bleeding following removal.).   Skin:     General: Skin is warm and dry.      Capillary Refill: Capillary refill takes less than 2 seconds.      Findings: No lesion or rash.   Neurological:      Mental Status: She is alert.   Psychiatric:         Attention and Perception: Attention and perception normal.         Mood and Affect: Mood and affect normal.              Signed Electronically by: SERENA Mendez CNP    "

## 2024-11-18 NOTE — TELEPHONE ENCOUNTER
Pt walked in and asked if appointment was available today for a provider to see and reassess her nose bleed as it is continuing to bleed and patient is on blood thinning medication. Pt added to Cait Duggan schedule at 2:30pm.

## 2024-11-18 NOTE — PROGRESS NOTES
ANTICOAGULATION MANAGEMENT     Luz Marina Live 64 year old female is on warfarin with subtherapeutic INR result. (Goal INR 2.0-3.0)    Recent labs: (last 7 days)     11/18/24  1337   INR 1.7*       ASSESSMENT     Source(s): Chart Review  Previous INR was Therapeutic last visit; previously outside of goal range  Medication, diet, health changes since last INR chart reviewed; none identified  In [place] ER on 11/15/24 for nosebleed, packing applied and told to hold wafarin.  Was seen today in clinic and packing removed no further bleeding.  INR in ER was 3.8, will decrease warfarin dose back to previous dosing, see calendar  Discussed with [facility], Radha Mcduffie.         PLAN     Unable to reach Luz Marina today.    Left message to continue current dose of warfarin 10 mg tonight. Request call back for assessment.    Follow up required to discuss out of range result     Maine Cole, RN  11/18/2024  Anticoagulation Clinic  [facility] for routing messages: norah GARZA [facility] patient phone line: [phone]

## 2024-11-18 NOTE — TELEPHONE ENCOUNTER
Patient is calling and stated she is usually seen at the [facility], but will have her labs drawn at the [facility] due to the distance from home.    Patient stated her  is seen by Dr. Joshua MD at the [facility].    Patient verbalized while out of state she was seen in the emergency room (please see noted telephone triage dated 11/15/2024) for a nose bleed.      Patient stated her nose was packed and looks like it is coming out at this time.  She was calling to request an appointment to have a provider assess her nose packing.  She stated she has a lab appointment at the [facility] at 1:30 pm today.  No available appointments at this time for today.  Informed patient she may call a nearby Urgent care for possible assessment, but may need to go to emergency room for her concern.  Patient stated understanding.    Patient is requesting a message be sent to [facility] staff team to ask any of the providers if they would be willing to assess her nose packing when she comes in for labs at 1:30 pm.    Will forward to staff pool in [facility] for review.    Yareli Giordano RN

## 2024-11-18 NOTE — PATIENT INSTRUCTIONS
Today we removed the packing that was placed in the emergency department. There does not appear to be any further bleeding in the nose from what I can tell. Please avoid blowing your nose or using nasal sprays. I would also avoid vigorous activity and bending forward to prevent re-bleeding. If the bleeding occurs, please go back to the ED.

## 2024-11-18 NOTE — TELEPHONE ENCOUNTER
Routing to in office providers, since ED visit was out of state I am not sure that an RN can do anything without provider visit    Patient is requesting a message be sent to [facility] staff team to ask any of the providers if they would be willing to assess her nose packing when she comes in for labs at 1:30 pm.

## 2024-11-19 NOTE — PROGRESS NOTES
ANTICOAGULATION MANAGEMENT     Luz Marina Live 64 year old female is on warfarin with subtherapeutic INR result. (Goal INR 2.0-3.0)    Recent labs: (last 7 days)     11/18/24  1337   INR 1.7*       ASSESSMENT     Source(s): Chart Review  Previous INR was Therapeutic last visit; previously outside of goal range  Medication, diet, health changes since last INR: In [place] ER on 11/15/24 for nosebleed, packing applied and told to hold wafarin. Was seen today in clinic and packing removed no further bleeding. INR in ER was 3.8, will decrease warfarin dose back to previous dosing, see calendar          PLAN     Recommended plan for temporary change(s) affecting INR     Dosing Instructions: decrease your warfarin dose (7.4% change) with next INR in 1 week       Summary  As of 11/18/2024      Full warfarin instructions:  7.5 mg every Sun, Tue, Thu; 10 mg all other days   Next INR check:  11/25/2024               Detailed voice message left for Luz Marina with dosing instructions and follow up date.   Sent [employer]ASS message with dosing and follow up instructions    Contact [phone] to schedule and with any changes, questions or concerns.     Education provided: None required    Plan made with [facility] Pharmacist Radha RIVAS, RN  11/19/2024  Anticoagulation Clinic  [facility] for routing messages: p ANTICOAG [place]  [facility] patient phone line: [phone]        _______________________________________________________________________     Anticoagulation Episode Summary       Current INR goal:  2.0-3.0   TTR:  68.7% (1 y)   Target end date:  Indefinite   Send INR reminders to:  DEBBIE ISIDRO    Indications    Long-term (current) use of anticoagulants [Z79.01] [Z79.01]  PAF (paroxysmal atrial fibrillation) (H) [I48.0]  Atrial fibrillation  unspecified type (H) [I48.91]             Comments:  --             Anticoagulation Care Providers       Provider Role Specialty Phone number    Eliz Nguyen MD Referring  Internal Medicine [phone]    Elodia Tang MD Referring Family Medicine [phone]

## 2024-11-25 ENCOUNTER — ANTICOAGULATION THERAPY VISIT (OUTPATIENT)
Dept: ANTICOAGULATION | Facility: CLINIC | Age: 64
End: 2024-11-25

## 2024-11-25 ENCOUNTER — LAB (OUTPATIENT)
Dept: LAB | Facility: CLINIC | Age: 64
End: 2024-11-25
Payer: COMMERCIAL

## 2024-11-25 DIAGNOSIS — Z79.01 LONG TERM CURRENT USE OF ANTICOAGULANT THERAPY: Primary | Chronic | ICD-10-CM

## 2024-11-25 DIAGNOSIS — I48.0 PAF (PAROXYSMAL ATRIAL FIBRILLATION) (H): Chronic | ICD-10-CM

## 2024-11-25 DIAGNOSIS — I48.91 ATRIAL FIBRILLATION, UNSPECIFIED TYPE (H): ICD-10-CM

## 2024-11-25 LAB — INR BLD: 1.9 (ref 0.9–1.1)

## 2024-11-25 PROCEDURE — 85610 PROTHROMBIN TIME: CPT

## 2024-11-25 PROCEDURE — 36416 COLLJ CAPILLARY BLOOD SPEC: CPT

## 2024-11-25 NOTE — PROGRESS NOTES
ANTICOAGULATION MANAGEMENT     Luz Marina Live 64 year old female is on warfarin with subtherapeutic INR result. (Goal INR 2.0-3.0)    Recent labs: (last 7 days)     11/25/24  1422   INR 1.9*       ASSESSMENT     Warfarin Lab Questionnaire    Warfarin Doses Last 7 Days      11/25/2024     2:14 PM   Dose in Tablet or Mg   TAB or MG? milligram (mg)     Pt Rptd Dose SUNDAY MONDAY TUESDAY WED THURS FRIDAY SATURDAY 11/25/2024   2:14 PM 7.5 10 7.5 10 7.5 10 10         11/25/2024   Warfarin Lab Questionnaire   Missed doses within past 14 days? Yes   If yes; please list when: nov 14 through 17   Changes in diet or alcohol within past 14 days? No   Medication changes since last result? No   Injuries or illness since last result? No   New shortness of breath, severe headaches or sudden changes in vision since last result? No   Abnormal bleeding since last result? Yes   If yes, please explain: bloody nose had to get packed   Upcoming surgery, procedure? No        Previous result: Subtherapeutic  Additional findings: None       PLAN     Recommended plan for no diet, medication or health factor changes affecting INR     Dosing Instructions: Increase your warfarin dose (4% change) with next INR in 1 week       Summary  As of 11/25/2024      Full warfarin instructions:  7.5 mg every Sun, Thu; 10 mg all other days   Next INR check:  12/2/2024               Detailed voice message left for Luz Marina with dosing instructions and follow up date.   Sent [employer] message with dosing and follow up instructions    Contact [phone] to schedule and with any changes, questions or concerns.     Education provided: Contact [phone] with any changes, questions or concerns.     Plan made per [facility] anticoagulation protocol    Maine Cole, RN  11/25/2024  Anticoagulation Clinic  [facility] for routing messages: p ANTICOAG [place]  [facility] patient phone line:  [phone]        _______________________________________________________________________     Anticoagulation Episode Summary       Current INR goal:  2.0-3.0   TTR:  68.5% (1 y)   Target end date:  Indefinite   Send INR reminders to:  [facility] [employer] [place]    Indications    Long-term (current) use of anticoagulants [Z79.01] [Z79.01]  PAF (paroxysmal atrial fibrillation) (H) [I48.0]  Atrial fibrillation  unspecified type (H) [I48.91]             Comments:  --             Anticoagulation Care Providers       Provider Role Specialty Phone number    Eliz Nguyen MD Referring Internal Medicine [phone]    Elodia Tang MD Referring Family Medicine [phone]

## 2024-12-03 ENCOUNTER — LAB (OUTPATIENT)
Dept: LAB | Facility: CLINIC | Age: 64
End: 2024-12-03
Payer: COMMERCIAL

## 2024-12-03 ENCOUNTER — MYC MEDICAL ADVICE (OUTPATIENT)
Dept: ANTICOAGULATION | Facility: CLINIC | Age: 64
End: 2024-12-03

## 2024-12-03 ENCOUNTER — ANTICOAGULATION THERAPY VISIT (OUTPATIENT)
Dept: ANTICOAGULATION | Facility: CLINIC | Age: 64
End: 2024-12-03

## 2024-12-03 DIAGNOSIS — I48.0 PAF (PAROXYSMAL ATRIAL FIBRILLATION) (H): Chronic | ICD-10-CM

## 2024-12-03 DIAGNOSIS — Z79.01 LONG TERM CURRENT USE OF ANTICOAGULANT THERAPY: Primary | Chronic | ICD-10-CM

## 2024-12-03 DIAGNOSIS — I48.91 ATRIAL FIBRILLATION, UNSPECIFIED TYPE (H): ICD-10-CM

## 2024-12-03 LAB — INR BLD: 3.9 (ref 0.9–1.1)

## 2024-12-03 PROCEDURE — 85610 PROTHROMBIN TIME: CPT

## 2024-12-03 PROCEDURE — 36416 COLLJ CAPILLARY BLOOD SPEC: CPT

## 2024-12-03 NOTE — PROGRESS NOTES
ANTICOAGULATION MANAGEMENT     Luz Marina Live 64 year old female is on warfarin with supratherapeutic INR result. (Goal INR 2.0-3.0)    Recent labs: (last 7 days)     12/03/24  1347   INR 3.9*       ASSESSMENT     Warfarin Lab Questionnaire    Warfarin Doses Last 7 Days      12/3/2024     1:45 PM   Dose in Tablet or Mg   TAB or MG? milligram (mg)     Pt Rptd Dose HARLAN MONDAY TUESDAY WED THURS FRIDAY SATURDAY   12/3/2024   1:45 PM 7.5 10 10 10 7.5 10 10         12/3/2024   Warfarin Lab Questionnaire   Missed doses within past 14 days? No   Changes in diet or alcohol within past 14 days? No   Medication changes since last result? No   Injuries or illness since last result? No   New shortness of breath, severe headaches or sudden changes in vision since last result? No   Abnormal bleeding since last result? No   Upcoming surgery, procedure? No   Best number to call with results? [phone]        Previous result: Subtherapeutic  Additional findings: None       PLAN     Recommended plan for no diet, medication or health factor changes affecting INR     Dosing Instructions: hold dose then decrease your warfarin dose (4% change) with next INR in 7-10 days       Summary  As of 12/3/2024      Full warfarin instructions:  12/3: Hold; Otherwise 7.5 mg every Sun, Tue, Thu; 10 mg all other days   Next INR check:  12/13/2024               Detailed voice message left for Luz Marina with dosing instructions and follow up date.   Sent [employer] message with dosing and follow up instructions    Contact [phone] to schedule and with any changes, questions or concerns.     Education provided: Contact [phone] with any changes, questions or concerns.     Plan made per [facility] anticoagulation protocol    Maine Cole, RN  12/3/2024  Anticoagulation Clinic  [facility] for routing messages: p ANTICOAG [place]  [facility] patient phone line: [phone]        _______________________________________________________________________      Anticoagulation Episode Summary       Current INR goal:  2.0-3.0   TTR:  67.4% (1 y)   Target end date:  Indefinite   Send INR reminders to:  DEBBIE GREG SANNA    Indications    Long-term (current) use of anticoagulants [Z79.01] [Z79.01]  PAF (paroxysmal atrial fibrillation) (H) [I48.0]  Atrial fibrillation  unspecified type (H) [I48.91]             Comments:  --             Anticoagulation Care Providers       Provider Role Specialty Phone number    Eliz Nguyen MD Referring Internal Medicine [phone]    Elodia Tang MD Referring Family Medicine [phone]

## 2024-12-20 ENCOUNTER — ANCILLARY PROCEDURE (OUTPATIENT)
Dept: MAMMOGRAPHY | Facility: CLINIC | Age: 64
End: 2024-12-20
Attending: INTERNAL MEDICINE
Payer: COMMERCIAL

## 2024-12-20 DIAGNOSIS — Z12.31 VISIT FOR SCREENING MAMMOGRAM: ICD-10-CM

## 2024-12-20 PROCEDURE — 77067 SCR MAMMO BI INCL CAD: CPT | Performed by: RADIOLOGY

## 2024-12-20 PROCEDURE — 77063 BREAST TOMOSYNTHESIS BI: CPT | Performed by: RADIOLOGY

## 2024-12-27 ENCOUNTER — TELEPHONE (OUTPATIENT)
Dept: GASTROENTEROLOGY | Facility: CLINIC | Age: 64
End: 2024-12-27
Payer: COMMERCIAL

## 2024-12-27 DIAGNOSIS — Z12.11 SPECIAL SCREENING FOR MALIGNANT NEOPLASMS, COLON: Primary | ICD-10-CM

## 2024-12-27 RX ORDER — BISACODYL 5 MG/1
TABLET, DELAYED RELEASE ORAL
Qty: 4 TABLET | Refills: 0 | Status: SHIPPED | OUTPATIENT
Start: 2024-12-27

## 2024-12-27 NOTE — TELEPHONE ENCOUNTER
Pre visit planning completed.      Procedure details:    Patient scheduled for Colonoscopy on 01/09/2025.     Arrival time: 0845. Procedure time 1015    Facility location: [facility]; [address], [place] [address]. Check in location: Main entrance at registration desk.    Sedation type: MAC    Pre op exam needed?  Yes, patient had pre-op eval on 12/20/2024, however PAC eval was needed.  Message sent to Samantha PIRES RN to review.     Indication for procedure: History of colonic polyps      Chart review:     Electronic implanted devices? No    Recent diagnosis of diverticulitis within the last 6 weeks? No      Medication review:    Diabetic? No    Anticoagulants? Yes Coumadin (Warfarin): Recommended HOLD 5 days before procedure.  Consult with your managing provider.    Weight loss medication/injectable? No GLP-1 medication per patient's medication list. Nursing to verify with pre-assessment call.    Other medication HOLDING recommendations:  Farxiga hold 3 days, consult with prescribing provider.       Prep for procedure:     Bowel prep recommendation: Standard Golytely. Bowel prep sent to    [facility] #[id number] - [place], [place] - [address]  Due to: CKD noted    Procedure information and instructions sent via [employer] Miller RN  Endoscopy Procedure Pre Assessment   [phone] option 2

## 2024-12-27 NOTE — TELEPHONE ENCOUNTER
Message returned from Samantha, Per Dr. Sharma, patient does need a PAC eval.  varinode message sent to patient to schedule.     Miguelina Miller RN

## 2024-12-28 ENCOUNTER — HEALTH MAINTENANCE LETTER (OUTPATIENT)
Age: 64
End: 2024-12-28

## 2024-12-30 NOTE — TELEPHONE ENCOUNTER
Attempted to contact patient in order to complete pre assessment questions.     No answer. Left message to return call to [phone] option 2.    Callback required communication sent via [employer].        Rosemary Espinoza RN  Endoscopy Procedure Pre Assessment

## 2024-12-31 RX ORDER — METOCLOPRAMIDE 10 MG/1
TABLET ORAL
Qty: 2 TABLET | Refills: 0 | Status: SHIPPED | OUTPATIENT
Start: 2024-12-31

## 2024-12-31 NOTE — TELEPHONE ENCOUNTER
FUTURE VISIT INFORMATION      SURGERY INFORMATION:  Date: 1/9/25  Location:  gi  Surgeon:  Evelin Renee MD   Anesthesia Type:  mac  Procedure: Colonoscopy     RECORDS REQUESTED FROM:       Primary Care Provider: Eliz Nguyen MD - [facility]    Pertinent Medical History: bolivar, cad, hypertension, paf, moderate tricuspid insufficiency, atrial fibrillation, chronic diastolic heart failure    Most recent EKG+ Tracing: 10/9/23    Most recent ECHO: 6/16/23    Most recent Coronary Angiogram: 10/9/23    Most recent PFT's: 11/8/23

## 2024-12-31 NOTE — TELEPHONE ENCOUNTER
Pre assessment completed for upcoming procedure.   (Please see previous telephone encounter notes for complete details)    Patient returned call.       Procedure details:    Arrival time and facility location reviewed.    Pre op exam needed? Yes. Patient needs to complete PAC eval within 30 days of procedure. Informed patient PAC eval is needed via discussion during call.   Patient did have pre op with primary and was approved to have procedure. Patient is questioning why she needs a PAC appointment. RN did reach out to KATE Mcintyre to verify why PAC eval is needed. PAC eval has been requested by anesthesia staff. Patient is aware and has been given PAC clinic number. She will be out of state today until 1/6. Will need to make appointment for 1/7.     Designated  policy reviewed. Instructed to have someone stay 24  hours post procedure.       Medication review:    Medications reviewed. Please see supporting documentation below. Holding recommendations discussed (if applicable).       Prep for procedure:     Procedure prep instructions reviewed.        Any additional information needed:  N/A      Patient verbalized understanding and had no questions or concerns at this time.      Lou Arevalo RN  Endoscopy Procedure Pre Assessment   [phone] option 2

## 2025-01-07 ENCOUNTER — PRE VISIT (OUTPATIENT)
Dept: SURGERY | Facility: CLINIC | Age: 65
End: 2025-01-07

## 2025-01-07 ENCOUNTER — VIRTUAL VISIT (OUTPATIENT)
Dept: SURGERY | Facility: CLINIC | Age: 65
End: 2025-01-07
Payer: COMMERCIAL

## 2025-01-07 ENCOUNTER — ANESTHESIA EVENT (OUTPATIENT)
Dept: GASTROENTEROLOGY | Facility: CLINIC | Age: 65
End: 2025-01-07
Payer: COMMERCIAL

## 2025-01-07 VITALS — WEIGHT: 250 LBS | HEIGHT: 62 IN | BODY MASS INDEX: 46.01 KG/M2

## 2025-01-07 DIAGNOSIS — Z86.0100 HISTORY OF COLONIC POLYPS: Primary | ICD-10-CM

## 2025-01-07 PROCEDURE — 98001 SYNCH AUDIO-VIDEO NEW LOW 30: CPT | Performed by: PHYSICIAN ASSISTANT

## 2025-01-07 ASSESSMENT — COPD QUESTIONNAIRES: COPD: 0

## 2025-01-07 ASSESSMENT — PAIN SCALES - GENERAL: PAINLEVEL_OUTOF10: NO PAIN (0)

## 2025-01-07 ASSESSMENT — ENCOUNTER SYMPTOMS
SEIZURES: 0
DYSRHYTHMIAS: 1

## 2025-01-07 NOTE — PROGRESS NOTES
Luz Marina is a 64 year old who is being evaluated via a billable video visit.    How would you like to obtain your AVS? MyChart  If the video visit is dropped, the invitation should be resent by: Text to cell phone: [phone]    Subjective   Luz Marina is a 64 year old, presenting for the following health issues:  No chief complaint on file.    HPI           Physical Exam

## 2025-01-07 NOTE — PATIENT INSTRUCTIONS
Name:  Luz Marina Live   MRN:  [id number]   :  1960   Today's Date:  2025     GI Lab procedures:    A representative from the GI Lab will contact you regarding arrival date and time.    Your procedure will be at the [address] [place] [address] address on 25    You were seen today in the PAC Clinic.   (Pre-operative Anesthesia Assessment Center)  [facility] [place]  [address]  [address]   phone [phone]    You had a pre-operative assessment done.    Anesthesia recommendations for medications:    Hold Aspirin for 2 days before procedure.  Hold Multivitamins for the next couple days before procedure.   Hold Supplements for the next couple of days before procedure. Omega 3    Hold Ibuprofen for 2 days before procedure.   Hold Naproxen for 2 days before procedure.     No alcohol or cannabis products for 24 hours before your procedure      Hold Dapagliflozin (Farxiga) for 3 days before procedure        Please hold the following medications the day of procedure:    Furosemide (lasix)  No ointments or creams        Please take these medications the day of procedure:    Hydroxychloroquine (Plaquenil)  Metoprolol tartrate   Sacubitril-valsartan (Entresto)          For questions or appointments, call:  [facility]: [phone], option 2.  Monday through Friday, 8 a.m. to 4:30 p.m.  (If it is after hours, please reach out to the clinic or provider that scheduled your appointment)

## 2025-01-07 NOTE — CONSULTS
Anesthesia Consult Note    Reason for consult:   High Risk consult  (Z86.0100) History of colonic polyps  (primary encounter diagnosis)  (Z01.818) Pre-operative examination      Date of Encounter: 1/7/2025  Referring Physician: Dr. Renee  Primary Care Physician:  Eliz Nguyen  Luz Marina Live is a 64 year old who is being seen in our clinic for high risk consult due to comorbidities of paroxysmal A-fib, PFO, pulmonary hypertension, hypertension, hyperlipidemia, coronary artery disease, obstructive sleep apnea, morbid obesity, SLE complicated by lupus nephritis and a history of colon polyps.  The patient has an upcoming colonoscopy and to note did have her H&P completed by her primary care physician Dr. Nguyen on 12/20/2024.    History of bleeding/coagulopathy  Coumadin and ASA 81    History of anesthesia issues in patient or 1st degree relative  No previous issues with anesthesia for the patient or a first degree relative    History is obtained from the patient and chart review    Past Medical History  Past Medical History:   Diagnosis Date    Arthritis     Congestive heart failure (H) 2012    COPD (chronic obstructive pulmonary disease) (H)     Heart disease 2006    History of blood transfusion 2009    Hypertension     Hypovolemic shock (H) 02/28/2015    Lupus     Sepsis (H) 08/22/2015       Past Surgical History  Past Surgical History:   Procedure Laterality Date    CARDIAC SURGERY  08/27/2009    triple vessel coronary artery bypass grafting on 8/27/09    CARPAL TUNNEL RELEASE RT/LT  1996    bilateral    COLONOSCOPY      CV ANGIOGRAM CORONARY GRAFT N/A 10/09/2023    Procedure: Coronary Angiogram Graft;  Surgeon: Mahendra Collins MD;  Location: [facility] CARDIAC CATH LAB    CV CORONARY ANGIOGRAM  10/17/2019    Procedure: CV CORONARY ANGIOGRAM;  Surgeon: Mahendra Collins MD;  Location: [facility] CARDIAC CATH LAB    CV CORONARY ANGIOGRAM N/A 08/20/2021    Procedure: CV  CORONARY ANGIOGRAM;  Surgeon: Derek Escoto MD;  Location:  HEART CARDIAC CATH LAB    CV CORONARY ANGIOGRAM N/A 10/09/2023    Procedure: Coronary Angiogram with checking bypass grafts;  Surgeon: Mahendra Collins MD;  [facility]    CV PCI STENT DRUG ELUTING N/A 10/17/2019    Procedure: PCI Stent Drug Eluting;  Surgeon: Mahendra Collins MD;  [facility]    CV RIGHT HEART CATH MEASUREMENTS RECORDED N/A 08/20/2021    Procedure: CV RIGHT HEART CATH;  Surgeon: Derek Escoto MD;  [facility]    CV RIGHT HEART CATH MEASUREMENTS RECORDED N/A 09/13/2023    Procedure: Heart Cath Right Heart Cath;  Surgeon: Con Burkett MD;  Location:  HEART CARDIAC CATH LAB       Prior to Admission Medications  Current Outpatient Medications   Medication Sig Dispense Refill    aspirin 81 MG EC tablet Take 81 mg by mouth every morning.      atorvastatin (LIPITOR) 40 MG tablet Take 1 tablet (40 mg) by mouth daily (Patient taking differently: Take 40 mg by mouth every evening.) 90 tablet 3    dapagliflozin (FARXIGA) 10 MG TABS tablet Take 1 tablet (10 mg) by mouth daily (Patient taking differently: Take 10 mg by mouth every morning.) 90 tablet 3    furosemide (LASIX) 20 MG tablet TAKE 2 TABLETS BY MOUTH EVERY DAY (Patient taking differently: Take 40 mg by mouth every morning.) 180 tablet 3    hydrocortisone 2.5 % ointment PLEASE SEE ATTACHED FOR DETAILED DIRECTIONS      hydroxychloroquine (PLAQUENIL) 200 MG tablet Take 1 tablet (200 mg) by mouth 2 times daily. 180 tablet 3    ketoconazole (NIZORAL) 2 % external shampoo WASH AFFECTED AREAS ON THE FACE ONCE DAILY. LATHER AND LET SIT FOR 3-5 MINUTES BEFORE RINSING.      loratadine (CLARITIN) 10 MG tablet Take 10 mg by mouth every evening.      metoprolol tartrate (LOPRESSOR) 50 MG tablet Take 1 tablet (50 mg) by mouth 2 times daily 180 tablet 3    metroNIDAZOLE (METROCREAM)  0.75 % external cream Apply topically 2 times daily 45 g 3    MULTIPLE VITAMIN PO Take 1 tablet by mouth every morning. HAIR SKIN AND NAILS      omega-3 fatty acids 1200 MG capsule Take 2 capsules by mouth 2 times daily       polyethylene glycol (GOLYTELY) 236 g suspension The night before the exam at 6 pm drink an 8-ounce glass every 15 minutes until the jug is half empty. If you arrive before 11 AM: Drink the other half of the Golytely jug at 11 PM night before procedure. If you arrive after 11 AM: Drink the other half of the Golytely jug at 6 AM day of procedure. For additional instructions refer to your colonoscopy prep instructions. 4000 mL 0    sacubitril-valsartan (ENTRESTO) 24-26 MG per tablet TAKE 1/2 TABLET BY MOUTH TWICE A DAY 90 tablet 3    warfarin ANTICOAGULANT (COUMADIN) 5 MG tablet TAKE 10 MG EVERY TUE, SAT 7.5 MG ALL OTHER DAYS OR AS DIRECTED BY THE INR CLINIC (Patient taking differently: Take 10 mg by mouth daily (with dinner). Take 10 mg every Tue, Sat; 7.5 mg all other days or as directed by the INR clinic) 140 tablet 1    Azelastine HCl 137 MCG/SPRAY SOLN [place] 2 sprays into both nostrils 2 times daily (Patient not taking: Reported on 1/7/2025) 30 mL 4    bisacodyl (DULCOLAX) 5 MG EC tablet Take 2 tablets at 3 pm the day before your procedure. If your procedure is before 11 am, take 2 additional tablets at 11 pm. If your procedure is after 11 am, take 2 additional tablets at 6 am. For additional instructions refer to your colonoscopy prep instructions. 4 tablet 0    metoclopramide (REGLAN) 10 MG tablet Take one tablet 30 minutes prior to drinking bowel prep to help with nausea. 2 tablet 0    order for DME Autopap 10-16 cmH20 1 Device 0       Menstrual history: No LMP recorded. Patient is postmenopausal.:      Allergies  Allergies   Allergen Reactions    Sulfa Antibiotics GI Disturbance, Rash and Anaphylaxis    Seasonal Allergies        Social History  Social History     Socioeconomic History     Marital status:      Spouse name: Not on file    Number of children: 1    Years of education: Not on file    Highest education level: Not on file   Occupational History    Not on file   Tobacco Use    Smoking status: Former     Current packs/day: 1.50     Average packs/day: 1.5 packs/day for 5.0 years (7.5 ttl pk-yrs)     Types: Cigarettes     Passive exposure: Past    Smokeless tobacco: Never    Tobacco comments:     Smoked as a teenager   Vaping Use    Vaping status: Not on file   Substance and Sexual Activity    Alcohol use: Not Currently    Drug use: No    Sexual activity: Yes     Partners: Male     Birth control/protection: Male Surgical     Comment: vasectomy   Other Topics Concern    Parent/sibling w/ CABG, MI or angioplasty before 65F 55M? No   Social History Narrative    Not on file     Social Drivers of Health     Financial Resource Strain: Low Risk  (10/5/2023)    Financial Resource Strain     Within the past 12 months, have you or your family members you live with been unable to get utilities (heat, electricity) when it was really needed?: No   Food Insecurity: Low Risk  (10/5/2023)    Food Insecurity     Within the past 12 months, did you worry that your food would run out before you got money to buy more?: No     Within the past 12 months, did the food you bought just not last and you didn t have money to get more?: No   Transportation Needs: Low Risk  (10/5/2023)    Transportation Needs     Within the past 12 months, has lack of transportation kept you from medical appointments, getting your medicines, non-medical meetings or appointments, work, or from getting things that you need?: No   Physical Activity: Not on file   Stress: Not on file   Social Connections: Not on file   Interpersonal Safety: Low Risk  (11/18/2024)    Interpersonal Safety     Do you feel physically and emotionally safe where you currently live?: Yes     Within the past 12 months, have you been hit, slapped, kicked or  otherwise physically hurt by someone?: No     Within the past 12 months, have you been humiliated or emotionally abused in other ways by your partner or ex-partner?: No   Housing Stability: Low Risk  (10/5/2023)    Housing Stability     Do you have housing? : Yes     Are you worried about losing your housing?: No       Family History  Family History   Problem Relation Age of Onset    Arthritis Mother         ra    Connective Tissue Disorder Mother         lupus    Congenital Anomalies Mother         wholein heart    Cerebrovascular Disease Mother         TIA's    Cerebrovascular Disease Father     Diabetes Father     Hypertension Father     Cardiovascular Father         stents    Genitourinary Problems Father         kidney failure    Kidney Disease Father         kidney injury related to acute illness around time of death (RANDELL likely)    Cataracts Father     Arthritis Paternal Grandmother     Diabetes Brother     Glaucoma No family hx of     Macular Degeneration No family hx of        The complete review of systems is negative other than noted in the HPI or here. Anesthesia Evaluation   Pt has had prior anesthetic. Type: General.    No history of anesthetic complications       ROS/MED HX  ENT/Pulmonary:     (+) sleep apnea, uses CPAP,                                   (-) COPD   Neurologic:  - neg neurologic ROS  (-) no seizures, no CVA and no TIA   Cardiovascular: Comment: Edema    (+) Dyslipidemia hypertension- -  CAD angina-  CABG-date: 2009. stent-2019. 2 Drug Eluting Stent.    CHF etiology: HFpEF Last EF: 55-60% date: 6/16/23  KAYE (Chronic).             dysrhythmias, a-fib,  valvular problems/murmurs  TR. congenital heart disease PFO seen in 2018 echo. pulmonary hypertension, Previous cardiac testing   Echo: Date: 6/16/23 Results:    Stress Test:  Date: Results:    ECG Reviewed:  Date: 10/9/23 Results:  Sinus bradycardia   Right bundle branch block   Minimal voltage criteria for LVH, may be normal variant ( R  in aVL )   T wave abnormality, consider inferolateral ischemia   Abnormal ECG   Cath:  Date: 10/9/23 Results:      METS/Exercise Tolerance: 3 - Able to walk 1-2 blocks without stopping    Hematologic:     (+)       history of blood transfusion,         Musculoskeletal:  - neg musculoskeletal ROS     GI/Hepatic: Comment: History of colon polyps   (-) GERD   Renal/Genitourinary:     (+) renal disease (lupus nephritis),             Endo:     (+)               Obesity,       Psychiatric/Substance Use:  - neg psychiatric ROS     Infectious Disease:  - neg infectious disease ROS     Malignancy:  - neg malignancy ROS     Other: Comment: SLE             Virtual visit -  No vitals were obtained    Physical Exam  Constitutional: Awake, alert, cooperative, no apparent distress, and appears stated age.  Eyes: Pupils equal  HENT: Normocephalic  Respiratory: non labored breathing   Neurologic: Awake, alert, oriented to name, place and time.   Neuropsychiatric: Calm, cooperative. Normal affect.      Labs / testing: (personally reviewed)   Latest Reference Range & Units 11/18/24 13:37   Sodium 135 - 145 mmol/L 138   Potassium 3.4 - 5.3 mmol/L 4.5   Chloride 98 - 107 mmol/L 103   Carbon Dioxide (CO2) 22 - 29 mmol/L 22   Urea Nitrogen 8.0 - 23.0 mg/dL 42.6 (H)   Creatinine 0.51 - 0.95 mg/dL 1.36 (H)   GFR Estimate >60 mL/min/1.73m2 43 (L)   Calcium 8.8 - 10.4 mg/dL 9.5   Anion Gap 7 - 15 mmol/L 13   Phosphorus 2.5 - 4.5 mg/dL 2.8   Albumin 3.5 - 5.2 g/dL 3.9   Glucose 70 - 99 mg/dL 108 (H)      Latest Reference Range & Units 12/20/24 15:05   WBC 4.0 - 11.0 10e3/uL 7.2   Hemoglobin 11.7 - 15.7 g/dL 13.0   Hematocrit 35.0 - 47.0 % 41.1   Platelet Count 150 - 450 10e3/uL 194   RBC Count 3.80 - 5.20 10e6/uL 4.42   MCV 78 - 100 fL 93   MCH 26.5 - 33.0 pg 29.4   MCHC 31.5 - 36.5 g/dL 31.6   RDW 10.0 - 15.0 % 15.0       Coronary angiogram 10/9/23  Conclusion    Severe three vessel coronary artery disease s/p CABG with LIMA-LAD, SVG-OM1,  SVG-rPDA and otherwise mild non obstructive CAD elsewhere unchanged from prior study in 2021.    EKG 10/9/23  Sinus bradycardia   Right bundle branch block   Minimal voltage criteria for LVH, may be normal variant ( R in aVL )   T wave abnormality, consider inferolateral ischemia   Abnormal ECG     RHC 9/13/23  Conclusion         Right sided filling pressures are normal.    Left sided filling pressures are normal.    Mild elevated pulmonary hypertension.    Normal cardiac output level.    Echo 6/1/623  Interpretation Summary  Left ventricular function is normal.The ejection fraction is 55-60%.  Paradoxical septal motion consistent with right ventricular pressure overload  is present.  Global right ventricular function is normal.  Moderate tricuspid insufficiency is present. Pulmonary hypertension is  present. The right ventricular systolic pressure is approximated at 50.8 mmHg  plus the right atrial pressure.  Estimated mean right atrial pressure is normal.  No pericardial effusion is present.    Echo 2018  Interpretation Summary     Right ventricular function, chamber size, wall motion, and thickness are  normal.  PFO again documented with color doppler and Bubble study.  Mild to moderate tricuspid insufficiency is present.  Right ventricular systolic pressure is 61mmHg above the right atrial pressure.  No pericardial effusion is present.    Outside records reviewed from:  Care Everywhere    Assessment    Luz Marina Live is a 64 year old female seen as a PAC referral for risk assessment and optimization for anesthesia.    Plan/Recommendations  Pt will be optimized for the proposed procedure.  See below for details on the assessment, risk, and preoperative recommendations    NEUROLOGY  - No history of TIA, CVA or seizure  -Post Op delirium risk factors:  High co-morbid index    ENT  - No current airway concerns.  Will need to be reassessed day of surgery.  Mallampati: Unable to assess  TM: Unable to  assess    CARDIAC  - CAD  Well controlled on home regimen -status post CABG in 2009 and PCI with DESx2 in 2019.  The patient underwent coronary angiogram on 10/9/2023 which showed nonobstructive residual coronary artery disease  - CHF  Diastolic, HFpEF-patient underwent echo on 6/16/2023 which showed an EF of 55 to 60%.  There was paradoxical septal motion consistent with right ventricular pressure overload.  The patient was noted to have pulmonary hypertension on her echo as well as mild pulmonary hypertension on her right heart cath on 9/13/2023.  The patient is followed by Dr. Moreno and last seen on 10/16/2024 with plan for virtual follow-up in January 2025  - Afib  [id number] score:  4  - Hypertension  Well controlled  - Hyperlipidemia  Well controlled on home regimen  ~Mild pulmonary hypertension  ~Moderate tricuspid insufficiency  ~ In echo in 2018 the patient was found to have PFO  ~ The patient reports she is on farxiga for her heart and kidneys. She last took a dose either on 1/5 or 1/6.   - METS (Metabolic Equivalents)  Patient CANNOT perform 4 METS exercise without symptoms             Total Score: 1    Functional Capacity: Unable to complete 4 METS      RCRI-Moderate risk: Class 3  6.6% complication rate             Total Score: 2    RCRI: CAD    RCRI: CHF    ~The patient was seen for her preoperative H&P by her primary care she noted her chronic symptom of shortness of breath.  A pro terminal BNP was checked and came back at 2375.  2 months prior the number was at 2159 and 8 months prior the number is at 1265.  In discussion with the patient today she feels like she is in between her baseline.  She notes that her shortness of breath does worsen at times and gets better on its own.  She is able to climb up 1 flight of stairs but then has to rest and this has not changed for her.      PULMONARY  - Obstructive Sleep Apnea  KEITH with home CPAP.  Patient will be instructed to bring their home CPAP  "device to the hospital with them.  KEITH Medium Risk             Total Score: 3    KEITH: Hypertension    KEITH: BMI over 35 kg/m2    KEITH: Over 50 ys old      - Denies asthma or inhaler use  - Tobacco History    History   Smoking Status    Former    Types: Cigarettes   Smokeless Tobacco    Never       GI  - History of colon polyps-procedure as above history of colon polyps-procedure as above  PONV Medium Risk  Total Score: 2           1 AN PONV: Pt is Female    1 AN PONV: Patient is not a current smoker        /RENAL  - Baseline Creatinine  1.3-1.6  ~ Lupus nephritis     ENDOCRINE   - BMI: Estimated body mass index is 45.73 kg/m  as calculated from the following:    Height as of this encounter: 1.575 m (5' 2\").    Weight as of this encounter: 113.4 kg (250 lb).  Class 3 Obesity (BMI > 40) -  - No history of Diabetes Mellitus    HEME  VTE Low Risk 0.26%             Total Score: 1    VTE: Greater than 59 yrs old      - Coagulopathy second to Warfarin (Coumadin, Jantoven) -plan for 5-day hold without bridging  - Platelet disfunction second to Aspirin (Xi, many others) -recommend to hold the day of surgery of and day before per GI protocol    MSK  Patient is NOT Frail             Total Score: 0        IMMUNOLOGY  -  SLE -followed by  and last seen on 9/6/2024.  Patient will continue Plaquenil    The patient is optimized and acceptable candidate for proposed procedure. Arrival time, NPO, shower and medication instructions provided by nursing staff today.    Patient discussed with Dr. Vee.     Please refer to the physical examination documented by the anesthesiologist in the anesthesia record on the day of surgery.    Video-Visit Details    Type of service:  Video Visit    Provider received verbal consent for a Video Visit from the patient? Yes     Originating Location (pt. Location): Home    Distant Location (provider location):  Off-site  Mode of Communication:  Video Conference via [employer]  On the day of " service:     Prep time: 15 minutes  Visit time: 12 minutes  Documentation time: 15 minutes  ------------------------------------------  Total time: 42 minutes    Kelly De Paz PA-C    Preoperative Assessment Center  [facility]  Office phone: [phone]  Fax: [phone]

## 2025-01-09 ENCOUNTER — HOSPITAL ENCOUNTER (OUTPATIENT)
Facility: CLINIC | Age: 65
Discharge: HOME | End: 2025-01-09
Attending: INTERNAL MEDICINE | Admitting: INTERNAL MEDICINE
Payer: COMMERCIAL

## 2025-01-09 ENCOUNTER — DOCUMENTATION ONLY (OUTPATIENT)
Dept: ANTICOAGULATION | Facility: CLINIC | Age: 65
End: 2025-01-09

## 2025-01-09 ENCOUNTER — ANESTHESIA (OUTPATIENT)
Dept: GASTROENTEROLOGY | Facility: CLINIC | Age: 65
End: 2025-01-09
Payer: COMMERCIAL

## 2025-01-09 VITALS
SYSTOLIC BLOOD PRESSURE: 116 MMHG | OXYGEN SATURATION: 98 % | RESPIRATION RATE: 12 BRPM | DIASTOLIC BLOOD PRESSURE: 78 MMHG | HEART RATE: 61 BPM

## 2025-01-09 DIAGNOSIS — E66.01 MORBID OBESITY (H): Primary | Chronic | ICD-10-CM

## 2025-01-09 LAB — COLONOSCOPY: NORMAL

## 2025-01-09 PROCEDURE — G0121 COLON CA SCRN NOT HI RSK IND: HCPCS | Performed by: INTERNAL MEDICINE

## 2025-01-09 PROCEDURE — 370N000017 HC ANESTHESIA TECHNICAL FEE, PER MIN: Performed by: INTERNAL MEDICINE

## 2025-01-09 PROCEDURE — G0105 COLORECTAL SCRN; HI RISK IND: HCPCS | Performed by: INTERNAL MEDICINE

## 2025-01-09 PROCEDURE — 999N000010 HC STATISTIC ANES STAT CODE-CRNA PER MINUTE: Performed by: INTERNAL MEDICINE

## 2025-01-09 PROCEDURE — 250N000011 HC RX IP 250 OP 636

## 2025-01-09 PROCEDURE — 258N000003 HC RX IP 258 OP 636

## 2025-01-09 PROCEDURE — 250N000009 HC RX 250

## 2025-01-09 PROCEDURE — 45378 DIAGNOSTIC COLONOSCOPY: CPT | Performed by: INTERNAL MEDICINE

## 2025-01-09 RX ORDER — PROPOFOL 10 MG/ML
INJECTION, EMULSION INTRAVENOUS PRN
Status: DISCONTINUED | OUTPATIENT
Start: 2025-01-09 | End: 2025-01-09

## 2025-01-09 RX ORDER — LIDOCAINE HYDROCHLORIDE 20 MG/ML
INJECTION, SOLUTION INFILTRATION; PERINEURAL PRN
Status: DISCONTINUED | OUTPATIENT
Start: 2025-01-09 | End: 2025-01-09

## 2025-01-09 RX ORDER — SODIUM CHLORIDE, SODIUM LACTATE, POTASSIUM CHLORIDE, CALCIUM CHLORIDE 600; 310; 30; 20 MG/100ML; MG/100ML; MG/100ML; MG/100ML
INJECTION, SOLUTION INTRAVENOUS CONTINUOUS PRN
Status: DISCONTINUED | OUTPATIENT
Start: 2025-01-09 | End: 2025-01-09

## 2025-01-09 RX ORDER — PROCHLORPERAZINE MALEATE 5 MG/1
10 TABLET ORAL EVERY 6 HOURS PRN
Status: DISCONTINUED | OUTPATIENT
Start: 2025-01-09 | End: 2025-01-09 | Stop reason: HOSPADM

## 2025-01-09 RX ORDER — NALOXONE HYDROCHLORIDE 0.4 MG/ML
0.4 INJECTION, SOLUTION INTRAMUSCULAR; INTRAVENOUS; SUBCUTANEOUS
Status: DISCONTINUED | OUTPATIENT
Start: 2025-01-09 | End: 2025-01-09 | Stop reason: HOSPADM

## 2025-01-09 RX ORDER — NALOXONE HYDROCHLORIDE 0.4 MG/ML
0.2 INJECTION, SOLUTION INTRAMUSCULAR; INTRAVENOUS; SUBCUTANEOUS
Status: DISCONTINUED | OUTPATIENT
Start: 2025-01-09 | End: 2025-01-09 | Stop reason: HOSPADM

## 2025-01-09 RX ORDER — FLUMAZENIL 0.1 MG/ML
0.2 INJECTION, SOLUTION INTRAVENOUS
Status: DISCONTINUED | OUTPATIENT
Start: 2025-01-09 | End: 2025-01-09 | Stop reason: HOSPADM

## 2025-01-09 RX ORDER — PROPOFOL 10 MG/ML
INJECTION, EMULSION INTRAVENOUS CONTINUOUS PRN
Status: DISCONTINUED | OUTPATIENT
Start: 2025-01-09 | End: 2025-01-09

## 2025-01-09 RX ORDER — ONDANSETRON 4 MG/1
4 TABLET, ORALLY DISINTEGRATING ORAL EVERY 6 HOURS PRN
Status: DISCONTINUED | OUTPATIENT
Start: 2025-01-09 | End: 2025-01-09 | Stop reason: HOSPADM

## 2025-01-09 RX ORDER — ONDANSETRON 2 MG/ML
4 INJECTION INTRAMUSCULAR; INTRAVENOUS EVERY 6 HOURS PRN
Status: DISCONTINUED | OUTPATIENT
Start: 2025-01-09 | End: 2025-01-09 | Stop reason: HOSPADM

## 2025-01-09 RX ADMIN — LIDOCAINE HYDROCHLORIDE 60 MG: 20 INJECTION, SOLUTION INFILTRATION; PERINEURAL at 10:35

## 2025-01-09 RX ADMIN — PROPOFOL 20 MG: 10 INJECTION, EMULSION INTRAVENOUS at 10:30

## 2025-01-09 RX ADMIN — PROPOFOL 10 MG: 10 INJECTION, EMULSION INTRAVENOUS at 10:41

## 2025-01-09 RX ADMIN — PROPOFOL 10 MG: 10 INJECTION, EMULSION INTRAVENOUS at 10:43

## 2025-01-09 RX ADMIN — PROPOFOL 120 MCG/KG/MIN: 10 INJECTION, EMULSION INTRAVENOUS at 10:29

## 2025-01-09 RX ADMIN — PROPOFOL 10 MG: 10 INJECTION, EMULSION INTRAVENOUS at 10:32

## 2025-01-09 RX ADMIN — LIDOCAINE HYDROCHLORIDE 40 MG: 20 INJECTION, SOLUTION INFILTRATION; PERINEURAL at 10:29

## 2025-01-09 RX ADMIN — PROPOFOL 10 MG: 10 INJECTION, EMULSION INTRAVENOUS at 10:33

## 2025-01-09 RX ADMIN — PROPOFOL 10 MG: 10 INJECTION, EMULSION INTRAVENOUS at 10:31

## 2025-01-09 RX ADMIN — SODIUM CHLORIDE, POTASSIUM CHLORIDE, SODIUM LACTATE AND CALCIUM CHLORIDE: 600; 310; 30; 20 INJECTION, SOLUTION INTRAVENOUS at 10:26

## 2025-01-09 RX ADMIN — PROPOFOL 10 MG: 10 INJECTION, EMULSION INTRAVENOUS at 10:42

## 2025-01-09 ASSESSMENT — ENCOUNTER SYMPTOMS: DYSRHYTHMIAS: 1

## 2025-01-09 ASSESSMENT — COPD QUESTIONNAIRES: COPD: 1

## 2025-01-09 ASSESSMENT — ACTIVITIES OF DAILY LIVING (ADL)
ADLS_ACUITY_SCORE: 55

## 2025-01-09 NOTE — ANESTHESIA CARE TRANSFER NOTE
Patient: Luz Marina Live    Procedure: Procedure(s):  Colonoscopy       Diagnosis: History of colonic polyps [Z86.010]  Diagnosis Additional Information: No value filed.    Anesthesia Type:   MAC     Note:    Oropharynx: oropharynx clear of all foreign objects  Level of Consciousness: awake  Oxygen Supplementation: room air    Independent Airway: airway patency satisfactory and stable  Dentition: dentition unchanged  Vital Signs Stable: post-procedure vital signs reviewed and stable  Report to RN Given: handoff report given  Patient transferred to: Phase II    Handoff Report: Identifed the Patient, Identified the Reponsible Provider, Reviewed the pertinent medical history, Discussed the surgical course, Reviewed Intra-OP anesthesia mangement and issues during anesthesia, Set expectations for post-procedure period and Allowed opportunity for questions and acknowledgement of understanding  Vitals:  Vitals Value Taken Time   /59 01/09/25 1056   Temp     Pulse     Resp     SpO2 98 % 01/09/25 1058   Vitals shown include unfiled device data.    Electronically Signed By: SERENA Schuler CRNA  January 9, 2025  10:58 AM

## 2025-01-09 NOTE — PROGRESS NOTES
"ANTICOAGULATION  MANAGEMENT: Discharge Review    Luz Marina Live chart reviewed for anticoagulation continuity of care    Outpatient surgery/procedure on 1/9/25 for a colonscopy.    Discharge disposition: Home    Results:    No results for input(s): \"INR\", \"[id number]\", \"F2\", \"ALMWH\", \"AAUFH\" in the last 168 hours.  Anticoagulation inpatient management:     not applicable     Anticoagulation discharge instructions:     Warfarin dosing: home regimen continued   Bridging: No   INR goal change: No      Medication changes affecting anticoagulation: No    Additional factors affecting anticoagulation: No     PLAN     No adjustment to anticoagulation plan needed    My chart message sent to patient reminding her to schedule an INR around 1/16/25    No adjustment to Anticoagulation Calendar was required    Maine Cole RN  1/9/2025  Anticoagulation Clinic  [facility] for routing messages: p ANTICOAG [place]  [facility] patient phone line: [phone]  "

## 2025-01-09 NOTE — ANESTHESIA PREPROCEDURE EVALUATION
Anesthesia Pre-Procedure Evaluation    Patient: Luz Marina Live   MRN: [id number] : 1960        Procedure : Procedure(s):  Colonoscopy          Past Medical History:   Diagnosis Date    Arthritis     Congestive heart failure (H)     COPD (chronic obstructive pulmonary disease) (H)     Heart disease     History of blood transfusion     Hypertension     Hypovolemic shock (H) 2015    Lupus     Sepsis (H) 2015      Past Surgical History:   Procedure Laterality Date    CARDIAC SURGERY  2009    triple vessel coronary artery bypass grafting on 09    CARPAL TUNNEL RELEASE RT/LT      bilateral    COLONOSCOPY      CV ANGIOGRAM CORONARY GRAFT N/A 10/09/2023    Procedure: Coronary Angiogram Graft;  Surgeon: Mahendra Collins MD;  Location: [facility]    CV CORONARY ANGIOGRAM  10/17/2019    Procedure: CV CORONARY ANGIOGRAM;  Surgeon: Mahendra Collins MD;  Location: [facility]    CV CORONARY ANGIOGRAM N/A 2021    Procedure: CV CORONARY ANGIOGRAM;  Surgeon: Derek Escoto MD;  Location: [facility]    CV CORONARY ANGIOGRAM N/A 10/09/2023    Procedure: Coronary Angiogram with checking bypass grafts;  Surgeon: Mahendra Collins MD;  Location: [facility]    CV PCI STENT DRUG ELUTING N/A 10/17/2019    Procedure: PCI Stent Drug Eluting;  Surgeon: Mahendra Collins MD;  Location: [facility]    CV RIGHT HEART CATH MEASUREMENTS RECORDED N/A 2021    Procedure: CV RIGHT HEART CATH;  Surgeon: Derek Escoto MD;  Location: [facility]    CV RIGHT HEART CATH MEASUREMENTS RECORDED N/A 2023    Procedure: Heart Cath Right Heart Cath;  Surgeon: Con Burkett MD;  Location: [facility]      Allergies   Allergen Reactions    Sulfa Antibiotics GI Disturbance, Rash and Anaphylaxis    Seasonal Allergies       Social  History     Tobacco Use    Smoking status: Former     Current packs/day: 1.50     Average packs/day: 1.5 packs/day for 5.0 years (7.5 ttl pk-yrs)     Types: Cigarettes     Passive exposure: Past    Smokeless tobacco: Never    Tobacco comments:     Smoked as a teenager   Substance Use Topics    Alcohol use: Not Currently      Wt Readings from Last 1 Encounters:   01/07/25 113.4 kg (250 lb)        Anesthesia Evaluation   Pt has had prior anesthetic.     No history of anesthetic complications       ROS/MED HX  ENT/Pulmonary:     (+) sleep apnea,                         COPD,              Neurologic:       Cardiovascular:     (+)  hypertension- -  CAD angina-  - -   Taking blood thinners  Instructions Given to patient: warfarin held 1/3. CHF                  dysrhythmias, a-fib,             METS/Exercise Tolerance:     Hematologic:       Musculoskeletal:       GI/Hepatic:       Renal/Genitourinary:     (+) renal disease,             Endo:     (+)               Obesity,       Psychiatric/Substance Use:       Infectious Disease:       Malignancy:       Other:            Physical Exam    Airway        Mallampati: II   TM distance: > 3 FB   Neck ROM: full   Mouth opening: > 3 cm    Respiratory Devices and Support         Dental       (+) Edentulous      Cardiovascular   cardiovascular exam normal          Pulmonary   pulmonary exam normal                OUTSIDE LABS:  CBC:   Lab Results   Component Value Date    WBC 7.2 12/20/2024    WBC 8.5 11/18/2024    HGB 13.0 12/20/2024    HGB 12.8 11/18/2024    HCT 41.1 12/20/2024    HCT 39.0 11/18/2024     12/20/2024     11/18/2024     BMP:   Lab Results   Component Value Date     11/18/2024     10/16/2024    POTASSIUM 4.5 11/18/2024    POTASSIUM 4.4 10/16/2024    CHLORIDE 103 11/18/2024    CHLORIDE 106 10/16/2024    CO2 22 11/18/2024    CO2 25 10/16/2024    BUN 42.6 (H) 11/18/2024    BUN 40.1 (H) 10/16/2024    CR 1.36 (H) 11/18/2024    CR 1.40 (H)  10/16/2024     (H) 11/18/2024    GLC 93 10/16/2024     COAGS:   Lab Results   Component Value Date    PTT 29 10/09/2023    INR 2.00 (H) 12/20/2024    FIBR 723 (H) 02/28/2015     POC:   Lab Results   Component Value Date    BGM 99 02/27/2015     HEPATIC:   Lab Results   Component Value Date    ALBUMIN 3.9 11/18/2024    PROTTOTAL 8.5 (H) 10/16/2024    ALT 21 10/16/2024    AST 35 10/16/2024    ALKPHOS 123 10/16/2024    BILITOTAL 0.9 10/16/2024    EVAN <10 (L) 02/27/2015     OTHER:   Lab Results   Component Value Date    PH 7.39 08/29/2009    LACT 1.7 08/23/2015    A1C 5.2 07/13/2021    BHARGAV 9.5 11/18/2024    PHOS 2.8 11/18/2024    MAG 1.6 10/17/2019    LIPASE 60 (L) 02/27/2015    TSH 4.08 06/02/2023    T4 1.12 07/13/2021    CRP 16.0 (H) 03/24/2023    SED 43 (H) 10/16/2024       Anesthesia Plan    ASA Status:  3       Anesthesia Type: MAC.     - Reason for MAC: straight local not clinically adequate   Induction: Intravenous.   Maintenance: TIVA.        Consents    Anesthesia Plan(s) and associated risks, benefits, and realistic alternatives discussed. Questions answered and patient/representative(s) expressed understanding.     - Discussed: Risks, Benefits and Alternatives for BOTH SEDATION and the PROCEDURE were discussed     - Discussed with:  Patient            Postoperative Care    Pain management: IV analgesics, Oral pain medications, Multi-modal analgesia.   PONV prophylaxis: Ondansetron (or other 5HT-3), Background Propofol Infusion     Comments:               Kailee Bhatti MD    I have reviewed the pertinent notes and labs in the chart from the past 30 days and (re)examined the patient.  Any updates or changes from those notes are reflected in this note.            # Drug Induced Coagulation Defect: home medication list includes an anticoagulant medication  # Drug Induced Platelet Defect: home medication list includes an antiplatelet medication   # Hypertension: Noted on problem list  # Heart  "failure with preserved ejection fraction: EF >50% and home medication list includes sacubitril/valsartan (Entresto)          # Severe Obesity: Estimated body mass index is 45.73 kg/m  as calculated from the following:    Height as of 1/7/25: 1.575 m (5' 2\").    Weight as of 1/7/25: 113.4 kg (250 lb).             "

## 2025-01-09 NOTE — ANESTHESIA POSTPROCEDURE EVALUATION
Patient: Luz Marina Live    Procedure: Procedure(s):  Colonoscopy       Anesthesia Type:  MAC    Note:  Disposition: Outpatient   Postop Pain Control: Uneventful            Sign Out: Well controlled pain   PONV: No   Neuro/Psych: Uneventful            Sign Out: Acceptable/Baseline neuro status   Airway/Respiratory: Uneventful            Sign Out: Acceptable/Baseline resp. status   CV/Hemodynamics: Uneventful            Sign Out: Acceptable CV status; No obvious hypovolemia; No obvious fluid overload   Other NRE: NONE   DID A NON-ROUTINE EVENT OCCUR? No           Last vitals:  Vitals Value Taken Time   /78 01/09/25 1100   Temp     Pulse     Resp     SpO2 100 % 01/09/25 1103   Vitals shown include unfiled device data.    Electronically Signed By: Kailee Bhatti MD  January 9, 2025  11:03 AM

## 2025-01-12 NOTE — PROGRESS NOTES
Virtual Visit Details    Type of service:  Telephone Visit   Phone call duration: 30 minutes   Originating Location (pt. Location): Home    Distant Location (provider location):  On-site  Telephone visit completed due to the patient did not have access to video, while the distant provider did.         Expand All Collapse All     SLE  2. Vaginal bleeding  3. ASHD with remote CABG  4. Exertional shortness of breath/HFpEF  5. Dependent edema   6. Elevated weight  7. Paroxysmal atrial fibrillation (warfarin)  8. KEITH  9. Collagen vascular disease    Plan  1. Consider GLP-1 for weight loss with history of heart failure, ASHD with premature CAD, chronic renal failure, sleep disordered breath. This would be prescribed by primary care. / not cardiogy  2.  Continue weight loss, dietary and fluid monitory  3.  RTC May Filomena, sooner if not doing well    The patient returns for follow-up of heart failure.  There is no interim history of chest pain, tightness, paroxysmal nocturnal dyspnea, orthopnea, peripheral edema, palpitation, pre-syncope, syncope, dExercise tolerance is stable.  The patient is attempting to exercise regularly and following a sodium restricted, calorically appropriate diet.  Medications are reviewed and the patient is taking medications as prescribed.  The patient is generally sleeping well.            Latest Reference Range & Units 10/16/24 12:54   Sodium 135 - 145 mmol/L 141   Potassium 3.4 - 5.3 mmol/L 4.4   Chloride 98 - 107 mmol/L 106   Carbon Dioxide (CO2) 22 - 29 mmol/L 25   Urea Nitrogen 8.0 - 23.0 mg/dL 40.1 (H)   Creatinine 0.51 - 0.95 mg/dL 1.40 (H)   GFR Estimate >60 mL/min/1.73m2 42 (L)   Calcium 8.8 - 10.4 mg/dL 9.7   Anion Gap 7 - 15 mmol/L 10   Albumin 3.5 - 5.2 g/dL 4.2   Protein Total 6.4 - 8.3 g/dL 8.5 (H)   Alkaline Phosphatase 40 - 150 U/L 123   ALT 0 - 50 U/L 21   AST 0 - 45 U/L 35   Bilirubin Total <=1.2 mg/dL 0.9   CRP Inflammation <5.00 mg/L 19.00 (H)   Glucose 70 - 99 mg/dL 93   WBC  4.0 - 11.0 10e3/uL 6.1   Hemoglobin 11.7 - 15.7 g/dL 13.0   Hematocrit 35.0 - 47.0 % 40.4   Platelet Count 150 - 450 10e3/uL 170   RBC Count 3.80 - 5.20 10e6/uL 4.33   MCV 78 - 100 fL 93   MCH 26.5 - 33.0 pg 30.0   MCHC 31.5 - 36.5 g/dL 32.2   RDW 10.0 - 15.0 % 14.9   % Neutrophils % 77   % Lymphocytes % 14   % Monocytes % 6   % Eosinophils % 2   % Basophils % 1   Absolute Basophils 0.0 - 0.2 10e3/uL 0.0   Absolute Eosinophils 0.0 - 0.7 10e3/uL 0.1   Absolute Immature Granulocytes <=0.4 10e3/uL 0.0   Absolute Lymphocytes 0.8 - 5.3 10e3/uL 0.9   Absolute Monocytes 0.0 - 1.3 10e3/uL 0.4   % Immature Granulocytes % 0   Absolute Neutrophils 1.6 - 8.3 10e3/uL 4.7   Absolute NRBCs 10e3/uL 0.0   NRBCs per 100 WBC <1 /100 0   Sed Rate 0 - 30 mm/hr 43 (H)     Wt Readings from Last 24 Encounters:   01/07/25 113.4 kg (250 lb)   11/26/24 113.9 kg (251 lb)   11/18/24 113.9 kg (251 lb)   10/16/24 115.2 kg (254 lb)   09/06/24 117.5 kg (259 lb)   04/17/24 115.2 kg (254 lb)   04/05/24 114.3 kg (252 lb)   11/29/23 113.6 kg (250 lb 8 oz)   11/28/23 113.4 kg (250 lb)   11/28/23 113.4 kg (250 lb)   10/26/23 113.4 kg (250 lb)   10/25/23 113.4 kg (250 lb)   10/18/23 113.4 kg (250 lb)   10/09/23 114.2 kg (251 lb 12.3 oz)   10/06/23 114.3 kg (251 lb 14.4 oz)   09/20/23 113.4 kg (250 lb)   09/13/23 114.7 kg (252 lb 13.9 oz)   07/26/23 108.9 kg (240 lb)   06/09/23 119.1 kg (262 lb 9.6 oz)   06/02/23 119.3 kg (263 lb)   11/28/22 122.2 kg (269 lb 5.8 oz)   09/09/22 122.5 kg (270 lb)   08/19/22 121.6 kg (268 lb 1.6 oz)   03/23/22 123 kg (271 lb 1.6 oz)             Primary Surgeon: Mahendra Collins MD   Procedure: Coronary Angiogram with checking bypass grafts    Coronary Angiogram Graft        Indications    Systemic lupus erythematosus with glomerular disease, unspecified SLE type (H) [M32.14 (ICD-10-CM)]   Hyperlipidemia LDL goal <100 [E78.5 (ICD-10-CM)]   Chronic diastolic heart failure (H) [I50.32 (ICD-10-CM)]   Coronary artery  disease involving native coronary artery of native heart without angina pectoris [I25.10 (ICD-10-CM)]     Comments/Patient Narrative    63yr old female with a h/o HFpEF, CAD s/p CAB, HTN, HLD, atrial fibrillation on warfarin, SLE, and PFO who presents with symptoms of dyspnea on exertion for coronary angiogram and possible PCI.     Pre Procedure Diagnosis    stable known CADheart failure    Post Procedure Diagnosis    Dyspnea on exertion  Stable severe coronary artery disease, s/p CABG  LIMA_LAD  SVG_OM1  SVG_rPDA      Conclusion      Right hear catheterization     RA: 6/7/5 mmHg  RV: 43/--/5 mmHg  PA: 41/20/27 mmHg  CWP: 15/15/13 mmHg  CO/CI (Kannan): 5.9/2.8 L/min/m2  CO/CI (TD): 4.5/2.14 L/min/m2         Name: IDA PADRON  MRN: [id number]  : 1960  Study Date: 2023 03:12 PM  Age: 62 yrs  Gender: Female  Patient Location: [facility]  Reason For Study: Moderate tricuspid insufficiency  Ordering Physician: SHELLEY KENNEY  Referring Physician: SHELLEY KENNEY  Performed By: Francy Bradley     BSA: 2.2 m2  Height: 63 in  Weight: 262 lb  BP: 121/74 mmHg  ______________________________________________________________________________  Procedure  Echocardiogram with two-dimensional, color and spectral Doppler performed.  ______________________________________________________________________________  Interpretation Summary  Left ventricular function is normal.The ejection fraction is 55-60%.  Paradoxical septal motion consistent with right ventricular pressure overload  is present.  Global right ventricular function is normal.  Moderate tricuspid insufficiency is present. Pulmonary hypertension is  present. The right ventricular systolic pressure is approximated at 50.8 mmHg  plus the right atrial pressure.  Estimated mean right atrial pressure is normal.  No pericardial effusion is present.  ______________________________________________________________________________  Left Ventricle  Left  ventricular size is normal. Left ventricular function is normal.The  ejection fraction is 55-60%. Paradoxical septal motion consistent with right  ventricular pressure overload is present.     Right Ventricle  Global right ventricular function is normal. Mild right ventricular dilation  is present.     Atria  The left atrium appears normal. Mild right atrial enlargement is present.     Mitral Valve  Mild mitral insufficiency is present.     Tricuspid Valve  Moderate tricuspid insufficiency is present. The right ventricular systolic  pressure is approximated at 50.8 mmHg plus the right atrial pressure.  Pulmonary hypertension is present.     Pulmonic Valve  Mild pulmonic insufficiency is present.     Vessels  The inferior vena cava was normal in size with preserved respiratory  variability. Estimated mean right atrial pressure is normal.     Pericardium  No pericardial effusion is present.     Compared to Previous Study  This study was compared with the study from 10.17.19 . No significant changes  noted.     ______________________________________________________________________________  MMode/2D Measurements & Calculations  IVSd: 0.95 cm  LVIDd: 4.4 cm  LVIDs: 2.7 cm  LVPWd: 0.99 cm  FS: 36.9 %     LV mass(C)d: 139.9 grams  LV mass(C)dI: 64.5 grams/m2  Ao root diam: 2.9 cm  asc Aorta Diam: 3.6 cm  LVOT diam: 1.9 cm  LVOT area: 2.9 cm2  LA Volume (BP): 73.0 ml  LA Volume Index (BP): 33.6 ml/m2  RWT: 0.46  TAPSE: 2.1 cm     Doppler Measurements & Calculations  MV E max luis: 100.0 cm/sec  MV A max luis: 48.0 cm/sec  MV E/A: 2.1  MV dec slope: 548.4 cm/sec2  MV dec time: 0.18 sec  Ao V2 max: 148.6 cm/sec  Ao max P.0 mmHg  Ao V2 mean: 104.3 cm/sec  Ao mean P.0 mmHg  Ao V2 VTI: 34.2 cm  TARUN(I,D): 2.0 cm2  TARUN(V,D): 2.0 cm2  LV V1 max P.1 mmHg  LV V1 max: 101.0 cm/sec  LV V1 VTI: 23.6 cm  SV(LVOT): 69.2 ml  SI(LVOT): 31.9 ml/m2  TR max luis: 306.0 cm/sec  TR max P.8 mmHg  AV Luis Ratio (DI): 0.68  TARUN Index  (cm2/m2): 0.93  E/E' avg: 10.6  Lateral E/e': 8.5  Medial E/e': 12.6  RV S Luis: 11.0 cm/sec      Severe three vessel coronary artery disease s/p CABG with LIMA-LAD, SVG-OM1, SVG-rPDA and otherwise mild non obstructive CAD elsewhere unchanged from prior study in 2021.                Coronary Findings    Diagnostic  Dominance: Right  Left Anterior Descending   Ost LAD to Mid LAD lesion is 100% stenosed. The lesion is chronic total occlusion.      Second Diagonal Branch   The vessel is small. There is mild diffuse disease throughout the vessel.      Left Circumflex   Previously placed Ost Cx to Prox Cx stent of unknown type is widely patent.   Prox Cx to Mid Cx lesion is 20% stenosed.      First Obtuse Marginal Branch   The vessel is moderate in size.   1st Mrg lesion is 30% stenosed with 100% stenosed side branch in Lat 1st Mrg. The lesion was previously treated using a stent of unknown type.      Lateral First Obtuse Marginal Branch   The vessel is small.      Second Obtuse Marginal Branch   The vessel is moderate in size.      Third Obtuse Marginal Branch   The vessel is small.      Right Coronary Artery   Prox RCA lesion is 40% stenosed. The lesion is eccentric.   Mid RCA to Dist RCA lesion is 10% stenosed.   Dist RCA lesion is 100% stenosed. The lesion is chronic total occlusion.      Right Posterior Descending Artery   RPDA lesion is 20% stenosed.      LIMA Graft To Dist LAD   The graft is large. The graft is angiographically normal.      Graft To Lat 1st Mrg   The graft is large. The graft is angiographically normal.      Graft To RPDA   The graft is large. The graft is angiographically normal.   Prox Graft lesion is 40% stenosed.         Intervention     No interventions have been documented.     Pressures Phase: Baseline     Time Systolic (mmHg) Diastolic (mmHg) Mean (mmHg) A Wave (mmHg) V Wave (mmHg) EDP (mmHg) Max dp/dt (mmHg/sec) HR (bpm) Content (mL/dL) SAT (%)   AO Pressures  3:58     58    76         54            CC: Eliz Aguilera M.D.,    CC: [facility] Medical Records                   RA: 6/7/5 mmHg  RV: 43/--/5 mmHg  PA: 41/20/27 mmHg  CWP: 15/15/13 mmHg  CO/CI (Kannan): 5.9/2.8 L/min/m2  CO/CI (TD): 4.5/2.14 L/min/m2    PA sat: 66.4%  MAP: 117 mmHg   PVR: 2.37 GASCA  Right sided filling pressures are normal. Left sided filling pressures are normal. Mild elevated pulmonary hypertension. Normal cardiac output level.

## 2025-01-15 ENCOUNTER — VIRTUAL VISIT (OUTPATIENT)
Dept: CARDIOLOGY | Facility: CLINIC | Age: 65
End: 2025-01-15
Attending: INTERNAL MEDICINE
Payer: COMMERCIAL

## 2025-01-15 ENCOUNTER — TELEPHONE (OUTPATIENT)
Dept: CARDIOLOGY | Facility: CLINIC | Age: 65
End: 2025-01-15

## 2025-01-15 VITALS — HEIGHT: 62 IN | BODY MASS INDEX: 45.45 KG/M2 | WEIGHT: 247 LBS

## 2025-01-15 DIAGNOSIS — I25.10 CORONARY ARTERY DISEASE INVOLVING NATIVE HEART WITHOUT ANGINA PECTORIS, UNSPECIFIED VESSEL OR LESION TYPE: Chronic | ICD-10-CM

## 2025-01-15 DIAGNOSIS — I50.30 HEART FAILURE WITH PRESERVED EJECTION FRACTION, UNSPECIFIED HF CHRONICITY (H): ICD-10-CM

## 2025-01-15 PROCEDURE — 98014 SYNCH AUDIO-ONLY EST MOD 30: CPT | Performed by: INTERNAL MEDICINE

## 2025-01-15 ASSESSMENT — PAIN SCALES - GENERAL: PAINLEVEL_OUTOF10: NO PAIN (0)

## 2025-01-15 NOTE — LETTER
"1/15/2025      RE: Luz Marina Live  [address]  [place] [address]       Dear Colleague,    Thank you for the opportunity to participate in the care of your patient, Luz Marina Live, at the [facility] [place] at [facility]. Please see a copy of my visit note below.      Virtual Visit Details    Type of service:  Telephone Visit   Phone call duration: 30 minutes   Originating Location (pt. Location): {patient location:919640::\"Home\"}  {PROVIDER LOCATION On-site should be selected for visits conducted from your clinic location or adjoining [facility] hospital, academic office, or other nearby [facility] building. Off-site should be selected for all other provider locations, including home:390813}  Distant Location (provider location):  {virtual location provider:302400}  Telephone visit completed due to {audio only reason:243422}         Expand All Collapse All     SLE  2. Vaginal bleeding  3. ASHD with remote CABG  4. Exertional shortness of breath  5. Dependent edema   6. Elevated weight  7. Paroxysmal atrial fibrillation (warfarin)  8. KEITH      The patient returns for follow-up of heart failure.  There is no interim history of chest pain, tightness, paroxysmal nocturnal dyspnea, orthopnea, peripheral edema, palpitation, pre-syncope, syncope, dExercise tolerance is stable.  The patient is attempting to exercise regularly and following a sodium restricted, calorically appropriate diet.  Medications are reviewed and the patient is taking medications as prescribed.  The patient is generally sleeping well.            Latest Reference Range & Units 10/16/24 12:54   Sodium 135 - 145 mmol/L 141   Potassium 3.4 - 5.3 mmol/L 4.4   Chloride 98 - 107 mmol/L 106   Carbon Dioxide (CO2) 22 - 29 mmol/L 25   Urea Nitrogen 8.0 - 23.0 mg/dL 40.1 (H)   Creatinine 0.51 - 0.95 mg/dL 1.40 (H)   GFR Estimate >60 mL/min/1.73m2 42 (L)   Calcium 8.8 - 10.4 mg/dL 9.7   Anion Gap " 7 - 15 mmol/L 10   Albumin 3.5 - 5.2 g/dL 4.2   Protein Total 6.4 - 8.3 g/dL 8.5 (H)   Alkaline Phosphatase 40 - 150 U/L 123   ALT 0 - 50 U/L 21   AST 0 - 45 U/L 35   Bilirubin Total <=1.2 mg/dL 0.9   CRP Inflammation <5.00 mg/L 19.00 (H)   Glucose 70 - 99 mg/dL 93   WBC 4.0 - 11.0 10e3/uL 6.1   Hemoglobin 11.7 - 15.7 g/dL 13.0   Hematocrit 35.0 - 47.0 % 40.4   Platelet Count 150 - 450 10e3/uL 170   RBC Count 3.80 - 5.20 10e6/uL 4.33   MCV 78 - 100 fL 93   MCH 26.5 - 33.0 pg 30.0   MCHC 31.5 - 36.5 g/dL 32.2   RDW 10.0 - 15.0 % 14.9   % Neutrophils % 77   % Lymphocytes % 14   % Monocytes % 6   % Eosinophils % 2   % Basophils % 1   Absolute Basophils 0.0 - 0.2 10e3/uL 0.0   Absolute Eosinophils 0.0 - 0.7 10e3/uL 0.1   Absolute Immature Granulocytes <=0.4 10e3/uL 0.0   Absolute Lymphocytes 0.8 - 5.3 10e3/uL 0.9   Absolute Monocytes 0.0 - 1.3 10e3/uL 0.4   % Immature Granulocytes % 0   Absolute Neutrophils 1.6 - 8.3 10e3/uL 4.7   Absolute NRBCs 10e3/uL 0.0   NRBCs per 100 WBC <1 /100 0   Sed Rate 0 - 30 mm/hr 43 (H)     Wt Readings from Last 24 Encounters:   01/07/25 113.4 kg (250 lb)   11/26/24 113.9 kg (251 lb)   11/18/24 113.9 kg (251 lb)   10/16/24 115.2 kg (254 lb)   09/06/24 117.5 kg (259 lb)   04/17/24 115.2 kg (254 lb)   04/05/24 114.3 kg (252 lb)   11/29/23 113.6 kg (250 lb 8 oz)   11/28/23 113.4 kg (250 lb)   11/28/23 113.4 kg (250 lb)   10/26/23 113.4 kg (250 lb)   10/25/23 113.4 kg (250 lb)   10/18/23 113.4 kg (250 lb)   10/09/23 114.2 kg (251 lb 12.3 oz)   10/06/23 114.3 kg (251 lb 14.4 oz)   09/20/23 113.4 kg (250 lb)   09/13/23 114.7 kg (252 lb 13.9 oz)   07/26/23 108.9 kg (240 lb)   06/09/23 119.1 kg (262 lb 9.6 oz)   06/02/23 119.3 kg (263 lb)   11/28/22 122.2 kg (269 lb 5.8 oz)   09/09/22 122.5 kg (270 lb)   08/19/22 121.6 kg (268 lb 1.6 oz)   03/23/22 123 kg (271 lb 1.6 oz)             Primary Surgeon: Mahendra Collins MD   Procedure: Coronary Angiogram with checking bypass grafts     Coronary Angiogram Graft        Indications    Systemic lupus erythematosus with glomerular disease, unspecified SLE type (H) [M32.14 (ICD-10-CM)]   Hyperlipidemia LDL goal <100 [E78.5 (ICD-10-CM)]   Chronic diastolic heart failure (H) [I50.32 (ICD-10-CM)]   Coronary artery disease involving native coronary artery of native heart without angina pectoris [I25.10 (ICD-10-CM)]     Comments/Patient Narrative    63yr old female with a h/o HFpEF, CAD s/p CAB, HTN, HLD, atrial fibrillation on warfarin, SLE, and PFO who presents with symptoms of dyspnea on exertion for coronary angiogram and possible PCI.     Pre Procedure Diagnosis    stable known CADheart failure    Post Procedure Diagnosis    Dyspnea on exertion  Stable severe coronary artery disease, s/p CABG  LIMA_LAD  SVG_OM1  SVG_rPDA      Conclusion      Right hear catheterization     RA: 6/7/5 mmHg  RV: 43/--/5 mmHg  PA: 41/20/27 mmHg  CWP: 15/15/13 mmHg  CO/CI (Kannan): 5.9/2.8 L/min/m2  CO/CI (TD): 4.5/2.14 L/min/m2         Name: IDA PADRON  MRN: [id number]  : 1960  Study Date: 2023 03:12 PM  Age: 62 yrs  Gender: Female  Patient Location: [facility]  Reason For Study: Moderate tricuspid insufficiency  Ordering Physician: SHELLEY KENNEY  Referring Physician: SHELLEY KENNEY  Performed By: Francy Bradley     BSA: 2.2 m2  Height: 63 in  Weight: 262 lb  BP: 121/74 mmHg  ______________________________________________________________________________  Procedure  Echocardiogram with two-dimensional, color and spectral Doppler performed.  ______________________________________________________________________________  Interpretation Summary  Left ventricular function is normal.The ejection fraction is 55-60%.  Paradoxical septal motion consistent with right ventricular pressure overload  is present.  Global right ventricular function is normal.  Moderate tricuspid insufficiency is present. Pulmonary hypertension is  present. The right  ventricular systolic pressure is approximated at 50.8 mmHg  plus the right atrial pressure.  Estimated mean right atrial pressure is normal.  No pericardial effusion is present.  ______________________________________________________________________________  Left Ventricle  Left ventricular size is normal. Left ventricular function is normal.The  ejection fraction is 55-60%. Paradoxical septal motion consistent with right  ventricular pressure overload is present.     Right Ventricle  Global right ventricular function is normal. Mild right ventricular dilation  is present.     Atria  The left atrium appears normal. Mild right atrial enlargement is present.     Mitral Valve  Mild mitral insufficiency is present.     Tricuspid Valve  Moderate tricuspid insufficiency is present. The right ventricular systolic  pressure is approximated at 50.8 mmHg plus the right atrial pressure.  Pulmonary hypertension is present.     Pulmonic Valve  Mild pulmonic insufficiency is present.     Vessels  The inferior vena cava was normal in size with preserved respiratory  variability. Estimated mean right atrial pressure is normal.     Pericardium  No pericardial effusion is present.     Compared to Previous Study  This study was compared with the study from 10.17.19 . No significant changes  noted.     ______________________________________________________________________________  MMode/2D Measurements & Calculations  IVSd: 0.95 cm  LVIDd: 4.4 cm  LVIDs: 2.7 cm  LVPWd: 0.99 cm  FS: 36.9 %     LV mass(C)d: 139.9 grams  LV mass(C)dI: 64.5 grams/m2  Ao root diam: 2.9 cm  asc Aorta Diam: 3.6 cm  LVOT diam: 1.9 cm  LVOT area: 2.9 cm2  LA Volume (BP): 73.0 ml  LA Volume Index (BP): 33.6 ml/m2  RWT: 0.46  TAPSE: 2.1 cm     Doppler Measurements & Calculations  MV E max maria isabel: 100.0 cm/sec  MV A max maria isabel: 48.0 cm/sec  MV E/A: 2.1  MV dec slope: 548.4 cm/sec2  MV dec time: 0.18 sec  Ao V2 max: 148.6 cm/sec  Ao max P.0 mmHg  Ao V2 mean: 104.3  cm/sec  Ao mean P.0 mmHg  Ao V2 VTI: 34.2 cm  TARUN(I,D): 2.0 cm2  TARUN(V,D): 2.0 cm2  LV V1 max P.1 mmHg  LV V1 max: 101.0 cm/sec  LV V1 VTI: 23.6 cm  SV(LVOT): 69.2 ml  SI(LVOT): 31.9 ml/m2  TR max luis: 306.0 cm/sec  TR max P.8 mmHg  AV Luis Ratio (DI): 0.68  TARUN Index (cm2/m2): 0.93  E/E' avg: 10.6  Lateral E/e': 8.5  Medial E/e': 12.6  RV S Luis: 11.0 cm/sec      Severe three vessel coronary artery disease s/p CABG with LIMA-LAD, SVG-OM1, SVG-rPDA and otherwise mild non obstructive CAD elsewhere unchanged from prior study in .                Coronary Findings    Diagnostic  Dominance: Right  Left Anterior Descending   Ost LAD to Mid LAD lesion is 100% stenosed. The lesion is chronic total occlusion.      Second Diagonal Branch   The vessel is small. There is mild diffuse disease throughout the vessel.      Left Circumflex   Previously placed Ost Cx to Prox Cx stent of unknown type is widely patent.   Prox Cx to Mid Cx lesion is 20% stenosed.      First Obtuse Marginal Branch   The vessel is moderate in size.   1st Mrg lesion is 30% stenosed with 100% stenosed side branch in Lat 1st Mrg. The lesion was previously treated using a stent of unknown type.      Lateral First Obtuse Marginal Branch   The vessel is small.      Second Obtuse Marginal Branch   The vessel is moderate in size.      Third Obtuse Marginal Branch   The vessel is small.      Right Coronary Artery   Prox RCA lesion is 40% stenosed. The lesion is eccentric.   Mid RCA to Dist RCA lesion is 10% stenosed.   Dist RCA lesion is 100% stenosed. The lesion is chronic total occlusion.      Right Posterior Descending Artery   RPDA lesion is 20% stenosed.      LIMA Graft To Dist LAD   The graft is large. The graft is angiographically normal.      Graft To Lat 1st Mrg   The graft is large. The graft is angiographically normal.      Graft To RPDA   The graft is large. The graft is angiographically normal.   Prox Graft lesion is 40% stenosed.          Intervention     No interventions have been documented.     Pressures Phase: Baseline     Time Systolic (mmHg) Diastolic (mmHg) Mean (mmHg) A Wave (mmHg) V Wave (mmHg) EDP (mmHg) Max dp/dt (mmHg/sec) HR (bpm) Content (mL/dL) SAT (%)   AO Pressures  3:58     58    76        54            CC: Eliz Aguilera M.D.,    CC: [facility] Medical Records                       Please do not hesitate to contact me if you have any questions/concerns.     Sincerely,     Twin Moreno MD

## 2025-01-15 NOTE — TELEPHONE ENCOUNTER
30 days supply test claims requested for the drugs below:  Wegovy 02.5mg/0.5ml, Zepbound 2.5mg/0.5ml     Wegovy 02.5mg/0.5ml  -Excluded from coverage          Zepbound 2.5mg/0.5ml   -Plan exclusion

## 2025-01-15 NOTE — PATIENT INSTRUCTIONS
"You were seen today in the Cardiovascular Clinic at the [facility].      Cardiology Providers you saw during your visit:  Dr. Twin Moreno     Recommendations:    Connecting with our Pharmacy Team and your Primary Care doctor to investigate a GLP1 medication for weight loss for you.  Please follow-up with Dr. Moreno in  with labs prior.        Eat a heart healthy, low sodium diet.  Get 20 to 30 minutes of aerobic exercise 4 to 5 times per week as tolerated. (Examples of aerobic exercise include: walking, bicycling, swimming, running).     Thank you for your visit today!   Please MyChart message or call if you have any questions or concerns.      During Business Hours:  [phone], option # 1 ([place])       After hours, weekends or holidays:   [phone], Option #4  Ask to speak to the On-Call Cardiologist. Inform them you are a heart failure patient at the [facility].      Kaylie Tran RN BSN CHFN  Cardiology Care Coordinator - C.O.R.[facility]  Questions and [phone]  First press #1 for the [employer] and then press #4 for \"Your Care Team\" to reach us Cardiology Nurses.                "

## 2025-01-15 NOTE — NURSING NOTE
Current patient location: [address] [place] [address]    Is the patient currently in the state of [place]? YES    Visit mode: TELEPHONE    If the visit is dropped, the patient can be reconnected by:TELEPHONE VISIT: Phone number:   Telephone Information:   Mobile [phone]       Will anyone else be joining the visit? NO  (If patient encounters technical issues they should call [phone])    Are changes needed to the allergy or medication list? Pt stated no med changes    Are refills needed on medications prescribed by this physician? Discuss with provider    Rooming Documentation:  Not applicable    Reason for visit: RECHECK    No other vitals to report per pt    Caterina HANCOCKF

## 2025-01-20 NOTE — TELEPHONE ENCOUNTER
Patient returning call to get INR results. Please call back at [phone]   The message was forwarded to Dr Haley to review

## 2025-01-28 DIAGNOSIS — I50.9 CHF (CONGESTIVE HEART FAILURE) (H): ICD-10-CM

## 2025-01-28 DIAGNOSIS — I48.91 ATRIAL FIBRILLATION, UNSPECIFIED TYPE (H): ICD-10-CM

## 2025-01-28 DIAGNOSIS — E78.00 PURE HYPERCHOLESTEROLEMIA: ICD-10-CM

## 2025-01-30 RX ORDER — ATORVASTATIN CALCIUM 40 MG/1
40 TABLET, FILM COATED ORAL DAILY
Qty: 90 TABLET | Refills: 1 | Status: SHIPPED | OUTPATIENT
Start: 2025-01-30

## 2025-01-30 RX ORDER — WARFARIN SODIUM 5 MG/1
TABLET ORAL
Qty: 150 TABLET | Refills: 1 | Status: SHIPPED | OUTPATIENT
Start: 2025-01-30

## 2025-01-30 NOTE — TELEPHONE ENCOUNTER
Last Written Prescription: WARFARIN SODIUM 5 MG TABLET   warfarin ANTICOAGULANT (COUMADIN) 5 MG tablet 140 tablet 1 8/29/2024 -- No   Sig: TAKE 10 MG EVERY TUE, SAT 7.5 MG ALL OTHER DAYS OR AS DIRECTED BY THE INR CLINIC     Followed by anti coagulation clinic and routed.  ----------------------    Last Written Prescription:  ATORVASTATIN 40 MG TABLET   atorvastatin (LIPITOR) 40 MG tablet 90 tablet 3 5/1/2024 -- No   Sig - Route: Take 1 tablet (40 mg) by mouth daily - Oral   [id number] - [place], [place] - [address]   LDL Cholesterol Calculated   Date Value Ref Range Status   04/17/2024 44 <=100 mg/dL Final   10/17/2019 27 <100 mg/dL Final     Comment:     Desirable:       <100 mg/dl      Last Visit Date: 12/20/24 Patrick  Future Visit Date: None  ----------------------    Refill decision: Medication refilled per  Medication Refill in Ambulatory Care  policy.       Refill decision: Medication unable to be refilled by RN due to: WARFARIN SODIUM 5 MG TABLET Followed by anti coagulation clinic and routed.         Request from pharmacy:  Requested Prescriptions   Pending Prescriptions Disp Refills    warfarin ANTICOAGULANT (COUMADIN) 5 MG tablet [Pharmacy Med Name: WARFARIN SODIUM 5 MG TABLET] 140 tablet 1     Sig: TAKE 10 MG EVERY TUE, SAT 7.5 MG ALL OTHER DAYS OR AS DIRECTED BY THE INR CLINIC       Vitamin K Antagonists Failed - 1/30/2025  8:27 AM        Failed - INR is within goal in the past 6 weeks     Confirm INR is within goal in the past 6 weeks.     Recent Labs   Lab Test 01/17/25  1141   INR 2.8*                       Failed - Always fail criteria - route to anticoagulation team     Medication protocol for this drug may only be authorized per protocol by the System Anticoagulation Team.   Please forward this request to the anticoagulation pool.          Failed - Recent (12 mo) or future (90 days) visit within the authorizing provider's specialty     The patient must have completed an in-person  or virtual visit within the past 12 months or has a future visit scheduled within the next 90 days with the authorizing provider s specialty.  Urgent care and e-visits do not qualify as an office visit for this protocol.          Passed - Medication is active on med list        Passed - Medication indicated for diagnosis     Medication is associated with one or more of the following diagnoses:     Atrial fibrillation   Prosthetic cardiac valve   Pulmonary embolism   Thrombosis, Post myocardial infarction   Venous thromboembloism   Long-term current use of anticoagulant          Passed - Patient is 18 years of age or older        Passed - Patient is not pregnant        Passed - No positive pregnancy on file in past 12 months          atorvastatin (LIPITOR) 40 MG tablet [Pharmacy Med Name: ATORVASTATIN 40 MG TABLET] 90 tablet 3     Sig: TAKE 1 TABLET BY MOUTH EVERY DAY       Antihyperlipidemic agents Failed - 1/30/2025  8:27 AM        Failed - Recent (12 mo) or future (90 days) visit within the authorizing provider's specialty     The patient must have completed an in-person or virtual visit within the past 12 months or has a future visit scheduled within the next 90 days with the authorizing provider s specialty.  Urgent care and e-visits do not qualify as an office visit for this protocol.          Passed - LDL on file in the past 12 months        Passed - Medication is active on med list        Passed - Patient is age 18 years or older        Passed - No active pregnancy on record        Passed - No positive pregnancy test in past 12 mos

## 2025-01-30 NOTE — TELEPHONE ENCOUNTER
ANTICOAGULATION MANAGEMENT:  Medication Refill    Anticoagulation Summary  As of 1/17/2025      Warfarin maintenance plan:  7.5 mg (5 mg x 1.5) every Sun, Tue, Thu; 10 mg (5 mg x 2) all other days   Next INR check:  2/7/2025   Target end date:  Indefinite    Indications    Long-term (current) use of anticoagulants [Z79.01] [Z79.01]  PAF (paroxysmal atrial fibrillation) (H) [I48.0]  Atrial fibrillation  unspecified type (H) [I48.91]                 Anticoagulation Care Providers       Provider Role Specialty Phone number    Eliz Nguyen MD Referring Internal Medicine [phone]    Elodia Tang MD Referring Family Medicine [phone]            Refill Criteria    Visit with referring provider/group: Meets criteria: visit within referring provider group in the last 15 months on 12/20/24    [facility] referral last signed: 08/22/2024; within last year:  Yes    Lab monitoring is up to date (not exceeding 2 weeks overdue): Yes    Luz Marina meets all criteria for refill. Rx instructions and quantity supplied updated to match patient's current dosing plan. Warfarin 90 day supply with 1 refill granted per [facility] protocol     Red RIVAS RN  Anticoagulation Clinic

## 2025-02-03 RX ORDER — SACUBITRIL AND VALSARTAN 24; 26 MG/1; MG/1
TABLET, FILM COATED ORAL
Qty: 90 TABLET | Refills: 3 | Status: SHIPPED | OUTPATIENT
Start: 2025-02-03

## 2025-02-19 ENCOUNTER — TELEPHONE (OUTPATIENT)
Dept: CARDIOLOGY | Facility: CLINIC | Age: 65
End: 2025-02-19
Payer: COMMERCIAL

## 2025-02-19 NOTE — TELEPHONE ENCOUNTER
Patient Contacted for the patient to call back and schedule the following:    Appointment type:  rtn hf   Provider:Tiffanie   Return date: 07/09/25  Specialty phone number: [phone] opt 1   Additional appointment(s) needed: n/a   Additonal Notes:pt said labs will be done in [place]

## 2025-03-03 ENCOUNTER — MYC MEDICAL ADVICE (OUTPATIENT)
Dept: ANTICOAGULATION | Facility: CLINIC | Age: 65
End: 2025-03-03

## 2025-03-03 ENCOUNTER — ANTICOAGULATION THERAPY VISIT (OUTPATIENT)
Dept: ANTICOAGULATION | Facility: CLINIC | Age: 65
End: 2025-03-03

## 2025-03-03 ENCOUNTER — LAB (OUTPATIENT)
Dept: LAB | Facility: CLINIC | Age: 65
End: 2025-03-03
Payer: COMMERCIAL

## 2025-03-03 DIAGNOSIS — I48.0 PAF (PAROXYSMAL ATRIAL FIBRILLATION) (H): Chronic | ICD-10-CM

## 2025-03-03 DIAGNOSIS — Z79.01 LONG TERM CURRENT USE OF ANTICOAGULANT THERAPY: Primary | Chronic | ICD-10-CM

## 2025-03-03 DIAGNOSIS — I48.91 ATRIAL FIBRILLATION, UNSPECIFIED TYPE (H): ICD-10-CM

## 2025-03-03 LAB — INR BLD: 1.8 (ref 0.9–1.1)

## 2025-03-03 PROCEDURE — 36416 COLLJ CAPILLARY BLOOD SPEC: CPT

## 2025-03-03 PROCEDURE — 85610 PROTHROMBIN TIME: CPT

## 2025-03-04 ENCOUNTER — TELEPHONE (OUTPATIENT)
Dept: RHEUMATOLOGY | Facility: CLINIC | Age: 65
End: 2025-03-04
Payer: COMMERCIAL

## 2025-03-04 NOTE — PROGRESS NOTES
ANTICOAGULATION MANAGEMENT     Luz Marina Live 64 year old female is on warfarin with subtherapeutic INR result. (Goal INR 2.0-3.0)    Recent labs: (last 7 days)     03/03/25  1453   INR 1.8*       ASSESSMENT     Source(s): Chart Review and Patient/Caregiver Call     Warfarin doses taken: Warfarin taken as instructed  Diet: No new diet changes identified  Medication/supplement changes: None noted  New illness, injury, or hospitalization: No  Signs or symptoms of bleeding or clotting: No  Previous result: Therapeutic last 2(+) visits  Additional findings: None       PLAN     Recommended plan for no diet, medication or health factor changes affecting INR     Dosing Instructions: booster dose then continue your current warfarin dose with next INR in 2 weeks       Summary  As of 3/3/2025      Full warfarin instructions:  3/3: 12.5 mg; Otherwise 7.5 mg every Sun, Tue, Thu; 10 mg all other days   Next INR check:  3/17/2025               Sent [employer]Rebel message with dosing and follow up instructions    Contact [phone] to schedule and with any changes, questions or concerns.     Education provided: Please call back if any changes to your diet, medications or how you've been taking warfarin    Plan made per [facility] anticoagulation protocol    Red RIVAS RN  3/4/2025  Anticoagulation Clinic  Revolver for routing messages: norah [facility] patient phone line: [phone]        _______________________________________________________________________     Anticoagulation Episode Summary       Current INR goal:  2.0-3.0   TTR:  67.2% (1 y)   Target end date:  Indefinite   Send INR reminders to:  [facility]    Indications    Long-term (current) use of anticoagulants [Z79.01] [Z79.01]  PAF (paroxysmal atrial fibrillation) (H) [I48.0]  Atrial fibrillation  unspecified type (H) [I48.91]             Comments:  --             Anticoagulation Care Providers       Provider Role Specialty Phone number    Eliz Nguyen  MD My Referring Internal Medicine [phone]    Elodia Tang MD Referring Family Medicine [phone]

## 2025-03-04 NOTE — TELEPHONE ENCOUNTER
Plaquenil eye exam flowchart created.    Last labs were 10/16/24 for Dr. Chau.    Sent t-Art message reminder to Luz Marina for labs.

## 2025-03-31 ENCOUNTER — PATIENT OUTREACH (OUTPATIENT)
Dept: GASTROENTEROLOGY | Facility: CLINIC | Age: 65
End: 2025-03-31
Payer: COMMERCIAL

## 2025-05-10 DIAGNOSIS — I50.32 CHRONIC DIASTOLIC CONGESTIVE HEART FAILURE (H): ICD-10-CM

## 2025-05-10 DIAGNOSIS — I48.91 ATRIAL FIBRILLATION, UNSPECIFIED TYPE (H): ICD-10-CM

## 2025-05-13 RX ORDER — METOPROLOL TARTRATE 50 MG
50 TABLET ORAL 2 TIMES DAILY
Qty: 180 TABLET | Refills: 3 | OUTPATIENT
Start: 2025-05-13

## 2025-05-13 RX ORDER — FUROSEMIDE 20 MG/1
40 TABLET ORAL
Qty: 180 TABLET | Refills: 3 | OUTPATIENT
Start: 2025-05-13

## 2025-05-14 RX ORDER — FUROSEMIDE 20 MG/1
40 TABLET ORAL DAILY
Qty: 180 TABLET | Refills: 3 | Status: SHIPPED | OUTPATIENT
Start: 2025-05-14

## 2025-05-14 RX ORDER — METOPROLOL TARTRATE 50 MG
50 TABLET ORAL 2 TIMES DAILY
Qty: 180 TABLET | Refills: 3 | Status: SHIPPED | OUTPATIENT
Start: 2025-05-14

## 2025-05-22 ENCOUNTER — MYC MEDICAL ADVICE (OUTPATIENT)
Dept: ANTICOAGULATION | Facility: CLINIC | Age: 65
End: 2025-05-22
Payer: COMMERCIAL

## 2025-06-10 DIAGNOSIS — I10 HYPERTENSION GOAL BP (BLOOD PRESSURE) < 130/80: ICD-10-CM

## 2025-06-10 DIAGNOSIS — N18.30 CKD (CHRONIC KIDNEY DISEASE) STAGE 3, GFR 30-59 ML/MIN (H): Chronic | ICD-10-CM

## 2025-06-10 DIAGNOSIS — I48.91 ATRIAL FIBRILLATION, UNSPECIFIED TYPE (H): ICD-10-CM

## 2025-06-10 NOTE — TELEPHONE ENCOUNTER
Last Written Prescription:  dapagliflozin (FARXIGA) 10 MG TABS tablet 90 tablet 3 5/29/2024 -- No   Sig - Route: Take 1 tablet (10 mg) by mouth daily - Oral   Patient taking differently: Take 10 mg by mouth every morning.     ----------------------  Last Visit Date:   1/15/2025  [facility]    Future Visit Date:    7/9/2025 10:00 AM (30 min)  Beno[facility]   Arrive by:  9:45 AM   RETURN HEART FAILURE   Rfl Status: Pending Review   [facility] ([facility])   Twin Moreno MD     ----------------------        Refill decision: Medication unable to be refilled by RN due to: Verification - order discrepancy, clarification needed, Sig modification needed Patient taking differently: Take 10 mg by mouth every morning.        Request from pharmacy:  Requested Prescriptions   Pending Prescriptions Disp Refills    FARXIGA 10 MG TABS tablet [Pharmacy Med Name: FARXIGA 10 MG TABLET] 90 tablet 3     Sig: TAKE 1 TABLET (10 MG) BY MOUTH DAILY.       Sodium Glucose Co-Transport Inhibitor Agents Failed - 6/10/2025  2:41 PM        Failed - Patient has documented A1c within the specified period of time.     If HgbA1C is 8 or greater, it needs to be on file within the past 3 months.  If less than 8, must be on file within the past 6 months.     Recent Labs   Lab Test 07/13/21  1213   A1C 5.2             Passed - Medication is active on med list and the sig matches. RN to manually verify dose and sig if red X/fail.     If the protocol passes (green check), you do not need to verify med dose and sig.    A prescription matches if they are the same clinical intention.    For Example: once daily and every morning are the same.    The protocol can not identify upper and lower case letters as matching and will fail.     For Example: Take 1 tablet (50 mg) by mouth daily     TAKE 1 TABLET (50 MG) BY MOUTH DAILY    For all fails (red x), verify dose and sig.    If the refill does match what is on file, the RN can still proceed to  approve the refill request.       If they do not match, route to the appropriate provider.             Passed - Recent (6 month) or future (90 days) visit with the authorizing provider's specialty (provided they have been seen in the past 9 months)     The patient must have completed an in-person or virtual visit within the past 6 months or has a future visit scheduled within the next 90 days with the authorizing provider s specialty.  Urgent care and e-visits do not quality as an office visit for this protocol.          Passed - Medication indicated for associated diagnosis     Medication is associated with one or more of the following diagnoses:     Diabetic nephropathy, With Albuminuria - Type 2 diabetes mellitus     Disorder of cardiovascular system; Prophylaxis - Type 2 diabetes mellitus     Type 2 diabetes mellitus    Disorder of cardiovascular system; Prophylaxis - Heart failure   Chronic kidney disease, (At risk of progression) to reduce the risk of sustained   estimated GFR decline, end-stage kidney disease, cardiovascular death,   and hospitalization for heart failure     Heart failure, (NYHA class II to IV, reduced ejection fraction) to reduce risk of  cardiovascular death and hospitalization           Passed - Has GFR on file in past 12 months and most recent value is >30        Passed - Patient is age 18 or older        Passed - Patient is not pregnant        Passed - Patient has documented normal Potassium within the last 12 mos.     Recent Labs   Lab Test 03/14/25  1140   POTASSIUM 5.0             Passed - Patient has no positive pregnancy test within the past 12 mos.

## 2025-06-11 RX ORDER — WARFARIN SODIUM 5 MG/1
7.5-1 TABLET ORAL EVERY EVENING
Qty: 180 TABLET | Refills: 1 | Status: SHIPPED | OUTPATIENT
Start: 2025-06-11

## 2025-06-11 NOTE — TELEPHONE ENCOUNTER
ANTICOAGULATION MANAGEMENT:  Medication Refill    Anticoagulation Summary  As of 6/6/2025      Warfarin maintenance plan:  7.5 mg (5 mg x 1.5) every Sun, Tue, Thu; 10 mg (5 mg x 2) all other days   Next INR check:  7/11/2025   Target end date:  Indefinite    Indications    Long-term (current) use of anticoagulants [Z79.01] [Z79.01]  PAF (paroxysmal atrial fibrillation) (H) [I48.0]  Atrial fibrillation  unspecified type (H) [I48.91]                 Anticoagulation Care Providers       Provider Role Specialty Phone number    Eliz Nguyen MD Referring Internal Medicine [phone]    Elodia Tang MD Referring Family Medicine [phone]            Refill Criteria    Visit with referring provider/group: Meets criteria: visit within referring provider group in the last 15 months on 12/20/24    [facility] referral last signed: 08/22/2024; within last year:  Yes    Lab monitoring is up to date (not exceeding 2 weeks overdue): Yes    Luz Marina meets all criteria for refill. Rx instructions and quantity supplied updated to match patient's current dosing plan. Warfarin 90 day supply with 1 refill granted per [facility] protocol     TENA TOTH RN  Anticoagulation Clinic

## 2025-06-12 DIAGNOSIS — E66.01 MORBID OBESITY (H): Chronic | ICD-10-CM

## 2025-06-12 DIAGNOSIS — I20.89 STABLE ANGINA: ICD-10-CM

## 2025-06-12 DIAGNOSIS — Z79.60 LONG-TERM USE OF IMMUNOSUPPRESSANT MEDICATION: ICD-10-CM

## 2025-06-12 DIAGNOSIS — Z79.899 LONG-TERM USE OF PLAQUENIL: ICD-10-CM

## 2025-06-12 DIAGNOSIS — I25.10 CORONARY ARTERY DISEASE INVOLVING NATIVE HEART WITHOUT ANGINA PECTORIS, UNSPECIFIED VESSEL OR LESION TYPE: Chronic | ICD-10-CM

## 2025-06-12 DIAGNOSIS — M32.14 SYSTEMIC LUPUS ERYTHEMATOSUS WITH GLOMERULAR DISEASE, UNSPECIFIED SLE TYPE (H): ICD-10-CM

## 2025-06-12 DIAGNOSIS — N18.30 STAGE 3 CHRONIC KIDNEY DISEASE, UNSPECIFIED WHETHER STAGE 3A OR 3B CKD (H): Chronic | ICD-10-CM

## 2025-06-12 LAB
6 MIN WALK (FT): 1000 FT
6 MIN WALK (M): 305 M
DLCOUNC-%PRED-PRE: 79 %
DLCOUNC-PRE: 14.98 ML/MIN/MMHG
DLCOUNC-PRED: 18.88 ML/MIN/MMHG
ERV-%PRED-PRE: 7 %
ERV-PRE: 0.08 L
ERV-PRED: 1 L
EXPTIME-PRE: 6.72 SEC
FEF2575-%PRED-PRE: 95 %
FEF2575-PRE: 1.82 L/SEC
FEF2575-PRED: 1.92 L/SEC
FEFMAX-%PRED-PRE: 98 %
FEFMAX-PRE: 5.74 L/SEC
FEFMAX-PRED: 5.85 L/SEC
FEV1-%PRED-PRE: 84 %
FEV1-PRE: 1.79 L
FEV1FEV6-PRE: 81 %
FEV1FEV6-PRED: 80 %
FEV1FVC-PRE: 81 %
FEV1FVC-PRED: 80 %
FEV1SVC-PRE: 77 %
FEV1SVC-PRED: 69 %
FIFMAX-PRE: 4.16 L/SEC
FVC-%PRED-PRE: 82 %
FVC-PRE: 2.21 L
FVC-PRED: 2.67 L
IC-%PRED-PRE: 111 %
IC-PRE: 2.23 L
IC-PRED: 2.01 L
VA-%PRED-PRE: 78 %
VA-PRE: 3.5 L
VC-%PRED-PRE: 75 %
VC-PRE: 2.31 L
VC-PRED: 3.07 L

## 2025-06-12 RX ORDER — DAPAGLIFLOZIN 10 MG/1
10 TABLET, FILM COATED ORAL DAILY
Qty: 90 TABLET | Refills: 3 | Status: SHIPPED | OUTPATIENT
Start: 2025-06-12

## 2025-06-16 ENCOUNTER — RESULTS FOLLOW-UP (OUTPATIENT)
Dept: RHEUMATOLOGY | Facility: CLINIC | Age: 65
End: 2025-06-16

## 2025-07-06 NOTE — PROGRESS NOTES
"         Expand All Collapse All     1 SLE  2. Vaginal bleeding  3. ASHD with remote CABG  4. Exertional shortness of breath/HFpEF  5. Dependent edema   6. Elevated weight  7. Paroxysmal atrial fibrillation (warfarin)  8. KEITH  9. Collagen vascular disease  10. Tricuspid insufficiency    Plan  1. Consider GLP-1 for weight loss with history of heart failure, ASHD with premature CAD, chronic renal failure, sleep disordered breath. This would be prescribed by primary care. / not cardiogy  2.  Continue weight loss, dietary and fluid monitoring  3.  Echocardiogram for LV and RV function  4, Bilateral heart catheterization coronary and graft angiogram in view of new exercise associated symptins    The patient returns for follow-up of heart failure.  There is no interim history of chest pain, tightness, paroxysmal nocturnal dyspnea, orthopnea, peripheral edema, palpitation, pre-syncope, syncope, dExercise tolerance is stable.  The patient is attempting to exercise regularly and following a sodium restricted, calorically appropriate diet.  Medications are reviewed and the patient is taking medications as prescribed.  The patient is generally sleeping well.       12/20/24 13:03 01/07/25 14:02 01/09/25 01/15/25 09:43 06/06/25 11:14 07/09/25   /67  116/78  128/76 135/85   Temp 98  F (36.7  C)        Pulse 63  61  65 62   Resp 22  12      SpO2 98 %  98 %  97 % 99 %   Height 1.593 m (5' 2.72\") 1.575 m (5' 2\")  1.575 m (5' 2\")     BMI (Calculated)  45.73  45.18     Weight (lbs)  250 lb [1]  247 lb [2] 254 lb 260 lb 6.4 oz [3]   Weight (kgs)  113.4 kg (250 lb) [1]  112 kg (247 lb) [2] 115.2 kg (254 lb) 118.1 kg (260 lb 6.4 oz) [3]       Current Outpatient Medications   Medication Sig Dispense Refill    aspirin 81 MG EC tablet Take 81 mg by mouth every morning.      atorvastatin (LIPITOR) 40 MG tablet TAKE 1 TABLET BY MOUTH EVERY DAY 90 tablet 1    Azelastine HCl 137 MCG/SPRAY SOLN Laona 2 sprays into both nostrils 2 times " daily 30 mL 4    FARXIGA 10 MG TABS tablet TAKE 1 TABLET (10 MG) BY MOUTH DAILY. 90 tablet 3    furosemide (LASIX) 20 MG tablet Take 2 tablets (40 mg) by mouth daily. 180 tablet 3    hydrocortisone 2.5 % ointment PLEASE SEE ATTACHED FOR DETAILED DIRECTIONS      hydroxychloroquine (PLAQUENIL) 200 MG tablet Take 1 tablet (200 mg) by mouth 2 times daily. 180 tablet 3    loratadine (CLARITIN) 10 MG tablet Take 10 mg by mouth every evening.      metoprolol tartrate (LOPRESSOR) 50 MG tablet Take 1 tablet (50 mg) by mouth 2 times daily. 180 tablet 3    MULTIPLE VITAMIN PO Take 1 tablet by mouth every morning. HAIR SKIN AND NAILS      omega-3 fatty acids 1200 MG capsule Take 2 capsules by mouth 2 times daily       order for DME Autopap 10-16 cmH20 1 Device 0    sacubitril-valsartan (ENTRESTO) 24-26 MG per tablet TAKE 1/2 TABLET TWICE A DAY BY MOUTH 90 tablet 3    warfarin ANTICOAGULANT (COUMADIN/JANTOVEN) 5 MG tablet Take 1.5-2 tablets (7.5-10 mg) by mouth every evening. or as directed by the INR clinic 180 tablet 1    ketoconazole (NIZORAL) 2 % external shampoo WASH AFFECTED AREAS ON THE FACE ONCE DAILY. LATHER AND LET SIT FOR 3-5 MINUTES BEFORE RINSING. (Patient not taking: Reported on 7/9/2025)      metroNIDAZOLE (METROCREAM) 0.75 % external cream Apply topically 2 times daily (Patient not taking: Reported on 7/9/2025) 45 g 3     No current facility-administered medications for this visit.         Latest Reference Range & Units 07/08/25 14:12   Potassium 3.4 - 5.3 mmol/L 5.2   Chloride 98 - 107 mmol/L 108 (H)   Carbon Dioxide (CO2) 22 - 29 mmol/L 23   Urea Nitrogen 8.0 - 23.0 mg/dL 39.1 (H)   Creatinine 0.51 - 0.95 mg/dL 1.39 (H)   GFR Estimate >60 mL/min/1.73m2 42 (L)   Calcium 8.8 - 10.4 mg/dL 9.8   Anion Gap 7 - 15 mmol/L 9   Albumin 3.5 - 5.2 g/dL 4.0   Protein Total 6.4 - 8.3 g/dL 8.1   Alkaline Phosphatase 40 - 150 U/L 119   ALT 0 - 50 U/L 23   AST 0 - 45 U/L 39   Bilirubin Total <=1.2 mg/dL 0.7   Glucose 70 - 99  mg/dL 96   N-Terminal Pro Bnp 0 - 226 pg/mL 2,209 (H)   WBC 4.0 - 11.0 10e3/uL 5.6   Hemoglobin 11.7 - 15.7 g/dL 12.8   Hematocrit 35.0 - 47.0 % 40.3   Platelet Count 150 - 450 10e3/uL 169   RBC Count 3.80 - 5.20 10e6/uL 4.28   MCV 78 - 100 fL 94   MCH 26.5 - 33.0 pg 29.9   MCHC 31.5 - 36.5 g/dL 31.8   RDW 10.0 - 15.0 % 15.0   (   Latest Reference Range & Units 10/16/24 12:54 12/20/24 15:05 07/08/25 14:12   N-Terminal Pro Bnp 0 - 226 pg/mL 2,159 (H) 2,375 (H) 2,209 (H)   (H): Data is abnormally high     Wt Readings from Last 24 Encounters:   07/09/25 118.1 kg (260 lb 6.4 oz)   06/06/25 115.2 kg (254 lb)   01/15/25 112 kg (247 lb)   01/07/25 113.4 kg (250 lb)   11/26/24 113.9 kg (251 lb)   11/18/24 113.9 kg (251 lb)   10/16/24 115.2 kg (254 lb)   09/06/24 117.5 kg (259 lb)   04/17/24 115.2 kg (254 lb)   04/05/24 114.3 kg (252 lb)   11/29/23 113.6 kg (250 lb 8 oz)   11/28/23 113.4 kg (250 lb)   11/28/23 113.4 kg (250 lb)   10/26/23 113.4 kg (250 lb)   10/25/23 113.4 kg (250 lb)   10/18/23 113.4 kg (250 lb)   10/09/23 114.2 kg (251 lb 12.3 oz)   10/06/23 114.3 kg (251 lb 14.4 oz)   09/20/23 113.4 kg (250 lb)   09/13/23 114.7 kg (252 lb 13.9 oz)   07/26/23 108.9 kg (240 lb)   06/09/23 119.1 kg (262 lb 9.6 oz)   06/02/23 119.3 kg (263 lb)   11/28/22 122.2 kg (269 lb 5.8 oz)          2023  RA: 6/7/5 mmHg  RV: 43/--/5 mmHg  PA: 41/20/27 mmHg  CWP: 15/15/13 mmHg  CO/CI (Kannan): 5.9/2.8 L/min/m2  CO/CI (TD): 4.5/2.14 L/min/m2    PA sat: 66.4%  MAP: 117 mmHg   PVR: 2.37 GASCA  Right sided filling pressures are normal. Left sided filling pressures are normal. Mild elevated pulmonary hypertension. Normal cardiac output level.         Primary Surgeon: Mahendra Collins MD   Procedure: Coronary Angiogram with checking bypass grafts    Coronary Angiogram Graft        Indications    Systemic lupus erythematosus with glomerular disease, unspecified SLE type (H) [M32.14 (ICD-10-CM)]   Hyperlipidemia LDL goal <100 [E78.5  (ICD-10-CM)]   Chronic diastolic heart failure (H) [I50.32 (ICD-10-CM)]   Coronary artery disease involving native coronary artery of native heart without angina pectoris [I25.10 (ICD-10-CM)]     Comments/Patient Narrative    63yr old female with a h/o HFpEF, CAD s/p CAB, HTN, HLD, atrial fibrillation on warfarin, SLE, and PFO who presents with symptoms of dyspnea on exertion for coronary angiogram and possible PCI.     Pre Procedure Diagnosis    stable known CADheart failure    Post Procedure Diagnosis    Dyspnea on exertion  Stable severe coronary artery disease, s/p CABG  LIMA_LAD  SVG_OM1  SVG_rPDA      Conclusion      Right hear catheterization     RA: 6/7/5 mmHg  RV: 43/--/5 mmHg  PA: 41/20/27 mmHg  CWP: 15/15/13 mmHg  CO/CI (Kannan): 5.9/2.8 L/min/m2  CO/CI (TD): 4.5/2.14 L/min/m2         Name: IDA PADRON  MRN: [id number]  : 1960  Study Date: 2023 03:12 PM  Age: 62 yrs  Gender: Female  Patient Location: [facility]  Reason For Study: Moderate tricuspid insufficiency  Ordering Physician: SHELLEY KENNEY  Referring Physician: SHELLEY KENNEY  Performed By: Francy Bradley     BSA: 2.2 m2  Height: 63 in  Weight: 262 lb  BP: 121/74 mmHg  ______________________________________________________________________________  Procedure  Echocardiogram with two-dimensional, color and spectral Doppler performed.  ______________________________________________________________________________  Interpretation Summary  Left ventricular function is normal.The ejection fraction is 55-60%.  Paradoxical septal motion consistent with right ventricular pressure overload  is present.  Global right ventricular function is normal.  Moderate tricuspid insufficiency is present. Pulmonary hypertension is  present. The right ventricular systolic pressure is approximated at 50.8 mmHg  plus the right atrial pressure.  Estimated mean right atrial pressure is normal.  No pericardial effusion is  present.  ______________________________________________________________________________  Left Ventricle  Left ventricular size is normal. Left ventricular function is normal.The  ejection fraction is 55-60%. Paradoxical septal motion consistent with right  ventricular pressure overload is present.     Right Ventricle  Global right ventricular function is normal. Mild right ventricular dilation  is present.     Atria  The left atrium appears normal. Mild right atrial enlargement is present.     Mitral Valve  Mild mitral insufficiency is present.     Tricuspid Valve  Moderate tricuspid insufficiency is present. The right ventricular systolic  pressure is approximated at 50.8 mmHg plus the right atrial pressure.  Pulmonary hypertension is present.     Pulmonic Valve  Mild pulmonic insufficiency is present.     Vessels  The inferior vena cava was normal in size with preserved respiratory  variability. Estimated mean right atrial pressure is normal.     Pericardium  No pericardial effusion is present.     Compared to Previous Study  This study was compared with the study from 10.17.19 . No significant changes  noted.     ______________________________________________________________________________  MMode/2D Measurements & Calculations  IVSd: 0.95 cm  LVIDd: 4.4 cm  LVIDs: 2.7 cm  LVPWd: 0.99 cm  FS: 36.9 %     LV mass(C)d: 139.9 grams  LV mass(C)dI: 64.5 grams/m2  Ao root diam: 2.9 cm  asc Aorta Diam: 3.6 cm  LVOT diam: 1.9 cm  LVOT area: 2.9 cm2  LA Volume (BP): 73.0 ml  LA Volume Index (BP): 33.6 ml/m2  RWT: 0.46  TAPSE: 2.1 cm     Doppler Measurements & Calculations  MV E max maria isabel: 100.0 cm/sec  MV A max maria isabel: 48.0 cm/sec  MV E/A: 2.1  MV dec slope: 548.4 cm/sec2  MV dec time: 0.18 sec  Ao V2 max: 148.6 cm/sec  Ao max P.0 mmHg  Ao V2 mean: 104.3 cm/sec  Ao mean P.0 mmHg  Ao V2 VTI: 34.2 cm  TARUN(I,D): 2.0 cm2  TARUN(V,D): 2.0 cm2  LV V1 max P.1 mmHg  LV V1 max: 101.0 cm/sec  LV V1 VTI: 23.6 cm  SV(LVOT): 69.2  ml  SI(LVOT): 31.9 ml/m2  TR max luis: 306.0 cm/sec  TR max P.8 mmHg  AV Luis Ratio (DI): 0.68  TARUN Index (cm2/m2): 0.93  E/E' avg: 10.6  Lateral E/e': 8.5  Medial E/e': 12.6  RV S Luis: 11.0 cm/sec      Severe three vessel coronary artery disease s/p CABG with LIMA-LAD, SVG-OM1, SVG-rPDA and otherwise mild non obstructive CAD elsewhere unchanged from prior study in .                Coronary Findings    Diagnostic  Dominance: Right  Left Anterior Descending   Ost LAD to Mid LAD lesion is 100% stenosed. The lesion is chronic total occlusion.      Second Diagonal Branch   The vessel is small. There is mild diffuse disease throughout the vessel.      Left Circumflex   Previously placed Ost Cx to Prox Cx stent of unknown type is widely patent.   Prox Cx to Mid Cx lesion is 20% stenosed.      First Obtuse Marginal Branch   The vessel is moderate in size.   1st Mrg lesion is 30% stenosed with 100% stenosed side branch in Lat 1st Mrg. The lesion was previously treated using a stent of unknown type.      Lateral First Obtuse Marginal Branch   The vessel is small.      Second Obtuse Marginal Branch   The vessel is moderate in size.      Third Obtuse Marginal Branch   The vessel is small.      Right Coronary Artery   Prox RCA lesion is 40% stenosed. The lesion is eccentric.   Mid RCA to Dist RCA lesion is 10% stenosed.   Dist RCA lesion is 100% stenosed. The lesion is chronic total occlusion.      Right Posterior Descending Artery   RPDA lesion is 20% stenosed.      LIMA Graft To Dist LAD   The graft is large. The graft is angiographically normal.      Graft To Lat 1st Mrg   The graft is large. The graft is angiographically normal.      Graft To RPDA   The graft is large. The graft is angiographically normal.   Prox Graft lesion is 40% stenosed.         Intervention     No interventions have been documented.     Pressures Phase: Baseline     Time Systolic (mmHg) Diastolic (mmHg) Mean (mmHg) A Wave (mmHg) V Wave (mmHg)  EDP (mmHg) Max dp/dt (mmHg/sec) HR (bpm) Content (mL/dL) SAT (%)   AO Pressures  3:58     58    76        54            CC: Eliz Aguilera M.D.,    CC: [facility] Medical Records                   RA: 6/7/5 mmHg  RV: 43/--/5 mmHg  PA: 41/20/27 mmHg  CWP: 15/15/13 mmHg  CO/CI (Kannan): 5.9/2.8 L/min/m2  CO/CI (TD): 4.5/2.14 L/min/m2    PA sat: 66.4%  MAP: 117 mmHg   PVR: 2.37 GASCA  Right sided filling pressures are normal. Left sided filling pressures are normal. Mild elevated pulmonary hypertension. Normal cardiac output level.

## 2025-07-08 ENCOUNTER — LAB (OUTPATIENT)
Dept: LAB | Facility: CLINIC | Age: 65
End: 2025-07-08
Payer: COMMERCIAL

## 2025-07-08 ENCOUNTER — ANTICOAGULATION THERAPY VISIT (OUTPATIENT)
Dept: ANTICOAGULATION | Facility: CLINIC | Age: 65
End: 2025-07-08

## 2025-07-08 ENCOUNTER — DOCUMENTATION ONLY (OUTPATIENT)
Dept: ANTICOAGULATION | Facility: CLINIC | Age: 65
End: 2025-07-08

## 2025-07-08 DIAGNOSIS — I48.0 PAF (PAROXYSMAL ATRIAL FIBRILLATION) (H): Chronic | ICD-10-CM

## 2025-07-08 DIAGNOSIS — Z79.01 LONG TERM CURRENT USE OF ANTICOAGULANT THERAPY: Primary | Chronic | ICD-10-CM

## 2025-07-08 DIAGNOSIS — I50.30 HEART FAILURE WITH PRESERVED EJECTION FRACTION, UNSPECIFIED HF CHRONICITY (H): ICD-10-CM

## 2025-07-08 DIAGNOSIS — Z13.6 SCREENING FOR CARDIOVASCULAR CONDITION: Primary | ICD-10-CM

## 2025-07-08 DIAGNOSIS — I48.91 ATRIAL FIBRILLATION, UNSPECIFIED TYPE (H): ICD-10-CM

## 2025-07-08 DIAGNOSIS — I25.10 CORONARY ARTERY DISEASE INVOLVING NATIVE CORONARY ARTERY OF NATIVE HEART WITHOUT ANGINA PECTORIS: ICD-10-CM

## 2025-07-08 LAB
ERYTHROCYTE [DISTWIDTH] IN BLOOD BY AUTOMATED COUNT: 15 % (ref 10–15)
HCT VFR BLD AUTO: 40.3 % (ref 35–47)
HGB BLD-MCNC: 12.8 G/DL (ref 11.7–15.7)
INR BLD: 2.4 (ref 0.9–1.1)
MCH RBC QN AUTO: 29.9 PG (ref 26.5–33)
MCHC RBC AUTO-ENTMCNC: 31.8 G/DL (ref 31.5–36.5)
MCV RBC AUTO: 94 FL (ref 78–100)
PLATELET # BLD AUTO: 169 10E3/UL (ref 150–450)
RBC # BLD AUTO: 4.28 10E6/UL (ref 3.8–5.2)
WBC # BLD AUTO: 5.6 10E3/UL (ref 4–11)

## 2025-07-08 PROCEDURE — 36415 COLL VENOUS BLD VENIPUNCTURE: CPT

## 2025-07-08 PROCEDURE — 80053 COMPREHEN METABOLIC PANEL: CPT

## 2025-07-08 PROCEDURE — 85027 COMPLETE CBC AUTOMATED: CPT

## 2025-07-08 PROCEDURE — 85610 PROTHROMBIN TIME: CPT

## 2025-07-08 PROCEDURE — 83880 ASSAY OF NATRIURETIC PEPTIDE: CPT

## 2025-07-08 NOTE — PROGRESS NOTES
ANTICOAGULATION MANAGEMENT     Luz Marina Live 64 year old female is on warfarin with therapeutic INR result. (Goal INR 2.0-3.0)    Recent labs: (last 7 days)     07/08/25  1418   INR 2.4*       ASSESSMENT     Warfarin Lab Questionnaire    Warfarin Doses Last 7 Days      7/8/2025     2:11 PM   Dose in Tablet or Mg   TAB or MG? milligram (mg)     Pt Rptd Dose SUNDAY MONDAY TUESDAY WED THURS FRIDAY SATURDAY 7/8/2025   2:11 PM 7.5 10 7.5 10 7.5 10 10         7/8/2025   Warfarin Lab Questionnaire   Missed doses within past 14 days? No   Changes in diet or alcohol within past 14 days? No   Medication changes since last result? No   Injuries or illness since last result? No   New shortness of breath, severe headaches or sudden changes in vision since last result? No   Abnormal bleeding since last result? No   Upcoming surgery, procedure? No     Previous result: Therapeutic last 2(+) visits  Additional findings: None       PLAN     Recommended plan for no diet, medication or health factor changes affecting INR     Dosing Instructions: Continue your current warfarin dose with next INR in 6 weeks       Summary  As of 7/8/2025      Full warfarin instructions:  7.5 mg every Sun, Tue, Thu; 10 mg all other days   Next INR check:  8/19/2025               Detailed voice message left for Luz Marina with dosing instructions and follow up date.   Sent [employer] message with dosing and follow up instructions    Contact [phone] or respond to [employer] message to schedule and with any changes, questions or concerns.     Education provided: Please call back if any changes to your diet, medications or how you've been taking warfarin    Plan made per [facility] anticoagulation protocol    Al Doan RN  7/8/2025  Anticoagulation Clinic  [employer] for routing messages: [facility]  [facility] patient phone line: [phone]        _______________________________________________________________________     Anticoagulation Episode Summary        Current INR goal:  2.0-3.0   TTR:  66.4% (1 y)   Target end date:  Indefinite   Send INR reminders to:  [facility]    Indications    Long-term (current) use of anticoagulants [Z79.01] [Z79.01]  PAF (paroxysmal atrial fibrillation) (H) [I48.0]  Atrial fibrillation  unspecified type (H) [I48.91]             Comments:  --             Anticoagulation Care Providers       Provider Role Specialty Phone number    Eliz Nguyen MD Referring Internal Medicine [phone]    Elodia Tang MD Referring Family Medicine [phone]

## 2025-07-08 NOTE — PROGRESS NOTES
ANTICOAGULATION CLINIC REFERRAL RENEWAL REQUEST       An annual renewal order is required for all patients referred to [facility].?  Please review and sign the pended referral order for Luz Marina Live.       ANTICOAGULATION SUMMARY      Warfarin indication(s)   Atrial Fibrillation    Mechanical heart valve present?  NO       Current goal range   INR: 2.0-3.0     Goal appropriate for indication? Goal INR 2-3, standard for indication(s) above     Time in Therapeutic Range (TTR)  (Goal > 60%) 66.4%       Office visit with referring provider's group within last year yes on 12/20/24   Additional standing orders None       Al Doan, RN  [facility]

## 2025-07-09 ENCOUNTER — OFFICE VISIT (OUTPATIENT)
Dept: CARDIOLOGY | Facility: CLINIC | Age: 65
End: 2025-07-09
Attending: INTERNAL MEDICINE
Payer: COMMERCIAL

## 2025-07-09 ENCOUNTER — TELEPHONE (OUTPATIENT)
Dept: CARDIOLOGY | Facility: CLINIC | Age: 65
End: 2025-07-09
Payer: MEDICARE

## 2025-07-09 VITALS
DIASTOLIC BLOOD PRESSURE: 85 MMHG | OXYGEN SATURATION: 99 % | SYSTOLIC BLOOD PRESSURE: 135 MMHG | BODY MASS INDEX: 47.63 KG/M2 | WEIGHT: 260.4 LBS | HEART RATE: 62 BPM

## 2025-07-09 DIAGNOSIS — R06.09 DYSPNEA ON EXERTION: Primary | ICD-10-CM

## 2025-07-09 DIAGNOSIS — R07.89 CHEST PRESSURE: ICD-10-CM

## 2025-07-09 DIAGNOSIS — I50.30 HEART FAILURE WITH PRESERVED EJECTION FRACTION, UNSPECIFIED HF CHRONICITY (H): ICD-10-CM

## 2025-07-09 LAB
ALBUMIN SERPL BCG-MCNC: 4 G/DL (ref 3.5–5.2)
ALP SERPL-CCNC: 119 U/L (ref 40–150)
ALT SERPL W P-5'-P-CCNC: 23 U/L (ref 0–50)
ANION GAP SERPL CALCULATED.3IONS-SCNC: 9 MMOL/L (ref 7–15)
AST SERPL W P-5'-P-CCNC: 39 U/L (ref 0–45)
BILIRUB SERPL-MCNC: 0.7 MG/DL
BUN SERPL-MCNC: 39.1 MG/DL (ref 8–23)
CALCIUM SERPL-MCNC: 9.8 MG/DL (ref 8.8–10.4)
CHLORIDE SERPL-SCNC: 108 MMOL/L (ref 98–107)
CREAT SERPL-MCNC: 1.39 MG/DL (ref 0.51–0.95)
EGFRCR SERPLBLD CKD-EPI 2021: 42 ML/MIN/1.73M2
GLUCOSE SERPL-MCNC: 96 MG/DL (ref 70–99)
HCO3 SERPL-SCNC: 23 MMOL/L (ref 22–29)
NT-PROBNP SERPL-MCNC: 2209 PG/ML (ref 0–226)
POTASSIUM SERPL-SCNC: 5.2 MMOL/L (ref 3.4–5.3)
PROT SERPL-MCNC: 8.1 G/DL (ref 6.4–8.3)
SODIUM SERPL-SCNC: 140 MMOL/L (ref 135–145)

## 2025-07-09 PROCEDURE — G0463 HOSPITAL OUTPT CLINIC VISIT: HCPCS | Performed by: INTERNAL MEDICINE

## 2025-07-09 RX ORDER — SODIUM CHLORIDE 9 MG/ML
INJECTION, SOLUTION INTRAVENOUS CONTINUOUS
OUTPATIENT
Start: 2025-07-09

## 2025-07-09 RX ORDER — ASPIRIN 81 MG/1
243 TABLET, CHEWABLE ORAL ONCE
OUTPATIENT
Start: 2025-07-09

## 2025-07-09 RX ORDER — LIDOCAINE 40 MG/G
CREAM TOPICAL
OUTPATIENT
Start: 2025-07-09

## 2025-07-09 RX ORDER — ASPIRIN 325 MG
325 TABLET ORAL ONCE
OUTPATIENT
Start: 2025-07-09 | End: 2025-07-09

## 2025-07-09 ASSESSMENT — PAIN SCALES - GENERAL: PAINLEVEL_OUTOF10: NO PAIN (0)

## 2025-07-09 NOTE — LETTER
"7/9/2025      RE: Luz Marina Live  [address]  [place] [address]       Dear Colleague,    Thank you for the opportunity to participate in the care of your patient, Luz Marina Live, at the [facility] at [facility]. Please see a copy of my visit note below.             Expand All Collapse All     1 SLE  2. Vaginal bleeding  3. ASHD with remote CABG  4. Exertional shortness of breath/HFpEF  5. Dependent edema   6. Elevated weight  7. Paroxysmal atrial fibrillation (warfarin)  8. KEITH  9. Collagen vascular disease  10. Tricuspid insufficiency    Plan  1. Consider GLP-1 for weight loss with history of heart failure, ASHD with premature CAD, chronic renal failure, sleep disordered breath. This would be prescribed by primary care. / not cardiogy  2.  Continue weight loss, dietary and fluid monitoring  3.  Echocardiogram for LV and RV function  4, Bilateral heart catheterization coronary and graft angiogram in view of new exercise associated symptins    The patient returns for follow-up of heart failure.  There is no interim history of chest pain, tightness, paroxysmal nocturnal dyspnea, orthopnea, peripheral edema, palpitation, pre-syncope, syncope, dExercise tolerance is stable.  The patient is attempting to exercise regularly and following a sodium restricted, calorically appropriate diet.  Medications are reviewed and the patient is taking medications as prescribed.  The patient is generally sleeping well.       12/20/24 13:03 01/07/25 14:02 01/09/25 01/15/25 09:43 06/06/25 11:14 07/09/25   /67  116/78  128/76 135/85   Temp 98  F (36.7  C)        Pulse 63  61  65 62   Resp 22  12      SpO2 98 %  98 %  97 % 99 %   Height 1.593 m (5' 2.72\") 1.575 m (5' 2\")  1.575 m (5' 2\")     BMI (Calculated)  45.73  45.18     Weight (lbs)  250 lb [1]  247 lb [2] 254 lb 260 lb 6.4 oz [3]   Weight (kgs)  113.4 kg (250 lb) [1]  112 kg (247 lb) [2] " 115.2 kg (254 lb) 118.1 kg (260 lb 6.4 oz) [3]       Current Outpatient Medications   Medication Sig Dispense Refill     aspirin 81 MG EC tablet Take 81 mg by mouth every morning.       atorvastatin (LIPITOR) 40 MG tablet TAKE 1 TABLET BY MOUTH EVERY DAY 90 tablet 1     Azelastine HCl 137 MCG/SPRAY SOLN [place] 2 sprays into both nostrils 2 times daily 30 mL 4     FARXIGA 10 MG TABS tablet TAKE 1 TABLET (10 MG) BY MOUTH DAILY. 90 tablet 3     furosemide (LASIX) 20 MG tablet Take 2 tablets (40 mg) by mouth daily. 180 tablet 3     hydrocortisone 2.5 % ointment PLEASE SEE ATTACHED FOR DETAILED DIRECTIONS       hydroxychloroquine (PLAQUENIL) 200 MG tablet Take 1 tablet (200 mg) by mouth 2 times daily. 180 tablet 3     loratadine (CLARITIN) 10 MG tablet Take 10 mg by mouth every evening.       metoprolol tartrate (LOPRESSOR) 50 MG tablet Take 1 tablet (50 mg) by mouth 2 times daily. 180 tablet 3     MULTIPLE VITAMIN PO Take 1 tablet by mouth every morning. HAIR SKIN AND NAILS       omega-3 fatty acids 1200 MG capsule Take 2 capsules by mouth 2 times daily        order for DME Autopap 10-16 cmH20 1 Device 0     sacubitril-valsartan (ENTRESTO) 24-26 MG per tablet TAKE 1/2 TABLET TWICE A DAY BY MOUTH 90 tablet 3     warfarin ANTICOAGULANT (COUMADIN/JANTOVEN) 5 MG tablet Take 1.5-2 tablets (7.5-10 mg) by mouth every evening. or as directed by the INR clinic 180 tablet 1     ketoconazole (NIZORAL) 2 % external shampoo WASH AFFECTED AREAS ON THE FACE ONCE DAILY. LATHER AND LET SIT FOR 3-5 MINUTES BEFORE RINSING. (Patient not taking: Reported on 7/9/2025)       metroNIDAZOLE (METROCREAM) 0.75 % external cream Apply topically 2 times daily (Patient not taking: Reported on 7/9/2025) 45 g 3     No current facility-administered medications for this visit.         Latest Reference Range & Units 07/08/25 14:12   Potassium 3.4 - 5.3 mmol/L 5.2   Chloride 98 - 107 mmol/L 108 (H)   Carbon Dioxide (CO2) 22 - 29 mmol/L 23   Urea Nitrogen  8.0 - 23.0 mg/dL 39.1 (H)   Creatinine 0.51 - 0.95 mg/dL 1.39 (H)   GFR Estimate >60 mL/min/1.73m2 42 (L)   Calcium 8.8 - 10.4 mg/dL 9.8   Anion Gap 7 - 15 mmol/L 9   Albumin 3.5 - 5.2 g/dL 4.0   Protein Total 6.4 - 8.3 g/dL 8.1   Alkaline Phosphatase 40 - 150 U/L 119   ALT 0 - 50 U/L 23   AST 0 - 45 U/L 39   Bilirubin Total <=1.2 mg/dL 0.7   Glucose 70 - 99 mg/dL 96   N-Terminal Pro Bnp 0 - 226 pg/mL 2,209 (H)   WBC 4.0 - 11.0 10e3/uL 5.6   Hemoglobin 11.7 - 15.7 g/dL 12.8   Hematocrit 35.0 - 47.0 % 40.3   Platelet Count 150 - 450 10e3/uL 169   RBC Count 3.80 - 5.20 10e6/uL 4.28   MCV 78 - 100 fL 94   MCH 26.5 - 33.0 pg 29.9   MCHC 31.5 - 36.5 g/dL 31.8   RDW 10.0 - 15.0 % 15.0   (   Latest Reference Range & Units 10/16/24 12:54 12/20/24 15:05 07/08/25 14:12   N-Terminal Pro Bnp 0 - 226 pg/mL 2,159 (H) 2,375 (H) 2,209 (H)   (H): Data is abnormally high     Wt Readings from Last 24 Encounters:   07/09/25 118.1 kg (260 lb 6.4 oz)   06/06/25 115.2 kg (254 lb)   01/15/25 112 kg (247 lb)   01/07/25 113.4 kg (250 lb)   11/26/24 113.9 kg (251 lb)   11/18/24 113.9 kg (251 lb)   10/16/24 115.2 kg (254 lb)   09/06/24 117.5 kg (259 lb)   04/17/24 115.2 kg (254 lb)   04/05/24 114.3 kg (252 lb)   11/29/23 113.6 kg (250 lb 8 oz)   11/28/23 113.4 kg (250 lb)   11/28/23 113.4 kg (250 lb)   10/26/23 113.4 kg (250 lb)   10/25/23 113.4 kg (250 lb)   10/18/23 113.4 kg (250 lb)   10/09/23 114.2 kg (251 lb 12.3 oz)   10/06/23 114.3 kg (251 lb 14.4 oz)   09/20/23 113.4 kg (250 lb)   09/13/23 114.7 kg (252 lb 13.9 oz)   07/26/23 108.9 kg (240 lb)   06/09/23 119.1 kg (262 lb 9.6 oz)   06/02/23 119.3 kg (263 lb)   11/28/22 122.2 kg (269 lb 5.8 oz)          2023  RA: 6/7/5 mmHg  RV: 43/--/5 mmHg  PA: 41/20/27 mmHg  CWP: 15/15/13 mmHg  CO/CI (Kannan): 5.9/2.8 L/min/m2  CO/CI (TD): 4.5/2.14 L/min/m2    PA sat: 66.4%  MAP: 117 mmHg   PVR: 2.37 GASCA  Right sided filling pressures are normal. Left sided filling pressures are normal. Mild elevated  pulmonary hypertension. Normal cardiac output level.         Primary Surgeon: Mahendra Collins MD   Procedure: Coronary Angiogram with checking bypass grafts    Coronary Angiogram Graft        Indications    Systemic lupus erythematosus with glomerular disease, unspecified SLE type (H) [M32.14 (ICD-10-CM)]   Hyperlipidemia LDL goal <100 [E78.5 (ICD-10-CM)]   Chronic diastolic heart failure (H) [I50.32 (ICD-10-CM)]   Coronary artery disease involving native coronary artery of native heart without angina pectoris [I25.10 (ICD-10-CM)]     Comments/Patient Narrative    63yr old female with a h/o HFpEF, CAD s/p CAB, HTN, HLD, atrial fibrillation on warfarin, SLE, and PFO who presents with symptoms of dyspnea on exertion for coronary angiogram and possible PCI.     Pre Procedure Diagnosis    stable known CADheart failure    Post Procedure Diagnosis    Dyspnea on exertion  Stable severe coronary artery disease, s/p CABG  LIMA_LAD  SVG_OM1  SVG_rPDA      Conclusion      Right hear catheterization     RA: 6/7/5 mmHg  RV: 43/--/5 mmHg  PA: 41/20/27 mmHg  CWP: 15/15/13 mmHg  CO/CI (Kannan): 5.9/2.8 L/min/m2  CO/CI (TD): 4.5/2.14 L/min/m2         Name: IDA PADRON  MRN: [id number]  : 1960  Study Date: 2023 03:12 PM  Age: 62 yrs  Gender: Female  Patient Location: [facility]  Reason For Study: Moderate tricuspid insufficiency  Ordering Physician: SHELLEY KENNEY  Referring Physician: SHELLEY KENNEY  Performed By: Francy Bradley     BSA: 2.2 m2  Height: 63 in  Weight: 262 lb  BP: 121/74 mmHg  ______________________________________________________________________________  Procedure  Echocardiogram with two-dimensional, color and spectral Doppler performed.  ______________________________________________________________________________  Interpretation Summary  Left ventricular function is normal.The ejection fraction is 55-60%.  Paradoxical septal motion consistent with right ventricular  pressure overload  is present.  Global right ventricular function is normal.  Moderate tricuspid insufficiency is present. Pulmonary hypertension is  present. The right ventricular systolic pressure is approximated at 50.8 mmHg  plus the right atrial pressure.  Estimated mean right atrial pressure is normal.  No pericardial effusion is present.  ______________________________________________________________________________  Left Ventricle  Left ventricular size is normal. Left ventricular function is normal.The  ejection fraction is 55-60%. Paradoxical septal motion consistent with right  ventricular pressure overload is present.     Right Ventricle  Global right ventricular function is normal. Mild right ventricular dilation  is present.     Atria  The left atrium appears normal. Mild right atrial enlargement is present.     Mitral Valve  Mild mitral insufficiency is present.     Tricuspid Valve  Moderate tricuspid insufficiency is present. The right ventricular systolic  pressure is approximated at 50.8 mmHg plus the right atrial pressure.  Pulmonary hypertension is present.     Pulmonic Valve  Mild pulmonic insufficiency is present.     Vessels  The inferior vena cava was normal in size with preserved respiratory  variability. Estimated mean right atrial pressure is normal.     Pericardium  No pericardial effusion is present.     Compared to Previous Study  This study was compared with the study from 10.17.19 . No significant changes  noted.     ______________________________________________________________________________  MMode/2D Measurements & Calculations  IVSd: 0.95 cm  LVIDd: 4.4 cm  LVIDs: 2.7 cm  LVPWd: 0.99 cm  FS: 36.9 %     LV mass(C)d: 139.9 grams  LV mass(C)dI: 64.5 grams/m2  Ao root diam: 2.9 cm  asc Aorta Diam: 3.6 cm  LVOT diam: 1.9 cm  LVOT area: 2.9 cm2  LA Volume (BP): 73.0 ml  LA Volume Index (BP): 33.6 ml/m2  RWT: 0.46  TAPSE: 2.1 cm     Doppler Measurements & Calculations  MV E max maria isabel:  100.0 cm/sec  MV A max luis: 48.0 cm/sec  MV E/A: 2.1  MV dec slope: 548.4 cm/sec2  MV dec time: 0.18 sec  Ao V2 max: 148.6 cm/sec  Ao max P.0 mmHg  Ao V2 mean: 104.3 cm/sec  Ao mean P.0 mmHg  Ao V2 VTI: 34.2 cm  TARUN(I,D): 2.0 cm2  TARUN(V,D): 2.0 cm2  LV V1 max P.1 mmHg  LV V1 max: 101.0 cm/sec  LV V1 VTI: 23.6 cm  SV(LVOT): 69.2 ml  SI(LVOT): 31.9 ml/m2  TR max luis: 306.0 cm/sec  TR max P.8 mmHg  AV Luis Ratio (DI): 0.68  TARUN Index (cm2/m2): 0.93  E/E' avg: 10.6  Lateral E/e': 8.5  Medial E/e': 12.6  RV S Luis: 11.0 cm/sec      Severe three vessel coronary artery disease s/p CABG with LIMA-LAD, SVG-OM1, SVG-rPDA and otherwise mild non obstructive CAD elsewhere unchanged from prior study in .                Coronary Findings    Diagnostic  Dominance: Right  Left Anterior Descending   Ost LAD to Mid LAD lesion is 100% stenosed. The lesion is chronic total occlusion.      Second Diagonal Branch   The vessel is small. There is mild diffuse disease throughout the vessel.      Left Circumflex   Previously placed Ost Cx to Prox Cx stent of unknown type is widely patent.   Prox Cx to Mid Cx lesion is 20% stenosed.      First Obtuse Marginal Branch   The vessel is moderate in size.   1st Mrg lesion is 30% stenosed with 100% stenosed side branch in Lat 1st Mrg. The lesion was previously treated using a stent of unknown type.      Lateral First Obtuse Marginal Branch   The vessel is small.      Second Obtuse Marginal Branch   The vessel is moderate in size.      Third Obtuse Marginal Branch   The vessel is small.      Right Coronary Artery   Prox RCA lesion is 40% stenosed. The lesion is eccentric.   Mid RCA to Dist RCA lesion is 10% stenosed.   Dist RCA lesion is 100% stenosed. The lesion is chronic total occlusion.      Right Posterior Descending Artery   RPDA lesion is 20% stenosed.      LIMA Graft To Dist LAD   The graft is large. The graft is angiographically normal.      Graft To Lat 1st Mrg   The graft  is large. The graft is angiographically normal.      Graft To RPDA   The graft is large. The graft is angiographically normal.   Prox Graft lesion is 40% stenosed.         Intervention     No interventions have been documented.     Pressures Phase: Baseline     Time Systolic (mmHg) Diastolic (mmHg) Mean (mmHg) A Wave (mmHg) V Wave (mmHg) EDP (mmHg) Max dp/dt (mmHg/sec) HR (bpm) Content (mL/dL) SAT (%)   AO Pressures  3:58     58    76        54            CC: Eliz Aguilera M.D.,    CC: [facility] Medical Records                   RA: 6/7/5 mmHg  RV: 43/--/5 mmHg  PA: 41/20/27 mmHg  CWP: 15/15/13 mmHg  CO/CI (Kannan): 5.9/2.8 L/min/m2  CO/CI (TD): 4.5/2.14 L/min/m2    PA sat: 66.4%  MAP: 117 mmHg   PVR: 2.37 GASCA  Right sided filling pressures are normal. Left sided filling pressures are normal. Mild elevated pulmonary hypertension. Normal cardiac output level.       Please do not hesitate to contact me if you have any questions/concerns.     Sincerely,     Twin Moreno MD

## 2025-07-09 NOTE — PROGRESS NOTES
Covering for PCP/ordering provider (out of clinic today):  orders signed.    Thanks,  Ruben Zabala MD

## 2025-07-09 NOTE — TELEPHONE ENCOUNTER
Cath Lab Case Request/Order    Location: [address] [address] [facility] Waiting Room    Procedure: Coronary Angiogram w/ RHC and LHC    Procedure Date: 7/22/2025    Patient Arrival Time: 11am    Procedure Time: 4th case to follow    Ordering Provider: Dr. Twin Moreno    Performing Cardiologist: Dr. Tomasz Lindsey    Inpatient Bed Needed: No    Post-  Procedure KRISTIAN appointment scheduled (1 - 2 weeks): N/A, [facility]     Date: NA     Provider: NA    Communicated Patient Instructions:     NPO, nothing to eat 8 hours and drink 2 hours before arrival time: No     , need to arrange a ride home - unable to drive post- procedure: N/A, RHC     Adult at home, need a responsible adult to stay with patient 24 hours post- procedure: N/A, RHC    Appointment was scheduled: Face to Face    Patient expressed understanding of above instructions and denied further questions at this time.    Josh Damon

## 2025-07-09 NOTE — NURSING NOTE
Chief Complaint   Patient presents with    Follow Up      Return HF; 64yr old female with a h/o HFpEF, CAD s/p CAB, HTN, HLD, atrial fibrillation on warfarin, SLE, and PFO presenting for follow-up with labs prior.       Vitals were taken, medications reconciled     Aashish Osuna, Clinic Assistant     10:12 AM

## 2025-07-09 NOTE — PATIENT INSTRUCTIONS
You were seen today in the Cardiovascular Clinic at the [facility].      Cardiology Providers you saw during your visit:  Dr. Twin Moreno     Recommendations:   Please schedule a Bilateral heart cath (Right and Left) and coronary angiogram next available - ideally within 1 week.  Please have a Overnight oximetry study completed. They will call you to schedule this pick-up.  Echocardiogram next available.  MTM consult (Pharmacist consult) to discuss GLP1 medications   5. Please have a follow-up phone visit with Dr. Moreno after this testing is completed.    Pre-procedure instructions - Coronary Angiogram, Right and Left Heart Cath  Patient Education    Your arrival time is TBD.  Location is [address] Waiting Room  Please plan on being at the hospital all day. If you are on dialysis, DO NOT schedule on a dialysis day.  At any time, emergencies and/or urgent cases may come up which could delay the start of your procedure.    Pre-procedure instructions - Coronary Angiogram  Shower in the evening before or the morning of the procedure  No solid food for 8 hours prior and nothing to drink 2 hours prior to arrival time  You can take your morning medications (except for diabetic and blood thinners) with sips of water.  Take 325 mg of Aspirin the night before and the morning of your procedure.  You will need to arrange a ride to drop you off and , as you will be unable to drive home. Prior to discharge you may be required to lay flat for approximately 2-6 hours in the recovery unit to ensure proper clotting of the artery. Please note: You cannot take an Uber/Taxi/etc unless you are accompanied by someone.You will need a responsible adult to stay with you for 24 hours post-procedure.              Diabetic Medication Instructions  Hold oral diabetic medication in morning of your procedure and for 48 hours after  "IV contrast is given  Typical instructions for insulin diabetic medication holding are below. However, please reach out to your Primary Care Provider or Endocrinologist for specific instructions  DO NOT take any oral diabetic medication, short-acting diabetes medications/insulin, humalog or regular insulin the morning of your test  Take   dose of long-acting insulin (Lantus, Levemir) the day of your test  Remember to bring your glucometer and insulin with you to take after your test if needed  GLP-1 Agonists Instructions  DO NOT take injectable GLP-1 agonists semaglutide (Ozempic, Wegovy), dulaglutide (Trulicity), exenatide ER (Bydureon), tirzepatide (Mounjaro), or oral semaglutide (Rybelsus) for 7 days prior your procedure  Hold once daily injectable GLP-1 agonists exenatide (Byetta), liraglutide (Saxenda, Victoza), lixisenatide (Soligua) the day before and day of your procedure                  Anticoagulation Medication Instructions   warfarin (COUMADIN) - Hold 4 Days prior to procedure - This will be determined by your Anticoag clinic.            Eat a heart healthy, low sodium diet.  Get 20 to 30 minutes of aerobic exercise 4 to 5 times per week as tolerated. (Examples of aerobic exercise include: walking, bicycling, swimming, running).     Thank you for your visit today!   Please MyChart message or call if you have any questions or concerns.      During Business Hours:  [phone], option # 1 ([facility])       After hours, weekends or holidays:   [phone], Option #4  Ask to speak to the On-Call Cardiologist. Inform them you are a heart failure patient at the [facility].      Kaylie Tran RN BSN CHFN  Cardiology Care Coordinator - [facility]  Questions and [phone]  First press #1 for the datango and then press #4 for \"Your Care Team\" to reach us Cardiology Nurses.                "

## 2025-07-10 ENCOUNTER — TELEPHONE (OUTPATIENT)
Dept: ANTICOAGULATION | Facility: CLINIC | Age: 65
End: 2025-07-10
Payer: MEDICARE

## 2025-07-10 ENCOUNTER — TELEPHONE (OUTPATIENT)
Dept: CARDIOLOGY | Facility: CLINIC | Age: 65
End: 2025-07-10
Payer: MEDICARE

## 2025-07-10 DIAGNOSIS — Z79.01 LONG TERM CURRENT USE OF ANTICOAGULANT THERAPY: Primary | Chronic | ICD-10-CM

## 2025-07-10 DIAGNOSIS — I48.0 PAF (PAROXYSMAL ATRIAL FIBRILLATION) (H): Chronic | ICD-10-CM

## 2025-07-10 DIAGNOSIS — I48.91 ATRIAL FIBRILLATION, UNSPECIFIED TYPE (H): ICD-10-CM

## 2025-07-10 NOTE — TELEPHONE ENCOUNTER
[facility]US-PROCEDURAL ANTICOAGULATION  MANAGEMENT    Dr. Moreno requesting pre-procedure hold orders for warfarin and review for bridging      //Procedure date: 7/22/25       /Procedure: [facility]      Procedure location and phone number (if external): [facility]     Number of warfarin hold days requested and/or target INR: INR <2.0    Pre-op date: Not applicable      Routing to Anticoagulation Pharmacist for review.     ACC pool: p [facility]     Lilia Romero RN

## 2025-07-10 NOTE — TELEPHONE ENCOUNTER
Called and LVM to schedule an appt with Dr. Moreno (phone visit) on 8/13 @ 12:30 pm do review echo and cath results.

## 2025-07-10 NOTE — TELEPHONE ENCOUNTER
MADELINE-PROCEDURAL ANTICOAGULATION  MANAGEMENT    ASSESSMENT     Warfarin interruption plan for RHC on 25.    Indication for Anticoagulation: Atrial Fibrillation    [id number] = 4-5 (Hypertension, Age 65-74, Vascular- CAD-S/p pci, and Female =/- HF)    Past procedure management  2025- colonoscopy-5 day hold, no bridge    [facility]-Procedure Risk stratification for thromboembolism: low-moderate ( Chest guidelines)    AFIB:  CHEST Perioperative Management guidelines recommends against bridging for patients with atrial fibrillation except in high risk stratification patients.    Target INR < 2 for procedure (confirmed with Ramses Tran RN 25)    Lab Results   Component Value Date    INR 2.4 (H) 2025    INR 2.1 (H) 2025    INR 2.5 (H) 2025       RECOMMENDATION     Target INR < 2 for procedure    Pre-Procedure:  Hold warfarin for 2 days, until after procedure startin25   No Bridge    Post-Procedure:  Resume warfarin dose if okay with provider doing procedure on night of procedure,  PM: 17.5 mg x 1 then resume current dose  Recheck INR ~ 7 days after resuming warfarin     Plan routed to referring provider for approval  ?   Lou Steele, [facility]    SUBJECTIVE/OBJECTIVE     Luz Marina Live, a 64 year old female    Goal Range: 2.0-3.0

## 2025-07-11 NOTE — TELEPHONE ENCOUNTER
Hi, this is Dr. Forrester covering for Dr. Nguyen while she is away from clinic. Holding 2 days prior to procedure without bridge would be appropriate and restarting after procedure.     Kilo Forrester MD  Internal Medicine  Primary Care Clinic, [place], [place]

## 2025-07-15 ENCOUNTER — MYC MEDICAL ADVICE (OUTPATIENT)
Dept: ANTICOAGULATION | Facility: CLINIC | Age: 65
End: 2025-07-15
Payer: MEDICARE

## 2025-07-15 NOTE — TELEPHONE ENCOUNTER
Plan updated:    Pre-Procedure:  Hold warfarin for 2 days, until after procedure startin25   No Bridge     Post-Procedure:  Resume warfarin dose if okay with provider doing procedure on night of procedure,  PM: 15 mg x 1 then resume current dose  Recheck INR ~ 7 days after resuming warfarin      Jael Howe, SylviaD, BCPS

## 2025-07-15 NOTE — TELEPHONE ENCOUNTER
My chart message is sent with specifics. Voicemail is left for Luz Marina instructing her to check her my chart message. Calendar is updated.

## 2025-07-22 ENCOUNTER — APPOINTMENT (OUTPATIENT)
Dept: MEDSURG UNIT | Facility: CLINIC | Age: 65
End: 2025-07-22
Attending: INTERNAL MEDICINE
Payer: MEDICARE

## 2025-07-22 ENCOUNTER — APPOINTMENT (OUTPATIENT)
Dept: LAB | Facility: CLINIC | Age: 65
End: 2025-07-22
Attending: INTERNAL MEDICINE
Payer: MEDICARE

## 2025-07-22 ENCOUNTER — HOSPITAL ENCOUNTER (OUTPATIENT)
Facility: CLINIC | Age: 65
Discharge: HOME | End: 2025-07-22
Attending: INTERNAL MEDICINE | Admitting: INTERNAL MEDICINE
Payer: MEDICARE

## 2025-07-22 VITALS
HEART RATE: 48 BPM | TEMPERATURE: 98.1 F | RESPIRATION RATE: 16 BRPM | OXYGEN SATURATION: 98 % | DIASTOLIC BLOOD PRESSURE: 67 MMHG | SYSTOLIC BLOOD PRESSURE: 120 MMHG | WEIGHT: 253.9 LBS | BODY MASS INDEX: 46.44 KG/M2

## 2025-07-22 DIAGNOSIS — Z98.890 STATUS POST CORONARY ANGIOGRAM: ICD-10-CM

## 2025-07-22 DIAGNOSIS — R06.09 DYSPNEA ON EXERTION: ICD-10-CM

## 2025-07-22 DIAGNOSIS — I25.10 CORONARY ARTERY DISEASE INVOLVING NATIVE CORONARY ARTERY OF NATIVE HEART WITHOUT ANGINA PECTORIS: Primary | ICD-10-CM

## 2025-07-22 DIAGNOSIS — R07.89 CHEST PRESSURE: ICD-10-CM

## 2025-07-22 DIAGNOSIS — I50.30 HEART FAILURE WITH PRESERVED EJECTION FRACTION, UNSPECIFIED HF CHRONICITY (H): ICD-10-CM

## 2025-07-22 LAB
ANION GAP SERPL CALCULATED.3IONS-SCNC: 10 MMOL/L (ref 7–15)
APTT PPP: 33 SECONDS (ref 22–38)
ATRIAL RATE - MUSE: 54 BPM
BUN SERPL-MCNC: 46.5 MG/DL (ref 8–23)
CALCIUM SERPL-MCNC: 10 MG/DL (ref 8.8–10.4)
CHLORIDE SERPL-SCNC: 104 MMOL/L (ref 98–107)
CREAT SERPL-MCNC: 1.69 MG/DL (ref 0.51–0.95)
DIASTOLIC BLOOD PRESSURE - MUSE: NORMAL MMHG
EGFRCR SERPLBLD CKD-EPI 2021: 33 ML/MIN/1.73M2
ERYTHROCYTE [DISTWIDTH] IN BLOOD BY AUTOMATED COUNT: 14.3 % (ref 10–15)
GLUCOSE SERPL-MCNC: 107 MG/DL (ref 70–99)
HCG INTACT+B SERPL-ACNC: 2 MIU/ML
HCO3 SERPL-SCNC: 23 MMOL/L (ref 22–29)
HCT VFR BLD AUTO: 40 % (ref 35–47)
HGB BLD-MCNC: 12.8 G/DL (ref 11.7–15.7)
INR PPP: 1.82 (ref 0.85–1.15)
INTERPRETATION ECG - MUSE: NORMAL
MCH RBC QN AUTO: 29 PG (ref 26.5–33)
MCHC RBC AUTO-ENTMCNC: 32 G/DL (ref 31.5–36.5)
MCV RBC AUTO: 91 FL (ref 78–100)
P AXIS - MUSE: 63 DEGREES
PLATELET # BLD AUTO: 228 10E3/UL (ref 150–450)
POTASSIUM SERPL-SCNC: 5.1 MMOL/L (ref 3.4–5.3)
PR INTERVAL - MUSE: 196 MS
PROTHROMBIN TIME: 20.8 SECONDS (ref 11.8–14.8)
QRS DURATION - MUSE: 150 MS
QT - MUSE: 512 MS
QTC - MUSE: 485 MS
R AXIS - MUSE: 6 DEGREES
RBC # BLD AUTO: 4.42 10E6/UL (ref 3.8–5.2)
SODIUM SERPL-SCNC: 137 MMOL/L (ref 135–145)
SYSTOLIC BLOOD PRESSURE - MUSE: NORMAL MMHG
T AXIS - MUSE: 148 DEGREES
VENTRICULAR RATE- MUSE: 54 BPM
WBC # BLD AUTO: 7.2 10E3/UL (ref 4–11)

## 2025-07-22 PROCEDURE — 99152 MOD SED SAME PHYS/QHP 5/>YRS: CPT | Performed by: INTERNAL MEDICINE

## 2025-07-22 PROCEDURE — 99152 MOD SED SAME PHYS/QHP 5/>YRS: CPT | Mod: GC | Performed by: INTERNAL MEDICINE

## 2025-07-22 PROCEDURE — 85014 HEMATOCRIT: CPT | Performed by: INTERNAL MEDICINE

## 2025-07-22 PROCEDURE — 250N000011 HC RX IP 250 OP 636: Performed by: INTERNAL MEDICINE

## 2025-07-22 PROCEDURE — 93005 ELECTROCARDIOGRAM TRACING: CPT

## 2025-07-22 PROCEDURE — 84702 CHORIONIC GONADOTROPIN TEST: CPT | Performed by: INTERNAL MEDICINE

## 2025-07-22 PROCEDURE — 999N000142 HC STATISTIC PROCEDURE PREP ONLY

## 2025-07-22 PROCEDURE — C1760 CLOSURE DEV, VASC: HCPCS | Performed by: INTERNAL MEDICINE

## 2025-07-22 PROCEDURE — 85730 THROMBOPLASTIN TIME PARTIAL: CPT | Performed by: INTERNAL MEDICINE

## 2025-07-22 PROCEDURE — 93461 R&L HRT ART/VENTRICLE ANGIO: CPT | Mod: 26 | Performed by: INTERNAL MEDICINE

## 2025-07-22 PROCEDURE — 80048 BASIC METABOLIC PNL TOTAL CA: CPT | Performed by: INTERNAL MEDICINE

## 2025-07-22 PROCEDURE — 272N000001 HC OR GENERAL SUPPLY STERILE: Performed by: INTERNAL MEDICINE

## 2025-07-22 PROCEDURE — C1894 INTRO/SHEATH, NON-LASER: HCPCS | Performed by: INTERNAL MEDICINE

## 2025-07-22 PROCEDURE — 258N000003 HC RX IP 258 OP 636: Performed by: INTERNAL MEDICINE

## 2025-07-22 PROCEDURE — 36415 COLL VENOUS BLD VENIPUNCTURE: CPT | Performed by: INTERNAL MEDICINE

## 2025-07-22 PROCEDURE — 93010 ELECTROCARDIOGRAM REPORT: CPT | Mod: XU | Performed by: INTERNAL MEDICINE

## 2025-07-22 PROCEDURE — 999N000054 HC STATISTIC EKG NON-CHARGEABLE

## 2025-07-22 PROCEDURE — 85610 PROTHROMBIN TIME: CPT | Performed by: INTERNAL MEDICINE

## 2025-07-22 PROCEDURE — 250N000009 HC RX 250: Performed by: INTERNAL MEDICINE

## 2025-07-22 PROCEDURE — 93461 R&L HRT ART/VENTRICLE ANGIO: CPT | Performed by: INTERNAL MEDICINE

## 2025-07-22 PROCEDURE — 999N000134 HC STATISTIC PP CARE STAGE 3

## 2025-07-22 PROCEDURE — 99153 MOD SED SAME PHYS/QHP EA: CPT | Performed by: INTERNAL MEDICINE

## 2025-07-22 PROCEDURE — C1751 CATH, INF, PER/CENT/MIDLINE: HCPCS | Performed by: INTERNAL MEDICINE

## 2025-07-22 DEVICE — CLOSURE ANGIOSEAL 6FR 610130: Type: IMPLANTABLE DEVICE | Status: FUNCTIONAL

## 2025-07-22 RX ORDER — NALOXONE HYDROCHLORIDE 0.4 MG/ML
0.4 INJECTION, SOLUTION INTRAMUSCULAR; INTRAVENOUS; SUBCUTANEOUS
Status: DISCONTINUED | OUTPATIENT
Start: 2025-07-22 | End: 2025-07-22 | Stop reason: HOSPADM

## 2025-07-22 RX ORDER — NALOXONE HYDROCHLORIDE 0.4 MG/ML
0.2 INJECTION, SOLUTION INTRAMUSCULAR; INTRAVENOUS; SUBCUTANEOUS
Status: DISCONTINUED | OUTPATIENT
Start: 2025-07-22 | End: 2025-07-22 | Stop reason: HOSPADM

## 2025-07-22 RX ORDER — ATROPINE SULFATE 0.1 MG/ML
0.5 INJECTION INTRAVENOUS
Status: DISCONTINUED | OUTPATIENT
Start: 2025-07-22 | End: 2025-07-22 | Stop reason: HOSPADM

## 2025-07-22 RX ORDER — FLUMAZENIL 0.1 MG/ML
0.2 INJECTION, SOLUTION INTRAVENOUS
Status: DISCONTINUED | OUTPATIENT
Start: 2025-07-22 | End: 2025-07-22 | Stop reason: HOSPADM

## 2025-07-22 RX ORDER — ASPIRIN 325 MG
325 TABLET ORAL ONCE
Status: COMPLETED | OUTPATIENT
Start: 2025-07-22 | End: 2025-07-22

## 2025-07-22 RX ORDER — ASPIRIN 81 MG/1
243 TABLET, CHEWABLE ORAL ONCE
Status: COMPLETED | OUTPATIENT
Start: 2025-07-22 | End: 2025-07-22

## 2025-07-22 RX ORDER — SODIUM CHLORIDE 9 MG/ML
INJECTION, SOLUTION INTRAVENOUS CONTINUOUS
Status: DISCONTINUED | OUTPATIENT
Start: 2025-07-22 | End: 2025-07-22 | Stop reason: HOSPADM

## 2025-07-22 RX ORDER — ACETAMINOPHEN 325 MG/1
650 TABLET ORAL EVERY 4 HOURS PRN
Status: DISCONTINUED | OUTPATIENT
Start: 2025-07-22 | End: 2025-07-22 | Stop reason: HOSPADM

## 2025-07-22 RX ORDER — LIDOCAINE 40 MG/G
CREAM TOPICAL
Status: DISCONTINUED | OUTPATIENT
Start: 2025-07-22 | End: 2025-07-22 | Stop reason: HOSPADM

## 2025-07-22 RX ORDER — IOPAMIDOL 755 MG/ML
INJECTION, SOLUTION INTRAVASCULAR
Status: DISCONTINUED | OUTPATIENT
Start: 2025-07-22 | End: 2025-07-22 | Stop reason: HOSPADM

## 2025-07-22 RX ORDER — FENTANYL CITRATE 50 UG/ML
INJECTION, SOLUTION INTRAMUSCULAR; INTRAVENOUS
Status: DISCONTINUED | OUTPATIENT
Start: 2025-07-22 | End: 2025-07-22 | Stop reason: HOSPADM

## 2025-07-22 RX ADMIN — SODIUM CHLORIDE: 0.9 INJECTION, SOLUTION INTRAVENOUS at 12:07

## 2025-07-22 RX ADMIN — LIDOCAINE: 40 CREAM TOPICAL at 12:15

## 2025-07-22 ASSESSMENT — ACTIVITIES OF DAILY LIVING (ADL)
ADLS_ACUITY_SCORE: 55

## 2025-07-22 NOTE — Clinical Note
Potential access sites were evaluated for patency using ultrasound.   The right femoral artery and right femoral vein was selected. Access was obtained under with Sonosite guidance using a standard 18 guage needle with direct visualization of needle entry.

## 2025-07-22 NOTE — PROGRESS NOTES
S/p RHC, CORS and LHC.  Pt is stable, AVSS/SB with BBB.  Pain free.  Pt is on bed rest until 1800 (extended bed rest due to an elevated INR).  Rt groin site drsg CDI/no bleeding or hematoma.  Weak PP and LE CMS intact except base line tingling.     Pt is drinking fluid and eating snacks and tolerating.      Discharge instructions were reviewed with pt and verbalized understanding.  Pt and spouse were reminded that pt needs BMP lab 7/25/2025 to assess renal function.      Off bed rest at 1800 and monitor pt until 1900. Pt needs to walk and ambulate before she goes home.  Spouse at bed side.

## 2025-07-22 NOTE — PROGRESS NOTES
D/I/A: Pt arrived via litter and accompanied by RN off   monitor.  VSSA.  Rhythm upon arrival SB on monitor.  Denies pain or sob.  Reviewed activity restrictions and when to notify RN, ie-changes to breathing or increased chest pressure or chest pain.  CCL access:  Rt grion sheats were pulled out in CCL. Site CDI/no bleeding or hematoma (per report, pt developed hematoma shortly after sheaths pulled out in CCL/manual pressure was held and hematoma resolved. Up on arrival to 2A: Rt groin is soft/no bleeding or hamartoma)  P: Continue to monitor status.  Discharge to home once meeting criteria.

## 2025-07-22 NOTE — PROGRESS NOTES
Pt arrived to 2A from home for [facility], RHC and LHC. VSS. Denies pain. Consent obtained. Lab resulted.  H&P current. Appropriately NPO. ASA 325mg taken last night and this morning. Pedal pulse marked and groin prepped. Prep completed.  at bedside; will be transporting patient to home.

## 2025-07-22 NOTE — Clinical Note
dry, intact, no bleeding and no hematoma. 7F venous sheath removed from RFV, manual pressure held.  No complications.  4F arterial sheath in the RFA was up sized to a 6F arterial sheath then closed with Angio-Seal.

## 2025-07-22 NOTE — PRE-PROCEDURE
Consenting/Education for Cardiology Procedure: Right heart catheterization , Left heart catheterization, Possible percutaneous intervention, and Coronary angiogram    Patient understands we would like to perform the listed procedure(s) due to new exertional symptoms.    The patient understands the following:     The procedure was described to the patient in detail.    Moderate sedation is required for this procedure and the risks, benefits and alternatives to moderate sedation were discussed. Patient also understands risks and complications of the procedure which include but are not limited to bruising/swelling around the incision site, infection, bleeding, allergic reaction to local anesthetic, air embolism, arterial puncture, stroke, heart attack, need for emergency surgery, death.    Patient verbalized understanding of risks and benefits and has elected to proceed with the procedure or procedures listed above.    SERENA Guaman CNP  Cardiology

## 2025-07-22 NOTE — DISCHARGE INSTRUCTIONS
Going Home after a Coronary Angiogram  Right &  Left Heart Catheterization   ______________________________________________      After you go home:  Have an adult stay with you for 24 hours.  Drink plenty of fluids.  You may eat your normal diet, unless your doctor tells you otherwise.  For 24 hours:  Relax and take it easy.  Do NOT smoke.  Do NOT make any important or legal decisions.  Do NOT drive or operate machines at home or at work.  Do NOT drink alcohol.  Remove the Band-Aid after 24 hours. If there is minor oozing, apply another Band-aid and remove it after 12 hours.  For 2 days, do NOT have sex or do any heavy exercise.  Do NOT take a bath, or use a hot tub or pool for at least 3 days. You may shower after 24 hours.     Care of groin site:  It is normal to have a small bruise or lump at the site.  Do not scrub the site.  For the first 2 days: Do not stoop or squat. When you cough, sneeze or move your bowels, hold your hand over the puncture site and press gently.  Do not lift more than 10 pounds for at least 3 to 5 days.  Do not use lotion or powder near the puncture site for 3 days.    If you start bleeding from the site in your groin, lie down flat and press firmly  on the site. Call your doctor as soon as you can.    Medicines:    If you are on metformin (Glucophage), do not restart it until you have blood tests (within 2 to 3 days after discharge). When your doctor tells you it is safe, you may restart the metformin.  If you have stopped any other medicines, check with your nurse or provider about when to restart them.    Call 911 right away if you have bleeding that is heavy or does not stop.    Call your doctor if:  You have a large or growing hard lump around the site.  The site is red, swollen, hot or tender.  Blood or fluid is draining from the site.  You have chills or a fever greater than 101 F (38 C).  Your leg feels numb or cool.  You have hives, a rash or unusual itching.    Follow Up:    Basic metabolic panel is ordered for you to monitor your kidney function test following coronary angiogram.     On 7/25/2025 go to your clinic and have them do your Basic Metabolic Panel.    Per your primary cardiology team    If you have any questions or concerns regarding your procedure site please call [phone] at any time & press option 4 to speak to the .  Ask for the Cardiology Fellow on call.  They are available 24 hours a day.  You may also contact the Cardiology Clinic after hours number at [phone].                                                       Cardiology Clinic  [phone] M-F 0-321p: Press 1 to leave a message for your care team.   After hours [phone]   After 4:30 pm Monday-Friday, Weekends & Holidays  Ask for Interventional Cardiologist on call. Someone is on call 24 hours/day   [facility] toll free number [phone] Monday-Friday 8:00 am to 4:30 pm   [facility] Emergency Dept [phone]

## 2025-07-23 ENCOUNTER — TELEPHONE (OUTPATIENT)
Dept: ANTICOAGULATION | Facility: CLINIC | Age: 65
End: 2025-07-23
Payer: MEDICARE

## 2025-07-23 DIAGNOSIS — I48.0 PAF (PAROXYSMAL ATRIAL FIBRILLATION) (H): Chronic | ICD-10-CM

## 2025-07-23 DIAGNOSIS — I48.91 ATRIAL FIBRILLATION, UNSPECIFIED TYPE (H): ICD-10-CM

## 2025-07-23 DIAGNOSIS — Z79.01 LONG TERM CURRENT USE OF ANTICOAGULANT THERAPY: Primary | Chronic | ICD-10-CM

## 2025-07-23 NOTE — TELEPHONE ENCOUNTER
7/23/25:  Luz Marina replied via [employer] that she took a 7.5 mg dose of warfarin on 7/22/25.  Have instructed her to take 15 mg of warfarin today, 7/23/25, and then continue with maintenance dose.  Calendar updated. She states she has lab appointment scheduled on 7/25/25.  Nimisha Caraballo RN      .ANTICOAGULATION  MANAGEMENT: Discharge Review    Luz Marina Live chart reviewed for anticoagulation continuity of care    Outpatient surgery/procedure on 7/22/25 for Coronary angiogram; right and left heart cath.    Discharge disposition: Home    Results:    Recent labs: (last 7 days)     07/22/25  1102   INR 1.82*     Anticoagulation inpatient management:     not applicable     Anticoagulation discharge instructions:     Warfarin dosing: 15 mg x 1 on /22/25, then resume home maintenance dose (per plan noted 7/15/25)   Bridging: No   INR goal change: No      Medication changes affecting anticoagulation: No    Additional factors affecting anticoagulation: No     PLAN     No adjustment to anticoagulation plan needed    Left a detailed message for Luz Marina. My chart message also sent.    No adjustment to Anticoagulation Calendar was required    Nimisha Caraballo RN  7/23/2025  Anticoagulation Clinic  [facility] for routing messages: [facility]  [facility] patient phone line: [phone]

## 2025-07-27 ENCOUNTER — MYC REFILL (OUTPATIENT)
Dept: CARDIOLOGY | Facility: CLINIC | Age: 65
End: 2025-07-27
Payer: MEDICARE

## 2025-07-27 DIAGNOSIS — I50.32 CHRONIC DIASTOLIC CONGESTIVE HEART FAILURE (H): ICD-10-CM

## 2025-07-27 RX ORDER — FUROSEMIDE 20 MG/1
40 TABLET ORAL DAILY
Qty: 180 TABLET | Refills: 3 | Status: CANCELLED | OUTPATIENT
Start: 2025-07-27

## 2025-07-29 NOTE — TELEPHONE ENCOUNTER
furosemide (LASIX) 20 MG tablet : refills on file  - Rx sent 5/14/2025 Disp:180 R:3 Pharmacy: [facility]/pharmacy #[id number]   - message sent to patient

## 2025-07-30 ENCOUNTER — VIRTUAL VISIT (OUTPATIENT)
Dept: CARDIOLOGY | Facility: CLINIC | Age: 65
End: 2025-07-30
Attending: INTERNAL MEDICINE
Payer: MEDICARE

## 2025-07-30 DIAGNOSIS — I50.30 (HFPEF) HEART FAILURE WITH PRESERVED EJECTION FRACTION (H): ICD-10-CM

## 2025-07-30 DIAGNOSIS — I50.32 CHRONIC DIASTOLIC HEART FAILURE (H): ICD-10-CM

## 2025-07-30 DIAGNOSIS — I48.91 ATRIAL FIBRILLATION, UNSPECIFIED TYPE (H): ICD-10-CM

## 2025-07-30 DIAGNOSIS — I25.10 CORONARY ARTERY DISEASE INVOLVING NATIVE HEART WITHOUT ANGINA PECTORIS, UNSPECIFIED VESSEL OR LESION TYPE: Primary | ICD-10-CM

## 2025-07-30 DIAGNOSIS — R06.09 DYSPNEA ON EXERTION: ICD-10-CM

## 2025-07-30 DIAGNOSIS — G47.30 SLEEP APNEA: Primary | ICD-10-CM

## 2025-07-30 NOTE — PATIENT INSTRUCTIONS
"You were seen today in the Cardiovascular Clinic at the [facility].      Cardiology Providers you saw during your visit:  Dr. Twin Moreno     Recommendations:   No medication changes today.  Please follow-up with Dr. Moreno in January with labs prior.           Eat a heart healthy, low sodium diet.  Get 20 to 30 minutes of aerobic exercise 4 to 5 times per week as tolerated. (Examples of aerobic exercise include: walking, bicycling, swimming, running).     Thank you for your visit today!   Please MyChart message or call if you have any questions or concerns.      During Business Hours:  [phone], option # 1 ([facility])       After hours, weekends or holidays:   [phone], Option #4  Ask to speak to the On-Call Cardiologist. Inform them you are a heart failure patient at the [facility].      Kaylie Tran RN BSN CHFN  Cardiology Care Coordinator - C.O.R.[facility]  Questions and [phone]  First press #1 for the [employer] and then press #4 for \"Your Care Team\" to reach us Cardiology Nurses.                "

## 2025-07-30 NOTE — PROGRESS NOTES
Virtual Visit Details      Telephone with patient at home and I in clinic, 30 minutes to review interim history and results    1 SLE  2. Vaginal bleeding  3. ASHD with remote CABG  4. Exertional shortness of breath/HFpEF  5. Dependent edema   6. Elevated weight  7. Paroxysmal atrial fibrillation (warfarin)  8. KEITH  9. Collagen vascular disease  10. Tricuspid insufficiency    Plan: RTC in January, weight loss, will still consider weight loss drugs    There is no interim history of increasing shortness of breath, orthopnea, PND, ankle edema, increasing abdominal girth, palpitation, pre-syncope, syncope, bleeding or thromboembolic complications.  The patient is taking medication regularly, following a low salt diet, and exercising regularly as outlined.    Alert, oriented, normal gait and station, normal mental, JVP within normal limits, HJR negative,thyroid not enlarged, mucous membranes clear, abdomen without tenderness, rebound or guarding, skin clear of lesion, PMI normal, S1 S2 regular rhythm, no gallop, no edema      07/22/25   /67   Temp 98.1  F (36.7  C)   Pulse 48 (L)   Resp 16   SpO2 98 %   Weight (lbs) 253 lb 14.4 oz   Weight (kgs) 115.2 kg (253 lb 14.4 oz)   Note: Showing the most recent values for these dates. There are additional values that can be seen in Synopsis.  (L): Data is abnormally low      Wt Readings from Last 24 Encounters:   07/22/25 115.2 kg (253 lb 14.4 oz)   07/09/25 118.1 kg (260 lb 6.4 oz)   06/06/25 115.2 kg (254 lb)   01/15/25 112 kg (247 lb)   01/07/25 113.4 kg (250 lb)   11/26/24 113.9 kg (251 lb)   11/18/24 113.9 kg (251 lb)   10/16/24 115.2 kg (254 lb)   09/06/24 117.5 kg (259 lb)   04/17/24 115.2 kg (254 lb)   04/05/24 114.3 kg (252 lb)   11/29/23 113.6 kg (250 lb 8 oz)   11/28/23 113.4 kg (250 lb)   11/28/23 113.4 kg (250 lb)   10/26/23 113.4 kg (250 lb)   10/25/23 113.4 kg (250 lb)   10/18/23 113.4 kg (250 lb)   10/09/23 114.2 kg (251 lb 12.3 oz)   10/06/23 114.3 kg (251  lb 14.4 oz)   09/20/23 113.4 kg (250 lb)   09/13/23 114.7 kg (252 lb 13.9 oz)   07/26/23 108.9 kg (240 lb)   06/09/23 119.1 kg (262 lb 9.6 oz)   06/02/23 119.3 kg (263 lb)      Latest Reference Range & Units 07/25/25 14:01   Sodium 135 - 145 mmol/L 138   Potassium 3.4 - 5.3 mmol/L 4.7   Chloride 98 - 107 mmol/L 101   Carbon Dioxide (CO2) 22 - 29 mmol/L 28   Urea Nitrogen 8.0 - 23.0 mg/dL 41.7 (H)   Creatinine 0.51 - 0.95 mg/dL 1.71 (H)   GFR Estimate >60 mL/min/1.73m2 33 (L)   Calcium 8.8 - 10.4 mg/dL 10.1   Anion Gap 7 - 15 mmol/L 9   Cholesterol <200 mg/dL 94   Patient Fasting?  No  No   Glucose 70 - 99 mg/dL 103 (H)   HDL Cholesterol >=50 mg/dL 36 (L)   LDL Cholesterol Calculated <100 mg/dL 34   Non HDL Cholesterol <130 mg/dL 58   Triglycerides <150 mg/dL 121   INR Point of Care 0.9 - 1.1  1.7 (H)       07/2025      Hemodynamics    BP: 99/59 (64) mmHg  RA: 10 mmHg  RV: 53/14 mmHg  PA: 51/17 (28) mmHg  W: 17 mmHg  LV: 99/17 mmHg  PA sat: 69.7 %  Kannan CO/CI: 6.01/2.85  Thermo CO/CI: 4.38/2.07  SVR: 986 dsc-5  PVR: 2.51 GASCA         Ost LAD to Mid LAD lesion is 100% stenosed.    Dist RCA lesion is 100% stenosed.    Prox RCA lesion is 40% stenosed.    Prox Graft lesion is 40% stenosed.    1st Mrg lesion is 30% stenosed.    RPDA lesion is 20% stenosed.    Prox Cx to Mid Cx lesion is 30% stenosed.    Right sided filling pressures are mildly elevated.    Left sided filling pressures are mildly elevated.    Moderately elevated pulmonary artery hypertension.    Left ventricular filling pressures are mildly elevated.    Reduced cardiac output level.  Diagnostic  Dominance: Right  Left Anterior Descending   Ost LAD to Mid LAD lesion is 100% stenosed. The lesion is chronic total occlusion.      Second Diagonal Branch   The vessel is small. There is mild diffuse disease throughout the vessel.      Left Circumflex   Previously placed Ost Cx to Prox Cx stent of unknown type is widely patent.   Prox Cx to Mid Cx lesion is 30%  stenosed.      First Obtuse Marginal Branch   The vessel is moderate in size.   1st Mrg lesion is 30% stenosed with 100% stenosed side branch in Lat 1st Mrg. The lesion was previously treated using a stent of unknown type.      Lateral First Obtuse Marginal Branch   The vessel is small.      Second Obtuse Marginal Branch   The vessel is moderate in size.      Third Obtuse Marginal Branch   The vessel is small.      Right Coronary Artery   Prox RCA lesion is 40% stenosed. The lesion is eccentric.   Dist RCA lesion is 100% stenosed. The lesion is chronic total occlusion.      Right Posterior Descending Artery   RPDA lesion is 20% stenosed.      LIMA Graft To Dist LAD   The graft is large. The graft is angiographically normal.      Graft To Lat 1st Mrg   The graft is large. The graft is angiographically normal.      Graft To RPDA   The graft is large. The graft is angiographically normal.   Prox Graft lesion is 40% stenosed.                 Hemodynamics    BP: 99/59 (64) mmHg  RA: 10 mmHg  RV: 53/14 mmHg  PA: 51/17 (28) mmHg  W: 17 mmHg  LV: 99/17 mmHg  PA sat: 69.7 %  Kannan CO/CI: 6.01/2.85  Thermo CO/CI: 4.38/2.07  SVR: 986 dsc-5  PVR: 2.51 GASCA        Stable multivessel coronary artery disease with patent LIMA-LAD, SVG-OM, and SVG-PDA bypass grafts.  Mildly elevated bilateral filling pressures.  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  Moderately reduced cardiac output by thermodilution.  Successful uncomplicated right femoral arterial and venous access with hemostasis by 6 Fr Angioseal and manual compression respectively.

## 2025-07-30 NOTE — LETTER
7/30/2025      RE: Luz Marina Live  [address]  [place] [address]       Dear Colleague,    Thank you for the opportunity to participate in the care of your patient, Luz Marina Live, at the [facility] [place] [facility]. Please see a copy of my visit note below.    Virtual Visit Details      Telephone with patient at home and I in clinic, 30 minutes to review interim history and results    1 SLE  2. Vaginal bleeding  3. ASHD with remote CABG  4. Exertional shortness of breath/HFpEF  5. Dependent edema   6. Elevated weight  7. Paroxysmal atrial fibrillation (warfarin)  8. KEITH  9. Collagen vascular disease  10. Tricuspid insufficiency    Plan: RTC in January, weight loss, will still consider weight loss drugs    There is no interim history of increasing shortness of breath, orthopnea, PND, ankle edema, increasing abdominal girth, palpitation, pre-syncope, syncope, bleeding or thromboembolic complications.  The patient is taking medication regularly, following a low salt diet, and exercising regularly as outlined.    Alert, oriented, normal gait and station, normal mental, JVP within normal limits, HJR negative,thyroid not enlarged, mucous membranes clear, abdomen without tenderness, rebound or guarding, skin clear of lesion, PMI normal, S1 S2 regular rhythm, no gallop, no edema      07/22/25   /67   Temp 98.1  F (36.7  C)   Pulse 48 (L)   Resp 16   SpO2 98 %   Weight (lbs) 253 lb 14.4 oz   Weight (kgs) 115.2 kg (253 lb 14.4 oz)   Note: Showing the most recent values for these dates. There are additional values that can be seen in Synopsis.  (L): Data is abnormally low      Wt Readings from Last 24 Encounters:   07/22/25 115.2 kg (253 lb 14.4 oz)   07/09/25 118.1 kg (260 lb 6.4 oz)   06/06/25 115.2 kg (254 lb)   01/15/25 112 kg (247 lb)   01/07/25 113.4 kg (250 lb)   11/26/24 113.9 kg (251 lb)   11/18/24 113.9 kg (251 lb)   10/16/24  115.2 kg (254 lb)   09/06/24 117.5 kg (259 lb)   04/17/24 115.2 kg (254 lb)   04/05/24 114.3 kg (252 lb)   11/29/23 113.6 kg (250 lb 8 oz)   11/28/23 113.4 kg (250 lb)   11/28/23 113.4 kg (250 lb)   10/26/23 113.4 kg (250 lb)   10/25/23 113.4 kg (250 lb)   10/18/23 113.4 kg (250 lb)   10/09/23 114.2 kg (251 lb 12.3 oz)   10/06/23 114.3 kg (251 lb 14.4 oz)   09/20/23 113.4 kg (250 lb)   09/13/23 114.7 kg (252 lb 13.9 oz)   07/26/23 108.9 kg (240 lb)   06/09/23 119.1 kg (262 lb 9.6 oz)   06/02/23 119.3 kg (263 lb)      Latest Reference Range & Units 07/25/25 14:01   Sodium 135 - 145 mmol/L 138   Potassium 3.4 - 5.3 mmol/L 4.7   Chloride 98 - 107 mmol/L 101   Carbon Dioxide (CO2) 22 - 29 mmol/L 28   Urea Nitrogen 8.0 - 23.0 mg/dL 41.7 (H)   Creatinine 0.51 - 0.95 mg/dL 1.71 (H)   GFR Estimate >60 mL/min/1.73m2 33 (L)   Calcium 8.8 - 10.4 mg/dL 10.1   Anion Gap 7 - 15 mmol/L 9   Cholesterol <200 mg/dL 94   Patient Fasting?  No  No   Glucose 70 - 99 mg/dL 103 (H)   HDL Cholesterol >=50 mg/dL 36 (L)   LDL Cholesterol Calculated <100 mg/dL 34   Non HDL Cholesterol <130 mg/dL 58   Triglycerides <150 mg/dL 121   INR Point of Care 0.9 - 1.1  1.7 (H)       07/2025      Hemodynamics    BP: 99/59 (64) mmHg  RA: 10 mmHg  RV: 53/14 mmHg  PA: 51/17 (28) mmHg  W: 17 mmHg  LV: 99/17 mmHg  PA sat: 69.7 %  Kannan CO/CI: 6.01/2.85  Thermo CO/CI: 4.38/2.07  SVR: 986 dsc-5  PVR: 2.51 GASCA         Ost LAD to Mid LAD lesion is 100% stenosed.     Dist RCA lesion is 100% stenosed.     Prox RCA lesion is 40% stenosed.     Prox Graft lesion is 40% stenosed.     1st Mrg lesion is 30% stenosed.     RPDA lesion is 20% stenosed.     Prox Cx to Mid Cx lesion is 30% stenosed.     Right sided filling pressures are mildly elevated.     Left sided filling pressures are mildly elevated.     Moderately elevated pulmonary artery hypertension.     Left ventricular filling pressures are mildly elevated.     Reduced cardiac output level.  Diagnostic  Dominance:  Right  Left Anterior Descending   Ost LAD to Mid LAD lesion is 100% stenosed. The lesion is chronic total occlusion.      Second Diagonal Branch   The vessel is small. There is mild diffuse disease throughout the vessel.      Left Circumflex   Previously placed Ost Cx to Prox Cx stent of unknown type is widely patent.   Prox Cx to Mid Cx lesion is 30% stenosed.      First Obtuse Marginal Branch   The vessel is moderate in size.   1st Mrg lesion is 30% stenosed with 100% stenosed side branch in Lat 1st Mrg. The lesion was previously treated using a stent of unknown type.      Lateral First Obtuse Marginal Branch   The vessel is small.      Second Obtuse Marginal Branch   The vessel is moderate in size.      Third Obtuse Marginal Branch   The vessel is small.      Right Coronary Artery   Prox RCA lesion is 40% stenosed. The lesion is eccentric.   Dist RCA lesion is 100% stenosed. The lesion is chronic total occlusion.      Right Posterior Descending Artery   RPDA lesion is 20% stenosed.      LIMA Graft To Dist LAD   The graft is large. The graft is angiographically normal.      Graft To Lat 1st Mrg   The graft is large. The graft is angiographically normal.      Graft To RPDA   The graft is large. The graft is angiographically normal.   Prox Graft lesion is 40% stenosed.                 Hemodynamics    BP: 99/59 (64) mmHg  RA: 10 mmHg  RV: 53/14 mmHg  PA: 51/17 (28) mmHg  W: 17 mmHg  LV: 99/17 mmHg  PA sat: 69.7 %  Kannan CO/CI: 6.01/2.85  Thermo CO/CI: 4.38/2.07  SVR: 986 dsc-5  PVR: 2.51 GASCA        Stable multivessel coronary artery disease with patent LIMA-LAD, SVG-OM, and SVG-PDA bypass grafts.  Mildly elevated bilateral filling pressures.  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  Moderately reduced cardiac output by thermodilution.  Successful uncomplicated right femoral arterial and venous access with hemostasis by 6 Fr Angioseal and manual compression respectively.       Please do not hesitate to  contact me if you have any questions/concerns.     Sincerely,     Twin Moreno MD

## 2025-08-06 ENCOUNTER — ANCILLARY PROCEDURE (OUTPATIENT)
Dept: CARDIOLOGY | Facility: CLINIC | Age: 65
End: 2025-08-06
Payer: MEDICARE

## 2025-08-06 DIAGNOSIS — R06.09 DYSPNEA ON EXERTION: ICD-10-CM

## 2025-08-06 DIAGNOSIS — R07.89 CHEST PRESSURE: ICD-10-CM

## 2025-08-06 DIAGNOSIS — I50.30 HEART FAILURE WITH PRESERVED EJECTION FRACTION, UNSPECIFIED HF CHRONICITY (H): ICD-10-CM

## 2025-08-06 LAB — LVEF ECHO: NORMAL

## 2025-08-06 PROCEDURE — 93306 TTE W/DOPPLER COMPLETE: CPT | Performed by: STUDENT IN AN ORGANIZED HEALTH CARE EDUCATION/TRAINING PROGRAM

## 2025-08-12 ENCOUNTER — MYC MEDICAL ADVICE (OUTPATIENT)
Dept: CARDIOLOGY | Facility: CLINIC | Age: 65
End: 2025-08-12
Payer: MEDICARE

## 2025-08-13 ENCOUNTER — VIRTUAL VISIT (OUTPATIENT)
Dept: CARDIOLOGY | Facility: CLINIC | Age: 65
End: 2025-08-13
Attending: INTERNAL MEDICINE
Payer: MEDICARE

## 2025-08-13 VITALS — HEIGHT: 63 IN | WEIGHT: 247 LBS | BODY MASS INDEX: 43.77 KG/M2

## 2025-08-13 DIAGNOSIS — I50.30 HEART FAILURE WITH PRESERVED EJECTION FRACTION, UNSPECIFIED HF CHRONICITY (H): Primary | ICD-10-CM

## 2025-08-13 PROCEDURE — 98013 SYNCH AUDIO-ONLY EST LOW 20: CPT | Performed by: INTERNAL MEDICINE

## 2025-08-13 PROCEDURE — 1126F AMNT PAIN NOTED NONE PRSNT: CPT | Mod: 93 | Performed by: INTERNAL MEDICINE

## 2025-08-13 ASSESSMENT — PAIN SCALES - GENERAL: PAINLEVEL_OUTOF10: NO PAIN (0)

## 2025-08-17 ENCOUNTER — HEALTH MAINTENANCE LETTER (OUTPATIENT)
Age: 65
End: 2025-08-17

## 2025-08-21 ENCOUNTER — MYC MEDICAL ADVICE (OUTPATIENT)
Facility: CLINIC | Age: 65
End: 2025-08-21
Payer: MEDICARE

## 2025-08-26 ENCOUNTER — MYC MEDICAL ADVICE (OUTPATIENT)
Dept: ANTICOAGULATION | Facility: CLINIC | Age: 65
End: 2025-08-26
Payer: MEDICARE

## 2025-08-27 ENCOUNTER — LAB (OUTPATIENT)
Dept: LAB | Facility: CLINIC | Age: 65
End: 2025-08-27
Payer: MEDICARE

## 2025-08-27 ENCOUNTER — MYC REFILL (OUTPATIENT)
Dept: CARDIOLOGY | Facility: CLINIC | Age: 65
End: 2025-08-27

## 2025-08-27 ENCOUNTER — ANTICOAGULATION THERAPY VISIT (OUTPATIENT)
Dept: ANTICOAGULATION | Facility: CLINIC | Age: 65
End: 2025-08-27

## 2025-08-27 DIAGNOSIS — I48.0 PAF (PAROXYSMAL ATRIAL FIBRILLATION) (H): Chronic | ICD-10-CM

## 2025-08-27 DIAGNOSIS — E78.00 PURE HYPERCHOLESTEROLEMIA: Primary | ICD-10-CM

## 2025-08-27 DIAGNOSIS — Z79.01 LONG TERM CURRENT USE OF ANTICOAGULANT THERAPY: Chronic | ICD-10-CM

## 2025-08-27 DIAGNOSIS — I48.91 ATRIAL FIBRILLATION, UNSPECIFIED TYPE (H): ICD-10-CM

## 2025-08-27 DIAGNOSIS — Z79.01 LONG TERM CURRENT USE OF ANTICOAGULANT THERAPY: Primary | Chronic | ICD-10-CM

## 2025-08-27 LAB — INR BLD: 2.7 (ref 0.9–1.1)

## 2025-08-27 PROCEDURE — 85610 PROTHROMBIN TIME: CPT

## 2025-08-27 PROCEDURE — 36416 COLLJ CAPILLARY BLOOD SPEC: CPT

## 2025-08-28 RX ORDER — METOPROLOL TARTRATE 50 MG
50 TABLET ORAL 2 TIMES DAILY
Qty: 180 TABLET | Refills: 2 | Status: SHIPPED | OUTPATIENT
Start: 2025-08-28

## 2025-09-02 RX ORDER — ATORVASTATIN CALCIUM 40 MG/1
40 TABLET, FILM COATED ORAL DAILY
Qty: 90 TABLET | Refills: 1 | Status: SHIPPED | OUTPATIENT
Start: 2025-09-02

## (undated) DEVICE — CATH ANGIO MULTIPURPOSE AL1 SIDEHOLES 6FRX100CM 533640

## (undated) DEVICE — CATH ANGIO SUPERTORQUE PLUS JR4 6FRX100CM 533621

## (undated) DEVICE — INTRO SHEATH 6FRX25CM PINNACLE RSS606

## (undated) DEVICE — PACK HEART RIGHT CUSTOM SAN32RHF18

## (undated) DEVICE — Device

## (undated) DEVICE — 0.035IN X 260CM, EMERALD DIAGNOSTIC GUIDEWIRE, FIXED-CORE PTFE COATED, STANDARD, 3MM EXCHANGE J-TIP (EA/1)

## (undated) DEVICE — CATH ANGIO INFINITI 3DRC 4FRX100CM 538476

## (undated) DEVICE — INTRO GLIDESHEATH SLENDER 6FR 10X45CM 60-1060

## (undated) DEVICE — CATH BALLOON EMERGE 2.5X12MM H7493918912250

## (undated) DEVICE — WIRE GUIDE 0.035"X150CM EMERALD J TIP 502521

## (undated) DEVICE — INTRO SHEATH AVANTI 4FRX23CM 504604T

## (undated) DEVICE — SYSTEM PANNUS RETENTION 4 PAD 2 STRAP CZ-PRS-04

## (undated) DEVICE — INTRO SHEATH 7FRX10CM PINNACLE RSS702

## (undated) DEVICE — VALVE HEMOSTASIS .096" COPILOT MECH 1003331

## (undated) DEVICE — GW VASC .035IN DIA 260CML 7CML 3 MM RADIUS J CURVE 502455

## (undated) DEVICE — SHTH INTRO 0.021IN ID 6FR DIA

## (undated) DEVICE — CATH ANGIO INFINITI JL4 4FRX100CM 538420

## (undated) DEVICE — CATH ANGIO SUPERTORQUE IM 6FRX100CM 533660

## (undated) DEVICE — CATH ANGIO 6FR JL3.5 100CM ST+

## (undated) DEVICE — CATH BALLOON NC EMERGE 4.00X12MM H7493926712400

## (undated) DEVICE — SLEEVE TR BAND RADIAL COMPRESSION DEVICE 29CM XX-RF06L

## (undated) DEVICE — PACK HEART LEFT CUSTOM PC15OT92B

## (undated) DEVICE — KIT HAND CONTROL ACIST 014644 AR-P54

## (undated) DEVICE — STATLOCK PSI DEVICE

## (undated) DEVICE — MANIFOLD KIT ANGIO AUTOMATED 014613

## (undated) DEVICE — INTRO SHEATH 7FRX25CM PINNACLE RSS706

## (undated) DEVICE — TUBING PRESSURE 30"

## (undated) DEVICE — CATH ANGIO SUPERTORQUE AL1 6FRX100CM 532-645

## (undated) DEVICE — CATH DIAG 4FR ANG PIG 538453S

## (undated) DEVICE — GUIDEWIRE VASC 0.014INX180CM RUNTHROUGH 25-1011

## (undated) DEVICE — CATH GUIDING BLUE YELLOW PTFE XB3 6FRX100CM 67005200

## (undated) DEVICE — KIT HAND CONTROL ACIST 016795

## (undated) DEVICE — CATH ANGIO INFINITI IM 4FRX100CM 538460

## (undated) DEVICE — PACK HEART LEFT CUSTOM

## (undated) DEVICE — KIT RIGHT HEART CATH 60130719

## (undated) DEVICE — CATH ANGIO SUPERTORQUE PLUS JL4 6FRX100CM 533620

## (undated) DEVICE — TUBING ANGIOGRAPHY EXCITE NYLON POLYURETHANE 1200 PSI L30 IN

## (undated) DEVICE — KIT ACCESSORY INTRO INFLATION SYS 20/30 PRIORITY 1000186-115

## (undated) RX ORDER — FENTANYL CITRATE 50 UG/ML
INJECTION, SOLUTION INTRAMUSCULAR; INTRAVENOUS
Status: DISPENSED
Start: 2019-10-17

## (undated) RX ORDER — FENTANYL CITRATE 50 UG/ML
INJECTION, SOLUTION INTRAMUSCULAR; INTRAVENOUS
Status: DISPENSED
Start: 2017-11-28

## (undated) RX ORDER — LIDOCAINE HYDROCHLORIDE 10 MG/ML
INJECTION, SOLUTION EPIDURAL; INFILTRATION; INTRACAUDAL; PERINEURAL
Status: DISPENSED
Start: 2023-10-09

## (undated) RX ORDER — FENTANYL CITRATE 50 UG/ML
INJECTION, SOLUTION INTRAMUSCULAR; INTRAVENOUS
Status: DISPENSED
Start: 2023-10-09

## (undated) RX ORDER — LIDOCAINE 40 MG/G
CREAM TOPICAL
Status: DISPENSED
Start: 2025-07-22

## (undated) RX ORDER — NITROGLYCERIN 5 MG/ML
VIAL (ML) INTRAVENOUS
Status: DISPENSED
Start: 2023-10-09

## (undated) RX ORDER — LIDOCAINE 40 MG/G
CREAM TOPICAL
Status: DISPENSED
Start: 2018-04-30

## (undated) RX ORDER — SODIUM CHLORIDE 9 MG/ML
INJECTION, SOLUTION INTRAVENOUS
Status: DISPENSED
Start: 2023-10-09

## (undated) RX ORDER — HEPARIN SODIUM 1000 [USP'U]/ML
INJECTION, SOLUTION INTRAVENOUS; SUBCUTANEOUS
Status: DISPENSED
Start: 2023-10-09

## (undated) RX ORDER — LIDOCAINE 40 MG/G
CREAM TOPICAL
Status: DISPENSED
Start: 2021-08-20

## (undated) RX ORDER — HEPARIN SODIUM 1000 [USP'U]/ML
INJECTION, SOLUTION INTRAVENOUS; SUBCUTANEOUS
Status: DISPENSED
Start: 2021-08-20

## (undated) RX ORDER — NITROGLYCERIN 5 MG/ML
VIAL (ML) INTRAVENOUS
Status: DISPENSED
Start: 2021-08-20

## (undated) RX ORDER — SIMETHICONE 40MG/0.6ML
SUSPENSION, DROPS(FINAL DOSAGE FORM)(ML) ORAL
Status: DISPENSED
Start: 2025-01-09

## (undated) RX ORDER — LIDOCAINE HYDROCHLORIDE 10 MG/ML
INJECTION, SOLUTION EPIDURAL; INFILTRATION; INTRACAUDAL; PERINEURAL
Status: DISPENSED
Start: 2021-08-20

## (undated) RX ORDER — FENTANYL CITRATE 50 UG/ML
INJECTION, SOLUTION INTRAMUSCULAR; INTRAVENOUS
Status: DISPENSED
Start: 2021-08-20

## (undated) RX ORDER — SODIUM CHLORIDE 9 MG/ML
INJECTION, SOLUTION INTRAVENOUS
Status: DISPENSED
Start: 2021-08-20

## (undated) RX ORDER — HEPARIN SODIUM 1000 [USP'U]/ML
INJECTION, SOLUTION INTRAVENOUS; SUBCUTANEOUS
Status: DISPENSED
Start: 2019-10-17

## (undated) RX ORDER — SODIUM CHLORIDE 9 MG/ML
INJECTION, SOLUTION INTRAVENOUS
Status: DISPENSED
Start: 2025-07-22

## (undated) RX ORDER — NICARDIPINE HCL-0.9% SOD CHLOR 1 MG/10 ML
SYRINGE (ML) INTRAVENOUS
Status: DISPENSED
Start: 2023-10-09

## (undated) RX ORDER — SIMETHICONE 20 MG/.3ML
EMULSION ORAL
Status: DISPENSED
Start: 2017-11-28

## (undated) RX ORDER — ASPIRIN 81 MG/1
TABLET, CHEWABLE ORAL
Status: DISPENSED
Start: 2021-08-20

## (undated) RX ORDER — NICARDIPINE HCL-0.9% SOD CHLOR 1 MG/10 ML
SYRINGE (ML) INTRAVENOUS
Status: DISPENSED
Start: 2021-08-20

## (undated) RX ORDER — FENTANYL CITRATE 50 UG/ML
INJECTION, SOLUTION INTRAMUSCULAR; INTRAVENOUS
Status: DISPENSED
Start: 2025-07-22

## (undated) RX ORDER — NITROGLYCERIN 5 MG/ML
VIAL (ML) INTRAVENOUS
Status: DISPENSED
Start: 2019-10-17

## (undated) RX ORDER — LIDOCAINE 40 MG/G
CREAM TOPICAL
Status: DISPENSED
Start: 2023-09-13